# Patient Record
Sex: FEMALE | Race: BLACK OR AFRICAN AMERICAN | NOT HISPANIC OR LATINO | URBAN - METROPOLITAN AREA
[De-identification: names, ages, dates, MRNs, and addresses within clinical notes are randomized per-mention and may not be internally consistent; named-entity substitution may affect disease eponyms.]

---

## 2014-09-04 RX ORDER — MORPHINE SULFATE 50 MG/1
1 CAPSULE, EXTENDED RELEASE ORAL
Qty: 0 | Refills: 0 | COMMUNITY
Start: 2014-09-04

## 2014-09-04 RX ORDER — MORPHINE SULFATE 50 MG/1
1 CAPSULE, EXTENDED RELEASE ORAL
Qty: 0 | Refills: 0 | DISCHARGE
Start: 2014-09-04

## 2014-09-04 RX ORDER — HYDROMORPHONE HYDROCHLORIDE 2 MG/ML
1 INJECTION INTRAMUSCULAR; INTRAVENOUS; SUBCUTANEOUS
Qty: 0 | Refills: 0 | DISCHARGE
Start: 2014-09-04

## 2017-02-21 ENCOUNTER — INPATIENT (INPATIENT)
Facility: HOSPITAL | Age: 72
LOS: 1 days | Discharge: HOME HEALTH | End: 2017-02-23
Attending: HOSPITALIST | Admitting: HOSPITALIST
Payer: MEDICARE

## 2017-02-21 VITALS
DIASTOLIC BLOOD PRESSURE: 70 MMHG | SYSTOLIC BLOOD PRESSURE: 160 MMHG | OXYGEN SATURATION: 100 % | TEMPERATURE: 98 F | RESPIRATION RATE: 16 BRPM | HEART RATE: 71 BPM

## 2017-02-21 DIAGNOSIS — M54.5 LOW BACK PAIN: ICD-10-CM

## 2017-02-21 LAB
ALBUMIN SERPL ELPH-MCNC: 3.7 G/DL — SIGNIFICANT CHANGE UP (ref 3.3–5)
ALP SERPL-CCNC: 49 U/L — SIGNIFICANT CHANGE UP (ref 40–120)
ALT FLD-CCNC: 14 U/L — SIGNIFICANT CHANGE UP (ref 4–33)
ANISOCYTOSIS BLD QL: SLIGHT — SIGNIFICANT CHANGE UP
APPEARANCE UR: CLEAR — SIGNIFICANT CHANGE UP
AST SERPL-CCNC: 33 U/L — HIGH (ref 4–32)
BASOPHILS # BLD AUTO: 0.01 K/UL — SIGNIFICANT CHANGE UP (ref 0–0.2)
BASOPHILS NFR BLD AUTO: 0 % — SIGNIFICANT CHANGE UP (ref 0–2)
BILIRUB SERPL-MCNC: 0.5 MG/DL — SIGNIFICANT CHANGE UP (ref 0.2–1.2)
BILIRUB UR-MCNC: NEGATIVE — SIGNIFICANT CHANGE UP
BLOOD UR QL VISUAL: NEGATIVE — SIGNIFICANT CHANGE UP
BUN SERPL-MCNC: 36 MG/DL — HIGH (ref 7–23)
CALCIUM SERPL-MCNC: 9.3 MG/DL — SIGNIFICANT CHANGE UP (ref 8.4–10.5)
CHLORIDE SERPL-SCNC: 101 MMOL/L — SIGNIFICANT CHANGE UP (ref 98–107)
CK MB BLD-MCNC: 2.39 NG/ML — SIGNIFICANT CHANGE UP (ref 1–4.7)
CK MB BLD-MCNC: SIGNIFICANT CHANGE UP (ref 0–2.5)
CK SERPL-CCNC: 88 U/L — SIGNIFICANT CHANGE UP (ref 25–170)
CO2 SERPL-SCNC: 24 MMOL/L — SIGNIFICANT CHANGE UP (ref 22–31)
COLOR SPEC: SIGNIFICANT CHANGE UP
CREAT SERPL-MCNC: 1.26 MG/DL — SIGNIFICANT CHANGE UP (ref 0.5–1.3)
EOSINOPHIL # BLD AUTO: 0 K/UL — SIGNIFICANT CHANGE UP (ref 0–0.5)
EOSINOPHIL NFR BLD AUTO: 0 % — SIGNIFICANT CHANGE UP (ref 0–6)
GLUCOSE SERPL-MCNC: 148 MG/DL — HIGH (ref 70–99)
GLUCOSE UR-MCNC: SIGNIFICANT CHANGE UP
HCT VFR BLD CALC: 33.6 % — LOW (ref 34.5–45)
HGB BLD-MCNC: 11.6 G/DL — SIGNIFICANT CHANGE UP (ref 11.5–15.5)
HYALINE CASTS # UR AUTO: SIGNIFICANT CHANGE UP (ref 0–?)
HYPOCHROMIA BLD QL: SLIGHT — SIGNIFICANT CHANGE UP
IMM GRANULOCYTES NFR BLD AUTO: 0.6 % — SIGNIFICANT CHANGE UP (ref 0–1.5)
KETONES UR-MCNC: NEGATIVE — SIGNIFICANT CHANGE UP
LEUKOCYTE ESTERASE UR-ACNC: NEGATIVE — SIGNIFICANT CHANGE UP
LG PLATELETS BLD QL AUTO: SLIGHT — SIGNIFICANT CHANGE UP
LIDOCAIN IGE QN: 29.3 U/L — SIGNIFICANT CHANGE UP (ref 7–60)
LYMPHOCYTES # BLD AUTO: 1.23 K/UL — SIGNIFICANT CHANGE UP (ref 1–3.3)
LYMPHOCYTES # BLD AUTO: 5.7 % — LOW (ref 13–44)
MANUAL SMEAR VERIFICATION: SIGNIFICANT CHANGE UP
MCHC RBC-ENTMCNC: 24.2 PG — LOW (ref 27–34)
MCHC RBC-ENTMCNC: 34.5 % — SIGNIFICANT CHANGE UP (ref 32–36)
MCV RBC AUTO: 70.1 FL — LOW (ref 80–100)
MICROCYTES BLD QL: SLIGHT — SIGNIFICANT CHANGE UP
MONOCYTES # BLD AUTO: 1.56 K/UL — HIGH (ref 0–0.9)
MONOCYTES NFR BLD AUTO: 7.3 % — SIGNIFICANT CHANGE UP (ref 2–14)
NEUTROPHILS # BLD AUTO: 18.56 K/UL — HIGH (ref 1.8–7.4)
NEUTROPHILS NFR BLD AUTO: 86.4 % — HIGH (ref 43–77)
NITRITE UR-MCNC: NEGATIVE — SIGNIFICANT CHANGE UP
NON-SQ EPI CELLS # UR AUTO: <1 — SIGNIFICANT CHANGE UP
OVALOCYTES BLD QL SMEAR: SLIGHT — SIGNIFICANT CHANGE UP
PH UR: 6.5 — SIGNIFICANT CHANGE UP (ref 4.6–8)
PLATELET # BLD AUTO: 229 K/UL — SIGNIFICANT CHANGE UP (ref 150–400)
PLATELET COUNT - ESTIMATE: NORMAL — SIGNIFICANT CHANGE UP
PMV BLD: 9.5 FL — SIGNIFICANT CHANGE UP (ref 7–13)
POLYCHROMASIA BLD QL SMEAR: SLIGHT — SIGNIFICANT CHANGE UP
POTASSIUM SERPL-MCNC: 5.4 MMOL/L — HIGH (ref 3.5–5.3)
POTASSIUM SERPL-SCNC: 5.4 MMOL/L — HIGH (ref 3.5–5.3)
PROT SERPL-MCNC: 7.5 G/DL — SIGNIFICANT CHANGE UP (ref 6–8.3)
PROT UR-MCNC: 10 — SIGNIFICANT CHANGE UP
RBC # BLD: 4.79 M/UL — SIGNIFICANT CHANGE UP (ref 3.8–5.2)
RBC # FLD: 17.9 % — HIGH (ref 10.3–14.5)
SODIUM SERPL-SCNC: 141 MMOL/L — SIGNIFICANT CHANGE UP (ref 135–145)
SP GR SPEC: 1.01 — SIGNIFICANT CHANGE UP (ref 1–1.03)
SQUAMOUS # UR AUTO: SIGNIFICANT CHANGE UP
TARGETS BLD QL SMEAR: SLIGHT — SIGNIFICANT CHANGE UP
TROPONIN T SERPL-MCNC: < 0.06 NG/ML — SIGNIFICANT CHANGE UP (ref 0–0.06)
UROBILINOGEN FLD QL: NORMAL E.U. — SIGNIFICANT CHANGE UP (ref 0.1–0.2)
WBC # BLD: 21.49 K/UL — HIGH (ref 3.8–10.5)
WBC # FLD AUTO: 21.49 K/UL — HIGH (ref 3.8–10.5)
WBC UR QL: SIGNIFICANT CHANGE UP (ref 0–?)

## 2017-02-21 PROCEDURE — 72131 CT LUMBAR SPINE W/O DYE: CPT | Mod: 26

## 2017-02-21 PROCEDURE — 71010: CPT | Mod: 26

## 2017-02-21 RX ORDER — HYDROMORPHONE HYDROCHLORIDE 2 MG/ML
2 INJECTION INTRAMUSCULAR; INTRAVENOUS; SUBCUTANEOUS ONCE
Qty: 0 | Refills: 0 | Status: DISCONTINUED | OUTPATIENT
Start: 2017-02-21 | End: 2017-02-21

## 2017-02-21 RX ORDER — HYDRALAZINE HCL 50 MG
25 TABLET ORAL ONCE
Qty: 0 | Refills: 0 | Status: COMPLETED | OUTPATIENT
Start: 2017-02-21 | End: 2017-02-21

## 2017-02-21 RX ADMIN — HYDROMORPHONE HYDROCHLORIDE 2 MILLIGRAM(S): 2 INJECTION INTRAMUSCULAR; INTRAVENOUS; SUBCUTANEOUS at 22:30

## 2017-02-21 RX ADMIN — HYDROMORPHONE HYDROCHLORIDE 2 MILLIGRAM(S): 2 INJECTION INTRAMUSCULAR; INTRAVENOUS; SUBCUTANEOUS at 19:27

## 2017-02-21 RX ADMIN — HYDROMORPHONE HYDROCHLORIDE 2 MILLIGRAM(S): 2 INJECTION INTRAMUSCULAR; INTRAVENOUS; SUBCUTANEOUS at 22:12

## 2017-02-21 RX ADMIN — HYDROMORPHONE HYDROCHLORIDE 2 MILLIGRAM(S): 2 INJECTION INTRAMUSCULAR; INTRAVENOUS; SUBCUTANEOUS at 20:40

## 2017-02-21 NOTE — ED PROVIDER NOTE - PROGRESS NOTE DETAILS
ZULMA Mays- Spoke with Cards fellow Maikel at [phone] and informed them that pt was in the ER and being admitted to medicine.

## 2017-02-21 NOTE — ED PROVIDER NOTE - MEDICAL DECISION MAKING DETAILS
70 yo female w/ lower back pain, acute on chronic r/o fracture w/ ct scan  chest pain r/o acs: cbc , cmp, trop, ekg, ck, ckmb, cxr

## 2017-02-21 NOTE — ED PROVIDER NOTE - OBJECTIVE STATEMENT
72 yo F w/ PMH hypothyroidism, adrenal insufficiency, lumbar spinal stenosis s/p laminectomy x 2 (most recent 2011), CKD, HTN, p/w lower back pain.  Patient completed recent course of steroids for the lower back pain which has been present the past two weeks. Family decided to bring in the patient in because she was complaining of burning in her chest starting today at 4 o'clock.  Denies bowel or bladder incontinence, fevers ,chills, blurry vision or headaches.  Patient takes Dialudid and Morphine chronically.  Denies n/v.  States that the chest pain and back pain are not related.

## 2017-02-21 NOTE — ED ADULT TRIAGE NOTE - CHIEF COMPLAINT QUOTE
C/o chest pain and nausea/vomiting since today and back pain x 2 weeks. Denies sob/dizziness/lightheadedness/fevers/chills/diarrhea. PMH HTN

## 2017-02-21 NOTE — ED PROVIDER NOTE - PMH
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

## 2017-02-21 NOTE — ED PROVIDER NOTE - ATTENDING CONTRIBUTION TO CARE
ED Attending Dr. Sanchez: 72 yo female with hypothyroidism, adrenal insufficiency, spinal stenosis s/p surgical repair x 2, with history of need for steroid taper when back pain worsens, in ED with low back pain and today chest pain.  Pt notes that she has pain to left low back for a few days and today developed epigastric chest/abdominal pain described as a burning.  She finished a steroid taper today for pain and daughter states that pt has had epigastric pain from using steroids in the past.  This is associated with N/V but no diarrhea.  Pt able to ambulate at baseline, and still able to move lower extremities, but walking more slowly due to pain.  No recent falls as per daughter.  No bowel/bladder incontinence.  No fevers.  On exam pt uncomfortable appearing, in moderate distress, heart RRR, lungs CTAB, abd TTP LLQ, extremities without swelling, strength 5/5 in all extremities but significant pain, left low back with TTP, no obvious swelling or collection, and skin without rash.

## 2017-02-22 DIAGNOSIS — Z41.8 ENCOUNTER FOR OTHER PROCEDURES FOR PURPOSES OTHER THAN REMEDYING HEALTH STATE: ICD-10-CM

## 2017-02-22 DIAGNOSIS — I10 ESSENTIAL (PRIMARY) HYPERTENSION: ICD-10-CM

## 2017-02-22 DIAGNOSIS — D72.829 ELEVATED WHITE BLOOD CELL COUNT, UNSPECIFIED: ICD-10-CM

## 2017-02-22 DIAGNOSIS — M54.5 LOW BACK PAIN: ICD-10-CM

## 2017-02-22 DIAGNOSIS — E27.40 UNSPECIFIED ADRENOCORTICAL INSUFFICIENCY: ICD-10-CM

## 2017-02-22 DIAGNOSIS — E03.9 HYPOTHYROIDISM, UNSPECIFIED: ICD-10-CM

## 2017-02-22 LAB
BASOPHILS # BLD AUTO: 0.02 K/UL — SIGNIFICANT CHANGE UP (ref 0–0.2)
BASOPHILS NFR BLD AUTO: 0.1 % — SIGNIFICANT CHANGE UP (ref 0–2)
BUN SERPL-MCNC: 32 MG/DL — HIGH (ref 7–23)
CALCIUM SERPL-MCNC: 9.2 MG/DL — SIGNIFICANT CHANGE UP (ref 8.4–10.5)
CHLORIDE SERPL-SCNC: 99 MMOL/L — SIGNIFICANT CHANGE UP (ref 98–107)
CHOLEST SERPL-MCNC: 168 MG/DL — SIGNIFICANT CHANGE UP (ref 120–199)
CK MB BLD-MCNC: 1.71 NG/ML — SIGNIFICANT CHANGE UP (ref 1–4.7)
CK SERPL-CCNC: 67 U/L — SIGNIFICANT CHANGE UP (ref 25–170)
CO2 SERPL-SCNC: 27 MMOL/L — SIGNIFICANT CHANGE UP (ref 22–31)
CREAT SERPL-MCNC: 1.21 MG/DL — SIGNIFICANT CHANGE UP (ref 0.5–1.3)
EOSINOPHIL # BLD AUTO: 0.02 K/UL — SIGNIFICANT CHANGE UP (ref 0–0.5)
EOSINOPHIL NFR BLD AUTO: 0.1 % — SIGNIFICANT CHANGE UP (ref 0–6)
GLUCOSE SERPL-MCNC: 85 MG/DL — SIGNIFICANT CHANGE UP (ref 70–99)
HBA1C BLD-MCNC: 5.9 % — HIGH (ref 4–5.6)
HCT VFR BLD CALC: 32.9 % — LOW (ref 34.5–45)
HDLC SERPL-MCNC: 81 MG/DL — HIGH (ref 45–65)
HGB BLD-MCNC: 11.1 G/DL — LOW (ref 11.5–15.5)
IMM GRANULOCYTES NFR BLD AUTO: 0.6 % — SIGNIFICANT CHANGE UP (ref 0–1.5)
LIPID PNL WITH DIRECT LDL SERPL: 93 MG/DL — SIGNIFICANT CHANGE UP
LYMPHOCYTES # BLD AUTO: 15.8 % — SIGNIFICANT CHANGE UP (ref 13–44)
LYMPHOCYTES # BLD AUTO: 2.64 K/UL — SIGNIFICANT CHANGE UP (ref 1–3.3)
MCHC RBC-ENTMCNC: 23.7 PG — LOW (ref 27–34)
MCHC RBC-ENTMCNC: 33.7 % — SIGNIFICANT CHANGE UP (ref 32–36)
MCV RBC AUTO: 70.3 FL — LOW (ref 80–100)
MONOCYTES # BLD AUTO: 1.66 K/UL — HIGH (ref 0–0.9)
MONOCYTES NFR BLD AUTO: 10 % — SIGNIFICANT CHANGE UP (ref 2–14)
NEUTROPHILS # BLD AUTO: 12.22 K/UL — HIGH (ref 1.8–7.4)
NEUTROPHILS NFR BLD AUTO: 73.4 % — SIGNIFICANT CHANGE UP (ref 43–77)
PLATELET # BLD AUTO: 162 K/UL — SIGNIFICANT CHANGE UP (ref 150–400)
PMV BLD: 10 FL — SIGNIFICANT CHANGE UP (ref 7–13)
POTASSIUM SERPL-MCNC: 4.2 MMOL/L — SIGNIFICANT CHANGE UP (ref 3.5–5.3)
POTASSIUM SERPL-SCNC: 4.2 MMOL/L — SIGNIFICANT CHANGE UP (ref 3.5–5.3)
RBC # BLD: 4.68 M/UL — SIGNIFICANT CHANGE UP (ref 3.8–5.2)
RBC # FLD: 18 % — HIGH (ref 10.3–14.5)
SODIUM SERPL-SCNC: 141 MMOL/L — SIGNIFICANT CHANGE UP (ref 135–145)
TRIGL SERPL-MCNC: 93 MG/DL — SIGNIFICANT CHANGE UP (ref 10–149)
TROPONIN T SERPL-MCNC: < 0.06 NG/ML — SIGNIFICANT CHANGE UP (ref 0–0.06)
TSH SERPL-MCNC: 0.01 UIU/ML — LOW (ref 0.27–4.2)
WBC # BLD: 16.66 K/UL — HIGH (ref 3.8–10.5)
WBC # FLD AUTO: 16.66 K/UL — HIGH (ref 3.8–10.5)

## 2017-02-22 PROCEDURE — 99222 1ST HOSP IP/OBS MODERATE 55: CPT

## 2017-02-22 RX ORDER — PANTOPRAZOLE SODIUM 20 MG/1
40 TABLET, DELAYED RELEASE ORAL
Qty: 0 | Refills: 0 | Status: DISCONTINUED | OUTPATIENT
Start: 2017-02-22 | End: 2017-02-23

## 2017-02-22 RX ORDER — HEPARIN SODIUM 5000 [USP'U]/ML
5000 INJECTION INTRAVENOUS; SUBCUTANEOUS EVERY 8 HOURS
Qty: 0 | Refills: 0 | Status: DISCONTINUED | OUTPATIENT
Start: 2017-02-22 | End: 2017-02-23

## 2017-02-22 RX ORDER — LISINOPRIL 2.5 MG/1
40 TABLET ORAL DAILY
Qty: 0 | Refills: 0 | Status: DISCONTINUED | OUTPATIENT
Start: 2017-02-22 | End: 2017-02-23

## 2017-02-22 RX ORDER — ASPIRIN/CALCIUM CARB/MAGNESIUM 324 MG
81 TABLET ORAL DAILY
Qty: 0 | Refills: 0 | Status: DISCONTINUED | OUTPATIENT
Start: 2017-02-22 | End: 2017-02-23

## 2017-02-22 RX ORDER — HYDROCORTISONE 20 MG
20 TABLET ORAL
Qty: 0 | Refills: 0 | Status: DISCONTINUED | OUTPATIENT
Start: 2017-02-22 | End: 2017-02-22

## 2017-02-22 RX ORDER — MORPHINE SULFATE 50 MG/1
30 CAPSULE, EXTENDED RELEASE ORAL EVERY 12 HOURS
Qty: 0 | Refills: 0 | Status: DISCONTINUED | OUTPATIENT
Start: 2017-02-22 | End: 2017-02-23

## 2017-02-22 RX ORDER — ONDANSETRON 8 MG/1
4 TABLET, FILM COATED ORAL ONCE
Qty: 0 | Refills: 0 | Status: COMPLETED | OUTPATIENT
Start: 2017-02-22 | End: 2017-02-22

## 2017-02-22 RX ORDER — HYDROCORTISONE 20 MG
10 TABLET ORAL
Qty: 0 | Refills: 0 | Status: DISCONTINUED | OUTPATIENT
Start: 2017-02-22 | End: 2017-02-23

## 2017-02-22 RX ORDER — GABAPENTIN 400 MG/1
200 CAPSULE ORAL AT BEDTIME
Qty: 0 | Refills: 0 | Status: DISCONTINUED | OUTPATIENT
Start: 2017-02-22 | End: 2017-02-23

## 2017-02-22 RX ORDER — HYDROMORPHONE HYDROCHLORIDE 2 MG/ML
4 INJECTION INTRAMUSCULAR; INTRAVENOUS; SUBCUTANEOUS EVERY 6 HOURS
Qty: 0 | Refills: 0 | Status: DISCONTINUED | OUTPATIENT
Start: 2017-02-22 | End: 2017-02-23

## 2017-02-22 RX ORDER — LEVOTHYROXINE SODIUM 125 MCG
75 TABLET ORAL DAILY
Qty: 0 | Refills: 0 | Status: DISCONTINUED | OUTPATIENT
Start: 2017-02-22 | End: 2017-02-23

## 2017-02-22 RX ORDER — ATORVASTATIN CALCIUM 80 MG/1
10 TABLET, FILM COATED ORAL AT BEDTIME
Qty: 0 | Refills: 0 | Status: DISCONTINUED | OUTPATIENT
Start: 2017-02-22 | End: 2017-02-23

## 2017-02-22 RX ORDER — DOCUSATE SODIUM 100 MG
100 CAPSULE ORAL
Qty: 0 | Refills: 0 | Status: DISCONTINUED | OUTPATIENT
Start: 2017-02-22 | End: 2017-02-23

## 2017-02-22 RX ADMIN — PANTOPRAZOLE SODIUM 40 MILLIGRAM(S): 20 TABLET, DELAYED RELEASE ORAL at 06:44

## 2017-02-22 RX ADMIN — HEPARIN SODIUM 5000 UNIT(S): 5000 INJECTION INTRAVENOUS; SUBCUTANEOUS at 13:58

## 2017-02-22 RX ADMIN — Medication 75 MICROGRAM(S): at 05:10

## 2017-02-22 RX ADMIN — MORPHINE SULFATE 30 MILLIGRAM(S): 50 CAPSULE, EXTENDED RELEASE ORAL at 05:10

## 2017-02-22 RX ADMIN — HYDROMORPHONE HYDROCHLORIDE 4 MILLIGRAM(S): 2 INJECTION INTRAMUSCULAR; INTRAVENOUS; SUBCUTANEOUS at 11:16

## 2017-02-22 RX ADMIN — MORPHINE SULFATE 30 MILLIGRAM(S): 50 CAPSULE, EXTENDED RELEASE ORAL at 17:04

## 2017-02-22 RX ADMIN — ONDANSETRON 4 MILLIGRAM(S): 8 TABLET, FILM COATED ORAL at 06:44

## 2017-02-22 RX ADMIN — MORPHINE SULFATE 30 MILLIGRAM(S): 50 CAPSULE, EXTENDED RELEASE ORAL at 18:00

## 2017-02-22 RX ADMIN — HYDROMORPHONE HYDROCHLORIDE 4 MILLIGRAM(S): 2 INJECTION INTRAMUSCULAR; INTRAVENOUS; SUBCUTANEOUS at 05:10

## 2017-02-22 RX ADMIN — HYDROMORPHONE HYDROCHLORIDE 4 MILLIGRAM(S): 2 INJECTION INTRAMUSCULAR; INTRAVENOUS; SUBCUTANEOUS at 12:11

## 2017-02-22 RX ADMIN — Medication 20 MILLIGRAM(S): at 05:10

## 2017-02-22 RX ADMIN — Medication 1 TABLET(S): at 11:16

## 2017-02-22 RX ADMIN — Medication 100 MILLIGRAM(S): at 21:25

## 2017-02-22 RX ADMIN — HEPARIN SODIUM 5000 UNIT(S): 5000 INJECTION INTRAVENOUS; SUBCUTANEOUS at 21:24

## 2017-02-22 RX ADMIN — GABAPENTIN 200 MILLIGRAM(S): 400 CAPSULE ORAL at 21:24

## 2017-02-22 RX ADMIN — Medication 81 MILLIGRAM(S): at 11:15

## 2017-02-22 RX ADMIN — MORPHINE SULFATE 30 MILLIGRAM(S): 50 CAPSULE, EXTENDED RELEASE ORAL at 06:07

## 2017-02-22 RX ADMIN — ATORVASTATIN CALCIUM 10 MILLIGRAM(S): 80 TABLET, FILM COATED ORAL at 21:24

## 2017-02-22 RX ADMIN — HYDROMORPHONE HYDROCHLORIDE 4 MILLIGRAM(S): 2 INJECTION INTRAMUSCULAR; INTRAVENOUS; SUBCUTANEOUS at 06:07

## 2017-02-22 RX ADMIN — LISINOPRIL 40 MILLIGRAM(S): 2.5 TABLET ORAL at 05:10

## 2017-02-22 NOTE — H&P ADULT. - PROBLEM SELECTOR PLAN 1
admit to tele, serial ekg  trend CE, check A1C, pBNP, lipids, TSH, T3, freeT4  Check TTE to assess Lv/Rv/valve function  c/w home meds Admit to Tele  - Serial CE/EKGs  - seen by house cardiology - Check TTE, trend CE

## 2017-02-22 NOTE — PROVIDER CONTACT NOTE (OTHER) - ACTION/TREATMENT ORDERED:
Pt to receive zofran, BP will be reassessed
Pt to receive BP and pain meds at this time and reassess BP after 1 hour

## 2017-02-22 NOTE — H&P ADULT. - PROBLEM SELECTOR PLAN 4
- WBC 21 likely secondary to long-term steroids?  - no sign/symptoms of infection  - CXR prelim neg and UA neg

## 2017-02-22 NOTE — H&P ADULT. - NEGATIVE MUSCULOSKELETAL SYMPTOMS
no joint swelling/no muscle cramps/no myalgia/no arthralgia/no arm pain L/no arm pain R/no muscle weakness/no neck pain

## 2017-02-22 NOTE — PATIENT PROFILE ADULT. - BY WHOM
MD Obrien is not in the office, spoke with Tawana (UR), covering MD Radha Dempsey to be notified MD Obrien not in the office, Didier spoke w/Tawana (UR), covering MD Radha Dempsey to be notified

## 2017-02-22 NOTE — H&P ADULT. - NEGATIVE NEUROLOGICAL SYMPTOMS
no syncope/no paresthesias/no loss of sensation/no difficulty walking/no loss of consciousness/no headache/no weakness

## 2017-02-22 NOTE — H&P ADULT. - RS GEN PE MLT RESP DETAILS PC
good air movement/normal/airway patent/breath sounds equal/respirations non-labored/clear to auscultation bilaterally/no chest wall tenderness

## 2017-02-22 NOTE — H&P ADULT. - HISTORY OF PRESENT ILLNESS
72 yo female with pmhx of hypothyroidiusm, adrenal insufficiency, lumbar spinal stenosis s/p laminextomy x2, CKD, HTN p/w with lower back pain, LLE swelling and chest pressure x2weeks. Patient states the chest pressure worsened today to 8/10 constant pressure with no radiation, and one episode of vomiting. Patient denies fever, chills, diaphoresis, Ha, dizzieness, diarrhea, constipation, SOB, palpitations, pleuritic CP, dysuria, hematuria, melena, hematochezia, or any other complaints. Patient denies any recent travel. 72 y/o female with pmhx of hypothyroidism, adrenal insufficiency, lumbar spinal stenosis s/p laminextomy x2, CKD, HTN p/w with lower back pain, LLE swelling and chest pressure x2weeks. Patient states the chest pressure worsened today to 8/10 constant pressure with no radiation, and one episode of vomiting. Patient denies fever, chills, diaphoresis, Ha, dizzieness, diarrhea, constipation, SOB, palpitations, pleuritic CP, dysuria, hematuria, melena, hematochezia, or any other complaints. Patient denies any recent travel.

## 2017-02-22 NOTE — H&P ADULT. - ATTENDING COMMENTS
Chart reviewed. Vitals and Labs noted.   Pt seen and examined at bedside. Plan formulated with the resident/PA/NP. Detail H&P as above.   Admitted for acute on chronic worsening low back pain. No trauma. Hx of Laminectomy with spinal fusion x 2 by Dr. Pinto.  Pain getting worse. Takes morphine and dilaudid at home but not helping.  No signs and symptoms of cord compression. Will continue with pain meds, spine consult, physical therapy.   Chest pressure after vomitting several times two days ago. Likely gastroenteritis. Now better, able to keep food down.  EKG no new changes. Cardiac enzymes WNL. House card on the case. TTE pending.   Dr. Norris will cover me starting 2/23/17.

## 2017-02-22 NOTE — H&P ADULT. - MUSCULOSKELETAL
details… detailed exam no joint warmth/no calf tenderness/no joint swelling/normal/normal strength/ROM intact/no joint erythema

## 2017-02-22 NOTE — H&P ADULT. - ASSESSMENT
72 yo female with pmhx of hypothyroidiusm, adrenal insufficiency, lumbar spinal stenosis s/p laminextomy x2, CKD, HTN p/w with lower back pain, LLE swelling and chest pressure. r/o acs 72 y/o female with pmhx of hypothyroidism, adrenal insufficiency, lumbar spinal stenosis s/p laminextomy x 2, CKD, HTN p/w with lower back pain and chest pressure. R/o ACS

## 2017-02-22 NOTE — PROVIDER CONTACT NOTE (OTHER) - ASSESSMENT
Pt seen in bed c/o nausea
Pt seen in bed  c/o pain, pt BP seen elevated, above 160SBP parameter, tele pa made aware pt bp above parameter as ordered.

## 2017-02-23 VITALS
SYSTOLIC BLOOD PRESSURE: 91 MMHG | RESPIRATION RATE: 18 BRPM | HEART RATE: 82 BPM | OXYGEN SATURATION: 98 % | TEMPERATURE: 99 F | DIASTOLIC BLOOD PRESSURE: 59 MMHG

## 2017-02-23 LAB
BACTERIA UR CULT: SIGNIFICANT CHANGE UP
BUN SERPL-MCNC: 33 MG/DL — HIGH (ref 7–23)
CALCIUM SERPL-MCNC: 8.9 MG/DL — SIGNIFICANT CHANGE UP (ref 8.4–10.5)
CHLORIDE SERPL-SCNC: 103 MMOL/L — SIGNIFICANT CHANGE UP (ref 98–107)
CO2 SERPL-SCNC: 27 MMOL/L — SIGNIFICANT CHANGE UP (ref 22–31)
CREAT SERPL-MCNC: 1.38 MG/DL — HIGH (ref 0.5–1.3)
GLUCOSE SERPL-MCNC: 84 MG/DL — SIGNIFICANT CHANGE UP (ref 70–99)
HCT VFR BLD CALC: 31.7 % — LOW (ref 34.5–45)
HGB BLD-MCNC: 10.7 G/DL — LOW (ref 11.5–15.5)
MCHC RBC-ENTMCNC: 23.6 PG — LOW (ref 27–34)
MCHC RBC-ENTMCNC: 33.8 % — SIGNIFICANT CHANGE UP (ref 32–36)
MCV RBC AUTO: 70 FL — LOW (ref 80–100)
PLATELET # BLD AUTO: 194 K/UL — SIGNIFICANT CHANGE UP (ref 150–400)
PMV BLD: 9.4 FL — SIGNIFICANT CHANGE UP (ref 7–13)
POTASSIUM SERPL-MCNC: 3.6 MMOL/L — SIGNIFICANT CHANGE UP (ref 3.5–5.3)
POTASSIUM SERPL-SCNC: 3.6 MMOL/L — SIGNIFICANT CHANGE UP (ref 3.5–5.3)
RBC # BLD: 4.53 M/UL — SIGNIFICANT CHANGE UP (ref 3.8–5.2)
RBC # FLD: 17.6 % — HIGH (ref 10.3–14.5)
SODIUM SERPL-SCNC: 142 MMOL/L — SIGNIFICANT CHANGE UP (ref 135–145)
SPECIMEN SOURCE: SIGNIFICANT CHANGE UP
T4 AB SER-ACNC: 4.93 UG/DL — LOW (ref 5.1–13)
WBC # BLD: 14.36 K/UL — HIGH (ref 3.8–10.5)
WBC # FLD AUTO: 14.36 K/UL — HIGH (ref 3.8–10.5)

## 2017-02-23 RX ADMIN — PANTOPRAZOLE SODIUM 40 MILLIGRAM(S): 20 TABLET, DELAYED RELEASE ORAL at 06:17

## 2017-02-23 RX ADMIN — HYDROMORPHONE HYDROCHLORIDE 4 MILLIGRAM(S): 2 INJECTION INTRAMUSCULAR; INTRAVENOUS; SUBCUTANEOUS at 12:11

## 2017-02-23 RX ADMIN — HYDROMORPHONE HYDROCHLORIDE 4 MILLIGRAM(S): 2 INJECTION INTRAMUSCULAR; INTRAVENOUS; SUBCUTANEOUS at 13:00

## 2017-02-23 RX ADMIN — HEPARIN SODIUM 5000 UNIT(S): 5000 INJECTION INTRAVENOUS; SUBCUTANEOUS at 06:17

## 2017-02-23 RX ADMIN — MORPHINE SULFATE 30 MILLIGRAM(S): 50 CAPSULE, EXTENDED RELEASE ORAL at 07:00

## 2017-02-23 RX ADMIN — Medication 1 TABLET(S): at 11:14

## 2017-02-23 RX ADMIN — Medication 81 MILLIGRAM(S): at 11:14

## 2017-02-23 RX ADMIN — Medication 75 MICROGRAM(S): at 06:17

## 2017-02-23 RX ADMIN — Medication 10 MILLIGRAM(S): at 06:17

## 2017-02-23 RX ADMIN — MORPHINE SULFATE 30 MILLIGRAM(S): 50 CAPSULE, EXTENDED RELEASE ORAL at 06:17

## 2017-02-23 RX ADMIN — LISINOPRIL 40 MILLIGRAM(S): 2.5 TABLET ORAL at 06:17

## 2017-02-23 NOTE — DISCHARGE NOTE ADULT - HOME CARE AGENCY
[facility] .Nurse to visit the day after discharge. Nurse will call prior to first visit.

## 2017-02-23 NOTE — DISCHARGE NOTE ADULT - PATIENT PORTAL LINK FT
“You can access the [facility] Patient Portal, offered by [facility], by registering with the following website: [email or web address]”

## 2017-02-23 NOTE — DISCHARGE NOTE ADULT - COMMUNITY RESOURCES
Home aide reinstated with [facility]--Ms. Myrna Swanson was faxed the discharge summary for start of care 2/24, Call [phone].

## 2017-02-23 NOTE — DISCHARGE NOTE ADULT - CARE PLAN
Principal Discharge DX:	Chest pain  Goal:	Followup with PMD and take all medications prescribed.  Instructions for follow-up, activity and diet:	Followup with PMD and take all medications prescribed. Low salt, low fat, low cholesterol diet  Secondary Diagnosis:	Chronic back pain greater than 3 months duration  Goal:	Followup with PMD and take all medications prescribed.  Instructions for follow-up, activity and diet:	Followup with PMD and take all medications prescribed. Low salt, low fat, low cholesterol diet  Secondary Diagnosis:	HTN (hypertension)  Goal:	Followup with PMD and take all medications prescribed.  Instructions for follow-up, activity and diet:	Followup with PMD and take all medications prescribed. Low salt, low fat, low cholesterol diet

## 2017-02-23 NOTE — DISCHARGE NOTE ADULT - HOSPITAL COURSE
70 y/o female with pmhx of hypothyroidism, adrenal insufficiency, lumbar spinal stenosis s/p laminectomy  x 2, CKD, HTN p/w with lower back pain and chest pressure. R/o ACS  CE x 2 neg   UA - neg  CT L-spine - Status post posterior spinal fusion from L2 to L5.   No evidence for acute lumbar spine fracture.  House cards evaluation - serial CE/ EKG, TTE in AM  CXR clear lungs  2/22 Med: Admitted for acute on chronic worsening low back pain. No trauma. Hx of Laminectomy with spinal fusion x 2 by Dr. Burrell. Pain getting worse. Takes morphine and dilaudid at home but not helping. No signs and symptoms of cord compression. Will continue with pain meds, spine consult, physical therapy. Chest pressure after vomiting  several times two days ago. Likely gastroenteritis. Now better, able to keep food down. EKG no new changes. Cardiac enzymes WNL. House card on the case. TTE pending.   2/23- As per attending, patient stable for discharge. 72 y/o female with pmhx of hypothyroidism, adrenal insufficiency, lumbar spinal stenosis s/p laminectomy  x 2, CKD, HTN p/w with lower back pain and chest pressure. R/o ACS  CE x 2 neg   UA - neg  CT L-spine - Status post posterior spinal fusion from L2 to L5.   No evidence for acute lumbar spine fracture.  House cards evaluation - serial CE/ EKG, TTE in AM  CXR clear lungs  2/22 Med: Admitted for acute on chronic worsening low back pain. No trauma. Hx of Laminectomy with spinal fusion x 2 by Dr. Burrell. Pain getting worse. Takes morphine and dilaudid at home but not helping. No signs and symptoms of cord compression. Will continue with pain meds, spine consult, physical therapy. Chest pressure after vomiting  several times two days ago. Likely gastroenteritis. Now better, able to keep food down. EKG no new changes. Cardiac enzymes WNL. House card on the case. TTE as outpatient.   2/23- As per attending, patient stable for discharge. 72 y/o female with pmhx of hypothyroidism, adrenal insufficiency, lumbar spinal stenosis s/p laminectomy  x 2, CKD, HTN p/w with lower back pain and chest pressure. R/o ACS  CE x 2 neg   UA - neg  CT L-spine - Status post posterior spinal fusion from L2 to L5.   No evidence for acute lumbar spine fracture.  House cards evaluation - serial CE/ EKG, TTE in AM  CXR clear lungs  2/22 Med: Admitted for acute on chronic worsening low back pain. No trauma. Hx of Laminectomy with spinal fusion x 2 by Dr. Burrell. Pain getting worse. Takes morphine and dilaudid at home but not helping. No signs and symptoms of cord compression. Will continue with pain meds, spine consult, physical therapy. Chest pressure after vomiting  several times two days ago. Likely gastroenteritis. Now better, able to keep food down. EKG no new changes. Cardiac enzymes WNL. House card on the case. TTE as outpatient.   2/23- As per attending, patient stable for discharge.     Med attending brief hospital summary  72 y/o female with pmhx of hypothyroidism, adrenal insufficiency, lumbar spinal stenosis s/p laminextomy x 2, CKD, HTN p/w with   lower back pain and chest pressure. R/o ACS.     #acute on chronic LBP: Pain persisted despite taking home meds morphine and dilaudid.  CT lumbar spine:Status post posterior spinal fusion from L2 to L5. No evidence for acute lumbar spine fracture. pain improved gradually.    #Chest Pressure: House cards evaluation - serial CE -ve/ EKG no acute changes  cp resolved, likely related to possible gastroenteritis as it was preceded by n/v few days ago.  outpt pcp f/u  outpt orthospine f/u with Dr Burrell-pt has an appointment.   .

## 2017-02-23 NOTE — DISCHARGE NOTE ADULT - CARE PROVIDERS DIRECT ADDRESSES
[email or web address],DirectAddress_Unknown

## 2017-02-23 NOTE — DISCHARGE NOTE ADULT - ADDITIONAL INSTRUCTIONS
Follow up with Cardiologist and PMD within one week of discharge. Call for appointment. Return to ED for any concerning symptoms. Continue medications as prescribed. Low salt, low fat, low cholesterol diet. Followup with Dr Burrell on 2/27/17 at 12:15 pm.

## 2017-02-23 NOTE — DISCHARGE NOTE ADULT - CARE PROVIDER_API CALL
Chava George), Internal Medicine  [address] [place] [address]  Phone: [phone]  Fax: [phone]    Mark Anthony uBrrell (MD; DC), Orthopaedic Surgery  [address] [place] [address]  Phone: [phone]  Fax: [phone]

## 2017-02-23 NOTE — DISCHARGE NOTE ADULT - MEDICATION SUMMARY - MEDICATIONS TO TAKE
I will START or STAY ON the medications listed below when I get home from the hospital:    hydrocortisone 20 mg oral tablet  -- 1 tab(s) by mouth 2 times a day  -- Indication: For Adrenal Insufficiency    morphine 30 mg/12 hr oral tablet, extended release  -- 1 tab(s) by mouth 2 times a day  -- Indication: For Low back pain    HYDROmorphone 4 mg oral tablet  -- 1 tab(s) by mouth every 6 hours, As needed, Moderate Pain  -- Indication: For Low back pain    aspirin 81 mg oral delayed release tablet  -- 1 tab(s) by mouth once a day  -- Indication: For Heart health    ramipril 10 mg oral capsule  -- 1 cap(s) by mouth once a day  -- Indication: For HTN (hypertension)    diltiazem 240 mg/24 hours oral capsule, extended release  -- 1 cap(s) by mouth once a day  -- Indication: For HTN (hypertension)    gabapentin 100 mg oral capsule  -- 2 cap(s) by mouth once a day (at bedtime)  -- Indication: For Leukocytosis    atorvastatin 10 mg oral tablet  -- 1 tab(s) by mouth once a day  -- Indication: For HLD    docusate sodium 100 mg oral capsule  -- 1 cap(s) by mouth 2 times a day, As needed, Constipation  -- Indication: For stool softner    omeprazole 20 mg oral delayed release tablet  -- 1 tab(s) by mouth once a day  -- Indication: For gerd    levothyroxine 75 mcg (0.075 mg) oral tablet  -- 1 tab(s) by mouth once a day  -- Indication: For Hypothyroidism    Citracal + D 315 mg-250 intl units oral tablet  -- 1 tab(s) by mouth once a day  -- Indication: For vitamin

## 2017-02-27 ENCOUNTER — APPOINTMENT (OUTPATIENT)
Dept: ORTHOPEDIC SURGERY | Facility: CLINIC | Age: 72
End: 2017-02-27

## 2017-02-27 VITALS — HEIGHT: 60 IN | WEIGHT: 157 LBS | BODY MASS INDEX: 30.82 KG/M2

## 2017-04-26 ENCOUNTER — APPOINTMENT (OUTPATIENT)
Dept: ORTHOPEDIC SURGERY | Facility: CLINIC | Age: 72
End: 2017-04-26

## 2017-05-16 NOTE — ED ADULT NURSE NOTE - OBJECTIVE STATEMENT
received pt A&ox3 in no apparent distress at this time. #20g IVL to L FA, bloods drawn and sent to the lab. no s/s of infiltration noted at this time. medicated for pain as per MD order. vss. cardiac monitor in place. MD at bedside. pt sent for CT. dispo pending
Warm/Dry

## 2017-05-31 ENCOUNTER — APPOINTMENT (OUTPATIENT)
Dept: ORTHOPEDIC SURGERY | Facility: CLINIC | Age: 72
End: 2017-05-31

## 2017-05-31 VITALS
DIASTOLIC BLOOD PRESSURE: 80 MMHG | SYSTOLIC BLOOD PRESSURE: 166 MMHG | HEIGHT: 62 IN | WEIGHT: 158 LBS | HEART RATE: 112 BPM | BODY MASS INDEX: 29.08 KG/M2

## 2017-07-14 ENCOUNTER — APPOINTMENT (OUTPATIENT)
Dept: ORTHOPEDIC SURGERY | Facility: CLINIC | Age: 72
End: 2017-07-14

## 2017-07-14 VITALS — BODY MASS INDEX: 29.08 KG/M2 | HEIGHT: 62 IN | WEIGHT: 158 LBS

## 2017-07-24 ENCOUNTER — OUTPATIENT (OUTPATIENT)
Dept: OUTPATIENT SERVICES | Facility: HOSPITAL | Age: 72
LOS: 1 days | End: 2017-07-24
Payer: MEDICARE

## 2017-07-24 ENCOUNTER — APPOINTMENT (OUTPATIENT)
Dept: RADIOLOGY | Facility: IMAGING CENTER | Age: 72
End: 2017-07-24

## 2017-07-24 DIAGNOSIS — M81.0 AGE-RELATED OSTEOPOROSIS WITHOUT CURRENT PATHOLOGICAL FRACTURE: ICD-10-CM

## 2017-07-24 PROCEDURE — 77080 DXA BONE DENSITY AXIAL: CPT

## 2018-07-18 ENCOUNTER — APPOINTMENT (OUTPATIENT)
Dept: ORTHOPEDIC SURGERY | Facility: CLINIC | Age: 73
End: 2018-07-18
Payer: MEDICARE

## 2018-07-18 VITALS
HEIGHT: 62 IN | HEART RATE: 98 BPM | BODY MASS INDEX: 27.23 KG/M2 | WEIGHT: 148 LBS | SYSTOLIC BLOOD PRESSURE: 157 MMHG | DIASTOLIC BLOOD PRESSURE: 80 MMHG

## 2018-07-18 DIAGNOSIS — S32.009A UNSPECIFIED FRACTURE OF UNSPECIFIED LUMBAR VERTEBRA, INITIAL ENCOUNTER FOR CLOSED FRACTURE: ICD-10-CM

## 2018-07-18 DIAGNOSIS — M54.5 LOW BACK PAIN: ICD-10-CM

## 2018-07-18 PROCEDURE — 72100 X-RAY EXAM L-S SPINE 2/3 VWS: CPT

## 2018-07-18 PROCEDURE — 99214 OFFICE O/P EST MOD 30 MIN: CPT | Mod: 25

## 2018-07-18 PROCEDURE — 20552 NJX 1/MLT TRIGGER POINT 1/2: CPT

## 2018-09-14 ENCOUNTER — INPATIENT (INPATIENT)
Facility: HOSPITAL | Age: 73
LOS: 3 days | Discharge: NURSING, REHAB OR LONG-TERM CARE | End: 2018-09-18
Attending: HOSPITALIST | Admitting: HOSPITALIST
Payer: MEDICARE

## 2018-09-14 VITALS
HEART RATE: 87 BPM | TEMPERATURE: 99 F | DIASTOLIC BLOOD PRESSURE: 91 MMHG | SYSTOLIC BLOOD PRESSURE: 176 MMHG | OXYGEN SATURATION: 99 % | RESPIRATION RATE: 16 BRPM

## 2018-09-14 DIAGNOSIS — E03.9 HYPOTHYROIDISM, UNSPECIFIED: ICD-10-CM

## 2018-09-14 DIAGNOSIS — E27.40 UNSPECIFIED ADRENOCORTICAL INSUFFICIENCY: ICD-10-CM

## 2018-09-14 DIAGNOSIS — M25.559 PAIN IN UNSPECIFIED HIP: ICD-10-CM

## 2018-09-14 DIAGNOSIS — D64.9 ANEMIA, UNSPECIFIED: ICD-10-CM

## 2018-09-14 DIAGNOSIS — I10 ESSENTIAL (PRIMARY) HYPERTENSION: ICD-10-CM

## 2018-09-14 DIAGNOSIS — M48.061 SPINAL STENOSIS, LUMBAR REGION WITHOUT NEUROGENIC CLAUDICATION: ICD-10-CM

## 2018-09-14 DIAGNOSIS — Z29.9 ENCOUNTER FOR PROPHYLACTIC MEASURES, UNSPECIFIED: ICD-10-CM

## 2018-09-14 LAB
ALBUMIN SERPL ELPH-MCNC: 3.1 G/DL — LOW (ref 3.3–5)
ALP SERPL-CCNC: 42 U/L — SIGNIFICANT CHANGE UP (ref 40–120)
ALT FLD-CCNC: 10 U/L — SIGNIFICANT CHANGE UP (ref 4–33)
APPEARANCE UR: CLEAR — SIGNIFICANT CHANGE UP
AST SERPL-CCNC: 28 U/L — SIGNIFICANT CHANGE UP (ref 4–32)
BASOPHILS # BLD AUTO: 0.07 K/UL — SIGNIFICANT CHANGE UP (ref 0–0.2)
BASOPHILS NFR BLD AUTO: 0.8 % — SIGNIFICANT CHANGE UP (ref 0–2)
BILIRUB SERPL-MCNC: 0.4 MG/DL — SIGNIFICANT CHANGE UP (ref 0.2–1.2)
BILIRUB UR-MCNC: NEGATIVE — SIGNIFICANT CHANGE UP
BLOOD UR QL VISUAL: NEGATIVE — SIGNIFICANT CHANGE UP
BUN SERPL-MCNC: 24 MG/DL — HIGH (ref 7–23)
CALCIUM SERPL-MCNC: 8.4 MG/DL — SIGNIFICANT CHANGE UP (ref 8.4–10.5)
CHLORIDE SERPL-SCNC: 105 MMOL/L — SIGNIFICANT CHANGE UP (ref 98–107)
CO2 SERPL-SCNC: 23 MMOL/L — SIGNIFICANT CHANGE UP (ref 22–31)
COLOR SPEC: SIGNIFICANT CHANGE UP
CREAT SERPL-MCNC: 1.37 MG/DL — HIGH (ref 0.5–1.3)
EOSINOPHIL # BLD AUTO: 0.13 K/UL — SIGNIFICANT CHANGE UP (ref 0–0.5)
EOSINOPHIL NFR BLD AUTO: 1.4 % — SIGNIFICANT CHANGE UP (ref 0–6)
GLUCOSE SERPL-MCNC: 96 MG/DL — SIGNIFICANT CHANGE UP (ref 70–99)
GLUCOSE UR-MCNC: NEGATIVE — SIGNIFICANT CHANGE UP
HCT VFR BLD CALC: 27.3 % — LOW (ref 34.5–45)
HGB BLD-MCNC: 9.4 G/DL — LOW (ref 11.5–15.5)
IMM GRANULOCYTES # BLD AUTO: 0.02 # — SIGNIFICANT CHANGE UP
IMM GRANULOCYTES NFR BLD AUTO: 0.2 % — SIGNIFICANT CHANGE UP (ref 0–1.5)
KETONES UR-MCNC: NEGATIVE — SIGNIFICANT CHANGE UP
LEUKOCYTE ESTERASE UR-ACNC: NEGATIVE — SIGNIFICANT CHANGE UP
LYMPHOCYTES # BLD AUTO: 2.16 K/UL — SIGNIFICANT CHANGE UP (ref 1–3.3)
LYMPHOCYTES # BLD AUTO: 23.8 % — SIGNIFICANT CHANGE UP (ref 13–44)
MCHC RBC-ENTMCNC: 25.1 PG — LOW (ref 27–34)
MCHC RBC-ENTMCNC: 34.4 % — SIGNIFICANT CHANGE UP (ref 32–36)
MCV RBC AUTO: 73 FL — LOW (ref 80–100)
MONOCYTES # BLD AUTO: 0.77 K/UL — SIGNIFICANT CHANGE UP (ref 0–0.9)
MONOCYTES NFR BLD AUTO: 8.5 % — SIGNIFICANT CHANGE UP (ref 2–14)
NEUTROPHILS # BLD AUTO: 5.92 K/UL — SIGNIFICANT CHANGE UP (ref 1.8–7.4)
NEUTROPHILS NFR BLD AUTO: 65.3 % — SIGNIFICANT CHANGE UP (ref 43–77)
NITRITE UR-MCNC: NEGATIVE — SIGNIFICANT CHANGE UP
NRBC # FLD: 0 — SIGNIFICANT CHANGE UP
PH UR: 7 — SIGNIFICANT CHANGE UP (ref 5–8)
PLATELET # BLD AUTO: 224 K/UL — SIGNIFICANT CHANGE UP (ref 150–400)
PMV BLD: 9.2 FL — SIGNIFICANT CHANGE UP (ref 7–13)
POTASSIUM SERPL-MCNC: 4.3 MMOL/L — SIGNIFICANT CHANGE UP (ref 3.5–5.3)
POTASSIUM SERPL-SCNC: 4.3 MMOL/L — SIGNIFICANT CHANGE UP (ref 3.5–5.3)
PROT SERPL-MCNC: 6 G/DL — SIGNIFICANT CHANGE UP (ref 6–8.3)
PROT UR-MCNC: NEGATIVE — SIGNIFICANT CHANGE UP
RBC # BLD: 3.74 M/UL — LOW (ref 3.8–5.2)
RBC # FLD: 18.2 % — HIGH (ref 10.3–14.5)
SODIUM SERPL-SCNC: 139 MMOL/L — SIGNIFICANT CHANGE UP (ref 135–145)
SP GR SPEC: 1.01 — SIGNIFICANT CHANGE UP (ref 1–1.04)
UROBILINOGEN FLD QL: NORMAL — SIGNIFICANT CHANGE UP
WBC # BLD: 9.07 K/UL — SIGNIFICANT CHANGE UP (ref 3.8–10.5)
WBC # FLD AUTO: 9.07 K/UL — SIGNIFICANT CHANGE UP (ref 3.8–10.5)

## 2018-09-14 PROCEDURE — 73502 X-RAY EXAM HIP UNI 2-3 VIEWS: CPT | Mod: 26,RT

## 2018-09-14 RX ORDER — ATORVASTATIN CALCIUM 80 MG/1
1 TABLET, FILM COATED ORAL
Qty: 0 | Refills: 0 | COMMUNITY

## 2018-09-14 RX ORDER — MORPHINE SULFATE 50 MG/1
60 CAPSULE, EXTENDED RELEASE ORAL EVERY 12 HOURS
Qty: 0 | Refills: 0 | Status: DISCONTINUED | OUTPATIENT
Start: 2018-09-14 | End: 2018-09-14

## 2018-09-14 RX ORDER — HYDROMORPHONE HYDROCHLORIDE 2 MG/ML
1 INJECTION INTRAMUSCULAR; INTRAVENOUS; SUBCUTANEOUS ONCE
Qty: 0 | Refills: 0 | Status: DISCONTINUED | OUTPATIENT
Start: 2018-09-14 | End: 2018-09-14

## 2018-09-14 RX ORDER — INFLUENZA VIRUS VACCINE 15; 15; 15; 15 UG/.5ML; UG/.5ML; UG/.5ML; UG/.5ML
0.5 SUSPENSION INTRAMUSCULAR ONCE
Qty: 0 | Refills: 0 | Status: DISCONTINUED | OUTPATIENT
Start: 2018-09-14 | End: 2018-09-18

## 2018-09-14 RX ORDER — MORPHINE SULFATE 50 MG/1
30 CAPSULE, EXTENDED RELEASE ORAL ONCE
Qty: 0 | Refills: 0 | Status: DISCONTINUED | OUTPATIENT
Start: 2018-09-14 | End: 2018-09-14

## 2018-09-14 RX ORDER — ACETAMINOPHEN 500 MG
650 TABLET ORAL EVERY 6 HOURS
Qty: 0 | Refills: 0 | Status: DISCONTINUED | OUTPATIENT
Start: 2018-09-14 | End: 2018-09-18

## 2018-09-14 RX ORDER — HYDROCHLOROTHIAZIDE 25 MG
25 TABLET ORAL DAILY
Qty: 0 | Refills: 0 | Status: DISCONTINUED | OUTPATIENT
Start: 2018-09-14 | End: 2018-09-18

## 2018-09-14 RX ORDER — ASPIRIN/CALCIUM CARB/MAGNESIUM 324 MG
81 TABLET ORAL DAILY
Qty: 0 | Refills: 0 | Status: DISCONTINUED | OUTPATIENT
Start: 2018-09-14 | End: 2018-09-18

## 2018-09-14 RX ORDER — ATORVASTATIN CALCIUM 80 MG/1
40 TABLET, FILM COATED ORAL AT BEDTIME
Qty: 0 | Refills: 0 | Status: DISCONTINUED | OUTPATIENT
Start: 2018-09-14 | End: 2018-09-18

## 2018-09-14 RX ORDER — HEPARIN SODIUM 5000 [USP'U]/ML
5000 INJECTION INTRAVENOUS; SUBCUTANEOUS EVERY 8 HOURS
Qty: 0 | Refills: 0 | Status: DISCONTINUED | OUTPATIENT
Start: 2018-09-14 | End: 2018-09-18

## 2018-09-14 RX ORDER — LISINOPRIL 2.5 MG/1
40 TABLET ORAL DAILY
Qty: 0 | Refills: 0 | Status: DISCONTINUED | OUTPATIENT
Start: 2018-09-14 | End: 2018-09-18

## 2018-09-14 RX ORDER — HYDROMORPHONE HYDROCHLORIDE 2 MG/ML
0.5 INJECTION INTRAMUSCULAR; INTRAVENOUS; SUBCUTANEOUS ONCE
Qty: 0 | Refills: 0 | Status: DISCONTINUED | OUTPATIENT
Start: 2018-09-14 | End: 2018-09-14

## 2018-09-14 RX ORDER — LEVOTHYROXINE SODIUM 125 MCG
50 TABLET ORAL DAILY
Qty: 0 | Refills: 0 | Status: DISCONTINUED | OUTPATIENT
Start: 2018-09-14 | End: 2018-09-18

## 2018-09-14 RX ORDER — HYDROMORPHONE HYDROCHLORIDE 2 MG/ML
4 INJECTION INTRAMUSCULAR; INTRAVENOUS; SUBCUTANEOUS EVERY 4 HOURS
Qty: 0 | Refills: 0 | Status: DISCONTINUED | OUTPATIENT
Start: 2018-09-14 | End: 2018-09-18

## 2018-09-14 RX ORDER — MORPHINE SULFATE 50 MG/1
30 CAPSULE, EXTENDED RELEASE ORAL
Qty: 0 | Refills: 0 | Status: DISCONTINUED | OUTPATIENT
Start: 2018-09-14 | End: 2018-09-18

## 2018-09-14 RX ORDER — HYDROMORPHONE HYDROCHLORIDE 2 MG/ML
1 INJECTION INTRAMUSCULAR; INTRAVENOUS; SUBCUTANEOUS EVERY 4 HOURS
Qty: 0 | Refills: 0 | Status: DISCONTINUED | OUTPATIENT
Start: 2018-09-14 | End: 2018-09-18

## 2018-09-14 RX ORDER — HYDROCORTISONE 20 MG
10 TABLET ORAL EVERY 12 HOURS
Qty: 0 | Refills: 0 | Status: DISCONTINUED | OUTPATIENT
Start: 2018-09-14 | End: 2018-09-18

## 2018-09-14 RX ORDER — MORPHINE SULFATE 50 MG/1
30 CAPSULE, EXTENDED RELEASE ORAL
Qty: 0 | Refills: 0 | Status: DISCONTINUED | OUTPATIENT
Start: 2018-09-14 | End: 2018-09-14

## 2018-09-14 RX ORDER — ACETAMINOPHEN 500 MG
975 TABLET ORAL ONCE
Qty: 0 | Refills: 0 | Status: COMPLETED | OUTPATIENT
Start: 2018-09-14 | End: 2018-09-14

## 2018-09-14 RX ORDER — CALCIUM CARBONATE 500(1250)
1 TABLET ORAL DAILY
Qty: 0 | Refills: 0 | Status: DISCONTINUED | OUTPATIENT
Start: 2018-09-14 | End: 2018-09-18

## 2018-09-14 RX ORDER — MORPHINE SULFATE 50 MG/1
60 CAPSULE, EXTENDED RELEASE ORAL
Qty: 0 | Refills: 0 | Status: DISCONTINUED | OUTPATIENT
Start: 2018-09-14 | End: 2018-09-18

## 2018-09-14 RX ORDER — MULTIVIT-MIN/FERROUS GLUCONATE 9 MG/15 ML
1 LIQUID (ML) ORAL DAILY
Qty: 0 | Refills: 0 | Status: DISCONTINUED | OUTPATIENT
Start: 2018-09-14 | End: 2018-09-14

## 2018-09-14 RX ORDER — PANTOPRAZOLE SODIUM 20 MG/1
40 TABLET, DELAYED RELEASE ORAL
Qty: 0 | Refills: 0 | Status: DISCONTINUED | OUTPATIENT
Start: 2018-09-14 | End: 2018-09-18

## 2018-09-14 RX ADMIN — HYDROMORPHONE HYDROCHLORIDE 0.5 MILLIGRAM(S): 2 INJECTION INTRAMUSCULAR; INTRAVENOUS; SUBCUTANEOUS at 08:53

## 2018-09-14 RX ADMIN — HYDROMORPHONE HYDROCHLORIDE 1 MILLIGRAM(S): 2 INJECTION INTRAMUSCULAR; INTRAVENOUS; SUBCUTANEOUS at 17:45

## 2018-09-14 RX ADMIN — Medication 975 MILLIGRAM(S): at 04:48

## 2018-09-14 RX ADMIN — Medication 975 MILLIGRAM(S): at 06:00

## 2018-09-14 RX ADMIN — HEPARIN SODIUM 5000 UNIT(S): 5000 INJECTION INTRAVENOUS; SUBCUTANEOUS at 21:25

## 2018-09-14 RX ADMIN — HYDROMORPHONE HYDROCHLORIDE 1 MILLIGRAM(S): 2 INJECTION INTRAMUSCULAR; INTRAVENOUS; SUBCUTANEOUS at 17:32

## 2018-09-14 RX ADMIN — LISINOPRIL 40 MILLIGRAM(S): 2.5 TABLET ORAL at 18:22

## 2018-09-14 RX ADMIN — MORPHINE SULFATE 30 MILLIGRAM(S): 50 CAPSULE, EXTENDED RELEASE ORAL at 06:00

## 2018-09-14 RX ADMIN — HYDROMORPHONE HYDROCHLORIDE 0.5 MILLIGRAM(S): 2 INJECTION INTRAMUSCULAR; INTRAVENOUS; SUBCUTANEOUS at 09:18

## 2018-09-14 RX ADMIN — MORPHINE SULFATE 30 MILLIGRAM(S): 50 CAPSULE, EXTENDED RELEASE ORAL at 22:29

## 2018-09-14 RX ADMIN — HYDROMORPHONE HYDROCHLORIDE 0.5 MILLIGRAM(S): 2 INJECTION INTRAMUSCULAR; INTRAVENOUS; SUBCUTANEOUS at 07:49

## 2018-09-14 RX ADMIN — HYDROMORPHONE HYDROCHLORIDE 0.5 MILLIGRAM(S): 2 INJECTION INTRAMUSCULAR; INTRAVENOUS; SUBCUTANEOUS at 08:28

## 2018-09-14 RX ADMIN — Medication 81 MILLIGRAM(S): at 18:22

## 2018-09-14 RX ADMIN — Medication 10 MILLIGRAM(S): at 18:59

## 2018-09-14 RX ADMIN — MORPHINE SULFATE 30 MILLIGRAM(S): 50 CAPSULE, EXTENDED RELEASE ORAL at 04:48

## 2018-09-14 RX ADMIN — Medication 1 TABLET(S): at 18:59

## 2018-09-14 RX ADMIN — PANTOPRAZOLE SODIUM 40 MILLIGRAM(S): 20 TABLET, DELAYED RELEASE ORAL at 18:22

## 2018-09-14 RX ADMIN — Medication 25 MILLIGRAM(S): at 18:22

## 2018-09-14 RX ADMIN — Medication 50 MICROGRAM(S): at 18:22

## 2018-09-14 RX ADMIN — ATORVASTATIN CALCIUM 40 MILLIGRAM(S): 80 TABLET, FILM COATED ORAL at 21:25

## 2018-09-14 NOTE — ED PROVIDER NOTE - PROGRESS NOTE DETAILS
Kavon Cardoso PGY2: paged [facility] hospitalist Kavon Cardoso PGY2: d/w [facility] hospitalist agreed w/ plan and to admission; text paged MAR: Admit: Joleen Solomon [phone]  to Dr. Hope  pain management, unable to walk  callback: [phone]

## 2018-09-14 NOTE — H&P ADULT - ASSESSMENT
73F w/ PMH of HTN, CKD, lumbar spinal stenosis s/p laminectomy x 2 (2008), hypothyroidism, adrenal insufficiency p/w acute on chronic R hip pain.

## 2018-09-14 NOTE — ED PROVIDER NOTE - RESPIRATORY NEGATIVE STATEMENT, MLM
Catarino for Tanya     Known to me from prior hospital stay     Metastatic lung cancer     Admitted with back pain and found to have empyema oh chest xray for chest tube today     On MS hernán taking 2 tablets twice daily  msir prn   Percocet 10 mg prn     Poor pain control overall but improved overnight with addtion of valium and increasing dilaudid dosing     Staff note pt \"slept all night\" after receiving vaium     Problem list as noted below     A  Metastatic lung ca  Empyema   Opioid tolerant and dependent  Continuous     p  D/w SO and staff    Continue current pain med  Consider pca when more awake and can evaluate pain after chest tube placed     Sleeping   No pain behavior   Pain 10/10 earlier today   Med reviewed       Patient Active Problem List   Diagnosis   • Non-small cell carcinoma of lung (CMS/HCC)   • Malignant neoplasm (CMS/HCC)   • Blurred vision   • History of non-ST elevation myocardial infarction (NSTEMI)   • Brain metastasis (CMS/HCC)   • History of ischemic multifocal multiple vascular territories stroke   • PFO (patent foramen ovale) with bidirectional atrial shunt   • Nausea and vomiting   • Cancer related pain   • Drug-induced constipation   • Candidiasis of mouth   • Microcytic anemia   • Hyponatremia   • Hx of ischemic left MCA stroke   • Other chest pain   • Palliative care by specialist     Past Medical History:   Diagnosis Date   • Chronic pain     ; several medication trials   • History of ischemic multifocal multiple vascular territories stroke 12/08/2017    12-8-17 mainly R PCA R parietal MCA branch other scattered L cerebellum L MCA felt hypercoagulable also PFO   • Hx of ischemic left MCA stroke 02/20/2018    L M1 embolus from hypercoag state s/p embolectomy   • Kidney stone    • Non-small cell cancer of left lung (CMS/HCC) 11/01/2017   • PFO (patent foramen ovale) with bidirectional atrial shunt 12/19/2017     Past Surgical History:   Procedure Laterality Date   • Cyberknife 3d  contouring  11/29/2017   • Fracture surgery Left 2000    plate placed     ALLERGIES:  No Known Allergies  Social History     Social History   • Marital status:      Spouse name: N/A   • Number of children: N/A   • Years of education: N/A     Occupational History   • Not on file.     Social History Main Topics   • Smoking status: Former Smoker     Packs/day: 1.00     Years: 30.00     Types: Cigarettes     Quit date: 10/23/2017   • Smokeless tobacco: Never Used   • Alcohol use No   • Drug use: Yes     Frequency: 4.0 times per week     Types: Marijuana      Comment: after chemo treatments    • Sexual activity: Not on file     Other Topics Concern   • Not on file     Social History Narrative   • No narrative on file     Current Facility-Administered Medications   Medication   • dexamethasone (DECADRON) tablet 4 mg   • ondansetron (ZOFRAN ODT) disintegrating tablet 4 mg   • morphine (IMM REL) (MSIR) tablet 15 mg   • morphine SR (MS CONTIN) tablet 15 mg   • nystatin (MYCOSTATIN) 744976 UNIT/ML suspension 500,000 Units   • levETIRAcetam (KepPRA) tablet 500 mg   • pantoprazole (PROTONIX) EC tablet 40 mg   • albuterol inhaler 2 puff   • sodium chloride (PF) 0.9 % injection 2 mL   • sodium chloride (PF) 0.9 % injection 2 mL   • sodium chloride 0.9% infusion   • sodium chloride 0.9% infusion   • HYDROmorphone (DILAUDID) injection 0.5-1 mg   • lidocaine (LIDOCARE) 4 % patch 1 patch    And   • lidocaine patch removal   • piperacillin-tazobactam (ZOSYN) 3.375 g in sodium chloride 0.9 % 100 mL IVPB     Facility-Administered Medications Ordered in Other Encounters   Medication   • heparin flush 100 UNIT/ML lock flush 500 Units          no chest pain, no cough, and no shortness of breath.

## 2018-09-14 NOTE — ED ADULT NURSE REASSESSMENT NOTE - NS ED NURSE REASSESS COMMENT FT1
Received report from Jaiden VIDAL. Pt is a/o x 3. Pt states she has no relief from pain. MAR made aware. No complaints of chest pain, headache, nausea, dizziness, vomiting  SOB, fever, chills verbalized. Pt has 22G to the left AC with no redness or swelling noted. Awaiting for furthers orders. WIll continue to monitor.

## 2018-09-14 NOTE — H&P ADULT - ATTENDING COMMENTS
Chart reviewed. Vitals and Labs noted.   Pt seen and examined at bedside. Plan formulated with the resident/PA/NP. Detail H&P as above.     Admitted for acute on chronic worsening of right hip pain. Pain is localized in the right scral iliac and surround muscles.  Pt already on high dose opioid regimen, seeing pain management at home.   Hip xray with degenerative changes. pt seems overly sensitive to pain.   PMHx of HTN, CKD, lumbar spinal stenosis s/p laminectomy x 2 (2008), hypothyroidism, adrenal insufficiency p/w acute on chronic R hip pain.  At home she takes Morphine ER 60 mg in am and 30 mg in pm, with break though dilaudid 4 mg PO.  will increase morphine ER to 60 mg BID. c/w dilaudid PO 4 mg.   For severe pain, add dilaudid 1 mg IV.   if no improvement, pain management consult.  DVT ppx    - Dr. ELMER Hope ([facility])  - [phone]

## 2018-09-14 NOTE — ED PROVIDER NOTE - ATTENDING CONTRIBUTION TO CARE
MD Cooper:  I performed a face to face bedside interview with patient regarding history of present illness, review of symptoms and past medical history. I completed an independent physical exam(documented below).  I have discussed patient's plan of care with resident.   I agree with note as stated above, having amended the EMR as needed to reflect my findings. I have discussed the assessment and plan of care.  This includes during the time I functioned as the attending physician for this patient.  PE:  Gen: Alert, moderate distress  Head: NC, AT,  R eye disconjugate gaze (chronic). normal lids/conjunctiva  ENT:  normal hearing, patent oropharynx without erythema/exudate  Neck: +supple, no tenderness/meningismus/JVD, +Trachea midline  Chest: no chest wall tenderness, equal chest rise  Pulm: Bilateral BS, normal resp effort, no wheeze/stridor/retractions  CV: RRR, no M/R/G, +dist pulses  Abd: +BS, soft, NT/ND  Rectal: deferred  Mskel: +R prox hip ttp and ROM at this joint limited 2/2 to pain  Skin: no rash  Neuro: AAOx3, no sensory/motor deficits  MDM:  72yo F w/ pmh inclusive of htn, ckd, lumbar spinal stenosis s/p laminectomy, on chronic opioid pain mngmt, c/o acute on chronic R hip pain X 2 days; normally ambulates with walker, c/o severe pain with even minimal movement. Denies urine/stool incontinence or retention, perineal paresthesias, or focal neuro symptoms. Basic labs, ua, pain mngmt, possible admission for pain mngmt/PT eval as pt in severe pain here and unable to even sit up, let alone walk.

## 2018-09-14 NOTE — H&P ADULT - NSHPLABSRESULTS_GEN_ALL_CORE
9.4    9.07  )-----------( 224      ( 14 Sep 2018 04:50 )             27.3       -    139  |  105  |  24<H>  ----------------------------<  96  4.3   |  23  |  1.37<H>    Ca    8.4      14 Sep 2018 04:50    TPro  6.0  /  Alb  3.1<L>  /  TBili  0.4  /  DBili  x   /  AST  28  /  ALT  10  /  AlkPhos  42                Urinalysis Basic - ( 14 Sep 2018 05:00 )    Color: LIGHT YELLOW / Appearance: CLEAR / S.013 / pH: 7.0  Gluc: NEGATIVE / Ketone: NEGATIVE  / Bili: NEGATIVE / Urobili: NORMAL   Blood: NEGATIVE / Protein: NEGATIVE / Nitrite: NEGATIVE   Leuk Esterase: NEGATIVE / RBC: x / WBC x   Sq Epi: x / Non Sq Epi: x / Bacteria: x            Lactate Trend            CAPILLARY BLOOD GLUCOSE

## 2018-09-14 NOTE — H&P ADULT - PROBLEM SELECTOR PLAN 1
-with acute on chronic hip pain   - start with morphine 60mg ER BID, continue with dilaudid 4mg PO for moderate pain, and 1mg dialudid IVP for severe pain Q 4hrs.  -per conversation with ortho, no acute reason for surgical intervention at this time  -consider pain management consult if pain not well controlled  -PT ordered

## 2018-09-14 NOTE — ED ADULT NURSE REASSESSMENT NOTE - NS ED NURSE REASSESS COMMENT FT1
Received report from Wm VIDAL. Pt states her pain returned. ADS Dr. Guerrero made aware and coming to evaluate the pt. No complaints of chest pain, headache, nausea, dizziness, vomiting  SOB, fever, chills verbalized. Awaiting further orders. Will continue to monitor.

## 2018-09-14 NOTE — H&P ADULT - FAMILY HISTORY
Mother  Still living? Unknown  Family history of hypertension, Age at diagnosis: Age Unknown     Sibling  Still living? Unknown  Family history of diabetes mellitus, Age at diagnosis: Age Unknown

## 2018-09-14 NOTE — ED PROVIDER NOTE - MEDICAL DECISION MAKING DETAILS
73F w/ R hip pain atraumatic likely 2/2 osteoarthritis, less likely gout, dislocation, fracture; will check xr, give pain meds, reassess, if still unable to ambulate will need admission

## 2018-09-14 NOTE — ED ADULT NURSE NOTE - NSIMPLEMENTINTERV_GEN_ALL_ED
Implemented All Universal Safety Interventions:  [place] to call system. Call bell, personal items and telephone within reach. Instruct patient to call for assistance. Room bathroom lighting operational. Non-slip footwear when patient is off stretcher. Physically safe environment: no spills, clutter or unnecessary equipment. Stretcher in lowest position, wheels locked, appropriate side rails in place.

## 2018-09-14 NOTE — ED PROVIDER NOTE - PHYSICAL EXAMINATION
*GEN:   uncomfortable, AOx3    ///    *EYES:   R eye lateral gaze deviation and uneven pupil; L eye normal    ///    *HEENT:   airway patent, moist mucosal membranes    ///    *CV:   regular rate and rhythm    ///    *RESP:   clear to auscultation bilaterally, non-labored    ///    *ABD:   soft, non-tender    ///    *:   no cva/flank tenderness    ///    *MSK:   R hip tenderness to palpation and limited ROM    ///    *SKIN:   dry, intact    ///    *NEURO:   AOx3, no focal loss of sensation, unable to sit up or stand w/out significant pain

## 2018-09-14 NOTE — H&P ADULT - HISTORY OF PRESENT ILLNESS
73F w/ pmh HTN, CKD, lumbar spinal stenosis s/p laminectomy x 2 (2008), hypothyroidism, adrenal insufficiency p/w acute on chronic R hip pain since yesterday. Has had R hip pain x 1 mo intermittently, denies any trauma, increased exertion or strain. Lives at home w/ daughter, usually ambulates with walker. +chronic unchanged back pain radiating down bilateral legs. Denies urinary incontinence, constipation/diarrhea, other sx. Normally takes morphine 60/30 and dilaudid 4 at home, took today w/out relief. Received second hip injection 1 wk ago. 73F w/ PMH of HTN, CKD, lumbar spinal stenosis s/p laminectomy x 2 (2008), hypothyroidism, adrenal insufficiency p/w acute on chronic R hip pain. Patient states that she was doing well on Wednesday morning but progressively started to have worsening R hip pain becoming unbearable on Thursday. Denies any trauma, increased exertion or strain. Lives at home w/ daughter, usually ambulates with walker. +chronic unchanged back pain radiating down bilateral legs. Denies urinary incontinence, constipation/diarrhea, other sx. Normally takes morphine 60/30 and dilaudid 4 at home, took today w/out relief. Received second hip injection 1 wk ago. 73F w/ PMH of HTN, CKD, lumbar spinal stenosis s/p laminectomy x 2 (2008), hypothyroidism, adrenal insufficiency p/w acute on chronic R hip pain. Patient states that she was doing well on Wednesday morning but progressively started to have worsening R hip pain becoming unbearable on Thursday, patient called her pain management provider who told her to present to the ED. Pain is 10/10 deep in the bone, worsened with acute movement. Patient states that she cannot ambulate 2/2 severe pain. Denies any trauma, increased exertion or strain. Lives at home w/ daughter and , usually ambulates with walker. Denies urinary incontinence, constipation/diarrhea, other sx, reports not feeling acutely ill. Normally takes morphine ER 60 in AM/30 in PM and dilaudid 4mg PRN at at home. Received second hip injection 1 wk ago. Patient repots that she is aware that she is anemic but has never been worked up. Reports no hematuria, hematochezia or hematemesis.       REVIEW OF SYSTEMS:  CONSTITUTIONAL: No weakness, fevers or chills  EYES/ENT: No visual changes;  No vertigo or throat pain   NECK: No pain or stiffness  RESPIRATORY: No cough, wheezing, hemoptysis; No shortness of breath  CARDIOVASCULAR: No chest pain or palpitations  GASTROINTESTINAL: No abdominal or epigastric pain. No nausea, vomiting, or hematemesis; No diarrhea or constipation. No melena or hematochezia.  GENITOURINARY: No dysuria, frequency or hematuria  NEUROLOGICAL: No numbness or weakness  SKIN: No itching, burning, rashes, or lesions   All other review of systems is negative unless indicated above.

## 2018-09-14 NOTE — H&P ADULT - NSHPPHYSICALEXAM_GEN_ALL_CORE
PHYSICAL EXAM:  GENERAL: appear uncomfortable   HEAD:  Atraumatic, Normocephalic  EYES: EOMI, clear sclera   ENMT: Moist mucous membranes  NECK: Supple, No JVD,   HEART: Regular rate and rhythm; No murmurs, rubs, or gallops  RESPIRATORY: CTA B/L, No W/R/R  ABDOMEN: Soft, Nontender, Nondistended; Bowel sounds present  NEUROLOGY: A&Ox3, nonfocal, moves lower extremities but in severe pain  EXTREMITIES:   No clubbing, cyanosis, or 2+ pitting edema, varicosities present, legs equal in girth   SKIN: warm, dry, normal color, no rash or abnormal lesions

## 2018-09-14 NOTE — ED PROVIDER NOTE - OBJECTIVE STATEMENT
73F w/ pmh HTN, CKD, lumbar spinal stenosis s/p laminectomy x 2 (2008), hypothyroidism, adrenal insufficiency p/w acute on chronic R hip pain since yesterday. Has had R hip pain x 1 mo intermittently, denies any trauma, increased exertion or strain. Lives at home w/ daughter, usually ambulates with walker. +chronic unchanged back pain radiating down bilateral legs. Denies urinary incontinence, constipation/diarrhea, other sx. Normally takes morphine 60/30 and dilaudid 4 at home, took today w/out relief. Received second hip injection 1 wk ago.    PCP: Chava Obrien  Pain: Ana Capellan

## 2018-09-14 NOTE — ED ADULT TRIAGE NOTE - CHIEF COMPLAINT QUOTE
p/w with chronic lower back pain worse in R hip & RLE worse today.  Sent to ED by pain management.  Pt rpts unable to ambulate x 1 day 2/2 to pain.  Morphine and Dilaudid at home for pain with no relief.

## 2018-09-14 NOTE — H&P ADULT - PROBLEM SELECTOR PLAN 2
-with hg 9.4, low MCV, high RDW, may be in the setting a slow bleed vs chronic disease  -iron studies with B12 and Folate   -trend daily CBC

## 2018-09-14 NOTE — ED ADULT NURSE NOTE - NSFALLRSKOUTCOME_ED_ALL_ED
ED Skin Rash/Insect Bite/Abscs





- General


Chief Complaint: Skin Problem


Stated Complaint: BACK ISSUE


Time Seen by Provider: 07/17/18 15:45


Mode of Arrival: Ambulatory


Information source: Patient


Notes: 





55-year-old male presents to ED for poison ivy to the back.  He states he was 

out working last week and his back been itching but it is getting progressively 

worse to the point that he could not take it anymore so he came to emergency 

room.  Patient is alert and oriented respirations regular and unlabored 

speaking in full sentences ambulate with a even steady gait.


TRAVEL OUTSIDE OF THE [place] IN LAST 30 DAYS: No





- HPI


Patient complains to provider of: Skin rash/lesion


Onset: Last week


Onset/Duration: Gradual, Worse


Quality of pain: Other - Itching


Severity: Mild


Pain Level: 1


Skin Character: Erythema, Rash


Quality of rash: Itchy


Identify cause: Yes


Other exposure: Poison ivy


Exacerbated by: Denies


Relieved by: Denies


Similar symptoms previously: Yes


Recently seen / treated by doctor: No





- Related Data


Allergies/Adverse Reactions: 


 





No Known Allergies Allergy (Unverified 07/17/18 15:35)


 











Past Medical History





- General


Information source: Patient





- Social History


Smoking Status: Current Every Day Smoker


Cigarette use (# per day): Yes - One half pack per day


Chew tobacco use (# tins/day): No


Smoking Education Provided: Yes - 4 minutes


Frequency of alcohol use: None


Drug Abuse: None


Occupation: Disabled


Lives with: Family


Family History: Reviewed & Not Pertinent


Patient has suicidal ideation: No


Patient has homicidal ideation: No





- Past Medical History


Cardiac Medical History: Reports: Hx Hypercholesterolemia, Hx Hypertension


Pulmonary Medical History: Reports: None


EENT Medical History: Reports: None


Neurological Medical History: Reports: Hx Cerebrovascular Accident


Endocrine Medical History: Reports: None


Renal/ Medical History: Reports: None


Malignancy Medical History: Reports None


GI Medical History: Reports: None


Musculoskeletal Medical History: Reports Hx Arthritis, Reports Hx 

Musculoskeletal Deformity


Skin Medical History: Reports None


Psychiatric Medical History: Reports: None


Traumatic Medical History: Reports: None


Infectious Medical History: Reports: None


Past Surgical History: Reports: Hx Orthopedic Surgery - Back surgery 2





- Immunizations


Immunizations up to date: Yes





Review of Systems





- Review of Systems


Constitutional: No symptoms reported


EENT: No symptoms reported


Cardiovascular: No symptoms reported


Respiratory: No symptoms reported


Gastrointestinal: No symptoms reported


Genitourinary: No symptoms reported


Male Genitourinary: No symptoms reported


Musculoskeletal: No symptoms reported


Skin: Rash - Poison ivy rash to upper back bilateral


Hematologic/Lymphatic: No symptoms reported


Neurological/Psychological: No symptoms reported


-: Yes All other systems reviewed and negative





Physical Exam





- Vital signs


Vitals: 


 











Temp Pulse Resp BP Pulse Ox


 


 98.5 F   60   20   121/72   97 


 


 07/17/18 15:37  07/17/18 15:37  07/17/18 15:37  07/17/18 15:37  07/17/18 15:37











Interpretation: Normal





- General


General appearance: Appears well, Alert





- HEENT


Head: Normocephalic, Atraumatic


Eyes: Normal


Pupils: PERRL





- Respiratory


Respiratory status: No respiratory distress


Chest status: Nontender


Breath sounds: Normal


Chest palpation: Normal





- Cardiovascular


Rhythm: Regular


Heart sounds: Normal auscultation


Murmur: No





- Abdominal


Inspection: Normal


Distension: No distension


Bowel sounds: Normal


Tenderness: Nontender


Organomegaly: No organomegaly





- Back


Back: Normal, Nontender





- Extremities


General upper extremity: Normal inspection, Nontender, Normal color, Normal ROM

, Normal temperature


General lower extremity: Normal inspection, Nontender, Normal color, Normal ROM

, Normal temperature, Normal weight bearing.  No: Nasrin's sign





- Neurological


Neuro grossly intact: Yes


Cognition: Normal


Orientation: AAOx4


[place] Coma Scale Eye Opening: Spontaneous


[place] Coma Scale Verbal: Oriented


[place] Coma Scale Motor: Obeys Commands


[place] Coma Scale Total: 15


Speech: Normal


Motor strength normal: LUE, RUE, LLE, RLE


Sensory: Normal





- Psychological


Associated symptoms: Normal affect, Normal mood





- Skin


Skin Temperature: Warm


Skin Moisture: Dry


Skin Color: Normal


Skin irregularity: Rash


Location of irregularity: Back - Upper back


Character of irregularity: Vesicular - Bilateral upper back poison ivy, 

Urticarial





Course





- Re-evaluation


Re-evalutation: 





07/17/18 16:06


Patient was treated with Decadron and Pepcid for his poison ivy to the upper 

back.  He was discharged home with prescription for Pepcid and prednisone.  He 

was instructed to follow-up with his primary doctor.  He was also instructed to 

use Benadryl and Caladryl for his itching.  She verbalized understanding of 

instructions and agreement with treatment plan.





- Vital Signs


Vital signs: 


 











Temp Pulse Resp BP Pulse Ox


 


 98.5 F   60   20   121/72   97 


 


 07/17/18 15:37  07/17/18 15:37  07/17/18 15:37  07/17/18 15:37  07/17/18 15:37














Discharge





- Discharge


Clinical Impression: 


 Poison ivy dermatitis





Condition: Stable


Disposition: HOME, SELF-CARE


Additional Instructions: 


Poison Ivy





     Poison ivy and poison oak can cause an itchy rash. This is called contact 

dermatitis. It's an allergy to an oil in the plant's leaves. The oil can be 

spread from clothing to skin, from pets to humans, or from one spot on the body 

to another. Washing thoroughly with soap immediately after exposure can prevent 

the rash. (Clothing should be washed as well.)


     If the oil is not removed, an itchy rash develops a few days after the 

exposure. Blisters may develop. Two to three weeks may be required for healing.


     Generally, treatment consists of: 


     (1) an immediate thorough washing with soap to remove the oil, 


     (2) application of a cortisone cream, and 


     (3) antihistamines for itching.


     If the reaction is particularly severe, oral cortisone medicine may be 

required. If there are oozing areas, these can be soaked in epsom salts or 

Burrow's solution.


     Call the doctor if the rash worsens despite treatment, or if signs of 

infection occur such as spreading redness, red streaks, swollen glands, swelling

, or fever.





STEROID MEDICATION INJECTION:


     You have been given an injection of medicine of the cortisone/steroid 

class.  This medication is used to control inflammation or allergy.  It is 

often continued as a pill for a short period of time, until the acute process 

subsides.


     There are usually no side effects from short-term use of cortisone-like 

medications.  Some persons feel an increased sense of well-being and are not 

sleepy at bedtime.  Long-term use of cortisone medications is best avoided, 

unless required for a severe condition.  If your condition does not remit, or 

relapses after the course of corticosteroid medication, you should consult your 

physician.








STEROID MEDICATION:


     You have been given a medicine of the cortisone/steroid class.  This 

medication is used to control inflammation or allergy.  It is usually only 

given for a short period of time, until the acute process subsides.


     There are usually no side effects from short-term use of cortisone-like 

medications.  Some persons feel an increased sense of well-being and are not 

sleepy at bedtime.  Long-term use of cortisone medications is best avoided, 

unless required for a severe condition.  If your condition does not remit, or 

relapses after the course of corticosteroid medication, you should consult your 

physician.








ACID-SUPPRESSING MEDICATION:


     You have a prescription for medicine which reduces the stomach's secretion 

of acid.  Examples include Zantac, Tagament, and Pepcid.  These drugs are often 

used to allow healing of ulcers or esophagitis.  They may be needed to prevent 

recurrence of ulcers in some patients, or to prevent damage from acid reflux in 

the esophagus.


     Take all medication as prescribed, even after the pain is gone. Regular 

antacids may be added as needed if you have symptoms while taking this medicine.


     These medications sometimes are prescribed for allergic reactions because 

they have anti-histaminic effects and relieve the rash and itching of the 

reaction.


     There are usually no side effects from this medication.  But, in rare 

cases and particularly in the elderly, serious problems can occur. Contact your 

doctor if there is fever, rash, hallucinations, confusion, or unusual bruising.


     Contact your doctor at once if you develop lightheadedness, black or 

bloody stool, or bloody vomitus.








ANTIHISTAMINES:


     An antihistamine has been given and/or prescribed to control your 

symptoms. Antihistamines are used for many reasons, including itching, watering 

eyes, runny nose, allergic swelling, hives, and insect stings.


     Antihistamines may cause drowsiness, especially with the first dose.  Do 

not operate machinery or drive while under the effects of the medication.  

Other common side effects include dry mouth and eyes.  In older persons, 

antihistamines can occasionally cause urinary retention, constipation, and 

trouble focusing the eyes.


     Do not combine the medication with alcohol, or with any other medication 

without talking to your doctor.








USE OF DIPHENHYDRAMINE:


     The use of diphenhydramine (Benadryl) has been recommended to control 

allergic symptoms.  The 25 mg strength is available over- the-counter, as well 

as the elixir.  This antihistamine is used for many symptoms.  It's useful for 

itching, watering eyes and nose, allergic swelling, hives, and insect stings.  

The medication can be repeated four times daily.


     Age         Elixir (12.5 mg/tsp)         25 mg pill


     2-3 yr      1/2 tsp


     4-8 yr      1 tsp


     9-14 yr     2 tsp                        one tab


     adult                                    1-2 tabs


     Antihistamines may cause drowsiness, especially with the first dose.  Do 

not operate machinery or drive while under the effects of the medication.  Do 

not combine the medication with alcohol, or with any other medication without 

talking to your doctor.





Use Dial soap to wash her back and use Caladryl lotion to help with the itching





FOLLOW-UP CARE:


If you have been referred to a physician for follow-up care, call the physician

s office for an appointment as you were instructed or within the next two days.

  If you experience worsening or a significant change in your symptoms, notify 

the physician immediately or return to the Emergency Department at any time for 

re-evaluation.





Prescriptions: 


Famotidine [Pepcid 20 mg Tablet] 20 mg PO BID #12 tablet


Prednisone [Deltasone 20 mg Tablet] 3 tab PO DAILY 5 Days  tablet
Universal Safety Interventions

## 2018-09-14 NOTE — ED ADULT NURSE REASSESSMENT NOTE - NS ED NURSE REASSESS COMMENT FT1
pain reassessment as noted in flow sheet. Pt states she has partial relief from pain. No complaints of chest pain, headache, nausea, dizziness, vomiting  SOB, fever, chills verbalized. Pt resting comfortably in bed. Will continue to monitor.

## 2018-09-14 NOTE — ED ADULT NURSE NOTE - OBJECTIVE STATEMENT
alert no distress c/o severe low back right hip and right leg pain  states she is unable to walk   in pain when turning in bed

## 2018-09-15 LAB
ALBUMIN SERPL ELPH-MCNC: 3.2 G/DL — LOW (ref 3.3–5)
ALP SERPL-CCNC: 48 U/L — SIGNIFICANT CHANGE UP (ref 40–120)
ALT FLD-CCNC: 9 U/L — SIGNIFICANT CHANGE UP (ref 4–33)
APTT BLD: 28.3 SEC — SIGNIFICANT CHANGE UP (ref 27.5–37.4)
AST SERPL-CCNC: 21 U/L — SIGNIFICANT CHANGE UP (ref 4–32)
BACTERIA UR CULT: SIGNIFICANT CHANGE UP
BASOPHILS # BLD AUTO: 0.1 K/UL — SIGNIFICANT CHANGE UP (ref 0–0.2)
BASOPHILS NFR BLD AUTO: 1 % — SIGNIFICANT CHANGE UP (ref 0–2)
BILIRUB SERPL-MCNC: 0.7 MG/DL — SIGNIFICANT CHANGE UP (ref 0.2–1.2)
BUN SERPL-MCNC: 21 MG/DL — SIGNIFICANT CHANGE UP (ref 7–23)
CALCIUM SERPL-MCNC: 8.9 MG/DL — SIGNIFICANT CHANGE UP (ref 8.4–10.5)
CHLORIDE SERPL-SCNC: 104 MMOL/L — SIGNIFICANT CHANGE UP (ref 98–107)
CO2 SERPL-SCNC: 24 MMOL/L — SIGNIFICANT CHANGE UP (ref 22–31)
CREAT SERPL-MCNC: 1.34 MG/DL — HIGH (ref 0.5–1.3)
EOSINOPHIL # BLD AUTO: 0.25 K/UL — SIGNIFICANT CHANGE UP (ref 0–0.5)
EOSINOPHIL NFR BLD AUTO: 2.4 % — SIGNIFICANT CHANGE UP (ref 0–6)
FERRITIN SERPL-MCNC: 57.69 NG/ML — SIGNIFICANT CHANGE UP (ref 15–150)
FOLATE SERPL-MCNC: > 20 NG/ML — HIGH (ref 4.7–20)
GLUCOSE SERPL-MCNC: 72 MG/DL — SIGNIFICANT CHANGE UP (ref 70–99)
HAPTOGLOB SERPL-MCNC: 235 MG/DL — HIGH (ref 34–200)
HCT VFR BLD CALC: 32.3 % — LOW (ref 34.5–45)
HGB BLD-MCNC: 11.1 G/DL — LOW (ref 11.5–15.5)
IMM GRANULOCYTES # BLD AUTO: 0.03 # — SIGNIFICANT CHANGE UP
IMM GRANULOCYTES NFR BLD AUTO: 0.3 % — SIGNIFICANT CHANGE UP (ref 0–1.5)
INR BLD: 0.99 — SIGNIFICANT CHANGE UP (ref 0.88–1.17)
IRON SATN MFR SERPL: 218 UG/DL — SIGNIFICANT CHANGE UP (ref 140–530)
IRON SATN MFR SERPL: 32 UG/DL — SIGNIFICANT CHANGE UP (ref 30–160)
LDH SERPL L TO P-CCNC: 236 U/L — HIGH (ref 135–225)
LYMPHOCYTES # BLD AUTO: 29.9 % — SIGNIFICANT CHANGE UP (ref 13–44)
LYMPHOCYTES # BLD AUTO: 3.12 K/UL — SIGNIFICANT CHANGE UP (ref 1–3.3)
MAGNESIUM SERPL-MCNC: 2.2 MG/DL — SIGNIFICANT CHANGE UP (ref 1.6–2.6)
MCHC RBC-ENTMCNC: 25.3 PG — LOW (ref 27–34)
MCHC RBC-ENTMCNC: 34.4 % — SIGNIFICANT CHANGE UP (ref 32–36)
MCV RBC AUTO: 73.7 FL — LOW (ref 80–100)
MONOCYTES # BLD AUTO: 0.84 K/UL — SIGNIFICANT CHANGE UP (ref 0–0.9)
MONOCYTES NFR BLD AUTO: 8.1 % — SIGNIFICANT CHANGE UP (ref 2–14)
NEUTROPHILS # BLD AUTO: 6.09 K/UL — SIGNIFICANT CHANGE UP (ref 1.8–7.4)
NEUTROPHILS NFR BLD AUTO: 58.3 % — SIGNIFICANT CHANGE UP (ref 43–77)
NRBC # FLD: 0 — SIGNIFICANT CHANGE UP
PHOSPHATE SERPL-MCNC: 2.6 MG/DL — SIGNIFICANT CHANGE UP (ref 2.5–4.5)
PLATELET # BLD AUTO: 230 K/UL — SIGNIFICANT CHANGE UP (ref 150–400)
PMV BLD: 10.5 FL — SIGNIFICANT CHANGE UP (ref 7–13)
POTASSIUM SERPL-MCNC: 3.9 MMOL/L — SIGNIFICANT CHANGE UP (ref 3.5–5.3)
POTASSIUM SERPL-SCNC: 3.9 MMOL/L — SIGNIFICANT CHANGE UP (ref 3.5–5.3)
PROT SERPL-MCNC: 6.3 G/DL — SIGNIFICANT CHANGE UP (ref 6–8.3)
PROTHROM AB SERPL-ACNC: 11.4 SEC — SIGNIFICANT CHANGE UP (ref 9.8–13.1)
RBC # BLD: 4.38 M/UL — SIGNIFICANT CHANGE UP (ref 3.8–5.2)
RBC # FLD: 18.4 % — HIGH (ref 10.3–14.5)
RETICS #: 83 K/UL — SIGNIFICANT CHANGE UP (ref 25–125)
RETICS/RBC NFR: 1.9 % — SIGNIFICANT CHANGE UP (ref 0.5–2.5)
SODIUM SERPL-SCNC: 142 MMOL/L — SIGNIFICANT CHANGE UP (ref 135–145)
SPECIMEN SOURCE: SIGNIFICANT CHANGE UP
TRANSFERRIN SERPL-MCNC: 157 MG/DL — LOW (ref 200–360)
UIBC SERPL-MCNC: 185.6 UG/DL — SIGNIFICANT CHANGE UP (ref 110–370)
VIT B12 SERPL-MCNC: 2000 PG/ML — HIGH (ref 200–900)
WBC # BLD: 10.43 K/UL — SIGNIFICANT CHANGE UP (ref 3.8–10.5)
WBC # FLD AUTO: 10.43 K/UL — SIGNIFICANT CHANGE UP (ref 3.8–10.5)

## 2018-09-15 PROCEDURE — 73700 CT LOWER EXTREMITY W/O DYE: CPT | Mod: 26,RT

## 2018-09-15 RX ORDER — LIDOCAINE 4 G/100G
1 CREAM TOPICAL DAILY
Qty: 0 | Refills: 0 | Status: DISCONTINUED | OUTPATIENT
Start: 2018-09-15 | End: 2018-09-18

## 2018-09-15 RX ADMIN — MORPHINE SULFATE 30 MILLIGRAM(S): 50 CAPSULE, EXTENDED RELEASE ORAL at 17:01

## 2018-09-15 RX ADMIN — HYDROMORPHONE HYDROCHLORIDE 4 MILLIGRAM(S): 2 INJECTION INTRAMUSCULAR; INTRAVENOUS; SUBCUTANEOUS at 20:42

## 2018-09-15 RX ADMIN — HYDROMORPHONE HYDROCHLORIDE 4 MILLIGRAM(S): 2 INJECTION INTRAMUSCULAR; INTRAVENOUS; SUBCUTANEOUS at 12:44

## 2018-09-15 RX ADMIN — LISINOPRIL 40 MILLIGRAM(S): 2.5 TABLET ORAL at 05:05

## 2018-09-15 RX ADMIN — Medication 10 MILLIGRAM(S): at 05:05

## 2018-09-15 RX ADMIN — HEPARIN SODIUM 5000 UNIT(S): 5000 INJECTION INTRAVENOUS; SUBCUTANEOUS at 05:04

## 2018-09-15 RX ADMIN — HYDROMORPHONE HYDROCHLORIDE 4 MILLIGRAM(S): 2 INJECTION INTRAMUSCULAR; INTRAVENOUS; SUBCUTANEOUS at 21:18

## 2018-09-15 RX ADMIN — Medication 10 MILLIGRAM(S): at 17:03

## 2018-09-15 RX ADMIN — ATORVASTATIN CALCIUM 40 MILLIGRAM(S): 80 TABLET, FILM COATED ORAL at 21:23

## 2018-09-15 RX ADMIN — Medication 1 TABLET(S): at 12:44

## 2018-09-15 RX ADMIN — Medication 1 TABLET(S): at 13:30

## 2018-09-15 RX ADMIN — Medication 25 MILLIGRAM(S): at 05:05

## 2018-09-15 RX ADMIN — HYDROMORPHONE HYDROCHLORIDE 4 MILLIGRAM(S): 2 INJECTION INTRAMUSCULAR; INTRAVENOUS; SUBCUTANEOUS at 13:30

## 2018-09-15 RX ADMIN — HYDROMORPHONE HYDROCHLORIDE 4 MILLIGRAM(S): 2 INJECTION INTRAMUSCULAR; INTRAVENOUS; SUBCUTANEOUS at 09:00

## 2018-09-15 RX ADMIN — Medication 81 MILLIGRAM(S): at 12:44

## 2018-09-15 RX ADMIN — PANTOPRAZOLE SODIUM 40 MILLIGRAM(S): 20 TABLET, DELAYED RELEASE ORAL at 05:04

## 2018-09-15 RX ADMIN — HEPARIN SODIUM 5000 UNIT(S): 5000 INJECTION INTRAVENOUS; SUBCUTANEOUS at 21:23

## 2018-09-15 RX ADMIN — HYDROMORPHONE HYDROCHLORIDE 4 MILLIGRAM(S): 2 INJECTION INTRAMUSCULAR; INTRAVENOUS; SUBCUTANEOUS at 08:23

## 2018-09-15 RX ADMIN — HYDROMORPHONE HYDROCHLORIDE 4 MILLIGRAM(S): 2 INJECTION INTRAMUSCULAR; INTRAVENOUS; SUBCUTANEOUS at 04:21

## 2018-09-15 RX ADMIN — HEPARIN SODIUM 5000 UNIT(S): 5000 INJECTION INTRAVENOUS; SUBCUTANEOUS at 13:30

## 2018-09-15 RX ADMIN — HYDROMORPHONE HYDROCHLORIDE 4 MILLIGRAM(S): 2 INJECTION INTRAMUSCULAR; INTRAVENOUS; SUBCUTANEOUS at 03:34

## 2018-09-15 RX ADMIN — MORPHINE SULFATE 60 MILLIGRAM(S): 50 CAPSULE, EXTENDED RELEASE ORAL at 05:07

## 2018-09-15 RX ADMIN — LIDOCAINE 1 PATCH: 4 CREAM TOPICAL at 13:30

## 2018-09-15 RX ADMIN — MORPHINE SULFATE 30 MILLIGRAM(S): 50 CAPSULE, EXTENDED RELEASE ORAL at 17:40

## 2018-09-15 RX ADMIN — Medication 50 MICROGRAM(S): at 05:05

## 2018-09-15 NOTE — PROGRESS NOTE ADULT - SUBJECTIVE AND OBJECTIVE BOX
Patient is a 73y old  Female who presents with a chief complaint of     SUBJECTIVE / OVERNIGHT EVENTS:  the right hip pain is still bothering.  not much improved.   the son emily at bedside.  no cp, no sob, no n/v/d. no abdominal pain.  no headache, no dizziness.       Vital Signs Last 24 Hrs  T(C): 36.7 (15 Sep 2018 04:06), Max: 37.2 (14 Sep 2018 17:22)  T(F): 98 (15 Sep 2018 04:06), Max: 98.9 (14 Sep 2018 17:22)  HR: 64 (15 Sep 2018 04:06) (64 - 79)  BP: 137/87 (15 Sep 2018 04:06) (135/76 - 169/90)  BP(mean): 106 (14 Sep 2018 16:37) (106 - 106)  RR: 17 (15 Sep 2018 04:06) (17 - 18)  SpO2: 99% (15 Sep 2018 04:06) (96% - 99%)  I&O's Summary    14 Sep 2018 07:01  -  15 Sep 2018 07:00  --------------------------------------------------------  IN: 170 mL / OUT: 350 mL / NET: -180 mL        PHYSICAL EXAM:  GENERAL: NAD, Comfortable, lying in bed  HEAD:  Atraumatic, Normocephalic  EYES: EOMI, PERRLA, conjunctiva and sclera clear  NECK: Supple, No JVD  CHEST/LUNG: Clear to auscultation bilaterally; No wheeze  HEART: Regular rate and rhythm; No murmurs, rubs, or gallops  ABDOMEN: Soft, Nontender, Nondistended; Bowel sounds present  Neuro: AAO x 3, no focal deficit, 5/5 b/l extremities  EXTREMITIES:  2+ Peripheral Pulses, No clubbing, cyanosis, or edema, right hip bony tenderness right iliac crest, right leg limited range of motion due to pain  SKIN: No rashes or lesions    LABS:                        11.1   10.43 )-----------( 230      ( 15 Sep 2018 05:21 )             32.3     09-15    142  |  104  |  21  ----------------------------<  72  3.9   |  24  |  1.34<H>    Ca    8.9      15 Sep 2018 05:21  Phos  2.6     09-15  Mg     2.2     09-15    TPro  6.3  /  Alb  3.2<L>  /  TBili  0.7  /  DBili  x   /  AST  21  /  ALT  9   /  AlkPhos  48  09-15    PT/INR - ( 15 Sep 2018 05:21 )   PT: 11.4 SEC;   INR: 0.99          PTT - ( 15 Sep 2018 05:21 )  PTT:28.3 SEC  CAPILLARY BLOOD GLUCOSE            Urinalysis Basic - ( 14 Sep 2018 05:00 )    Color: LIGHT YELLOW / Appearance: CLEAR / S.013 / pH: 7.0  Gluc: NEGATIVE / Ketone: NEGATIVE  / Bili: NEGATIVE / Urobili: NORMAL   Blood: NEGATIVE / Protein: NEGATIVE / Nitrite: NEGATIVE   Leuk Esterase: NEGATIVE / RBC: x / WBC x   Sq Epi: x / Non Sq Epi: x / Bacteria: x        RADIOLOGY & ADDITIONAL TESTS:    Imaging Personally Reviewed:  [x] YES  [ ] NO    Consultant(s) Notes Reviewed:  [x] YES  [ ] NO      MEDICATIONS  (STANDING):  aspirin enteric coated 81 milliGRAM(s) Oral daily  atorvastatin 40 milliGRAM(s) Oral at bedtime  calcium carbonate    500 mG (Tums) Chewable 1 Tablet(s) Chew daily  heparin  Injectable 5000 Unit(s) SubCutaneous every 8 hours  hydrochlorothiazide 25 milliGRAM(s) Oral daily  hydrocortisone 10 milliGRAM(s) Oral every 12 hours  influenza   Vaccine 0.5 milliLiter(s) IntraMuscular once  levothyroxine 50 MICROGram(s) Oral daily  lidocaine   Patch 1 Patch Transdermal daily  lisinopril 40 milliGRAM(s) Oral daily  morphine ER Tablet 30 milliGRAM(s) Oral <User Schedule>  morphine ER Tablet 60 milliGRAM(s) Oral <User Schedule>  multivitamin 1 Tablet(s) Oral daily  pantoprazole    Tablet 40 milliGRAM(s) Oral before breakfast    MEDICATIONS  (PRN):  acetaminophen   Tablet .. 650 milliGRAM(s) Oral every 6 hours PRN Mild Pain (1 - 3)  HYDROmorphone   Tablet 4 milliGRAM(s) Oral every 4 hours PRN for moderate pain  HYDROmorphone  Injectable 1 milliGRAM(s) IV Push every 4 hours PRN Severe Pain (7 - 10)      Care Discussed with Consultants/Other Providers [x] YES  [ ] NO    HEALTH ISSUES - PROBLEM Dx:  Encounter for preventive measure: Encounter for preventive measure  Adult Hypothyroidism: Adult Hypothyroidism  Lumbar Spinal Stenosis: Lumbar Spinal Stenosis  Adrenal Insufficiency: Adrenal Insufficiency  Benign essential HTN: Benign essential HTN  Anemia: Anemia  Hip pain: Hip pain

## 2018-09-15 NOTE — PROGRESS NOTE ADULT - PROBLEM SELECTOR PLAN 1
-with acute on chronic hip pain   - c/w morphine 60mg ER BID, continue with dilaudid 4mg PO for moderate pain, and 1mg dialudid IVP for severe pain Q 4hrs.  -per conversation with ortho, no acute reason for surgical intervention at this time  -consider pain management consult if pain not well controlled  -PT ordered  - CT right hip ordered given the severity of the pain to r/o secondary pathologies.

## 2018-09-15 NOTE — PHYSICAL THERAPY INITIAL EVALUATION ADULT - PERTINENT HX OF CURRENT PROBLEM, REHAB EVAL
admitted from home with R hip pain, history of severe OA R hip, receiving injections, as per ortho MD, no Sx. intervention at this time

## 2018-09-16 ENCOUNTER — TRANSCRIPTION ENCOUNTER (OUTPATIENT)
Age: 73
End: 2018-09-16

## 2018-09-16 RX ORDER — SENNA PLUS 8.6 MG/1
2 TABLET ORAL AT BEDTIME
Qty: 0 | Refills: 0 | Status: DISCONTINUED | OUTPATIENT
Start: 2018-09-16 | End: 2018-09-18

## 2018-09-16 RX ORDER — LIDOCAINE HCL 20 MG/ML
10 VIAL (ML) INJECTION ONCE
Qty: 0 | Refills: 0 | Status: DISCONTINUED | OUTPATIENT
Start: 2018-09-17 | End: 2018-09-18

## 2018-09-16 RX ORDER — DOCUSATE SODIUM 100 MG
100 CAPSULE ORAL THREE TIMES A DAY
Qty: 0 | Refills: 0 | Status: DISCONTINUED | OUTPATIENT
Start: 2018-09-16 | End: 2018-09-18

## 2018-09-16 RX ADMIN — PANTOPRAZOLE SODIUM 40 MILLIGRAM(S): 20 TABLET, DELAYED RELEASE ORAL at 05:18

## 2018-09-16 RX ADMIN — HYDROMORPHONE HYDROCHLORIDE 1 MILLIGRAM(S): 2 INJECTION INTRAMUSCULAR; INTRAVENOUS; SUBCUTANEOUS at 04:53

## 2018-09-16 RX ADMIN — HYDROMORPHONE HYDROCHLORIDE 4 MILLIGRAM(S): 2 INJECTION INTRAMUSCULAR; INTRAVENOUS; SUBCUTANEOUS at 11:40

## 2018-09-16 RX ADMIN — HEPARIN SODIUM 5000 UNIT(S): 5000 INJECTION INTRAVENOUS; SUBCUTANEOUS at 21:52

## 2018-09-16 RX ADMIN — LIDOCAINE 1 PATCH: 4 CREAM TOPICAL at 11:40

## 2018-09-16 RX ADMIN — Medication 1 TABLET(S): at 11:42

## 2018-09-16 RX ADMIN — LISINOPRIL 40 MILLIGRAM(S): 2.5 TABLET ORAL at 05:19

## 2018-09-16 RX ADMIN — MORPHINE SULFATE 60 MILLIGRAM(S): 50 CAPSULE, EXTENDED RELEASE ORAL at 05:24

## 2018-09-16 RX ADMIN — Medication 25 MILLIGRAM(S): at 05:19

## 2018-09-16 RX ADMIN — Medication 50 MICROGRAM(S): at 05:18

## 2018-09-16 RX ADMIN — Medication 10 MILLIGRAM(S): at 05:18

## 2018-09-16 RX ADMIN — LIDOCAINE 1 PATCH: 4 CREAM TOPICAL at 21:53

## 2018-09-16 RX ADMIN — ATORVASTATIN CALCIUM 40 MILLIGRAM(S): 80 TABLET, FILM COATED ORAL at 21:52

## 2018-09-16 RX ADMIN — HEPARIN SODIUM 5000 UNIT(S): 5000 INJECTION INTRAVENOUS; SUBCUTANEOUS at 13:35

## 2018-09-16 RX ADMIN — LIDOCAINE 1 PATCH: 4 CREAM TOPICAL at 01:14

## 2018-09-16 RX ADMIN — Medication 1 TABLET(S): at 11:40

## 2018-09-16 RX ADMIN — Medication 100 MILLIGRAM(S): at 13:35

## 2018-09-16 RX ADMIN — MORPHINE SULFATE 30 MILLIGRAM(S): 50 CAPSULE, EXTENDED RELEASE ORAL at 17:06

## 2018-09-16 RX ADMIN — Medication 10 MILLIGRAM(S): at 17:06

## 2018-09-16 RX ADMIN — Medication 81 MILLIGRAM(S): at 11:40

## 2018-09-16 RX ADMIN — HYDROMORPHONE HYDROCHLORIDE 1 MILLIGRAM(S): 2 INJECTION INTRAMUSCULAR; INTRAVENOUS; SUBCUTANEOUS at 05:08

## 2018-09-16 RX ADMIN — HEPARIN SODIUM 5000 UNIT(S): 5000 INJECTION INTRAVENOUS; SUBCUTANEOUS at 05:19

## 2018-09-16 RX ADMIN — HYDROMORPHONE HYDROCHLORIDE 4 MILLIGRAM(S): 2 INJECTION INTRAMUSCULAR; INTRAVENOUS; SUBCUTANEOUS at 12:15

## 2018-09-16 NOTE — PROGRESS NOTE ADULT - PROBLEM SELECTOR PLAN 1
- with acute on chronic hip pain   - c/w morphine 60mg ER BID, continue with dilaudid 4mg PO for moderate pain, and 1mg dialudid IVP for severe pain Q 4hrs.  -per conversation with ortho, no acute reason for surgical intervention at this time  -consider pain management consult if pain not well controlled  -PT ordered  - CT right hip neg.   - PM&R eval appreciated.   Plan for joint /bursa injection.

## 2018-09-16 NOTE — DISCHARGE NOTE ADULT - PATIENT PORTAL LINK FT
You can access the [facility] Patient Portal, offered by [facility], by registering with the following website: [email or web address]

## 2018-09-16 NOTE — CONSULT NOTE ADULT - SUBJECTIVE AND OBJECTIVE BOX
HPI:  73F w/ PMH of HTN, CKD, lumbar spinal stenosis s/p laminectomy x 2 (2008), hypothyroidism, adrenal insufficiency p/w acute on chronic R hip pain. Patient states that she was doing well on Wednesday morning but progressively started to have worsening R hip pain becoming unbearable on Thursday, patient called her pain management provider who told her to present to the ED. Pain is 10/10 deep in the bone, worsened with acute movement. Patient states that she cannot ambulate 2/2 severe pain. Denies any trauma, increased exertion or strain. Lives at home w/ daughter and , usually ambulates with walker. Denies urinary incontinence, constipation/diarrhea, other sx, reports not feeling acutely ill. Normally takes morphine ER 60 in AM/30 in PM and dilaudid 4mg PRN at at home. Received second hip injection 1 wk ago. Patient repots that she is aware that she is anemic but has never been worked up. Reports no hematuria, hematochezia or hematemesis.       REVIEW OF SYSTEMS:  CONSTITUTIONAL: No weakness, fevers or chills  EYES/ENT: No visual changes;  No vertigo or throat pain   NECK: No pain or stiffness  RESPIRATORY: No cough, wheezing, hemoptysis; No shortness of breath  CARDIOVASCULAR: No chest pain or palpitations  GASTROINTESTINAL: No abdominal or epigastric pain. No nausea, vomiting, or hematemesis; No diarrhea or constipation. No melena or hematochezia.  GENITOURINARY: No dysuria, frequency or hematuria  NEUROLOGICAL: No numbness or weakness  SKIN: No itching, burning, rashes, or lesions   All other review of systems is negative unless indicated above. (14 Sep 2018 16:37)      REVIEW OF SYSTEMS: No chest pain, shortness of breath, nausea, vomiting or diarhea.      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  Adrenal Insufficiency  Adult Hypothyroidism  Lumbar Spinal Stenosis  History of Laminectomy/ Fusion 1/06; L1-L2      SOCIAL HISTORY  Smoking - Denied, EtOH - Denied, Drugs - Denied    FUNCTIONAL HISTORY:   Lives   Independent    CURRENT FUNCTIONAL STATUS:      FAMILY HISTORY   Family history of diabetes mellitus (Sibling)  Family history of hypertension (Mother)  Family history of hypertension      RECENT LABS/IMAGING  CBC Full  -  ( 15 Sep 2018 05:21 )  WBC Count : 10.43 K/uL  Hemoglobin : 11.1 g/dL  Hematocrit : 32.3 %  Platelet Count - Automated : 230 K/uL  Mean Cell Volume : 73.7 fL  Mean Cell Hemoglobin : 25.3 pg  Mean Cell Hemoglobin Concentration : 34.4 %  Auto Neutrophil # : 6.09 K/uL  Auto Lymphocyte # : 3.12 K/uL  Auto Monocyte # : 0.84 K/uL  Auto Eosinophil # : 0.25 K/uL  Auto Basophil # : 0.10 K/uL  Auto Neutrophil % : 58.3 %  Auto Lymphocyte % : 29.9 %  Auto Monocyte % : 8.1 %  Auto Eosinophil % : 2.4 %  Auto Basophil % : 1.0 %    09-15    142  |  104  |  21  ----------------------------<  72  3.9   |  24  |  1.34<H>    Ca    8.9      15 Sep 2018 05:21  Phos  2.6     09-15  Mg     2.2     09-15    TPro  6.3  /  Alb  3.2<L>  /  TBili  0.7  /  DBili  x   /  AST  21  /  ALT  9   /  AlkPhos  48  09-15        VITALS  T(C): 36.8 (09-16-18 @ 04:04), Max: 36.9 (09-15-18 @ 20:04)  HR: 60 (09-16-18 @ 05:08) (60 - 81)  BP: 127/74 (09-16-18 @ 05:08) (114/63 - 138/71)  RR: 17 (09-16-18 @ 05:08) (17 - 17)  SpO2: 100% (09-16-18 @ 05:08) (97% - 100%)  Wt(kg): --    ALLERGIES  codedine (Rash)  codeine (Nausea; Other)  penicillin (Rash)      MEDICATIONS   acetaminophen   Tablet .. 650 milliGRAM(s) Oral every 6 hours PRN  aspirin enteric coated 81 milliGRAM(s) Oral daily  atorvastatin 40 milliGRAM(s) Oral at bedtime  calcium carbonate    500 mG (Tums) Chewable 1 Tablet(s) Chew daily  docusate sodium 100 milliGRAM(s) Oral three times a day  heparin  Injectable 5000 Unit(s) SubCutaneous every 8 hours  hydrochlorothiazide 25 milliGRAM(s) Oral daily  hydrocortisone 10 milliGRAM(s) Oral every 12 hours  HYDROmorphone   Tablet 4 milliGRAM(s) Oral every 4 hours PRN  HYDROmorphone  Injectable 1 milliGRAM(s) IV Push every 4 hours PRN  influenza   Vaccine 0.5 milliLiter(s) IntraMuscular once  levothyroxine 50 MICROGram(s) Oral daily  lidocaine   Patch 1 Patch Transdermal daily  lisinopril 40 milliGRAM(s) Oral daily  morphine ER Tablet 30 milliGRAM(s) Oral <User Schedule>  morphine ER Tablet 60 milliGRAM(s) Oral <User Schedule>  multivitamin 1 Tablet(s) Oral daily  pantoprazole    Tablet 40 milliGRAM(s) Oral before breakfast  senna 2 Tablet(s) Oral at bedtime      ----------------------------------------------------------------------------------------  PHYSICAL EXAM  Constitutional - NAD, Comfortable  HEENT - NCAT, EOMI  Neck - Supple, No limited ROM  Chest - CTA bilaterally, No wheeze, No rhonchi, No crackles  Cardiovascular - RRR, S1S2, No murmurs  Abdomen - BS+, Soft, NTND  Extremities - No C/C/E, No calf tenderness   Neurologic Exam -                    Cognitive - Awake, Alert, AAO to self, place, date, year, situation     Communication - Fluent, No dysarthria, no aphasia     Cranial Nerves - CN 2-12 intact     Motor - No focal deficits                       Sensory - Intact to LT     Reflexes - DTR Intact, No primitive reflexive     Balance - WNL Static  Psychiatric - Mood stable, Affect WNL HPI:  73F w/ PMH of HTN, CKD, lumbar spinal stenosis s/p laminectomy x 2 (2008), hypothyroidism, adrenal insufficiency p/w acute on chronic R hip pain. Patient states that she was doing well on Wednesday morning but progressively started to have worsening R hip pain becoming unbearable on Thursday, patient called her pain management provider who told her to present to the ED. Pain is 10/10 deep in the bone, worsened with acute movement. Patient states that she cannot ambulate 2/2 severe pain. Denies any trauma, increased exertion or strain. Lives at home w/ daughter and , usually ambulates with walker. Denies urinary incontinence, constipation/diarrhea, other sx, reports not feeling acutely ill. Normally takes morphine ER 60 in AM/30 in PM and dilaudid 4mg PRN at at home. Received second hip injection 1 wk ago. Patient repots that she is aware that she is anemic but has never been worked up.     CT with mild hip arthosis         REVIEW OF SYSTEMS:  CONSTITUTIONAL: No weakness, fevers or chills  EYES/ENT: No visual changes;  No vertigo or throat pain   NECK: No pain or stiffness  RESPIRATORY: No cough, wheezing, hemoptysis; No shortness of breath  CARDIOVASCULAR: No chest pain or palpitations  GASTROINTESTINAL: No abdominal or epigastric pain. No nausea, vomiting, or hematemesis; No diarrhea or constipation. No melena or hematochezia.  GENITOURINARY: No dysuria, frequency or hematuria  NEUROLOGICAL: No numbness or weakness  SKIN: No itching, burning, rashes, or lesions   All other review of systems is negative unless indicated above. (14 Sep 2018 16:37)      REVIEW OF SYSTEMS: No chest pain, shortness of breath, nausea, vomiting or diarhea.      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  Adrenal Insufficiency  Adult Hypothyroidism  Lumbar Spinal Stenosis  History of Laminectomy/ Fusion 1/06; L1-L2      SOCIAL HISTORY  Smoking - Denied, EtOH - Denied, Drugs - Denied    FUNCTIONAL HISTORY:   Lives   Independent    CURRENT FUNCTIONAL STATUS:      FAMILY HISTORY   Family history of diabetes mellitus (Sibling)  Family history of hypertension (Mother)  Family history of hypertension      RECENT LABS/IMAGING  CBC Full  -  ( 15 Sep 2018 05:21 )  WBC Count : 10.43 K/uL  Hemoglobin : 11.1 g/dL  Hematocrit : 32.3 %  Platelet Count - Automated : 230 K/uL  Mean Cell Volume : 73.7 fL  Mean Cell Hemoglobin : 25.3 pg  Mean Cell Hemoglobin Concentration : 34.4 %  Auto Neutrophil # : 6.09 K/uL  Auto Lymphocyte # : 3.12 K/uL  Auto Monocyte # : 0.84 K/uL  Auto Eosinophil # : 0.25 K/uL  Auto Basophil # : 0.10 K/uL  Auto Neutrophil % : 58.3 %  Auto Lymphocyte % : 29.9 %  Auto Monocyte % : 8.1 %  Auto Eosinophil % : 2.4 %  Auto Basophil % : 1.0 %    09-15    142  |  104  |  21  ----------------------------<  72  3.9   |  24  |  1.34<H>    Ca    8.9      15 Sep 2018 05:21  Phos  2.6     09-15  Mg     2.2     09-15    TPro  6.3  /  Alb  3.2<L>  /  TBili  0.7  /  DBili  x   /  AST  21  /  ALT  9   /  AlkPhos  48  09-15        VITALS  T(C): 36.8 (09-16-18 @ 04:04), Max: 36.9 (09-15-18 @ 20:04)  HR: 60 (09-16-18 @ 05:08) (60 - 81)  BP: 127/74 (09-16-18 @ 05:08) (114/63 - 138/71)  RR: 17 (09-16-18 @ 05:08) (17 - 17)  SpO2: 100% (09-16-18 @ 05:08) (97% - 100%)  Wt(kg): --    ALLERGIES  codedine (Rash)  codeine (Nausea; Other)  penicillin (Rash)      MEDICATIONS   acetaminophen   Tablet .. 650 milliGRAM(s) Oral every 6 hours PRN  aspirin enteric coated 81 milliGRAM(s) Oral daily  atorvastatin 40 milliGRAM(s) Oral at bedtime  calcium carbonate    500 mG (Tums) Chewable 1 Tablet(s) Chew daily  docusate sodium 100 milliGRAM(s) Oral three times a day  heparin  Injectable 5000 Unit(s) SubCutaneous every 8 hours  hydrochlorothiazide 25 milliGRAM(s) Oral daily  hydrocortisone 10 milliGRAM(s) Oral every 12 hours  HYDROmorphone   Tablet 4 milliGRAM(s) Oral every 4 hours PRN  HYDROmorphone  Injectable 1 milliGRAM(s) IV Push every 4 hours PRN  influenza   Vaccine 0.5 milliLiter(s) IntraMuscular once  levothyroxine 50 MICROGram(s) Oral daily  lidocaine   Patch 1 Patch Transdermal daily  lisinopril 40 milliGRAM(s) Oral daily  morphine ER Tablet 30 milliGRAM(s) Oral <User Schedule>  morphine ER Tablet 60 milliGRAM(s) Oral <User Schedule>  multivitamin 1 Tablet(s) Oral daily  pantoprazole    Tablet 40 milliGRAM(s) Oral before breakfast  senna 2 Tablet(s) Oral at bedtime      ----------------------------------------------------------------------------------------  PHYSICAL EXAM  Constitutional - NAD, Comfortable  HEENT - NCAT, EOMI  Neck - Supple, No limited ROM  Chest - CTA bilaterally, No wheeze, No rhonchi, No crackles  Cardiovascular - RRR, S1S2, No murmurs  Abdomen - BS+, Soft, NTND  Extremities - No C/C/E, No calf tenderness   MSK: + pain right buttock, right hip trochanteric bursa, decreased ROM   Neurologic Exam -                    Cognitive - Awake, Alert, AAO to self, place, date, year, situation     Communication - Fluent, No dysarthria, no aphasia     Cranial Nerves - CN 2-12 intact     Motor - limited by pain                       Sensory - Intact to LT     Reflexes - DTR Intact, No primitive reflexive     Balance - WNL Static  Psychiatric - Mood stable, Affect WNL

## 2018-09-16 NOTE — DISCHARGE NOTE ADULT - CARE PROVIDER_API CALL
Chetna Marroquin (DO), PhysicalRehab Medicine  [facility] [facility]  [address]th [place] [address]  Phone: [phone]  Fax: [phone]    Ana Capellan), Anesthesiology; Pain Medicine  [address] [place] [address]  Phone: [phone]  Fax: [phone]    Chava George), Internal Medicine  [address] [place] [address]  Phone: [phone]  Fax: [phone]

## 2018-09-16 NOTE — DISCHARGE NOTE ADULT - HOSPITAL COURSE
73F w/ PMH of HTN, CKD, lumbar spinal stenosis s/p laminectomy x 2 (2008), hypothyroidism, adrenal insufficiency p/w acute on chronic R hip pain.     Hospital Course    Hip pain- with acute on chronic hip pain. XR Rt Hip- No evidence of acute fracture or dislocation of the right hip. Osteoarthrosis. CT Rt Hip- There is no acute fracture or dislocation. Morphine 60mg ER BID, Dilaudid 4mg PO for mod pain, Dialudid IVP for severe pain. ORTHO consulted no acute reason for surgical intervention at this time, pt follows with Pain management as outpt. PT- Rehab    Anemia- with hg 9.4, low MCV, high RDW, may be in the setting a slow bleed vs chronic disease. Iron studies- WNL, B12 -2000, Folate >20.0     Benign essential HTN- continue on Lisinopril    Adrenal Insufficiency- continue on Hydrocortisone 10mg BID.     Lumbar Spinal Stenosis- continue adequate pain control, no acute complaints of back pain at this time. Lidoderm patch    Adult Hypothyroidism- continue on Levothyroxine 50mcg. Encounter for preventive measure- [facility] 73F w/ PMH of HTN, CKD, lumbar spinal stenosis s/p laminectomy x 2 (2008), hypothyroidism, adrenal insufficiency p/w acute on chronic R hip pain.     Hospital Course    Hip pain- with acute on chronic hip pain. XR Rt Hip- No evidence of acute fracture or dislocation of the right hip. Osteoarthrosis. CT Rt Hip- There is no acute fracture or dislocation. Morphine 60mg ER BID, Dilaudid 4mg PO for mod pain, Dialudid IVP for severe pain. ORTHO consulted no acute reason for surgical intervention at this time, pt follows with Pain management as outpt. PT- Rehab    Anemia- with hg 9.4, low MCV, high RDW, may be in the setting a slow bleed vs chronic disease. Iron studies- WNL, B12 -2000, Folate >20.0     Benign essential HTN- continue on Lisinopril    Adrenal Insufficiency- continue on Hydrocortisone 10mg BID.     Lumbar Spinal Stenosis- continue adequate pain control, no acute complaints of back pain at this time. Lidoderm patch    Adult Hypothyroidism- continue on Levothyroxine 50mcg. Encounter for preventive measure- [facility]    Dispo: PT : Rehab 73F w/ PMH of HTN, CKD, lumbar spinal stenosis s/p laminectomy x 2 (2008), hypothyroidism, adrenal insufficiency p/w acute on chronic R hip pain.     Hospital Course    Problem: Hip pain.  Plan: - with acute on chronic hip pain   - c/w morphine 60 mg ER am, 30 mg ER pm, continue with dilaudid 4mg PO for moderate pain, and 1mg dialudid IVP for severe pain Q4hrs.  -per ortho, no acute reason for surgical intervention at this time  - CT right hip neg neg for fracture.   - PM&R eval appreciated. s/p joint /bursa injection with great results.  - pt is stable for d/c planning to rehab.  	   Anemia- with hg 9.4, low MCV, high RDW, may be in the setting a slow bleed vs chronic disease. Iron studies- WNL, B12 -2000, Folate >20.0     Benign essential HTN- continue on Lisinopril    Adrenal Insufficiency- continue on Hydrocortisone 10mg BID.     Lumbar Spinal Stenosis- continue adequate pain control, no acute complaints of back pain at this time. Lidoderm patch    Adult Hypothyroidism- continue on Levothyroxine 50mcg. Encounter for preventive measure- [facility]    Dispo: PT : Rehab

## 2018-09-16 NOTE — PROGRESS NOTE ADULT - ASSESSMENT
73 F w/ PMH of HTN, CKD, lumbar spinal stenosis s/p laminectomy x 2 (2008), hypothyroidism, adrenal insufficiency p/w acute on chronic R hip pain.

## 2018-09-16 NOTE — PROGRESS NOTE ADULT - SUBJECTIVE AND OBJECTIVE BOX
Patient is a 73y old  Female who presents with a chief complaint of hip pain (16 Sep 2018 15:39)      SUBJECTIVE / OVERNIGHT EVENTS:  pain is stable.  the son at bedside.  comfortable.  plan for joint injection tomorrow.  no cp, no sob, no n/v/d. no abdominal pain.  no headache, no dizziness.       Vital Signs Last 24 Hrs  T(C): 37.2 (16 Sep 2018 13:04), Max: 37.2 (16 Sep 2018 13:04)  T(F): 98.9 (16 Sep 2018 13:04), Max: 98.9 (16 Sep 2018 13:04)  HR: 73 (16 Sep 2018 13:04) (60 - 81)  BP: 122/73 (16 Sep 2018 13:04) (122/73 - 138/71)  BP(mean): --  RR: 17 (16 Sep 2018 13:04) (17 - 17)  SpO2: 96% (16 Sep 2018 13:04) (96% - 100%)  I&O's Summary    15 Sep 2018 07:01  -  16 Sep 2018 07:00  --------------------------------------------------------  IN: 645 mL / OUT: 1020 mL / NET: -375 mL    16 Sep 2018 07:01  -  16 Sep 2018 22:09  --------------------------------------------------------  IN: 550 mL / OUT: 750 mL / NET: -200 mL        PHYSICAL EXAM:  GENERAL: NAD, Comfortable, lying in bed  HEAD:  Atraumatic, Normocephalic  EYES: EOMI, PERRLA, conjunctiva and sclera clear  NECK: Supple, No JVD  CHEST/LUNG: Clear to auscultation bilaterally; No wheeze  HEART: Regular rate and rhythm; No murmurs, rubs, or gallops  ABDOMEN: Soft, Nontender, Nondistended; Bowel sounds present  Neuro: AAO x 3, no focal deficit, 5/5 b/l extremities  EXTREMITIES:  2+ Peripheral Pulses, No clubbing, cyanosis, or edema, right hip ?bursa tenderness, right leg limited range of motion due to pain  SKIN: No rashes or lesions      LABS:                        11.1   10.43 )-----------( 230      ( 15 Sep 2018 05:21 )             32.3     09-15    142  |  104  |  21  ----------------------------<  72  3.9   |  24  |  1.34<H>    Ca    8.9      15 Sep 2018 05:21  Phos  2.6     09-15  Mg     2.2     09-15    TPro  6.3  /  Alb  3.2<L>  /  TBili  0.7  /  DBili  x   /  AST  21  /  ALT  9   /  AlkPhos  48  09-15    PT/INR - ( 15 Sep 2018 05:21 )   PT: 11.4 SEC;   INR: 0.99          PTT - ( 15 Sep 2018 05:21 )  PTT:28.3 SEC  CAPILLARY BLOOD GLUCOSE                RADIOLOGY & ADDITIONAL TESTS:    Imaging Personally Reviewed:  [x] YES  [ ] NO    Consultant(s) Notes Reviewed:  [x] YES  [ ] NO      MEDICATIONS  (STANDING):  aspirin enteric coated 81 milliGRAM(s) Oral daily  atorvastatin 40 milliGRAM(s) Oral at bedtime  calcium carbonate    500 mG (Tums) Chewable 1 Tablet(s) Chew daily  docusate sodium 100 milliGRAM(s) Oral three times a day  heparin  Injectable 5000 Unit(s) SubCutaneous every 8 hours  hydrochlorothiazide 25 milliGRAM(s) Oral daily  hydrocortisone 10 milliGRAM(s) Oral every 12 hours  influenza   Vaccine 0.5 milliLiter(s) IntraMuscular once  levothyroxine 50 MICROGram(s) Oral daily  lidocaine   Patch 1 Patch Transdermal daily  lisinopril 40 milliGRAM(s) Oral daily  morphine ER Tablet 30 milliGRAM(s) Oral <User Schedule>  morphine ER Tablet 60 milliGRAM(s) Oral <User Schedule>  multivitamin 1 Tablet(s) Oral daily  pantoprazole    Tablet 40 milliGRAM(s) Oral before breakfast  senna 2 Tablet(s) Oral at bedtime    MEDICATIONS  (PRN):  acetaminophen   Tablet .. 650 milliGRAM(s) Oral every 6 hours PRN Mild Pain (1 - 3)  HYDROmorphone   Tablet 4 milliGRAM(s) Oral every 4 hours PRN for moderate pain  HYDROmorphone  Injectable 1 milliGRAM(s) IV Push every 4 hours PRN Severe Pain (7 - 10)      Care Discussed with Consultants/Other Providers [x] YES  [ ] NO    HEALTH ISSUES - PROBLEM Dx:  Encounter for preventive measure: Encounter for preventive measure  Adult Hypothyroidism: Adult Hypothyroidism  Lumbar Spinal Stenosis: Lumbar Spinal Stenosis  Adrenal Insufficiency: Adrenal Insufficiency  Benign essential HTN: Benign essential HTN  Anemia: Anemia  Hip pain: Hip pain

## 2018-09-16 NOTE — DISCHARGE NOTE ADULT - CARE PLAN
Principal Discharge DX:	Pain of right hip joint  Goal:	Remain free from pain  Assessment and plan of treatment:	- with acute on chronic hip pain   - XR Rt Hip- No evidence of acute fracture or dislocation of the right hip. Osteoarthrosis.  - CT Rt Hip- There is no acute fracture or dislocation.  - Morphine 60mg ER BID, Dilaudid 4mg PO for mod pain, Dilaudid IVP for severe pain  - ORTHO consulted no acute reason for surgical intervention at this time  - pt follows with Pain management doctor as outpatient  - PM&R- Will try to inject right hip bursa tomorrow 9/17 with Depomedrol 80mg; needs aggressive PT. Would not increase narcotics  - PT- Rehab  Secondary Diagnosis:	Spinal stenosis of lumbar region, unspecified whether neurogenic claudication present  Assessment and plan of treatment:	- follow safety / fall precautions  - Lidoderm patch, continue taking pain medications as prescribed  - PT- Rehab  Secondary Diagnosis:	Anemia  Assessment and plan of treatment:	- with hg 9.4, low MCV, high RDW, may be in the setting a slow bleed vs chronic disease  - Iron studies- WNL, B12 -2000, Folate >20.0  Secondary Diagnosis:	Adult Hypothyroidism  Assessment and plan of treatment:	- continue on Levothyroxine 50mcg as prescribed  - follow up with Provider as outpatient for TFTs  Secondary Diagnosis:	Adrenal Insufficiency  Assessment and plan of treatment:	- continue on Hydrocortisone 10mg BID as prescribed Principal Discharge DX:	Pain of right hip joint  Goal:	Remain free from pain  Assessment and plan of treatment:	- with acute on chronic hip pain   - XR Rt Hip- No evidence of acute fracture or dislocation of the right hip. Osteoarthrosis.  - CT Rt Hip- There is no acute fracture or dislocation.  - Morphine 60mg ER BID, Dilaudid 4mg PO for mod pain, Dilaudid IVP for severe pain  - ORTHO consulted no acute reason for surgical intervention at this time  - pt follows with Pain management doctor as outpatient  - PM&R- Will try to inject right hip bursa tomorrow 9/17 with Depomedrol 80mg; needs aggressive PT. Would not increase narcotics  - PT- Rehab  Please follow up with your medical doctors upon arrival to rehab  Secondary Diagnosis:	Spinal stenosis of lumbar region, unspecified whether neurogenic claudication present  Goal:	Remain free from pain  Assessment and plan of treatment:	- follow safety / fall precautions  - Lidoderm patch, continue taking pain medications as prescribed  - PT- Rehab  Secondary Diagnosis:	Anemia  Goal:	stable  Assessment and plan of treatment:	- with hg 9.4, low MCV, high RDW, may be in the setting a slow bleed vs chronic disease  - Iron studies- WNL, B12 -2000, Folate >20.0  Secondary Diagnosis:	Adult Hypothyroidism  Goal:	continue synthroid  Assessment and plan of treatment:	- continue on Levothyroxine 50mcg as prescribed  - follow up with Provider as outpatient for TFTs  Secondary Diagnosis:	Adrenal Insufficiency  Goal:	continue current regimen  Assessment and plan of treatment:	- continue on Hydrocortisone 10mg BID as prescribed  Please follow up with your medical doctor upon arrival to rehab

## 2018-09-16 NOTE — DISCHARGE NOTE ADULT - MEDICATION SUMMARY - MEDICATIONS TO TAKE
I will START or STAY ON the medications listed below when I get home from the hospital:    hydrocortisone 10 mg oral tablet  -- 1 tab(s) by mouth 2 times a day  -- Indication: For Adrenal Insufficiency    morphine 60 mg/12 hours oral tablet, extended release  -- 1 tab(s) by mouth in the AM   -- Indication: For Pain of right hip joint    morphine 30 mg/12 hr oral tablet, extended release  -- 1 tab(s) by mouth once a day in the PM   -- Indication: For Pain of right hip joint    HYDROmorphone 4 mg oral tablet  -- 1 tab(s) by mouth every 4 hours, As Needed  -- Indication: For Pain of right hip joint    aspirin 81 mg oral delayed release tablet  -- 1 tab(s) by mouth once a day  -- Indication: For Prophylaxis     acetaminophen 325 mg oral tablet  -- 2 tab(s) by mouth every 6 hours, As needed, Mild Pain (1 - 3)  -- Indication: For Pain of right hip joint    ramipril 10 mg oral capsule  -- 1 cap(s) by mouth once a day  -- Indication: For Htn     calcium (as carbonate) 600 mg oral tablet  -- 1 cap(s) by mouth once a day  -- Indication: For Supplement     rosuvastatin 10 mg oral tablet  -- 1 tab(s) by mouth once a day (at bedtime)  -- Indication: For Hyperlipidemia     lidocaine 5% topical film  -- Apply on skin to affected area once a day  -- Indication: For Pain of right hip joint    hydroCHLOROthiazide 25 mg oral tablet  -- 1 tab(s) by mouth once a day  -- Indication: For Htn     docusate sodium 100 mg oral capsule  -- 1 cap(s) by mouth 3 times a day  -- Indication: For Bowel regimen     senna oral tablet  -- 2 tab(s) by mouth once a day (at bedtime)  -- Indication: For Bowel regimen     pantoprazole 20 mg oral delayed release tablet  -- 1 tab(s) by mouth once a day  -- Indication: For gerd     levothyroxine 50 mcg (0.05 mg) oral tablet  -- 1 tab(s) by mouth once a day  -- Indication: For Hypothyroidism     Macular Vitamin Benefit oral tablet  -- 1 tab(s) by mouth once a day  -- Indication: For Supplement     Vitamin A, D oral capsule  -- 1 cap(s) by mouth once a day  -- Indication: For Supplement

## 2018-09-16 NOTE — DISCHARGE NOTE ADULT - PLAN OF CARE
Remain free from pain - with acute on chronic hip pain   - XR Rt Hip- No evidence of acute fracture or dislocation of the right hip. Osteoarthrosis.  - CT Rt Hip- There is no acute fracture or dislocation.  - Morphine 60mg ER BID, Dilaudid 4mg PO for mod pain, Dilaudid IVP for severe pain  - ORTHO consulted no acute reason for surgical intervention at this time  - pt follows with Pain management doctor as outpatient  - PM&R- Will try to inject right hip bursa tomorrow 9/17 with Depomedrol 80mg; needs aggressive PT. Would not increase narcotics  - PT- Rehab - follow safety / fall precautions  - Lidoderm patch, continue taking pain medications as prescribed  - PT- Rehab - with hg 9.4, low MCV, high RDW, may be in the setting a slow bleed vs chronic disease  - Iron studies- WNL, B12 -2000, Folate >20.0 - continue on Levothyroxine 50mcg as prescribed  - follow up with Provider as outpatient for TFTs - continue on Hydrocortisone 10mg BID as prescribed - with acute on chronic hip pain   - XR Rt Hip- No evidence of acute fracture or dislocation of the right hip. Osteoarthrosis.  - CT Rt Hip- There is no acute fracture or dislocation.  - Morphine 60mg ER BID, Dilaudid 4mg PO for mod pain, Dilaudid IVP for severe pain  - ORTHO consulted no acute reason for surgical intervention at this time  - pt follows with Pain management doctor as outpatient  - PM&R- Will try to inject right hip bursa tomorrow 9/17 with Depomedrol 80mg; needs aggressive PT. Would not increase narcotics  - PT- Rehab  Please follow up with your medical doctors upon arrival to rehab stable continue synthroid continue current regimen - continue on Hydrocortisone 10mg BID as prescribed  Please follow up with your medical doctor upon arrival to rehab

## 2018-09-16 NOTE — DISCHARGE NOTE ADULT - CARE PROVIDERS DIRECT ADDRESSES
[email or web address]

## 2018-09-16 NOTE — DISCHARGE NOTE ADULT - SECONDARY DIAGNOSIS.
Spinal stenosis of lumbar region, unspecified whether neurogenic claudication present Anemia Adult Hypothyroidism Adrenal Insufficiency

## 2018-09-17 LAB
BUN SERPL-MCNC: 25 MG/DL — HIGH (ref 7–23)
CALCIUM SERPL-MCNC: 8.4 MG/DL — SIGNIFICANT CHANGE UP (ref 8.4–10.5)
CHLORIDE SERPL-SCNC: 102 MMOL/L — SIGNIFICANT CHANGE UP (ref 98–107)
CO2 SERPL-SCNC: 17 MMOL/L — LOW (ref 22–31)
CREAT SERPL-MCNC: 1.32 MG/DL — HIGH (ref 0.5–1.3)
GLUCOSE SERPL-MCNC: 85 MG/DL — SIGNIFICANT CHANGE UP (ref 70–99)
HCT VFR BLD CALC: 31.7 % — LOW (ref 34.5–45)
HGB BLD-MCNC: 10.7 G/DL — LOW (ref 11.5–15.5)
MCHC RBC-ENTMCNC: 24.9 PG — LOW (ref 27–34)
MCHC RBC-ENTMCNC: 33.8 % — SIGNIFICANT CHANGE UP (ref 32–36)
MCV RBC AUTO: 73.9 FL — LOW (ref 80–100)
NRBC # FLD: 0.04 — SIGNIFICANT CHANGE UP
PLATELET # BLD AUTO: 259 K/UL — SIGNIFICANT CHANGE UP (ref 150–400)
PMV BLD: 9.4 FL — SIGNIFICANT CHANGE UP (ref 7–13)
POTASSIUM SERPL-MCNC: 4.5 MMOL/L — SIGNIFICANT CHANGE UP (ref 3.5–5.3)
POTASSIUM SERPL-SCNC: 4.5 MMOL/L — SIGNIFICANT CHANGE UP (ref 3.5–5.3)
RBC # BLD: 4.29 M/UL — SIGNIFICANT CHANGE UP (ref 3.8–5.2)
RBC # FLD: 18.4 % — HIGH (ref 10.3–14.5)
SODIUM SERPL-SCNC: 137 MMOL/L — SIGNIFICANT CHANGE UP (ref 135–145)
WBC # BLD: 11.01 K/UL — HIGH (ref 3.8–10.5)
WBC # FLD AUTO: 11.01 K/UL — HIGH (ref 3.8–10.5)

## 2018-09-17 PROCEDURE — 99222 1ST HOSP IP/OBS MODERATE 55: CPT | Mod: GC

## 2018-09-17 RX ADMIN — Medication 10 MILLIGRAM(S): at 17:17

## 2018-09-17 RX ADMIN — Medication 25 MILLIGRAM(S): at 05:57

## 2018-09-17 RX ADMIN — MORPHINE SULFATE 60 MILLIGRAM(S): 50 CAPSULE, EXTENDED RELEASE ORAL at 06:38

## 2018-09-17 RX ADMIN — HEPARIN SODIUM 5000 UNIT(S): 5000 INJECTION INTRAVENOUS; SUBCUTANEOUS at 13:28

## 2018-09-17 RX ADMIN — HYDROMORPHONE HYDROCHLORIDE 4 MILLIGRAM(S): 2 INJECTION INTRAMUSCULAR; INTRAVENOUS; SUBCUTANEOUS at 01:57

## 2018-09-17 RX ADMIN — HYDROMORPHONE HYDROCHLORIDE 4 MILLIGRAM(S): 2 INJECTION INTRAMUSCULAR; INTRAVENOUS; SUBCUTANEOUS at 21:56

## 2018-09-17 RX ADMIN — HYDROMORPHONE HYDROCHLORIDE 4 MILLIGRAM(S): 2 INJECTION INTRAMUSCULAR; INTRAVENOUS; SUBCUTANEOUS at 08:18

## 2018-09-17 RX ADMIN — Medication 1 TABLET(S): at 13:28

## 2018-09-17 RX ADMIN — MORPHINE SULFATE 30 MILLIGRAM(S): 50 CAPSULE, EXTENDED RELEASE ORAL at 17:17

## 2018-09-17 RX ADMIN — HYDROMORPHONE HYDROCHLORIDE 4 MILLIGRAM(S): 2 INJECTION INTRAMUSCULAR; INTRAVENOUS; SUBCUTANEOUS at 16:38

## 2018-09-17 RX ADMIN — HYDROMORPHONE HYDROCHLORIDE 4 MILLIGRAM(S): 2 INJECTION INTRAMUSCULAR; INTRAVENOUS; SUBCUTANEOUS at 17:24

## 2018-09-17 RX ADMIN — MORPHINE SULFATE 60 MILLIGRAM(S): 50 CAPSULE, EXTENDED RELEASE ORAL at 06:05

## 2018-09-17 RX ADMIN — HYDROMORPHONE HYDROCHLORIDE 4 MILLIGRAM(S): 2 INJECTION INTRAMUSCULAR; INTRAVENOUS; SUBCUTANEOUS at 22:40

## 2018-09-17 RX ADMIN — Medication 81 MILLIGRAM(S): at 13:29

## 2018-09-17 RX ADMIN — ATORVASTATIN CALCIUM 40 MILLIGRAM(S): 80 TABLET, FILM COATED ORAL at 21:54

## 2018-09-17 RX ADMIN — HYDROMORPHONE HYDROCHLORIDE 4 MILLIGRAM(S): 2 INJECTION INTRAMUSCULAR; INTRAVENOUS; SUBCUTANEOUS at 09:00

## 2018-09-17 RX ADMIN — LISINOPRIL 40 MILLIGRAM(S): 2.5 TABLET ORAL at 05:57

## 2018-09-17 RX ADMIN — LIDOCAINE 1 PATCH: 4 CREAM TOPICAL at 16:38

## 2018-09-17 RX ADMIN — Medication 10 MILLIGRAM(S): at 06:06

## 2018-09-17 RX ADMIN — Medication 100 MILLIGRAM(S): at 13:28

## 2018-09-17 RX ADMIN — PANTOPRAZOLE SODIUM 40 MILLIGRAM(S): 20 TABLET, DELAYED RELEASE ORAL at 05:57

## 2018-09-17 RX ADMIN — HEPARIN SODIUM 5000 UNIT(S): 5000 INJECTION INTRAVENOUS; SUBCUTANEOUS at 21:54

## 2018-09-17 RX ADMIN — HEPARIN SODIUM 5000 UNIT(S): 5000 INJECTION INTRAVENOUS; SUBCUTANEOUS at 06:06

## 2018-09-17 RX ADMIN — HYDROMORPHONE HYDROCHLORIDE 4 MILLIGRAM(S): 2 INJECTION INTRAMUSCULAR; INTRAVENOUS; SUBCUTANEOUS at 02:30

## 2018-09-17 RX ADMIN — Medication 50 MICROGRAM(S): at 06:06

## 2018-09-17 NOTE — PROGRESS NOTE ADULT - PROBLEM SELECTOR PLAN 1
- with acute on chronic hip pain   - c/w morphine 60 mg ER am, 30 mg ER pm, continue with dilaudid 4mg PO for moderate pain, and 1mg dialudid IVP for severe pain Q4hrs.  -per conversation with ortho, no acute reason for surgical intervention at this time  -consider pain management consult if pain not well controlled  -PT ordered  - CT right hip neg.   - PM&R eval appreciated. Plan for joint /bursa injection.

## 2018-09-17 NOTE — PROGRESS NOTE ADULT - SUBJECTIVE AND OBJECTIVE BOX
Pt seen for follow up for injection.   continues to have right hip pain, going to rehab today                 REVIEW OF SYSTEMS  Constitutional - No fever, No weight loss, No fatigue  HEENT - No eye pain, No visual disturbances, No difficulty hearing, No tinnitus, No vertigo, No neck pain  Neurological - No headaches, No memory loss, No loss of strength, No numbness, No tremors  Musculoskeletal - + hip pain   Psychiatric - No depression, No anxiety  All other ROS negative,     RECENT LABS/IMAGING  CBC Full  -  ( 17 Sep 2018 09:56 )  WBC Count : 11.01 K/uL  Hemoglobin : 10.7 g/dL  Hematocrit : 31.7 %  Platelet Count - Automated : 259 K/uL  Mean Cell Volume : 73.9 fL  Mean Cell Hemoglobin : 24.9 pg  Mean Cell Hemoglobin Concentration : 33.8 %  Auto Neutrophil # : x  Auto Lymphocyte # : x  Auto Monocyte # : x  Auto Eosinophil # : x  Auto Basophil # : x  Auto Neutrophil % : x  Auto Lymphocyte % : x  Auto Monocyte % : x  Auto Eosinophil % : x  Auto Basophil % : x    09-17    137  |  102  |  25<H>  ----------------------------<  85  4.5   |  17<L>  |  1.32<H>    Ca    8.4      17 Sep 2018 06:30          VITALS  T(C): 36.9 (09-17-18 @ 13:48), Max: 37.2 (09-16-18 @ 22:12)  HR: 83 (09-17-18 @ 13:48) (68 - 83)  BP: 104/62 (09-17-18 @ 13:48) (103/66 - 111/72)  RR: 17 (09-17-18 @ 13:48) (17 - 18)  SpO2: 97% (09-17-18 @ 13:48) (97% - 98%)  Wt(kg): --    MEDICATIONS   acetaminophen   Tablet .. 650 milliGRAM(s) every 6 hours PRN  aspirin enteric coated 81 milliGRAM(s) daily  atorvastatin 40 milliGRAM(s) at bedtime  calcium carbonate    500 mG (Tums) Chewable 1 Tablet(s) daily  docusate sodium 100 milliGRAM(s) three times a day  heparin  Injectable 5000 Unit(s) every 8 hours  hydrochlorothiazide 25 milliGRAM(s) daily  hydrocortisone 10 milliGRAM(s) every 12 hours  HYDROmorphone   Tablet 4 milliGRAM(s) every 4 hours PRN  HYDROmorphone  Injectable 1 milliGRAM(s) every 4 hours PRN  influenza   Vaccine 0.5 milliLiter(s) once  levothyroxine 50 MICROGram(s) daily  lidocaine   Patch 1 Patch daily  lidocaine 2% Injectable 10 milliLiter(s) once  lisinopril 40 milliGRAM(s) daily  methylPREDNISolone acetate Injectable 80 milliGRAM(s) once  morphine ER Tablet 30 milliGRAM(s) <User Schedule>  morphine ER Tablet 60 milliGRAM(s) <User Schedule>  multivitamin 1 Tablet(s) daily  pantoprazole    Tablet 40 milliGRAM(s) before breakfast  senna 2 Tablet(s) at bedtime      PHYSICAL EXAM  Constitutional - NAD, Comfortable  HEENT - NCAT, EOMI  Neck - Supple, No limited ROM  Chest - CTA bilaterally, No wheeze, No rhonchi, No crackles  Cardiovascular - RRR, S1S2, No murmurs  Abdomen - BS+, Soft, NTND  Extremities - decereased ROm right hip, + TTP right hip trochanteric bursa  Neurologic Exam -                   stable   Psychiatric - Mood stable, Affect WNL    --------------------------------------------------------------------

## 2018-09-17 NOTE — PROGRESS NOTE ADULT - ASSESSMENT
trochanteric bursitis  After obtaining informed consent and under sterile conditions, she was injected with 2 cc lidocaine and 80 mg Depomedrol in the right hip trochanteric bursa.   She tolerated the injection well.

## 2018-09-17 NOTE — PROGRESS NOTE ADULT - SUBJECTIVE AND OBJECTIVE BOX
Patient is a 73y old  Female who presents with a chief complaint of hip pain (16 Sep 2018 16:09)      SUBJECTIVE / OVERNIGHT EVENTS:  pain is stable.  no cp, no sob, no n/v/d. no abdominal pain.  no headache, no dizziness.   plan for joint injection.       Vital Signs Last 24 Hrs  T(C): 36.9 (17 Sep 2018 05:54), Max: 37.2 (16 Sep 2018 13:04)  T(F): 98.5 (17 Sep 2018 05:54), Max: 98.9 (16 Sep 2018 13:04)  HR: 68 (17 Sep 2018 05:54) (68 - 73)  BP: 111/72 (17 Sep 2018 05:54) (103/66 - 122/73)  BP(mean): --  RR: 18 (17 Sep 2018 05:54) (17 - 18)  SpO2: 98% (17 Sep 2018 05:54) (96% - 98%)  I&O's Summary    16 Sep 2018 07:01  -  17 Sep 2018 07:00  --------------------------------------------------------  IN: 550 mL / OUT: 1750 mL / NET: -1200 mL      PHYSICAL EXAM:  GENERAL: NAD, Comfortable, lying in bed  HEAD:  Atraumatic, Normocephalic  EYES: EOMI, PERRLA, conjunctiva and sclera clear  NECK: Supple, No JVD  CHEST/LUNG: Clear to auscultation bilaterally; No wheeze  HEART: Regular rate and rhythm; No murmurs, rubs, or gallops  ABDOMEN: Soft, Nontender, Nondistended; Bowel sounds present  Neuro: AAO x 3, no focal deficit, 5/5 b/l extremities  EXTREMITIES:  2+ Peripheral Pulses, No clubbing, cyanosis, or edema, right hip ?bursa tenderness, right leg limited range of motion due to pain  SKIN: No rashes or lesions      LABS:    09-17    137  |  102  |  25<H>  ----------------------------<  85  4.5   |  17<L>  |  1.32<H>    Ca    8.4      17 Sep 2018 06:30        CAPILLARY BLOOD GLUCOSE                RADIOLOGY & ADDITIONAL TESTS:    Imaging Personally Reviewed:  [x] YES  [ ] NO    Consultant(s) Notes Reviewed:  [x] YES  [ ] NO      MEDICATIONS  (STANDING):  aspirin enteric coated 81 milliGRAM(s) Oral daily  atorvastatin 40 milliGRAM(s) Oral at bedtime  calcium carbonate    500 mG (Tums) Chewable 1 Tablet(s) Chew daily  docusate sodium 100 milliGRAM(s) Oral three times a day  heparin  Injectable 5000 Unit(s) SubCutaneous every 8 hours  hydrochlorothiazide 25 milliGRAM(s) Oral daily  hydrocortisone 10 milliGRAM(s) Oral every 12 hours  influenza   Vaccine 0.5 milliLiter(s) IntraMuscular once  levothyroxine 50 MICROGram(s) Oral daily  lidocaine   Patch 1 Patch Transdermal daily  lidocaine 2% Injectable 10 milliLiter(s) Local Injection once  lisinopril 40 milliGRAM(s) Oral daily  methylPREDNISolone acetate Injectable 80 milliGRAM(s) IntraArticular once  morphine ER Tablet 30 milliGRAM(s) Oral <User Schedule>  morphine ER Tablet 60 milliGRAM(s) Oral <User Schedule>  multivitamin 1 Tablet(s) Oral daily  pantoprazole    Tablet 40 milliGRAM(s) Oral before breakfast  senna 2 Tablet(s) Oral at bedtime    MEDICATIONS  (PRN):  acetaminophen   Tablet .. 650 milliGRAM(s) Oral every 6 hours PRN Mild Pain (1 - 3)  HYDROmorphone   Tablet 4 milliGRAM(s) Oral every 4 hours PRN for moderate pain  HYDROmorphone  Injectable 1 milliGRAM(s) IV Push every 4 hours PRN Severe Pain (7 - 10)      Care Discussed with Consultants/Other Providers [x] YES  [ ] NO    HEALTH ISSUES - PROBLEM Dx:  Encounter for preventive measure: Encounter for preventive measure  Adult Hypothyroidism: Adult Hypothyroidism  Lumbar Spinal Stenosis: Lumbar Spinal Stenosis  Adrenal Insufficiency: Adrenal Insufficiency  Benign essential HTN: Benign essential HTN  Anemia: Anemia  Hip pain: Hip pain

## 2018-09-18 VITALS
TEMPERATURE: 98 F | RESPIRATION RATE: 18 BRPM | HEART RATE: 84 BPM | DIASTOLIC BLOOD PRESSURE: 69 MMHG | OXYGEN SATURATION: 100 % | SYSTOLIC BLOOD PRESSURE: 116 MMHG

## 2018-09-18 LAB
BUN SERPL-MCNC: 26 MG/DL — HIGH (ref 7–23)
CALCIUM SERPL-MCNC: 8.3 MG/DL — LOW (ref 8.4–10.5)
CHLORIDE SERPL-SCNC: 105 MMOL/L — SIGNIFICANT CHANGE UP (ref 98–107)
CO2 SERPL-SCNC: 25 MMOL/L — SIGNIFICANT CHANGE UP (ref 22–31)
CREAT SERPL-MCNC: 1.48 MG/DL — HIGH (ref 0.5–1.3)
GLUCOSE SERPL-MCNC: 100 MG/DL — HIGH (ref 70–99)
HCT VFR BLD CALC: 28.8 % — LOW (ref 34.5–45)
HGB BLD-MCNC: 9.9 G/DL — LOW (ref 11.5–15.5)
MCHC RBC-ENTMCNC: 25.7 PG — LOW (ref 27–34)
MCHC RBC-ENTMCNC: 34.4 % — SIGNIFICANT CHANGE UP (ref 32–36)
MCV RBC AUTO: 74.8 FL — LOW (ref 80–100)
NRBC # FLD: 0 — SIGNIFICANT CHANGE UP
PLATELET # BLD AUTO: 239 K/UL — SIGNIFICANT CHANGE UP (ref 150–400)
PMV BLD: 9.7 FL — SIGNIFICANT CHANGE UP (ref 7–13)
POTASSIUM SERPL-MCNC: 4.2 MMOL/L — SIGNIFICANT CHANGE UP (ref 3.5–5.3)
POTASSIUM SERPL-SCNC: 4.2 MMOL/L — SIGNIFICANT CHANGE UP (ref 3.5–5.3)
RBC # BLD: 3.85 M/UL — SIGNIFICANT CHANGE UP (ref 3.8–5.2)
RBC # FLD: 18 % — HIGH (ref 10.3–14.5)
SODIUM SERPL-SCNC: 140 MMOL/L — SIGNIFICANT CHANGE UP (ref 135–145)
WBC # BLD: 10.25 K/UL — SIGNIFICANT CHANGE UP (ref 3.8–10.5)
WBC # FLD AUTO: 10.25 K/UL — SIGNIFICANT CHANGE UP (ref 3.8–10.5)

## 2018-09-18 PROCEDURE — 20600 DRAIN/INJ JOINT/BURSA W/O US: CPT | Mod: GC

## 2018-09-18 RX ORDER — ACETAMINOPHEN 500 MG
2 TABLET ORAL
Qty: 0 | Refills: 0 | DISCHARGE
Start: 2018-09-18

## 2018-09-18 RX ORDER — LIDOCAINE 4 G/100G
1 CREAM TOPICAL
Qty: 0 | Refills: 0 | DISCHARGE
Start: 2018-09-18

## 2018-09-18 RX ORDER — DOCUSATE SODIUM 100 MG
1 CAPSULE ORAL
Qty: 0 | Refills: 0 | DISCHARGE
Start: 2018-09-18

## 2018-09-18 RX ORDER — SENNA PLUS 8.6 MG/1
2 TABLET ORAL
Qty: 0 | Refills: 0 | DISCHARGE
Start: 2018-09-18

## 2018-09-18 RX ADMIN — Medication 1 TABLET(S): at 12:57

## 2018-09-18 RX ADMIN — Medication 81 MILLIGRAM(S): at 12:59

## 2018-09-18 RX ADMIN — Medication 100 MILLIGRAM(S): at 05:35

## 2018-09-18 RX ADMIN — Medication 25 MILLIGRAM(S): at 05:35

## 2018-09-18 RX ADMIN — Medication 100 MILLIGRAM(S): at 12:58

## 2018-09-18 RX ADMIN — HEPARIN SODIUM 5000 UNIT(S): 5000 INJECTION INTRAVENOUS; SUBCUTANEOUS at 12:58

## 2018-09-18 RX ADMIN — MORPHINE SULFATE 60 MILLIGRAM(S): 50 CAPSULE, EXTENDED RELEASE ORAL at 06:00

## 2018-09-18 RX ADMIN — LIDOCAINE 1 PATCH: 4 CREAM TOPICAL at 03:58

## 2018-09-18 RX ADMIN — LISINOPRIL 40 MILLIGRAM(S): 2.5 TABLET ORAL at 05:35

## 2018-09-18 RX ADMIN — Medication 1 TABLET(S): at 12:58

## 2018-09-18 RX ADMIN — MORPHINE SULFATE 60 MILLIGRAM(S): 50 CAPSULE, EXTENDED RELEASE ORAL at 05:35

## 2018-09-18 RX ADMIN — LIDOCAINE 1 PATCH: 4 CREAM TOPICAL at 12:57

## 2018-09-18 RX ADMIN — HYDROMORPHONE HYDROCHLORIDE 4 MILLIGRAM(S): 2 INJECTION INTRAMUSCULAR; INTRAVENOUS; SUBCUTANEOUS at 10:44

## 2018-09-18 RX ADMIN — HYDROMORPHONE HYDROCHLORIDE 4 MILLIGRAM(S): 2 INJECTION INTRAMUSCULAR; INTRAVENOUS; SUBCUTANEOUS at 11:30

## 2018-09-18 RX ADMIN — Medication 50 MICROGRAM(S): at 05:35

## 2018-09-18 RX ADMIN — Medication 10 MILLIGRAM(S): at 05:35

## 2018-09-18 RX ADMIN — PANTOPRAZOLE SODIUM 40 MILLIGRAM(S): 20 TABLET, DELAYED RELEASE ORAL at 05:35

## 2018-09-18 NOTE — PROGRESS NOTE ADULT - SUBJECTIVE AND OBJECTIVE BOX
Patient is a 73y old  Female who presents with a chief complaint of hip pain (17 Sep 2018 09:36)      SUBJECTIVE / OVERNIGHT EVENTS:  pain is so much better today per pt.  no cp, no sob, no n/v/d. no abdominal pain.  no headache, no dizziness.   plan for rehab.       Vital Signs Last 24 Hrs  T(C): 36.9 (18 Sep 2018 05:31), Max: 36.9 (17 Sep 2018 13:48)  T(F): 98.5 (18 Sep 2018 05:31), Max: 98.5 (17 Sep 2018 13:48)  HR: 70 (18 Sep 2018 05:31) (68 - 83)  BP: 132/73 (18 Sep 2018 05:31) (104/62 - 132/73)  BP(mean): --  RR: 18 (18 Sep 2018 05:31) (17 - 18)  SpO2: 98% (18 Sep 2018 05:31) (97% - 98%)  I&O's Summary    17 Sep 2018 07:01  -  18 Sep 2018 07:00  --------------------------------------------------------  IN: 0 mL / OUT: 800 mL / NET: -800 mL        PHYSICAL EXAM:  GENERAL: NAD, Comfortable  HEAD:  Atraumatic, Normocephalic  EYES: EOMI, PERRLA, conjunctiva and sclera clear  NECK: Supple, No JVD  CHEST/LUNG: Clear to auscultation bilaterally; No wheeze  HEART: Regular rate and rhythm; No murmurs, rubs, or gallops  ABDOMEN: Soft, Nontender, Nondistended; Bowel sounds present  Neuro: AAO x 3, no focal deficit, 5/5 b/l extremities  EXTREMITIES:  2+ Peripheral Pulses, No clubbing, cyanosis, or edema, right hip pain much improved. injection site clean, no edema/erythema.   SKIN: No rashes or lesions      LABS:                        9.9    10.25 )-----------( 239      ( 18 Sep 2018 07:42 )             28.8     09-18    140  |  105  |  26<H>  ----------------------------<  100<H>  4.2   |  25  |  1.48<H>    Ca    8.3<L>      18 Sep 2018 07:42        CAPILLARY BLOOD GLUCOSE                RADIOLOGY & ADDITIONAL TESTS:    Imaging Personally Reviewed:  [x] YES  [ ] NO    Consultant(s) Notes Reviewed:  [x] YES  [ ] NO      MEDICATIONS  (STANDING):  aspirin enteric coated 81 milliGRAM(s) Oral daily  atorvastatin 40 milliGRAM(s) Oral at bedtime  calcium carbonate    500 mG (Tums) Chewable 1 Tablet(s) Chew daily  docusate sodium 100 milliGRAM(s) Oral three times a day  heparin  Injectable 5000 Unit(s) SubCutaneous every 8 hours  hydrochlorothiazide 25 milliGRAM(s) Oral daily  hydrocortisone 10 milliGRAM(s) Oral every 12 hours  influenza   Vaccine 0.5 milliLiter(s) IntraMuscular once  levothyroxine 50 MICROGram(s) Oral daily  lidocaine   Patch 1 Patch Transdermal daily  lidocaine 2% Injectable 10 milliLiter(s) Local Injection once  lisinopril 40 milliGRAM(s) Oral daily  methylPREDNISolone acetate Injectable 80 milliGRAM(s) IntraArticular once  morphine ER Tablet 30 milliGRAM(s) Oral <User Schedule>  morphine ER Tablet 60 milliGRAM(s) Oral <User Schedule>  multivitamin 1 Tablet(s) Oral daily  pantoprazole    Tablet 40 milliGRAM(s) Oral before breakfast  senna 2 Tablet(s) Oral at bedtime    MEDICATIONS  (PRN):  acetaminophen   Tablet .. 650 milliGRAM(s) Oral every 6 hours PRN Mild Pain (1 - 3)  HYDROmorphone   Tablet 4 milliGRAM(s) Oral every 4 hours PRN for moderate pain  HYDROmorphone  Injectable 1 milliGRAM(s) IV Push every 4 hours PRN Severe Pain (7 - 10)      Care Discussed with Consultants/Other Providers [x] YES  [ ] NO    HEALTH ISSUES - PROBLEM Dx:  Encounter for preventive measure: Encounter for preventive measure  Adult Hypothyroidism: Adult Hypothyroidism  Lumbar Spinal Stenosis: Lumbar Spinal Stenosis  Adrenal Insufficiency: Adrenal Insufficiency  Benign essential HTN: Benign essential HTN  Anemia: Anemia  Hip pain: Hip pain

## 2018-09-18 NOTE — PROGRESS NOTE ADULT - ATTENDING COMMENTS
- Dr. ELMER Hope ([facility])  - [phone] Jerardo Steward will be covering for me starting 9/19/18. He can be reached at  if needed.     - Dr. ELMER Hope ([facility])  - [phone]

## 2018-09-18 NOTE — PROGRESS NOTE ADULT - PROBLEM SELECTOR PLAN 1
- with acute on chronic hip pain   - c/w morphine 60 mg ER am, 30 mg ER pm, continue with dilaudid 4mg PO for moderate pain, and 1mg dialudid IVP for severe pain Q4hrs.  -per ortho, no acute reason for surgical intervention at this time  - CT right hip neg neg for fracture.   - PM&R eval appreciated. s/p joint /bursa injection with great results.  - pt is stable for d/c planning.

## 2019-05-14 NOTE — PATIENT PROFILE ADULT. - BLOOD AVOIDANCE/RESTRICTIONS, PROFILE
OK 05/15    Telephone call to pt. and given Dr. Glez message. Pt. verbalized understanding and states he is willing to do what is recommended and start on the Atorvastatin and ASA 81 mg. He states he is also committed to changing his diet and exercise.     RN discussed how medications help to lower risk of cardiac event in the future as well as diet and exercise but that some risk is r/t heredity.     He is asking if he will need to take the Atorvastatin and ASA lifelong? Can the CT cardiac scoring be reversed so that he will be able to stop these medications in the future?      none

## 2019-09-17 NOTE — ED ADULT NURSE NOTE - CHIEF COMPLAINT
MD Elkins paged about patients BP being 163/88 and requesting other BP medication as Hydralazine was not effective this AM. Per MD SBP in the 160s is okay for this patient, patient can still discharge at 1500 and no need to give another dose of Hydralazine for BP.    The patient is a 71y Female complaining of

## 2019-09-19 NOTE — DISCHARGE NOTE ADULT - NSTOBACCOREFERRAL_GEN_A_NCS
Note Text (......Xxx Chief Complaint.): This diagnosis correlates with the Detail Level: Zone Other (Free Text): Patient states lesion has been present without change.  Discussed biopsy of lesion versus continuing to monitor. Patient wishes to continue to monitor. Picture and measurement obtained today and will recheck at next visit.  Counseled patient to return to office upon any change or worsening of condition. Yes

## 2020-02-03 ENCOUNTER — OUTPATIENT (OUTPATIENT)
Dept: OUTPATIENT SERVICES | Facility: HOSPITAL | Age: 75
LOS: 1 days | End: 2020-02-03
Payer: MEDICARE

## 2020-02-03 ENCOUNTER — APPOINTMENT (OUTPATIENT)
Dept: RADIOLOGY | Facility: IMAGING CENTER | Age: 75
End: 2020-02-03
Payer: MEDICARE

## 2020-02-03 DIAGNOSIS — Z00.8 ENCOUNTER FOR OTHER GENERAL EXAMINATION: ICD-10-CM

## 2020-02-03 PROCEDURE — 77080 DXA BONE DENSITY AXIAL: CPT

## 2020-02-03 PROCEDURE — 77080 DXA BONE DENSITY AXIAL: CPT | Mod: 26

## 2020-06-03 NOTE — ED ADULT TRIAGE NOTE - MODE OF ARRIVAL
EMS Consent (Temporal Branch)/Introductory Paragraph: The rationale for Mohs was explained to the patient and consent was obtained. The risks, benefits and alternatives to therapy were discussed in detail. Specifically, the risks of damage to the temporal branch of the facial nerve, infection, scarring, bleeding, prolonged wound healing, incomplete removal, allergy to anesthesia, and recurrence were addressed. Prior to the procedure, the treatment site was clearly identified and confirmed by the patient. All components of Universal Protocol/PAUSE Rule completed.

## 2020-09-20 DIAGNOSIS — Z01.818 ENCOUNTER FOR OTHER PREPROCEDURAL EXAMINATION: ICD-10-CM

## 2020-09-21 ENCOUNTER — APPOINTMENT (OUTPATIENT)
Dept: DISASTER EMERGENCY | Facility: CLINIC | Age: 75
End: 2020-09-21

## 2020-09-22 LAB — SARS-COV-2 N GENE NPH QL NAA+PROBE: NOT DETECTED

## 2020-09-24 ENCOUNTER — OUTPATIENT (OUTPATIENT)
Dept: OUTPATIENT SERVICES | Facility: HOSPITAL | Age: 75
LOS: 1 days | End: 2020-09-24
Payer: MEDICARE

## 2020-09-24 VITALS
HEIGHT: 60 IN | OXYGEN SATURATION: 96 % | WEIGHT: 177.03 LBS | RESPIRATION RATE: 18 BRPM | SYSTOLIC BLOOD PRESSURE: 154 MMHG | TEMPERATURE: 98 F | HEART RATE: 85 BPM | DIASTOLIC BLOOD PRESSURE: 72 MMHG

## 2020-09-24 VITALS
RESPIRATION RATE: 17 BRPM | SYSTOLIC BLOOD PRESSURE: 154 MMHG | OXYGEN SATURATION: 95 % | HEART RATE: 78 BPM | DIASTOLIC BLOOD PRESSURE: 86 MMHG

## 2020-09-24 DIAGNOSIS — Z90.49 ACQUIRED ABSENCE OF OTHER SPECIFIED PARTS OF DIGESTIVE TRACT: Chronic | ICD-10-CM

## 2020-09-24 PROCEDURE — G0105: CPT

## 2020-09-24 RX ORDER — HYDROCORTISONE 20 MG
1 TABLET ORAL
Qty: 0 | Refills: 0 | DISCHARGE

## 2020-09-24 RX ORDER — PANTOPRAZOLE SODIUM 20 MG/1
1 TABLET, DELAYED RELEASE ORAL
Qty: 0 | Refills: 0 | DISCHARGE

## 2020-09-24 RX ORDER — MORPHINE SULFATE 50 MG/1
1 CAPSULE, EXTENDED RELEASE ORAL
Qty: 0 | Refills: 0 | DISCHARGE

## 2020-09-24 RX ORDER — ROSUVASTATIN CALCIUM 5 MG/1
1 TABLET ORAL
Qty: 0 | Refills: 0 | DISCHARGE

## 2020-09-24 RX ORDER — SODIUM CHLORIDE 9 MG/ML
1000 INJECTION INTRAMUSCULAR; INTRAVENOUS; SUBCUTANEOUS
Refills: 0 | Status: DISCONTINUED | OUTPATIENT
Start: 2020-09-24 | End: 2020-10-08

## 2020-09-24 RX ADMIN — SODIUM CHLORIDE 75 MILLILITER(S): 9 INJECTION INTRAMUSCULAR; INTRAVENOUS; SUBCUTANEOUS at 12:06

## 2020-09-24 NOTE — PRE PROCEDURE NOTE - PRE PROCEDURE EVALUATION
Attending Physician:              Soto Ryan M.D.              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    Adrenal Insufficiency    Adult Hypothyroidism    Lumbar Spinal Stenosis    H/O right hemicolectomy    History of Laminectomy/ Fusion 1/06; L1-L2      ALLERGIES:  codedine (Rash)  codeine (Nausea; Other)  penicillin (Rash)    HOME MEDICATIONS:  acetaminophen 325 mg oral tablet: 2 tab(s) orally every 6 hours, As needed, Mild Pain (1 - 3)  calcium (as carbonate) 600 mg oral tablet: 1 cap(s) orally once a day  hydrocortisone 10 mg oral tablet: 1 tab(s) orally 2 times a day  levothyroxine 50 mcg (0.05 mg) oral tablet: 1 tab(s) orally once a day  lidocaine 5% topical film: Apply topically to affected area once a day  Macular Vitamin Benefit oral tablet: 1 tab(s) orally once a day  morphine 30 mg/12 hr oral tablet, extended release: 1 tab(s) orally once a day in the PM     AICD/PPM: [ ] yes   [ ] no    PERTINENT LAB DATA:                      PHYSICAL EXAMINATION:    Height (cm): 152.4  Weight (kg): 80.3  BMI (kg/m2): 34.6  BSA (m2): 1.77T(C): 36.9  HR: 85  BP: 154/72  RR: 18  SpO2: 96%    Constitutional: NAD  HEENT: PERRLA, EOMI,    Neck:  No JVD  Respiratory: CTAB/L  Cardiovascular: S1 and S2  Gastrointestinal: BS+, soft, NT/ND  Extremities: No peripheral edema  Neurological: A/O x 3, no focal deficits  Psychiatric: Normal mood, normal affect  Skin: No rashes    ASA Class: I [ ]  II [ ]  III [x ]  IV [ ]    COMMENTS:    The patient is a suitable candidate for the planned procedure unless box checked [ ]  No, explain:

## 2020-09-24 NOTE — ASU PATIENT PROFILE, ADULT - VISION (WITH CORRECTIVE LENSES IF THE PATIENT USUALLY WEARS THEM):
Normal vision: sees adequately in most situations; can see medication labels, newsprint/malcular regeneration

## 2021-07-06 NOTE — PHYSICAL THERAPY INITIAL EVALUATION ADULT - SITTING BALANCE: DYNAMIC
----- Message from Afsaneh Boland sent at 7/6/2021  9:44 AM EDT -----  Regarding: Dr. Partha Pinedo: [phone]  Appointment not available    Caller's first and last name and relationship to patient (if not the patient): N/A      Best contact number: [phone]      Preferred date and time: ASAP      Scheduled appointment date and time: N/A      Reason for appointment: EZE YI & Lab Appt Request      Details to clarify the request: PT was discharged from [facility] on 7/3/21. Diagnosis: Hypervolemia. PT went in for pain in legs and frequent urination.       Message from [facility] good balance

## 2021-07-15 NOTE — ED ADULT NURSE NOTE - SPO2 (%)
100
You can access the FollowMyHealth Patient Portal offered by [facility] by registering at the following website: [email or web address]. By joining [employer]’s FollowMyHealth portal, you will also be able to view your health information using other applications (apps) compatible with our system.

## 2021-08-22 ENCOUNTER — TRANSCRIPTION ENCOUNTER (OUTPATIENT)
Age: 76
End: 2021-08-22

## 2021-08-22 ENCOUNTER — INPATIENT (INPATIENT)
Facility: HOSPITAL | Age: 76
LOS: 4 days | Discharge: HOME | End: 2021-08-27
Attending: HOSPITALIST | Admitting: HOSPITALIST
Payer: MEDICARE

## 2021-08-22 VITALS
OXYGEN SATURATION: 100 % | RESPIRATION RATE: 24 BRPM | HEIGHT: 60 IN | DIASTOLIC BLOOD PRESSURE: 79 MMHG | SYSTOLIC BLOOD PRESSURE: 116 MMHG | HEART RATE: 140 BPM | TEMPERATURE: 98 F

## 2021-08-22 DIAGNOSIS — I47.1 SUPRAVENTRICULAR TACHYCARDIA: ICD-10-CM

## 2021-08-22 DIAGNOSIS — Z90.49 ACQUIRED ABSENCE OF OTHER SPECIFIED PARTS OF DIGESTIVE TRACT: Chronic | ICD-10-CM

## 2021-08-22 LAB
ALBUMIN SERPL ELPH-MCNC: 4 G/DL — SIGNIFICANT CHANGE UP (ref 3.3–5)
ALP SERPL-CCNC: 61 U/L — SIGNIFICANT CHANGE UP (ref 40–120)
ALT FLD-CCNC: 18 U/L — SIGNIFICANT CHANGE UP (ref 4–33)
ANION GAP SERPL CALC-SCNC: 11 MMOL/L — SIGNIFICANT CHANGE UP (ref 7–14)
AST SERPL-CCNC: 30 U/L — SIGNIFICANT CHANGE UP (ref 4–32)
B PERT DNA SPEC QL NAA+PROBE: SIGNIFICANT CHANGE UP
B PERT+PARAPERT DNA PNL SPEC NAA+PROBE: SIGNIFICANT CHANGE UP
BASE EXCESS BLDV CALC-SCNC: 0.6 MMOL/L — SIGNIFICANT CHANGE UP (ref -2–3)
BASOPHILS # BLD AUTO: 0.08 K/UL — SIGNIFICANT CHANGE UP (ref 0–0.2)
BASOPHILS NFR BLD AUTO: 0.8 % — SIGNIFICANT CHANGE UP (ref 0–2)
BILIRUB SERPL-MCNC: 0.4 MG/DL — SIGNIFICANT CHANGE UP (ref 0.2–1.2)
BLOOD GAS VENOUS COMPREHENSIVE RESULT: SIGNIFICANT CHANGE UP
BORDETELLA PARAPERTUSSIS (RAPRVP): SIGNIFICANT CHANGE UP
BUN SERPL-MCNC: 24 MG/DL — HIGH (ref 7–23)
C PNEUM DNA SPEC QL NAA+PROBE: SIGNIFICANT CHANGE UP
CALCIUM SERPL-MCNC: 9.8 MG/DL — SIGNIFICANT CHANGE UP (ref 8.4–10.5)
CHLORIDE BLDV-SCNC: 104 MMOL/L — SIGNIFICANT CHANGE UP (ref 96–108)
CHLORIDE SERPL-SCNC: 103 MMOL/L — SIGNIFICANT CHANGE UP (ref 98–107)
CO2 BLDV-SCNC: 29 MMOL/L — HIGH (ref 22–26)
CO2 SERPL-SCNC: 24 MMOL/L — SIGNIFICANT CHANGE UP (ref 22–31)
CREAT SERPL-MCNC: 1.74 MG/DL — HIGH (ref 0.5–1.3)
D DIMER BLD IA.RAPID-MCNC: 283 NG/ML DDU — HIGH
EOSINOPHIL # BLD AUTO: 0.56 K/UL — HIGH (ref 0–0.5)
EOSINOPHIL NFR BLD AUTO: 5.4 % — SIGNIFICANT CHANGE UP (ref 0–6)
FLUAV SUBTYP SPEC NAA+PROBE: SIGNIFICANT CHANGE UP
FLUBV RNA SPEC QL NAA+PROBE: SIGNIFICANT CHANGE UP
GAS PNL BLDV: 137 MMOL/L — SIGNIFICANT CHANGE UP (ref 136–145)
GLUCOSE BLDV-MCNC: 114 MG/DL — HIGH (ref 70–99)
GLUCOSE SERPL-MCNC: 114 MG/DL — HIGH (ref 70–99)
HADV DNA SPEC QL NAA+PROBE: SIGNIFICANT CHANGE UP
HCO3 BLDV-SCNC: 27 MMOL/L — SIGNIFICANT CHANGE UP (ref 22–29)
HCOV 229E RNA SPEC QL NAA+PROBE: SIGNIFICANT CHANGE UP
HCOV HKU1 RNA SPEC QL NAA+PROBE: SIGNIFICANT CHANGE UP
HCOV NL63 RNA SPEC QL NAA+PROBE: SIGNIFICANT CHANGE UP
HCOV OC43 RNA SPEC QL NAA+PROBE: SIGNIFICANT CHANGE UP
HCT VFR BLD CALC: 36.1 % — SIGNIFICANT CHANGE UP (ref 34.5–45)
HCT VFR BLDA CALC: 39 % — SIGNIFICANT CHANGE UP (ref 34.5–46.5)
HGB BLD CALC-MCNC: 12.9 G/DL — SIGNIFICANT CHANGE UP (ref 11.5–15.5)
HGB BLD-MCNC: 12.5 G/DL — SIGNIFICANT CHANGE UP (ref 11.5–15.5)
HMPV RNA SPEC QL NAA+PROBE: SIGNIFICANT CHANGE UP
HPIV1 RNA SPEC QL NAA+PROBE: SIGNIFICANT CHANGE UP
HPIV2 RNA SPEC QL NAA+PROBE: SIGNIFICANT CHANGE UP
HPIV3 RNA SPEC QL NAA+PROBE: SIGNIFICANT CHANGE UP
HPIV4 RNA SPEC QL NAA+PROBE: SIGNIFICANT CHANGE UP
IANC: 5.16 K/UL — SIGNIFICANT CHANGE UP (ref 1.5–8.5)
IMM GRANULOCYTES NFR BLD AUTO: 0.4 % — SIGNIFICANT CHANGE UP (ref 0–1.5)
LACTATE BLDV-MCNC: 1.5 MMOL/L — SIGNIFICANT CHANGE UP (ref 0.5–2)
LYMPHOCYTES # BLD AUTO: 3.44 K/UL — HIGH (ref 1–3.3)
LYMPHOCYTES # BLD AUTO: 33.4 % — SIGNIFICANT CHANGE UP (ref 13–44)
M PNEUMO DNA SPEC QL NAA+PROBE: SIGNIFICANT CHANGE UP
MCHC RBC-ENTMCNC: 26.4 PG — LOW (ref 27–34)
MCHC RBC-ENTMCNC: 34.6 GM/DL — SIGNIFICANT CHANGE UP (ref 32–36)
MCV RBC AUTO: 76.3 FL — LOW (ref 80–100)
MONOCYTES # BLD AUTO: 1.01 K/UL — HIGH (ref 0–0.9)
MONOCYTES NFR BLD AUTO: 9.8 % — SIGNIFICANT CHANGE UP (ref 2–14)
NEUTROPHILS # BLD AUTO: 5.16 K/UL — SIGNIFICANT CHANGE UP (ref 1.8–7.4)
NEUTROPHILS NFR BLD AUTO: 50.2 % — SIGNIFICANT CHANGE UP (ref 43–77)
NRBC # BLD: 0 /100 WBCS — SIGNIFICANT CHANGE UP
NRBC # FLD: 0 K/UL — SIGNIFICANT CHANGE UP
PCO2 BLDV: 52 MMHG — HIGH (ref 39–42)
PH BLDV: 7.33 — SIGNIFICANT CHANGE UP (ref 7.32–7.43)
PLATELET # BLD AUTO: 250 K/UL — SIGNIFICANT CHANGE UP (ref 150–400)
PO2 BLDV: 29 MMHG — SIGNIFICANT CHANGE UP
POTASSIUM BLDV-SCNC: 4.5 MMOL/L — SIGNIFICANT CHANGE UP (ref 3.5–5.1)
POTASSIUM SERPL-MCNC: 4.5 MMOL/L — SIGNIFICANT CHANGE UP (ref 3.5–5.3)
POTASSIUM SERPL-SCNC: 4.5 MMOL/L — SIGNIFICANT CHANGE UP (ref 3.5–5.3)
PROT SERPL-MCNC: 8 G/DL — SIGNIFICANT CHANGE UP (ref 6–8.3)
RAPID RVP RESULT: SIGNIFICANT CHANGE UP
RBC # BLD: 4.73 M/UL — SIGNIFICANT CHANGE UP (ref 3.8–5.2)
RBC # FLD: 16.8 % — HIGH (ref 10.3–14.5)
RSV RNA SPEC QL NAA+PROBE: SIGNIFICANT CHANGE UP
RV+EV RNA SPEC QL NAA+PROBE: SIGNIFICANT CHANGE UP
SAO2 % BLDV: 38.2 % — SIGNIFICANT CHANGE UP
SARS-COV-2 RNA SPEC QL NAA+PROBE: SIGNIFICANT CHANGE UP
SODIUM SERPL-SCNC: 138 MMOL/L — SIGNIFICANT CHANGE UP (ref 135–145)
TROPONIN T, HIGH SENSITIVITY RESULT: 56 NG/L — CRITICAL HIGH
WBC # BLD: 10.29 K/UL — SIGNIFICANT CHANGE UP (ref 3.8–10.5)
WBC # FLD AUTO: 10.29 K/UL — SIGNIFICANT CHANGE UP (ref 3.8–10.5)

## 2021-08-22 PROCEDURE — 99291 CRITICAL CARE FIRST HOUR: CPT | Mod: 25,GC

## 2021-08-22 PROCEDURE — 93010 ELECTROCARDIOGRAM REPORT: CPT

## 2021-08-22 PROCEDURE — 71045 X-RAY EXAM CHEST 1 VIEW: CPT | Mod: 26

## 2021-08-22 RX ORDER — APIXABAN 2.5 MG/1
2.5 TABLET, FILM COATED ORAL EVERY 12 HOURS
Refills: 0 | Status: DISCONTINUED | OUTPATIENT
Start: 2021-08-23 | End: 2021-08-23

## 2021-08-22 RX ORDER — HYDROCORTISONE 20 MG
10 TABLET ORAL
Refills: 0 | Status: DISCONTINUED | OUTPATIENT
Start: 2021-08-22 | End: 2021-08-27

## 2021-08-22 RX ORDER — ACETAMINOPHEN 500 MG
1000 TABLET ORAL ONCE
Refills: 0 | Status: DISCONTINUED | OUTPATIENT
Start: 2021-08-22 | End: 2021-08-22

## 2021-08-22 RX ORDER — ACETAMINOPHEN 500 MG
975 TABLET ORAL ONCE
Refills: 0 | Status: COMPLETED | OUTPATIENT
Start: 2021-08-22 | End: 2021-08-22

## 2021-08-22 RX ORDER — LEVOTHYROXINE SODIUM 125 MCG
50 TABLET ORAL DAILY
Refills: 0 | Status: DISCONTINUED | OUTPATIENT
Start: 2021-08-22 | End: 2021-08-27

## 2021-08-22 RX ORDER — PANTOPRAZOLE SODIUM 20 MG/1
40 TABLET, DELAYED RELEASE ORAL
Refills: 0 | Status: DISCONTINUED | OUTPATIENT
Start: 2021-08-22 | End: 2021-08-27

## 2021-08-22 RX ORDER — ATORVASTATIN CALCIUM 80 MG/1
10 TABLET, FILM COATED ORAL AT BEDTIME
Refills: 0 | Status: DISCONTINUED | OUTPATIENT
Start: 2021-08-22 | End: 2021-08-27

## 2021-08-22 RX ORDER — OXYCODONE HYDROCHLORIDE 5 MG/1
1 TABLET ORAL
Qty: 0 | Refills: 0 | DISCHARGE

## 2021-08-22 RX ORDER — OXYCODONE HYDROCHLORIDE 5 MG/1
5 TABLET ORAL EVERY 8 HOURS
Refills: 0 | Status: DISCONTINUED | OUTPATIENT
Start: 2021-08-22 | End: 2021-08-27

## 2021-08-22 RX ORDER — CALCIUM CARBONATE 500(1250)
1 TABLET ORAL DAILY
Refills: 0 | Status: DISCONTINUED | OUTPATIENT
Start: 2021-08-22 | End: 2021-08-27

## 2021-08-22 RX ORDER — LATANOPROST 0.05 MG/ML
1 SOLUTION/ DROPS OPHTHALMIC; TOPICAL AT BEDTIME
Refills: 0 | Status: DISCONTINUED | OUTPATIENT
Start: 2021-08-22 | End: 2021-08-27

## 2021-08-22 RX ORDER — OXYCODONE HYDROCHLORIDE 5 MG/1
20 TABLET ORAL
Refills: 0 | Status: DISCONTINUED | OUTPATIENT
Start: 2021-08-22 | End: 2021-08-26

## 2021-08-22 RX ORDER — METOPROLOL TARTRATE 50 MG
50 TABLET ORAL DAILY
Refills: 0 | Status: DISCONTINUED | OUTPATIENT
Start: 2021-08-22 | End: 2021-08-27

## 2021-08-22 RX ORDER — AMLODIPINE BESYLATE 2.5 MG/1
5 TABLET ORAL DAILY
Refills: 0 | Status: DISCONTINUED | OUTPATIENT
Start: 2021-08-22 | End: 2021-08-27

## 2021-08-22 RX ADMIN — Medication 975 MILLIGRAM(S): at 23:11

## 2021-08-22 NOTE — H&P ADULT - NSHPPHYSICALEXAM_GEN_ALL_CORE
PHYSICAL EXAM:      Constitutional: NAD, well-groomed, well-developed  HEENT:  EOMI, Normal Hearing  Neck: No LAD, No JVD  Back: Normal spine flexure, No CVA tenderness  Respiratory: CTAB  Cardiovascular: S1 and S2, RRR  Gastrointestinal: BS+, soft, NT/ND  Extremities: No peripheral edema  Vascular: 2+ peripheral pulses  Neurological: A/O x 3, mild resting tremors  Psychiatric: Normal mood, normal affect  Musculoskeletal: 5/5 strength b/l upper and lower extremities  Skin: No rashes

## 2021-08-22 NOTE — H&P ADULT - PROBLEM SELECTOR PLAN 2
-would obtain baseline from pcp Janelle Segura- pcp [phone]  -hold arb, risedronate, prn lasix for now (given for leg swelling, no known h/o chf)

## 2021-08-22 NOTE — ED PROVIDER NOTE - OBJECTIVE STATEMENT
75 yo F pmhx htn ckd presents with 2 hours of SOB. Pt said she started to feel SOB this afternoon but it had worsened in the last 2 hours before presentation. She was tachycardic to the 150s on presentation with other stable   She denied any chest pain denied orthopnea 75 yo F pmhx htn ckd hypothyroid adrenal insuffiencey presents with 2 hours of SOB. Pt said she started to feel SOB this afternoon but it had worsened in the last 2 hours before presentation. It was gradual onset. She was tachycardic to the 150s on presentation with other stable vitals. She denied any chest pain denied orthopnea f/c cough. 75 yo F pmhx htn ckd hypothyroid adrenal insuffiencey presents with 2 hours of SOB. Pt said she started to feel SOB this afternoon but it had worsened in the last 2 hours before presentation. It was gradual onset. She was tachycardic to the 150s on presentation with other stable vitals. She denied any chest pain denied orthopnea f/c cough.  Attending - Agree with above.  I evaluated patient myself. 75 y/o F with PMH as noted.  c/o shortness of breath since yesterday, worsened today so to ED. 77 yo F pmhx htn ckd hypothyroid adrenal insuffiencey presents with 2 hours of SOB. Pt said she started to feel SOB this afternoon but it had worsened in the last 2 hours before presentation. It was gradual onset. She was tachycardic to the 150s on presentation with other stable vitals. She denied any chest pain denied orthopnea f/c cough.  Attending - Agree with above.  I evaluated patient myself. 75 y/o F with PMH as noted.  c/o shortness of breath since yesterday, worsened today so to ED.  Denies chest pain or palpitations.  denies fever, cough, covid diagnosis.  No recent trauma.

## 2021-08-22 NOTE — H&P ADULT - HISTORY OF PRESENT ILLNESS
77 yo f h/o spinal stenosis on Oxycontin, colon ca s/p hemicolectomy 2 years ago, perioperative MI,  dvt on eliquis, htn, adrenal insufficiency on hydrocortisone, hypothyroidism, CKD, essential tremors   Pt reports sudden onset of dyspnea at rest while watching tv on couch earlier in afternoon. In ed, found  to be in SVT in 140s on ekg, broke spontaneously. Pt currently hemodynamically stable. Daughter with whom I spoke reports compliance with Eliquis. TSH , duplex pending. Denies fever, cough, chest pain.  77 yo f h/o spinal stenosis on Oxycontin, colon ca s/p hemicolectomy 2 years ago, perioperative MI,  dvt on eliquis, htn, adrenal insufficiency on hydrocortisone, hypothyroidism, CKD, essential tremors.   Pt reports sudden onset of dyspnea at rest while watching tv on couch earlier in afternoon. In ed, found  to be in SVT in 140s on ekg, broke spontaneously. Pt currently hemodynamically stable. Daughter with whom I spoke reports compliance with Eliquis. TSH , duplex pending. Denies fever, cough, chest pain.

## 2021-08-22 NOTE — H&P ADULT - NSHPOUTPATIENTPROVIDERS_GEN_ALL_CORE
[facility] -[facility]  Janelle Segura- [facility] [phone]

## 2021-08-22 NOTE — ED ADULT NURSE NOTE - CHIEF COMPLAINT QUOTE
pt c/o increased diff breathing since yesterday.  occ cough. denies fever  . leg swelling noted. hr    denies ch pains.  daughter feliberto [phone]

## 2021-08-22 NOTE — ED PROVIDER NOTE - PROGRESS NOTE DETAILS
called [facility] hospitalist line for admission called [facility] hospitalist. admit to Dr. Hope

## 2021-08-22 NOTE — H&P ADULT - NSHPLABSRESULTS_GEN_ALL_CORE
12.5   10.29 )-----------( 250      ( 22 Aug 2021 20:37 )             36.1     08-22    138  |  103  |  24<H>  ----------------------------<  114<H>  4.5   |  24  |  1.74<H>    Ca    9.8      22 Aug 2021 20:27    TPro  8.0  /  Alb  4.0  /  TBili  0.4  /  DBili  x   /  AST  30  /  ALT  18  /  AlkPhos  61  08-22    CAPILLARY BLOOD GLUCOSE            Vital Signs Last 24 Hrs  T(C): 36.8 (22 Aug 2021 19:24), Max: 36.8 (22 Aug 2021 19:24)  T(F): 98.3 (22 Aug 2021 19:24), Max: 98.3 (22 Aug 2021 19:24)  HR: 60 (22 Aug 2021 22:50) (60 - 152)  BP: 138/84 (22 Aug 2021 22:50) (108/73 - 138/84)  BP(mean): 83 (22 Aug 2021 21:20) (72 - 83)  RR: 20 (22 Aug 2021 22:50) (18 - 24)  SpO2: 100% (22 Aug 2021 22:50) (98% - 100%)

## 2021-08-22 NOTE — ED PROVIDER NOTE - PHYSICAL EXAMINATION
Gen: NAD, non-toxic appearing  Head: normal appearing  HEENT: normal conjunctiva, oral mucosa moist  Lung: respiratory distress, speaking in full sentences, rhales b/l B lines throughout on US     CV: SVT rate 150s, no murmurs  Abd: soft, non distended, non tender   MSK: no visible deformities  Neuro: No focal deficits, AAOx3  Skin: Warm  Psych: normal affect Gen: NAD, non-toxic appearing  Head: normal appearing  HEENT: normal conjunctiva, oral mucosa moist  Lung: respiratory distress, speaking in full sentences, rhales b/l B lines throughout on US     CV: SVT rate 150s, no murmurs  Abd: soft, non distended, non tender   MSK: no visible deformities  Neuro: No focal deficits, AAOx3  Skin: Warm  Psych: normal affect  ATTENDING PHYSICAL EXAM  GEN - NAD; A+O x3, mild tachypnea appreciated with RR 18-20, O2 99%RA, able to speak full sentences  HEAD - NC/AT; EYES/NOSE - PERRL, EOMI, mucous membranes moist, no discharge; THROAT: Oral cavity and pharynx normal. No inflammation, swelling, exudate, or lesions  NECK: Neck supple, non-tender without lymphadenopathy, no masses, no JVD  PULMONARY - CTA b/l, symmetric breath sounds, no w/r/r  CARDIAC -tachy with rate 145-155, reg rhythm - spontaneously converted to NSR after IV placed, prior to adenosine or vagal maneuvers.  ABDOMEN - +NABS, ND, NT, soft, no guarding, no rebound, no masses   BACK - no CVA tenderness, No vertebral or paravertebral tenderness  EXTREMITIES - symmetric pulses, 2+ dp, capillary refill < 2 seconds, no clubbing, no cyanosis, no edema  NEUROLOGIC - alert, CN 2-12 intact, no focal deficits

## 2021-08-22 NOTE — H&P ADULT - NSHPREVIEWOFSYSTEMS_GEN_ALL_CORE
Review of Systems:   CONSTITUTIONAL: No fever, weight loss  EYES: No eye pain, visual disturbances, or discharge  ENMT:  No difficulty hearing, tinnitus, vertigo; No sinus or throat pain  NECK: No pain or stiffness  RESPIRATORY: No cough, wheezing, chills or hemoptysis; No current  shortness of breath  CARDIOVASCULAR: No chest pain, palpitations, dizziness, or leg swelling  GASTROINTESTINAL: No abdominal or epigastric pain. No nausea, vomiting, or hematemesis; No diarrhea or constipation. No melena or hematochezia.  GENITOURINARY: No dysuria, frequency, hematuria, or incontinence  NEUROLOGICAL: No headaches, memory loss, loss of strength, numbness, or tremors  SKIN: No itching, burning, rashes, or lesions   LYMPH NODES: No enlarged glands  ENDOCRINE: No heat or cold intolerance; No hair loss

## 2021-08-22 NOTE — ED ADULT TRIAGE NOTE - CHIEF COMPLAINT QUOTE
pt c/o increased diff breathing since yesterday.  occ cough. denies fever  . leg swelling noted. hr    denies ch pains.  daughter feliberto [phone]

## 2021-08-22 NOTE — H&P ADULT - NSICDXPASTMEDICALHX_GEN_ALL_CORE_FT
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

## 2021-08-22 NOTE — ED PROVIDER NOTE - NSICDXFAMILYHX_GEN_ALL_CORE_FT
FAMILY HISTORY:  Mother  Still living? Unknown  Family history of hypertension, Age at diagnosis: Age Unknown    Sibling  Still living? Unknown  Family history of diabetes mellitus, Age at diagnosis: Age Unknown

## 2021-08-22 NOTE — ED PROVIDER NOTE - NS ED ROS FT
GENERAL: No fever, no chills  EYES: no change in vision  HEENT: no trouble swallowing, no trouble speaking  CARDIAC: no chest pain, no palpitations  PULMONARY: no cough, (+)SOB  GI: no abdominal pain, no nausea, no vomiting, no diarrhea, no constipation  : no dysuria, no frequency, no change in appearance, no odor of urine  SKIN: no rashes  NEURO: no headache, no weakness  MSK: no joint pain

## 2021-08-22 NOTE — ED ADULT NURSE NOTE - OBJECTIVE STATEMENT
Brian RN. PT arrives to room 12 for SOB. PT states SOB started today at 12 pm increasing throughout the day. PT respirations labored, tachypneic arrives on NRBM, placed on NC 4 L spo2 100 %. Pt placed on CM, SVT on monitor. Denies chest pain. Zoll placed on pt, EKG in progress. PT broke SVT on her own, NSR on cardiac monitor, continues to deny cp. spo2 100 % on room air. Pt is AOX4. Pt denies chest pain Pt denies fever, chills, n/v/d. Pt denies abdominal pain, dysuria, hematuria. PMH of HTN, CKD, adrenal insufficieny. 20 g iv placed in L ac by US, 18 g iv placed in R forearm labs drawn, pending review, will continue to monitor.

## 2021-08-22 NOTE — ED PROVIDER NOTE - CLINICAL SUMMARY MEDICAL DECISION MAKING FREE TEXT BOX
76 F p/w SOB. SVT on EKG and in the room to 150s, B lines throughout, good cardiac squeeze no large effusions US. Patient converted back to NSR with rate in 70s with no intervention and improvements of SOB symptoms. Concern for ACS vs CHF. Less likley PE given normal O2 with no chest pain. Will get labs repeat EKG CXR trope.

## 2021-08-22 NOTE — ED ADULT NURSE REASSESSMENT NOTE - NS ED NURSE REASSESS COMMENT FT1
Patient A&Ox4, resting in stretcher, respirations even and unlabored, patient states improvement in condition. Denies chest pain, dyspnea, dizziness and nausea at this time. Patient made aware of admission to hospital. Continues on cardiac monitor- NSR. 100% O2 on 2L nasal canula for comfort. Stretcher in lowest position, wheels locked, appropriate side rails in place, call bell in reach.

## 2021-08-22 NOTE — ED PROVIDER NOTE - NSICDXPASTMEDICALHX_GEN_ALL_CORE_FT
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

## 2021-08-22 NOTE — H&P ADULT - PROBLEM SELECTOR PLAN 1
-broke spontaneously  -monitor on tel  -tte, tsh, ordered  -gfr in low 30s prompted ed to cancel CTA chest; will order BL duplex /continue Eliquis for now. If suspicion for PE persists will need vq or CTA

## 2021-08-23 DIAGNOSIS — I82.409 ACUTE EMBOLISM AND THROMBOSIS OF UNSPECIFIED DEEP VEINS OF UNSPECIFIED LOWER EXTREMITY: ICD-10-CM

## 2021-08-23 DIAGNOSIS — I47.1 SUPRAVENTRICULAR TACHYCARDIA: ICD-10-CM

## 2021-08-23 DIAGNOSIS — I25.10 ATHEROSCLEROTIC HEART DISEASE OF NATIVE CORONARY ARTERY WITHOUT ANGINA PECTORIS: ICD-10-CM

## 2021-08-23 DIAGNOSIS — N18.9 CHRONIC KIDNEY DISEASE, UNSPECIFIED: ICD-10-CM

## 2021-08-23 LAB
ALBUMIN SERPL ELPH-MCNC: 3.1 G/DL — LOW (ref 3.3–5)
ALP SERPL-CCNC: 56 U/L — SIGNIFICANT CHANGE UP (ref 40–120)
ALT FLD-CCNC: 13 U/L — SIGNIFICANT CHANGE UP (ref 4–33)
ANION GAP SERPL CALC-SCNC: 20 MMOL/L — HIGH (ref 7–14)
APTT BLD: 37.1 SEC — HIGH (ref 27–36.3)
AST SERPL-CCNC: 27 U/L — SIGNIFICANT CHANGE UP (ref 4–32)
BASOPHILS # BLD AUTO: 0.09 K/UL — SIGNIFICANT CHANGE UP (ref 0–0.2)
BASOPHILS NFR BLD AUTO: 0.9 % — SIGNIFICANT CHANGE UP (ref 0–2)
BILIRUB SERPL-MCNC: 0.5 MG/DL — SIGNIFICANT CHANGE UP (ref 0.2–1.2)
BUN SERPL-MCNC: 23 MG/DL — SIGNIFICANT CHANGE UP (ref 7–23)
CALCIUM SERPL-MCNC: 9.3 MG/DL — SIGNIFICANT CHANGE UP (ref 8.4–10.5)
CHLORIDE SERPL-SCNC: 100 MMOL/L — SIGNIFICANT CHANGE UP (ref 98–107)
CO2 SERPL-SCNC: 17 MMOL/L — LOW (ref 22–31)
CREAT SERPL-MCNC: 1.53 MG/DL — HIGH (ref 0.5–1.3)
EOSINOPHIL # BLD AUTO: 0.81 K/UL — HIGH (ref 0–0.5)
EOSINOPHIL NFR BLD AUTO: 8.5 % — HIGH (ref 0–6)
GLUCOSE SERPL-MCNC: 83 MG/DL — SIGNIFICANT CHANGE UP (ref 70–99)
HCT VFR BLD CALC: 35.2 % — SIGNIFICANT CHANGE UP (ref 34.5–45)
HGB BLD-MCNC: 12.1 G/DL — SIGNIFICANT CHANGE UP (ref 11.5–15.5)
IANC: 4.84 K/UL — SIGNIFICANT CHANGE UP (ref 1.5–8.5)
IMM GRANULOCYTES NFR BLD AUTO: 0.5 % — SIGNIFICANT CHANGE UP (ref 0–1.5)
INR BLD: 1.42 RATIO — HIGH (ref 0.88–1.16)
LYMPHOCYTES # BLD AUTO: 2.77 K/UL — SIGNIFICANT CHANGE UP (ref 1–3.3)
LYMPHOCYTES # BLD AUTO: 29.1 % — SIGNIFICANT CHANGE UP (ref 13–44)
MCHC RBC-ENTMCNC: 26.7 PG — LOW (ref 27–34)
MCHC RBC-ENTMCNC: 34.4 GM/DL — SIGNIFICANT CHANGE UP (ref 32–36)
MCV RBC AUTO: 77.5 FL — LOW (ref 80–100)
MONOCYTES # BLD AUTO: 0.95 K/UL — HIGH (ref 0–0.9)
MONOCYTES NFR BLD AUTO: 10 % — SIGNIFICANT CHANGE UP (ref 2–14)
NEUTROPHILS # BLD AUTO: 4.84 K/UL — SIGNIFICANT CHANGE UP (ref 1.8–7.4)
NEUTROPHILS NFR BLD AUTO: 51 % — SIGNIFICANT CHANGE UP (ref 43–77)
NRBC # BLD: 0 /100 WBCS — SIGNIFICANT CHANGE UP
NRBC # FLD: 0 K/UL — SIGNIFICANT CHANGE UP
PLATELET # BLD AUTO: 214 K/UL — SIGNIFICANT CHANGE UP (ref 150–400)
POTASSIUM SERPL-MCNC: 4.3 MMOL/L — SIGNIFICANT CHANGE UP (ref 3.5–5.3)
POTASSIUM SERPL-SCNC: 4.3 MMOL/L — SIGNIFICANT CHANGE UP (ref 3.5–5.3)
PROT SERPL-MCNC: 7.1 G/DL — SIGNIFICANT CHANGE UP (ref 6–8.3)
PROTHROM AB SERPL-ACNC: 16.1 SEC — HIGH (ref 10.6–13.6)
RBC # BLD: 4.54 M/UL — SIGNIFICANT CHANGE UP (ref 3.8–5.2)
RBC # FLD: 16.9 % — HIGH (ref 10.3–14.5)
SODIUM SERPL-SCNC: 137 MMOL/L — SIGNIFICANT CHANGE UP (ref 135–145)
TROPONIN T, HIGH SENSITIVITY RESULT: 86 NG/L — CRITICAL HIGH
TSH SERPL-MCNC: 0.5 UIU/ML — SIGNIFICANT CHANGE UP (ref 0.27–4.2)
WBC # BLD: 9.51 K/UL — SIGNIFICANT CHANGE UP (ref 3.8–10.5)
WBC # FLD AUTO: 9.51 K/UL — SIGNIFICANT CHANGE UP (ref 3.8–10.5)

## 2021-08-23 PROCEDURE — 99223 1ST HOSP IP/OBS HIGH 75: CPT

## 2021-08-23 PROCEDURE — 93970 EXTREMITY STUDY: CPT | Mod: 26

## 2021-08-23 RX ORDER — HEPARIN SODIUM 5000 [USP'U]/ML
8000 INJECTION INTRAVENOUS; SUBCUTANEOUS EVERY 6 HOURS
Refills: 0 | Status: DISCONTINUED | OUTPATIENT
Start: 2021-08-23 | End: 2021-08-25

## 2021-08-23 RX ORDER — HEPARIN SODIUM 5000 [USP'U]/ML
INJECTION INTRAVENOUS; SUBCUTANEOUS
Qty: 25000 | Refills: 0 | Status: DISCONTINUED | OUTPATIENT
Start: 2021-08-23 | End: 2021-08-25

## 2021-08-23 RX ORDER — HEPARIN SODIUM 5000 [USP'U]/ML
4000 INJECTION INTRAVENOUS; SUBCUTANEOUS EVERY 6 HOURS
Refills: 0 | Status: DISCONTINUED | OUTPATIENT
Start: 2021-08-23 | End: 2021-08-25

## 2021-08-23 RX ADMIN — APIXABAN 2.5 MILLIGRAM(S): 2.5 TABLET, FILM COATED ORAL at 06:19

## 2021-08-23 RX ADMIN — OXYCODONE HYDROCHLORIDE 20 MILLIGRAM(S): 5 TABLET ORAL at 18:59

## 2021-08-23 RX ADMIN — OXYCODONE HYDROCHLORIDE 20 MILLIGRAM(S): 5 TABLET ORAL at 06:19

## 2021-08-23 RX ADMIN — OXYCODONE HYDROCHLORIDE 20 MILLIGRAM(S): 5 TABLET ORAL at 07:08

## 2021-08-23 RX ADMIN — ATORVASTATIN CALCIUM 10 MILLIGRAM(S): 80 TABLET, FILM COATED ORAL at 21:29

## 2021-08-23 RX ADMIN — Medication 10 MILLIGRAM(S): at 13:18

## 2021-08-23 RX ADMIN — OXYCODONE HYDROCHLORIDE 20 MILLIGRAM(S): 5 TABLET ORAL at 20:00

## 2021-08-23 RX ADMIN — HEPARIN SODIUM 1800 UNIT(S)/HR: 5000 INJECTION INTRAVENOUS; SUBCUTANEOUS at 19:17

## 2021-08-23 RX ADMIN — Medication 50 MICROGRAM(S): at 06:20

## 2021-08-23 RX ADMIN — OXYCODONE HYDROCHLORIDE 5 MILLIGRAM(S): 5 TABLET ORAL at 13:10

## 2021-08-23 RX ADMIN — Medication 50 MILLIGRAM(S): at 06:19

## 2021-08-23 RX ADMIN — AMLODIPINE BESYLATE 5 MILLIGRAM(S): 2.5 TABLET ORAL at 06:19

## 2021-08-23 RX ADMIN — Medication 10 MILLIGRAM(S): at 22:21

## 2021-08-23 RX ADMIN — Medication 1 TABLET(S): at 13:12

## 2021-08-23 RX ADMIN — Medication 1 TABLET(S): at 13:18

## 2021-08-23 RX ADMIN — PANTOPRAZOLE SODIUM 40 MILLIGRAM(S): 20 TABLET, DELAYED RELEASE ORAL at 06:19

## 2021-08-23 NOTE — PATIENT PROFILE ADULT - TOBACCO USE
Never smoker Muscle Hinge Flap Text: The defect edges were debeveled with a #15 scalpel blade.  Given the size, depth and location of the defect and the proximity to free margins a muscle hinge flap was deemed most appropriate.  Using a sterile surgical marker, an appropriate hinge flap was drawn incorporating the defect. The area thus outlined was incised with a #15 scalpel blade.  The skin margins were undermined to an appropriate distance in all directions utilizing iris scissors.

## 2021-08-23 NOTE — CONSULT NOTE ADULT - TIME BILLING
Agree with above NP note.  pt getting le duplex  a/p  75 yo f h/o spinal stenosis on Oxycontin, colon ca s/p hemicolectomy 2 years ago, perioperative MI,  dvt on eliquis, htn, adrenal insufficiency on hydrocortisone, hypothyroidism, CKD, essential tremors presenting with sudden onset of SOB, found to have SVT     #PSVT   -broke spontaneously, cont tele   -c/w toprol 50 mg daily, inc to 75 qd if hr can tolerate  -elevated trop likely demand ischemia given wm, PSVT  -add ASA 81 mg daily   -await ECHO, cont a/c, possible ischemic eval pending echo, change to hep gtt if stress ordered    # SOB  -likely  symptomatic SVT episode  -echo pending    # DVT (hx)  pending repeat dopplers, on ac     # wm   -med f/u

## 2021-08-23 NOTE — CONSULT NOTE ADULT - ASSESSMENT
ECHO 10/25/16:  Normal left ventricular systolic function. No segmental wall motion abnormalities. Hypermobile interatrial septum.     a/p    77 yo f h/o spinal stenosis on Oxycontin, colon ca s/p hemicolectomy 2 years ago, perioperative MI,  dvt on eliquis, htn, adrenal insufficiency on hydrocortisone, hypothyroidism, CKD, essential tremors presenting with sudden onset of SOB, found to have SVT     #SVT   -broke spontaneously  -monitor on tele  -wnl tsh, rvp/covid negative  -LE dopplers pending r/o DVT   -med f/u noted L gfr in low 30s prompted ed to cancel CTA chest to r.o pe   -Pt already on Eliquis for her prior DVTs.  -c/w toprol 50 mg daily   -uptrending HS trop noted ( 56-86-92)- no acs noted on ecg, likely demand ischemia given wm, episode svt  -CK and CKMB added, trend trops  -add ASA 81 mg daily   -pending ECHO ;; change Eliquis to hep gtt- possible ischemic eval pending echo results     # SOB  -likely  symptomatic SVT episode  -echo pending  -chest xray noted ; Bilateral neck soft tissue calcifications may be vascular. Question minimal left apical pneumothorax versus skinfold; Hazy left basilar opacity, possibly a small layering left pleural effusion with associated passive atelectasis.  -awaiting repeat chest xray   -consider ct chest non cont    # DVT (hx)  pending repeat dopplers, on ac     # wm   -med f/u     dvt ppx

## 2021-08-23 NOTE — CONSULT NOTE ADULT - SUBJECTIVE AND OBJECTIVE BOX
CARDIOLOGY CONSULT - Dr. Orozco         HPI:  75 yo f h/o spinal stenosis on Oxycontin, colon ca s/p hemicolectomy 2 years ago, perioperative MI,  dvt on eliquis, htn, adrenal insufficiency on hydrocortisone, hypothyroidism, CKD, essential tremors presenting with sudden onset of SOB. pt reports episode started  at rest while watching tv on couch. She reports her hr was elevated at that time.  In ed, found  to be in SVT in 140s on ekg, broke spontaneously. reports compliance with Eliquis. She denies any prior cardiac hx,. 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    Osteoporosis    Chronic Kidney Disease; Dx 2009    HTN - Hypertension    History of Obesity    Myocardial infarction    History of Laminectomy/ Fusion 1/06; L1-L2    H/O right hemicolectomy            PREVIOUS DIAGNOSTIC TESTING:    [ ] Echocardiogram:< from: Transthoracic Echocardiogram (10.25.16 @ 09:42) >  CONCLUSIONS:  1. Increased relative wall thickness with normal left  ventricular mass index, consistent with concentric left  ventricular remodeling.  2. Normal left ventricular systolic function. No segmental  wall motion abnormalities.  3. Normal right ventricular size and function.  4. Hypermobile interatrial septum. Cannot excllude patent  foramen ovale.  ------------------------------------------------------------------------    < end of copied text >    [ ]  Catheterization:    [ ] Stress Test:  	    MEDICATIONS:  Home Medications:  acetaminophen 325 mg oral tablet: 2 tab(s) orally every 6 hours, As needed, Mild Pain (1 - 3) (24 Sep 2020 11:16)  Artificial Tears ophthalmic solution: 1 drop(s) to each affected eye 4 times a day, As Needed (22 Aug 2021 23:28)  calcium (as carbonate) 600 mg oral tablet: 1 cap(s) orally once a day (24 Sep 2020 11:16)  Eliquis 2.5 mg oral tablet: 1 tab(s) orally 2 times a day (24 Sep 2020 11:16)  felodipine 5 mg oral tablet, extended release: 1 tab(s) orally once a day (22 Aug 2021 23:23)  hydrocortisone 10 mg oral tablet: 1 tab(s) orally 2 times a day (24 Sep 2020 11:16)  Lasix:  (22 Aug 2021 23:24)  latanoprost 0.005% ophthalmic solution: 1 drop(s) to each affected eye once a day (in the evening) (22 Aug 2021 23:27)  levothyroxine 50 mcg (0.05 mg) oral tablet: 1 tab(s) orally once a day (24 Sep 2020 11:16)  Lipitor 10 mg oral tablet: 1 tab(s) orally once a day (22 Aug 2021 23:41)  Macular Vitamin Benefit oral tablet: 1 tab(s) orally once a day (24 Sep 2020 11:16)  metoprolol succinate 50 mg oral tablet, extended release: 1 tab(s) orally once a day (22 Aug 2021 23:24)  Multiple Vitamins oral tablet: 1 tab(s) orally once a day (22 Aug 2021 23:22)  olmesartan 20 mg oral tablet: 1 tab(s) orally once a day (22 Aug 2021 23:22)  omeprazole 20 mg oral delayed release capsule: 1 cap(s) orally once a day (22 Aug 2021 23:21)  oxyCODONE 5 mg oral tablet: 1 tab(s) orally every 8 hours (22 Aug 2021 23:18)  OxyCONTIN 20 mg oral tablet, extended release: 1 tab(s) orally every 12 hours (24 Sep 2020 11:16)  risedronate 35 mg oral tablet: 1 tab(s) orally once a week (24 Sep 2020 11:16)      MEDICATIONS  (STANDING):  amLODIPine   Tablet 5 milliGRAM(s) Oral daily  apixaban 2.5 milliGRAM(s) Oral every 12 hours  atorvastatin 10 milliGRAM(s) Oral at bedtime  calcium carbonate    500 mG (Tums) Chewable 1 Tablet(s) Chew daily  hydrocortisone 10 milliGRAM(s) Oral <User Schedule>  latanoprost 0.005% Ophthalmic Solution 1 Drop(s) Both EYES at bedtime  levothyroxine 50 MICROGram(s) Oral daily  metoprolol succinate ER 50 milliGRAM(s) Oral daily  multivitamin 1 Tablet(s) Oral daily  oxyCODONE  ER Tablet 20 milliGRAM(s) Oral <User Schedule>  pantoprazole    Tablet 40 milliGRAM(s) Oral before breakfast      FAMILY HISTORY:  Family history of hypertension (Mother)    Family history of diabetes mellitus (Sibling)        SOCIAL HISTORY:    [ x] Non-smoker  [ ] Smoker  [ ] Alcohol    Allergies    codedine (Rash)  codeine (Nausea; Other)  penicillin (Rash)    Intolerances    	    REVIEW OF SYSTEMS:  CONSTITUTIONAL: No fever, weight loss, or fatigue  EYES: No eye pain, visual disturbances, or discharge  ENMT:  No difficulty hearing, tinnitus, vertigo; No sinus or throat pain  NECK: No pain or stiffness  RESPIRATORY: see hpi   CARDIOVASCULAR: No chest pain, palpitations, passing out, dizziness, or leg swelling  GASTROINTESTINAL: No abdominal or epigastric pain. No nausea, vomiting, or hematemesis; No diarrhea or constipation. No melena or hematochezia.  GENITOURINARY: No dysuria, frequency, hematuria, or incontinence  NEUROLOGICAL: No headaches, memory loss, loss of strength, numbness, or tremors  SKIN: No itching, burning, rashes, or lesions   	    [x ] All others negative	  [ ] Unable to obtain    PHYSICAL EXAM:  T(C): 36.6 (08-23-21 @ 10:00), Max: 36.8 (08-22-21 @ 19:24)  HR: 63 (08-23-21 @ 10:00) (60 - 152)  BP: 117/55 (08-23-21 @ 10:00) (108/73 - 164/72)  RR: 19 (08-23-21 @ 10:00) (18 - 24)  SpO2: 98% (08-23-21 @ 10:00) (98% - 100%)  Wt(kg): --  I&O's Summary      Appearance: Normal	  Psychiatry: A & O x 3, Mood & affect appropriate  HEENT:   Normal oral mucosa, PERRL, EOMI	  Lymphatic: No lymphadenopathy  Cardiovascular: Normal S1 S2,RR + murmur  Respiratory:  diminished    Gastrointestinal:  Soft, Non-tender, + BS	  Skin: No rashes, No ecchymoses, No cyanosis	  Neurologic: Non-focal  Extremities: Normal range of motion, No clubbing, cyanosis or edema  Vascular: Peripheral pulses palpable 2+ bilaterally    TELEMETRY: nsr 	    ECG:  	  RADIOLOGY:  < from: Xray Chest 1 View AP/PA (08.22.21 @ 22:36) >    EXAM:  XR CHEST AP OR PA 1V        *** ADDENDUM 08/23/2021  ***    Bilateral neck soft tissue calcifications may be vascular.    --- End of Report ---    *** END OF ADDENDUM 08/23/2021  ***      PROCEDURE DATE:  Aug 22 2021         INTERPRETATION:TIME OF EXAM: August 22, 2021 at 10:36 PM.    CLINICAL INFORMATION: Chest pain. Shortness of breath.    COMPARISON:  February 21, 2017.    TECHNIQUE:   AP Portable chest x-ray. External cardiac pacer pad projects over and obscures part of left chest.    INTERPRETATION:    Heart size and the mediastinum cannot be accurately evaluated on this projection. Calcified thoracic aorta.  Mildly elevated right hemidiaphragm.  Right basilar subsegmental atelectasis.  Linear atelectasis versus scar left lower lung.  No right pleural effusion.  Hazy left basilar opacity may be a small layering left pleural effusion with associated passive atelectasis.  Question minimal left apical pneumothorax versus skinfold.  Question pneumoperitoneum below right hemidiaphragm versus interposed lung between right basilar linear atelectasis and the right hemidiaphragm.  There is osteoarthritic degenerative change of the spine and shoulders. There is incompletely imaged orthopedic hardware projecting over the lumbar spine.      IMPRESSION:  Right basilar subsegmental atelectasis. Linear atelectasis versus scar left lower lung.    Hazy left basilar opacity, possibly a small layering left pleural effusion with associated passive atelectasis.    Question minimal leftapical pneumothorax versus skinfold.    Question pneumoperitoneum below right hemidiaphragm versus interposed lung between right basilar linear atelectasis in the right hemidiaphragm.    Suggest repeat, preferably PA and lateral, chest x-ray to see if findings persist. Discussed with ZULMA Solorzano with read back at 8:57 AM on August 23, 2021 by Dr. Pruitt.    --- End of Report ---      ***Please see the addendum at the top of this report. It may contain additional important information or changes.****        < end of copied text >    OTHER: 	  	  LABS:	 	    CARDIAC MARKERS:  Troponin T, High Sensitivity Result: 92 ng/L (08-23 @ 07:35)  Troponin T, High Sensitivity Result: 86 ng/L (08-22 @ 23:30)  Troponin T, High Sensitivity Result: 56 ng/L (08-22 @ 20:27)                                  12.1   9.51  )-----------( 214      ( 23 Aug 2021 08:01 )             35.2     08-23    137  |  100  |  23  ----------------------------<  83  4.3   |  17<L>  |  1.53<H>    Ca    9.3      23 Aug 2021 07:33  Mg     2.10     08-23    TPro  7.1  /  Alb  3.1<L>  /  TBili  0.5  /  DBili  x   /  AST  27  /  ALT  13  /  AlkPhos  56  08-23    PT/INR - ( 23 Aug 2021 08:01 )   PT: 16.1 sec;   INR: 1.42 ratio         PTT - ( 23 Aug 2021 08:01 )  PTT:37.1 sec  proBNP:   Lipid Profile:   HgA1c:   TSH: Thyroid Stimulating Hormone, Serum: 0.50 uIU/mL (08-23 @ 07:33)

## 2021-08-24 LAB
ANION GAP SERPL CALC-SCNC: 14 MMOL/L — SIGNIFICANT CHANGE UP (ref 7–14)
APTT BLD: >200 SEC — CRITICAL HIGH (ref 27–36.3)
APTT BLD: >200 SEC — SIGNIFICANT CHANGE UP (ref 27–36.3)
APTT BLD: >200 SEC — SIGNIFICANT CHANGE UP (ref 27–36.3)
BUN SERPL-MCNC: 24 MG/DL — HIGH (ref 7–23)
CALCIUM SERPL-MCNC: 9.5 MG/DL — SIGNIFICANT CHANGE UP (ref 8.4–10.5)
CHLORIDE SERPL-SCNC: 101 MMOL/L — SIGNIFICANT CHANGE UP (ref 98–107)
CO2 SERPL-SCNC: 22 MMOL/L — SIGNIFICANT CHANGE UP (ref 22–31)
COVID-19 SPIKE DOMAIN AB INTERP: POSITIVE
COVID-19 SPIKE DOMAIN ANTIBODY RESULT: 60.2 U/ML — HIGH
CREAT SERPL-MCNC: 1.51 MG/DL — HIGH (ref 0.5–1.3)
GLUCOSE SERPL-MCNC: 104 MG/DL — HIGH (ref 70–99)
HCT VFR BLD CALC: 34.4 % — LOW (ref 34.5–45)
HCT VFR BLD CALC: 35.8 % — SIGNIFICANT CHANGE UP (ref 34.5–45)
HGB BLD-MCNC: 11.8 G/DL — SIGNIFICANT CHANGE UP (ref 11.5–15.5)
HGB BLD-MCNC: 12.6 G/DL — SIGNIFICANT CHANGE UP (ref 11.5–15.5)
INR BLD: 1.45 RATIO — HIGH (ref 0.88–1.16)
MAGNESIUM SERPL-MCNC: 2 MG/DL — SIGNIFICANT CHANGE UP (ref 1.6–2.6)
MCHC RBC-ENTMCNC: 26.5 PG — LOW (ref 27–34)
MCHC RBC-ENTMCNC: 26.7 PG — LOW (ref 27–34)
MCHC RBC-ENTMCNC: 34.3 GM/DL — SIGNIFICANT CHANGE UP (ref 32–36)
MCHC RBC-ENTMCNC: 35.2 GM/DL — SIGNIFICANT CHANGE UP (ref 32–36)
MCV RBC AUTO: 75.8 FL — LOW (ref 80–100)
MCV RBC AUTO: 77.3 FL — LOW (ref 80–100)
NRBC # BLD: 0 /100 WBCS — SIGNIFICANT CHANGE UP
NRBC # BLD: 0 /100 WBCS — SIGNIFICANT CHANGE UP
NRBC # FLD: 0 K/UL — SIGNIFICANT CHANGE UP
NRBC # FLD: 0 K/UL — SIGNIFICANT CHANGE UP
PHOSPHATE SERPL-MCNC: 3.5 MG/DL — SIGNIFICANT CHANGE UP (ref 2.5–4.5)
PLATELET # BLD AUTO: 216 K/UL — SIGNIFICANT CHANGE UP (ref 150–400)
PLATELET # BLD AUTO: 225 K/UL — SIGNIFICANT CHANGE UP (ref 150–400)
POTASSIUM SERPL-MCNC: 4.5 MMOL/L — SIGNIFICANT CHANGE UP (ref 3.5–5.3)
POTASSIUM SERPL-SCNC: 4.5 MMOL/L — SIGNIFICANT CHANGE UP (ref 3.5–5.3)
PROTHROM AB SERPL-ACNC: 16.2 SEC — HIGH (ref 10.6–13.6)
RBC # BLD: 4.45 M/UL — SIGNIFICANT CHANGE UP (ref 3.8–5.2)
RBC # BLD: 4.72 M/UL — SIGNIFICANT CHANGE UP (ref 3.8–5.2)
RBC # FLD: 16.5 % — HIGH (ref 10.3–14.5)
RBC # FLD: 16.8 % — HIGH (ref 10.3–14.5)
SARS-COV-2 IGG+IGM SERPL QL IA: 60.2 U/ML — HIGH
SARS-COV-2 IGG+IGM SERPL QL IA: POSITIVE
SODIUM SERPL-SCNC: 137 MMOL/L — SIGNIFICANT CHANGE UP (ref 135–145)
TROPONIN T, HIGH SENSITIVITY RESULT: 62 NG/L — CRITICAL HIGH
WBC # BLD: 10.06 K/UL — SIGNIFICANT CHANGE UP (ref 3.8–10.5)
WBC # BLD: 10.09 K/UL — SIGNIFICANT CHANGE UP (ref 3.8–10.5)
WBC # FLD AUTO: 10.06 K/UL — SIGNIFICANT CHANGE UP (ref 3.8–10.5)
WBC # FLD AUTO: 10.09 K/UL — SIGNIFICANT CHANGE UP (ref 3.8–10.5)

## 2021-08-24 PROCEDURE — 93306 TTE W/DOPPLER COMPLETE: CPT | Mod: 26

## 2021-08-24 RX ORDER — LIDOCAINE 4 G/100G
1 CREAM TOPICAL DAILY
Refills: 0 | Status: DISCONTINUED | OUTPATIENT
Start: 2021-08-24 | End: 2021-08-24

## 2021-08-24 RX ORDER — LIDOCAINE 4 G/100G
1 CREAM TOPICAL DAILY
Refills: 0 | Status: DISCONTINUED | OUTPATIENT
Start: 2021-08-24 | End: 2021-08-27

## 2021-08-24 RX ORDER — NYSTATIN CREAM 100000 [USP'U]/G
1 CREAM TOPICAL ONCE
Refills: 0 | Status: COMPLETED | OUTPATIENT
Start: 2021-08-24 | End: 2021-08-24

## 2021-08-24 RX ORDER — ASPIRIN/CALCIUM CARB/MAGNESIUM 324 MG
81 TABLET ORAL DAILY
Refills: 0 | Status: DISCONTINUED | OUTPATIENT
Start: 2021-08-24 | End: 2021-08-27

## 2021-08-24 RX ORDER — LIDOCAINE 4 G/100G
1 CREAM TOPICAL ONCE
Refills: 0 | Status: DISCONTINUED | OUTPATIENT
Start: 2021-08-24 | End: 2021-08-27

## 2021-08-24 RX ORDER — LOSARTAN POTASSIUM 100 MG/1
25 TABLET, FILM COATED ORAL DAILY
Refills: 0 | Status: DISCONTINUED | OUTPATIENT
Start: 2021-08-24 | End: 2021-08-27

## 2021-08-24 RX ORDER — LIDOCAINE 4 G/100G
1 CREAM TOPICAL ONCE
Refills: 0 | Status: DISCONTINUED | OUTPATIENT
Start: 2021-08-24 | End: 2021-08-24

## 2021-08-24 RX ADMIN — AMLODIPINE BESYLATE 5 MILLIGRAM(S): 2.5 TABLET ORAL at 05:55

## 2021-08-24 RX ADMIN — HEPARIN SODIUM 0 UNIT(S)/HR: 5000 INJECTION INTRAVENOUS; SUBCUTANEOUS at 13:21

## 2021-08-24 RX ADMIN — LATANOPROST 1 DROP(S): 0.05 SOLUTION/ DROPS OPHTHALMIC; TOPICAL at 22:13

## 2021-08-24 RX ADMIN — Medication 50 MICROGRAM(S): at 05:55

## 2021-08-24 RX ADMIN — Medication 81 MILLIGRAM(S): at 17:55

## 2021-08-24 RX ADMIN — HEPARIN SODIUM 0 UNIT(S)/HR: 5000 INJECTION INTRAVENOUS; SUBCUTANEOUS at 21:33

## 2021-08-24 RX ADMIN — LATANOPROST 1 DROP(S): 0.05 SOLUTION/ DROPS OPHTHALMIC; TOPICAL at 00:26

## 2021-08-24 RX ADMIN — HEPARIN SODIUM 1200 UNIT(S)/HR: 5000 INJECTION INTRAVENOUS; SUBCUTANEOUS at 14:29

## 2021-08-24 RX ADMIN — LIDOCAINE 1 PATCH: 4 CREAM TOPICAL at 22:13

## 2021-08-24 RX ADMIN — HEPARIN SODIUM 1500 UNIT(S)/HR: 5000 INJECTION INTRAVENOUS; SUBCUTANEOUS at 05:54

## 2021-08-24 RX ADMIN — Medication 1 TABLET(S): at 11:28

## 2021-08-24 RX ADMIN — OXYCODONE HYDROCHLORIDE 20 MILLIGRAM(S): 5 TABLET ORAL at 05:55

## 2021-08-24 RX ADMIN — PANTOPRAZOLE SODIUM 40 MILLIGRAM(S): 20 TABLET, DELAYED RELEASE ORAL at 05:55

## 2021-08-24 RX ADMIN — Medication 50 MILLIGRAM(S): at 05:55

## 2021-08-24 RX ADMIN — Medication 10 MILLIGRAM(S): at 10:40

## 2021-08-24 RX ADMIN — ATORVASTATIN CALCIUM 10 MILLIGRAM(S): 80 TABLET, FILM COATED ORAL at 22:14

## 2021-08-24 RX ADMIN — Medication 10 MILLIGRAM(S): at 22:14

## 2021-08-24 RX ADMIN — OXYCODONE HYDROCHLORIDE 20 MILLIGRAM(S): 5 TABLET ORAL at 18:53

## 2021-08-24 RX ADMIN — HEPARIN SODIUM 900 UNIT(S)/HR: 5000 INJECTION INTRAVENOUS; SUBCUTANEOUS at 22:44

## 2021-08-24 RX ADMIN — OXYCODONE HYDROCHLORIDE 20 MILLIGRAM(S): 5 TABLET ORAL at 17:55

## 2021-08-24 RX ADMIN — HEPARIN SODIUM 0 UNIT(S)/HR: 5000 INJECTION INTRAVENOUS; SUBCUTANEOUS at 04:51

## 2021-08-24 RX ADMIN — NYSTATIN CREAM 1 APPLICATION(S): 100000 CREAM TOPICAL at 10:41

## 2021-08-24 RX ADMIN — OXYCODONE HYDROCHLORIDE 20 MILLIGRAM(S): 5 TABLET ORAL at 06:55

## 2021-08-24 NOTE — PHYSICAL THERAPY INITIAL EVALUATION ADULT - ADDITIONAL COMMENTS
Patient report lives with daughter, gets HHA for 8 hours/ day, ambulated with a walker and wheel chair.

## 2021-08-24 NOTE — PROVIDER CONTACT NOTE (CRITICAL VALUE NOTIFICATION) - RECOMMENDATIONS
Follow Heparin nomogram and continue to monitor Pt. New Heparin rate of 13gtt/hr. Follow Heparin nomogram and continue to monitor Pt. New Heparin rate of 12gtt/hr.

## 2021-08-24 NOTE — PROGRESS NOTE ADULT - NSPROGADDITIONALINFOA_GEN_ALL_CORE
Jerardo Steward will be covering for me starting 8/25/21. He can be reached at  if needed.     d/w pt and the son Sebastián (Lives in [place]).   All questions answered.   Physical therapy. Out of bed to chair with assistance.     - Dr. ELMER Hope ([facility])  - [phone]
- Dr. ELMER Hope ([facility])  - [phone]

## 2021-08-24 NOTE — PHYSICAL THERAPY INITIAL EVALUATION ADULT - PERTINENT HX OF CURRENT PROBLEM, REHAB EVAL
Patient is 76 year old female admitted with history of spinal stenosis on Oxycontin, colon ca s/p hemicolectomy 2 years ago, perioperative MI,  dvt on eliquis, htn, adrenal insufficiency on hydrocortisone, hypothyroidism, CKD, essential tremors, presents with SVT.

## 2021-08-25 LAB
ANION GAP SERPL CALC-SCNC: 12 MMOL/L — SIGNIFICANT CHANGE UP (ref 7–14)
APTT BLD: 181.9 SEC — SIGNIFICANT CHANGE UP (ref 27–36.3)
APTT BLD: >200 SEC — CRITICAL HIGH (ref 27–36.3)
BUN SERPL-MCNC: 24 MG/DL — HIGH (ref 7–23)
CALCIUM SERPL-MCNC: 9.4 MG/DL — SIGNIFICANT CHANGE UP (ref 8.4–10.5)
CHLORIDE SERPL-SCNC: 104 MMOL/L — SIGNIFICANT CHANGE UP (ref 98–107)
CO2 SERPL-SCNC: 22 MMOL/L — SIGNIFICANT CHANGE UP (ref 22–31)
CREAT SERPL-MCNC: 1.58 MG/DL — HIGH (ref 0.5–1.3)
GLUCOSE SERPL-MCNC: 117 MG/DL — HIGH (ref 70–99)
HCT VFR BLD CALC: 30.6 % — LOW (ref 34.5–45)
HGB BLD-MCNC: 10.8 G/DL — LOW (ref 11.5–15.5)
MAGNESIUM SERPL-MCNC: 2 MG/DL — SIGNIFICANT CHANGE UP (ref 1.6–2.6)
MCHC RBC-ENTMCNC: 26.7 PG — LOW (ref 27–34)
MCHC RBC-ENTMCNC: 35.3 GM/DL — SIGNIFICANT CHANGE UP (ref 32–36)
MCV RBC AUTO: 75.6 FL — LOW (ref 80–100)
NRBC # BLD: 0 /100 WBCS — SIGNIFICANT CHANGE UP
NRBC # FLD: 0 K/UL — SIGNIFICANT CHANGE UP
PHOSPHATE SERPL-MCNC: 3.1 MG/DL — SIGNIFICANT CHANGE UP (ref 2.5–4.5)
PLATELET # BLD AUTO: 204 K/UL — SIGNIFICANT CHANGE UP (ref 150–400)
POTASSIUM SERPL-MCNC: 4.3 MMOL/L — SIGNIFICANT CHANGE UP (ref 3.5–5.3)
POTASSIUM SERPL-SCNC: 4.3 MMOL/L — SIGNIFICANT CHANGE UP (ref 3.5–5.3)
RBC # BLD: 4.05 M/UL — SIGNIFICANT CHANGE UP (ref 3.8–5.2)
RBC # FLD: 16.4 % — HIGH (ref 10.3–14.5)
SODIUM SERPL-SCNC: 138 MMOL/L — SIGNIFICANT CHANGE UP (ref 135–145)
WBC # BLD: 9.37 K/UL — SIGNIFICANT CHANGE UP (ref 3.8–10.5)
WBC # FLD AUTO: 9.37 K/UL — SIGNIFICANT CHANGE UP (ref 3.8–10.5)

## 2021-08-25 RX ORDER — APIXABAN 2.5 MG/1
5 TABLET, FILM COATED ORAL EVERY 12 HOURS
Refills: 0 | Status: DISCONTINUED | OUTPATIENT
Start: 2021-08-25 | End: 2021-08-27

## 2021-08-25 RX ADMIN — LIDOCAINE 1 PATCH: 4 CREAM TOPICAL at 09:30

## 2021-08-25 RX ADMIN — Medication 10 MILLIGRAM(S): at 22:20

## 2021-08-25 RX ADMIN — OXYCODONE HYDROCHLORIDE 20 MILLIGRAM(S): 5 TABLET ORAL at 05:50

## 2021-08-25 RX ADMIN — LIDOCAINE 1 PATCH: 4 CREAM TOPICAL at 07:30

## 2021-08-25 RX ADMIN — OXYCODONE HYDROCHLORIDE 20 MILLIGRAM(S): 5 TABLET ORAL at 17:50

## 2021-08-25 RX ADMIN — PANTOPRAZOLE SODIUM 40 MILLIGRAM(S): 20 TABLET, DELAYED RELEASE ORAL at 05:50

## 2021-08-25 RX ADMIN — Medication 1 TABLET(S): at 11:32

## 2021-08-25 RX ADMIN — LATANOPROST 1 DROP(S): 0.05 SOLUTION/ DROPS OPHTHALMIC; TOPICAL at 22:20

## 2021-08-25 RX ADMIN — OXYCODONE HYDROCHLORIDE 5 MILLIGRAM(S): 5 TABLET ORAL at 22:55

## 2021-08-25 RX ADMIN — HEPARIN SODIUM 0 UNIT(S)/HR: 5000 INJECTION INTRAVENOUS; SUBCUTANEOUS at 06:05

## 2021-08-25 RX ADMIN — LOSARTAN POTASSIUM 25 MILLIGRAM(S): 100 TABLET, FILM COATED ORAL at 05:50

## 2021-08-25 RX ADMIN — Medication 81 MILLIGRAM(S): at 17:49

## 2021-08-25 RX ADMIN — Medication 50 MILLIGRAM(S): at 05:51

## 2021-08-25 RX ADMIN — HEPARIN SODIUM 0 UNIT(S)/HR: 5000 INJECTION INTRAVENOUS; SUBCUTANEOUS at 15:14

## 2021-08-25 RX ADMIN — APIXABAN 5 MILLIGRAM(S): 2.5 TABLET, FILM COATED ORAL at 22:16

## 2021-08-25 RX ADMIN — LIDOCAINE 1 PATCH: 4 CREAM TOPICAL at 22:18

## 2021-08-25 RX ADMIN — OXYCODONE HYDROCHLORIDE 5 MILLIGRAM(S): 5 TABLET ORAL at 14:29

## 2021-08-25 RX ADMIN — Medication 50 MICROGRAM(S): at 05:50

## 2021-08-25 RX ADMIN — ATORVASTATIN CALCIUM 10 MILLIGRAM(S): 80 TABLET, FILM COATED ORAL at 22:19

## 2021-08-25 RX ADMIN — HEPARIN SODIUM 300 UNIT(S)/HR: 5000 INJECTION INTRAVENOUS; SUBCUTANEOUS at 16:24

## 2021-08-25 RX ADMIN — OXYCODONE HYDROCHLORIDE 5 MILLIGRAM(S): 5 TABLET ORAL at 15:22

## 2021-08-25 RX ADMIN — OXYCODONE HYDROCHLORIDE 5 MILLIGRAM(S): 5 TABLET ORAL at 23:50

## 2021-08-25 RX ADMIN — OXYCODONE HYDROCHLORIDE 20 MILLIGRAM(S): 5 TABLET ORAL at 06:50

## 2021-08-25 RX ADMIN — Medication 10 MILLIGRAM(S): at 09:02

## 2021-08-25 RX ADMIN — Medication 600 MILLIGRAM(S): at 18:16

## 2021-08-25 RX ADMIN — OXYCODONE HYDROCHLORIDE 20 MILLIGRAM(S): 5 TABLET ORAL at 18:48

## 2021-08-25 RX ADMIN — AMLODIPINE BESYLATE 5 MILLIGRAM(S): 2.5 TABLET ORAL at 05:50

## 2021-08-25 RX ADMIN — HEPARIN SODIUM 600 UNIT(S)/HR: 5000 INJECTION INTRAVENOUS; SUBCUTANEOUS at 07:08

## 2021-08-25 NOTE — PROVIDER CONTACT NOTE (CRITICAL VALUE NOTIFICATION) - BACKGROUND
Patient was admitted for SOB and SVT. Pt has pmh of obesity, HTN, and spinal stenosis.
Patient admitted for SOB. Pt has pmh of HTN, spinal stenosis, and MI.
Patient admitted for SVT, DVT and SOB.
Patient admitted for SOB and SVT. Pt has pmh of obesity, HTN and spinal stenosis.
Pt has critical aPTT results on 8/24/21 and 8/25/21

## 2021-08-25 NOTE — PROVIDER CONTACT NOTE (CRITICAL VALUE NOTIFICATION) - ASSESSMENT
Patient is stable and shows no signs of bleeding
Patient is stable and shows no signs of bleeding.
Pt is AOx4 without signs or symptoms of bleeding
Patient is asymptomatic and shows no signs of bleeding
Patient is stable and shows no signs of bleeding

## 2021-08-25 NOTE — PROVIDER CONTACT NOTE (CRITICAL VALUE NOTIFICATION) - TEST AND RESULT REPORTED:
aPTT greater than 200
aPTT result of 181.9
aPTT greater than 200

## 2021-08-25 NOTE — PROVIDER CONTACT NOTE (CRITICAL VALUE NOTIFICATION) - RECOMMENDATIONS
Continue to follow Heparin nomogram and restart Heparin in 60 minutes from holding, as per nomogram. Restart at new rate at 3gtt/hr as per nomogram.

## 2021-08-25 NOTE — PROVIDER CONTACT NOTE (CRITICAL VALUE NOTIFICATION) - SITUATION
Pt has aPTT result of 181.9. Heparin paused for 60 minutes as per nomogram with new rate of 3gtt/hr.
Patients aPTT is greater than 200
Patient's aPTT is greater than 200
Patients aPTT greater than 200
Patients aPTT was greater than 200

## 2021-08-25 NOTE — PROVIDER CONTACT NOTE (CRITICAL VALUE NOTIFICATION) - ACTION/TREATMENT ORDERED:
RN to follow Heparin nomogram.
As per ZULMA Resendiz, continue to monitor patient and follow full anticoagulation nonogram
Pending
As per PA Shaylee will continue to monitor the patient and follow full anticoagulation nonogram
As per ZULMA June, will monitor the patient and follow the full anticoagulation nonogram as ordered

## 2021-08-26 LAB
ANION GAP SERPL CALC-SCNC: 12 MMOL/L — SIGNIFICANT CHANGE UP (ref 7–14)
BUN SERPL-MCNC: 25 MG/DL — HIGH (ref 7–23)
CALCIUM SERPL-MCNC: 9.7 MG/DL — SIGNIFICANT CHANGE UP (ref 8.4–10.5)
CHLORIDE SERPL-SCNC: 102 MMOL/L — SIGNIFICANT CHANGE UP (ref 98–107)
CO2 SERPL-SCNC: 23 MMOL/L — SIGNIFICANT CHANGE UP (ref 22–31)
CREAT SERPL-MCNC: 1.7 MG/DL — HIGH (ref 0.5–1.3)
GLUCOSE SERPL-MCNC: 103 MG/DL — HIGH (ref 70–99)
HCT VFR BLD CALC: 34.1 % — LOW (ref 34.5–45)
HGB BLD-MCNC: 11.5 G/DL — SIGNIFICANT CHANGE UP (ref 11.5–15.5)
MCHC RBC-ENTMCNC: 25.7 PG — LOW (ref 27–34)
MCHC RBC-ENTMCNC: 33.7 GM/DL — SIGNIFICANT CHANGE UP (ref 32–36)
MCV RBC AUTO: 76.1 FL — LOW (ref 80–100)
NRBC # BLD: 0 /100 WBCS — SIGNIFICANT CHANGE UP
NRBC # FLD: 0 K/UL — SIGNIFICANT CHANGE UP
PLATELET # BLD AUTO: 197 K/UL — SIGNIFICANT CHANGE UP (ref 150–400)
POTASSIUM SERPL-MCNC: 4.3 MMOL/L — SIGNIFICANT CHANGE UP (ref 3.5–5.3)
POTASSIUM SERPL-SCNC: 4.3 MMOL/L — SIGNIFICANT CHANGE UP (ref 3.5–5.3)
RBC # BLD: 4.48 M/UL — SIGNIFICANT CHANGE UP (ref 3.8–5.2)
RBC # FLD: 16.6 % — HIGH (ref 10.3–14.5)
SODIUM SERPL-SCNC: 137 MMOL/L — SIGNIFICANT CHANGE UP (ref 135–145)
WBC # BLD: 9.62 K/UL — SIGNIFICANT CHANGE UP (ref 3.8–10.5)
WBC # FLD AUTO: 9.62 K/UL — SIGNIFICANT CHANGE UP (ref 3.8–10.5)

## 2021-08-26 RX ORDER — SENNA PLUS 8.6 MG/1
2 TABLET ORAL AT BEDTIME
Refills: 0 | Status: DISCONTINUED | OUTPATIENT
Start: 2021-08-26 | End: 2021-08-27

## 2021-08-26 RX ORDER — OXYCODONE HYDROCHLORIDE 5 MG/1
20 TABLET ORAL
Refills: 0 | Status: DISCONTINUED | OUTPATIENT
Start: 2021-08-26 | End: 2021-08-27

## 2021-08-26 RX ADMIN — Medication 600 MILLIGRAM(S): at 05:40

## 2021-08-26 RX ADMIN — Medication 81 MILLIGRAM(S): at 19:02

## 2021-08-26 RX ADMIN — Medication 1 TABLET(S): at 12:49

## 2021-08-26 RX ADMIN — Medication 50 MICROGRAM(S): at 05:40

## 2021-08-26 RX ADMIN — APIXABAN 5 MILLIGRAM(S): 2.5 TABLET, FILM COATED ORAL at 19:02

## 2021-08-26 RX ADMIN — OXYCODONE HYDROCHLORIDE 20 MILLIGRAM(S): 5 TABLET ORAL at 19:30

## 2021-08-26 RX ADMIN — LIDOCAINE 1 PATCH: 4 CREAM TOPICAL at 12:30

## 2021-08-26 RX ADMIN — LIDOCAINE 1 PATCH: 4 CREAM TOPICAL at 08:34

## 2021-08-26 RX ADMIN — PANTOPRAZOLE SODIUM 40 MILLIGRAM(S): 20 TABLET, DELAYED RELEASE ORAL at 08:34

## 2021-08-26 RX ADMIN — OXYCODONE HYDROCHLORIDE 20 MILLIGRAM(S): 5 TABLET ORAL at 19:02

## 2021-08-26 RX ADMIN — SENNA PLUS 2 TABLET(S): 8.6 TABLET ORAL at 23:20

## 2021-08-26 RX ADMIN — ATORVASTATIN CALCIUM 10 MILLIGRAM(S): 80 TABLET, FILM COATED ORAL at 23:18

## 2021-08-26 RX ADMIN — Medication 10 MILLIGRAM(S): at 13:51

## 2021-08-26 RX ADMIN — OXYCODONE HYDROCHLORIDE 5 MILLIGRAM(S): 5 TABLET ORAL at 13:51

## 2021-08-26 RX ADMIN — APIXABAN 5 MILLIGRAM(S): 2.5 TABLET, FILM COATED ORAL at 05:40

## 2021-08-26 RX ADMIN — Medication 1 TABLET(S): at 12:48

## 2021-08-26 RX ADMIN — OXYCODONE HYDROCHLORIDE 20 MILLIGRAM(S): 5 TABLET ORAL at 05:40

## 2021-08-26 RX ADMIN — Medication 10 MILLIGRAM(S): at 23:18

## 2021-08-26 RX ADMIN — LATANOPROST 1 DROP(S): 0.05 SOLUTION/ DROPS OPHTHALMIC; TOPICAL at 23:18

## 2021-08-26 RX ADMIN — OXYCODONE HYDROCHLORIDE 20 MILLIGRAM(S): 5 TABLET ORAL at 06:45

## 2021-08-26 RX ADMIN — Medication 600 MILLIGRAM(S): at 19:03

## 2021-08-26 RX ADMIN — AMLODIPINE BESYLATE 5 MILLIGRAM(S): 2.5 TABLET ORAL at 05:40

## 2021-08-26 RX ADMIN — LOSARTAN POTASSIUM 25 MILLIGRAM(S): 100 TABLET, FILM COATED ORAL at 05:40

## 2021-08-26 RX ADMIN — OXYCODONE HYDROCHLORIDE 5 MILLIGRAM(S): 5 TABLET ORAL at 14:25

## 2021-08-26 RX ADMIN — Medication 50 MILLIGRAM(S): at 05:40

## 2021-08-26 NOTE — PROGRESS NOTE ADULT - PROBLEM SELECTOR PLAN 1
-broke spontaneously  -monitor on tele  -TTE, tsh, ordered  -gfr in low 30s prompted ed to cancel CTA chest; will order BL duplex /continue Eliquis for now.  - Pt already on Eliquis for her prior DVTs.  - pt of Dr. Walter (cardiology) [facility]. no recent TTE.   - will consult Dr. Orozco.
- broke spontaneously  - monitor on tele  - TSH wnl   - gfr in low 30s prompted ed to cancel CTA chest; ordered BL duplex: neg.  - Pt already on Eliquis for her prior DVTs.  - pt of Dr. Walter (cardiology) [facility]. no recent TTE.   - card consulted Dr. Orozco.  - TTE showing nl LV wall motion --> no need for ischemic eval as trops reflect demand ischemia  - started Asa 81 mg 8/24/21
- broke spontaneously  - monitor on tele  - TSH wnl   - gfr in low 30s prompted ed to cancel CTA chest; ordered BL duplex: neg.  - Pt already on Eliquis for her prior DVTs.  - pt of Dr. Walter (cardiology) [facility]. no recent TTE.   - card consulted Dr. Orozco.  - TTE showing nl LV wall motion --> no need for ischemic eval as trops reflect demand ischemia  - started Asa 81 mg 8/24/21
-broke spontaneously  -monitor on tele  - TSH wnl   - gfr in low 30s prompted ed to cancel CTA chest; ordered BL duplex: neg.  - Pt already on Eliquis for her prior DVTs.  - pt of Dr. Walter (cardiology) [facility]. no recent TTE.   - card consulted Dr. Orozco.  - await TTE. depending on TTE results will consider ischemic work up per card  - started Asa 81 8/24/21

## 2021-08-26 NOTE — PROGRESS NOTE ADULT - PROBLEM SELECTOR PLAN 2
- pcp Janelle Segura- pcp [phone], renal Dr. Treviño ([facility])  - outpt EMR reviewed. Cr range from 1.4 to 1.6, presume HTN nephrosclerosis.   - stable Cr so restarted arb (Omelsatan at home)  - prn Lasix for now (given for leg swelling, no known h/o chf)
- pcp Janelle Segura- pcp [phone], renal Dr. Treviño ([facility])  - outpt EMR reviewed. Cr range from 1.4 to 1.6, presume HTN nephrosclerosis.   - stable Cr so restarted cozaar in place of Olmesartan at home  - prn Lasix for now (given for leg swelling, no known h/o chf)
- pcp Janelle Segura- pcp [phone], renal Dr. Treviño ([facility])  - outpt EMR reviewed. Cr range from 1.4 to 1.6, presume HTN nephrosclerosis.   - stable Cr so restarted arb (Omelsatan at home)  - prn Lasix for now (given for leg swelling, no known h/o chf)
- pcp Janelle Segura- pcp [phone], renal Dr. Treviño ([facility])  - outpt EMR reviewed. Cr range from 1.4 to 1.6, presume HTN nephrosclerosis.   - can resume arb, risedronate, prn Lasix for now (given for leg swelling, no known h/o chf)

## 2021-08-26 NOTE — PROGRESS NOTE ADULT - PROBLEM SELECTOR PLAN 3
- Cont bb, statin, restart ARB Olmesartan at home. (Cr at baseline)
c/w bb, statin, restart ARB Omelsatan at home. (Cr at baseline)
c/w bb, statin, restart ARB Omelsatan at home. (Cr at baseline)
c/w bb, statin, restart ARB (Cr at baseline)

## 2021-08-26 NOTE — PROGRESS NOTE ADULT - PROBLEM SELECTOR PLAN 4
May resume eliquis 8/25/21  Physical therapy. Out of bed to chair with assistance.
- Resumed eliquis 8/25/21  - Physical therapy. Out of bed to chair with assistance.
c/w Eliquis  Physical therapy. Out of bed to chair with assistance.
c/w Eliquis (hep gtt in anticipation for ischemic work up)  Physical therapy. Out of bed to chair with assistance.

## 2021-08-26 NOTE — PROGRESS NOTE ADULT - PROBLEM SELECTOR PROBLEM 1
SVT (supraventricular tachycardia)

## 2021-08-27 ENCOUNTER — TRANSCRIPTION ENCOUNTER (OUTPATIENT)
Age: 76
End: 2021-08-27

## 2021-08-27 VITALS
SYSTOLIC BLOOD PRESSURE: 151 MMHG | RESPIRATION RATE: 18 BRPM | DIASTOLIC BLOOD PRESSURE: 71 MMHG | TEMPERATURE: 99 F | HEART RATE: 64 BPM | OXYGEN SATURATION: 100 %

## 2021-08-27 LAB
ANION GAP SERPL CALC-SCNC: 10 MMOL/L — SIGNIFICANT CHANGE UP (ref 7–14)
BUN SERPL-MCNC: 23 MG/DL — SIGNIFICANT CHANGE UP (ref 7–23)
CALCIUM SERPL-MCNC: 9.3 MG/DL — SIGNIFICANT CHANGE UP (ref 8.4–10.5)
CHLORIDE SERPL-SCNC: 105 MMOL/L — SIGNIFICANT CHANGE UP (ref 98–107)
CO2 SERPL-SCNC: 23 MMOL/L — SIGNIFICANT CHANGE UP (ref 22–31)
CREAT SERPL-MCNC: 1.57 MG/DL — HIGH (ref 0.5–1.3)
GLUCOSE SERPL-MCNC: 109 MG/DL — HIGH (ref 70–99)
HCT VFR BLD CALC: 32.7 % — LOW (ref 34.5–45)
HGB BLD-MCNC: 11.5 G/DL — SIGNIFICANT CHANGE UP (ref 11.5–15.5)
MAGNESIUM SERPL-MCNC: 2 MG/DL — SIGNIFICANT CHANGE UP (ref 1.6–2.6)
MCHC RBC-ENTMCNC: 26.7 PG — LOW (ref 27–34)
MCHC RBC-ENTMCNC: 35.2 GM/DL — SIGNIFICANT CHANGE UP (ref 32–36)
MCV RBC AUTO: 75.9 FL — LOW (ref 80–100)
NRBC # BLD: 0 /100 WBCS — SIGNIFICANT CHANGE UP
NRBC # FLD: 0 K/UL — SIGNIFICANT CHANGE UP
PLATELET # BLD AUTO: 205 K/UL — SIGNIFICANT CHANGE UP (ref 150–400)
POTASSIUM SERPL-MCNC: 4.4 MMOL/L — SIGNIFICANT CHANGE UP (ref 3.5–5.3)
POTASSIUM SERPL-SCNC: 4.4 MMOL/L — SIGNIFICANT CHANGE UP (ref 3.5–5.3)
RBC # BLD: 4.31 M/UL — SIGNIFICANT CHANGE UP (ref 3.8–5.2)
RBC # FLD: 16.5 % — HIGH (ref 10.3–14.5)
SODIUM SERPL-SCNC: 138 MMOL/L — SIGNIFICANT CHANGE UP (ref 135–145)
T3FREE SERPL-MCNC: 1.29 PG/ML — LOW (ref 1.8–4.6)
T4 FREE SERPL-MCNC: 1.1 NG/DL — SIGNIFICANT CHANGE UP (ref 0.9–1.8)
WBC # BLD: 9.01 K/UL — SIGNIFICANT CHANGE UP (ref 3.8–10.5)
WBC # FLD AUTO: 9.01 K/UL — SIGNIFICANT CHANGE UP (ref 3.8–10.5)

## 2021-08-27 RX ORDER — ASPIRIN/CALCIUM CARB/MAGNESIUM 324 MG
1 TABLET ORAL
Qty: 0 | Refills: 0 | DISCHARGE
Start: 2021-08-27

## 2021-08-27 RX ORDER — SENNA PLUS 8.6 MG/1
2 TABLET ORAL
Qty: 0 | Refills: 0 | DISCHARGE
Start: 2021-08-27

## 2021-08-27 RX ORDER — OXYCODONE HYDROCHLORIDE 5 MG/1
1 TABLET ORAL
Qty: 0 | Refills: 0 | DISCHARGE

## 2021-08-27 RX ORDER — FUROSEMIDE 40 MG
0 TABLET ORAL
Qty: 0 | Refills: 0 | DISCHARGE

## 2021-08-27 RX ADMIN — AMLODIPINE BESYLATE 5 MILLIGRAM(S): 2.5 TABLET ORAL at 06:12

## 2021-08-27 RX ADMIN — Medication 50 MILLIGRAM(S): at 06:12

## 2021-08-27 RX ADMIN — LOSARTAN POTASSIUM 25 MILLIGRAM(S): 100 TABLET, FILM COATED ORAL at 06:11

## 2021-08-27 RX ADMIN — Medication 10 MILLIGRAM(S): at 11:38

## 2021-08-27 RX ADMIN — Medication 50 MICROGRAM(S): at 06:11

## 2021-08-27 RX ADMIN — PANTOPRAZOLE SODIUM 40 MILLIGRAM(S): 20 TABLET, DELAYED RELEASE ORAL at 06:12

## 2021-08-27 RX ADMIN — APIXABAN 5 MILLIGRAM(S): 2.5 TABLET, FILM COATED ORAL at 06:11

## 2021-08-27 RX ADMIN — Medication 1 TABLET(S): at 11:38

## 2021-08-27 RX ADMIN — Medication 600 MILLIGRAM(S): at 06:11

## 2021-08-27 RX ADMIN — OXYCODONE HYDROCHLORIDE 20 MILLIGRAM(S): 5 TABLET ORAL at 07:01

## 2021-08-27 RX ADMIN — OXYCODONE HYDROCHLORIDE 5 MILLIGRAM(S): 5 TABLET ORAL at 06:12

## 2021-08-27 NOTE — CHART NOTE - NSCHARTNOTEFT_GEN_A_CORE
Patient has a history of spinal stenosis, osteoporosis, obesity, and previous laminectomy. As a result of these comorbidities, patient demonstrates impaired balance, decreased strength, and decreased mobility. Patient will require standard wheelchair with elevated leg rests, brake extensions, anti-tippers, and seat belt. A wheelchair is necessary to achieve daily tasks and therapies that cannot be achieved with a walker, cane, or crutches. Patient will be utilizing wheelchair within the home.

## 2021-08-27 NOTE — DISCHARGE NOTE PROVIDER - NSDCCPCAREPLAN_GEN_ALL_CORE_FT
PRINCIPAL DISCHARGE DIAGNOSIS  Diagnosis: Supraventricular tachycardia  Assessment and Plan of Treatment: You were found to have supra-ventricular tachycardia which spontaneously resolved. You were seen by cardiology team.  - A Transthoracic echocardiogram was performed which showed normal Left Ventricular wall motion   - started Asa 81 mg 8/24/21  - Outpatient cardiology follow up within 1 week of discharge from the hospital for further management.  Please continue your medications as directed and follow-up with your primary provider/cardiologist to further manage your care. Monitor for signs/symptoms such as, increased heart rate, palpitations, chest pain, dizziness, or shortness of breath - Return to emergency room if these signs/symptoms are present.      SECONDARY DISCHARGE DIAGNOSES  Diagnosis: CAD (coronary artery disease)  Assessment and Plan of Treatment: Continue medications. Continue DASH diet. Follow up with your PCP within 1 week of discharge for further management.    Diagnosis: Chronic kidney disease, unspecified CKD stage  Assessment and Plan of Treatment: - Creatinine, Serum: 1.57 mg/dL (08.27.21 @ 07:27)  - CONTINUE TO HOLD LASIX FOR NOW. FOLLOW UP WITH P[[facility] CARE PROVIDER WITHIN 1 WEEK OF DISCHARGE FROM THE HOSPITAL FOR FURTHER EVALUATION AND MANAGEMENT.  In order to prevent further disease progression, continue to follow recommendations made by your primary provider/nephrologist. Continue a diet that is low in sodium and avoid foods that are concentrated in potassium and phosphorus. Continue your medications/supplementations as directed and avoid over-the-counter drugs that are harmful to kidneys, such as, Non-Steroidal Anti-Inflammatory Drugs (NSAIDs). Follow-up as outpatient to monitor your kidney function, as well as, vitamin D, Calcium, potassium, and phosphorus levels.       Diagnosis: DVT, lower extremity  Assessment and Plan of Treatment: Continue with eliquis as prescribed. Continue your medications as directed and please follow-up as an outpatient with your primary care provider for further care and recommendations.

## 2021-08-27 NOTE — DISCHARGE NOTE NURSING/CASE MANAGEMENT/SOCIAL WORK - NSSCTYPOFSERV_GEN_ALL_CORE
RN & PT visits; RN to call to schedule the visit. PLEASE CALL [facility] directly if do not hear from the agency.

## 2021-08-27 NOTE — DISCHARGE NOTE PROVIDER - CARE PROVIDER_API CALL
Pablo Orozco (MD)  Cardiovascular Disease; Interventional Cardiology; Nuclear Cardiology  [address]  [place] [address]  Phone: [phone]  Fax: [phone]  Follow Up Time:     Primary care provider,   Phone: (   )    -  Fax: (   )    -  Follow Up Time:

## 2021-08-27 NOTE — DISCHARGE NOTE PROVIDER - PROVIDER TOKENS
PROVIDER:[TOKEN:[[id number]]],FREE:[LAST:[Primary care provider],PHONE:[(   )    -],FAX:[(   )    -]]

## 2021-08-27 NOTE — DISCHARGE NOTE PROVIDER - HOSPITAL COURSE
75 yo F h/o spinal stenosis on Oxycontin, colon ca s/p hemicolectomy 2 years ago, perioperative MI,  dvt on Eliquis, htn, adrenal insufficiency on hydrocortisone, hypothyroidism, CKD, essential tremors. Pt reports sudden onset of dyspnea at rest while watching tv on couch earlier in afternoon. In ed, found to be in SVT in 140s on ekg, broke spontaneously.      Problem/Plan - 1:  ·  Problem: SVT (supraventricular tachycardia).   ·  Plan: - broke spontaneously  - monitor on tele  - TSH wnl   - gfr in low 30s prompted ed to cancel CTA chest; ordered BL duplex: neg.  - Pt already on Eliquis for her prior DVTs.  - pt of Dr. Walter (cardiology) [facility]. no recent TTE.   - card consulted Dr. Orozco.  - TTE showing nl LV wall motion --> no need for ischemic eval as trops reflect demand ischemia  - started Asa 81 mg 8/24/21.     Problem/Plan - 2:  ·  Problem: Chronic kidney disease, unspecified CKD stage.   ·  Plan: - pcp Janelle Segura- pcp [phone], renal Dr. Treviño ([facility])  - outpt EMR reviewed. Cr range from 1.4 to 1.6, presume HTN nephrosclerosis.   - stable Cr so restarted cozaar in place of Olmesartan at home  - prn Lasix for now (given for leg swelling, no known h/o chf).     Problem/Plan - 3:  ·  Problem: CAD (coronary artery disease).   ·  Plan: - Cont bb, statin, restart ARB Olmesartan at home. (Cr at baseline).     Problem/Plan - 4:  ·  Problem: DVT, lower extremity.   ·  Plan: - Resumed eliquis 8/25/21  - Physical therapy. Out of bed to chair with assistance.   77 yo F h/o spinal stenosis on Oxycontin, colon ca s/p hemicolectomy 2 years ago, perioperative MI,  dvt on Eliquis, htn, adrenal insufficiency on hydrocortisone, hypothyroidism, CKD, essential tremors. Pt reports sudden onset of dyspnea at rest while watching tv on couch earlier in afternoon. In ed, found to be in SVT in 140s on ekg, broke spontaneously.     SVT  - broke spontaneously  - TSH wnl   - gfr in low 30s prompted ed to cancel CTA chest; ordered BL duplex: neg.  - Pt already on Eliquis for her prior DVTs.  - pt of Dr. Walter (cardiology) [facility]. no recent TTE.   - card consulted Dr. Orozco.  - TTE showing nl LV wall motion --> no need for ischemic eval as trops reflect demand ischemia  - started Asa 81 mg 8/24/21.  - Outpatient cardiology follow up    Chronic kidney disease  -  pcp Janelle Segura- pcp [phone], renal Dr. Treviño ([facility])  - outpt EMR reviewed. Cr range from 1.4 to 1.6, presume HTN nephrosclerosis.   - stable Cr so restarted cozaar in place of Olmesartan at home  - prn Lasix for now (given for leg swelling, no known h/o chf).  - Creatinine, Serum: 1.57 mg/dL (08.27.21 @ 07:27)    CAD  - Cont bb, statin, restart ARB Olmesartan at home. (Cr at baseline).    DVT, lower extremity.   - Resumed eliquis 8/25/21    Discussed case with Dr. Bernard on 8/27/2021, patient medically stable for discharge to home today. All medications reviewed with medical attending prior to discharge.

## 2021-08-27 NOTE — PROGRESS NOTE ADULT - SUBJECTIVE AND OBJECTIVE BOX
SUBJECTIVE / OVERNIGHT EVENTS:  Feeling better today.  the son Sebastián at bedside.  No overnight event. remain stable on tele. Sinus. occasional PVCs.   No complaints. await TTE.   Chest pain free. no SOB, no N/V/D.  Denied HA/dizziness, abdominal pain.         --------------------------------------------------------------------------------------------  LABS:                        11.8   10.06 )-----------( 216      ( 24 Aug 2021 03:56 )             34.4     08-24    137  |  101  |  24<H>  ----------------------------<  104<H>  4.5   |  22  |  1.51<H>    Ca    9.5      24 Aug 2021 03:56  Phos  3.5     08-24  Mg     2.00     08-24    TPro  7.1  /  Alb  3.1<L>  /  TBili  0.5  /  DBili  x   /  AST  27  /  ALT  13  /  AlkPhos  56  08-23    PT/INR - ( 24 Aug 2021 03:56 )   PT: 16.2 sec;   INR: 1.45 ratio         PTT - ( 24 Aug 2021 12:20 )  PTT:>200 sec  CAPILLARY BLOOD GLUCOSE        CARDIAC MARKERS ( 23 Aug 2021 07:35 )  x     / x     / 59 U/L / x     / 2.4 ng/mL          RADIOLOGY & ADDITIONAL TESTS:    Imaging Personally Reviewed:  [x] YES  [ ] NO    Consultant(s) Notes Reviewed:  [x] YES  [ ] NO    MEDICATIONS  (STANDING):  amLODIPine   Tablet 5 milliGRAM(s) Oral daily  aspirin enteric coated 81 milliGRAM(s) Oral daily  atorvastatin 10 milliGRAM(s) Oral at bedtime  calcium carbonate    500 mG (Tums) Chewable 1 Tablet(s) Chew daily  heparin  Infusion.  Unit(s)/Hr (18 mL/Hr) IV Continuous <Continuous>  hydrocortisone 10 milliGRAM(s) Oral <User Schedule>  latanoprost 0.005% Ophthalmic Solution 1 Drop(s) Both EYES at bedtime  levothyroxine 50 MICROGram(s) Oral daily  losartan 25 milliGRAM(s) Oral daily  metoprolol succinate ER 50 milliGRAM(s) Oral daily  multivitamin 1 Tablet(s) Oral daily  oxyCODONE  ER Tablet 20 milliGRAM(s) Oral <User Schedule>  pantoprazole    Tablet 40 milliGRAM(s) Oral before breakfast    MEDICATIONS  (PRN):  artificial tears (preservative free) Ophthalmic Solution 1 Drop(s) Both EYES three times a day PRN Dry Eyes  heparin   Injectable 8000 Unit(s) IV Push every 6 hours PRN For aPTT less than 40  heparin   Injectable 4000 Unit(s) IV Push every 6 hours PRN For aPTT between 40 - 57  oxyCODONE    IR 5 milliGRAM(s) Oral every 8 hours PRN moderate and severe pain      Care Discussed with Consultants/Other Providers [x] YES  [ ] NO    Vital Signs Last 24 Hrs  T(C): 36.7 (24 Aug 2021 05:52), Max: 37.1 (23 Aug 2021 21:34)  T(F): 98 (24 Aug 2021 05:52), Max: 98.8 (23 Aug 2021 21:34)  HR: 70 (24 Aug 2021 05:52) (62 - 84)  BP: 136/65 (24 Aug 2021 05:52) (132/74 - 167/83)  BP(mean): --  RR: 18 (24 Aug 2021 05:52) (18 - 21)  SpO2: 99% (24 Aug 2021 05:52) (96% - 100%)  I&O's Summary    24 Aug 2021 07:01  -  24 Aug 2021 13:24  --------------------------------------------------------  IN: 363 mL / OUT: 0 mL / NET: 363 mL    PHYSICAL EXAM:  GENERAL: NAD, well-developed, comfortable, on room air  HEAD:  Atraumatic, Normocephalic  EYES: EOMI, PERRLA, conjunctiva and sclera clear, legally blind  NECK: Supple, No JVD  CHEST/LUNG: mild decrease breath sounds bilaterally; No wheeze   HEART: Regular rate and rhythm; No murmurs, rubs, or gallops  ABDOMEN: Soft, Nontender, Nondistended; Bowel sounds present  Neuro: AAOx3, no focal weakness, 5/5 b/l extremity strength  EXTREMITIES:  2+ Peripheral Pulses, No clubbing, cyanosis, trace b/l edema  SKIN: No rashes or lesions   
CARDIOLOGY FOLLOW UP - Dr. Orozco  DATE OF SERVICE: 8/25/21    CC no cp or sob       REVIEW OF SYSTEMS:  CONSTITUTIONAL: No fever, weight loss, or fatigue  RESPIRATORY: No cough, wheezing, chills or hemoptysis; No Shortness of Breath  CARDIOVASCULAR: No chest pain, palpitations, passing out, dizziness, or leg swelling  GASTROINTESTINAL: No abdominal or epigastric pain. No nausea, vomiting, or hematemesis; No diarrhea or constipation. No melena or hematochezia.  VASCULAR: No edema     PHYSICAL EXAM:  T(C): 36.9 (08-25-21 @ 05:47), Max: 37.7 (08-24-21 @ 12:30)  HR: 65 (08-25-21 @ 05:47) (65 - 74)  BP: 126/65 (08-25-21 @ 05:47) (114/49 - 126/65)  RR: 16 (08-25-21 @ 05:47) (16 - 18)  SpO2: 100% (08-25-21 @ 05:47) (99% - 100%)  Wt(kg): --  I&O's Summary    24 Aug 2021 07:01  -  25 Aug 2021 07:00  --------------------------------------------------------  IN: 1065 mL / OUT: 0 mL / NET: 1065 mL        Appearance: Normal	  Cardiovascular: Normal S1 S2,RRR, No JVD, No murmurs  Respiratory: Lungs clear to auscultation	  Gastrointestinal:  Soft, Non-tender, + BS	  Extremities: Normal range of motion, No clubbing, cyanosis or edema      Home Medications:  acetaminophen 325 mg oral tablet: 2 tab(s) orally every 6 hours, As needed, Mild Pain (1 - 3) (24 Sep 2020 11:16)  Artificial Tears ophthalmic solution: 1 drop(s) to each affected eye 4 times a day, As Needed (22 Aug 2021 23:28)  calcium (as carbonate) 600 mg oral tablet: 1 cap(s) orally once a day (24 Sep 2020 11:16)  Eliquis 2.5 mg oral tablet: 1 tab(s) orally 2 times a day (24 Sep 2020 11:16)  felodipine 5 mg oral tablet, extended release: 1 tab(s) orally once a day (22 Aug 2021 23:23)  hydrocortisone 10 mg oral tablet: 1 tab(s) orally 2 times a day (24 Sep 2020 11:16)  Lasix:  (22 Aug 2021 23:24)  latanoprost 0.005% ophthalmic solution: 1 drop(s) to each affected eye once a day (in the evening) (22 Aug 2021 23:27)  levothyroxine 50 mcg (0.05 mg) oral tablet: 1 tab(s) orally once a day (24 Sep 2020 11:16)  Lipitor 10 mg oral tablet: 1 tab(s) orally once a day (22 Aug 2021 23:41)  Macular Vitamin Benefit oral tablet: 1 tab(s) orally once a day (24 Sep 2020 11:16)  metoprolol succinate 50 mg oral tablet, extended release: 1 tab(s) orally once a day (22 Aug 2021 23:24)  Multiple Vitamins oral tablet: 1 tab(s) orally once a day (22 Aug 2021 23:22)  olmesartan 20 mg oral tablet: 1 tab(s) orally once a day (22 Aug 2021 23:22)  omeprazole 20 mg oral delayed release capsule: 1 cap(s) orally once a day (22 Aug 2021 23:21)  oxyCODONE 5 mg oral tablet: 1 tab(s) orally every 8 hours (22 Aug 2021 23:18)  OxyCONTIN 20 mg oral tablet, extended release: 1 tab(s) orally every 12 hours (24 Sep 2020 11:16)  risedronate 35 mg oral tablet: 1 tab(s) orally once a week (24 Sep 2020 11:16)      MEDICATIONS  (STANDING):  amLODIPine   Tablet 5 milliGRAM(s) Oral daily  aspirin enteric coated 81 milliGRAM(s) Oral daily  atorvastatin 10 milliGRAM(s) Oral at bedtime  calcium carbonate    500 mG (Tums) Chewable 1 Tablet(s) Chew daily  heparin  Infusion.  Unit(s)/Hr (18 mL/Hr) IV Continuous <Continuous>  hydrocortisone 10 milliGRAM(s) Oral <User Schedule>  latanoprost 0.005% Ophthalmic Solution 1 Drop(s) Both EYES at bedtime  levothyroxine 50 MICROGram(s) Oral daily  lidocaine   5% Patch 1 Patch Transdermal once  lidocaine   5% Patch 1 Patch Transdermal daily  losartan 25 milliGRAM(s) Oral daily  metoprolol succinate ER 50 milliGRAM(s) Oral daily  multivitamin 1 Tablet(s) Oral daily  oxyCODONE  ER Tablet 20 milliGRAM(s) Oral <User Schedule>  pantoprazole    Tablet 40 milliGRAM(s) Oral before breakfast      TELEMETRY: nsr	    ECG:  	  RADIOLOGY:   DIAGNOSTIC TESTING:  < from: US Duplex Venous Lower Ext Complete, Bilateral (08.23.21 @ 15:25) >    IMPRESSION:    No evidence of deep venous thrombosis in either lower extremity.          [ ] Echocardiogram:   from: Transthoracic Echocardiogram (08.24.21 @ 16:53) >  Ejection Fraction (Teicholtz): 66 %  ------------------------------------------------------------------------  OBSERVATIONS:  Mitral Valve: Mitral annular calcification, otherwise  normal mitral valve. Minimal mitral regurgitation.  Aortic Root: Normal aortic root.  Aortic Valve: Aortic valve leaflet morphology not well  visualized.  Left Atrium: Normal left atrium.  Left Ventricle: Endocardium not well visualized; grossly  normal left ventricular systolic function. Normal left  ventricular internal dimensions and wall thicknesses. Mild  diastolic dysfunction (Stage I).  Right Heart: Normal right atrium. The right ventricle is  not well visualized; grossly normal right ventricular  systolic function. Normal tricuspid valve. Minimal  tricuspid regurgitation. Normal pulmonic valve.  Pericardium/PleuraNormal pericardium with no pericardial  effusion.  ------------------------------------------------------------------------  CONCLUSIONS:  1. Mitral annular calcification, otherwise normal mitral  valve. Minimal mitral regurgitation.  2. Normal left ventricular internal dimensions and wall  thicknesses.  3. Endocardium not well visualized; grossly normal left  ventricular systolic function.  4. Mild diastolic dysfunction (Stage I).  5. The right ventricle is not well visualized; grossly  normal right ventricular systolic function.  ------------------------------------------------------------------------  Confirmed on  8/24/2021 - 18:04:43 by Pablo Valenzuela M.D.,  [facility], [facility]  ------------------------------------------------------------------------    < end of copied text >    [ ]  Catheterization:  [ ] Stress Test:    OTHER: 	    LABS:	 	    Troponin T, High Sensitivity Result: 62 ng/L (08-24 @ 03:56)  Troponin T, High Sensitivity Result: 92 ng/L (08-23 @ 07:35)  Creatine Kinase, Serum: 59 U/L [25 - 170] (08-23 @ 07:35)  CKMB Units: 2.4 ng/mL (08-23 @ 07:35)  Troponin T, High Sensitivity Result: 86 ng/L (08-22 @ 23:30)  Troponin T, High Sensitivity Result: 56 ng/L (08-22 @ 20:27)                          10.8   9.37  )-----------( 204      ( 25 Aug 2021 05:14 )             30.6     08-25    138  |  104  |  24<H>  ----------------------------<  117<H>  4.3   |  22  |  1.58<H>    Ca    9.4      25 Aug 2021 05:14  Phos  3.1     08-25  Mg     2.00     08-25      PT/INR - ( 24 Aug 2021 03:56 )   PT: 16.2 sec;   INR: 1.45 ratio         PTT - ( 25 Aug 2021 05:14 )  PTT:>200.0 sec        
CARDIOLOGY FOLLOW UP - Dr. Orozco  DATE OF SERVICE: 08-24-21     CC no cp/sob     REVIEW OF SYSTEMS:   CONSTITUTIONAL: No fever, weight loss, or fatigue   RESPIRATORY:  No cough, wheezing, chills or hemoptysis; No SOB  CARDIOVASCULAR: No chest pain, palpitations, passing out, dizziness, or leg swelling   GASTROINTESTINAL: No abdominal or epigastric pain. No nausea, vomiting, or hematemesis, no diarreha, or constipation, No melena or hematochezia   VASCULAR: no edema.       PHYSICAL EXAM:  T(C): 36.7 (08-24-21 @ 05:52), Max: 37.1 (08-23-21 @ 21:34)  HR: 70 (08-24-21 @ 05:52) (62 - 84)  BP: 136/65 (08-24-21 @ 05:52) (132/74 - 167/83)  RR: 18 (08-24-21 @ 05:52) (18 - 21)  SpO2: 99% (08-24-21 @ 05:52) (96% - 100%)  Wt(kg): --  I&O's Summary      Appearance: Normal	  Cardiovascular: Normal S1 S2,RRR, No JVD, + murmurs  Respiratory: Lungs clear to auscultation	  Gastrointestinal:  Soft, Non-tender, + BS	  Extremities: Normal range of motion, No clubbing, cyanosis or edema      HOME MEDICATIONS:  acetaminophen 325 mg oral tablet: 2 tab(s) orally every 6 hours, As needed, Mild Pain (1 - 3) (24 Sep 2020 11:16)  Artificial Tears ophthalmic solution: 1 drop(s) to each affected eye 4 times a day, As Needed (22 Aug 2021 23:28)  calcium (as carbonate) 600 mg oral tablet: 1 cap(s) orally once a day (24 Sep 2020 11:16)  Eliquis 2.5 mg oral tablet: 1 tab(s) orally 2 times a day (24 Sep 2020 11:16)  felodipine 5 mg oral tablet, extended release: 1 tab(s) orally once a day (22 Aug 2021 23:23)  hydrocortisone 10 mg oral tablet: 1 tab(s) orally 2 times a day (24 Sep 2020 11:16)  Lasix:  (22 Aug 2021 23:24)  latanoprost 0.005% ophthalmic solution: 1 drop(s) to each affected eye once a day (in the evening) (22 Aug 2021 23:27)  levothyroxine 50 mcg (0.05 mg) oral tablet: 1 tab(s) orally once a day (24 Sep 2020 11:16)  Lipitor 10 mg oral tablet: 1 tab(s) orally once a day (22 Aug 2021 23:41)  Macular Vitamin Benefit oral tablet: 1 tab(s) orally once a day (24 Sep 2020 11:16)  metoprolol succinate 50 mg oral tablet, extended release: 1 tab(s) orally once a day (22 Aug 2021 23:24)  Multiple Vitamins oral tablet: 1 tab(s) orally once a day (22 Aug 2021 23:22)  olmesartan 20 mg oral tablet: 1 tab(s) orally once a day (22 Aug 2021 23:22)  omeprazole 20 mg oral delayed release capsule: 1 cap(s) orally once a day (22 Aug 2021 23:21)  oxyCODONE 5 mg oral tablet: 1 tab(s) orally every 8 hours (22 Aug 2021 23:18)  OxyCONTIN 20 mg oral tablet, extended release: 1 tab(s) orally every 12 hours (24 Sep 2020 11:16)  risedronate 35 mg oral tablet: 1 tab(s) orally once a week (24 Sep 2020 11:16)      MEDICATIONS  (STANDING):  amLODIPine   Tablet 5 milliGRAM(s) Oral daily  atorvastatin 10 milliGRAM(s) Oral at bedtime  calcium carbonate    500 mG (Tums) Chewable 1 Tablet(s) Chew daily  heparin  Infusion.  Unit(s)/Hr (18 mL/Hr) IV Continuous <Continuous>  hydrocortisone 10 milliGRAM(s) Oral <User Schedule>  latanoprost 0.005% Ophthalmic Solution 1 Drop(s) Both EYES at bedtime  levothyroxine 50 MICROGram(s) Oral daily  metoprolol succinate ER 50 milliGRAM(s) Oral daily  multivitamin 1 Tablet(s) Oral daily  nystatin Powder 1 Application(s) Topical once  oxyCODONE  ER Tablet 20 milliGRAM(s) Oral <User Schedule>  pantoprazole    Tablet 40 milliGRAM(s) Oral before breakfast      TELEMETRY: nsr 	    ECG:  	  RADIOLOGY:   DIAGNOSTIC TESTING:  [ ] Echocardiogram:  [ ]  Catheterization:  [ ] Stress Test:    OTHER: 	    LABS:	 	                                11.8   10.06 )-----------( 216      ( 24 Aug 2021 03:56 )             34.4     08-24    137  |  101  |  24<H>  ----------------------------<  104<H>  4.5   |  22  |  1.51<H>    Ca    9.5      24 Aug 2021 03:56  Phos  3.5     08-24  Mg     2.00     08-24    TPro  7.1  /  Alb  3.1<L>  /  TBili  0.5  /  DBili  x   /  AST  27  /  ALT  13  /  AlkPhos  56  08-23    PT/INR - ( 24 Aug 2021 03:56 )   PT: 16.2 sec;   INR: 1.45 ratio         PTT - ( 24 Aug 2021 03:56 )  PTT:>200.0 sec    
CARDIOLOGY FOLLOW UP - Dr. Orozco  DATE OF SERVICE: 08-26-21 @ 14:54    CC no cp/sob   c/o right sided back pain s/p pain medications. reported improvement.     REVIEW OF SYSTEMS:   CONSTITUTIONAL: No fever, weight loss, or fatigue   RESPIRATORY:  No cough, wheezing, chills or hemoptysis; No SOB  CARDIOVASCULAR: No chest pain, palpitations, passing out, dizziness, or leg swelling   GASTROINTESTINAL: No abdominal or epigastric pain. No nausea, vomiting, or hematemesis, no diarreha, or constipation, No melena or hematochezia   VASCULAR: no edema.       PHYSICAL EXAM:  T(C): 37.2 (08-26-21 @ 10:00), Max: 37.2 (08-26-21 @ 10:00)  HR: 69 (08-26-21 @ 10:00) (62 - 69)  BP: 105/53 (08-26-21 @ 10:00) (105/53 - 131/63)  RR: 16 (08-26-21 @ 10:00) (16 - 16)  SpO2: 100% (08-26-21 @ 10:00) (98% - 100%)  Wt(kg): --  I&O's Summary    25 Aug 2021 07:01  -  26 Aug 2021 07:00  --------------------------------------------------------  IN: 251 mL / OUT: 0 mL / NET: 251 mL        Appearance: Normal	  Cardiovascular: Normal S1 S2,RRR, No JVD, No murmurs  Respiratory: Lungs clear to auscultation	  Gastrointestinal:  Soft, Non-tender, + BS	  Extremities: Normal range of motion, No clubbing, cyanosis or edema      HOME MEDICATIONS:  acetaminophen 325 mg oral tablet: 2 tab(s) orally every 6 hours, As needed, Mild Pain (1 - 3) (24 Sep 2020 11:16)  Artificial Tears ophthalmic solution: 1 drop(s) to each affected eye 4 times a day, As Needed (22 Aug 2021 23:28)  calcium (as carbonate) 600 mg oral tablet: 1 cap(s) orally once a day (24 Sep 2020 11:16)  Eliquis 2.5 mg oral tablet: 1 tab(s) orally 2 times a day (24 Sep 2020 11:16)  felodipine 5 mg oral tablet, extended release: 1 tab(s) orally once a day (22 Aug 2021 23:23)  hydrocortisone 10 mg oral tablet: 1 tab(s) orally 2 times a day (24 Sep 2020 11:16)  Lasix:  (22 Aug 2021 23:24)  latanoprost 0.005% ophthalmic solution: 1 drop(s) to each affected eye once a day (in the evening) (22 Aug 2021 23:27)  levothyroxine 50 mcg (0.05 mg) oral tablet: 1 tab(s) orally once a day (24 Sep 2020 11:16)  Lipitor 10 mg oral tablet: 1 tab(s) orally once a day (22 Aug 2021 23:41)  Macular Vitamin Benefit oral tablet: 1 tab(s) orally once a day (24 Sep 2020 11:16)  metoprolol succinate 50 mg oral tablet, extended release: 1 tab(s) orally once a day (22 Aug 2021 23:24)  Multiple Vitamins oral tablet: 1 tab(s) orally once a day (22 Aug 2021 23:22)  olmesartan 20 mg oral tablet: 1 tab(s) orally once a day (22 Aug 2021 23:22)  omeprazole 20 mg oral delayed release capsule: 1 cap(s) orally once a day (22 Aug 2021 23:21)  oxyCODONE 5 mg oral tablet: 1 tab(s) orally every 8 hours (22 Aug 2021 23:18)  OxyCONTIN 20 mg oral tablet, extended release: 1 tab(s) orally every 12 hours (24 Sep 2020 11:16)  risedronate 35 mg oral tablet: 1 tab(s) orally once a week (24 Sep 2020 11:16)      MEDICATIONS  (STANDING):  amLODIPine   Tablet 5 milliGRAM(s) Oral daily  apixaban 5 milliGRAM(s) Oral every 12 hours  aspirin enteric coated 81 milliGRAM(s) Oral daily  atorvastatin 10 milliGRAM(s) Oral at bedtime  calcium carbonate    500 mG (Tums) Chewable 1 Tablet(s) Chew daily  guaiFENesin  milliGRAM(s) Oral every 12 hours  hydrocortisone 10 milliGRAM(s) Oral <User Schedule>  latanoprost 0.005% Ophthalmic Solution 1 Drop(s) Both EYES at bedtime  levothyroxine 50 MICROGram(s) Oral daily  lidocaine   5% Patch 1 Patch Transdermal once  lidocaine   5% Patch 1 Patch Transdermal daily  losartan 25 milliGRAM(s) Oral daily  metoprolol succinate ER 50 milliGRAM(s) Oral daily  multivitamin 1 Tablet(s) Oral daily  oxyCODONE  ER Tablet 20 milliGRAM(s) Oral <User Schedule>  pantoprazole    Tablet 40 milliGRAM(s) Oral before breakfast  senna 2 Tablet(s) Oral at bedtime      TELEMETRY: nsr 	    ECG:  	  RADIOLOGY:   DIAGNOSTIC TESTING:  [ ] Echocardiogram:  [ ]  Catheterization:  [ ] Stress Test:    OTHER: 	    LABS:	 	                                11.5   9.62  )-----------( 197      ( 26 Aug 2021 08:19 )             34.1     08-26    137  |  102  |  25<H>  ----------------------------<  103<H>  4.3   |  23  |  1.70<H>    Ca    9.7      26 Aug 2021 08:19  Phos  3.1     08-25  Mg     2.00     08-25      PTT - ( 25 Aug 2021 14:33 )  PTT:181.9 sec    
CARDIOLOGY FOLLOW UP - Dr. Orozco  DATE OF SERVICE: 08-27-21    CC no cp/sob     REVIEW OF SYSTEMS:   CONSTITUTIONAL: No fever, weight loss, or fatigue   RESPIRATORY:  No cough, wheezing, chills or hemoptysis; No SOB  CARDIOVASCULAR: No chest pain, palpitations, passing out, dizziness, or leg swelling   GASTROINTESTINAL: No abdominal or epigastric pain. No nausea, vomiting, or hematemesis, no diarreha, or constipation, No melena or hematochezia   VASCULAR: no edema.       PHYSICAL EXAM:  T(C): 37 (08-27-21 @ 10:09), Max: 37 (08-27-21 @ 10:09)  HR: 64 (08-27-21 @ 10:09) (63 - 71)  BP: 151/71 (08-27-21 @ 10:09) (115/61 - 151/71)  RR: 18 (08-27-21 @ 10:09) (16 - 18)  SpO2: 100% (08-27-21 @ 10:09) (98% - 100%)  Wt(kg): --  I&O's Summary      Appearance: Normal	  Cardiovascular: Normal S1 S2,RRR, No JVD, No murmurs  Respiratory: Lungs clear to auscultation	  Gastrointestinal:  Soft, Non-tender, + BS	  Extremities: Normal range of motion, No clubbing, cyanosis or edema      HOME MEDICATIONS:  acetaminophen 325 mg oral tablet: 2 tab(s) orally every 6 hours, As needed, Mild Pain (1 - 3) (24 Sep 2020 11:16)  Artificial Tears ophthalmic solution: 1 drop(s) to each affected eye 4 times a day, As Needed (22 Aug 2021 23:28)  aspirin 81 mg oral delayed release tablet: 1 tab(s) orally once a day (27 Aug 2021 13:38)  calcium (as carbonate) 600 mg oral tablet: 1 cap(s) orally once a day (24 Sep 2020 11:16)  Eliquis 2.5 mg oral tablet: 1 tab(s) orally 2 times a day (24 Sep 2020 11:16)  felodipine 5 mg oral tablet, extended release: 1 tab(s) orally once a day (22 Aug 2021 23:23)  guaiFENesin 600 mg oral tablet, extended release: 1 tab(s) orally every 12 hours as needed for cough  (27 Aug 2021 13:38)  hydrocortisone 10 mg oral tablet: 1 tab(s) orally 2 times a day (24 Sep 2020 11:16)  latanoprost 0.005% ophthalmic solution: 1 drop(s) to each affected eye once a day (in the evening) (22 Aug 2021 23:27)  levothyroxine 50 mcg (0.05 mg) oral tablet: 1 tab(s) orally once a day (24 Sep 2020 11:16)  Lipitor 10 mg oral tablet: 1 tab(s) orally once a day (22 Aug 2021 23:41)  Macular Vitamin Benefit oral tablet: 1 tab(s) orally once a day (24 Sep 2020 11:16)  metoprolol succinate 50 mg oral tablet, extended release: 1 tab(s) orally once a day (22 Aug 2021 23:24)  Multiple Vitamins oral tablet: 1 tab(s) orally once a day (22 Aug 2021 23:22)  olmesartan 20 mg oral tablet: 1 tab(s) orally once a day (22 Aug 2021 23:22)  omeprazole 20 mg oral delayed release capsule: 1 cap(s) orally once a day (22 Aug 2021 23:21)  oxyCODONE 5 mg oral tablet: 1 tab(s) orally every 8 hours as needed for severe pain (27 Aug 2021 13:35)  OxyCONTIN 20 mg oral tablet, extended release: 1 tab(s) orally every 12 hours (24 Sep 2020 11:16)  risedronate 35 mg oral tablet: 1 tab(s) orally once a week (24 Sep 2020 11:16)  senna oral tablet: 2 tab(s) orally once a day (at bedtime) (27 Aug 2021 13:38)      MEDICATIONS  (STANDING):  amLODIPine   Tablet 5 milliGRAM(s) Oral daily  apixaban 5 milliGRAM(s) Oral every 12 hours  aspirin enteric coated 81 milliGRAM(s) Oral daily  atorvastatin 10 milliGRAM(s) Oral at bedtime  calcium carbonate    500 mG (Tums) Chewable 1 Tablet(s) Chew daily  guaiFENesin  milliGRAM(s) Oral every 12 hours  hydrocortisone 10 milliGRAM(s) Oral <User Schedule>  latanoprost 0.005% Ophthalmic Solution 1 Drop(s) Both EYES at bedtime  levothyroxine 50 MICROGram(s) Oral daily  lidocaine   5% Patch 1 Patch Transdermal once  lidocaine   5% Patch 1 Patch Transdermal daily  losartan 25 milliGRAM(s) Oral daily  metoprolol succinate ER 50 milliGRAM(s) Oral daily  multivitamin 1 Tablet(s) Oral daily  oxyCODONE  ER Tablet 20 milliGRAM(s) Oral <User Schedule>  pantoprazole    Tablet 40 milliGRAM(s) Oral before breakfast  senna 2 Tablet(s) Oral at bedtime      TELEMETRY: 	    ECG:  	  RADIOLOGY:   DIAGNOSTIC TESTING:  [ ] Echocardiogram:  [ ]  Catheterization:  [ ] Stress Test:    OTHER: 	    LABS:	 	                                11.5   9.01  )-----------( 205      ( 27 Aug 2021 07:27 )             32.7     08-27    138  |  105  |  23  ----------------------------<  109<H>  4.4   |  23  |  1.57<H>    Ca    9.3      27 Aug 2021 07:27  Mg     2.00     08-27      PTT - ( 25 Aug 2021 14:33 )  PTT:181.9 sec    
SUBJECTIVE / OVERNIGHT EVENTS:  Feeling better today.  the son Sebastián at bedside.  spoke with daughter over phone; according to her, she expressed concerns over whether pt's SVT was 2' to her chronic hypopituitarism; I reassured her this was not the case  No overnight event. remain stable on tele.       Vital Signs Last 24 Hrs  T(C): 37.2 (26 Aug 2021 10:00), Max: 37.2 (26 Aug 2021 10:00)  T(F): 98.9 (26 Aug 2021 10:00), Max: 98.9 (26 Aug 2021 10:00)  HR: 69 (26 Aug 2021 10:00) (62 - 69)  BP: 105/53 (26 Aug 2021 10:00) (105/53 - 131/63)  BP(mean): --  RR: 16 (26 Aug 2021 10:00) (16 - 16)  SpO2: 100% (26 Aug 2021 10:00) (98% - 100%)    I&O's Summary    08-25-21 @ 07:01  -  08-26-21 @ 07:00  --------------------------------------------------------  IN: 251 mL / OUT: 0 mL / NET: 251 mL        PHYSICAL EXAM:  GENERAL: NAD, well-developed, comfortable, on room air  HEAD:  Atraumatic, Normocephalic  EYES: EOMI, PERRLA, conjunctiva and sclera clear, legally blind  NECK: Supple, No JVD  CHEST/LUNG: mild decrease breath sounds bilaterally; No wheeze   HEART: Regular rate and rhythm; No murmurs, rubs, or gallops  ABDOMEN: Soft, Nontender, Nondistended; Bowel sounds present  Neuro: AAOx3, no focal weakness, 5/5 b/l extremity strength  EXTREMITIES:  2+ Peripheral Pulses, No clubbing, cyanosis, trace b/l edema  SKIN: No rashes or lesions     LABS:                        11.5   9.62  )-----------( 197      ( 26 Aug 2021 08:19 )             34.1     08-26    137  |  102  |  25<H>  ----------------------------<  103<H>  4.3   |  23  |  1.70<H>    Ca    9.7      26 Aug 2021 08:19  Phos  3.1     08-25  Mg     2.00     08-25      PTT - ( 25 Aug 2021 14:33 )  PTT:181.9 sec  CAPILLARY BLOOD GLUCOSE                RADIOLOGY & ADDITIONAL TESTS:    Imaging Personally Reviewed:  [x] YES  [ ] NO    Will obtain old records:  [ ] YES  [x] NO       
SUBJECTIVE / OVERNIGHT EVENTS:  Feeling better today.  the son Sebastián at bedside.  spoke with daughter over phone; according to her, pt having trouble with clearance of mucus associated with cough past 3 weeks; she is quite sure that pt does not have any dysphagia or signs of aspiration while eating  No overnight event. remain stable on tele.     Vital Signs Last 24 Hrs  T(C): 36.6 (25 Aug 2021 11:33), Max: 37.2 (24 Aug 2021 17:52)  T(F): 97.8 (25 Aug 2021 11:33), Max: 99 (24 Aug 2021 17:52)  HR: 62 (25 Aug 2021 11:33) (62 - 72)  BP: 108/51 (25 Aug 2021 11:33) (108/51 - 126/65)  BP(mean): --  RR: 17 (25 Aug 2021 11:33) (16 - 18)  SpO2: 100% (25 Aug 2021 11:33) (99% - 100%)    I&O's Summary    08-24-21 @ 07:01  -  08-25-21 @ 07:00  --------------------------------------------------------  IN: 1065 mL / OUT: 0 mL / NET: 1065 mL    08-25-21 @ 07:01  -  08-25-21 @ 14:00  --------------------------------------------------------  IN: 224 mL / OUT: 0 mL / NET: 224 mL        PHYSICAL EXAM:  GENERAL: NAD, well-developed, comfortable, on room air  HEAD:  Atraumatic, Normocephalic  EYES: EOMI, PERRLA, conjunctiva and sclera clear, legally blind  NECK: Supple, No JVD  CHEST/LUNG: mild decrease breath sounds bilaterally; No wheeze   HEART: Regular rate and rhythm; No murmurs, rubs, or gallops  ABDOMEN: Soft, Nontender, Nondistended; Bowel sounds present  Neuro: AAOx3, no focal weakness, 5/5 b/l extremity strength  EXTREMITIES:  2+ Peripheral Pulses, No clubbing, cyanosis, trace b/l edema  SKIN: No rashes or lesions     LABS:                        10.8   9.37  )-----------( 204      ( 25 Aug 2021 05:14 )             30.6     08-25    138  |  104  |  24<H>  ----------------------------<  117<H>  4.3   |  22  |  1.58<H>    Ca    9.4      25 Aug 2021 05:14  Phos  3.1     08-25  Mg     2.00     08-25      PT/INR - ( 24 Aug 2021 03:56 )   PT: 16.2 sec;   INR: 1.45 ratio         PTT - ( 25 Aug 2021 05:14 )  PTT:>200.0 sec  CAPILLARY BLOOD GLUCOSE                RADIOLOGY & ADDITIONAL TESTS:    Imaging Personally Reviewed:  [x] YES  [ ] NO    Will obtain old records:  [ ] YES  [x] NO  
SUBJECTIVE / OVERNIGHT EVENTS:\  No overnight events.  Pt feels well.   no further SVT on tele.  Denied cp, sob, n/v/d.  no abdominal pain. No HA/dizziness.   have chronic right hip pain  legally blind.   wheel chair bound, lives with the daughter and home aide.          --------------------------------------------------------------------------------------------  LABS:                        12.1   9.51  )-----------( 214      ( 23 Aug 2021 08:01 )             35.2     08-23    137  |  100  |  23  ----------------------------<  83  4.3   |  17<L>  |  1.53<H>    Ca    9.3      23 Aug 2021 07:33  Mg     2.10     08-23    TPro  7.1  /  Alb  3.1<L>  /  TBili  0.5  /  DBili  x   /  AST  27  /  ALT  13  /  AlkPhos  56  08-23    PT/INR - ( 23 Aug 2021 08:01 )   PT: 16.1 sec;   INR: 1.42 ratio         PTT - ( 23 Aug 2021 08:01 )  PTT:37.1 sec  CAPILLARY BLOOD GLUCOSE                RADIOLOGY & ADDITIONAL TESTS:    Imaging Personally Reviewed:  [x] YES  [ ] NO    Consultant(s) Notes Reviewed:  [x] YES  [ ] NO    MEDICATIONS  (STANDING):  amLODIPine   Tablet 5 milliGRAM(s) Oral daily  apixaban 2.5 milliGRAM(s) Oral every 12 hours  atorvastatin 10 milliGRAM(s) Oral at bedtime  calcium carbonate    500 mG (Tums) Chewable 1 Tablet(s) Chew daily  hydrocortisone 10 milliGRAM(s) Oral <User Schedule>  latanoprost 0.005% Ophthalmic Solution 1 Drop(s) Both EYES at bedtime  levothyroxine 50 MICROGram(s) Oral daily  metoprolol succinate ER 50 milliGRAM(s) Oral daily  multivitamin 1 Tablet(s) Oral daily  oxyCODONE  ER Tablet 20 milliGRAM(s) Oral <User Schedule>  pantoprazole    Tablet 40 milliGRAM(s) Oral before breakfast    MEDICATIONS  (PRN):  artificial tears (preservative free) Ophthalmic Solution 1 Drop(s) Both EYES three times a day PRN Dry Eyes  oxyCODONE    IR 5 milliGRAM(s) Oral every 8 hours PRN moderate and severe pain      Care Discussed with Consultants/Other Providers [x] YES  [ ] NO    Vital Signs Last 24 Hrs  T(C): 36.5 (23 Aug 2021 04:05), Max: 36.8 (22 Aug 2021 19:24)  T(F): 97.7 (23 Aug 2021 04:05), Max: 98.3 (22 Aug 2021 19:24)  HR: 61 (23 Aug 2021 06:15) (60 - 152)  BP: 158/72 (23 Aug 2021 06:15) (108/73 - 164/72)  BP(mean): 83 (22 Aug 2021 21:20) (72 - 83)  RR: 18 (23 Aug 2021 06:15) (18 - 24)  SpO2: 100% (23 Aug 2021 06:15) (98% - 100%)  I&O's Summary    PHYSICAL EXAM:  GENERAL: NAD, well-developed, comfortable, on nasal canula  HEAD:  Atraumatic, Normocephalic  EYES: EOMI, PERRLA, conjunctiva and sclera clear, legally blind  NECK: Supple, No JVD  CHEST/LUNG: mild decrease breath sounds bilaterally; No wheeze   HEART: Regular rate and rhythm; No murmurs, rubs, or gallops  ABDOMEN: Soft, Nontender, Nondistended; Bowel sounds present  Neuro: AAOx3, no focal weakness, 5/5 b/l extremity strength  EXTREMITIES:  2+ Peripheral Pulses, No clubbing, cyanosis, 1+ b/l edema  SKIN: No rashes or lesions

## 2021-08-27 NOTE — DISCHARGE NOTE NURSING/CASE MANAGEMENT/SOCIAL WORK - NSDCFUADDAPPT_GEN_ALL_CORE_FT
Please follow up with your primary care provider within 1 week of discharge from the hospital. If you do not have a primary care provider please follow up with the [facility], call [phone]

## 2021-08-27 NOTE — PROGRESS NOTE ADULT - TIME BILLING
Agree with above NP note.  77 yo f h/o spinal stenosis on Oxycontin, colon ca s/p hemicolectomy 2 years ago, perioperative MI,  dvt on eliquis, htn, adrenal insufficiency on hydrocortisone, hypothyroidism, CKD, essential tremors presenting with sudden onset of SOB, found to have SVT     #PSVT   -broke spontaneously, cont tele   -c/w toprol 50 mg daily  -elevated trop likely demand ischemia given wm, PSVT  -echo with normal lv fxn  -will defer ischemic eval for now as sx were likely secondary to svt   -cont a/c      # SOB  -likely symptomatic SVT episode  -echo as above    # DVT (hx)  pending repeat dopplers, on ac     # wm   -med f/u
Agree with above NP note.  77 yo f h/o spinal stenosis on Oxycontin, colon ca s/p hemicolectomy 2 years ago, perioperative MI,  dvt on eliquis, htn, adrenal insufficiency on hydrocortisone, hypothyroidism, CKD, essential tremors presenting with sudden onset of SOB, found to have SVT     #PSVT   -broke spontaneously, cont tele   -c/w toprol 50 mg daily, inc to 75 qd if hr can tolerate  -elevated trop likely demand ischemia given wm, PSVT  -add ASA 81 mg daily   -await ECHO, a/c on hold, possible ischemic eval pending echo, change to hep gtt if stress ordered    # SOB  -likely symptomatic SVT episode  -echo pending    # DVT (hx)  pending repeat dopplers, on ac     # wm   -med f/u
Agree with above NP note.  77 yo f h/o spinal stenosis on Oxycontin, colon ca s/p hemicolectomy 2 years ago, perioperative MI,  dvt on eliquis, htn, adrenal insufficiency on hydrocortisone, hypothyroidism, CKD, essential tremors presenting with sudden onset of SOB, found to have SVT     #PSVT   -broke spontaneously, cont tele   -c/w toprol 50 mg daily  -elevated trop likely demand ischemia given wm, PSVT  -echo with normal lv fxn  -will defer ischemic eval for now as sx were likely secondary to svt   -cont a/c      # SOB  -likely symptomatic SVT episode  -echo as above    # DVT (hx)  pending repeat dopplers, on ac     # wm   -med f/u
Agree with above NP note.  75 yo f h/o spinal stenosis on Oxycontin, colon ca s/p hemicolectomy 2 years ago, perioperative MI,  dvt on eliquis, htn, adrenal insufficiency on hydrocortisone, hypothyroidism, CKD, essential tremors presenting with sudden onset of SOB, found to have SVT     #PSVT   -broke spontaneously, cont tele   -c/w toprol 50 mg daily  -elevated trop likely demand ischemia given wm, PSVT  -echo with normal lv fxn  -will defer ischemic eval for now as sx were likely secondary to svt   -change to oral a/c     # SOB  -likely symptomatic SVT episode  -echo as above    # DVT (hx)  pending repeat dopplers, on ac     # wm   -med f/u

## 2021-08-27 NOTE — DISCHARGE NOTE PROVIDER - NSDCFUADDAPPT_GEN_ALL_CORE_FT
Please follow up with your primary care provider within 1 week of discharge from the hospital. If you do not have a primary care provider please follow up with the [facility], call [phone]

## 2021-08-27 NOTE — PROGRESS NOTE ADULT - ASSESSMENT
ECHO 10/25/16:  Normal left ventricular systolic function. No segmental wall motion abnormalities. Hypermobile interatrial septum.     a/p    75 yo f h/o spinal stenosis on Oxycontin, colon ca s/p hemicolectomy 2 years ago, perioperative MI,  dvt on eliquis, htn, adrenal insufficiency on hydrocortisone, hypothyroidism, CKD, essential tremors presenting with sudden onset of SOB, found to have SVT     #SVT   -broke spontaneously  -monitor on tele  -wnl tsh, rvp/covid negative  -LE dopplers negative for DVT   -med f/u noted L gfr in low 30s prompted ed to cancel CTA chest to r.o pe   -Pt already on Eliquis for her prior DVTs.  -c/w toprol 50 mg daily   -HST peaked, now downtrending, normal CK/CKMB   -add ASA 81 mg daily   -c/w hep gtt, pending echo, possible ischemic eval pending results.     # SOB  -likely symptomatic SVT episode  -echo pending  -chest xray noted ; Bilateral neck soft tissue calcifications may be vascular. Question minimal left apical pneumothorax versus skinfold; Hazy left basilar opacity, possibly a small layering left pleural effusion with associated passive atelectasis.  -awaiting repeat chest xray   -consider ct chest non cont    # DVT (hx)  -negative for acute dvt  - on ac     # wm   -med f/u     dvt ppx 
ECHO 10/25/16:  Normal left ventricular systolic function. No segmental wall motion abnormalities. Hypermobile interatrial septum.   ECHO 8/24/21: EF 66% grossly nl LV sys fx, mild diastolic dsyfx - stage 1   a/p    75 yo f h/o spinal stenosis on Oxycontin, colon ca s/p hemicolectomy 2 years ago, perioperative MI,  dvt on eliquis, htn, adrenal insufficiency on hydrocortisone, hypothyroidism, CKD, essential tremors presenting with sudden onset of SOB, found to have SVT     #SVT   -broke spontaneously in the ER   -no events on tele   -wnl tsh, rvp/covid negative  -LE dopplers negative for DVT   -med f/u noted  gfr in low 30s prompted ed to cancel CTA chest to r.o pe   -Pt already on Eliquis for her prior DVTs.  -c/w toprol 50 mg daily   -HST peaked, now downtrending, normal CK/CKMB   -c.w ASA 81 mg daily   -echo with nl LV sys fx  -can stop hep gtt and resume oral PO A/C     # SOB  -likely symptomatic SVT episode  -echo with nl LV sys fx    -chest xray noted ; Bilateral neck soft tissue calcifications may be vascular. Question minimal left apical pneumothorax versus skinfold; Hazy left basilar opacity, possibly a small layering left pleural effusion with associated passive atelectasis.  -med f/u    # DVT (hx)  -negative for acute dvt  -on ac     # wm   -med f/u     dvt ppx 
ECHO 10/25/16:  Normal left ventricular systolic function. No segmental wall motion abnormalities. Hypermobile interatrial septum.   ECHO 8/24/21: EF 66% grossly nl LV sys fx, mild diastolic dsyfx - stage 1   a/p    77 yo f h/o spinal stenosis on Oxycontin, colon ca s/p hemicolectomy 2 years ago, perioperative MI,  dvt on eliquis, htn, adrenal insufficiency on hydrocortisone, hypothyroidism, CKD, essential tremors presenting with sudden onset of SOB, found to have SVT     #SVT   -broke spontaneously in the ER   -no events on tele   -wnl tsh, rvp/covid negative  -LE dopplers negative for DVT   -med f/u noted  gfr in low 30s prompted ed to cancel CTA chest to r.o pe   -Pt already on Eliquis for her prior DVTs.  -c/w toprol 50 mg daily   -HST peaked, now downtrending, normal CK/CKMB   -c.w ASA 81 mg daily   -echo with nl LV sys fx  c/w eliquis.     # SOB  -likely symptomatic SVT episode  -echo with nl LV sys fx    -chest xray noted ; Bilateral neck soft tissue calcifications may be vascular. Question minimal left apical pneumothorax versus skinfold; Hazy left basilar opacity, possibly a small layering left pleural effusion with associated passive atelectasis.  -med f/u    # DVT (hx)  -negative for acute dvt  -on ac     # wm   -med f/u     dvt ppx 
ECHO 10/25/16:  Normal left ventricular systolic function. No segmental wall motion abnormalities. Hypermobile interatrial septum.   ECHO 8/24/21: EF 66% grossly nl LV sys fx, mild diastolic dsyfx - stage 1   a/p    77 yo f h/o spinal stenosis on Oxycontin, colon ca s/p hemicolectomy 2 years ago, perioperative MI,  dvt on eliquis, htn, adrenal insufficiency on hydrocortisone, hypothyroidism, CKD, essential tremors presenting with sudden onset of SOB, found to have SVT.     #SVT   -broke spontaneously in the ER   -no events on tele   -wnl tsh, rvp/covid negative  -LE dopplers negative for DVT   -med f/u noted  gfr in low 30s prompted ed to cancel CTA chest to r.o pe   -Pt already on Eliquis for her prior DVTs.  -c/w toprol 50 mg daily   -HST peaked, now downtrending, normal CK/CKMB   -c.w ASA 81 mg daily   -echo with nl LV sys fx  c/w eliquis.     # SOB  -likely symptomatic SVT episode  -echo with nl LV sys fx    -chest xray noted ; Bilateral neck soft tissue calcifications may be vascular. Question minimal left apical pneumothorax versus skinfold; Hazy left basilar opacity, possibly a small layering left pleural effusion with associated passive atelectasis.  -med f/u    # DVT (hx)  -negative for acute dvt  -on ac     # wm   -med f/u     dvt ppx   no cv objection to dc planning. 
75 yo F h/o spinal stenosis on Oxycontin, colon ca s/p hemicolectomy 2 years ago, perioperative MI,  dvt on Eliquis, htn, adrenal insufficiency on hydrocortisone, hypothyroidism, CKD, essential tremors. Pt reports sudden onset of dyspnea at rest while watching tv on couch earlier in afternoon. In ed, found to be in SVT in 140s on ekg, broke spontaneously. 
77 yo F h/o spinal stenosis on Oxycontin, colon ca s/p hemicolectomy 2 years ago, perioperative MI,  dvt on Eliquis, htn, adrenal insufficiency on hydrocortisone, hypothyroidism, CKD, essential tremors. Pt reports sudden onset of dyspnea at rest while watching tv on couch earlier in afternoon. In ed, found to be in SVT in 140s on ekg, broke spontaneously. 
75 yo F h/o spinal stenosis on Oxycontin, colon ca s/p hemicolectomy 2 years ago, perioperative MI,  dvt on Eliquis, htn, adrenal insufficiency on hydrocortisone, hypothyroidism, CKD, essential tremors.   Pt reports sudden onset of dyspnea at rest while watching tv on couch earlier in afternoon. In ed, found to be in SVT in 140s on ekg, broke spontaneously. 
75 yo F h/o spinal stenosis on Oxycontin, colon ca s/p hemicolectomy 2 years ago, perioperative MI,  dvt on Eliquis, htn, adrenal insufficiency on hydrocortisone, hypothyroidism, CKD, essential tremors. Pt reports sudden onset of dyspnea at rest while watching tv on couch earlier in afternoon. In ed, found to be in SVT in 140s on ekg, broke spontaneously.

## 2021-08-27 NOTE — DISCHARGE NOTE NURSING/CASE MANAGEMENT/SOCIAL WORK - NSDCDMETYPESERV_GEN_ALL_CORE_FT
Wheelchair ordered; please follow-up with [facility] re: wheelchair delivery/insurance approval

## 2021-08-27 NOTE — DISCHARGE NOTE PROVIDER - NSDCMRMEDTOKEN_GEN_ALL_CORE_FT
acetaminophen 325 mg oral tablet: 2 tab(s) orally every 6 hours, As needed, Mild Pain (1 - 3)  Artificial Tears ophthalmic solution: 1 drop(s) to each affected eye 4 times a day, As Needed  calcium (as carbonate) 600 mg oral tablet: 1 cap(s) orally once a day  Eliquis 2.5 mg oral tablet: 1 tab(s) orally 2 times a day  felodipine 5 mg oral tablet, extended release: 1 tab(s) orally once a day  hydrocortisone 10 mg oral tablet: 1 tab(s) orally 2 times a day  Lasix:   latanoprost 0.005% ophthalmic solution: 1 drop(s) to each affected eye once a day (in the evening)  levothyroxine 50 mcg (0.05 mg) oral tablet: 1 tab(s) orally once a day  Lipitor 10 mg oral tablet: 1 tab(s) orally once a day  Macular Vitamin Benefit oral tablet: 1 tab(s) orally once a day  metoprolol succinate 50 mg oral tablet, extended release: 1 tab(s) orally once a day  Multiple Vitamins oral tablet: 1 tab(s) orally once a day  olmesartan 20 mg oral tablet: 1 tab(s) orally once a day  omeprazole 20 mg oral delayed release capsule: 1 cap(s) orally once a day  oxyCODONE 5 mg oral tablet: 1 tab(s) orally every 8 hours  OxyCONTIN 20 mg oral tablet, extended release: 1 tab(s) orally every 12 hours  risedronate 35 mg oral tablet: 1 tab(s) orally once a week   acetaminophen 325 mg oral tablet: 2 tab(s) orally every 6 hours, As needed, Mild Pain (1 - 3)  Artificial Tears ophthalmic solution: 1 drop(s) to each affected eye 4 times a day, As Needed  aspirin 81 mg oral delayed release tablet: 1 tab(s) orally once a day  calcium (as carbonate) 600 mg oral tablet: 1 cap(s) orally once a day  Eliquis 2.5 mg oral tablet: 1 tab(s) orally 2 times a day  felodipine 5 mg oral tablet, extended release: 1 tab(s) orally once a day  guaiFENesin 600 mg oral tablet, extended release: 1 tab(s) orally every 12 hours as needed for cough   hydrocortisone 10 mg oral tablet: 1 tab(s) orally 2 times a day  latanoprost 0.005% ophthalmic solution: 1 drop(s) to each affected eye once a day (in the evening)  levothyroxine 50 mcg (0.05 mg) oral tablet: 1 tab(s) orally once a day  Lipitor 10 mg oral tablet: 1 tab(s) orally once a day  Macular Vitamin Benefit oral tablet: 1 tab(s) orally once a day  metoprolol succinate 50 mg oral tablet, extended release: 1 tab(s) orally once a day  Multiple Vitamins oral tablet: 1 tab(s) orally once a day  olmesartan 20 mg oral tablet: 1 tab(s) orally once a day  omeprazole 20 mg oral delayed release capsule: 1 cap(s) orally once a day  oxyCODONE 5 mg oral tablet: 1 tab(s) orally every 8 hours as needed for severe pain  OxyCONTIN 20 mg oral tablet, extended release: 1 tab(s) orally every 12 hours  risedronate 35 mg oral tablet: 1 tab(s) orally once a week  senna oral tablet: 2 tab(s) orally once a day (at bedtime)

## 2021-08-27 NOTE — DISCHARGE NOTE NURSING/CASE MANAGEMENT/SOCIAL WORK - PATIENT PORTAL LINK FT
You can access the FollowMyHealth Patient Portal offered by [facility] by registering at the following website: [email or web address]. By joining [facility]’s FollowMyHealth portal, you will also be able to view your health information using other applications (apps) compatible with our system.

## 2021-09-01 ENCOUNTER — OUTPATIENT (OUTPATIENT)
Dept: OUTPATIENT SERVICES | Facility: HOSPITAL | Age: 76
LOS: 1 days | End: 2021-09-01
Payer: MEDICARE

## 2021-09-01 DIAGNOSIS — Z90.49 ACQUIRED ABSENCE OF OTHER SPECIFIED PARTS OF DIGESTIVE TRACT: Chronic | ICD-10-CM

## 2021-09-05 ENCOUNTER — EMERGENCY (EMERGENCY)
Facility: HOSPITAL | Age: 76
LOS: 1 days | Discharge: ANOTHER HOSPITAL | End: 2021-09-05
Attending: EMERGENCY MEDICINE | Admitting: EMERGENCY MEDICINE
Payer: MEDICARE

## 2021-09-05 ENCOUNTER — INPATIENT (INPATIENT)
Facility: HOSPITAL | Age: 76
LOS: 21 days | Discharge: NURSING, REHAB OR LONG-TERM CARE | DRG: 28 | End: 2021-09-27
Attending: NEUROLOGICAL SURGERY | Admitting: SURGERY
Payer: MEDICARE

## 2021-09-05 VITALS
DIASTOLIC BLOOD PRESSURE: 75 MMHG | TEMPERATURE: 98 F | WEIGHT: 179.9 LBS | OXYGEN SATURATION: 97 % | HEART RATE: 77 BPM | RESPIRATION RATE: 18 BRPM | HEIGHT: 61 IN | SYSTOLIC BLOOD PRESSURE: 145 MMHG

## 2021-09-05 VITALS
TEMPERATURE: 98 F | OXYGEN SATURATION: 100 % | HEART RATE: 74 BPM | RESPIRATION RATE: 19 BRPM | DIASTOLIC BLOOD PRESSURE: 75 MMHG | SYSTOLIC BLOOD PRESSURE: 149 MMHG

## 2021-09-05 VITALS
SYSTOLIC BLOOD PRESSURE: 174 MMHG | RESPIRATION RATE: 16 BRPM | HEART RATE: 66 BPM | TEMPERATURE: 98 F | DIASTOLIC BLOOD PRESSURE: 75 MMHG | OXYGEN SATURATION: 95 % | HEIGHT: 61 IN

## 2021-09-05 DIAGNOSIS — Z90.49 ACQUIRED ABSENCE OF OTHER SPECIFIED PARTS OF DIGESTIVE TRACT: Chronic | ICD-10-CM
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
## 2021-09-05 PROBLEM — I21.9 ACUTE MYOCARDIAL INFARCTION, UNSPECIFIED: Chronic | Status: ACTIVE | Noted: 2021-08-23

## 2021-09-05 LAB
ALBUMIN SERPL ELPH-MCNC: 3.6 G/DL — SIGNIFICANT CHANGE UP (ref 3.3–5)
ALP SERPL-CCNC: 55 U/L — SIGNIFICANT CHANGE UP (ref 40–120)
ALT FLD-CCNC: 16 U/L — SIGNIFICANT CHANGE UP (ref 4–33)
ANION GAP SERPL CALC-SCNC: 12 MMOL/L — SIGNIFICANT CHANGE UP (ref 7–14)
APTT BLD: 34 SEC — SIGNIFICANT CHANGE UP (ref 27.5–35.5)
APTT BLD: 35.1 SEC — SIGNIFICANT CHANGE UP (ref 27–36.3)
AST SERPL-CCNC: 43 U/L — HIGH (ref 4–32)
B PERT DNA SPEC QL NAA+PROBE: SIGNIFICANT CHANGE UP
B PERT+PARAPERT DNA PNL SPEC NAA+PROBE: SIGNIFICANT CHANGE UP
BASE EXCESS BLDV CALC-SCNC: -0.5 MMOL/L — SIGNIFICANT CHANGE UP (ref -2–3)
BASOPHILS # BLD AUTO: 0.09 K/UL — SIGNIFICANT CHANGE UP (ref 0–0.2)
BASOPHILS NFR BLD AUTO: 0.5 % — SIGNIFICANT CHANGE UP (ref 0–2)
BILIRUB SERPL-MCNC: 0.5 MG/DL — SIGNIFICANT CHANGE UP (ref 0.2–1.2)
BLD GP AB SCN SERPL QL: NEGATIVE — SIGNIFICANT CHANGE UP
BLOOD GAS VENOUS COMPREHENSIVE RESULT: SIGNIFICANT CHANGE UP
BORDETELLA PARAPERTUSSIS (RAPRVP): SIGNIFICANT CHANGE UP
BUN SERPL-MCNC: 26 MG/DL — HIGH (ref 7–23)
C PNEUM DNA SPEC QL NAA+PROBE: SIGNIFICANT CHANGE UP
CALCIUM SERPL-MCNC: 9.4 MG/DL — SIGNIFICANT CHANGE UP (ref 8.4–10.5)
CHLORIDE BLDV-SCNC: 108 MMOL/L — SIGNIFICANT CHANGE UP (ref 96–108)
CHLORIDE SERPL-SCNC: 101 MMOL/L — SIGNIFICANT CHANGE UP (ref 98–107)
CO2 BLDV-SCNC: 26.8 MMOL/L — HIGH (ref 22–26)
CO2 SERPL-SCNC: 22 MMOL/L — SIGNIFICANT CHANGE UP (ref 22–31)
CREAT SERPL-MCNC: 1.42 MG/DL — HIGH (ref 0.5–1.3)
EOSINOPHIL # BLD AUTO: 0.5 K/UL — SIGNIFICANT CHANGE UP (ref 0–0.5)
EOSINOPHIL NFR BLD AUTO: 2.9 % — SIGNIFICANT CHANGE UP (ref 0–6)
FLUAV SUBTYP SPEC NAA+PROBE: SIGNIFICANT CHANGE UP
FLUBV RNA SPEC QL NAA+PROBE: SIGNIFICANT CHANGE UP
GAS PNL BLDV: 136 MMOL/L — SIGNIFICANT CHANGE UP (ref 136–145)
GLUCOSE BLDV-MCNC: 107 MG/DL — HIGH (ref 70–99)
GLUCOSE SERPL-MCNC: 102 MG/DL — HIGH (ref 70–99)
HADV DNA SPEC QL NAA+PROBE: SIGNIFICANT CHANGE UP
HCO3 BLDV-SCNC: 25 MMOL/L — SIGNIFICANT CHANGE UP (ref 22–29)
HCOV 229E RNA SPEC QL NAA+PROBE: SIGNIFICANT CHANGE UP
HCOV HKU1 RNA SPEC QL NAA+PROBE: SIGNIFICANT CHANGE UP
HCOV NL63 RNA SPEC QL NAA+PROBE: SIGNIFICANT CHANGE UP
HCOV OC43 RNA SPEC QL NAA+PROBE: SIGNIFICANT CHANGE UP
HCT VFR BLD CALC: 32.8 % — LOW (ref 34.5–45)
HCT VFR BLDA CALC: 36 % — SIGNIFICANT CHANGE UP (ref 34.5–46.5)
HGB BLD CALC-MCNC: 11.9 G/DL — SIGNIFICANT CHANGE UP (ref 11.5–15.5)
HGB BLD-MCNC: 11.7 G/DL — SIGNIFICANT CHANGE UP (ref 11.5–15.5)
HMPV RNA SPEC QL NAA+PROBE: SIGNIFICANT CHANGE UP
HPIV1 RNA SPEC QL NAA+PROBE: SIGNIFICANT CHANGE UP
HPIV2 RNA SPEC QL NAA+PROBE: SIGNIFICANT CHANGE UP
HPIV3 RNA SPEC QL NAA+PROBE: SIGNIFICANT CHANGE UP
HPIV4 RNA SPEC QL NAA+PROBE: SIGNIFICANT CHANGE UP
IANC: 11.53 K/UL — HIGH (ref 1.5–8.5)
IMM GRANULOCYTES NFR BLD AUTO: 0.6 % — SIGNIFICANT CHANGE UP (ref 0–1.5)
INR BLD: 1.29 RATIO — HIGH (ref 0.88–1.16)
INR BLD: 1.36 RATIO — HIGH (ref 0.88–1.16)
LACTATE BLDV-MCNC: 1.6 MMOL/L — SIGNIFICANT CHANGE UP (ref 0.5–2)
LYMPHOCYTES # BLD AUTO: 22.3 % — SIGNIFICANT CHANGE UP (ref 13–44)
LYMPHOCYTES # BLD AUTO: 3.88 K/UL — HIGH (ref 1–3.3)
M PNEUMO DNA SPEC QL NAA+PROBE: SIGNIFICANT CHANGE UP
MCHC RBC-ENTMCNC: 27 PG — SIGNIFICANT CHANGE UP (ref 27–34)
MCHC RBC-ENTMCNC: 35.7 GM/DL — SIGNIFICANT CHANGE UP (ref 32–36)
MCV RBC AUTO: 75.6 FL — LOW (ref 80–100)
MONOCYTES # BLD AUTO: 1.3 K/UL — HIGH (ref 0–0.9)
MONOCYTES NFR BLD AUTO: 7.5 % — SIGNIFICANT CHANGE UP (ref 2–14)
NEUTROPHILS # BLD AUTO: 11.53 K/UL — HIGH (ref 1.8–7.4)
NEUTROPHILS NFR BLD AUTO: 66.2 % — SIGNIFICANT CHANGE UP (ref 43–77)
NRBC # BLD: 0 /100 WBCS — SIGNIFICANT CHANGE UP
NRBC # FLD: 0 K/UL — SIGNIFICANT CHANGE UP
PCO2 BLDV: 46 MMHG — HIGH (ref 39–42)
PH BLDV: 7.35 — SIGNIFICANT CHANGE UP (ref 7.32–7.43)
PLATELET # BLD AUTO: 251 K/UL — SIGNIFICANT CHANGE UP (ref 150–400)
PO2 BLDV: 41 MMHG — SIGNIFICANT CHANGE UP
POTASSIUM BLDV-SCNC: 5.9 MMOL/L — HIGH (ref 3.5–5.1)
POTASSIUM SERPL-MCNC: 4.2 MMOL/L — SIGNIFICANT CHANGE UP (ref 3.5–5.3)
POTASSIUM SERPL-SCNC: 4.2 MMOL/L — SIGNIFICANT CHANGE UP (ref 3.5–5.3)
PROT SERPL-MCNC: 7.8 G/DL — SIGNIFICANT CHANGE UP (ref 6–8.3)
PROTHROM AB SERPL-ACNC: 15.3 SEC — HIGH (ref 10.6–13.6)
PROTHROM AB SERPL-ACNC: 15.4 SEC — HIGH (ref 10.6–13.6)
RAPID RVP RESULT: SIGNIFICANT CHANGE UP
RBC # BLD: 4.34 M/UL — SIGNIFICANT CHANGE UP (ref 3.8–5.2)
RBC # FLD: 17.7 % — HIGH (ref 10.3–14.5)
RH IG SCN BLD-IMP: POSITIVE — SIGNIFICANT CHANGE UP
RSV RNA SPEC QL NAA+PROBE: SIGNIFICANT CHANGE UP
RV+EV RNA SPEC QL NAA+PROBE: SIGNIFICANT CHANGE UP
SAO2 % BLDV: 63.4 % — SIGNIFICANT CHANGE UP
SARS-COV-2 RNA SPEC QL NAA+PROBE: SIGNIFICANT CHANGE UP
SARS-COV-2 RNA SPEC QL NAA+PROBE: SIGNIFICANT CHANGE UP
SODIUM SERPL-SCNC: 135 MMOL/L — SIGNIFICANT CHANGE UP (ref 135–145)
TROPONIN T, HIGH SENSITIVITY RESULT: 35 NG/L — SIGNIFICANT CHANGE UP (ref 0–51)
WBC # BLD: 17.4 K/UL — HIGH (ref 3.8–10.5)
WBC # FLD AUTO: 17.4 K/UL — HIGH (ref 3.8–10.5)

## 2021-09-05 PROCEDURE — 99285 EMERGENCY DEPT VISIT HI MDM: CPT

## 2021-09-05 PROCEDURE — 14302 TIS TRNFR ADDL 30 SQ CM: CPT | Mod: 59

## 2021-09-05 PROCEDURE — 73590 X-RAY EXAM OF LOWER LEG: CPT | Mod: 26,50

## 2021-09-05 PROCEDURE — 70450 CT HEAD/BRAIN W/O DYE: CPT | Mod: 26

## 2021-09-05 PROCEDURE — 71250 CT THORAX DX C-: CPT | Mod: 26

## 2021-09-05 PROCEDURE — 73030 X-RAY EXAM OF SHOULDER: CPT | Mod: 26,RT

## 2021-09-05 PROCEDURE — 72170 X-RAY EXAM OF PELVIS: CPT | Mod: 26

## 2021-09-05 PROCEDURE — 14301 TIS TRNFR ANY 30.1-60 SQ CM: CPT

## 2021-09-05 PROCEDURE — 73562 X-RAY EXAM OF KNEE 3: CPT | Mod: 26,RT

## 2021-09-05 PROCEDURE — 99291 CRITICAL CARE FIRST HOUR: CPT

## 2021-09-05 PROCEDURE — 72125 CT NECK SPINE W/O DYE: CPT | Mod: 26

## 2021-09-05 PROCEDURE — 15734 MUSCLE-SKIN GRAFT TRUNK: CPT | Mod: 59

## 2021-09-05 PROCEDURE — 93010 ELECTROCARDIOGRAM REPORT: CPT

## 2021-09-05 PROCEDURE — 72131 CT LUMBAR SPINE W/O DYE: CPT | Mod: 26

## 2021-09-05 PROCEDURE — 71046 X-RAY EXAM CHEST 2 VIEWS: CPT | Mod: 26

## 2021-09-05 PROCEDURE — 99283 EMERGENCY DEPT VISIT LOW MDM: CPT

## 2021-09-05 RX ORDER — SENNA PLUS 8.6 MG/1
2 TABLET ORAL AT BEDTIME
Refills: 0 | Status: DISCONTINUED | OUTPATIENT
Start: 2021-09-05 | End: 2021-09-14

## 2021-09-05 RX ORDER — ATORVASTATIN CALCIUM 80 MG/1
10 TABLET, FILM COATED ORAL AT BEDTIME
Refills: 0 | Status: DISCONTINUED | OUTPATIENT
Start: 2021-09-05 | End: 2021-09-14

## 2021-09-05 RX ORDER — CALCIUM CARBONATE 500(1250)
1 TABLET ORAL DAILY
Refills: 0 | Status: DISCONTINUED | OUTPATIENT
Start: 2021-09-06 | End: 2021-09-14

## 2021-09-05 RX ORDER — RISEDRONATE SODIUM 25.8; 4.2 MG/1; MG/1
1 TABLET, FILM COATED ORAL
Qty: 0 | Refills: 0 | DISCHARGE

## 2021-09-05 RX ORDER — LEVOTHYROXINE SODIUM 125 MCG
50 TABLET ORAL DAILY
Refills: 0 | Status: DISCONTINUED | OUTPATIENT
Start: 2021-09-05 | End: 2021-09-14

## 2021-09-05 RX ORDER — AMLODIPINE BESYLATE 2.5 MG/1
5 TABLET ORAL DAILY
Refills: 0 | Status: DISCONTINUED | OUTPATIENT
Start: 2021-09-05 | End: 2021-09-08

## 2021-09-05 RX ORDER — MORPHINE SULFATE 50 MG/1
4 CAPSULE, EXTENDED RELEASE ORAL ONCE
Refills: 0 | Status: DISCONTINUED | OUTPATIENT
Start: 2021-09-05 | End: 2021-09-05

## 2021-09-05 RX ORDER — TRAMADOL HYDROCHLORIDE 50 MG/1
25 TABLET ORAL EVERY 6 HOURS
Refills: 0 | Status: DISCONTINUED | OUTPATIENT
Start: 2021-09-05 | End: 2021-09-12

## 2021-09-05 RX ORDER — HEPARIN SODIUM 5000 [USP'U]/ML
5000 INJECTION INTRAVENOUS; SUBCUTANEOUS EVERY 8 HOURS
Refills: 0 | Status: DISCONTINUED | OUTPATIENT
Start: 2021-09-05 | End: 2021-09-08

## 2021-09-05 RX ORDER — METOPROLOL TARTRATE 50 MG
50 TABLET ORAL EVERY 24 HOURS
Refills: 0 | Status: DISCONTINUED | OUTPATIENT
Start: 2021-09-05 | End: 2021-09-14

## 2021-09-05 RX ORDER — LATANOPROST 0.05 MG/ML
1 SOLUTION/ DROPS OPHTHALMIC; TOPICAL AT BEDTIME
Refills: 0 | Status: DISCONTINUED | OUTPATIENT
Start: 2021-09-05 | End: 2021-09-14

## 2021-09-05 RX ORDER — ASPIRIN/CALCIUM CARB/MAGNESIUM 324 MG
1 TABLET ORAL
Qty: 0 | Refills: 0 | DISCHARGE

## 2021-09-05 RX ORDER — ONDANSETRON 8 MG/1
4 TABLET, FILM COATED ORAL ONCE
Refills: 0 | Status: COMPLETED | OUTPATIENT
Start: 2021-09-05 | End: 2021-09-05

## 2021-09-05 RX ORDER — LOSARTAN POTASSIUM 100 MG/1
50 TABLET, FILM COATED ORAL DAILY
Refills: 0 | Status: DISCONTINUED | OUTPATIENT
Start: 2021-09-06 | End: 2021-09-07

## 2021-09-05 RX ORDER — LIDOCAINE 4 G/100G
1 CREAM TOPICAL EVERY 24 HOURS
Refills: 0 | Status: DISCONTINUED | OUTPATIENT
Start: 2021-09-05 | End: 2021-09-14

## 2021-09-05 RX ORDER — ASPIRIN/CALCIUM CARB/MAGNESIUM 324 MG
81 TABLET ORAL DAILY
Refills: 0 | Status: DISCONTINUED | OUTPATIENT
Start: 2021-09-06 | End: 2021-09-08

## 2021-09-05 RX ORDER — HYDROMORPHONE HYDROCHLORIDE 2 MG/ML
0.5 INJECTION INTRAMUSCULAR; INTRAVENOUS; SUBCUTANEOUS ONCE
Refills: 0 | Status: DISCONTINUED | OUTPATIENT
Start: 2021-09-05 | End: 2021-09-05

## 2021-09-05 RX ORDER — OXYCODONE HYDROCHLORIDE 5 MG/1
20 TABLET ORAL EVERY 12 HOURS
Refills: 0 | Status: DISCONTINUED | OUTPATIENT
Start: 2021-09-05 | End: 2021-09-12

## 2021-09-05 RX ORDER — TRAMADOL HYDROCHLORIDE 50 MG/1
50 TABLET ORAL EVERY 8 HOURS
Refills: 0 | Status: DISCONTINUED | OUTPATIENT
Start: 2021-09-05 | End: 2021-09-12

## 2021-09-05 RX ORDER — ACETAMINOPHEN 500 MG
975 TABLET ORAL EVERY 8 HOURS
Refills: 0 | Status: COMPLETED | OUTPATIENT
Start: 2021-09-05 | End: 2021-09-07

## 2021-09-05 RX ORDER — PANTOPRAZOLE SODIUM 20 MG/1
40 TABLET, DELAYED RELEASE ORAL
Refills: 0 | Status: DISCONTINUED | OUTPATIENT
Start: 2021-09-05 | End: 2021-09-14

## 2021-09-05 RX ORDER — HYDROCORTISONE 20 MG
10 TABLET ORAL EVERY 12 HOURS
Refills: 0 | Status: DISCONTINUED | OUTPATIENT
Start: 2021-09-05 | End: 2021-09-09

## 2021-09-05 RX ADMIN — MORPHINE SULFATE 4 MILLIGRAM(S): 50 CAPSULE, EXTENDED RELEASE ORAL at 10:33

## 2021-09-05 RX ADMIN — TRAMADOL HYDROCHLORIDE 50 MILLIGRAM(S): 50 TABLET ORAL at 21:54

## 2021-09-05 RX ADMIN — HYDROMORPHONE HYDROCHLORIDE 0.5 MILLIGRAM(S): 2 INJECTION INTRAMUSCULAR; INTRAVENOUS; SUBCUTANEOUS at 14:51

## 2021-09-05 RX ADMIN — HYDROMORPHONE HYDROCHLORIDE 0.5 MILLIGRAM(S): 2 INJECTION INTRAMUSCULAR; INTRAVENOUS; SUBCUTANEOUS at 14:04

## 2021-09-05 RX ADMIN — ATORVASTATIN CALCIUM 10 MILLIGRAM(S): 80 TABLET, FILM COATED ORAL at 21:54

## 2021-09-05 RX ADMIN — Medication 975 MILLIGRAM(S): at 21:53

## 2021-09-05 RX ADMIN — HYDROMORPHONE HYDROCHLORIDE 0.5 MILLIGRAM(S): 2 INJECTION INTRAMUSCULAR; INTRAVENOUS; SUBCUTANEOUS at 16:51

## 2021-09-05 RX ADMIN — HYDROMORPHONE HYDROCHLORIDE 0.5 MILLIGRAM(S): 2 INJECTION INTRAMUSCULAR; INTRAVENOUS; SUBCUTANEOUS at 16:21

## 2021-09-05 RX ADMIN — HEPARIN SODIUM 5000 UNIT(S): 5000 INJECTION INTRAVENOUS; SUBCUTANEOUS at 21:54

## 2021-09-05 RX ADMIN — Medication 975 MILLIGRAM(S): at 22:25

## 2021-09-05 RX ADMIN — MORPHINE SULFATE 4 MILLIGRAM(S): 50 CAPSULE, EXTENDED RELEASE ORAL at 11:19

## 2021-09-05 RX ADMIN — LIDOCAINE 1 PATCH: 4 CREAM TOPICAL at 17:00

## 2021-09-05 RX ADMIN — LIDOCAINE 1 PATCH: 4 CREAM TOPICAL at 17:52

## 2021-09-05 RX ADMIN — LATANOPROST 1 DROP(S): 0.05 SOLUTION/ DROPS OPHTHALMIC; TOPICAL at 22:22

## 2021-09-05 RX ADMIN — ONDANSETRON 4 MILLIGRAM(S): 8 TABLET, FILM COATED ORAL at 19:00

## 2021-09-05 RX ADMIN — MORPHINE SULFATE 4 MILLIGRAM(S): 50 CAPSULE, EXTENDED RELEASE ORAL at 12:56

## 2021-09-05 RX ADMIN — Medication 10 MILLIGRAM(S): at 21:54

## 2021-09-05 RX ADMIN — TRAMADOL HYDROCHLORIDE 50 MILLIGRAM(S): 50 TABLET ORAL at 22:25

## 2021-09-05 NOTE — ED ADULT NURSE NOTE - OBJECTIVE STATEMENT
77 yo F pt PMHx of HTN presents to ED transferred from [facility] s/p fall off toilet with sternum fx and right sided scapulla fx. c spine collar was cleared via xray at [facility] and CT was not done. pt endorses hitting her head. pt was given 4 of morphine around 1 pm per EMS report. pt currently c/o 9/10 pain.  pt is A&Ox4, MALHOTRA, PERRL, 77 yo F pt PMHx of HTN presents to ED transferred from [facility] s/p fall off toilet with sternum fx and right sided scapulla fx. c spine collar was cleared via xray at [facility] and CT was not done. pt endorses hitting her head. pt was given 4 of morphine around 1 pm per EMS report. pt currently c/o 9/10 pain in right back and central chest.   pt is A&Ox4, MALHOTRA, PERRL, minimal ecchymosis noted to right for head, tenderness noted over right scapula and central sternum, lung sounds cta, sensation grossly intact, no unilateral weakness, no arm or leg drift, strength 5/5 b/l.  Pt denies: headache, dizziness, chest pain, palpitations, cough, SOB, abdominal pain, n/v/d, urinary symptoms, fevers, chills, generalized or focal weakness at this time. 75 yo F pt PMHx of HTN presents to ED transferred from [facility] s/p fall off toilet with sternum fx and right sided scapulla fx. c spine collar was cleared at [facility]. pt endorses hitting her head and was scanned at [facility] with no ICH. pt was given 4 of morphine around 1 pm per EMS report. pt currently c/o 9/10 pain in right back and central chest.   pt is A&Ox4, MALHOTRA, PERRL, minimal ecchymosis noted to right for head, tenderness noted over right scapula and central sternum, lung sounds cta, sensation grossly intact, no unilateral weakness, no arm or leg drift, strength 5/5 b/l.  Pt denies: headache, dizziness, chest pain, palpitations, cough, SOB, abdominal pain, n/v/d, urinary symptoms, fevers, chills, generalized or focal weakness at this time.

## 2021-09-05 NOTE — ED PROVIDER NOTE - OBJECTIVE STATEMENT
75yo F with a history of colon cancer s/p right hemicolectomy, DVT (on Eliquis), CKD, adrenal insufficiency s/p transfer from [facility] due to fall forward from commode found to have sternal fx, thoracic/lumbar vertebral compression fx, and R scap fx. Patient only has complaints of chest pain. Patient does not report sob, loc, aura, or any sx prior to falling. Patient does report head trauma hitting her head on walker while falling forward. Patient has no other complaints, ROS otherwise negative.

## 2021-09-05 NOTE — ED ADULT NURSE NOTE - OBJECTIVE STATEMENT
Pt received to room 20 came from home complaining of R sided chest pain after a fall this AM. Pt states she was getting up to go to the commode when she slipped and fell forward onto her walker. Pt noted to have bruising to her R chest area. Pt also states she hit her head, noted to have hematoma to R side of head. Pt states she takes Eliquis daily. As per EMS pts O2 was 88% so pt was placed on 2L NC. Pt placed on cardiac monitor. PA at bedside for US IV line. pt denies SOB, N/V/D, fever or chills.

## 2021-09-05 NOTE — ED PROVIDER NOTE - NS ED ROS FT
General: no fever, chills  HENT: no nasal congestion, no sore throat  Eyes: no visual changes, no blurred vision  Neck: no neck pain  CV: +chest pain, no palpitations  Resp: no difficulty breathing, no cough  Abdominal: no nausea, no vomiting, no diarrhea, no abdominal pain  MSK: no muscle aches, no leg pain, no leg swelling  Neuro: no headaches  Skin: no rashes

## 2021-09-05 NOTE — ED PROVIDER NOTE - PROGRESS NOTE DETAILS
BRENDA:  CT showing right scapular fx, possible sternal fx, age indeterminant compression fractures of thoracic spine.  HD stable currently.  No respiratory distress.  I spoke with trauma surgery attending Dr. Mancia through transfer center line and plan is to transfer patient to [facility] for trauma care.

## 2021-09-05 NOTE — ED PROVIDER NOTE - CARE PLAN
1 Principal Discharge DX:	Fall   Principal Discharge DX:	Closed fracture of coracoid process of right scapula  Secondary Diagnosis:	Hematoma of scalp

## 2021-09-05 NOTE — CONSULT NOTE ADULT - SUBJECTIVE AND OBJECTIVE BOX
Orthopaedic Surgery Consult Note    Pt is a R-hand dominant 76y female presenting with R shoulder pain s/p mechanical fall. Pt was walking at home when she tripped over her commode/ Pt denies numbness, tingling, paresthesias in affected limb. Pt denies headstrike or LOC and denies any other orthopaedic injuries at this time. Denies fevers, dizziness, CP, SOB, N/V, calf pain.    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    Osteoporosis    Chronic Kidney Disease; Dx 2009    HTN - Hypertension    History of Obesity    Myocardial infarction    History of Laminectomy/ Fusion 1/06; L1-L2    H/O right hemicolectomy    TRAUMA TRANSFER    0    [email or web address]      PSHx:    Allergies:  codedine (Rash)  codeine (Nausea; Other)  penicillin (Rash)    Medications:    Social: Denies tobacco, EtOH, or illicit drug use.    Labs:                        11.7   17.40 )-----------( 251      ( 05 Sep 2021 09:50 )             32.8     09-05    135  |  101  |  26<H>  ----------------------------<  102<H>  4.2   |  22  |  1.42<H>    Ca    9.4      05 Sep 2021 09:50    TPro  7.8  /  Alb  3.6  /  TBili  0.5  /  DBili  x   /  AST  43<H>  /  ALT  16  /  AlkPhos  55  09-05    PT/INR - ( 05 Sep 2021 14:48 )   PT: 15.3 sec;   INR: 1.29 ratio         PTT - ( 05 Sep 2021 14:48 )  PTT:34.0 sec    CT Chest - nondisplaced right coracoid process fracture    Vitals:  T(C): 36.3 (09-05-21 @ 14:52), Max: 36.8 (09-05-21 @ 07:46)  HR: 68 (09-05-21 @ 14:52) (66 - 77)  BP: 155/62 (09-05-21 @ 14:52) (145/75 - 174/75)  RR: 20 (09-05-21 @ 14:52) (16 - 20)  SpO2: 96% (09-05-21 @ 14:52) (95% - 100%)    Physical Exam:  Gen: NAD, AAOx3    RUE:   Skin intact  + TTP over coracoid process  No bony TTP of Elbow/Wrist/Hand/Fingers  NT with AROM/PROM of Elbow/Wrist/Hand/Fingers  +AIN/PIN/Med/Rad/Uln/Msc/Ax  SILT C5-T1  2+ Rad/Uln Pulse   Brisk cap refill  Compartments soft and compressible    Secondary Assessment:  NC/AT, NTTP of clavicles, NTTP of C-,T-,L-Spine, NTTP of Pelvis  LUE: NTTP of Shoulder, Elbow, Wrist, Hand; NT with AROM/PROM of Shoulder, Elbow, Wrist, Hand; AIN/PIN/Med/Uln/Msc/Rad/Ax intact  LEs: Able to SLR, NT with Log Roll, NT with Heel Strike, NTTP of Hips, Knees, Ankles, Feet; NT with AROM/PROM of Hips, Knees, Ankles, Feet; Q/H/Gsc/TA/EHL/FHL intact    A/P: Pt is a 76y female with a R scapula fracture    -Stable injury no surgical intervention warranted at this time  -Closed Fracture, NV Intact  -Pain control  -Pt placed in sling.  -Rest, ice, elevate affected extremity.  -NWB affected extremity in sling.  -Pt informed to remove all jewelry from affected limb.  -Please follow up in office within 2 weeks of discharge from hospital or rehab with Dr. Tripp. Call office for appointment.  -Ortho stable for discharge.    Lakhwinder Mcdermott D.O.  PGY-3 Orthopaedic Surgery

## 2021-09-05 NOTE — ED ADULT NURSE NOTE - NS ED NURSE LEVEL OF CONSCIOUSNESS MENTAL STATUS
Order placed for Decadron 10 mg per Verbal Order from Dr. Momo Garduno on 6/18/2019 due to poly Myalgia. Decadron 10 mg given IM in right arm, no reaction noted. Awake/Alert

## 2021-09-05 NOTE — ED ADULT NURSE NOTE - CHIEF COMPLAINT QUOTE
patient was attempting to get to commode this AM slipped on right foot fell forward on her walker and hit her chest on the walker. having pain to her chest at this time. per EMS patient was found to have low 02 sat 88% on RA place on 2LNC with 02 sat 95%. no LOC, on blood thinner eliquis have hematoma to head

## 2021-09-05 NOTE — ED PROVIDER NOTE - OBJECTIVE STATEMENT
75 yo F h/o spinal stenosis on Oxycontin, colon ca s/p hemicolectomy 2 years ago, perioperative MI,  dvt on Eliquis, htn, adrenal insufficiency on hydrocortisone, hypothyroidism, CKD, essential tremors presents to ER sp fall. As per patient and EMS - staets she was getting off of comode when she lost her footing falling forward and hit the right side of her chest wall on her walker - c/o pain to right chest wall./breast pain and bruising. Denies loc or head trauma.

## 2021-09-05 NOTE — ED PROVIDER NOTE - PHYSICAL EXAMINATION
General: well appearing   HEENT: neck supple, anicteric sclera  Cardiovascular: Normal s1, s2, RRR  Respiratory: CTA b/l   Abdominal: Soft, ntnd  Extremities: No swelling in LEs  MSK: Midstenal ttp, no step off on sternum or clavicle   Neurologic: Non focal  Psych: Awake, alert answering questions appropriately General: well appearing   HEENT: neck supple, anicteric sclera  Cardiovascular: Normal s1, s2, RRR  Respiratory: CTA b/l   Abdominal: Soft, ntnd  Extremities: No swelling in LEs  MSK: Midstenal ttp, no step off on sternum or clavicle   Neurologic: Non focal  Psych: Awake, alert answering questions appropriately    NIC Spencer, Attending: distal neurovascular exam on RUE intact, sensation/motor intact lower legs b/l, no hypoxia or resp distress

## 2021-09-05 NOTE — CONSULT NOTE ADULT - SUBJECTIVE AND OBJECTIVE BOX
p ([id number])     HPI:  Pt is a R-hand dominant 76y female presenting with R shoulder pain s/p mechanical fall. Pt was walking at home when she tripped over her commode.    Imaging: CTL w/ prior L2-5 fxn, hardware intact, significant comp fx T10-L1 supra-adjacent to prior fusion, chronic in nature, areas of mild retropulsion.     Exam: AOx2-3 with choices, RUE pain pritchett 3/5 prox, 5/5 distally, LUE 5/5, pain pritchett HF LLE 4+/5, otherwise 5/5, SILT, no clonus.     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    Osteoporosis    Chronic Kidney Disease; Dx 2009    HTN - Hypertension    History of Obesity    Myocardial infarction      PAST SURGICAL HISTORY   History of Laminectomy/ Fusion 1/06; L1-L2    H/O right hemicolectomy      codedine (Rash)  codeine (Nausea; Other)  penicillin (Rash)      MEDICATIONS:  Antibiotics:    Neuro:    Other:      SOCIAL HISTORY:   Occupation:   Marital Status:     FAMILY HISTORY:  Family history of hypertension    Family history of hypertension (Mother)    Family history of diabetes mellitus (Sibling)        ROS: Negative except per HPI    LABS:  PT/INR - ( 05 Sep 2021 14:48 )   PT: 15.3 sec;   INR: 1.29 ratio         PTT - ( 05 Sep 2021 14:48 )  PTT:34.0 sec                        11.7   17.40 )-----------( 251      ( 05 Sep 2021 09:50 )             32.8     09-05    135  |  101  |  26<H>  ----------------------------<  102<H>  4.2   |  22  |  1.42<H>    Ca    9.4      05 Sep 2021 09:50    TPro  7.8  /  Alb  3.6  /  TBili  0.5  /  DBili  x   /  AST  43<H>  /  ALT  16  /  AlkPhos  55  09-05

## 2021-09-05 NOTE — H&P ADULT - NSHPSOCIALHISTORY_GEN_ALL_CORE
Lives at home with daughter and with assistance from an aide for ADLs. Denies smoking, drinking or using drugs.

## 2021-09-05 NOTE — ED PROVIDER NOTE - ATTENDING CONTRIBUTION TO CARE
I performed a face to face evaluation of this patient and obtained a history and performed a full exam.  I agree with the history, physical exam and plan of the PA.  Critical care time 30 mins.      Brief HPI:  77 yo F h/o spinal stenosis on Oxycontin, colon ca s/p hemicolectomy 2 years ago, perioperative MI,  dvt on Eliquis, htn, adrenal insufficiency on hydrocortisone, hypothyroidism, CKD, essential tremors presents to ER sp fall.   Patient reports right sided chest pain.  Denies numbness, tingling, weakness.  Reported hypoxia by ems but sats reassuring in ED.     Vitals:   Reviewed    Primary survey:   A:  airway intact, normal voice  B:  breath sounds clear bilaterally, symmetric chest rise, no flail segment  C:  peripheral pulses 2+ and symmetric, brisk capillary refill     Secondary Survey:    Gen:  AAOx3, non-toxic appearing, non-diaphoretic, speaking full sentences   Scalp:  no skull depression, right sided forehead hematoma   Face:  no hematoma or laceration, non-tender sinuses and forehead, no mobile segments   Eyes:  no orbital swelling or tenderness bilaterally, no conjunctival injection, no proptosis, EOMI without pain, PERRL, no visual field abnormalities, no raccoon eyes   Nose:  non-swollen, non-tender, nares clear, no septal hematoma, no rhinorrhea   Ears:  no deformity or ecchymoses, no hemotympanum b/l, external auditory canals clear b/l, no disla's sign, no otorrhea   Mouth:  lips and tongue normal appearing, teeth normal appearing, uvula midline, mucosa normal appearing without any laceration or visible bleeding   NECK:  no stridor, normal voice, no jvd, neck supple, no skin laceration   CV:  RRR, s1/s2, no murmur, rubs or gallops, peripheral pulses 2+ and symmetric, no JVD  PULM:  symmetric chest rise, lungs ctab, no wheeze, crackles, or rales, right anterior chest wall ecchymoses and tenderness, no flail segment   ABD:  non-distended, non-tender, no rebound or guarding, no ecchymoses, no flank ecchymoses, no fluid shift   Spine:  no c/t/l spine tenderness or stepoff   EXT:  no deformity, non-tender joints or extremities, rom grossly intact, warm to touch  PELVIS:  stable to bony compression   Axilla:  no lacerations or wounds  NEURO:  AAOx3, GCS 15, motor 5/5 in ue and le b/l, sensation grossly intact in extremities and trunk, no   SKIN:  warm, dry, no lacerations or ecchymoses     A/P:  77 yo F h/o spinal stenosis on Oxycontin, colon ca s/p hemicolectomy 2 years ago, perioperative MI,  dvt on Eliquis, htn, adrenal insufficiency on hydrocortisone, hypothyroidism, CKD, essential tremors presents to ER sp fall.   VSS.  GCS 15.  Will obtain ct imaging, labs, x-rays, ekg.  Dispo pending.

## 2021-09-05 NOTE — H&P ADULT - NSHPPHYSICALEXAM_GEN_ALL_CORE
VITALS:  Vital Signs Last 24 Hrs  T(C): 36.3 (05 Sep 2021 14:52), Max: 36.8 (05 Sep 2021 07:46)  T(F): 97.4 (05 Sep 2021 14:52), Max: 98.3 (05 Sep 2021 07:46)  HR: 68 (05 Sep 2021 14:52) (66 - 77)  BP: 155/62 (05 Sep 2021 14:52) (145/75 - 174/75)  BP(mean): 88 (05 Sep 2021 14:52) (88 - 93)  RR: 20 (05 Sep 2021 14:52) (16 - 20)  SpO2: 96% (05 Sep 2021 14:52) (95% - 100%)    SECONDARY SURVEY:  Gen: No acute distress.  HEENT: Normocephalic, right forehead hematoma, slightly tender to palpation. No midline cervical tenderness. Trachea midline.  Pulm: No respiratory distress.   Chest: inferior sternal tenderness, no crepitus over bilateral chest  Abd: Soft, nontender, nondistended, no rebound, no guarding.  Hips: Stable.   Ext: No gross deformities. Sensation and strength intact. Palpable radial pulses bilaterally, palpable dorsalis pedis pulses bilaterally. +right knee tenderness, +right shoulder tenderness  Back: No tenderness to palpation of spine, paraspinal tenderness, no palpable runoff, stepoff, or deformity.

## 2021-09-05 NOTE — H&P ADULT - HISTORY OF PRESENT ILLNESS
Patient is a 77yo F with a history of colon cancer s/p right hemicolectomy (approx 2 years ago, outside hospital), DVT (on Eliquis), CKD, adrenal insufficiency presenting as transfer from [facility] for trauma evaluation secondary to a mechanical fall from sitting. Patient was going to her commode last night when she slipped and fell on her right side, hitting her head. Denies LOC. Patient's daughter came to help her up and brought her to the ED. Since the fall, patient has been endorses right-sided chest pain but no SOB, headache, weakness or dizziness. Patient does not know what medications she takes.     Patient transferred to [facility] for trauma evaluation. In the ED, patient was hemodynamically stable but requiring 2L NC, satting 98%. Labs showed WBC 17, Cr 1.42, otherwise normal. CT head/cervical spine/chest were performed which showed a right forehead hematoma, acute minimally displaced right coracoid process scapular fracture, T11/L1 compression fractures and an inferior sternal fracture.    VITALS:  Vital Signs Last 24 Hrs  T(C): 36.3 (05 Sep 2021 14:52), Max: 36.8 (05 Sep 2021 07:46)  T(F): 97.4 (05 Sep 2021 14:52), Max: 98.3 (05 Sep 2021 07:46)  HR: 68 (05 Sep 2021 14:52) (66 - 77)  BP: 155/62 (05 Sep 2021 14:52) (145/75 - 174/75)  BP(mean): 88 (05 Sep 2021 14:52) (88 - 93)  RR: 20 (05 Sep 2021 14:52) (16 - 20)  SpO2: 96% (05 Sep 2021 14:52) (95% - 100%)    PRIMARY SURVEY:  A - Airway intact.  B - Bilateral breath sounds and equal chest rise. SpO2 98% 2L NC  C - Initially BP: 155/62 (09-05-21 @ 14:52), palpable pulses in all extremities.  D - GCS 15.  E - Exposure obtained.    SECONDARY SURVEY:  Gen: No acute distress.  HEENT: Normocephalic, right forehead hematoma, slightly tender to palpation. No midline cervical tenderness. Trachea midline.  Pulm: No respiratory distress.   Chest: inferior sternal tenderness, no crepitus over bilateral chest  Abd: Soft, nontender, nondistended, no rebound, no guarding.  Hips: Stable.   Ext: No gross deformities. Sensation and strength intact. Palpable radial pulses bilaterally, palpable dorsalis pedis pulses bilaterally. +right knee tenderness, +right shoulder tenderness  Back: No tenderness to palpation of spine, paraspinal tenderness, no palpable runoff, stepoff, or deformity.

## 2021-09-05 NOTE — ED ADULT NURSE REASSESSMENT NOTE - NS ED NURSE REASSESS COMMENT FT1
Pt to be transferred to [facility] for Trauma. report given to ER YOUNG Chavis. Pt waiting for EMS for transport. Pt in no acute distress at this time. Will continue to monitor.

## 2021-09-05 NOTE — H&P ADULT - NSHPLABSRESULTS_GEN_ALL_CORE
LABS:                          11.7   17.40 )-----------( 251      ( 05 Sep 2021 09:50 )             32.8       09-05    135  |  101  |  26<H>  ----------------------------<  102<H>  4.2   |  22  |  1.42<H>    Ca    9.4      05 Sep 2021 09:50    TPro  7.8  /  Alb  3.6  /  TBili  0.5  /  DBili  x   /  AST  43<H>  /  ALT  16  /  AlkPhos  55  09-05        PT/INR - ( 05 Sep 2021 14:48 )   PT: 15.3 sec;   INR: 1.29 ratio      PTT - ( 05 Sep 2021 14:48 )  PTT:34.0 sec    _______________________________________  RADIOLOGY & ADDITIONAL STUDIES:    < from: CT Head No Cont (09.05.21 @ 09:02) >    IMPRESSION:  BRAIN:  Small right frontal scalp hematoma without associated calvarial fracture.  No acute intracranial hemorrhage, mass effect, or cerebral edema.    CERVICAL SPINE:  No acute vertebral fracture or traumatic malalignment.  Severe multilevel spondylosis as above.    < end of copied text >    ===    < from: CT Chest No Cont (09.05.21 @ 09:02) >    IMPRESSION:  Acute minimally displaced fracture of the coracoid process of the right scapula.    Mildly displaced and foreshortened fracture of the mid to lower sternum new since the prior study appears acute although no surrounding soft tissue infiltrative changes are noted. Correlation with point tenderness in this region is recommended for evaluation of an acute fracture.    Compression fracture deformities of the T11 and L1 vertebral bodies are new since February 21, 2017 are of indeterminate age.    No acute rib fractures or pneumothorax.    Scattered few groundglass opacities in the right upper lobe, indeterminate.    < end of copied text >    ===    < from: Xray Chest 2 Views PA/Lat (09.05.21 @ 09:25) >    FINDINGS/  IMPRESSION:  Mild increased interstitial lung markings. No significant pleural effusion.    Scoliosis and hardware overlies thoracolumbar region    Heart size within normal limits.    < end of copied text >

## 2021-09-05 NOTE — CONSULT NOTE ADULT - ASSESSMENT
BEBETO, GREYSON  76F hx prior L2-5 Fxn w/. Dr Jane (ortho) at [facility] in early 2000s, revision x1, chronic LBP, s/p mechanical fall w/ RUE pain found to have R scapular fx and sternal fx, adm to trauma, CT T/L w/ sig adj segment degen above prior fusion w/ chronic comp fx, mild retropulsion. Exam: AOx2-3 with choices, RUE pain pritcehtt 3/5 prox, 5/5 distally, LUE 5/5, pain pritchett HF LLE 4+/5, otherwise 5/5, SILT, no clonus.   - No acute nsgy intervention, d/w daughter, would not want further surgical intervention for spine  - should wear TLSO brace when OOB, ok for PT w/ brace  - can fu outpt w/ Dr. Gomez in 6-8 weeks as needed, continue outpt fu w/ [facility] for pain control  - no further imaging indicated at this time, not a candidate for kyphoplasty given significant degen of >50% vb and multilevel in nature     - Please obtain a TLSO brace. Please contact lavinia goodrich for a fitted brace: [phone]   BEBETO, GREYSON  76F hx prior L2-5 Fxn w/. Dr Jane (ortho) at [facility] in early 2000s, revision x1, chronic LBP, s/p mechanical fall w/ RUE pain found to have R scapular fx and sternal fx, adm to trauma, CT T/L w/ sig adj segment degen above prior fusion w/ chronic comp fx, mild retropulsion, c/f unstable fx. Exam: AOx2-3 with choices, RUE pain pritchett 3/5 prox, 5/5 distally, LUE 5/5, pain pritchett HF LLE 4+/5, otherwise 5/5, SILT, no clonus.   - No acute nsgy intervention, d/w daughter, would not want further surgical intervention for spine  - needs MRI non con T/L spine for r/o ligamentous injury  - should wear TLSO brace at all times, keep on spine precautions pending MRI  - continue outpt fu w/ [facility] for pain control  - not a candidate for kyphoplasty given significant degen of >50% vb and multilevel in nature     - Please obtain a TLSO brace. Please contact lavinia goodrich for a fitted brace: [phone]

## 2021-09-05 NOTE — ED ADULT NURSE NOTE - HIV OFFER
Patient understands condition, prognosis and need for follow up care. F/U with Dr. Michael Dorsey. Previously Declined (within the last year)

## 2021-09-05 NOTE — H&P ADULT - ASSESSMENT
Patient is a 77yo F with a history of colon cancer s/p right hemicolectomy (approx 2 years ago, outside hospital), DVT (on Eliquis), CKD, adrenal insufficiency presenting as transfer from [facility] for trauma evaluation secondary to a mechanical fall from sitting with headstrike. Injuries identified include right forehead hematoma, acute minimally displaced right coracoid process scapular fracture, T11/L1 compression fractures and an inferior sternal fracture. Patient hemodynamically stable.    Plan:  - Admit to surgery under Dr. Mancia, floor bed  - Reg diet  - F/u ortho recs for scapular fracture  - F/u spine recs for vertebral fractures  - EKG and troponin to assess for blunt cardiac injury  - Hold Eliquis until tomorrow and if cleared by ortho/spine  - PT eval  - Nasal cannula as needed    Seen and discussed with Dr. Gavino You, PGY2  Acute Care Surgery [id number]    Patient is a 77yo F with a history of colon cancer s/p right hemicolectomy (approx 2 years ago, outside hospital), DVT (on Eliquis), CKD, adrenal insufficiency presenting as transfer from [facility] for trauma evaluation secondary to a mechanical fall from sitting with headstrike. Injuries identified include right forehead hematoma, acute minimally displaced right coracoid process scapular fracture, T11/L1 compression fractures and an inferior sternal fracture. Patient hemodynamically stable.    Plan:  - Admit to surgery under Dr. Mancia, floor bed  - Reg diet  - F/u ortho recs for scapular fracture  - F/u spine recs for vertebral fractures  - F/u right knee XRAY  - EKG and troponin to assess for blunt cardiac injury  - Hold Eliquis until tomorrow and if cleared by ortho/spine  - PT eval  - Nasal cannula as needed    Seen and discussed with Dr. Gavino You, PGY2  Acute Care Surgery [id number]

## 2021-09-05 NOTE — ED ADULT TRIAGE NOTE - CHIEF COMPLAINT QUOTE
patient was attempting to get to commode this AM slipped on right foot fell forward on her walker and hit her chest on the walker. having pain to her chest at this time. per EMS patient was found to have low 02 sat 88% on RA place on 2LNC with 02 sat 95%. no LOC, on blood thinner eliquis no head trauma patient was attempting to get to commode this AM slipped on right foot fell forward on her walker and hit her chest on the walker. having pain to her chest at this time. per EMS patient was found to have low 02 sat 88% on RA place on 2LNC with 02 sat 95%. no LOC, on blood thinner eliquis have hematoma to head

## 2021-09-05 NOTE — ED PROVIDER NOTE - CLINICAL SUMMARY MEDICAL DECISION MAKING FREE TEXT BOX
77 y/o female w/ fall at home c/o right chest wall pain  -r/o fracture r/o ptx  -labs cxr ct  -pain control

## 2021-09-05 NOTE — ED PROVIDER NOTE - PROGRESS NOTE DETAILS
ap- pt signed out to me, discussion w/ orthopedics, will be down to assess ap- pt seen by me has pain in the L hip when I was palpating the L hip, she reports worsening chest pain, will give additional dose of diluadid, obtain ekg and send trop seen by nsg, recommending ct scan of the t spine

## 2021-09-05 NOTE — ED PROVIDER NOTE - CLINICAL SUMMARY MEDICAL DECISION MAKING FREE TEXT BOX
77yo F with a history of colon cancer s/p right hemicolectomy, DVT (on Eliquis), CKD, adrenal insufficiency s/p transfer from [facility] for trauma eval. EKG and trops for cp, other labs drawn at [facility]. Trauma, ortho and spine c/s. Will admit. 75yo F with a history of colon cancer s/p right hemicolectomy, DVT (on Eliquis), CKD, adrenal insufficiency s/p transfer from [facility] for trauma eval. EKG and trops for cp, other labs drawn at [facility]. Trauma, ortho and spine c/s. Will admit.    NIC Spencer, Attending: saw with resident, reviewed note.    Transfer from [facility] as trauma consult for [facility] fall on commode with sternal fx, scalp hematoma, R coracoid process fx. No intracranial injuries. A/P was not imaged. No reported extremity injuries. Arrives appearing well with complaint of only chest pain. No hypoxia or resp issues. No supplemental O2 needed. Reviewed diagnostics from [facility]. Spoke to surgery about evaluation and likely admission. Doubt ICU level of care needed. Pt looks well with reassuring vitals. Defer to specialists if additional diagnostics/treatment required.

## 2021-09-06 LAB
ANION GAP SERPL CALC-SCNC: 16 MMOL/L — SIGNIFICANT CHANGE UP (ref 5–17)
BUN SERPL-MCNC: 18 MG/DL — SIGNIFICANT CHANGE UP (ref 7–23)
CALCIUM SERPL-MCNC: 10.1 MG/DL — SIGNIFICANT CHANGE UP (ref 8.4–10.5)
CHLORIDE SERPL-SCNC: 101 MMOL/L — SIGNIFICANT CHANGE UP (ref 96–108)
CO2 SERPL-SCNC: 19 MMOL/L — LOW (ref 22–31)
COVID-19 SPIKE DOMAIN AB INTERP: POSITIVE
COVID-19 SPIKE DOMAIN ANTIBODY RESULT: 72.8 U/ML — HIGH
CREAT SERPL-MCNC: 1.25 MG/DL — SIGNIFICANT CHANGE UP (ref 0.5–1.3)
GLUCOSE SERPL-MCNC: 110 MG/DL — HIGH (ref 70–99)
HCT VFR BLD CALC: 35.7 % — SIGNIFICANT CHANGE UP (ref 34.5–45)
HGB BLD-MCNC: 11.9 G/DL — SIGNIFICANT CHANGE UP (ref 11.5–15.5)
MAGNESIUM SERPL-MCNC: 2 MG/DL — SIGNIFICANT CHANGE UP (ref 1.6–2.6)
MCHC RBC-ENTMCNC: 27.1 PG — SIGNIFICANT CHANGE UP (ref 27–34)
MCHC RBC-ENTMCNC: 33.3 GM/DL — SIGNIFICANT CHANGE UP (ref 32–36)
MCV RBC AUTO: 81.3 FL — SIGNIFICANT CHANGE UP (ref 80–100)
NRBC # BLD: 0 /100 WBCS — SIGNIFICANT CHANGE UP (ref 0–0)
PHOSPHATE SERPL-MCNC: 3.3 MG/DL — SIGNIFICANT CHANGE UP (ref 2.5–4.5)
PLATELET # BLD AUTO: 155 K/UL — SIGNIFICANT CHANGE UP (ref 150–400)
POTASSIUM SERPL-MCNC: 3.7 MMOL/L — SIGNIFICANT CHANGE UP (ref 3.5–5.3)
POTASSIUM SERPL-SCNC: 3.7 MMOL/L — SIGNIFICANT CHANGE UP (ref 3.5–5.3)
RBC # BLD: 4.39 M/UL — SIGNIFICANT CHANGE UP (ref 3.8–5.2)
RBC # FLD: 18.5 % — HIGH (ref 10.3–14.5)
SARS-COV-2 IGG+IGM SERPL QL IA: 72.8 U/ML — HIGH
SARS-COV-2 IGG+IGM SERPL QL IA: POSITIVE
SODIUM SERPL-SCNC: 136 MMOL/L — SIGNIFICANT CHANGE UP (ref 135–145)
WBC # BLD: 10.21 K/UL — SIGNIFICANT CHANGE UP (ref 3.8–10.5)
WBC # FLD AUTO: 10.21 K/UL — SIGNIFICANT CHANGE UP (ref 3.8–10.5)

## 2021-09-06 PROCEDURE — 93010 ELECTROCARDIOGRAM REPORT: CPT

## 2021-09-06 RX ORDER — ONDANSETRON 8 MG/1
4 TABLET, FILM COATED ORAL EVERY 8 HOURS
Refills: 0 | Status: DISCONTINUED | OUTPATIENT
Start: 2021-09-06 | End: 2021-09-06

## 2021-09-06 RX ORDER — ONDANSETRON 8 MG/1
4 TABLET, FILM COATED ORAL ONCE
Refills: 0 | Status: COMPLETED | OUTPATIENT
Start: 2021-09-06 | End: 2021-09-06

## 2021-09-06 RX ORDER — ONDANSETRON 8 MG/1
4 TABLET, FILM COATED ORAL EVERY 8 HOURS
Refills: 0 | Status: COMPLETED | OUTPATIENT
Start: 2021-09-06 | End: 2021-09-07

## 2021-09-06 RX ORDER — NYSTATIN CREAM 100000 [USP'U]/G
1 CREAM TOPICAL
Refills: 0 | Status: DISCONTINUED | OUTPATIENT
Start: 2021-09-06 | End: 2021-09-14

## 2021-09-06 RX ORDER — INFLUENZA VIRUS VACCINE 15; 15; 15; 15 UG/.5ML; UG/.5ML; UG/.5ML; UG/.5ML
0.5 SUSPENSION INTRAMUSCULAR ONCE
Refills: 0 | Status: DISCONTINUED | OUTPATIENT
Start: 2021-09-06 | End: 2021-09-27

## 2021-09-06 RX ADMIN — LIDOCAINE 1 PATCH: 4 CREAM TOPICAL at 06:26

## 2021-09-06 RX ADMIN — Medication 50 MICROGRAM(S): at 05:53

## 2021-09-06 RX ADMIN — OXYCODONE HYDROCHLORIDE 20 MILLIGRAM(S): 5 TABLET ORAL at 05:53

## 2021-09-06 RX ADMIN — Medication 10 MILLIGRAM(S): at 21:41

## 2021-09-06 RX ADMIN — AMLODIPINE BESYLATE 5 MILLIGRAM(S): 2.5 TABLET ORAL at 05:53

## 2021-09-06 RX ADMIN — LOSARTAN POTASSIUM 50 MILLIGRAM(S): 100 TABLET, FILM COATED ORAL at 05:53

## 2021-09-06 RX ADMIN — Medication 1 TABLET(S): at 13:47

## 2021-09-06 RX ADMIN — Medication 975 MILLIGRAM(S): at 06:26

## 2021-09-06 RX ADMIN — HEPARIN SODIUM 5000 UNIT(S): 5000 INJECTION INTRAVENOUS; SUBCUTANEOUS at 21:41

## 2021-09-06 RX ADMIN — PANTOPRAZOLE SODIUM 40 MILLIGRAM(S): 20 TABLET, DELAYED RELEASE ORAL at 05:54

## 2021-09-06 RX ADMIN — Medication 975 MILLIGRAM(S): at 05:53

## 2021-09-06 RX ADMIN — Medication 975 MILLIGRAM(S): at 13:47

## 2021-09-06 RX ADMIN — Medication 975 MILLIGRAM(S): at 21:41

## 2021-09-06 RX ADMIN — OXYCODONE HYDROCHLORIDE 20 MILLIGRAM(S): 5 TABLET ORAL at 19:22

## 2021-09-06 RX ADMIN — LIDOCAINE 1 PATCH: 4 CREAM TOPICAL at 18:43

## 2021-09-06 RX ADMIN — TRAMADOL HYDROCHLORIDE 50 MILLIGRAM(S): 50 TABLET ORAL at 14:15

## 2021-09-06 RX ADMIN — OXYCODONE HYDROCHLORIDE 20 MILLIGRAM(S): 5 TABLET ORAL at 06:26

## 2021-09-06 RX ADMIN — ONDANSETRON 4 MILLIGRAM(S): 8 TABLET, FILM COATED ORAL at 13:28

## 2021-09-06 RX ADMIN — Medication 975 MILLIGRAM(S): at 22:11

## 2021-09-06 RX ADMIN — HEPARIN SODIUM 5000 UNIT(S): 5000 INJECTION INTRAVENOUS; SUBCUTANEOUS at 05:53

## 2021-09-06 RX ADMIN — ATORVASTATIN CALCIUM 10 MILLIGRAM(S): 80 TABLET, FILM COATED ORAL at 21:41

## 2021-09-06 RX ADMIN — HEPARIN SODIUM 5000 UNIT(S): 5000 INJECTION INTRAVENOUS; SUBCUTANEOUS at 13:47

## 2021-09-06 RX ADMIN — LATANOPROST 1 DROP(S): 0.05 SOLUTION/ DROPS OPHTHALMIC; TOPICAL at 21:41

## 2021-09-06 RX ADMIN — ONDANSETRON 4 MILLIGRAM(S): 8 TABLET, FILM COATED ORAL at 05:51

## 2021-09-06 RX ADMIN — Medication 10 MILLIGRAM(S): at 09:11

## 2021-09-06 RX ADMIN — Medication 50 MILLIGRAM(S): at 18:39

## 2021-09-06 RX ADMIN — Medication 81 MILLIGRAM(S): at 13:46

## 2021-09-06 RX ADMIN — OXYCODONE HYDROCHLORIDE 20 MILLIGRAM(S): 5 TABLET ORAL at 18:41

## 2021-09-06 RX ADMIN — LIDOCAINE 1 PATCH: 4 CREAM TOPICAL at 19:28

## 2021-09-06 RX ADMIN — TRAMADOL HYDROCHLORIDE 50 MILLIGRAM(S): 50 TABLET ORAL at 13:49

## 2021-09-06 RX ADMIN — Medication 975 MILLIGRAM(S): at 14:15

## 2021-09-06 RX ADMIN — NYSTATIN CREAM 1 APPLICATION(S): 100000 CREAM TOPICAL at 18:40

## 2021-09-06 NOTE — PROGRESS NOTE ADULT - SUBJECTIVE AND OBJECTIVE BOX
SUBJECTIVE:   Seen and examined at bedside this am. no acute events overnight. pain is controlled.     OBJECTIVE: T(C): 37 (09-06-21 @ 08:57), Max: 37 (09-06-21 @ 08:57)  HR: 113 (09-06-21 @ 08:57) (68 - 113)  BP: 141/80 (09-06-21 @ 08:57) (141/80 - 164/82)  RR: 18 (09-06-21 @ 08:57) (18 - 20)  SpO2: 97% (09-06-21 @ 08:57) (95% - 100%)  Wt(kg): --  I&O's Summary    05 Sep 2021 07:01  -  06 Sep 2021 07:00  --------------------------------------------------------  IN: 0 mL / OUT: 1000 mL / NET: -1000 mL    06 Sep 2021 07:01  -  06 Sep 2021 12:17  --------------------------------------------------------  IN: 60 mL / OUT: 0 mL / NET: 60 mL      I&O's Detail    05 Sep 2021 07:01  -  06 Sep 2021 07:00  --------------------------------------------------------  IN:  Total IN: 0 mL    OUT:    Voided (mL): 1000 mL  Total OUT: 1000 mL    Total NET: -1000 mL      06 Sep 2021 07:01  -  06 Sep 2021 12:17  --------------------------------------------------------  IN:    Oral Fluid: 60 mL  Total IN: 60 mL    OUT:  Total OUT: 0 mL    Total NET: 60 mL      Physical Exam:   Gen: No acute distress.  HEENT: Normocephalic, right forehead hematoma, slightly tender to palpation. No midline cervical tenderness. Trachea midline.  Pulm: No respiratory distress.   Chest: inferior sternal tenderness, no crepitus over bilateral chest  Abd: Soft, nontender, nondistended, no rebound, no guarding.  Hips: Stable.   Ext: No gross deformities. Palpable radial pulses bilaterally, palpable dorsalis pedis pulses bilaterally. +right knee tenderness, +right shoulder tenderness      MEDICATIONS  (STANDING):  acetaminophen   Tablet .. 975 milliGRAM(s) Oral every 8 hours  amLODIPine   Tablet 5 milliGRAM(s) Oral daily  aspirin enteric coated 81 milliGRAM(s) Oral daily  atorvastatin 10 milliGRAM(s) Oral at bedtime  calcium carbonate    500 mG (Tums) Chewable 1 Tablet(s) Chew daily  heparin   Injectable 5000 Unit(s) SubCutaneous every 8 hours  hydrocortisone 10 milliGRAM(s) Oral every 12 hours  influenza   Vaccine 0.5 milliLiter(s) IntraMuscular once  latanoprost 0.005% Ophthalmic Solution 1 Drop(s) Both EYES at bedtime  levothyroxine 50 MICROGram(s) Oral daily  lidocaine   4% Patch 1 Patch Transdermal every 24 hours  losartan 50 milliGRAM(s) Oral daily  metoprolol succinate ER 50 milliGRAM(s) Oral every 24 hours  nystatin Powder 1 Application(s) Topical two times a day  oxyCODONE  ER Tablet 20 milliGRAM(s) Oral every 12 hours  pantoprazole    Tablet 40 milliGRAM(s) Oral before breakfast    MEDICATIONS  (PRN):  artificial  tears Solution 1 Drop(s) Both EYES every 6 hours PRN Dry Eyes  guaiFENesin  milliGRAM(s) Oral every 12 hours PRN Cough  senna 2 Tablet(s) Oral at bedtime PRN Constipation  traMADol 25 milliGRAM(s) Oral every 6 hours PRN Moderate Pain (4 - 6)  traMADol 50 milliGRAM(s) Oral every 8 hours PRN Severe Pain (7 - 10)      LABS:                        11.9   10.21 )-----------( 155      ( 06 Sep 2021 06:52 )             35.7     09-06    136  |  101  |  18  ----------------------------<  110<H>  3.7   |  19<L>  |  1.25    Ca    10.1      06 Sep 2021 06:52  Phos  3.3     09-06  Mg     2.0     09-06    TPro  7.8  /  Alb  3.6  /  TBili  0.5  /  DBili  x   /  AST  43<H>  /  ALT  16  /  AlkPhos  55  09-05    PT/INR - ( 05 Sep 2021 14:48 )   PT: 15.3 sec;   INR: 1.29 ratio         PTT - ( 05 Sep 2021 14:48 )  PTT:34.0 sec

## 2021-09-06 NOTE — PROGRESS NOTE ADULT - ASSESSMENT
BEBETO, GREYSON  76F hx prior L2-5 Fxn w/. Dr Jane (ortho) at [facility] in early 2000s, revision x1, chronic LBP, s/p mechanical fall w/ RUE pain found to have R scapular fx and sternal fx, adm to trauma, CT T/L w/ sig adj segment degen above prior fusion w/ chronic comp fx, mild retropulsion, c/f unstable fx. Exam: AOx2-3 with choices, RUE pain pritchett 3/5 prox, 5/5 distally, LUE 5/5, pain pritchett HF LLE 4+/5, otherwise 5/5, SILT, no clonus.   - No acute nsgy intervention, d/w daughter, would not want further surgical intervention for spine  - needs MRI non con T/L spine for r/o ligamentous injury  - should wear TLSO brace at all times, keep on spine precautions pending MRI  - continue outpt fu w/ [facility] for pain control  - not a candidate for kyphoplasty given significant degen of >50% vb and multilevel in nature     - Please obtain a TLSO brace. Please contact lavinia goodrich for a fitted brace: [phone]

## 2021-09-06 NOTE — PHYSICAL THERAPY INITIAL EVALUATION ADULT - PRECAUTIONS/LIMITATIONS, REHAB EVAL
CT head/cervical spine/chest were performed which showed a right forehead hematoma, acute minimally displaced right coracoid process scapular fracture, T11/L1 compression fractures and an inferior sternal fracture.. XRayPelvis: (-) XRayRKnee: (-) TLSO brace at all times. CT head/cervical spine/chest were performed which showed a right forehead hematoma, acute minimally displaced right coracoid process scapular fracture, T11/L1 compression fractures and an inferior sternal fracture.. XRayPelvis: (-) XRayRKnee: (-) TLSO brace at all times./fall precautions/spinal precautions

## 2021-09-06 NOTE — PROGRESS NOTE ADULT - ASSESSMENT
Patient is a 77yo F with a history of colon cancer s/p right hemicolectomy (approx 2 years ago, outside hospital), DVT (on Eliquis), CKD, adrenal insufficiency presenting as transfer from [facility] for trauma evaluation secondary to a mechanical fall from sitting with headstrike. Injuries identified include right forehead hematoma, acute minimally displaced right coracoid process scapular fracture, T11/L1 compression fractures and an inferior sternal fracture. Patient hemodynamically stable.    Plan:  - pain control   - Reg diet  - F/u ortho recs for scapular fracture: sling immobilization and outpt fu  - F/u spine recs for vertebral fractures: TLSO, C spine precautions  - F/u MRI   - F/u right knee XRAY  - EKG and troponin negative  - PT eval  - tertiary survey  - DVT ppx     ATP  [id number]

## 2021-09-07 LAB
ANION GAP SERPL CALC-SCNC: 16 MMOL/L — SIGNIFICANT CHANGE UP (ref 5–17)
APPEARANCE UR: CLEAR — SIGNIFICANT CHANGE UP
BILIRUB UR-MCNC: NEGATIVE — SIGNIFICANT CHANGE UP
BUN SERPL-MCNC: 23 MG/DL — SIGNIFICANT CHANGE UP (ref 7–23)
CALCIUM SERPL-MCNC: 9.7 MG/DL — SIGNIFICANT CHANGE UP (ref 8.4–10.5)
CHLORIDE SERPL-SCNC: 100 MMOL/L — SIGNIFICANT CHANGE UP (ref 96–108)
CO2 SERPL-SCNC: 20 MMOL/L — LOW (ref 22–31)
COLOR SPEC: YELLOW — SIGNIFICANT CHANGE UP
CREAT SERPL-MCNC: 2.28 MG/DL — HIGH (ref 0.5–1.3)
DIFF PNL FLD: ABNORMAL
GLUCOSE SERPL-MCNC: 76 MG/DL — SIGNIFICANT CHANGE UP (ref 70–99)
GLUCOSE UR QL: NEGATIVE — SIGNIFICANT CHANGE UP
HCT VFR BLD CALC: 34.9 % — SIGNIFICANT CHANGE UP (ref 34.5–45)
HGB BLD-MCNC: 11.9 G/DL — SIGNIFICANT CHANGE UP (ref 11.5–15.5)
KETONES UR-MCNC: ABNORMAL
LEUKOCYTE ESTERASE UR-ACNC: ABNORMAL
MAGNESIUM SERPL-MCNC: 2 MG/DL — SIGNIFICANT CHANGE UP (ref 1.6–2.6)
MCHC RBC-ENTMCNC: 26.2 PG — LOW (ref 27–34)
MCHC RBC-ENTMCNC: 34.1 GM/DL — SIGNIFICANT CHANGE UP (ref 32–36)
MCV RBC AUTO: 76.7 FL — LOW (ref 80–100)
NITRITE UR-MCNC: NEGATIVE — SIGNIFICANT CHANGE UP
NRBC # BLD: 0 /100 WBCS — SIGNIFICANT CHANGE UP (ref 0–0)
PH UR: 6 — SIGNIFICANT CHANGE UP (ref 5–8)
PHOSPHATE SERPL-MCNC: 4 MG/DL — SIGNIFICANT CHANGE UP (ref 2.5–4.5)
PLATELET # BLD AUTO: 205 K/UL — SIGNIFICANT CHANGE UP (ref 150–400)
POTASSIUM SERPL-MCNC: 4.5 MMOL/L — SIGNIFICANT CHANGE UP (ref 3.5–5.3)
POTASSIUM SERPL-SCNC: 4.5 MMOL/L — SIGNIFICANT CHANGE UP (ref 3.5–5.3)
PROT UR-MCNC: ABNORMAL
RBC # BLD: 4.55 M/UL — SIGNIFICANT CHANGE UP (ref 3.8–5.2)
RBC # FLD: 17.7 % — HIGH (ref 10.3–14.5)
SODIUM SERPL-SCNC: 136 MMOL/L — SIGNIFICANT CHANGE UP (ref 135–145)
SP GR SPEC: 1.02 — SIGNIFICANT CHANGE UP (ref 1.01–1.02)
UROBILINOGEN FLD QL: NEGATIVE — SIGNIFICANT CHANGE UP
WBC # BLD: 13.56 K/UL — HIGH (ref 3.8–10.5)
WBC # FLD AUTO: 13.56 K/UL — HIGH (ref 3.8–10.5)

## 2021-09-07 PROCEDURE — 72146 MRI CHEST SPINE W/O DYE: CPT | Mod: 26

## 2021-09-07 PROCEDURE — 99221 1ST HOSP IP/OBS SF/LOW 40: CPT | Mod: 57

## 2021-09-07 PROCEDURE — 72128 CT CHEST SPINE W/O DYE: CPT | Mod: 26

## 2021-09-07 PROCEDURE — 72148 MRI LUMBAR SPINE W/O DYE: CPT | Mod: 26

## 2021-09-07 RX ORDER — SODIUM CHLORIDE 9 MG/ML
1000 INJECTION INTRAMUSCULAR; INTRAVENOUS; SUBCUTANEOUS
Refills: 0 | Status: DISCONTINUED | OUTPATIENT
Start: 2021-09-07 | End: 2021-09-08

## 2021-09-07 RX ORDER — ONDANSETRON 8 MG/1
4 TABLET, FILM COATED ORAL ONCE
Refills: 0 | Status: COMPLETED | OUTPATIENT
Start: 2021-09-07 | End: 2021-09-07

## 2021-09-07 RX ORDER — OXYCODONE HYDROCHLORIDE 5 MG/1
5 TABLET ORAL EVERY 8 HOURS
Refills: 0 | Status: DISCONTINUED | OUTPATIENT
Start: 2021-09-07 | End: 2021-09-14

## 2021-09-07 RX ADMIN — HEPARIN SODIUM 5000 UNIT(S): 5000 INJECTION INTRAVENOUS; SUBCUTANEOUS at 14:54

## 2021-09-07 RX ADMIN — Medication 81 MILLIGRAM(S): at 14:54

## 2021-09-07 RX ADMIN — Medication 1 TABLET(S): at 14:54

## 2021-09-07 RX ADMIN — OXYCODONE HYDROCHLORIDE 20 MILLIGRAM(S): 5 TABLET ORAL at 05:48

## 2021-09-07 RX ADMIN — PANTOPRAZOLE SODIUM 40 MILLIGRAM(S): 20 TABLET, DELAYED RELEASE ORAL at 05:48

## 2021-09-07 RX ADMIN — NYSTATIN CREAM 1 APPLICATION(S): 100000 CREAM TOPICAL at 17:39

## 2021-09-07 RX ADMIN — ONDANSETRON 4 MILLIGRAM(S): 8 TABLET, FILM COATED ORAL at 17:00

## 2021-09-07 RX ADMIN — LIDOCAINE 1 PATCH: 4 CREAM TOPICAL at 06:43

## 2021-09-07 RX ADMIN — HEPARIN SODIUM 5000 UNIT(S): 5000 INJECTION INTRAVENOUS; SUBCUTANEOUS at 21:57

## 2021-09-07 RX ADMIN — Medication 975 MILLIGRAM(S): at 06:43

## 2021-09-07 RX ADMIN — TRAMADOL HYDROCHLORIDE 50 MILLIGRAM(S): 50 TABLET ORAL at 11:33

## 2021-09-07 RX ADMIN — TRAMADOL HYDROCHLORIDE 50 MILLIGRAM(S): 50 TABLET ORAL at 22:24

## 2021-09-07 RX ADMIN — LIDOCAINE 1 PATCH: 4 CREAM TOPICAL at 17:37

## 2021-09-07 RX ADMIN — TRAMADOL HYDROCHLORIDE 50 MILLIGRAM(S): 50 TABLET ORAL at 12:03

## 2021-09-07 RX ADMIN — Medication 10 MILLIGRAM(S): at 08:51

## 2021-09-07 RX ADMIN — Medication 50 MILLIGRAM(S): at 18:07

## 2021-09-07 RX ADMIN — Medication 975 MILLIGRAM(S): at 15:25

## 2021-09-07 RX ADMIN — LATANOPROST 1 DROP(S): 0.05 SOLUTION/ DROPS OPHTHALMIC; TOPICAL at 21:57

## 2021-09-07 RX ADMIN — OXYCODONE HYDROCHLORIDE 20 MILLIGRAM(S): 5 TABLET ORAL at 18:08

## 2021-09-07 RX ADMIN — OXYCODONE HYDROCHLORIDE 20 MILLIGRAM(S): 5 TABLET ORAL at 06:43

## 2021-09-07 RX ADMIN — NYSTATIN CREAM 1 APPLICATION(S): 100000 CREAM TOPICAL at 05:49

## 2021-09-07 RX ADMIN — LOSARTAN POTASSIUM 50 MILLIGRAM(S): 100 TABLET, FILM COATED ORAL at 05:48

## 2021-09-07 RX ADMIN — TRAMADOL HYDROCHLORIDE 50 MILLIGRAM(S): 50 TABLET ORAL at 21:56

## 2021-09-07 RX ADMIN — HEPARIN SODIUM 5000 UNIT(S): 5000 INJECTION INTRAVENOUS; SUBCUTANEOUS at 05:49

## 2021-09-07 RX ADMIN — Medication 975 MILLIGRAM(S): at 14:55

## 2021-09-07 RX ADMIN — LIDOCAINE 1 PATCH: 4 CREAM TOPICAL at 21:58

## 2021-09-07 RX ADMIN — ONDANSETRON 4 MILLIGRAM(S): 8 TABLET, FILM COATED ORAL at 10:06

## 2021-09-07 RX ADMIN — Medication 50 MICROGRAM(S): at 05:49

## 2021-09-07 RX ADMIN — ATORVASTATIN CALCIUM 10 MILLIGRAM(S): 80 TABLET, FILM COATED ORAL at 21:56

## 2021-09-07 RX ADMIN — AMLODIPINE BESYLATE 5 MILLIGRAM(S): 2.5 TABLET ORAL at 05:48

## 2021-09-07 RX ADMIN — Medication 10 MILLIGRAM(S): at 21:57

## 2021-09-07 RX ADMIN — SODIUM CHLORIDE 75 MILLILITER(S): 9 INJECTION INTRAMUSCULAR; INTRAVENOUS; SUBCUTANEOUS at 15:37

## 2021-09-07 RX ADMIN — OXYCODONE HYDROCHLORIDE 20 MILLIGRAM(S): 5 TABLET ORAL at 17:38

## 2021-09-07 RX ADMIN — Medication 975 MILLIGRAM(S): at 05:48

## 2021-09-07 NOTE — PROGRESS NOTE ADULT - SUBJECTIVE AND OBJECTIVE BOX
SURGERY DAILY PROGRESS NOTE:       SUBJECTIVE/ROS: No acute events overnight. pain is well controlled. tolerating regular diet. denies dizziness, SOB, CP or palpitations.          MEDICATIONS  (STANDING):  acetaminophen   Tablet .. 975 milliGRAM(s) Oral every 8 hours  amLODIPine   Tablet 5 milliGRAM(s) Oral daily  aspirin enteric coated 81 milliGRAM(s) Oral daily  atorvastatin 10 milliGRAM(s) Oral at bedtime  calcium carbonate    500 mG (Tums) Chewable 1 Tablet(s) Chew daily  heparin   Injectable 5000 Unit(s) SubCutaneous every 8 hours  hydrocortisone 10 milliGRAM(s) Oral every 12 hours  influenza   Vaccine 0.5 milliLiter(s) IntraMuscular once  latanoprost 0.005% Ophthalmic Solution 1 Drop(s) Both EYES at bedtime  levothyroxine 50 MICROGram(s) Oral daily  lidocaine   4% Patch 1 Patch Transdermal every 24 hours  losartan 50 milliGRAM(s) Oral daily  metoprolol succinate ER 50 milliGRAM(s) Oral every 24 hours  nystatin Powder 1 Application(s) Topical two times a day  oxyCODONE  ER Tablet 20 milliGRAM(s) Oral every 12 hours  pantoprazole    Tablet 40 milliGRAM(s) Oral before breakfast    MEDICATIONS  (PRN):  artificial  tears Solution 1 Drop(s) Both EYES every 6 hours PRN Dry Eyes  guaiFENesin  milliGRAM(s) Oral every 12 hours PRN Cough  ondansetron Injectable 4 milliGRAM(s) IV Push every 8 hours PRN Nausea and/or Vomiting  senna 2 Tablet(s) Oral at bedtime PRN Constipation  traMADol 25 milliGRAM(s) Oral every 6 hours PRN Moderate Pain (4 - 6)  traMADol 50 milliGRAM(s) Oral every 8 hours PRN Severe Pain (7 - 10)      OBJECTIVE:    Vital Signs Last 24 Hrs  T(C): 36.9 (07 Sep 2021 00:32), Max: 37.4 (06 Sep 2021 13:59)  T(F): 98.5 (07 Sep 2021 00:32), Max: 99.3 (06 Sep 2021 13:59)  HR: 95 (07 Sep 2021 00:32) (63 - 116)  BP: 100/61 (07 Sep 2021 00:32) (100/61 - 164/82)  BP(mean): --  RR: 18 (07 Sep 2021 00:32) (18 - 18)  SpO2: 97% (07 Sep 2021 00:32) (95% - 97%)    Physical Exam:   Gen: No acute distress.  HEENT: Normocephalic, right forehead hematoma, slightly tender to palpation. No midline cervical tenderness. Trachea midline.  Pulm: No respiratory distress.   Chest: inferior sternal tenderness, no crepitus over bilateral chest  Abd: Soft, nontender, nondistended, no rebound, no guarding.  Hips: Stable.   Ext: No gross deformities. Palpable radial pulses bilaterally, palpable dorsalis pedis pulses bilaterally. +right knee tenderness, +right shoulder tenderness        I&O's Detail    05 Sep 2021 07:01  -  06 Sep 2021 07:00  --------------------------------------------------------  IN:  Total IN: 0 mL    OUT:    Voided (mL): 1000 mL  Total OUT: 1000 mL    Total NET: -1000 mL      06 Sep 2021 07:01  -  07 Sep 2021 02:13  --------------------------------------------------------  IN:    Oral Fluid: 300 mL  Total IN: 300 mL    OUT:    Voided (mL): 950 mL  Total OUT: 950 mL    Total NET: -650 mL          Daily     Daily     LABS:                        11.9   10.21 )-----------( 155      ( 06 Sep 2021 06:52 )             35.7     09-06    136  |  101  |  18  ----------------------------<  110<H>  3.7   |  19<L>  |  1.25    Ca    10.1      06 Sep 2021 06:52  Phos  3.3     09-06  Mg     2.0     09-06    TPro  7.8  /  Alb  3.6  /  TBili  0.5  /  DBili  x   /  AST  43<H>  /  ALT  16  /  AlkPhos  55  09-05    PT/INR - ( 05 Sep 2021 14:48 )   PT: 15.3 sec;   INR: 1.29 ratio         PTT - ( 05 Sep 2021 14:48 )  PTT:34.0 sec                     SURGERY DAILY PROGRESS NOTE:       SUBJECTIVE/ROS: No acute events overnight. pain is well controlled. tolerating regular diet. denies dizziness, SOB, CP or palpitations. MRI T/L spine with anterior splaying of the intervertebral disc space at the T12-L1 with edema in the anterior prevertebral soft tissues.          MEDICATIONS  (STANDING):  acetaminophen   Tablet .. 975 milliGRAM(s) Oral every 8 hours  amLODIPine   Tablet 5 milliGRAM(s) Oral daily  aspirin enteric coated 81 milliGRAM(s) Oral daily  atorvastatin 10 milliGRAM(s) Oral at bedtime  calcium carbonate    500 mG (Tums) Chewable 1 Tablet(s) Chew daily  heparin   Injectable 5000 Unit(s) SubCutaneous every 8 hours  hydrocortisone 10 milliGRAM(s) Oral every 12 hours  influenza   Vaccine 0.5 milliLiter(s) IntraMuscular once  latanoprost 0.005% Ophthalmic Solution 1 Drop(s) Both EYES at bedtime  levothyroxine 50 MICROGram(s) Oral daily  lidocaine   4% Patch 1 Patch Transdermal every 24 hours  losartan 50 milliGRAM(s) Oral daily  metoprolol succinate ER 50 milliGRAM(s) Oral every 24 hours  nystatin Powder 1 Application(s) Topical two times a day  oxyCODONE  ER Tablet 20 milliGRAM(s) Oral every 12 hours  pantoprazole    Tablet 40 milliGRAM(s) Oral before breakfast    MEDICATIONS  (PRN):  artificial  tears Solution 1 Drop(s) Both EYES every 6 hours PRN Dry Eyes  guaiFENesin  milliGRAM(s) Oral every 12 hours PRN Cough  ondansetron Injectable 4 milliGRAM(s) IV Push every 8 hours PRN Nausea and/or Vomiting  senna 2 Tablet(s) Oral at bedtime PRN Constipation  traMADol 25 milliGRAM(s) Oral every 6 hours PRN Moderate Pain (4 - 6)  traMADol 50 milliGRAM(s) Oral every 8 hours PRN Severe Pain (7 - 10)      OBJECTIVE:    Vital Signs Last 24 Hrs  T(C): 36.9 (07 Sep 2021 00:32), Max: 37.4 (06 Sep 2021 13:59)  T(F): 98.5 (07 Sep 2021 00:32), Max: 99.3 (06 Sep 2021 13:59)  HR: 95 (07 Sep 2021 00:32) (63 - 116)  BP: 100/61 (07 Sep 2021 00:32) (100/61 - 164/82)  BP(mean): --  RR: 18 (07 Sep 2021 00:32) (18 - 18)  SpO2: 97% (07 Sep 2021 00:32) (95% - 97%)    Physical Exam:   Gen: No acute distress.  HEENT: Normocephalic, right forehead hematoma, slightly tender to palpation. No midline cervical tenderness. Trachea midline.  Pulm: No respiratory distress.   Chest: inferior sternal tenderness, no crepitus over bilateral chest  Abd: Soft, nontender, nondistended, no rebound, no guarding.  Hips: Stable.   Ext: No gross deformities. Palpable radial pulses bilaterally, palpable dorsalis pedis pulses bilaterally. +right knee tenderness, +right shoulder tenderness        I&O's Detail    05 Sep 2021 07:01  -  06 Sep 2021 07:00  --------------------------------------------------------  IN:  Total IN: 0 mL    OUT:    Voided (mL): 1000 mL  Total OUT: 1000 mL    Total NET: -1000 mL      06 Sep 2021 07:01  -  07 Sep 2021 02:13  --------------------------------------------------------  IN:    Oral Fluid: 300 mL  Total IN: 300 mL    OUT:    Voided (mL): 950 mL  Total OUT: 950 mL    Total NET: -650 mL          Daily     Daily     LABS:                        11.9   10.21 )-----------( 155      ( 06 Sep 2021 06:52 )             35.7     09-06    136  |  101  |  18  ----------------------------<  110<H>  3.7   |  19<L>  |  1.25    Ca    10.1      06 Sep 2021 06:52  Phos  3.3     09-06  Mg     2.0     09-06    TPro  7.8  /  Alb  3.6  /  TBili  0.5  /  DBili  x   /  AST  43<H>  /  ALT  16  /  AlkPhos  55  09-05    PT/INR - ( 05 Sep 2021 14:48 )   PT: 15.3 sec;   INR: 1.29 ratio         PTT - ( 05 Sep 2021 14:48 )  PTT:34.0 sec

## 2021-09-07 NOTE — PROGRESS NOTE ADULT - ASSESSMENT
BEBETO, GREYSON  76F hx prior L2-5 Fxn w/. Dr Jane (ortho) at [facility] in early 2000s, revision x1, chronic LBP, s/p mechanical fall w/ RUE pain found to have R scapular fx and sternal fx, adm to trauma, CT T/L w/ sig adj segment degen above prior fusion w/ chronic comp fx, mild retropulsion, c/f unstable fx. Exam: AOx2-3 with choices, RUE pain pritchett 3/5 prox, 5/5 distally, LUE 5/5, pain pritchett HF LLE 4+/5, otherwise 5/5, SILT, no clonus.   -ADM trauma, q4h neuro checks  - No acute nsgy intervention, d/w daughter, would not want further surgical intervention for spine  - needs MRI non con T/L spine for r/o ligamentous injury.   - Attempted MRI yesterday but patient in too much pain to withstand. Needs to be premedicated with pain meds prior to going to MRI today.   - should wear TLSO brace at all times, keep on spine precautions pending MRI  - continue outpt fu w/ [facility] for pain control  - not a candidate for kyphoplasty given significant degen of >50% vb and multilevel in nature     - Please obtain a TLSO brace. Please contact lavinia goodrich for a fitted brace: [phone]   BEBETO, GREYSON  76F hx prior L2-5 Fxn w/. Dr Jane (ortho) at [facility] in early 2000s, revision x1, chronic LBP, s/p mechanical fall w/ RUE pain found to have R scapular fx and sternal fx, adm to trauma, CT T/L w/ sig adj segment degen above prior fusion w/ chronic comp fx, mild retropulsion, c/f unstable fx. Exam: AOx2-3 with choices, RUE pain pritchett 3/5 prox, 5/5 distally, LUE 5/5, pain pritchett HF LLE 4+/5, otherwise 5/5, SILT, no clonus.   -ADM trauma, q4h neuro checks  - No acute nsgy intervention, d/w daughter, would not want further surgical intervention for spine  - needs MRI non con T/L spine for r/o ligamentous injury.   - Attempted MRI yesterday but patient in too much pain to withstand. Needs to have MRI done with anesthesia today.  - should wear TLSO brace at all times, keep on spine precautions pending MRI  - continue outpt fu w/ [facility] for pain control  - not a candidate for kyphoplasty given significant degen of >50% vb and multilevel in nature     - Please obtain a TLSO brace. Please contact lavinia goodrich for a fitted brace: [phone]   BEBETO, GREYSON  76F hx prior L2-5 Fxn w/. Dr Jane (ortho) at [facility] in early 2000s, revision x1, chronic LBP, s/p mechanical fall w/ RUE pain found to have R scapular fx and sternal fx, adm to trauma, CT T/L w/ sig adj segment degen above prior fusion w/ chronic comp fx, mild retropulsion, c/f unstable fx. Exam: AOx2-3 with choices, RUE pain pritchett 3/5 prox, 5/5 distally, LUE 5/5, pain pritchett HF LLE 4+/5, otherwise 5/5, SILT, no clonus.   -ADM trauma, q4h neuro checks  - No acute nsgy intervention, d/w daughter, would not want further surgical intervention for spine  - needs MRI non con T/L spine for r/o ligamentous injury.   - Attempted MRI yesterday but patient in too much pain to withstand. Needs to have MRI done with anesthesia today.  - please obtain a dedicated CT T-spine urgently. Ordered  - should wear TLSO brace at all times, keep on spine precautions pending MRI  - continue outpt fu w/ [facility] for pain control  - not a candidate for kyphoplasty given significant degen of >50% vb and multilevel in nature     - Please obtain a TLSO brace. Please contact lavinia goodrich for a fitted brace: [phone]

## 2021-09-07 NOTE — PROGRESS NOTE ADULT - ASSESSMENT
Patient is a 75yo F with a history of colon cancer s/p right hemicolectomy (approx 2 years ago, outside hospital), DVT (on Eliquis), CKD, adrenal insufficiency presenting as transfer from [facility] for trauma evaluation secondary to a mechanical fall from sitting with headstrike. Injuries identified include right forehead hematoma, acute minimally displaced right coracoid process scapular fracture, T11/L1 compression fractures and an inferior sternal fracture. Patient hemodynamically stable.    Plan:  - pain control   - Reg diet  - F/u ortho recs for scapular fracture: sling immobilization and outpt fu  - F/u spine recs for vertebral fractures: TLSO, C spine precautions  - F/u MRI   - F/u right knee XRAY  - EKG and troponin negative  - Appreciate PT eval  - DVT ppx     ATP  [id number]   Patient is a 75yo F with a history of colon cancer s/p right hemicolectomy (approx 2 years ago, outside hospital), DVT (on Eliquis), CKD, adrenal insufficiency presenting as transfer from [facility] for trauma evaluation secondary to a mechanical fall from sitting with headstrike. Injuries identified include right forehead hematoma, acute minimally displaced right coracoid process scapular fracture, T11/L1 compression fractures and an inferior sternal fracture. Patient hemodynamically stable.    Plan:  - pain control   - Reg diet  - F/u ortho recs for scapular fracture: sling immobilization and outpt fu  - F/u spine recs for vertebral fractures: TLSO, C spine precautions  - F/u MRI, FU CT Thoracic Spine   - Appreciate PT eval  - DVT ppx     ATP  [id number]

## 2021-09-07 NOTE — CONSULT NOTE ADULT - SUBJECTIVE AND OBJECTIVE BOX
Patient is a 76y old  Female who presents with a chief complaint of S/p fall (07 Sep 2021 05:13)      HPI:  Patient is a 75yo F with a history of colon cancer s/p right hemicolectomy (approx 2 years ago, outside hospital), DVT (on Eliquis), CKD, adrenal insufficiency presenting as transfer from [facility] for trauma evaluation secondary to a mechanical fall from sitting. Patient was going to her commode last night when she slipped and fell on her right side, hitting her head. Denies LOC. Patient's daughter came to help her up and brought her to the ED. Since the fall, patient has been endorses right-sided chest pain but no SOB, headache, weakness or dizziness. Patient does not know what medications she takes.     Patient transferred to [facility] for trauma evaluation. In the ED, patient was hemodynamically stable but requiring 2L NC, satting 98%. Labs showed WBC 17, Cr 1.42, otherwise normal. CT head/cervical spine/chest were performed which showed a right forehead hematoma, acute minimally displaced right coracoid process scapular fracture, T11/L1 compression fractures and an inferior sternal fracture.    VITALS:  Vital Signs Last 24 Hrs  T(C): 36.3 (05 Sep 2021 14:52), Max: 36.8 (05 Sep 2021 07:46)  T(F): 97.4 (05 Sep 2021 14:52), Max: 98.3 (05 Sep 2021 07:46)  HR: 68 (05 Sep 2021 14:52) (66 - 77)  BP: 155/62 (05 Sep 2021 14:52) (145/75 - 174/75)  BP(mean): 88 (05 Sep 2021 14:52) (88 - 93)  RR: 20 (05 Sep 2021 14:52) (16 - 20)  SpO2: 96% (05 Sep 2021 14:52) (95% - 100%)    PRIMARY SURVEY:  A - Airway intact.  B - Bilateral breath sounds and equal chest rise. SpO2 98% 2L NC  C - Initially BP: 155/62 (09-05-21 @ 14:52), palpable pulses in all extremities.  D - GCS 15.  E - Exposure obtained.    SECONDARY SURVEY:  Gen: No acute distress.  HEENT: Normocephalic, right forehead hematoma, slightly tender to palpation. No midline cervical tenderness. Trachea midline.  Pulm: No respiratory distress.   Chest: inferior sternal tenderness, no crepitus over bilateral chest  Abd: Soft, nontender, nondistended, no rebound, no guarding.  Hips: Stable.   Ext: No gross deformities. Sensation and strength intact. Palpable radial pulses bilaterally, palpable dorsalis pedis pulses bilaterally. +right knee tenderness, +right shoulder tenderness  Back: No tenderness to palpation of spine, paraspinal tenderness, no palpable runoff, stepoff, or deformity. 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    Osteoporosis    Chronic Kidney Disease; Dx 2009    HTN - Hypertension    History of Obesity    Myocardial infarction    History of Laminectomy/ Fusion 1/06; L1-L2    H/O right hemicolectomy        FAMILY HISTORY:  Family history of hypertension (Mother)    Family history of diabetes mellitus (Sibling)        SOCIAL HISTORY:    Allergies    penicillin (Rash)    Intolerances          REVIEW OF SYSTEMS:  General: no weakness, no fever/chills, no weight loss/gain  Skin/Breast: no rash, no jaundice  Ophthalmologic: no vision changes, no dry eyes   Respiratory and Thorax: no cough, no wheezing, no hemoptysis, no dyspnea  Cardiovascular: no chest pain, no shortness of breath, no orthopnea  Gastrointestinal: no n/v/d, no abdominal pain, no dysphagia   Genitourinary: no dysuria, no frequency, no nocturia, no hematuria  Musculoskeletal: no trauma, no sprain/strain, no myalgias, no arthralgias, no fracture, + back pain  Neurological: no HA, no dizziness, no weakness, no numbness  Psychiatric: no depression, no SI/HI  Hematology/Lymphatics: no easy bruising  Endocrine: no heat or cold intolerance. no weight gain or loss  Allergic/Immunologic: no allergy or recent reaction     SUBJECTIVE / OVERNIGHT EVENTS:    + vomiting overnight and this AM as per daughter at bedside  patient seen and examined  denies cp/sob  poor PO intake      Vital Signs Last 24 Hrs  T(C): 36.6 (07 Sep 2021 09:00), Max: 37.4 (06 Sep 2021 13:59)  T(F): 97.8 (07 Sep 2021 09:00), Max: 99.3 (06 Sep 2021 13:59)  HR: 75 (07 Sep 2021 09:00) (63 - 116)  BP: 104/62 (07 Sep 2021 09:00) (100/61 - 137/68)  BP(mean): --  RR: 18 (07 Sep 2021 09:00) (18 - 18)  SpO2: 97% (07 Sep 2021 09:00) (96% - 97%)  I&O's Summary    06 Sep 2021 07:01  -  07 Sep 2021 07:00  --------------------------------------------------------  IN: 550 mL / OUT: 950 mL / NET: -400 mL    07 Sep 2021 07:01  -  07 Sep 2021 12:50  --------------------------------------------------------  IN: 0 mL / OUT: 300 mL / NET: -300 mL        PHYSICAL EXAM:  GENERAL: NAD, Comfortable  HEAD:  Atraumatic, Normocephalic, right forehead hematoma  EYES: EOMI, PERRLA, conjunctiva and sclera clear, legally blind  NECK: Supple, No JVD  CHEST/LUNG: Clear to auscultation bilaterally; No wheeze  HEART: Regular rate and rhythm; No murmurs, rubs, or gallops  ABDOMEN: Soft, Nontender, Nondistended; Bowel sounds present  NEURO: AAOx3, no focal deficit, 5/5 b/l extremities  EXTREMITIES:  2+ Peripheral Pulses, No clubbing, cyanosis, or edema  SKIN: No rashes or lesions    LABS:                        11.9   13.56 )-----------( 205      ( 07 Sep 2021 07:19 )             34.9     09-07    136  |  100  |  23  ----------------------------<  76  4.5   |  20<L>  |  2.28<H>    Ca    9.7      07 Sep 2021 07:19  Phos  4.0     09-07  Mg     2.0     09-07      PT/INR - ( 05 Sep 2021 14:48 )   PT: 15.3 sec;   INR: 1.29 ratio         PTT - ( 05 Sep 2021 14:48 )  PTT:34.0 sec  CAPILLARY BLOOD GLUCOSE                RADIOLOGY & ADDITIONAL TESTS:        Imaging Personally Reviewed:  [x] YES  [ ] NO    Consultant(s) Notes Reviewed:  [x] YES  [ ] NO      MEDICATIONS  (STANDING):  acetaminophen   Tablet .. 975 milliGRAM(s) Oral every 8 hours  amLODIPine   Tablet 5 milliGRAM(s) Oral daily  aspirin enteric coated 81 milliGRAM(s) Oral daily  atorvastatin 10 milliGRAM(s) Oral at bedtime  calcium carbonate    500 mG (Tums) Chewable 1 Tablet(s) Chew daily  heparin   Injectable 5000 Unit(s) SubCutaneous every 8 hours  hydrocortisone 10 milliGRAM(s) Oral every 12 hours  influenza   Vaccine 0.5 milliLiter(s) IntraMuscular once  latanoprost 0.005% Ophthalmic Solution 1 Drop(s) Both EYES at bedtime  levothyroxine 50 MICROGram(s) Oral daily  lidocaine   4% Patch 1 Patch Transdermal every 24 hours  losartan 50 milliGRAM(s) Oral daily  metoprolol succinate ER 50 milliGRAM(s) Oral every 24 hours  nystatin Powder 1 Application(s) Topical two times a day  oxyCODONE  ER Tablet 20 milliGRAM(s) Oral every 12 hours  pantoprazole    Tablet 40 milliGRAM(s) Oral before breakfast    MEDICATIONS  (PRN):  artificial  tears Solution 1 Drop(s) Both EYES every 6 hours PRN Dry Eyes  guaiFENesin  milliGRAM(s) Oral every 12 hours PRN Cough  oxyCODONE    IR 5 milliGRAM(s) Oral every 8 hours PRN Severe Pain (7 - 10)  senna 2 Tablet(s) Oral at bedtime PRN Constipation  traMADol 25 milliGRAM(s) Oral every 6 hours PRN Moderate Pain (4 - 6)  traMADol 50 milliGRAM(s) Oral every 8 hours PRN Severe Pain (7 - 10)      Care Discussed with Consultants/Other Providers [x] YES  [ ] NO

## 2021-09-07 NOTE — PROGRESS NOTE ADULT - SUBJECTIVE AND OBJECTIVE BOX
Ms. Solomon is a very pleasant 68-year-old female with a past medical history significant for 2 prior lumbar operations in the form of an L2-L5 fusion in 2004 as well as an T12-L1 laminectomy in 2008 who presents with a 3 column injury at T12-L1 in the setting of recent trauma.  She has had longstanding back issues ever since 2008 with worsening kyphosis as well as chronic low back pain requiring the aid of a walker to walk around.  Despite having significant proximal junctional kyphosis she was still able to get around with some assistance and she lives with her daughter.  She was recently hospitalized to [facility] and was discharged home.  However she had a mechanical fall and landed on her right side and hit her head.  She was brought to [facility] where work-up revealed that she had a hyperextension injury at T12-L1 with MRI confirming 3 column injury especially in the setting of a prior laminectomy at this level.  She is currently on the trauma service on bedrest with significant mechanical back pain upon any type of movement.  She denies any weakness of the legs or any numbness or tingling or bowel bladder incontinence.    On examination today she appears uncomfortable and mumbles to herself in bed.  With prompting by her daughter she seems to reorient herself.  Her bilateral lower extremities are at least 4 out of 5 throughout.  Her reflexes are somewhat diminished at 1+.  She has no clonus.    A CT and MRI both revealed that there is anterior splaying of T12-L1 with essentially a 3 column injury with STIR signal change at this level as well as paraspinous soft tissue edema.    I explained to her daughter that this is a difficult situation given that this was clearly an unstable injury based off of CT as well as MRI criteria.  However she has many medical comorbidities including her advanced age as well as severe osteoporosis that make surgery risky that is complicated by her prior L2-L5 fusion as well, with high likelihood of complications.  I told her that while I would recommend surgical management in the form of a thoracolumbar fusion that it was not entirely unreasonable for her is to try to keep her bedrest with a TLSO brace to see if she can heal the fracture on her own to avoid the risks of the surgery although I told her that there was significant morbidity from the immobilization from the conservative management as well and even after all that she might not heal anyways potentially required an operation.  I told her that it was unsafe to ambulate her at this time even with the brace.  I encouraged her daughter to discuss these options with her mother as well as her family and get back to us with course of their decision.  They are in agreement with the plan.    I spent over 30 minutes with the patient at the bedside evaluating the patient and coordinating her care.

## 2021-09-07 NOTE — CHART NOTE - NSCHARTNOTEFT_GEN_A_CORE
Tertiary Trauma Survey (TTS)    Date of TTS: 09/07/21               Time: 1430  Admit Date:  09/05/21                              Admit GCS: E- 4    V-5     M- 6    HPI:  Patient is a 77yo F with a history of colon cancer s/p right hemicolectomy (approx 2 years ago, outside hospital), DVT (on Eliquis), CKD, adrenal insufficiency presenting as transfer from [facility] for trauma evaluation secondary to a mechanical fall from sitting. Patient was going to her commode last night when she slipped and fell on her right side, hitting her head. Denies LOC. Patient's daughter came to help her up and brought her to the ED. Since the fall, patient has been endorses right-sided chest pain but no SOB, headache, weakness or dizziness. Patient does not know what medications she takes.     Patient transferred to [facility] for trauma evaluation. In the ED, patient was hemodynamically stable but requiring 2L NC, satting 98%. Labs showed WBC 17, Cr 1.42, otherwise normal. CT head/cervical spine/chest were performed which showed a right forehead hematoma, acute minimally displaced right coracoid process scapular fracture, T11/L1 compression fractures and an inferior sternal fracture.    VITALS:  Vital Signs Last 24 Hrs  T(C): 36.3 (05 Sep 2021 14:52), Max: 36.8 (05 Sep 2021 07:46)  T(F): 97.4 (05 Sep 2021 14:52), Max: 98.3 (05 Sep 2021 07:46)  HR: 68 (05 Sep 2021 14:52) (66 - 77)  BP: 155/62 (05 Sep 2021 14:52) (145/75 - 174/75)  BP(mean): 88 (05 Sep 2021 14:52) (88 - 93)  RR: 20 (05 Sep 2021 14:52) (16 - 20)  SpO2: 96% (05 Sep 2021 14:52) (95% - 100%)    PRIMARY SURVEY:  A - Airway intact.  B - Bilateral breath sounds and equal chest rise. SpO2 98% 2L NC  C - Initially BP: 155/62 (09-05-21 @ 14:52), palpable pulses in all extremities.  D - GCS 15.  E - Exposure obtained.    SECONDARY SURVEY:  Gen: No acute distress.  HEENT: Normocephalic, right forehead hematoma, slightly tender to palpation. No midline cervical tenderness. Trachea midline.  Pulm: No respiratory distress.   Chest: inferior sternal tenderness, no crepitus over bilateral chest  Abd: Soft, nontender, nondistended, no rebound, no guarding.  Hips: Stable.   Ext: No gross deformities. Sensation and strength intact. Palpable radial pulses bilaterally, palpable dorsalis pedis pulses bilaterally. +right knee tenderness, +right shoulder tenderness  Back: No tenderness to palpation of spine, paraspinal tenderness, no palpable runoff, stepoff, or deformity. 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    Osteoporosis    Chronic Kidney Disease; Dx 2009    HTN - Hypertension    History of Obesity    Myocardial infarction    History of Laminectomy/ Fusion 1/06; L1-L2    H/O right hemicolectomy      [  ] No significant past history as reviewed with the patient and family    FAMILY HISTORY:  Family history of hypertension (Mother)    Family history of diabetes mellitus (Sibling)      [  ] Family history not pertinent as reviewed with the patient and family    SOCIAL HISTORY:    Medications (inpatient): acetaminophen   Tablet .. 975 milliGRAM(s) Oral every 8 hours  amLODIPine   Tablet 5 milliGRAM(s) Oral daily  aspirin enteric coated 81 milliGRAM(s) Oral daily  atorvastatin 10 milliGRAM(s) Oral at bedtime  calcium carbonate    500 mG (Tums) Chewable 1 Tablet(s) Chew daily  heparin   Injectable 5000 Unit(s) SubCutaneous every 8 hours  hydrocortisone 10 milliGRAM(s) Oral every 12 hours  influenza   Vaccine 0.5 milliLiter(s) IntraMuscular once  latanoprost 0.005% Ophthalmic Solution 1 Drop(s) Both EYES at bedtime  levothyroxine 50 MICROGram(s) Oral daily  lidocaine   4% Patch 1 Patch Transdermal every 24 hours  metoprolol succinate ER 50 milliGRAM(s) Oral every 24 hours  nystatin Powder 1 Application(s) Topical two times a day  oxyCODONE  ER Tablet 20 milliGRAM(s) Oral every 12 hours  pantoprazole    Tablet 40 milliGRAM(s) Oral before breakfast    Medications (PRN):artificial  tears Solution 1 Drop(s) Both EYES every 6 hours PRN  guaiFENesin  milliGRAM(s) Oral every 12 hours PRN  oxyCODONE    IR 5 milliGRAM(s) Oral every 8 hours PRN  senna 2 Tablet(s) Oral at bedtime PRN  traMADol 25 milliGRAM(s) Oral every 6 hours PRN  traMADol 50 milliGRAM(s) Oral every 8 hours PRN    Allergies: penicillin (Rash)  (Intolerances: )    Vital Signs Last 24 Hrs  T(C): 36.7 (07 Sep 2021 13:46), Max: 37.2 (06 Sep 2021 21:41)  T(F): 98.1 (07 Sep 2021 13:46), Max: 99 (06 Sep 2021 21:41)  HR: 87 (07 Sep 2021 13:46) (63 - 99)  BP: 125/75 (07 Sep 2021 13:46) (100/61 - 137/68)  BP(mean): --  RR: 18 (07 Sep 2021 13:46) (18 - 18)  SpO2: 97% (07 Sep 2021 13:46) (97% - 97%)  Drug Dosing Weight  Height (cm): 154.9 (05 Sep 2021 13:41)  Weight (kg): 81.6 (05 Sep 2021 13:41)  BMI (kg/m2): 34 (05 Sep 2021 13:41)  BSA (m2): 1.81 (05 Sep 2021 13:41)                          11.9   13.56 )-----------( 205      ( 07 Sep 2021 07:19 )             34.9     09-07    136  |  100  |  23  ----------------------------<  76  4.5   |  20<L>  |  2.28<H>    Ca    9.7      07 Sep 2021 07:19  Phos  4.0     09-07  Mg     2.0     09-07      PT/INR - ( 05 Sep 2021 14:48 )   PT: 15.3 sec;   INR: 1.29 ratio         PTT - ( 05 Sep 2021 14:48 )  PTT:34.0 sec      List Injuries Identified to Date:    1) Small right frontal scalp hematoma without associated calvarial fracture.  2) Acute minimally displaced fracture of the coracoid process of the right scapula.  3) Mildly displaced and foreshortened fracture of the mid to lower sternum  4) Compression fracture deformities of the T11 and L1 vertebral bodies    List Operative and Interventional Radiological Procedures:   None    Consults (Date):  [09/05 ] Neurosurgery   [09/05 ] Orthopedics    RADIOLOGICAL FINDINGS REVIEW:  CXR:   Mild increased interstitial lung markings. No significant pleural effusion.    Scoliosis and hardware overlies thoracolumbar region    Heart size within normal limits.    Pelvis Films:   No acute fracture or dislocation. If clinically warranted cross-sectional imaging should be obtained.    Degenerative joint disease of the bilateral hips.    C-Spine Films:    T/L/S Spine Films:    Extremity Films:  Tib/Fib    No acute fracture, dislocation, or joint effusion.    Mild osteoarthritic changes of the right knee.    R shoulder    No acute displaced fracture or dislocation. Calcified tendinitis. Degenerative changes of the right shoulder.    R Knee  No acute fracture, dislocation, or joint effusion.    Mild osteoarthritic changes of the right knee.      Head CT:    BRAIN:  Small right frontal scalp hematoma without associated calvarial fracture.  No acute intracranial hemorrhage, mass effect, or cerebral edema.    C-Spine CT:   CERVICAL SPINE:  No acute vertebral fracture or traumatic malalignment.  Severe multilevel spondylosis as above.    Neck CT:    Chest CT:  Acute minimally displaced fracture of the coracoid process of the right scapula.    Mildly displaced and foreshortened fracture of the mid to lower sternum new since the prior study appears acute although no surrounding soft tissue infiltrative changes are noted. Correlation with point tenderness in this region is recommended for evaluation of an acute fracture.    Compression fracture deformities of the T11 and L1 vertebral bodies are new since February 21, 2017 are of indeterminate age.    No acute rib fractures or pneumothorax.    Scattered few groundglass opacities in the right upper lobe, indeterminate.    ABD/Pelvis CT:    Other: CT lumbar spine  Fusion L2-5. Fracture through the T12-L1 disc space with widening of the disc space and a L1-2 laminectomy which appears unstable. Diffuse osteopenia. Old T11 and L1 fractures.    MR Thoracic Spine  There is anterior splaying of the intervertebral disc space at T12-L1 with edema and blood products in the anterior prevertebral soft tissues, suspicious for anterior longitudinal ligament injury. No edema is seen in the neural arch to suggest three column injury.    Chronic fractures of the T2, T11, T12 and L1. No acute fractures are seen.    MR Lumbar spine  -Widening of the T12-L1 disc space, without associated marrow edema. Chronic L1 compression fracture. Please see concurrent dedicated MRI thoracic spine for further details.    -Multilevel degenerative changes and postsurgical changes throughout the lumbar spine, as described above.    CT Thoracic Spine  The level above the lumbar fusion there is widening of the T12-L1 disc space anteriorly with abnormal signal replacing the disc and compression of the L1 vertebral body. There has been a L1 laminectomy. In spite of the preservation of the posterior longitudinal ligament and ligamentum flavum this level may be unstable without lack of posterior osseous support.    Physical Exam:  Gen: No acute distress.  HEENT: Normocephalic, right forehead hematoma, slightly tender to palpation. No midline cervical tenderness. Trachea midline.  Pulm: No respiratory distress.   Chest: inferior sternal tenderness, no crepitus over bilateral chest   Abd: Soft, nontender, nondistended, no rebound, no guarding.  Hips: Stable.   Ext: No gross deformities. Sensation and strength intact. Palpable radial pulses bilaterally, palpable dorsalis pedis pulses bilaterally. +right knee tenderness, +right shoulder tenderness  Back: Mild tenderness to palpation of spine in thoracolumbar spine, mild paraspinal tenderness at this level, no palpable runoff, stepoff, or deformity.      Interpretation of Findings:    1) Small right frontal scalp hematoma without associated calvarial fracture.  2) Acute minimally displaced fracture of the coracoid process of the right scapula.  3) Mildly displaced and foreshortened fracture of the mid to lower sternum  4) Compression fracture deformities of the T11 and L1 vertebral bodies  5) Scattered few groundglass opacities in the right upper lobe  6) Anterior splaying of the intervertebral disc space at T12-L1 with edema and blood products in the anterior prevertebral soft tissues, suspicious for anterior longitudinal ligament injury. Chronic fractures of the T2, T11, T12 and L1  7) Fusion L2-5

## 2021-09-07 NOTE — CHART NOTE - NSCHARTNOTEFT_GEN_A_CORE
Evaluate and measure for custom bivalve TLSO. Reviewed with daughter. Orthosis to be fit and applied 9/8/21.    Kenny CUNNINGHAM  [facility]  [phone]

## 2021-09-07 NOTE — CONSULT NOTE ADULT - ATTENDING COMMENTS
Pt seen and examined with the NP. Agree with the assessment and plan.  Vitals, labs and radiology results personally reviewed.    Admitted for mechanical fall with compression fractures. Per the daughter, pt is legally blind from macular degeneration, but otherwise able to go short distances with walker. Interactive at home. Feeling well until she fell while trying to go from bed to commode.     Neuro surgery offering surgery vs conservative management.  CT and MRI reviewed.   TLSO brace in the meantime. The daughter Susie will discuss further with the rest of the family.    Pt has been nauseous (no abd pain). ?pain med induced.   Poor PO intake since being in the hospital. Normally with good appetite at home, with mechanical soft.  Cr is up from baseline. Hold Losartan. Will start Gentle IVF for acute kidney failure: pre-renal. Her recent TTE reviewed, with normal LV.   She is on Hydrocortisone 10 mg BID at home, so will monitor for adrenal insufficiency. will order am cortisol.   can consider abd xray if remain nauseous. c/w antiemetics.     Discussed with the pt and the daughter at bedside answered all questions. discussed with neurosurgery team.    DVT ppx    Dr. Hope ([employer])  [phone] Pt seen and examined with the NP. Agree with the assessment and plan.  Vitals, labs and radiology results personally reviewed.    Admitted for mechanical fall with compression fractures. Per the daughter, pt is legally blind from macular degeneration, but otherwise able to go short distances with walker. Interactive at home. Feeling well until she fell while trying to go from bed to commode.     Neuro surgery offering surgery vs conservative management.  CT and MRI reviewed.   TLSO brace in the meantime. The daughter Susie will discuss further with the rest of the family.    Pt has been nauseous (no abd pain). ?pain med induced.   Poor PO intake since being in the hospital. Normally with good appetite at home, with mechanical soft.  Cr is up from baseline. Hold Losartan. Will start Gentle IVF for acute kidney failure: pre-renal. Her recent TTE reviewed, with normal LV.   She is on Hydrocortisone 10 mg BID at home, so will monitor for adrenal insufficiency. will order am cortisol.   can consider abd xray if remain nauseous. c/w antiemetics.   check UA.  will check vit D, vit B12, TSH.     Discussed with the pt and the daughter at bedside answered all questions. discussed with neurosurgery team.    DVT ppx    Dr. Hope ([employer])  [phone]

## 2021-09-08 LAB
24R-OH-CALCIDIOL SERPL-MCNC: 67.9 NG/ML — SIGNIFICANT CHANGE UP (ref 30–80)
ANION GAP SERPL CALC-SCNC: 17 MMOL/L — SIGNIFICANT CHANGE UP (ref 5–17)
ANION GAP SERPL CALC-SCNC: 19 MMOL/L — HIGH (ref 5–17)
APTT BLD: 69.4 SEC — HIGH (ref 27.5–35.5)
BLD GP AB SCN SERPL QL: NEGATIVE — SIGNIFICANT CHANGE UP
BUN SERPL-MCNC: 27 MG/DL — HIGH (ref 7–23)
BUN SERPL-MCNC: 29 MG/DL — HIGH (ref 7–23)
CALCIUM SERPL-MCNC: 9.3 MG/DL — SIGNIFICANT CHANGE UP (ref 8.4–10.5)
CALCIUM SERPL-MCNC: 9.3 MG/DL — SIGNIFICANT CHANGE UP (ref 8.4–10.5)
CHLORIDE SERPL-SCNC: 101 MMOL/L — SIGNIFICANT CHANGE UP (ref 96–108)
CHLORIDE SERPL-SCNC: 102 MMOL/L — SIGNIFICANT CHANGE UP (ref 96–108)
CK SERPL-CCNC: 161 U/L — SIGNIFICANT CHANGE UP (ref 25–170)
CO2 SERPL-SCNC: 17 MMOL/L — LOW (ref 22–31)
CO2 SERPL-SCNC: 18 MMOL/L — LOW (ref 22–31)
CORTIS AM PEAK SERPL-MCNC: 18 UG/DL — SIGNIFICANT CHANGE UP (ref 6–18.4)
CREAT ?TM UR-MCNC: 84 MG/DL — SIGNIFICANT CHANGE UP
CREAT SERPL-MCNC: 2.05 MG/DL — HIGH (ref 0.5–1.3)
CREAT SERPL-MCNC: 2.2 MG/DL — HIGH (ref 0.5–1.3)
GLUCOSE SERPL-MCNC: 69 MG/DL — LOW (ref 70–99)
GLUCOSE SERPL-MCNC: 74 MG/DL — SIGNIFICANT CHANGE UP (ref 70–99)
HCT VFR BLD CALC: 31.5 % — LOW (ref 34.5–45)
HCT VFR BLD CALC: 32.1 % — LOW (ref 34.5–45)
HGB BLD-MCNC: 10.8 G/DL — LOW (ref 11.5–15.5)
HGB BLD-MCNC: 11.2 G/DL — LOW (ref 11.5–15.5)
INR BLD: 1.28 RATIO — HIGH (ref 0.88–1.16)
MAGNESIUM SERPL-MCNC: 2 MG/DL — SIGNIFICANT CHANGE UP (ref 1.6–2.6)
MCHC RBC-ENTMCNC: 26.5 PG — LOW (ref 27–34)
MCHC RBC-ENTMCNC: 26.6 PG — LOW (ref 27–34)
MCHC RBC-ENTMCNC: 34.3 GM/DL — SIGNIFICANT CHANGE UP (ref 32–36)
MCHC RBC-ENTMCNC: 34.9 GM/DL — SIGNIFICANT CHANGE UP (ref 32–36)
MCV RBC AUTO: 75.9 FL — LOW (ref 80–100)
MCV RBC AUTO: 77.6 FL — LOW (ref 80–100)
MRSA PCR RESULT.: SIGNIFICANT CHANGE UP
NRBC # BLD: 0 /100 WBCS — SIGNIFICANT CHANGE UP (ref 0–0)
NRBC # BLD: 0 /100 WBCS — SIGNIFICANT CHANGE UP (ref 0–0)
OSMOLALITY UR: 443 MOS/KG — SIGNIFICANT CHANGE UP (ref 300–900)
PHOSPHATE SERPL-MCNC: 4.9 MG/DL — HIGH (ref 2.5–4.5)
PLATELET # BLD AUTO: 203 K/UL — SIGNIFICANT CHANGE UP (ref 150–400)
PLATELET # BLD AUTO: 221 K/UL — SIGNIFICANT CHANGE UP (ref 150–400)
POTASSIUM SERPL-MCNC: 4 MMOL/L — SIGNIFICANT CHANGE UP (ref 3.5–5.3)
POTASSIUM SERPL-MCNC: 4.1 MMOL/L — SIGNIFICANT CHANGE UP (ref 3.5–5.3)
POTASSIUM SERPL-SCNC: 4 MMOL/L — SIGNIFICANT CHANGE UP (ref 3.5–5.3)
POTASSIUM SERPL-SCNC: 4.1 MMOL/L — SIGNIFICANT CHANGE UP (ref 3.5–5.3)
POTASSIUM UR-SCNC: 26 MMOL/L — SIGNIFICANT CHANGE UP
PROTHROM AB SERPL-ACNC: 15.2 SEC — HIGH (ref 10.6–13.6)
RBC # BLD: 4.06 M/UL — SIGNIFICANT CHANGE UP (ref 3.8–5.2)
RBC # BLD: 4.23 M/UL — SIGNIFICANT CHANGE UP (ref 3.8–5.2)
RBC # FLD: 16.9 % — HIGH (ref 10.3–14.5)
RBC # FLD: 17.6 % — HIGH (ref 10.3–14.5)
RH IG SCN BLD-IMP: POSITIVE — SIGNIFICANT CHANGE UP
S AUREUS DNA NOSE QL NAA+PROBE: SIGNIFICANT CHANGE UP
SARS-COV-2 RNA SPEC QL NAA+PROBE: SIGNIFICANT CHANGE UP
SODIUM SERPL-SCNC: 137 MMOL/L — SIGNIFICANT CHANGE UP (ref 135–145)
SODIUM SERPL-SCNC: 137 MMOL/L — SIGNIFICANT CHANGE UP (ref 135–145)
SODIUM UR-SCNC: 70 MMOL/L — SIGNIFICANT CHANGE UP
TSH SERPL-MCNC: 0.18 UIU/ML — LOW (ref 0.27–4.2)
VIT B12 SERPL-MCNC: >2000 PG/ML — HIGH (ref 232–1245)
WBC # BLD: 11.96 K/UL — HIGH (ref 3.8–10.5)
WBC # BLD: 12.89 K/UL — HIGH (ref 3.8–10.5)
WBC # FLD AUTO: 11.96 K/UL — HIGH (ref 3.8–10.5)
WBC # FLD AUTO: 12.89 K/UL — HIGH (ref 3.8–10.5)

## 2021-09-08 PROCEDURE — 74018 RADEX ABDOMEN 1 VIEW: CPT | Mod: 26

## 2021-09-08 RX ORDER — HEPARIN SODIUM 5000 [USP'U]/ML
5000 INJECTION INTRAVENOUS; SUBCUTANEOUS EVERY 8 HOURS
Refills: 0 | Status: DISCONTINUED | OUTPATIENT
Start: 2021-09-08 | End: 2021-09-13

## 2021-09-08 RX ORDER — SODIUM BICARBONATE 1 MEQ/ML
650 SYRINGE (ML) INTRAVENOUS THREE TIMES A DAY
Refills: 0 | Status: DISCONTINUED | OUTPATIENT
Start: 2021-09-08 | End: 2021-09-14

## 2021-09-08 RX ORDER — DEXTROSE MONOHYDRATE, SODIUM CHLORIDE, AND POTASSIUM CHLORIDE 50; .745; 4.5 G/1000ML; G/1000ML; G/1000ML
1000 INJECTION, SOLUTION INTRAVENOUS
Refills: 0 | Status: DISCONTINUED | OUTPATIENT
Start: 2021-09-08 | End: 2021-09-12

## 2021-09-08 RX ORDER — DEXTROSE MONOHYDRATE, SODIUM CHLORIDE, AND POTASSIUM CHLORIDE 50; .745; 4.5 G/1000ML; G/1000ML; G/1000ML
1000 INJECTION, SOLUTION INTRAVENOUS
Refills: 0 | Status: DISCONTINUED | OUTPATIENT
Start: 2021-09-08 | End: 2021-09-08

## 2021-09-08 RX ORDER — ACETAMINOPHEN 500 MG
975 TABLET ORAL EVERY 8 HOURS
Refills: 0 | Status: COMPLETED | OUTPATIENT
Start: 2021-09-08 | End: 2021-09-09

## 2021-09-08 RX ADMIN — HEPARIN SODIUM 5000 UNIT(S): 5000 INJECTION INTRAVENOUS; SUBCUTANEOUS at 21:45

## 2021-09-08 RX ADMIN — NYSTATIN CREAM 1 APPLICATION(S): 100000 CREAM TOPICAL at 06:29

## 2021-09-08 RX ADMIN — Medication 1 TABLET(S): at 11:40

## 2021-09-08 RX ADMIN — OXYCODONE HYDROCHLORIDE 20 MILLIGRAM(S): 5 TABLET ORAL at 06:59

## 2021-09-08 RX ADMIN — DEXTROSE MONOHYDRATE, SODIUM CHLORIDE, AND POTASSIUM CHLORIDE 50 MILLILITER(S): 50; .745; 4.5 INJECTION, SOLUTION INTRAVENOUS at 21:43

## 2021-09-08 RX ADMIN — LIDOCAINE 1 PATCH: 4 CREAM TOPICAL at 17:23

## 2021-09-08 RX ADMIN — SODIUM CHLORIDE 75 MILLILITER(S): 9 INJECTION INTRAMUSCULAR; INTRAVENOUS; SUBCUTANEOUS at 00:54

## 2021-09-08 RX ADMIN — OXYCODONE HYDROCHLORIDE 5 MILLIGRAM(S): 5 TABLET ORAL at 21:42

## 2021-09-08 RX ADMIN — PANTOPRAZOLE SODIUM 40 MILLIGRAM(S): 20 TABLET, DELAYED RELEASE ORAL at 06:29

## 2021-09-08 RX ADMIN — OXYCODONE HYDROCHLORIDE 20 MILLIGRAM(S): 5 TABLET ORAL at 17:25

## 2021-09-08 RX ADMIN — Medication 975 MILLIGRAM(S): at 11:41

## 2021-09-08 RX ADMIN — OXYCODONE HYDROCHLORIDE 5 MILLIGRAM(S): 5 TABLET ORAL at 14:10

## 2021-09-08 RX ADMIN — Medication 975 MILLIGRAM(S): at 21:45

## 2021-09-08 RX ADMIN — LIDOCAINE 1 PATCH: 4 CREAM TOPICAL at 20:27

## 2021-09-08 RX ADMIN — OXYCODONE HYDROCHLORIDE 20 MILLIGRAM(S): 5 TABLET ORAL at 06:29

## 2021-09-08 RX ADMIN — Medication 975 MILLIGRAM(S): at 22:15

## 2021-09-08 RX ADMIN — AMLODIPINE BESYLATE 5 MILLIGRAM(S): 2.5 TABLET ORAL at 09:40

## 2021-09-08 RX ADMIN — LATANOPROST 1 DROP(S): 0.05 SOLUTION/ DROPS OPHTHALMIC; TOPICAL at 21:44

## 2021-09-08 RX ADMIN — OXYCODONE HYDROCHLORIDE 20 MILLIGRAM(S): 5 TABLET ORAL at 17:22

## 2021-09-08 RX ADMIN — Medication 975 MILLIGRAM(S): at 00:54

## 2021-09-08 RX ADMIN — Medication 50 MILLIGRAM(S): at 17:24

## 2021-09-08 RX ADMIN — LIDOCAINE 1 PATCH: 4 CREAM TOPICAL at 06:29

## 2021-09-08 RX ADMIN — HEPARIN SODIUM 5000 UNIT(S): 5000 INJECTION INTRAVENOUS; SUBCUTANEOUS at 11:39

## 2021-09-08 RX ADMIN — Medication 650 MILLIGRAM(S): at 22:46

## 2021-09-08 RX ADMIN — Medication 50 MICROGRAM(S): at 06:29

## 2021-09-08 RX ADMIN — OXYCODONE HYDROCHLORIDE 5 MILLIGRAM(S): 5 TABLET ORAL at 13:38

## 2021-09-08 RX ADMIN — ATORVASTATIN CALCIUM 10 MILLIGRAM(S): 80 TABLET, FILM COATED ORAL at 21:44

## 2021-09-08 RX ADMIN — HEPARIN SODIUM 5000 UNIT(S): 5000 INJECTION INTRAVENOUS; SUBCUTANEOUS at 06:29

## 2021-09-08 RX ADMIN — Medication 975 MILLIGRAM(S): at 11:40

## 2021-09-08 RX ADMIN — Medication 10 MILLIGRAM(S): at 09:40

## 2021-09-08 RX ADMIN — Medication 975 MILLIGRAM(S): at 01:24

## 2021-09-08 RX ADMIN — Medication 10 MILLIGRAM(S): at 22:46

## 2021-09-08 RX ADMIN — OXYCODONE HYDROCHLORIDE 5 MILLIGRAM(S): 5 TABLET ORAL at 22:12

## 2021-09-08 RX ADMIN — NYSTATIN CREAM 1 APPLICATION(S): 100000 CREAM TOPICAL at 17:23

## 2021-09-08 NOTE — CONSULT NOTE ADULT - TIME BILLING
Pt seen and examined, agree with the above assessment and plan by NAHOMI Pineda.  Patient is a 75yo F , known to practice from prior admission,  with a history of colon cancer s/p right hemicolectomy (approx 2 years ago, outside hospital), DVT (on Eliquis), CKD, adrenal insufficiency presenting as transfer from [facility] for trauma evaluation secondary to a mechanical fall from sitting    #Mechanical fall  -MRI noted with anterior splaying of T12/L1 space w/ edema   -CT T done noted with widening of T12/L1 disc space w/ L1 laminectomy  -plans for posterior extension of prior L2-5 fusion and stabilization tomorrow  -cv stable, no cp, sob. ekg without acute ischemic changes. most recent echo noted with EF 66% grossly nl LV sys fx, mild diastolic dsyfx   -pt cardiac optimized - can proceed to OR with acceptable CV risk  -neuro sx/ trauma f/u   -c/w toprol 50 mg daily for h/o SVT  -AC on hold for OR      d/w patient and daughter at bedside

## 2021-09-08 NOTE — PROGRESS NOTE ADULT - ASSESSMENT
BEBETO, GREYSON  76F hx prior L2-5 Fxn w/. Dr Jane (ortho) at [facility] in early 2000s, revision x1, chronic LBP, s/p mechanical fall w/ RUE pain found to have R scapular fx and sternal fx, adm to trauma, CT T/L w/ sig adj segment degen above prior fusion w/ chronic comp fx, mild retropulsion, c/f unstable fx. Exam: AOx2-3 with choices, RUE pain pritchett 3/5 prox, 5/5 distally, LUE 5/5, pain pritchett HF LLE 4+/5, otherwise 5/5, SILT, no clonus.     - ADM trauma, q4h neuro checks  - Possible OR tomorrow, need to confirm with family and patient whether they'd like to proceed with surgery, please document clearance   - MRI T/L done - anterior splaying of T12/L1 space w/ edema and hem w/ ALL injury, concerning for unstable injury  - CT T done - widening of T12/L1 disc space w/ L1 laminectomy  - should wear TLSO brace at all times, keep on spine precautions

## 2021-09-08 NOTE — PROGRESS NOTE ADULT - SUBJECTIVE AND OBJECTIVE BOX
SUBJECTIVE / OVERNIGHT EVENTS:  feels okay  mild nausea  could be from oxycodone  Cr still ~ 2. on iVF  no cp, no sob, no n/v/d. no abdominal pain.  no headache, no dizziness.   the daughter at bedside.      --------------------------------------------------------------------------------------------  LABS:                        11.2   12.89 )-----------( 221      ( 08 Sep 2021 10:31 )             32.1     -    137  |  101  |  27<H>  ----------------------------<  69<L>  4.0   |  17<L>  |  2.05<H>    Ca    9.3      08 Sep 2021 10:30  Phos  4.9     -  Mg     2.0     09-08      PT/INR - ( 08 Sep 2021 10:31 )   PT: 15.2 sec;   INR: 1.28 ratio         PTT - ( 08 Sep 2021 10:31 )  PTT:69.4 sec  CAPILLARY BLOOD GLUCOSE            Urinalysis Basic - ( 07 Sep 2021 17:15 )    Color: Yellow / Appearance: Clear / S.017 / pH: x  Gluc: x / Ketone: Small  / Bili: Negative / Urobili: Negative   Blood: x / Protein: 30 mg/dL / Nitrite: Negative   Leuk Esterase: Moderate / RBC: 2 /hpf / WBC 6 /HPF   Sq Epi: x / Non Sq Epi: 1 /hpf / Bacteria: Negative        RADIOLOGY & ADDITIONAL TESTS:    Imaging Personally Reviewed:  [x] YES  [ ] NO    Consultant(s) Notes Reviewed:  [x] YES  [ ] NO    MEDICATIONS  (STANDING):  acetaminophen   Tablet .. 975 milliGRAM(s) Oral every 8 hours  atorvastatin 10 milliGRAM(s) Oral at bedtime  calcium carbonate    500 mG (Tums) Chewable 1 Tablet(s) Chew daily  dextrose 5% + sodium chloride 0.45% with potassium chloride 20 mEq/L 1000 milliLiter(s) (100 mL/Hr) IV Continuous <Continuous>  heparin   Injectable 5000 Unit(s) SubCutaneous every 8 hours  hydrocortisone 10 milliGRAM(s) Oral every 12 hours  influenza   Vaccine 0.5 milliLiter(s) IntraMuscular once  latanoprost 0.005% Ophthalmic Solution 1 Drop(s) Both EYES at bedtime  levothyroxine 50 MICROGram(s) Oral daily  lidocaine   4% Patch 1 Patch Transdermal every 24 hours  metoprolol succinate ER 50 milliGRAM(s) Oral every 24 hours  nystatin Powder 1 Application(s) Topical two times a day  oxyCODONE  ER Tablet 20 milliGRAM(s) Oral every 12 hours  pantoprazole    Tablet 40 milliGRAM(s) Oral before breakfast    MEDICATIONS  (PRN):  artificial  tears Solution 1 Drop(s) Both EYES every 6 hours PRN Dry Eyes  guaiFENesin  milliGRAM(s) Oral every 12 hours PRN Cough  oxyCODONE    IR 5 milliGRAM(s) Oral every 8 hours PRN Severe Pain (7 - 10)  senna 2 Tablet(s) Oral at bedtime PRN Constipation  traMADol 25 milliGRAM(s) Oral every 6 hours PRN Moderate Pain (4 - 6)  traMADol 50 milliGRAM(s) Oral every 8 hours PRN Severe Pain (7 - 10)      Care Discussed with Consultants/Other Providers [x] YES  [ ] NO    Vital Signs Last 24 Hrs  T(C): 37.3 (08 Sep 2021 15:57), Max: 37.3 (08 Sep 2021 15:57)  T(F): 99.1 (08 Sep 2021 15:57), Max: 99.1 (08 Sep 2021 15:57)  HR: 86 (08 Sep 2021 15:57) (66 - 91)  BP: 142/68 (08 Sep 2021 15:57) (78/42 - 142/68)  BP(mean): --  RR: 18 (08 Sep 2021 15:57) (18 - 18)  SpO2: 94% (08 Sep 2021 15:57) (94% - 100%)  I&O's Summary    07 Sep 2021 07:01  -  08 Sep 2021 07:00  --------------------------------------------------------  IN: 1420 mL / OUT: 1050 mL / NET: 370 mL    08 Sep 2021 07:01  -  08 Sep 2021 16:39  --------------------------------------------------------  IN: 0 mL / OUT: 300 mL / NET: -300 mL    PHYSICAL EXAM:  GENERAL: NAD, well-developed, comfortable, on room air  HEAD:  Atraumatic, Normocephalic  EYES: EOMI, PERRLA, conjunctiva and sclera clear, legally blind  NECK: Supple, No JVD  CHEST/LUNG: mild decrease breath sounds bilaterally; No wheeze   HEART: Regular rate and rhythm; No murmurs, rubs, or gallops  ABDOMEN: Soft, Nontender, Nondistended; Bowel sounds present  Neuro: AAOx3, no focal weakness, 5/5 b/l extremity strength  EXTREMITIES:  2+ Peripheral Pulses, No clubbing, cyanosis, trace b/l edema  SKIN: No rashes or lesions

## 2021-09-08 NOTE — PROGRESS NOTE ADULT - ASSESSMENT
Patient is a 77yo F with a history of colon cancer s/p right hemicolectomy (approx 2 years ago, outside hospital), DVT (on Eliquis), CKD, adrenal insufficiency presenting as transfer from [facility] for trauma evaluation secondary to a mechanical fall from sitting with headstrike. Injuries identified include right forehead hematoma, acute minimally displaced right coracoid process scapular fracture, T11/L1 compression fractures and an inferior sternal fracture. Patient hemodynamically stable.    Plan:  - pain control   - Reg diet  - F/u ortho recs for scapular fracture: sling immobilization and outpt fu  - F/u spine recs for vertebral fractures: TLSO, C spine precautions  - Appreciate hospitalist recs  - DVT ppx    Patient is a 77yo F with a history of colon cancer s/p right hemicolectomy (approx 2 years ago, outside hospital), DVT (on Eliquis), CKD, adrenal insufficiency presenting as transfer from [facility] for trauma evaluation secondary to a mechanical fall from sitting with headstrike. Injuries identified include right forehead hematoma, acute minimally displaced right coracoid process scapular fracture, T11/L1 compression fractures and an inferior sternal fracture. Patient hemodynamically stable.    Plan:  - pain control   - mechanical soft   - F/u ortho recs for scapular fracture: sling immobilization and outpt fu  - F/u spine recs for vertebral fractures: TLSO, C spine precautions, pending OR planning   - Appreciate hospitalist recs  - DVT ppx     ACS  [id number]

## 2021-09-08 NOTE — CONSULT NOTE ADULT - ASSESSMENT
ECHO 10/25/16:  Normal left ventricular systolic function. No segmental wall motion abnormalities. Hypermobile interatrial septum.   ECHO 8/24/21: EF 66% grossly nl LV sys fx, mild diastolic dsyfx - stage 1   a/p    Patient is a 75yo F , known to practice from prior admission,  with a history of colon cancer s/p right hemicolectomy (approx 2 years ago, outside hospital), DVT (on Eliquis), CKD, adrenal insufficiency presenting as transfer from [facility] for trauma evaluation secondary to a mechanical fall from sitting    #Mechanical fall  -MRI noted with anterior splaying of T12/L1 space w/ edema   -CT T done noted with widening of T12/L1 disc space w/ L1 laminectomy  -plans for posterior extension of prior L2-5 fusion and stabilization tomorrow  -cv stable, no cp, sob. ekg without acute ischemic changes. most recent echo noted with EF 66% grossly nl LV sys fx, mild diastolic dsyfx   -pt cardiac optimized - can proceed to OR with acceptable CV risk  -neuro sx/ trauma f/u     #SVT (hx)  -c/w toprol 50 mg daily     # DVT (hx)  -recent le doppler negative for acute dvt  -AC on hold for OR      # wm on CKD   -renal following     dvt ppx   d/w family at bedside

## 2021-09-08 NOTE — CONSULT NOTE ADULT - SUBJECTIVE AND OBJECTIVE BOX
CARDIOLOGY CONSULT - Dr. Orozco         HPI:  Patient is a 77yo F , known to practice from prior admission,  with a history of colon cancer s/p right hemicolectomy (approx 2 years ago, outside hospital), DVT (on Eliquis), CKD, adrenal insufficiency presenting as transfer from [facility] for trauma evaluation secondary to a mechanical fall from sitting. Patient was going to her commode last night when she slipped and fell on her right side, hitting her head. Denies LOC. Patient's daughter came to help her up and brought her to the ED. Since the fall, patient has been endorses right-sided chest pain but no SOB, headache, weakness or dizziness. Patient does not know what medications she takes.     Patient transferred to [facility] for trauma evaluation. In the ED, patient was hemodynamically stable but requiring 2L NC, satting 98%. Labs showed WBC 17, Cr 1.42, otherwise normal. CT head/cervical spine/chest were performed which showed a right forehead hematoma, acute minimally displaced right coracoid process scapular fracture, T11/L1 compression fractures and an inferior sternal fracture.    Cardiac eval for clearance.  Patient denies any chest pain, palpitations, cough, syncope, edema, exertional symptoms, nausea, abdominal pain, fever, chills,  or rash.  Recent echo noted with EF 66% grossly nl LV sys fx, mild diastolic dsyfx - stage 1     VITALS:  Vital Signs Last 24 Hrs  T(C): 36.3 (05 Sep 2021 14:52), Max: 36.8 (05 Sep 2021 07:46)  T(F): 97.4 (05 Sep 2021 14:52), Max: 98.3 (05 Sep 2021 07:46)  HR: 68 (05 Sep 2021 14:52) (66 - 77)  BP: 155/62 (05 Sep 2021 14:52) (145/75 - 174/75)  BP(mean): 88 (05 Sep 2021 14:52) (88 - 93)  RR: 20 (05 Sep 2021 14:52) (16 - 20)  SpO2: 96% (05 Sep 2021 14:52) (95% - 100%)        PAST MEDICAL & SURGICAL HISTORY:  Lumbar Spinal Stenosis    Adult Hypothyroidism    Adrenal Insufficiency    Pituitary Insufficiency;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    Osteoporosis    Chronic Kidney Disease; Dx 2009    HTN - Hypertension    History of Obesity    Myocardial infarction    History of Laminectomy/ Fusion 1/06; L1-L2    H/O right hemicolectomy            PREVIOUS DIAGNOSTIC TESTING:    [ x] Echocardiogram:  < from: Transthoracic Echocardiogram (08.24.21 @ 16:53) >  ------------------------------------------------------------------------  DIMENSIONS:  Dimensions:     Normal Values:  LA:     3.5 cm    2.0 - 4.0 cm  Ao:     3.2 cm    2.0 -3.8 cm  SEPTUM: 0.8 cm    0.6 - 1.2 cm  PWT:    0.8 cm    0.6 - 1.1 cm  LVIDd:  4.2 cm    3.0 - 5.6 cm  LVIDs:  2.7 cm    1.8 - 4.0 cm  Derived Variables:  LVMI: 52 g/m2  RWT: 0.38  Fractional short: 36 %  Ejection Fraction (Teicholtz): 66 %  ------------------------------------------------------------------------  OBSERVATIONS:  Mitral Valve: Mitral annular calcification, otherwise  normal mitral valve. Minimal mitral regurgitation.  Aortic Root: Normal aortic root.  Aortic Valve: Aortic valve leaflet morphology not well  visualized.  Left Atrium: Normal left atrium.  Left Ventricle: Endocardium not well visualized; grossly  normal left ventricular systolic function. Normal left  ventricular internal dimensions and wall thicknesses. Mild  diastolic dysfunction (Stage I).  Right Heart: Normal right atrium. The right ventricle is  not well visualized; grossly normal right ventricular  systolic function. Normal tricuspid valve. Minimal  tricuspid regurgitation. Normal pulmonic valve.  Pericardium/PleuraNormal pericardium with no pericardial  effusion.  ------------------------------------------------------------------------  CONCLUSIONS:  1. Mitral annular calcification, otherwise normal mitral  valve. Minimal mitral regurgitation.  2. Normal left ventricular internal dimensions and wall  thicknesses.  3. Endocardium not well visualized; grossly normal left  ventricular systolic function.  4. Mild diastolic dysfunction (Stage I).  5. The right ventricle is not well visualized; grossly  normal right ventricular systolic function.  ------------------------------------------------------------------------    < end of copied text >    [ ]  Catheterization:  [ ] Stress Test:  	    MEDICATIONS:  Home Medications:  acetaminophen 325 mg oral tablet: 2 tab(s) orally every 6 hours, As needed, Mild Pain (1 - 3) (24 Sep 2020 11:16)  Artificial Tears ophthalmic solution: 1 drop(s) to each affected eye 4 times a day, As Needed (22 Aug 2021 23:28)  aspirin 81 mg oral delayed release tablet: 1 tab(s) orally once a day (27 Aug 2021 13:38)  calcium (as carbonate) 600 mg oral tablet: 1 cap(s) orally once a day (24 Sep 2020 11:16)  Eliquis 2.5 mg oral tablet: 1 tab(s) orally 2 times a day (24 Sep 2020 11:16)  felodipine 5 mg oral tablet, extended release: 1 tab(s) orally once a day (22 Aug 2021 23:23)  guaiFENesin 600 mg oral tablet, extended release: 1 tab(s) orally every 12 hours as needed for cough  (27 Aug 2021 13:38)  hydrocortisone 10 mg oral tablet: 1 tab(s) orally 2 times a day (24 Sep 2020 11:16)  latanoprost 0.005% ophthalmic solution: 1 drop(s) to each affected eye once a day (in the evening) (22 Aug 2021 23:27)  levothyroxine 50 mcg (0.05 mg) oral tablet: 1 tab(s) orally once a day (24 Sep 2020 11:16)  Lipitor 10 mg oral tablet: 1 tab(s) orally once a day (22 Aug 2021 23:41)  Macular Vitamin Benefit oral tablet: 1 tab(s) orally once a day (24 Sep 2020 11:16)  metoprolol succinate 50 mg oral tablet, extended release: 1 tab(s) orally once a day (22 Aug 2021 23:24)  Multiple Vitamins oral tablet: 1 tab(s) orally once a day (22 Aug 2021 23:22)  olmesartan 20 mg oral tablet: 1 tab(s) orally once a day (22 Aug 2021 23:22)  omeprazole 20 mg oral delayed release capsule: 1 cap(s) orally once a day (22 Aug 2021 23:21)  oxyCODONE 5 mg oral tablet: 1 tab(s) orally every 8 hours as needed for severe pain (27 Aug 2021 13:35)  OxyCONTIN 20 mg oral tablet, extended release: 1 tab(s) orally every 12 hours (24 Sep 2020 11:16)  risedronate 35 mg oral tablet: 1 tab(s) orally once a week (Mondays) (05 Sep 2021 18:30)  senna oral tablet: 2 tab(s) orally once a day (at bedtime), As Needed (05 Sep 2021 18:31)      MEDICATIONS  (STANDING):  acetaminophen   Tablet .. 975 milliGRAM(s) Oral every 8 hours  atorvastatin 10 milliGRAM(s) Oral at bedtime  calcium carbonate    500 mG (Tums) Chewable 1 Tablet(s) Chew daily  dextrose 5% + sodium chloride 0.45% with potassium chloride 20 mEq/L 1000 milliLiter(s) (100 mL/Hr) IV Continuous <Continuous>  heparin   Injectable 5000 Unit(s) SubCutaneous every 8 hours  hydrocortisone 10 milliGRAM(s) Oral every 12 hours  influenza   Vaccine 0.5 milliLiter(s) IntraMuscular once  latanoprost 0.005% Ophthalmic Solution 1 Drop(s) Both EYES at bedtime  levothyroxine 50 MICROGram(s) Oral daily  lidocaine   4% Patch 1 Patch Transdermal every 24 hours  metoprolol succinate ER 50 milliGRAM(s) Oral every 24 hours  nystatin Powder 1 Application(s) Topical two times a day  oxyCODONE  ER Tablet 20 milliGRAM(s) Oral every 12 hours  pantoprazole    Tablet 40 milliGRAM(s) Oral before breakfast      FAMILY HISTORY:  Family history of hypertension (Mother)    Family history of diabetes mellitus (Sibling)        SOCIAL HISTORY:    [ ] Non-smoker  [ ] Smoker  [ ] Alcohol    Allergies    penicillin (Rash)    Intolerances    	    REVIEW OF SYSTEMS:  CONSTITUTIONAL: No fever, weight loss, or fatigue  EYES: No eye pain, visual disturbances, or discharge  ENMT:  No difficulty hearing, tinnitus, vertigo; No sinus or throat pain  NECK: No pain or stiffness  RESPIRATORY: No cough, wheezing, chills or hemoptysis; No Shortness of Breath  CARDIOVASCULAR: No chest pain, palpitations, passing out, dizziness, or leg swelling  GASTROINTESTINAL: No abdominal or epigastric pain. No nausea, vomiting, or hematemesis; No diarrhea or constipation. No melena or hematochezia.  GENITOURINARY: No dysuria, frequency, hematuria, or incontinence  NEUROLOGICAL: No headaches, memory loss, loss of strength, numbness, or tremors  SKIN: No itching, burning, rashes, or lesions   	    [ x] All others negative	 see hpi   [ ] Unable to obtain    PHYSICAL EXAM:  T(C): 37.3 (09-08-21 @ 15:57), Max: 37.3 (09-08-21 @ 15:57)  HR: 86 (09-08-21 @ 15:57) (66 - 91)  BP: 142/68 (09-08-21 @ 15:57) (78/42 - 142/68)  RR: 18 (09-08-21 @ 15:57) (18 - 18)  SpO2: 94% (09-08-21 @ 15:57) (94% - 100%)  Wt(kg): --  I&O's Summary    07 Sep 2021 07:01  -  08 Sep 2021 07:00  --------------------------------------------------------  IN: 1420 mL / OUT: 1050 mL / NET: 370 mL    08 Sep 2021 07:01  -  08 Sep 2021 16:49  --------------------------------------------------------  IN: 0 mL / OUT: 300 mL / NET: -300 mL        Appearance: Normal	  Psychiatry: A & O x 3, Mood & affect appropriate  HEENT:   Normal oral mucosa, PERRL, EOMI	  Lymphatic: No lymphadenopathy  Cardiovascular: Normal S1 S2,RRR, No JVD, No murmurs  Respiratory: Lungs clear to auscultation	  Gastrointestinal:  Soft, Non-tender, + BS	  Skin: No rashes, No ecchymoses, No cyanosis	  Neurologic: Non-focal  Extremities: Normal range of motion, No clubbing, cyanosis or edema  Vascular: Peripheral pulses palpable 2+ bilaterally    TELEMETRY: 	    ECG:  Sinus tach , pvc, no acute ischemic changes 	  RADIOLOGY:  < from: CT Thoracic Spine No Cont (09.07.21 @ 12:16) >    Serial thin sections on a multi slice scanner were obtained through thethoracic spine from the T1 to the L2-3 level in a stacked axial fashion reformatted at 1.25 mm sections with sagittal and coronal computer generated reconstructed views.    Comparison is made with the prior MRI of 9/7/2021 and CT of 9/5/2021.    The thoracic vertebral bodies are normal in height with the exception of T2 and T11. There appear to be old mild compression deformities. There is widening of the T12-L1 disc space anteriorly with apparent disruption of the anterior longitudinal ligament.There is also volume loss to the L1 vertebral body anteriorly. Disc space is bright on T2-weighted series consistent with inflammatory change. There is fusion of L2-L5. There has been a previous laminectomy of L1. The widening of the disc space extends to the posterior disc margin. The ligamentum flavum and posterior longitudinal ligament may be intact. However, no bony spinal canal is present at this level raising the possibility of instability. Disc space narrowing is identified at L1-2 with vacuum disc phenomenon. Transpedicular screws are identified at L2 and L3.    IMPRESSION: The level above the lumbar fusion there is widening of the T12-L1 disc space anteriorly with abnormal signal replacing the disc and compression of the L1 vertebralbody. There has been a L1 laminectomy. In spite of the preservation of the posterior longitudinal ligament and ligamentum flavum this level may be unstable without lack of posterior osseous support.      < from: MR Lumbar Spine No Cont (09.07.21 @ 09:54) >  FINDINGS:    ALIGNMENT: There is reversal of the normal lumbar lordosis.    VERTEBRAE: Posterior instrumentation and fusion noted spanning L2-L5. There is widening of the T12-L1 disc space as seen on 9/5/2021 CT and further discussed on dedicated MRI thoracic spine 9/7/2021. No significant associated marrow edema of T12 or L1 vertebral bodies.    Chronic T11 and T12 compression deformities, discussed in further detail on concurrent MRI thoracic spine.    CONUS MEDULLARIS AND CAUDA EQUINA: The conus medullaris terminates at T12-L1. There is normal appearance of the conus medullaris and cauda equina.    EVALUATION OF INDIVIDUAL LEVELS:  L1-2: Disc bulge and superimposed central disc protrusion. There is mild spinal canal stenosis. There is mild bilateral neuroforaminal stenosis.    L2-3: Disc bulge. No spinal canal stenosis. Mild bilateral neuroforaminal stenosis.    L3-4: Postsurgical changes. Disc osteophyte complex. No thecal sac narrowing. Mild bilateral neuroforaminal stenosis.    L4-5: Postsurgical changes. Disc osteophyte complex. No thecal sac narrowing. Mild bilateral neuroforaminal stenosis.    L5-S1: Postsurgical changes. Disc osteophyte complex. No thecal sac narrowing. Severe bilateral neuroforaminal stenosis.    LIMITED EVALUATION OF UPPER SACRUM AND SACROILIAC JOINTS: Mild degenerative changes of the sacroiliac joints.    PARAVERTEBRAL SOFT TISSUES AND VISUALIZED RETROPERITONEUM: Diffuse fatty atrophy of the paraspinal musculature. Included intra-abdominal contents are grossly unremarkable.    IMPRESSION:    -Widening of the T12-L1 disc space, without associated marrow edema. Chronic L1 compression fracture. Please see concurrent dedicated MRI thoracic spine for further details.    -Multilevel degenerative changes and postsurgical changes throughout the lumbar spine, as described above.    < from: Xray Abdomen 1 View PORTABLE -Urgent (Xray Abdomen 1 View PORTABLE -Urgent .) (09.08.21 @ 07:21) >  FINDINGS:    Partially imaged abdomen demonstrates a nonobstructive bowel gas pattern with air and stool filled large bowel.  There is no evidence of intraperitoneal free air.  Lumbar spine orthopedic hardware.    IMPRESSION:  Nonobstructive bowel gas pattern.    --- End of Report ---    < from: Xray Chest 2 Views PA/Lat (09.05.21 @ 09:25) >  FINDINGS/  IMPRESSION:  Mild increased interstitial lung markings. No significant pleural effusion.    Scoliosis and hardware overlies thoracolumbar region    Heart size within normal limits.    --- End of Report ---    < end of copied text >        OTHER: 	  	  LABS:	 	    CARDIAC MARKERS:  Troponin T, High Sensitivity Result: 35 ng/L (09-05 @ 18:53)                                  11.2   12.89 )-----------( 221      ( 08 Sep 2021 10:31 )             32.1     09-08    137  |  101  |  27<H>  ----------------------------<  69<L>  4.0   |  17<L>  |  2.05<H>    Ca    9.3      08 Sep 2021 10:30  Phos  4.9     09-08  Mg     2.0     09-08      PT/INR - ( 08 Sep 2021 10:31 )   PT: 15.2 sec;   INR: 1.28 ratio         PTT - ( 08 Sep 2021 10:31 )  PTT:69.4 sec  proBNP:   Lipid Profile:   HgA1c:   TSH: Thyroid Stimulating Hormone, Serum: 0.18 uIU/mL (09-08 @ 09:59)

## 2021-09-08 NOTE — PROGRESS NOTE ADULT - ASSESSMENT
77yo F with a history of colon cancer s/p right hemicolectomy (approx 2 years ago, outside hospital), DVT (on Eliquis), CKD, adrenal insufficiency presenting as transfer from [facility] for trauma evaluation secondary to a mechanical fall from sitting with headstrike. Injuries identified include right forehead hematoma, acute minimally displaced right coracoid process scapular fracture, T11/L1 compression fractures and an inferior sternal fracture. Patient hemodynamically stable.    # s/p Mechanical Fall:  Right forehead hematoma, acute minimally displaced right coracoid process scapular fracture, T11/L1 compression fractures and an inferior sternal fracture  no surgery for scapular fx. Placed in sling  CT spine with fusion L2-5. Fracture through the T12-L1 disc space with widening of the disc space and a L1-2 laminectomy which appears unstable. Diffuse osteopenia. Old T11 and L1 fractures.   MRI T/L spine with anterior splaying of the intervertebral disc space at the T12-L1 with edema in the anterior prevertebral soft tissues  Neuro surgery offering surgery vs conservative management, TLSO brace in the meantime.   The daughter Susie decided to proceed with surgery.     # Nausea:   Pt has been nauseous (no abd pain). ?pain med induced/ Opioids   Poor PO intake since being in the hospital. Normally with good appetite at home, with mechanical soft per the daughter.  anti-emetics PRN, encourage PO intake     # Acute on chronic kidney disease stage II-III  Renal Dr. Treviño ([facility]). Outpt EMR reviewed; Cr range from 1.4 to 1.6   Monitor I/O's, Serial Cr, Avoid nephrotoxins  Cr is up from baseline. Hold Losartan. c/w gentle IVF for acute kidney failure: likely pre-renal.   Her recent TTE reviewed, with normal LV.     # Pre-Op Clearance  - monitoring Cr on IVF. if Cr downtrending, then will clear her for surgery. will re-evaluate in am.  - Cardiology (Dr. Orozco's group) consulted for card clearance.     # Hypopituitary   She is on Hydrocortisone 10 mg BID at home, so will monitor for adrenal insufficiency. am cortisol appropriate  UA unimpressive. vit D, vit B12 normal. TSH low.   Given she is chronically on Hydrocortisone, will consider stress dose steroids 24hrs pre-surgery  (50 mg hydrocortisone intravenously just before the procedure and 25 mg of hydrocortisone every eight hours for 24 hours, then resume home PO dose after)    # CAD: Chronic, stable  Cont home CV meds  holding Asa for neurosurgery     # Hypothyroidism  Chronic, stable  TSH low. will check free T4  Cont Synthroid 50mcg    # Hx of DVT, lower extremity  Home med Eliquis, on hold  Heparin subq Q8H    GI ppx:  Protonix  77yo F with a history of colon cancer s/p right hemicolectomy (approx 2 years ago, outside hospital), DVT (on Eliquis), CKD, adrenal insufficiency presenting as transfer from [facility] for trauma evaluation secondary to a mechanical fall from sitting with headstrike. Injuries identified include right forehead hematoma, acute minimally displaced right coracoid process scapular fracture, T11/L1 compression fractures and an inferior sternal fracture. Patient hemodynamically stable.    # s/p Mechanical Fall:  Right forehead hematoma, acute minimally displaced right coracoid process scapular fracture, T11/L1 compression fractures and an inferior sternal fracture  no surgery for scapular fx. Placed in sling  CT spine with fusion L2-5. Fracture through the T12-L1 disc space with widening of the disc space and a L1-2 laminectomy which appears unstable. Diffuse osteopenia. Old T11 and L1 fractures.   MRI T/L spine with anterior splaying of the intervertebral disc space at the T12-L1 with edema in the anterior prevertebral soft tissues  Neuro surgery offering surgery vs conservative management, TLSO brace in the meantime.   The daughter Susie decided to proceed with surgery.     # Nausea:   Pt has been nauseous (no abd pain). ?pain med induced/ Opioids   Poor PO intake since being in the hospital. Normally with good appetite at home, with mechanical soft per the daughter.  anti-emetics PRN, encourage PO intake     # Acute on chronic kidney disease stage II-III  Renal Dr. Trevñio ([facility]). Outpt EMR reviewed; Cr range from 1.4 to 1.6   Monitor I/O's, Serial Cr, Avoid nephrotoxins  Cr is up from baseline. Hold Losartan. c/w gentle IVF for acute kidney failure: likely pre-renal.   Her recent TTE reviewed, with normal LV.     # Pre-Op Clearance  - monitoring Cr on IVF. if Cr downtrending, then will clear her for surgery. will re-evaluate in am.  - Cardiology (Dr. Orozco's group) consulted for card clearance.   - Dr. Mohan's group for renal, consulted.     # Hypopituitary   She is on Hydrocortisone 10 mg BID at home, so will monitor for adrenal insufficiency. am cortisol appropriate  UA unimpressive. vit D, vit B12 normal. TSH low.   Given she is chronically on Hydrocortisone, will consider stress dose steroids 24hrs pre-surgery  (50 mg hydrocortisone intravenously just before the procedure and 25 mg of hydrocortisone every eight hours for 24 hours, then resume home PO dose after)    # CAD: Chronic, stable  Cont home CV meds  holding Asa for neurosurgery     # Hypothyroidism  Chronic, stable  TSH low. will check free T4  Cont Synthroid 50mcg    # Hx of DVT, lower extremity  Home med Eliquis, on hold  Heparin subq Q8H    GI ppx:  Protonix

## 2021-09-08 NOTE — CONSULT NOTE ADULT - SUBJECTIVE AND OBJECTIVE BOX
Dr. Mohan  Office [phone]  Cell [phone]  Irma MAYA  Cell [phone]    RENAL INITIAL CONSULT NOTE: DATE OF SERVICE 21 @ 16:41    HPI:  Patient is a 77yo F with a history of colon cancer s/p right hemicolectomy (approx 2 years ago, outside hospital), DVT (on Eliquis), CKD, adrenal insufficiency presenting after mechanical fall from sitting. CT head/cervical spine/chest were performed which showed a right forehead hematoma, acute minimally displaced right coracoid process scapular fracture, T11/L1 compression fractures and an inferior sternal fracture. 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    Osteoporosis    Chronic Kidney Disease; Dx     HTN - Hypertension    History of Obesity    Myocardial infarction    History of Laminectomy/ Fusion ; L1-L2    H/O right hemicolectomy        Home Medications Reviewed    Hospital Medications:   MEDICATIONS  (STANDING):  acetaminophen   Tablet .. 975 milliGRAM(s) Oral every 8 hours  atorvastatin 10 milliGRAM(s) Oral at bedtime  calcium carbonate    500 mG (Tums) Chewable 1 Tablet(s) Chew daily  dextrose 5% + sodium chloride 0.45% with potassium chloride 20 mEq/L 1000 milliLiter(s) (100 mL/Hr) IV Continuous <Continuous>  heparin   Injectable 5000 Unit(s) SubCutaneous every 8 hours  hydrocortisone 10 milliGRAM(s) Oral every 12 hours  influenza   Vaccine 0.5 milliLiter(s) IntraMuscular once  latanoprost 0.005% Ophthalmic Solution 1 Drop(s) Both EYES at bedtime  levothyroxine 50 MICROGram(s) Oral daily  lidocaine   4% Patch 1 Patch Transdermal every 24 hours  metoprolol succinate ER 50 milliGRAM(s) Oral every 24 hours  nystatin Powder 1 Application(s) Topical two times a day  oxyCODONE  ER Tablet 20 milliGRAM(s) Oral every 12 hours  pantoprazole    Tablet 40 milliGRAM(s) Oral before breakfast      SOCIAL HISTORY:  Denies ETOh, Smoking,     FAMILY HISTORY:  Family history of hypertension (Mother)    Family history of diabetes mellitus (Sibling)        REVIEW OF SYSTEMS:  CONSTITUTIONAL: No weakness, fevers or chills  EYES/ENT: No visual changes;  No vertigo or throat pain   NECK: No pain or stiffness  RESPIRATORY: No cough, wheezing, hemoptysis; No shortness of breath  CARDIOVASCULAR: No chest pain or palpitations.  GASTROINTESTINAL: No abdominal or epigastric pain. No nausea, vomiting, or hematemesis; No diarrhea or constipation. No melena or hematochezia.  GENITOURINARY: No dysuria, frequency, foamy urine, urinary urgency, incontinence or hematuria  NEUROLOGICAL: No numbness or weakness  SKIN: No itching, burning, rashes, or lesions   VASCULAR: No bilateral lower extremity edema.   All other review of systems is negative unless indicated above.    VITALS:  T(F): 99.1 (21 @ 15:57), Max: 99.1 (21 @ 15:57)  HR: 86 (21 @ 15:57)  BP: 142/68 (21 @ 15:57)  RR: 18 (21 @ 15:57)  SpO2: 94% (21 @ 15:57)  Wt(kg): --     @ 07:01  -   @ 07:00  --------------------------------------------------------  IN: 1420 mL / OUT: 1050 mL / NET: 370 mL     @ 07:01  -   @ 16:41  --------------------------------------------------------  IN: 0 mL / OUT: 300 mL / NET: -300 mL          PHYSICAL EXAM:  Constitutional: NAD  HEENT: anicteric sclera, oropharynx clear, MMM  Neck: No JVD  Respiratory: CTAB, no wheezes, rales or rhonchi  Cardiovascular: S1, S2, RRR  Gastrointestinal: BS+, soft, NT/ND  Extremities: No cyanosis or clubbing. No peripheral edema  Neurological: A/O x 3, no focal deficits  Psychiatric: Normal mood, normal affect  : No CVA tenderness. No ervin.   Skin: No rashes      LABS:      137  |  101  |  27<H>  ----------------------------<  69<L>  4.0   |  17<L>  |  2.05<H>    Ca    9.3      08 Sep 2021 10:30  Phos  4.9       Mg     2.0           Creatinine Trend: 2.05 <--, 2.20 <--, 2.28 <--, 1.25 <--, 1.42 <--                        11.2   12.89 )-----------( 221      ( 08 Sep 2021 10:31 )             32.1     Urine Studies:  Urinalysis Basic - ( 07 Sep 2021 17:15 )    Color: Yellow / Appearance: Clear / S.017 / pH:   Gluc:  / Ketone: Small  / Bili: Negative / Urobili: Negative   Blood:  / Protein: 30 mg/dL / Nitrite: Negative   Leuk Esterase: Moderate / RBC: 2 /hpf / WBC 6 /HPF   Sq Epi:  / Non Sq Epi: 1 /hpf / Bacteria: Negative          RADIOLOGY & ADDITIONAL STUDIES:

## 2021-09-08 NOTE — PROGRESS NOTE ADULT - SUBJECTIVE AND OBJECTIVE BOX
SURGERY DAILY PROGRESS NOTE:       SUBJECTIVE/ROS: No acute events overnight. pain is well controlled. tolerating regular diet. denies dizziness, SOB, CP or palpitations. MRI T/L spine with anterior splaying of the intervertebral disc space at the T12-L1 with edema in the anterior prevertebral soft tissues.          MEDICATIONS  (STANDING):  acetaminophen   Tablet .. 975 milliGRAM(s) Oral every 8 hours  amLODIPine   Tablet 5 milliGRAM(s) Oral daily  aspirin enteric coated 81 milliGRAM(s) Oral daily  atorvastatin 10 milliGRAM(s) Oral at bedtime  calcium carbonate    500 mG (Tums) Chewable 1 Tablet(s) Chew daily  heparin   Injectable 5000 Unit(s) SubCutaneous every 8 hours  hydrocortisone 10 milliGRAM(s) Oral every 12 hours  influenza   Vaccine 0.5 milliLiter(s) IntraMuscular once  latanoprost 0.005% Ophthalmic Solution 1 Drop(s) Both EYES at bedtime  levothyroxine 50 MICROGram(s) Oral daily  lidocaine   4% Patch 1 Patch Transdermal every 24 hours  losartan 50 milliGRAM(s) Oral daily  metoprolol succinate ER 50 milliGRAM(s) Oral every 24 hours  nystatin Powder 1 Application(s) Topical two times a day  oxyCODONE  ER Tablet 20 milliGRAM(s) Oral every 12 hours  pantoprazole    Tablet 40 milliGRAM(s) Oral before breakfast    MEDICATIONS  (PRN):  artificial  tears Solution 1 Drop(s) Both EYES every 6 hours PRN Dry Eyes  guaiFENesin  milliGRAM(s) Oral every 12 hours PRN Cough  ondansetron Injectable 4 milliGRAM(s) IV Push every 8 hours PRN Nausea and/or Vomiting  senna 2 Tablet(s) Oral at bedtime PRN Constipation  traMADol 25 milliGRAM(s) Oral every 6 hours PRN Moderate Pain (4 - 6)  traMADol 50 milliGRAM(s) Oral every 8 hours PRN Severe Pain (7 - 10)      OBJECTIVE:    Vital Signs Last 24 Hrs  T(C): 36.9 (07 Sep 2021 00:32), Max: 37.4 (06 Sep 2021 13:59)  T(F): 98.5 (07 Sep 2021 00:32), Max: 99.3 (06 Sep 2021 13:59)  HR: 95 (07 Sep 2021 00:32) (63 - 116)  BP: 100/61 (07 Sep 2021 00:32) (100/61 - 164/82)  BP(mean): --  RR: 18 (07 Sep 2021 00:32) (18 - 18)  SpO2: 97% (07 Sep 2021 00:32) (95% - 97%)    Physical Exam:   Gen: No acute distress.  HEENT: Normocephalic, right forehead hematoma, slightly tender to palpation. No midline cervical tenderness. Trachea midline.  Pulm: No respiratory distress.   Chest: inferior sternal tenderness, no crepitus over bilateral chest  Abd: Soft, nontender, nondistended, no rebound, no guarding.  Hips: Stable.   Ext: No gross deformities. Palpable radial pulses bilaterally, palpable dorsalis pedis pulses bilaterally. +right knee tenderness, +right shoulder tenderness        I&O's Detail    05 Sep 2021 07:01  -  06 Sep 2021 07:00  --------------------------------------------------------  IN:  Total IN: 0 mL    OUT:    Voided (mL): 1000 mL  Total OUT: 1000 mL    Total NET: -1000 mL      06 Sep 2021 07:01  -  07 Sep 2021 02:13  --------------------------------------------------------  IN:    Oral Fluid: 300 mL  Total IN: 300 mL    OUT:    Voided (mL): 950 mL  Total OUT: 950 mL    Total NET: -650 mL          Daily     Daily     LABS:                        11.9   10.21 )-----------( 155      ( 06 Sep 2021 06:52 )             35.7     09-06    136  |  101  |  18  ----------------------------<  110<H>  3.7   |  19<L>  |  1.25    Ca    10.1      06 Sep 2021 06:52  Phos  3.3     09-06  Mg     2.0     09-06    TPro  7.8  /  Alb  3.6  /  TBili  0.5  /  DBili  x   /  AST  43<H>  /  ALT  16  /  AlkPhos  55  09-05    PT/INR - ( 05 Sep 2021 14:48 )   PT: 15.3 sec;   INR: 1.29 ratio         PTT - ( 05 Sep 2021 14:48 )  PTT:34.0 sec

## 2021-09-08 NOTE — PROGRESS NOTE ADULT - SUBJECTIVE AND OBJECTIVE BOX
p ([id number])     HPI:  Pt is a R-hand dominant 76y female presenting with R shoulder pain s/p mechanical fall. Pt was walking at home when she tripped over her commode.    Imaging: . MRI shows anterior splaying of T12/L1 space w/ edema and hem w/ ALL injury, no PLL injury or involvement of post elementsCTL w/ prior L2-5 fxn, hardware intact, significant comp fx T10-L1 supra-adjacent to prior fusion, chronic in nature, areas of mild retropulsion.     Exam: AOx2-3 with choices, RUE pain pritchett 3/5 prox, 5/5 distally, LUE 5/5, pain prtichett HF LLE 4+/5, otherwise 5/5, SILT, no clonus.     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    Osteoporosis    Chronic Kidney Disease; Dx 2009    HTN - Hypertension    History of Obesity    Myocardial infarction      PAST SURGICAL HISTORY   History of Laminectomy/ Fusion 1/06; L1-L2    H/O right hemicolectomy      codedine (Rash)  codeine (Nausea; Other)  penicillin (Rash)      MEDICATIONS:  Antibiotics:    Neuro:    Other:      SOCIAL HISTORY:   Occupation:   Marital Status:     FAMILY HISTORY:  Family history of hypertension    Family history of hypertension (Mother)    Family history of diabetes mellitus (Sibling)        ROS: Negative except per HPI    LABS:  PT/INR - ( 05 Sep 2021 14:48 )   PT: 15.3 sec;   INR: 1.29 ratio         PTT - ( 05 Sep 2021 14:48 )  PTT:34.0 sec                        11.7   17.40 )-----------( 251      ( 05 Sep 2021 09:50 )             32.8     09-05    135  |  101  |  26<H>  ----------------------------<  102<H>  4.2   |  22  |  1.42<H>    Ca    9.4      05 Sep 2021 09:50    TPro  7.8  /  Alb  3.6  /  TBili  0.5  /  DBili  x   /  AST  43<H>  /  ALT  16  /  AlkPhos  55  09-05

## 2021-09-08 NOTE — PROVIDER CONTACT NOTE (OTHER) - ACTION/TREATMENT ORDERED:
As per MD, IV Tylenol to be ordered, recheck b/p at 0115, if b/p remains low, contact team and bolus will be ordered. As per MD, IV Tylenol to be ordered, recheck b/p at 0100, if b/p remains low, contact team and bolus will be ordered.

## 2021-09-09 LAB
ANION GAP SERPL CALC-SCNC: 11 MMOL/L — SIGNIFICANT CHANGE UP (ref 5–17)
BUN SERPL-MCNC: 18 MG/DL — SIGNIFICANT CHANGE UP (ref 7–23)
CALCIUM SERPL-MCNC: 9.5 MG/DL — SIGNIFICANT CHANGE UP (ref 8.4–10.5)
CHLORIDE SERPL-SCNC: 104 MMOL/L — SIGNIFICANT CHANGE UP (ref 96–108)
CO2 SERPL-SCNC: 22 MMOL/L — SIGNIFICANT CHANGE UP (ref 22–31)
CREAT SERPL-MCNC: 1.36 MG/DL — HIGH (ref 0.5–1.3)
GLUCOSE SERPL-MCNC: 129 MG/DL — HIGH (ref 70–99)
HCT VFR BLD CALC: 31.8 % — LOW (ref 34.5–45)
HGB BLD-MCNC: 11.1 G/DL — LOW (ref 11.5–15.5)
MAGNESIUM SERPL-MCNC: 1.8 MG/DL — SIGNIFICANT CHANGE UP (ref 1.6–2.6)
MCHC RBC-ENTMCNC: 25.8 PG — LOW (ref 27–34)
MCHC RBC-ENTMCNC: 34.9 GM/DL — SIGNIFICANT CHANGE UP (ref 32–36)
MCV RBC AUTO: 74 FL — LOW (ref 80–100)
NRBC # BLD: 0 /100 WBCS — SIGNIFICANT CHANGE UP (ref 0–0)
PHOSPHATE SERPL-MCNC: 1.6 MG/DL — LOW (ref 2.5–4.5)
PLATELET # BLD AUTO: 236 K/UL — SIGNIFICANT CHANGE UP (ref 150–400)
POTASSIUM SERPL-MCNC: 4.4 MMOL/L — SIGNIFICANT CHANGE UP (ref 3.5–5.3)
POTASSIUM SERPL-SCNC: 4.4 MMOL/L — SIGNIFICANT CHANGE UP (ref 3.5–5.3)
RBC # BLD: 4.3 M/UL — SIGNIFICANT CHANGE UP (ref 3.8–5.2)
RBC # FLD: 17.2 % — HIGH (ref 10.3–14.5)
SODIUM SERPL-SCNC: 137 MMOL/L — SIGNIFICANT CHANGE UP (ref 135–145)
WBC # BLD: 11.53 K/UL — HIGH (ref 3.8–10.5)
WBC # FLD AUTO: 11.53 K/UL — HIGH (ref 3.8–10.5)

## 2021-09-09 PROCEDURE — 93970 EXTREMITY STUDY: CPT | Mod: 26

## 2021-09-09 PROCEDURE — 99231 SBSQ HOSP IP/OBS SF/LOW 25: CPT

## 2021-09-09 RX ORDER — MAGNESIUM SULFATE 500 MG/ML
1 VIAL (ML) INJECTION ONCE
Refills: 0 | Status: COMPLETED | OUTPATIENT
Start: 2021-09-09 | End: 2021-09-09

## 2021-09-09 RX ORDER — ONDANSETRON 8 MG/1
4 TABLET, FILM COATED ORAL ONCE
Refills: 0 | Status: COMPLETED | OUTPATIENT
Start: 2021-09-09 | End: 2021-09-09

## 2021-09-09 RX ORDER — POTASSIUM PHOSPHATE, MONOBASIC POTASSIUM PHOSPHATE, DIBASIC 236; 224 MG/ML; MG/ML
15 INJECTION, SOLUTION INTRAVENOUS ONCE
Refills: 0 | Status: COMPLETED | OUTPATIENT
Start: 2021-09-09 | End: 2021-09-09

## 2021-09-09 RX ORDER — ACETAMINOPHEN 500 MG
1000 TABLET ORAL ONCE
Refills: 0 | Status: COMPLETED | OUTPATIENT
Start: 2021-09-09 | End: 2021-09-09

## 2021-09-09 RX ORDER — POLYETHYLENE GLYCOL 3350 17 G/17G
17 POWDER, FOR SOLUTION ORAL DAILY
Refills: 0 | Status: DISCONTINUED | OUTPATIENT
Start: 2021-09-09 | End: 2021-09-14

## 2021-09-09 RX ORDER — HYDROCORTISONE 20 MG
25 TABLET ORAL
Refills: 0 | Status: DISCONTINUED | OUTPATIENT
Start: 2021-09-09 | End: 2021-09-14

## 2021-09-09 RX ADMIN — PANTOPRAZOLE SODIUM 40 MILLIGRAM(S): 20 TABLET, DELAYED RELEASE ORAL at 06:28

## 2021-09-09 RX ADMIN — Medication 975 MILLIGRAM(S): at 05:34

## 2021-09-09 RX ADMIN — OXYCODONE HYDROCHLORIDE 5 MILLIGRAM(S): 5 TABLET ORAL at 09:53

## 2021-09-09 RX ADMIN — OXYCODONE HYDROCHLORIDE 5 MILLIGRAM(S): 5 TABLET ORAL at 09:23

## 2021-09-09 RX ADMIN — Medication 400 MILLIGRAM(S): at 23:01

## 2021-09-09 RX ADMIN — HEPARIN SODIUM 5000 UNIT(S): 5000 INJECTION INTRAVENOUS; SUBCUTANEOUS at 15:15

## 2021-09-09 RX ADMIN — HEPARIN SODIUM 5000 UNIT(S): 5000 INJECTION INTRAVENOUS; SUBCUTANEOUS at 05:05

## 2021-09-09 RX ADMIN — POLYETHYLENE GLYCOL 3350 17 GRAM(S): 17 POWDER, FOR SOLUTION ORAL at 22:30

## 2021-09-09 RX ADMIN — Medication 100 GRAM(S): at 17:00

## 2021-09-09 RX ADMIN — Medication 50 MICROGRAM(S): at 05:07

## 2021-09-09 RX ADMIN — Medication 600 MILLIGRAM(S): at 05:03

## 2021-09-09 RX ADMIN — Medication 50 MILLIGRAM(S): at 17:49

## 2021-09-09 RX ADMIN — LIDOCAINE 1 PATCH: 4 CREAM TOPICAL at 04:30

## 2021-09-09 RX ADMIN — Medication 10 MILLIGRAM(S): at 12:34

## 2021-09-09 RX ADMIN — LIDOCAINE 1 PATCH: 4 CREAM TOPICAL at 19:14

## 2021-09-09 RX ADMIN — OXYCODONE HYDROCHLORIDE 20 MILLIGRAM(S): 5 TABLET ORAL at 17:30

## 2021-09-09 RX ADMIN — Medication 650 MILLIGRAM(S): at 22:24

## 2021-09-09 RX ADMIN — Medication 650 MILLIGRAM(S): at 16:38

## 2021-09-09 RX ADMIN — Medication 1000 MILLIGRAM(S): at 23:02

## 2021-09-09 RX ADMIN — Medication 650 MILLIGRAM(S): at 05:04

## 2021-09-09 RX ADMIN — OXYCODONE HYDROCHLORIDE 20 MILLIGRAM(S): 5 TABLET ORAL at 18:00

## 2021-09-09 RX ADMIN — NYSTATIN CREAM 1 APPLICATION(S): 100000 CREAM TOPICAL at 17:31

## 2021-09-09 RX ADMIN — POTASSIUM PHOSPHATE, MONOBASIC POTASSIUM PHOSPHATE, DIBASIC 62.5 MILLIMOLE(S): 236; 224 INJECTION, SOLUTION INTRAVENOUS at 18:47

## 2021-09-09 RX ADMIN — NYSTATIN CREAM 1 APPLICATION(S): 100000 CREAM TOPICAL at 05:04

## 2021-09-09 RX ADMIN — Medication 1 TABLET(S): at 12:34

## 2021-09-09 RX ADMIN — SENNA PLUS 2 TABLET(S): 8.6 TABLET ORAL at 22:24

## 2021-09-09 RX ADMIN — OXYCODONE HYDROCHLORIDE 20 MILLIGRAM(S): 5 TABLET ORAL at 05:44

## 2021-09-09 RX ADMIN — ATORVASTATIN CALCIUM 10 MILLIGRAM(S): 80 TABLET, FILM COATED ORAL at 22:24

## 2021-09-09 RX ADMIN — Medication 975 MILLIGRAM(S): at 15:15

## 2021-09-09 RX ADMIN — ONDANSETRON 4 MILLIGRAM(S): 8 TABLET, FILM COATED ORAL at 16:38

## 2021-09-09 RX ADMIN — LATANOPROST 1 DROP(S): 0.05 SOLUTION/ DROPS OPHTHALMIC; TOPICAL at 22:30

## 2021-09-09 RX ADMIN — Medication 975 MILLIGRAM(S): at 15:45

## 2021-09-09 RX ADMIN — OXYCODONE HYDROCHLORIDE 20 MILLIGRAM(S): 5 TABLET ORAL at 05:03

## 2021-09-09 RX ADMIN — LIDOCAINE 1 PATCH: 4 CREAM TOPICAL at 17:31

## 2021-09-09 RX ADMIN — Medication 975 MILLIGRAM(S): at 05:04

## 2021-09-09 RX ADMIN — HEPARIN SODIUM 5000 UNIT(S): 5000 INJECTION INTRAVENOUS; SUBCUTANEOUS at 22:24

## 2021-09-09 NOTE — PROGRESS NOTE ADULT - ASSESSMENT
ECHO 10/25/16:  Normal left ventricular systolic function. No segmental wall motion abnormalities. Hypermobile interatrial septum.   ECHO 8/24/21: EF 66% grossly nl LV sys fx, mild diastolic dsyfx - stage 1   a/p    Patient is a 75yo F , known to practice from prior admission,  with a history of colon cancer s/p right hemicolectomy (approx 2 years ago, outside hospital), DVT (on Eliquis), CKD, adrenal insufficiency presenting as transfer from [facility] for trauma evaluation secondary to a mechanical fall from sitting    #Mechanical fall  -MRI noted with anterior splaying of T12/L1 space w/ edema   -CT T done noted with widening of T12/L1 disc space w/ L1 laminectomy  -plans for posterior extension of prior L2-5 fusion and stabilization 9/14  -cv stable, no cp, sob. ekg without acute ischemic changes. most recent echo noted with EF 66% grossly nl LV sys fx, mild diastolic dsyfx   -pt cardiac optimized - can proceed to OR with acceptable CV risk  -neuro sx/ trauma f/u     #SVT (hx)  -c/w toprol 50 mg daily     # DVT (hx)  -recent le doppler negative for acute dvt  -AC on hold for OR      # wm on CKD   -ivf per renal   -renal following     dvt ppx

## 2021-09-09 NOTE — PROVIDER CONTACT NOTE (OTHER) - ACTION/TREATMENT ORDERED:
Pt. head elevated per request/comfort level during coughing fit, chest PT performed, 3L 02 applied with 02 >95%, pt. currently laying @ 19 degrees, states she is comfortable, /84, 02 95%, HR 81

## 2021-09-09 NOTE — PROGRESS NOTE ADULT - ASSESSMENT
Patient is a 77yo F with a history of colon cancer s/p right hemicolectomy (approx 2 years ago, outside hospital), DVT (on Eliquis), CKD, adrenal insufficiency presenting as transfer from [facility] for trauma evaluation secondary to a mechanical fall from sitting with headstrike. Injuries identified include right forehead hematoma, acute minimally displaced right coracoid process scapular fracture, T11/L1 compression fractures and an inferior sternal fracture. Patient hemodynamically stable.    Plan:  - pain control   - mechanical soft   - F/u ortho recs for scapular fracture: sling immobilization and outpt fu  - F/u spine recs for vertebral fractures: TLSO, C spine precautions, pending OR planning   - Appreciate hospitalist recs  - DVT ppx     ACS  [id number] Patient is a 77yo F with a history of colon cancer s/p right hemicolectomy (approx 2 years ago, outside hospital), DVT (on Eliquis), CKD, adrenal insufficiency presenting as transfer from [facility] for trauma evaluation secondary to a mechanical fall from sitting with headstrike. Injuries identified include right forehead hematoma, acute minimally displaced right coracoid process scapular fracture, T11/L1 compression fractures and an inferior sternal fracture. Patient hemodynamically stable.    Plan:  - pain control   - mechanical soft   - cleared by multiple teams for surgery with NSGY team  - Appreciate hospitalist recs  - DVT ppx       ACS  [id number]

## 2021-09-09 NOTE — PROGRESS NOTE ADULT - SUBJECTIVE AND OBJECTIVE BOX
SUBJECTIVE / OVERNIGHT EVENTS:  comfortable  still nauseous  vomited  the daughter at bedside.  requests IV steroids early, states her PO may not be absorbed with vomiting.  switched to IV hydrocortisone.   can give stress dose near timing of surgery.  the daughter upset that her call-bell is out of reach. d/w RN.       --------------------------------------------------------------------------------------------  LABS:                        11.1   11.53 )-----------( 236      ( 09 Sep 2021 14:37 )             31.8         137  |  104  |  18  ----------------------------<  129<H>  4.4   |  22  |  1.36<H>    Ca    9.5      09 Sep 2021 14:37  Phos  1.6       Mg     1.8     -      PT/INR - ( 08 Sep 2021 10:31 )   PT: 15.2 sec;   INR: 1.28 ratio         PTT - ( 08 Sep 2021 10:31 )  PTT:69.4 sec  CAPILLARY BLOOD GLUCOSE        CARDIAC MARKERS ( 08 Sep 2021 18:29 )  x     / x     / 161 U/L / x     / x          Urinalysis Basic - ( 07 Sep 2021 17:15 )    Color: Yellow / Appearance: Clear / S.017 / pH: x  Gluc: x / Ketone: Small  / Bili: Negative / Urobili: Negative   Blood: x / Protein: 30 mg/dL / Nitrite: Negative   Leuk Esterase: Moderate / RBC: 2 /hpf / WBC 6 /HPF   Sq Epi: x / Non Sq Epi: 1 /hpf / Bacteria: Negative        RADIOLOGY & ADDITIONAL TESTS:    Imaging Personally Reviewed:  [x] YES  [ ] NO    Consultant(s) Notes Reviewed:  [x] YES  [ ] NO    MEDICATIONS  (STANDING):  acetaminophen   Tablet .. 975 milliGRAM(s) Oral every 8 hours  atorvastatin 10 milliGRAM(s) Oral at bedtime  calcium carbonate    500 mG (Tums) Chewable 1 Tablet(s) Chew daily  dextrose 5% + sodium chloride 0.45% with potassium chloride 20 mEq/L 1000 milliLiter(s) (50 mL/Hr) IV Continuous <Continuous>  heparin   Injectable 5000 Unit(s) SubCutaneous every 8 hours  hydrocortisone sodium succinate Injectable 25 milliGRAM(s) IV Push two times a day  influenza   Vaccine 0.5 milliLiter(s) IntraMuscular once  latanoprost 0.005% Ophthalmic Solution 1 Drop(s) Both EYES at bedtime  levothyroxine 50 MICROGram(s) Oral daily  lidocaine   4% Patch 1 Patch Transdermal every 24 hours  magnesium sulfate  IVPB 1 Gram(s) IV Intermittent once  metoprolol succinate ER 50 milliGRAM(s) Oral every 24 hours  nystatin Powder 1 Application(s) Topical two times a day  oxyCODONE  ER Tablet 20 milliGRAM(s) Oral every 12 hours  pantoprazole    Tablet 40 milliGRAM(s) Oral before breakfast  polyethylene glycol 3350 17 Gram(s) Oral daily  potassium phosphate IVPB 15 milliMole(s) IV Intermittent once  sodium bicarbonate 650 milliGRAM(s) Oral three times a day    MEDICATIONS  (PRN):  artificial  tears Solution 1 Drop(s) Both EYES every 6 hours PRN Dry Eyes  guaiFENesin  milliGRAM(s) Oral every 12 hours PRN Cough  oxyCODONE    IR 5 milliGRAM(s) Oral every 8 hours PRN Severe Pain (7 - 10)  senna 2 Tablet(s) Oral at bedtime PRN Constipation  traMADol 25 milliGRAM(s) Oral every 6 hours PRN Moderate Pain (4 - 6)  traMADol 50 milliGRAM(s) Oral every 8 hours PRN Severe Pain (7 - 10)      Care Discussed with Consultants/Other Providers [x] YES  [ ] NO    Vital Signs Last 24 Hrs  T(C): 36.6 (09 Sep 2021 12:36), Max: 37.3 (08 Sep 2021 15:57)  T(F): 97.9 (09 Sep 2021 12:36), Max: 99.1 (08 Sep 2021 15:57)  HR: 60 (09 Sep 2021 12:36) (60 - 86)  BP: 147/83 (09 Sep 2021 12:36) (103/63 - 157/88)  BP(mean): --  RR: 18 (09 Sep 2021 12:36) (18 - 18)  SpO2: 100% (09 Sep 2021 12:36) (93% - 100%)  I&O's Summary    08 Sep 2021 07:01  -  09 Sep 2021 07:00  --------------------------------------------------------  IN: 0 mL / OUT: 600 mL / NET: -600 mL    09 Sep 2021 07:01  -  09 Sep 2021 15:48  --------------------------------------------------------  IN: 400 mL / OUT: 650 mL / NET: -250 mL      PHYSICAL EXAM:  GENERAL: NAD, well-developed, comfortable, on room air  HEAD:  Atraumatic, Normocephalic  EYES: EOMI, PERRLA, conjunctiva and sclera clear, legally blind  NECK: Supple, No JVD  CHEST/LUNG: mild decrease breath sounds bilaterally; No wheeze   HEART: Regular rate and rhythm; No murmurs, rubs, or gallops  ABDOMEN: Soft, Nontender, Nondistended; Bowel sounds present  Neuro: AAOx3, no focal weakness, 5/5 b/l extremity strength  EXTREMITIES:  2+ Peripheral Pulses, No clubbing, cyanosis, trace b/l edema  SKIN: No rashes or lesions

## 2021-09-09 NOTE — PROGRESS NOTE ADULT - ASSESSMENT
75 yo F with a history of colon cancer s/p right hemicolectomy (approx 2 years ago, outside hospital), DVT (on Eliquis), CKD, adrenal insufficiency presenting as transfer from [facility] for trauma evaluation secondary to a mechanical fall from sitting with headstrike. Injuries identified include right forehead hematoma, acute minimally displaced right coracoid process scapular fracture, T11/L1 compression fractures and an inferior sternal fracture. Patient hemodynamically stable.    # s/p Mechanical Fall:  Right forehead hematoma, acute minimally displaced right coracoid process scapular fracture, T11/L1 compression fractures and an inferior sternal fracture  no surgery for scapular fx. Placed in sling  CT spine with fusion L2-5. Fracture through the T12-L1 disc space with widening of the disc space and a L1-2 laminectomy which appears unstable. Diffuse osteopenia. Old T11 and L1 fractures.   MRI T/L spine with anterior splaying of the intervertebral disc space at the T12-L1 with edema in the anterior prevertebral soft tissues  Neuro surgery offering surgery vs conservative management, TLSO brace in the meantime.   The daughter Susie decided to proceed with surgery.   Cr improving. Medically optimized for the OR. Card clearance appreciated.     # Nausea:   Pt has been nauseous (no abd pain). ?pain med induced/ Opioids   Poor PO intake since being in the hospital. Normally with good appetite at home, with mechanical soft per the daughter.  anti-emetics PRN, encourage PO intake   still nauseous today, vomited x 1  the daughter at bedside. requests IV steroids early, states her PO may not be absorbed with vomiting.  Reasonable to start IV hydrocortisone while PO intake is poor.   Switch hydrocortisone 10 mg BID to  IV 25 mg BID.   can give additional stress dose near the timing of surgery.    # Hypopituitary   She is on Hydrocortisone 10 mg BID at home, so will monitor for adrenal insufficiency. am cortisol appropriate  UA unimpressive. vit D, vit B12 normal. TSH low as expected with hypopit. free T4 sent, pending.   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 Treviño ([facility]). Outpt EMR reviewed; Cr range from 1.4 to 1.6   Monitor I/O's, Serial Cr, Avoid nephrotoxins  Cr is up from baseline. Hold Losartan. s/p gentle IVF for acute kidney failure: likely pre-renal.   Her recent TTE reviewed, with normal LV.   Her Cr now back to baseline.     # CAD: Chronic, stable  Cont home CV meds  holding Asa for neurosurgery     # Hypothyroidism  Chronic, stable  TSH low. free T4 sent  Cont Synthroid 50mcg    # Hx of DVT, lower extremity  Home med Eliquis, on hold  Heparin subq Q8H    GI ppx:  Protonix

## 2021-09-09 NOTE — PROGRESS NOTE ADULT - ASSESSMENT
Patient is a 75yo F with a history of colon cancer s/p right hemicolectomy (approx 2 years ago, outside hospital), DVT (on Eliquis), CKD, adrenal insufficiency presenting after mechanical fall from sitting. CT head/cervical spine/chest were performed which showed a right forehead hematoma, acute minimally displaced right coracoid process scapular fracture, T11/L1 compression fractures and an inferior sternal fracture. Found to have worsened renal function    A/P:  Acute on CKD:  Outpatient Nephrologist Dr. Treviño  Baseline SCr 1.4-1.6  FELICIANO etiology? possible sec to hemodynamic change, rule out Rhabdo  Renal function slightly better since admission--pending BMP today  Pt optimized from renal stand point for surgery , as long as renal function stable   CK level ok, Urine lytes suggestive of ATN  Monitor I/O  Monitor renal function at present    Acidosis:  non-AG+AG  Continue oral  sodium bicarb 650mg TID    HTN:  BP improving   Currently off antihypertensive meds  Monitor at present

## 2021-09-09 NOTE — PROGRESS NOTE ADULT - SUBJECTIVE AND OBJECTIVE BOX
[facility]   MD KILLIAN JAIME MD RUORU WONG, PA    TEL:  OFFICE: [phone]  DR CHARLES CELL: [phone]  BEENA PISANO CELL: [phone]  DR. LOZANO CELL: [phone]      FROM 5 PM - 7 AM PLEASE CALL ANSWERING SERVICE: [phone]    RENAL FOLLOW UP NOTE--Date of Service 09-09-21 @ 10:23  --------------------------------------------------------------------------------  HPI:      Pt seen and examined at bedside.     PAST HISTORY  --------------------------------------------------------------------------------  No significant changes to PMH, PSH, FHx, SHx, unless otherwise noted    ALLERGIES & MEDICATIONS  --------------------------------------------------------------------------------  Allergies    penicillin (Rash)    Intolerances      Standing Inpatient Medications  acetaminophen   Tablet .. 975 milliGRAM(s) Oral every 8 hours  atorvastatin 10 milliGRAM(s) Oral at bedtime  calcium carbonate    500 mG (Tums) Chewable 1 Tablet(s) Chew daily  dextrose 5% + sodium chloride 0.45% with potassium chloride 20 mEq/L 1000 milliLiter(s) IV Continuous <Continuous>  heparin   Injectable 5000 Unit(s) SubCutaneous every 8 hours  hydrocortisone 10 milliGRAM(s) Oral every 12 hours  influenza   Vaccine 0.5 milliLiter(s) IntraMuscular once  latanoprost 0.005% Ophthalmic Solution 1 Drop(s) Both EYES at bedtime  levothyroxine 50 MICROGram(s) Oral daily  lidocaine   4% Patch 1 Patch Transdermal every 24 hours  metoprolol succinate ER 50 milliGRAM(s) Oral every 24 hours  nystatin Powder 1 Application(s) Topical two times a day  oxyCODONE  ER Tablet 20 milliGRAM(s) Oral every 12 hours  pantoprazole    Tablet 40 milliGRAM(s) Oral before breakfast  sodium bicarbonate 650 milliGRAM(s) Oral three times a day    PRN Inpatient Medications  artificial  tears Solution 1 Drop(s) Both EYES every 6 hours PRN  guaiFENesin  milliGRAM(s) Oral every 12 hours PRN  oxyCODONE    IR 5 milliGRAM(s) Oral every 8 hours PRN  senna 2 Tablet(s) Oral at bedtime PRN  traMADol 25 milliGRAM(s) Oral every 6 hours PRN  traMADol 50 milliGRAM(s) Oral every 8 hours PRN      REVIEW OF SYSTEMS  --------------------------------------------------------------------------------  General: no fever  ,  MSK: no edema     VITALS/PHYSICAL EXAM  --------------------------------------------------------------------------------  T(C): 37.3 (09-09-21 @ 08:20), Max: 37.3 (09-08-21 @ 15:57)  HR: 81 (09-09-21 @ 08:20) (76 - 86)  BP: 157/88 (09-09-21 @ 08:20) (103/63 - 157/88)  RR: 18 (09-09-21 @ 08:20) (18 - 18)  SpO2: 95% (09-09-21 @ 08:20) (93% - 96%)  Wt(kg): --        09-08-21 @ 07:01  -  09-09-21 @ 07:00  --------------------------------------------------------  IN: 0 mL / OUT: 600 mL / NET: -600 mL      Physical Exam:  Constitutional: NAD  HEENT: anicteric sclera, oropharynx clear, MMM  Neck: No JVD  Respiratory: CTAB, no wheezes, rales or rhonchi  Cardiovascular: S1, S2, RRR  Gastrointestinal: BS+, soft, NT/ND  Extremities: No cyanosis or clubbing. No peripheral edema  Neurological: A/O x 3, no focal deficits  Psychiatric: Normal mood, normal affect  : No CVA tenderness. No ervin.   Skin: No rashes  LABS/STUDIES  --------------------------------------------------------------------------------              11.2   12.89 >-----------<  221      [09-08-21 @ 10:31]              32.1     137  |  101  |  27  ----------------------------<  69      [09-08-21 @ 10:30]  4.0   |  17  |  2.05        Ca     9.3     [09-08-21 @ 10:30]      Mg     2.0     [09-08-21 @ 06:01]      Phos  4.9     [09-08-21 @ 06:01]      PT/INR: PT 15.2 , INR 1.28       [09-08-21 @ 10:31]  PTT: 69.4       [09-08-21 @ 10:31]          [09-08-21 @ 18:29]    Creatinine Trend:  SCr 2.05 [09-08 @ 10:30]  SCr 2.20 [09-08 @ 06:01]  SCr 2.28 [09-07 @ 07:19]  SCr 1.25 [09-06 @ 06:52]  SCr 1.42 [09-05 @ 09:50]    Urinalysis - [09-07-21 @ 17:15]      Color Yellow / Appearance Clear / SG 1.017 / pH 6.0      Gluc Negative / Ketone Small  / Bili Negative / Urobili Negative       Blood Trace / Protein 30 mg/dL / Leuk Est Moderate / Nitrite Negative      RBC 2 / WBC 6 / Hyaline 0 / Gran  / Sq Epi  / Non Sq Epi 1 / Bacteria Negative    Urine Creatinine 84      [09-08-21 @ 18:29]  Urine Sodium 70      [09-08-21 @ 18:29]  Urine Potassium 26      [09-08-21 @ 18:29]  Urine Osmolality 443      [09-08-21 @ 18:29]    Vitamin D (25OH) 67.9      [09-08-21 @ 09:59]  HbA1c 5.9      [02-22-17 @ 03:10]  TSH 0.18      [09-08-21 @ 09:59]

## 2021-09-09 NOTE — PROGRESS NOTE ADULT - SUBJECTIVE AND OBJECTIVE BOX
Patient seen and examined. Stable exam, c/o nausea. Bedrest with TLSO and spine precautions. Family decided to undergo operative stabilization.    - Awaiting for OR next Tuesday due to ASA use  - Medicine pre-op clearance and FELICIANO management  - Cont spine precautions, TLSO    I spent 15 min at bedside evaluating the patient.

## 2021-09-09 NOTE — CHART NOTE - NSCHARTNOTEFT_GEN_A_CORE
Fit and apply custom bivalve TLSO with CASH extension. Physical therapy present and assisting. Orthosis fit well. Reviewed application skin precautions and care with nursing.  Contact information left bedside. To notfiy office with any issues questions or concerns.    Orthosis to be worn at all times to protect and stabilize fracture and prevent additional injury.    Kenny CUNNINGHAM  [facility]  [phone]

## 2021-09-09 NOTE — PROGRESS NOTE ADULT - SUBJECTIVE AND OBJECTIVE BOX
p ([id number])     HPI:  Pt is a R-hand dominant 76y female presenting with R shoulder pain s/p mechanical fall. Pt was walking at home when she tripped over her commode.    Imaging: . MRI shows anterior splaying of T12/L1 space w/ edema and hem w/ ALL injury, no PLL injury or involvement of post elementsCTL w/ prior L2-5 fxn, hardware intact, significant comp fx T10-L1 supra-adjacent to prior fusion, chronic in nature, areas of mild retropulsion.     Exam: AOx2-3 with choices, RUE pain pritchett 3/5 prox, 5/5 distally, LUE 5/5, pain pritchett HF LLE 4+/5, otherwise 5/5, SILT, no clonus.     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    Osteoporosis    Chronic Kidney Disease; Dx 2009    HTN - Hypertension    History of Obesity    Myocardial infarction      PAST SURGICAL HISTORY   History of Laminectomy/ Fusion 1/06; L1-L2    H/O right hemicolectomy      codedine (Rash)  codeine (Nausea; Other)  penicillin (Rash)      MEDICATIONS:  Antibiotics:    Neuro:    Other:      SOCIAL HISTORY:   Occupation:   Marital Status:     FAMILY HISTORY:  Family history of hypertension    Family history of hypertension (Mother)    Family history of diabetes mellitus (Sibling)        ROS: Negative except per HPI    LABS:  Vital Signs Last 24 Hrs  T(C): 37 (09 Sep 2021 04:41), Max: 37.3 (08 Sep 2021 15:57)  T(F): 98.6 (09 Sep 2021 04:41), Max: 99.1 (08 Sep 2021 15:57)  HR: 83 (09 Sep 2021 04:41) (72 - 86)  BP: 149/86 (09 Sep 2021 04:41) (103/63 - 149/86)  BP(mean): --  RR: 18 (09 Sep 2021 04:41) (18 - 18)  SpO2: 94% (09 Sep 2021 04:41) (93% - 96%)                          11.2   12.89 )-----------( 221      ( 08 Sep 2021 10:31 )             32.1     09-08    137  |  101  |  27<H>  ----------------------------<  69<L>  4.0   |  17<L>  |  2.05<H>    Ca    9.3      08 Sep 2021 10:30  Phos  4.9     09-08  Mg     2.0     09-08

## 2021-09-09 NOTE — PROGRESS NOTE ADULT - SUBJECTIVE AND OBJECTIVE BOX
CARDIOLOGY FOLLOW UP - Dr. Orozco  Date of Service: 9/9/21  CC: denies cp, sob, and palpitations     Review of Systems:  Constitutional: No fever, weight loss, or fatigue  Respiratory: No cough, wheezing, or hemoptysis, no shortness of breath  Cardiovascular: No chest pain, palpitations, passing out, dizziness, or leg swelling  Gastrointestinal: No abd or epigastric pain.  No nausea, vomiting, or hematemesis; no diarrhea or constipation, no melena or hematochezia  Vascular: no edema       PHYSICAL EXAM:  T(C): 36.6 (09-09-21 @ 12:36), Max: 37.3 (09-08-21 @ 15:57)  HR: 60 (09-09-21 @ 12:36) (60 - 86)  BP: 147/83 (09-09-21 @ 12:36) (103/63 - 157/88)  RR: 18 (09-09-21 @ 12:36) (18 - 18)  SpO2: 100% (09-09-21 @ 12:36) (93% - 100%)  Wt(kg): --  I&O's Summary    08 Sep 2021 07:01  -  09 Sep 2021 07:00  --------------------------------------------------------  IN: 0 mL / OUT: 600 mL / NET: -600 mL    09 Sep 2021 07:01  -  09 Sep 2021 12:51  --------------------------------------------------------  IN: 400 mL / OUT: 650 mL / NET: -250 mL        Appearance: Normal	  Cardiovascular: Normal S1 S2,RRR, No JVD, No murmurs  Respiratory: Lungs clear to auscultation	  Gastrointestinal:  Soft, Non-tender, + BS	  Extremities: Normal range of motion, No clubbing, cyanosis or edema      Home Medications:  acetaminophen 325 mg oral tablet: 2 tab(s) orally every 6 hours, As needed, Mild Pain (1 - 3) (24 Sep 2020 11:16)  Artificial Tears ophthalmic solution: 1 drop(s) to each affected eye 4 times a day, As Needed (22 Aug 2021 23:28)  aspirin 81 mg oral delayed release tablet: 1 tab(s) orally once a day (27 Aug 2021 13:38)  calcium (as carbonate) 600 mg oral tablet: 1 cap(s) orally once a day (24 Sep 2020 11:16)  Eliquis 2.5 mg oral tablet: 1 tab(s) orally 2 times a day (24 Sep 2020 11:16)  felodipine 5 mg oral tablet, extended release: 1 tab(s) orally once a day (22 Aug 2021 23:23)  guaiFENesin 600 mg oral tablet, extended release: 1 tab(s) orally every 12 hours as needed for cough  (27 Aug 2021 13:38)  hydrocortisone 10 mg oral tablet: 1 tab(s) orally 2 times a day (24 Sep 2020 11:16)  latanoprost 0.005% ophthalmic solution: 1 drop(s) to each affected eye once a day (in the evening) (22 Aug 2021 23:27)  levothyroxine 50 mcg (0.05 mg) oral tablet: 1 tab(s) orally once a day (24 Sep 2020 11:16)  Lipitor 10 mg oral tablet: 1 tab(s) orally once a day (22 Aug 2021 23:41)  Macular Vitamin Benefit oral tablet: 1 tab(s) orally once a day (24 Sep 2020 11:16)  metoprolol succinate 50 mg oral tablet, extended release: 1 tab(s) orally once a day (22 Aug 2021 23:24)  Multiple Vitamins oral tablet: 1 tab(s) orally once a day (22 Aug 2021 23:22)  olmesartan 20 mg oral tablet: 1 tab(s) orally once a day (22 Aug 2021 23:22)  omeprazole 20 mg oral delayed release capsule: 1 cap(s) orally once a day (22 Aug 2021 23:21)  oxyCODONE 5 mg oral tablet: 1 tab(s) orally every 8 hours as needed for severe pain (27 Aug 2021 13:35)  OxyCONTIN 20 mg oral tablet, extended release: 1 tab(s) orally every 12 hours (24 Sep 2020 11:16)  risedronate 35 mg oral tablet: 1 tab(s) orally once a week (Mondays) (05 Sep 2021 18:30)  senna oral tablet: 2 tab(s) orally once a day (at bedtime), As Needed (05 Sep 2021 18:31)      MEDICATIONS  (STANDING):  acetaminophen   Tablet .. 975 milliGRAM(s) Oral every 8 hours  atorvastatin 10 milliGRAM(s) Oral at bedtime  calcium carbonate    500 mG (Tums) Chewable 1 Tablet(s) Chew daily  dextrose 5% + sodium chloride 0.45% with potassium chloride 20 mEq/L 1000 milliLiter(s) (50 mL/Hr) IV Continuous <Continuous>  heparin   Injectable 5000 Unit(s) SubCutaneous every 8 hours  hydrocortisone 10 milliGRAM(s) Oral every 12 hours  influenza   Vaccine 0.5 milliLiter(s) IntraMuscular once  latanoprost 0.005% Ophthalmic Solution 1 Drop(s) Both EYES at bedtime  levothyroxine 50 MICROGram(s) Oral daily  lidocaine   4% Patch 1 Patch Transdermal every 24 hours  metoprolol succinate ER 50 milliGRAM(s) Oral every 24 hours  nystatin Powder 1 Application(s) Topical two times a day  oxyCODONE  ER Tablet 20 milliGRAM(s) Oral every 12 hours  pantoprazole    Tablet 40 milliGRAM(s) Oral before breakfast  sodium bicarbonate 650 milliGRAM(s) Oral three times a day      TELEMETRY: 	    ECG:  	  RADIOLOGY:   DIAGNOSTIC TESTING:  [ ] Echocardiogram:  [ ]  Catheterization:  [ ] Stress Test:    OTHER: 	    LABS:	 	    Creatine Kinase, Serum: 161 U/L [25 - 170] (09-08 @ 18:29)  Troponin T, High Sensitivity Result: 35 ng/L [0 - 51] (09-05 @ 18:53)                          11.2   12.89 )-----------( 221      ( 08 Sep 2021 10:31 )             32.1     09-08    137  |  101  |  27<H>  ----------------------------<  69<L>  4.0   |  17<L>  |  2.05<H>    Ca    9.3      08 Sep 2021 10:30  Phos  4.9     09-08  Mg     2.0     09-08      PT/INR - ( 08 Sep 2021 10:31 )   PT: 15.2 sec;   INR: 1.28 ratio         PTT - ( 08 Sep 2021 10:31 )  PTT:69.4 sec

## 2021-09-09 NOTE — PROGRESS NOTE ADULT - ASSESSMENT
BEBETO, GREYSON  76F hx prior L2-5 Fxn w/. Dr Jane (ortho) at [facility] in early 2000s, revision x1, chronic LBP, s/p mechanical fall w/ RUE pain found to have R scapular fx and sternal fx, adm to trauma, CT T/L w/ sig adj segment degen above prior fusion w/ chronic comp fx, mild retropulsion, c/f unstable fx.     - ADM trauma, q4h neuro checks  - Preop for OR Tuesday for posterior extension of prior L2-5 fusion and stabilitzation, dw family  - ASA held, last dose 9/7  - Needs preop labs: please send new coags, BMP, CBC, Type and screen x1, COVID swab, MRSA swab, BLE Duplex urgently (ordered)  - Cr in light of new FELICIANO downtrending 2.05 < 2.20 < 2.28, please document if patient is optimized from a medical, renal, and cardiological standpoint for surgery   - MRI T/L done - anterior splaying of T12/L1 space w/ edema and hem w/ ALL injury, concerning for unstable injury  - CT T done - widening of T12/L1 disc space w/ L1 laminectomy

## 2021-09-10 DIAGNOSIS — Z86.39 PERSONAL HISTORY OF OTHER ENDOCRINE, NUTRITIONAL AND METABOLIC DISEASE: ICD-10-CM

## 2021-09-10 DIAGNOSIS — Z71.89 OTHER SPECIFIED COUNSELING: ICD-10-CM

## 2021-09-10 DIAGNOSIS — I10 ESSENTIAL (PRIMARY) HYPERTENSION: ICD-10-CM

## 2021-09-10 DIAGNOSIS — S22.000A WEDGE COMPRESSION FRACTURE OF UNSPECIFIED THORACIC VERTEBRA, INITIAL ENCOUNTER FOR CLOSED FRACTURE: ICD-10-CM

## 2021-09-10 LAB
ANION GAP SERPL CALC-SCNC: 12 MMOL/L — SIGNIFICANT CHANGE UP (ref 5–17)
BUN SERPL-MCNC: 18 MG/DL — SIGNIFICANT CHANGE UP (ref 7–23)
CALCIUM SERPL-MCNC: 8.9 MG/DL — SIGNIFICANT CHANGE UP (ref 8.4–10.5)
CHLORIDE SERPL-SCNC: 103 MMOL/L — SIGNIFICANT CHANGE UP (ref 96–108)
CO2 SERPL-SCNC: 22 MMOL/L — SIGNIFICANT CHANGE UP (ref 22–31)
CREAT SERPL-MCNC: 1.25 MG/DL — SIGNIFICANT CHANGE UP (ref 0.5–1.3)
GLUCOSE SERPL-MCNC: 124 MG/DL — HIGH (ref 70–99)
HCT VFR BLD CALC: 29.9 % — LOW (ref 34.5–45)
HGB BLD-MCNC: 10.6 G/DL — LOW (ref 11.5–15.5)
MAGNESIUM SERPL-MCNC: 2.1 MG/DL — SIGNIFICANT CHANGE UP (ref 1.6–2.6)
MCHC RBC-ENTMCNC: 26.6 PG — LOW (ref 27–34)
MCHC RBC-ENTMCNC: 35.5 GM/DL — SIGNIFICANT CHANGE UP (ref 32–36)
MCV RBC AUTO: 75.1 FL — LOW (ref 80–100)
NRBC # BLD: 0 /100 WBCS — SIGNIFICANT CHANGE UP (ref 0–0)
PHOSPHATE SERPL-MCNC: 2.2 MG/DL — LOW (ref 2.5–4.5)
PLATELET # BLD AUTO: 194 K/UL — SIGNIFICANT CHANGE UP (ref 150–400)
POTASSIUM SERPL-MCNC: 4.7 MMOL/L — SIGNIFICANT CHANGE UP (ref 3.5–5.3)
POTASSIUM SERPL-SCNC: 4.7 MMOL/L — SIGNIFICANT CHANGE UP (ref 3.5–5.3)
RBC # BLD: 3.98 M/UL — SIGNIFICANT CHANGE UP (ref 3.8–5.2)
RBC # FLD: 16.8 % — HIGH (ref 10.3–14.5)
SODIUM SERPL-SCNC: 137 MMOL/L — SIGNIFICANT CHANGE UP (ref 135–145)
T4 AB SER-ACNC: 6 UG/DL — SIGNIFICANT CHANGE UP (ref 4.6–12)
WBC # BLD: 11.18 K/UL — HIGH (ref 3.8–10.5)
WBC # FLD AUTO: 11.18 K/UL — HIGH (ref 3.8–10.5)

## 2021-09-10 PROCEDURE — 99232 SBSQ HOSP IP/OBS MODERATE 35: CPT | Mod: 57

## 2021-09-10 RX ORDER — ACETAMINOPHEN 500 MG
1000 TABLET ORAL ONCE
Refills: 0 | Status: COMPLETED | OUTPATIENT
Start: 2021-09-10 | End: 2021-09-11

## 2021-09-10 RX ORDER — ONDANSETRON 8 MG/1
4 TABLET, FILM COATED ORAL ONCE
Refills: 0 | Status: COMPLETED | OUTPATIENT
Start: 2021-09-10 | End: 2021-09-10

## 2021-09-10 RX ADMIN — ATORVASTATIN CALCIUM 10 MILLIGRAM(S): 80 TABLET, FILM COATED ORAL at 22:59

## 2021-09-10 RX ADMIN — Medication 650 MILLIGRAM(S): at 05:41

## 2021-09-10 RX ADMIN — OXYCODONE HYDROCHLORIDE 20 MILLIGRAM(S): 5 TABLET ORAL at 18:19

## 2021-09-10 RX ADMIN — Medication 25 MILLIGRAM(S): at 18:01

## 2021-09-10 RX ADMIN — Medication 1 TABLET(S): at 11:29

## 2021-09-10 RX ADMIN — POLYETHYLENE GLYCOL 3350 17 GRAM(S): 17 POWDER, FOR SOLUTION ORAL at 11:30

## 2021-09-10 RX ADMIN — OXYCODONE HYDROCHLORIDE 20 MILLIGRAM(S): 5 TABLET ORAL at 07:06

## 2021-09-10 RX ADMIN — Medication 50 MICROGRAM(S): at 05:41

## 2021-09-10 RX ADMIN — Medication 650 MILLIGRAM(S): at 22:58

## 2021-09-10 RX ADMIN — LIDOCAINE 1 PATCH: 4 CREAM TOPICAL at 18:02

## 2021-09-10 RX ADMIN — HEPARIN SODIUM 5000 UNIT(S): 5000 INJECTION INTRAVENOUS; SUBCUTANEOUS at 22:59

## 2021-09-10 RX ADMIN — PANTOPRAZOLE SODIUM 40 MILLIGRAM(S): 20 TABLET, DELAYED RELEASE ORAL at 05:42

## 2021-09-10 RX ADMIN — LIDOCAINE 1 PATCH: 4 CREAM TOPICAL at 18:18

## 2021-09-10 RX ADMIN — ONDANSETRON 4 MILLIGRAM(S): 8 TABLET, FILM COATED ORAL at 20:11

## 2021-09-10 RX ADMIN — OXYCODONE HYDROCHLORIDE 20 MILLIGRAM(S): 5 TABLET ORAL at 05:46

## 2021-09-10 RX ADMIN — NYSTATIN CREAM 1 APPLICATION(S): 100000 CREAM TOPICAL at 05:41

## 2021-09-10 RX ADMIN — NYSTATIN CREAM 1 APPLICATION(S): 100000 CREAM TOPICAL at 18:00

## 2021-09-10 RX ADMIN — HEPARIN SODIUM 5000 UNIT(S): 5000 INJECTION INTRAVENOUS; SUBCUTANEOUS at 05:40

## 2021-09-10 RX ADMIN — LIDOCAINE 1 PATCH: 4 CREAM TOPICAL at 07:06

## 2021-09-10 RX ADMIN — Medication 50 MILLIGRAM(S): at 18:00

## 2021-09-10 RX ADMIN — HEPARIN SODIUM 5000 UNIT(S): 5000 INJECTION INTRAVENOUS; SUBCUTANEOUS at 14:45

## 2021-09-10 RX ADMIN — Medication 650 MILLIGRAM(S): at 14:45

## 2021-09-10 RX ADMIN — Medication 25 MILLIGRAM(S): at 05:46

## 2021-09-10 RX ADMIN — LATANOPROST 1 DROP(S): 0.05 SOLUTION/ DROPS OPHTHALMIC; TOPICAL at 22:58

## 2021-09-10 RX ADMIN — OXYCODONE HYDROCHLORIDE 20 MILLIGRAM(S): 5 TABLET ORAL at 18:01

## 2021-09-10 NOTE — PROGRESS NOTE ADULT - SUBJECTIVE AND OBJECTIVE BOX
SUBJECTIVE / OVERNIGHT EVENTS:  Nausea better today  not eating much  the son at bedside  no cp, no sob, no n/v/d. no abdominal pain.  no headache, no dizziness.     --------------------------------------------------------------------------------------------  LABS:                        10.6   11.18 )-----------( 194      ( 10 Sep 2021 09:50 )             29.9     09-10    137  |  103  |  18  ----------------------------<  124<H>  4.7   |  22  |  1.25    Ca    8.9      10 Sep 2021 09:50  Phos  2.2     09-10  Mg     2.1     09-10        CAPILLARY BLOOD GLUCOSE        CARDIAC MARKERS ( 08 Sep 2021 18:29 )  x     / x     / 161 U/L / x     / x              RADIOLOGY & ADDITIONAL TESTS:    Imaging Personally Reviewed:  [x] YES  [ ] NO    Consultant(s) Notes Reviewed:  [x] YES  [ ] NO    MEDICATIONS  (STANDING):  atorvastatin 10 milliGRAM(s) Oral at bedtime  calcium carbonate    500 mG (Tums) Chewable 1 Tablet(s) Chew daily  dextrose 5% + sodium chloride 0.45% with potassium chloride 20 mEq/L 1000 milliLiter(s) (50 mL/Hr) IV Continuous <Continuous>  heparin   Injectable 5000 Unit(s) SubCutaneous every 8 hours  hydrocortisone sodium succinate Injectable 25 milliGRAM(s) IV Push two times a day  influenza   Vaccine 0.5 milliLiter(s) IntraMuscular once  latanoprost 0.005% Ophthalmic Solution 1 Drop(s) Both EYES at bedtime  levothyroxine 50 MICROGram(s) Oral daily  lidocaine   4% Patch 1 Patch Transdermal every 24 hours  metoprolol succinate ER 50 milliGRAM(s) Oral every 24 hours  nystatin Powder 1 Application(s) Topical two times a day  oxyCODONE  ER Tablet 20 milliGRAM(s) Oral every 12 hours  pantoprazole    Tablet 40 milliGRAM(s) Oral before breakfast  polyethylene glycol 3350 17 Gram(s) Oral daily  sodium bicarbonate 650 milliGRAM(s) Oral three times a day    MEDICATIONS  (PRN):  artificial  tears Solution 1 Drop(s) Both EYES every 6 hours PRN Dry Eyes  guaiFENesin  milliGRAM(s) Oral every 12 hours PRN Cough  oxyCODONE    IR 5 milliGRAM(s) Oral every 8 hours PRN Severe Pain (7 - 10)  senna 2 Tablet(s) Oral at bedtime PRN Constipation  traMADol 25 milliGRAM(s) Oral every 6 hours PRN Moderate Pain (4 - 6)  traMADol 50 milliGRAM(s) Oral every 8 hours PRN Severe Pain (7 - 10)      Care Discussed with Consultants/Other Providers [x] YES  [ ] NO    Vital Signs Last 24 Hrs  T(C): 37.1 (10 Sep 2021 13:01), Max: 37.3 (09 Sep 2021 16:03)  T(F): 98.8 (10 Sep 2021 13:01), Max: 99.2 (09 Sep 2021 16:03)  HR: 73 (10 Sep 2021 13:01) (68 - 85)  BP: 123/82 (10 Sep 2021 13:01) (108/56 - 156/80)  BP(mean): --  RR: 18 (10 Sep 2021 13:01) (18 - 18)  SpO2: 100% (10 Sep 2021 13:01) (95% - 100%)  I&O's Summary    09 Sep 2021 07:01  -  10 Sep 2021 07:00  --------------------------------------------------------  IN: 1850 mL / OUT: 2550 mL / NET: -700 mL    10 Sep 2021 07:01  -  10 Sep 2021 15:00  --------------------------------------------------------  IN: 360 mL / OUT: 400 mL / NET: -40 mL      PHYSICAL EXAM:  GENERAL: NAD, well-developed, comfortable, on room air  HEAD:  Atraumatic, Normocephalic  EYES: EOMI, PERRLA, conjunctiva and sclera clear, legally blind  NECK: Supple, No JVD  CHEST/LUNG: mild decrease breath sounds bilaterally; No wheeze   HEART: Regular rate and rhythm; No murmurs, rubs, or gallops  ABDOMEN: Soft, Nontender, Nondistended; Bowel sounds present  Neuro: AAOx3, no focal weakness, 5/5 b/l extremity strength  EXTREMITIES:  2+ Peripheral Pulses, No clubbing, cyanosis, trace b/l edema  SKIN: No rashes or lesions

## 2021-09-10 NOTE — PROGRESS NOTE ADULT - SUBJECTIVE AND OBJECTIVE BOX
CARDIOLOGY FOLLOW UP - Dr. Orozco  DATE OF SERVICE: 09-10-21     CC no cp/sob     REVIEW OF SYSTEMS:   CONSTITUTIONAL: No fever, weight loss, or fatigue   RESPIRATORY:  No cough, wheezing, chills or hemoptysis; No SOB  CARDIOVASCULAR: No chest pain, palpitations, passing out, dizziness, or leg swelling   GASTROINTESTINAL: No abdominal or epigastric pain. No nausea, vomiting, or hematemesis, no diarreha, or constipation, No melena or hematochezia   VASCULAR: no edema.       PHYSICAL EXAM:  T(C): 37.2 (09-10-21 @ 08:45), Max: 37.3 (09-09-21 @ 16:03)  HR: 68 (09-10-21 @ 08:45) (60 - 85)  BP: 130/81 (09-10-21 @ 08:45) (108/56 - 156/80)  RR: 18 (09-10-21 @ 08:45) (18 - 18)  SpO2: 98% (09-10-21 @ 08:45) (95% - 100%)  Wt(kg): --  I&O's Summary    09 Sep 2021 07:01  -  10 Sep 2021 07:00  --------------------------------------------------------  IN: 1850 mL / OUT: 2550 mL / NET: -700 mL        Appearance: Normal	  Cardiovascular: Normal S1 S2,RRR, No JVD, No murmurs  Respiratory: Lungs clear to auscultation	  Gastrointestinal:  Soft, Non-tender, + BS	  Extremities: Normal range of motion, No clubbing, cyanosis or edema      HOME MEDICATIONS:  acetaminophen 325 mg oral tablet: 2 tab(s) orally every 6 hours, As needed, Mild Pain (1 - 3) (24 Sep 2020 11:16)  Artificial Tears ophthalmic solution: 1 drop(s) to each affected eye 4 times a day, As Needed (22 Aug 2021 23:28)  aspirin 81 mg oral delayed release tablet: 1 tab(s) orally once a day (27 Aug 2021 13:38)  calcium (as carbonate) 600 mg oral tablet: 1 cap(s) orally once a day (24 Sep 2020 11:16)  Eliquis 2.5 mg oral tablet: 1 tab(s) orally 2 times a day (24 Sep 2020 11:16)  felodipine 5 mg oral tablet, extended release: 1 tab(s) orally once a day (22 Aug 2021 23:23)  guaiFENesin 600 mg oral tablet, extended release: 1 tab(s) orally every 12 hours as needed for cough  (27 Aug 2021 13:38)  hydrocortisone 10 mg oral tablet: 1 tab(s) orally 2 times a day (24 Sep 2020 11:16)  latanoprost 0.005% ophthalmic solution: 1 drop(s) to each affected eye once a day (in the evening) (22 Aug 2021 23:27)  levothyroxine 50 mcg (0.05 mg) oral tablet: 1 tab(s) orally once a day (24 Sep 2020 11:16)  Lipitor 10 mg oral tablet: 1 tab(s) orally once a day (22 Aug 2021 23:41)  Macular Vitamin Benefit oral tablet: 1 tab(s) orally once a day (24 Sep 2020 11:16)  metoprolol succinate 50 mg oral tablet, extended release: 1 tab(s) orally once a day (22 Aug 2021 23:24)  Multiple Vitamins oral tablet: 1 tab(s) orally once a day (22 Aug 2021 23:22)  olmesartan 20 mg oral tablet: 1 tab(s) orally once a day (22 Aug 2021 23:22)  omeprazole 20 mg oral delayed release capsule: 1 cap(s) orally once a day (22 Aug 2021 23:21)  oxyCODONE 5 mg oral tablet: 1 tab(s) orally every 8 hours as needed for severe pain (27 Aug 2021 13:35)  OxyCONTIN 20 mg oral tablet, extended release: 1 tab(s) orally every 12 hours (24 Sep 2020 11:16)  risedronate 35 mg oral tablet: 1 tab(s) orally once a week (Mondays) (05 Sep 2021 18:30)  senna oral tablet: 2 tab(s) orally once a day (at bedtime), As Needed (05 Sep 2021 18:31)      MEDICATIONS  (STANDING):  atorvastatin 10 milliGRAM(s) Oral at bedtime  calcium carbonate    500 mG (Tums) Chewable 1 Tablet(s) Chew daily  dextrose 5% + sodium chloride 0.45% with potassium chloride 20 mEq/L 1000 milliLiter(s) (50 mL/Hr) IV Continuous <Continuous>  heparin   Injectable 5000 Unit(s) SubCutaneous every 8 hours  hydrocortisone sodium succinate Injectable 25 milliGRAM(s) IV Push two times a day  influenza   Vaccine 0.5 milliLiter(s) IntraMuscular once  latanoprost 0.005% Ophthalmic Solution 1 Drop(s) Both EYES at bedtime  levothyroxine 50 MICROGram(s) Oral daily  lidocaine   4% Patch 1 Patch Transdermal every 24 hours  metoprolol succinate ER 50 milliGRAM(s) Oral every 24 hours  nystatin Powder 1 Application(s) Topical two times a day  oxyCODONE  ER Tablet 20 milliGRAM(s) Oral every 12 hours  pantoprazole    Tablet 40 milliGRAM(s) Oral before breakfast  polyethylene glycol 3350 17 Gram(s) Oral daily  sodium bicarbonate 650 milliGRAM(s) Oral three times a day      TELEMETRY: 	    ECG:  	  RADIOLOGY:   DIAGNOSTIC TESTING:  [ ] Echocardiogram:  [ ]  Catheterization:  [ ] Stress Test:    OTHER: 	    LABS:	 	                                10.6   11.18 )-----------( 194      ( 10 Sep 2021 09:50 )             29.9     09-10    137  |  103  |  18  ----------------------------<  124<H>  4.7   |  22  |  1.25    Ca    8.9      10 Sep 2021 09:50  Phos  2.2     09-10  Mg     2.1     09-10

## 2021-09-10 NOTE — PROGRESS NOTE ADULT - ASSESSMENT
Patient is a 77yo F with a history of colon cancer s/p right hemicolectomy (approx 2 years ago, outside hospital), DVT (on Eliquis), CKD, adrenal insufficiency presenting after mechanical fall from sitting. CT head/cervical spine/chest were performed which showed a right forehead hematoma, acute minimally displaced right coracoid process scapular fracture, T11/L1 compression fractures and an inferior sternal fracture. Found to have worsened renal function    A/P:  Acute on CKD:  Outpatient Nephrologist Dr. Treviño  Baseline SCr 1.4-1.6  FELICIANO etiology? possible sec to hemodynamic change  Renal function improving   Pt optimized from renal stand point for surgery , as long as renal function stable   CK level ok, Urine lytes suggestive of ATN  Monitor I/O  Monitor renal function at present    Acidosis:  non-AG+AG  Continue oral  sodium bicarb 650mg TID    HTN:  BP improving   Currently off antihypertensive meds  Monitor at present

## 2021-09-10 NOTE — PROGRESS NOTE ADULT - SUBJECTIVE AND OBJECTIVE BOX
[facility]   MD KILLIAN JAIME MD RUORU WONG, PA    TEL:  OFFICE: [phone]  DR CHARLES CELL: [phone]  BEENA PISANO CELL: [phone]  DR. LOZANO CELL: [phone]      FROM 5 PM - 7 AM PLEASE CALL ANSWERING SERVICE: [phone]    RENAL FOLLOW UP NOTE--Date of Service 09-10-21 @ 10:10  --------------------------------------------------------------------------------  HPI:      Pt seen and examined at bedside.   Denies SOB, chest pain     PAST HISTORY  --------------------------------------------------------------------------------  No significant changes to PMH, PSH, FHx, SHx, unless otherwise noted    ALLERGIES & MEDICATIONS  --------------------------------------------------------------------------------  Allergies    penicillin (Rash)    Intolerances      Standing Inpatient Medications  atorvastatin 10 milliGRAM(s) Oral at bedtime  calcium carbonate    500 mG (Tums) Chewable 1 Tablet(s) Chew daily  dextrose 5% + sodium chloride 0.45% with potassium chloride 20 mEq/L 1000 milliLiter(s) IV Continuous <Continuous>  heparin   Injectable 5000 Unit(s) SubCutaneous every 8 hours  hydrocortisone sodium succinate Injectable 25 milliGRAM(s) IV Push two times a day  influenza   Vaccine 0.5 milliLiter(s) IntraMuscular once  latanoprost 0.005% Ophthalmic Solution 1 Drop(s) Both EYES at bedtime  levothyroxine 50 MICROGram(s) Oral daily  lidocaine   4% Patch 1 Patch Transdermal every 24 hours  metoprolol succinate ER 50 milliGRAM(s) Oral every 24 hours  nystatin Powder 1 Application(s) Topical two times a day  oxyCODONE  ER Tablet 20 milliGRAM(s) Oral every 12 hours  pantoprazole    Tablet 40 milliGRAM(s) Oral before breakfast  polyethylene glycol 3350 17 Gram(s) Oral daily  sodium bicarbonate 650 milliGRAM(s) Oral three times a day    PRN Inpatient Medications  artificial  tears Solution 1 Drop(s) Both EYES every 6 hours PRN  guaiFENesin  milliGRAM(s) Oral every 12 hours PRN  oxyCODONE    IR 5 milliGRAM(s) Oral every 8 hours PRN  senna 2 Tablet(s) Oral at bedtime PRN  traMADol 25 milliGRAM(s) Oral every 6 hours PRN  traMADol 50 milliGRAM(s) Oral every 8 hours PRN      REVIEW OF SYSTEMS  --------------------------------------------------------------------------------  General: no fever    MSK: no edema     VITALS/PHYSICAL EXAM  --------------------------------------------------------------------------------  T(C): 37.2 (09-10-21 @ 08:45), Max: 37.3 (09-09-21 @ 16:03)  HR: 68 (09-10-21 @ 08:45) (60 - 85)  BP: 130/81 (09-10-21 @ 08:45) (108/56 - 156/80)  RR: 18 (09-10-21 @ 08:45) (18 - 18)  SpO2: 98% (09-10-21 @ 08:45) (95% - 100%)  Wt(kg): --        09-09-21 @ 07:01  -  09-10-21 @ 07:00  --------------------------------------------------------  IN: 1850 mL / OUT: 2550 mL / NET: -700 mL      Physical Exam:  	Gen: NAD  	HEENT: MMM  	Pulm: CTA B/L  	CV: S1S2  	Abd: Soft, +BS  	Ext: No LE edema B/L                      Neuro: Awake   	Skin: Warm and Dry   	Vascular access: NO HD catheter            no  aziza  LABS/STUDIES  --------------------------------------------------------------------------------              10.6   11.18 >-----------<  194      [09-10-21 @ 09:50]              29.9     137  |  104  |  18  ----------------------------<  129      [09-09-21 @ 14:37]  4.4   |  22  |  1.36        Ca     9.5     [09-09-21 @ 14:37]      Mg     1.8     [09-09-21 @ 14:37]      Phos  1.6     [09-09-21 @ 14:37]      PT/INR: PT 15.2 , INR 1.28       [09-08-21 @ 10:31]  PTT: 69.4       [09-08-21 @ 10:31]          [09-08-21 @ 18:29]    Creatinine Trend:  SCr 1.36 [09-09 @ 14:37]  SCr 2.05 [09-08 @ 10:30]  SCr 2.20 [09-08 @ 06:01]  SCr 2.28 [09-07 @ 07:19]  SCr 1.25 [09-06 @ 06:52]    Urinalysis - [09-07-21 @ 17:15]      Color Yellow / Appearance Clear / SG 1.017 / pH 6.0      Gluc Negative / Ketone Small  / Bili Negative / Urobili Negative       Blood Trace / Protein 30 mg/dL / Leuk Est Moderate / Nitrite Negative      RBC 2 / WBC 6 / Hyaline 0 / Gran  / Sq Epi  / Non Sq Epi 1 / Bacteria Negative    Urine Creatinine 84      [09-08-21 @ 18:29]  Urine Sodium 70      [09-08-21 @ 18:29]  Urine Potassium 26      [09-08-21 @ 18:29]  Urine Osmolality 443      [09-08-21 @ 18:29]    Vitamin D (25OH) 67.9      [09-08-21 @ 09:59]  HbA1c 5.9      [02-22-17 @ 03:10]  TSH 0.18      [09-08-21 @ 09:59]

## 2021-09-10 NOTE — PROGRESS NOTE ADULT - ASSESSMENT
ECHO 10/25/16:  Normal left ventricular systolic function. No segmental wall motion abnormalities. Hypermobile interatrial septum.   ECHO 8/24/21: EF 66% grossly nl LV sys fx, mild diastolic dsyfx - stage 1     a/p    Patient is a 77yo F , known to practice from prior admission,  with a history of colon cancer s/p right hemicolectomy (approx 2 years ago, outside hospital), DVT (on Eliquis), CKD, adrenal insufficiency presenting as transfer from [facility] for trauma evaluation secondary to a mechanical fall from sitting    #Mechanical fall  -MRI noted with anterior splaying of T12/L1 space w/ edema   -CT T done noted with widening of T12/L1 disc space w/ L1 laminectomy  -plans for posterior extension of prior L2-5 fusion and stabilization 9/14  -cv stable, no cp, sob. ekg without acute ischemic changes. most recent echo noted with EF 66% grossly nl LV sys fx, mild diastolic dsyfx   -pt cardiac optimized - can proceed to OR with acceptable CV risk  -neuro sx/ trauma f/u     #SVT (hx)  -c/w toprol 50 mg daily     # DVT (hx)  -recent le doppler negative for acute dvt  -AC on hold for OR      # wm on CKD   -ivf per renal   -renal following     dvt ppx

## 2021-09-10 NOTE — PROGRESS NOTE ADULT - SUBJECTIVE AND OBJECTIVE BOX
SUBJECTIVE:   Patient was seen and evaluated at bedside. Patient is resting in bed and is in no new acute distress.   OVERNIGHT EVENTS:   none   Vital Signs Last 24 Hrs  T(C): 37.2 (10 Sep 2021 08:45), Max: 37.3 (09 Sep 2021 16:03)  T(F): 99 (10 Sep 2021 08:45), Max: 99.2 (09 Sep 2021 16:03)  HR: 68 (10 Sep 2021 08:45) (60 - 85)  BP: 130/81 (10 Sep 2021 08:45) (108/56 - 156/80)  BP(mean): --  RR: 18 (10 Sep 2021 08:45) (18 - 18)  SpO2: 98% (10 Sep 2021 08:45) (95% - 100%)    PHYSICAL EXAM:    General: No Acute Distress     Neurological: Awake, alert oriented to person, place and time, Following Commands, PERRL, EOMI, Face Symmetrical, Speech Fluent, Moving all extremities, Muscle Strength uppers wnl, lowers rle 4/5 proximal , df/pf 4+, lle proximal 4- and df/pf 4/5 , sensation wnl.   Pulmonary: Clear to Auscultation, No Rales, No Rhonchi, No Wheezes     Cardiovascular: S1, S2, Regular Rate and Rhythm     Gastrointestinal: Soft, Nontender, Nondistended     Incision: none     LABS:                        10.6   11.18 )-----------( 194      ( 10 Sep 2021 09:50 )             29.9    10    137  |  103  |  18  ----------------------------<  124<H>  4.7   |  22  |  1.25    Ca    8.9      10 Sep 2021 09:50  Phos  2.2     10  Mg     2.1     09-10          09-09 @ 07:01  -  09-10 @ 07:00  --------------------------------------------------------  IN: 1850 mL / OUT: 2550 mL / NET: -700 mL      DRAINS:     MEDICATIONS:  Antibiotics:    Neuro:  oxyCODONE    IR 5 milliGRAM(s) Oral every 8 hours PRN Severe Pain (7 - 10)  oxyCODONE  ER Tablet 20 milliGRAM(s) Oral every 12 hours  traMADol 25 milliGRAM(s) Oral every 6 hours PRN Moderate Pain (4 - 6)  traMADol 50 milliGRAM(s) Oral every 8 hours PRN Severe Pain (7 - 10)    Cardiac:  metoprolol succinate ER 50 milliGRAM(s) Oral every 24 hours    Pulm:  guaiFENesin  milliGRAM(s) Oral every 12 hours PRN Cough    GI/:  calcium carbonate    500 mG (Tums) Chewable 1 Tablet(s) Chew daily  pantoprazole    Tablet 40 milliGRAM(s) Oral before breakfast  polyethylene glycol 3350 17 Gram(s) Oral daily  senna 2 Tablet(s) Oral at bedtime PRN Constipation    Other:   artificial  tears Solution 1 Drop(s) Both EYES every 6 hours PRN Dry Eyes  atorvastatin 10 milliGRAM(s) Oral at bedtime  dextrose 5% + sodium chloride 0.45% with potassium chloride 20 mEq/L 1000 milliLiter(s) IV Continuous <Continuous>  heparin   Injectable 5000 Unit(s) SubCutaneous every 8 hours  hydrocortisone sodium succinate Injectable 25 milliGRAM(s) IV Push two times a day  influenza   Vaccine 0.5 milliLiter(s) IntraMuscular once  latanoprost 0.005% Ophthalmic Solution 1 Drop(s) Both EYES at bedtime  levothyroxine 50 MICROGram(s) Oral daily  lidocaine   4% Patch 1 Patch Transdermal every 24 hours  nystatin Powder 1 Application(s) Topical two times a day  sodium bicarbonate 650 milliGRAM(s) Oral three times a day    DIET: [] Regular [] CCD [] Renal [] Puree [] Dysphagia [] Tube Feeds:   mechanical soft   [facility]       INTERPRETATION:  MR OF THE THORACIC SPINE WITHOUT CONTRAST.    CLINICAL INDICATION: Suspected 3 column injury at T12.    TECHNIQUE: Multiplanar, multisequence MR imaging of the thoracic spine was performed without contrast.    COMPARISON: Relevant portions of CT lumbar spine, 2021    FINDINGS:    There is straightening of the usual thoracic kyphosis with multiple chronic compression deformities involving the T2 vertebral body, T11, T12 and L1 vertebral bodies. There is anterior widening of the interspace between T11 and T12 with edema, more suspicious of anterior longitudinal ligament injury. There is no edema in the posterior elements of T11-T12 although there is some edema in the right facet of T12-L1 without evidence of facet dislocation which may be degenerative in nature.    Patient is status post fusion in the lumbar spine which is partially seen from L2 to L5 by means of bi-pedicular screws and vertical members.    The pre and paravertebralsoft tissues demonstrate a thin 6 mm T1 and T2 hyperintensity anterior to the T11-L2 which may represent blood products from anterior ligamentous injury. This finding can be best seen on series 6 image 10.    No acute fracture is seen in the thoracicspine.    The thoracic spinal cord signal and contour is unremarkable.      IMPRESSION:    There is anterior splaying of the intervertebral disc space at T12-L1 with edema and blood products in the anterior prevertebral soft tissues, suspicious for anterior longitudinal ligament injury. No edema is seen in the neural arch to suggest three column injury.    Chronic fractures of the T2, T11, T12 and L1. No acute fractures are seen.

## 2021-09-10 NOTE — PROGRESS NOTE ADULT - ASSESSMENT
HPI:  Patient is a 75yo F with a history of colon cancer s/p right hemicolectomy (approx 2 years ago, outside hospital), DVT (on Eliquis), CKD, adrenal insufficiency presenting as transfer from [facility] for trauma evaluation secondary to a mechanical fall from sitting. Patient was going to her commode last night when she slipped and fell on her right side, hitting her head. Denies LOC. Patient's daughter came to help her up and brought her to the ED. Since the fall, patient has been endorses right-sided chest pain but no SOB, headache, weakness or dizziness. Patient does not know what medications she takes.     Patient transferred to [facility] for trauma evaluation. In the ED, patient was hemodynamically stable but requiring 2L NC, satting 98%. Labs showed WBC 17, Cr 1.42, otherwise normal. CT head/cervical spine/chest were performed which showed a right forehead hematoma, acute minimally displaced right coracoid process scapular fracture, T11/L1 compression fractures and an inferior sternal fracture.    75 yo female with T12 L1 fracture with ALL injury after a fall   PROCEDURE:    POD#    PLAN:  1 OR planning   2 strict bedrest and spine precaution -brace in place   3 prn pain meds   4 medically cleared   5 on nasal cannula   6 bp stable -continue metoprolol   7      HPI:  Patient is a 77yo F with a history of colon cancer s/p right hemicolectomy (approx 2 years ago, outside hospital), DVT (on Eliquis), CKD, adrenal insufficiency presenting as transfer from [facility] for trauma evaluation secondary to a mechanical fall from sitting. Patient was going to her commode last night when she slipped and fell on her right side, hitting her head. Denies LOC. Patient's daughter came to help her up and brought her to the ED. Since the fall, patient has been endorses right-sided chest pain but no SOB, headache, weakness or dizziness. Patient does not know what medications she takes.     Patient transferred to [facility] for trauma evaluation. In the ED, patient was hemodynamically stable but requiring 2L NC, satting 98%. Labs showed WBC 17, Cr 1.42, otherwise normal. CT head/cervical spine/chest were performed which showed a right forehead hematoma, acute minimally displaced right coracoid process scapular fracture, T11/L1 compression fractures and an inferior sternal fracture.    75 yo female with T12 L1 fracture with ALL injury after a fall   PROCEDURE:    POD#    PLAN:  1 OR planning -9/14   2 strict bedrest and spine precaution -brace in place   3 prn pain meds   4 medically cleared   5 on nasal cannula   6 bp stable -continue metoprolol   7 mechanical soft diet   8 continue hydrocortisone for hypopituitarism per medicine , continue synthroid   9 voiding   10 afebrile   11 dvt ppx sqh and scds   12 dispo :p     -will discuss with Dr. Heidy mcginnis [phone]

## 2021-09-10 NOTE — PROGRESS NOTE ADULT - ASSESSMENT
75 yo F with a history of colon cancer s/p right hemicolectomy (approx 2 years ago, outside hospital), DVT (on Eliquis), CKD, adrenal insufficiency presenting as transfer from [facility] for trauma evaluation secondary to a mechanical fall from sitting with headstrike. Injuries identified include right forehead hematoma, acute minimally displaced right coracoid process scapular fracture, T11/L1 compression fractures and an inferior sternal fracture. Patient hemodynamically stable.    # s/p Mechanical Fall:  Right forehead hematoma, acute minimally displaced right coracoid process scapular fracture, T11/L1 compression fractures and an inferior sternal fracture  no surgery for scapular fx. Placed in sling  CT spine with fusion L2-5. Fracture through the T12-L1 disc space with widening of the disc space and a L1-2 laminectomy which appears unstable. Diffuse osteopenia. Old T11 and L1 fractures.   MRI T/L spine with anterior splaying of the intervertebral disc space at the T12-L1 with edema in the anterior prevertebral soft tissues  Neuro surgery offering surgery vs conservative management, TLSO brace in the meantime.   The daughter Susie decided to proceed with surgery.   Cr improving. Medically optimized for the OR. Card clearance appreciated.     # Nausea:   Pt has been nauseous (no abd pain). ?pain med induced/ Opioids   Poor PO intake since being in the hospital. Normally with good appetite at home, with mechanical soft per the daughter.  anti-emetics PRN, encourage PO intake   the daughter at bedside. requests IV steroids early, states her PO may not be absorbed with vomiting.  Reasonable to start IV hydrocortisone while PO intake is poor.   Switch hydrocortisone 10 mg BID to  IV 25 mg BID.   can give additional stress dose near the timing of surgery.   (can consider 50 mg hydrocortisone intravenously just before the procedure and 25 mg of hydrocortisone every eight hours for 24 hours, then resume home PO dose after)  or steroid regimen per anesthesia's discretion     # Hypopituitary   She is on Hydrocortisone 10 mg BID at home, so will monitor for adrenal insufficiency. am cortisol appropriate  UA unimpressive. vit D, vit B12 normal. TSH low as expected with hypopit. free T4 sent, pending.   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 Outpt EMR reviewed; Cr range from 1.4 to 1.6   Monitor I/O's, Serial Cr, Avoid nephrotoxins  Cr is up from baseline. Hold Losartan. s/p gentle IVF for acute kidney failure: likely pre-renal.   Her recent TTE reviewed, with normal LV.   Her Cr now back to baseline.     # CAD: Chronic, stable  Cont home CV meds  holding Asa for neurosurgery     # Hypothyroidism  Chronic, stable  TSH low. free T4 sent  Cont Synthroid 50mcg    # Hx of DVT, lower extremity  Home med Eliquis, on hold  Heparin subq Q8H    GI ppx:  Protonix

## 2021-09-10 NOTE — PROGRESS NOTE ADULT - SUBJECTIVE AND OBJECTIVE BOX
C/C of Joleen was straps cutting in her axilla area. Examination proved brace had moved proximally and required repositioning, which was accomplished with assist from YOUNG Siddiqui. Pt felt much better, straps no longer cutting .  [place] [phone]

## 2021-09-11 LAB
ANION GAP SERPL CALC-SCNC: 14 MMOL/L — SIGNIFICANT CHANGE UP (ref 5–17)
BUN SERPL-MCNC: 17 MG/DL — SIGNIFICANT CHANGE UP (ref 7–23)
CALCIUM SERPL-MCNC: 9 MG/DL — SIGNIFICANT CHANGE UP (ref 8.4–10.5)
CHLORIDE SERPL-SCNC: 101 MMOL/L — SIGNIFICANT CHANGE UP (ref 96–108)
CO2 SERPL-SCNC: 22 MMOL/L — SIGNIFICANT CHANGE UP (ref 22–31)
CREAT SERPL-MCNC: 1.24 MG/DL — SIGNIFICANT CHANGE UP (ref 0.5–1.3)
GLUCOSE SERPL-MCNC: 118 MG/DL — HIGH (ref 70–99)
POTASSIUM SERPL-MCNC: 4.5 MMOL/L — SIGNIFICANT CHANGE UP (ref 3.5–5.3)
POTASSIUM SERPL-SCNC: 4.5 MMOL/L — SIGNIFICANT CHANGE UP (ref 3.5–5.3)
SODIUM SERPL-SCNC: 137 MMOL/L — SIGNIFICANT CHANGE UP (ref 135–145)

## 2021-09-11 PROCEDURE — 99231 SBSQ HOSP IP/OBS SF/LOW 25: CPT

## 2021-09-11 PROCEDURE — 99232 SBSQ HOSP IP/OBS MODERATE 35: CPT | Mod: 57

## 2021-09-11 RX ORDER — ACETAMINOPHEN 500 MG
1000 TABLET ORAL ONCE
Refills: 0 | Status: COMPLETED | OUTPATIENT
Start: 2021-09-11 | End: 2021-09-11

## 2021-09-11 RX ADMIN — NYSTATIN CREAM 1 APPLICATION(S): 100000 CREAM TOPICAL at 06:14

## 2021-09-11 RX ADMIN — Medication 650 MILLIGRAM(S): at 14:08

## 2021-09-11 RX ADMIN — OXYCODONE HYDROCHLORIDE 20 MILLIGRAM(S): 5 TABLET ORAL at 17:22

## 2021-09-11 RX ADMIN — Medication 650 MILLIGRAM(S): at 21:51

## 2021-09-11 RX ADMIN — Medication 1000 MILLIGRAM(S): at 09:43

## 2021-09-11 RX ADMIN — OXYCODONE HYDROCHLORIDE 5 MILLIGRAM(S): 5 TABLET ORAL at 21:51

## 2021-09-11 RX ADMIN — DEXTROSE MONOHYDRATE, SODIUM CHLORIDE, AND POTASSIUM CHLORIDE 50 MILLILITER(S): 50; .745; 4.5 INJECTION, SOLUTION INTRAVENOUS at 06:34

## 2021-09-11 RX ADMIN — OXYCODONE HYDROCHLORIDE 5 MILLIGRAM(S): 5 TABLET ORAL at 22:21

## 2021-09-11 RX ADMIN — OXYCODONE HYDROCHLORIDE 20 MILLIGRAM(S): 5 TABLET ORAL at 06:34

## 2021-09-11 RX ADMIN — HEPARIN SODIUM 5000 UNIT(S): 5000 INJECTION INTRAVENOUS; SUBCUTANEOUS at 06:13

## 2021-09-11 RX ADMIN — DEXTROSE MONOHYDRATE, SODIUM CHLORIDE, AND POTASSIUM CHLORIDE 50 MILLILITER(S): 50; .745; 4.5 INJECTION, SOLUTION INTRAVENOUS at 21:51

## 2021-09-11 RX ADMIN — Medication 1000 MILLIGRAM(S): at 00:32

## 2021-09-11 RX ADMIN — LIDOCAINE 1 PATCH: 4 CREAM TOPICAL at 21:43

## 2021-09-11 RX ADMIN — Medication 50 MICROGRAM(S): at 06:13

## 2021-09-11 RX ADMIN — HEPARIN SODIUM 5000 UNIT(S): 5000 INJECTION INTRAVENOUS; SUBCUTANEOUS at 14:08

## 2021-09-11 RX ADMIN — PANTOPRAZOLE SODIUM 40 MILLIGRAM(S): 20 TABLET, DELAYED RELEASE ORAL at 06:13

## 2021-09-11 RX ADMIN — Medication 25 MILLIGRAM(S): at 06:14

## 2021-09-11 RX ADMIN — NYSTATIN CREAM 1 APPLICATION(S): 100000 CREAM TOPICAL at 17:23

## 2021-09-11 RX ADMIN — LATANOPROST 1 DROP(S): 0.05 SOLUTION/ DROPS OPHTHALMIC; TOPICAL at 21:51

## 2021-09-11 RX ADMIN — HEPARIN SODIUM 5000 UNIT(S): 5000 INJECTION INTRAVENOUS; SUBCUTANEOUS at 21:51

## 2021-09-11 RX ADMIN — Medication 650 MILLIGRAM(S): at 06:13

## 2021-09-11 RX ADMIN — Medication 50 MILLIGRAM(S): at 17:44

## 2021-09-11 RX ADMIN — Medication 1 TABLET(S): at 12:02

## 2021-09-11 RX ADMIN — ATORVASTATIN CALCIUM 10 MILLIGRAM(S): 80 TABLET, FILM COATED ORAL at 21:51

## 2021-09-11 RX ADMIN — Medication 400 MILLIGRAM(S): at 09:12

## 2021-09-11 RX ADMIN — Medication 25 MILLIGRAM(S): at 17:22

## 2021-09-11 RX ADMIN — LIDOCAINE 1 PATCH: 4 CREAM TOPICAL at 17:22

## 2021-09-11 RX ADMIN — Medication 400 MILLIGRAM(S): at 00:00

## 2021-09-11 RX ADMIN — OXYCODONE HYDROCHLORIDE 20 MILLIGRAM(S): 5 TABLET ORAL at 17:50

## 2021-09-11 RX ADMIN — OXYCODONE HYDROCHLORIDE 20 MILLIGRAM(S): 5 TABLET ORAL at 06:16

## 2021-09-11 RX ADMIN — LIDOCAINE 1 PATCH: 4 CREAM TOPICAL at 06:33

## 2021-09-11 NOTE — PROGRESS NOTE ADULT - ASSESSMENT
Patient is a 75yo F with a history of colon cancer s/p right hemicolectomy (approx 2 years ago, outside hospital), DVT (on Eliquis), CKD, adrenal insufficiency presenting after mechanical fall from sitting. CT head/cervical spine/chest were performed which showed a right forehead hematoma, acute minimally displaced right coracoid process scapular fracture, T11/L1 compression fractures and an inferior sternal fracture. Found to have worsened renal function    A/P:  Acute on CKD:  Outpatient Nephrologist Dr. Treviño  Baseline SCr 1.4-1.6  FELICIANO etiology? possible sec to hemodynamic change  Renal function improving   Pt optimized from renal stand point for surgery , as long as renal function stable   CK level ok, Urine lytes suggestive of ATN  Monitor I/O  Monitor renal function at present    Acidosis:  non-AG+AG  Continue oral  sodium bicarb 650mg TID    HTN:  BP improving   Currently off antihypertensive meds  Monitor at present

## 2021-09-11 NOTE — PROGRESS NOTE ADULT - ASSESSMENT
HPI:  Patient is a 75yo F with a history of colon cancer s/p right hemicolectomy (approx 2 years ago, outside hospital), DVT (on Eliquis), CKD, adrenal insufficiency presenting as transfer from [facility] for trauma evaluation secondary to a mechanical fall from sitting. Patient was going to her commode last night when she slipped and fell on her right side, hitting her head. Denies LOC. Patient's daughter came to help her up and brought her to the ED. Since the fall, patient has been endorses right-sided chest pain but no SOB, headache, weakness or dizziness. Patient does not know what medications she takes.     Patient transferred to [facility] for trauma evaluation. In the ED, patient was hemodynamically stable but requiring 2L NC, satting 98%. Labs showed WBC 17, Cr 1.42, otherwise normal. CT head/cervical spine/chest were performed which showed a right forehead hematoma, acute minimally displaced right coracoid process scapular fracture, T11/L1 compression fractures and an inferior sternal fracture.    77 yo female with T12 L1 fracture with ALL injury after a fall   PROCEDURE:    POD#    PLAN:  1 OR planning -9/14   2 strict bedrest and spine precaution -brace in place   3 prn pain meds   4 medically cleared   5 on nasal cannula   6 bp stable -continue metoprolol   7 mechanical soft diet   8 continue hydrocortisone for hypopituitarism per medicine , continue synthroid   9 voiding   10 afebrile   11 dvt ppx sqh and scds   12 dispo :p     -will discuss with Dr. Heidy mcginnis [phone]

## 2021-09-11 NOTE — PROGRESS NOTE ADULT - SUBJECTIVE AND OBJECTIVE BOX
[facility]   MD KILLIAN JAIME MD RUORU WONG, PA    TEL:  OFFICE: [phone]  DR CHARLES CELL: [phone]  BEENA PISANO CELL: [phone]  DR. LOZANO CELL: [phone]      FROM 5 PM - 7 AM PLEASE CALL ANSWERING SERVICE: [phone]    RENAL FOLLOW UP NOTE--Date of Service 09-11-21 @ 08:17  --------------------------------------------------------------------------------  HPI:      Pt seen and examined at bedside.     PAST HISTORY  --------------------------------------------------------------------------------  No significant changes to PMH, PSH, FHx, SHx, unless otherwise noted    ALLERGIES & MEDICATIONS  --------------------------------------------------------------------------------  Allergies    penicillin (Rash)    Intolerances      Standing Inpatient Medications  atorvastatin 10 milliGRAM(s) Oral at bedtime  calcium carbonate    500 mG (Tums) Chewable 1 Tablet(s) Chew daily  dextrose 5% + sodium chloride 0.45% with potassium chloride 20 mEq/L 1000 milliLiter(s) IV Continuous <Continuous>  heparin   Injectable 5000 Unit(s) SubCutaneous every 8 hours  hydrocortisone sodium succinate Injectable 25 milliGRAM(s) IV Push two times a day  influenza   Vaccine 0.5 milliLiter(s) IntraMuscular once  latanoprost 0.005% Ophthalmic Solution 1 Drop(s) Both EYES at bedtime  levothyroxine 50 MICROGram(s) Oral daily  lidocaine   4% Patch 1 Patch Transdermal every 24 hours  metoprolol succinate ER 50 milliGRAM(s) Oral every 24 hours  nystatin Powder 1 Application(s) Topical two times a day  oxyCODONE  ER Tablet 20 milliGRAM(s) Oral every 12 hours  pantoprazole    Tablet 40 milliGRAM(s) Oral before breakfast  polyethylene glycol 3350 17 Gram(s) Oral daily  sodium bicarbonate 650 milliGRAM(s) Oral three times a day    PRN Inpatient Medications  artificial  tears Solution 1 Drop(s) Both EYES every 6 hours PRN  guaiFENesin  milliGRAM(s) Oral every 12 hours PRN  oxyCODONE    IR 5 milliGRAM(s) Oral every 8 hours PRN  senna 2 Tablet(s) Oral at bedtime PRN  traMADol 25 milliGRAM(s) Oral every 6 hours PRN  traMADol 50 milliGRAM(s) Oral every 8 hours PRN      REVIEW OF SYSTEMS  --------------------------------------------------------------------------------  General: no fever    MSK: no edema     VITALS/PHYSICAL EXAM  --------------------------------------------------------------------------------  T(C): 37 (09-11-21 @ 04:32), Max: 37.3 (09-10-21 @ 17:06)  HR: 74 (09-11-21 @ 04:32) (62 - 79)  BP: 121/75 (09-11-21 @ 04:32) (120/74 - 139/82)  RR: 18 (09-11-21 @ 04:32) (18 - 18)  SpO2: 97% (09-11-21 @ 04:32) (95% - 100%)  Wt(kg): --        09-10-21 @ 07:01  -  09-11-21 @ 07:00  --------------------------------------------------------  IN: 1620 mL / OUT: 1600 mL / NET: 20 mL      Physical Exam:  	Gen: NAD  	HEENT: MMM  	Pulm: CTA B/L  	CV: S1S2  	Abd: Soft, +BS  	Ext: No LE edema B/L                      Neuro: Awake   	Skin: Warm and Dry   	Vascular access: NO HD catheter            no  aziza  LABS/STUDIES  --------------------------------------------------------------------------------              10.6   11.18 >-----------<  194      [09-10-21 @ 09:50]              29.9     137  |  101  |  17  ----------------------------<  118      [09-11-21 @ 06:32]  4.5   |  22  |  1.24        Ca     9.0     [09-11-21 @ 06:32]      Mg     2.1     [09-10-21 @ 09:50]      Phos  2.2     [09-10-21 @ 09:50]            Creatinine Trend:  SCr 1.24 [09-11 @ 06:32]  SCr 1.25 [09-10 @ 09:50]  SCr 1.36 [09-09 @ 14:37]  SCr 2.05 [09-08 @ 10:30]  SCr 2.20 [09-08 @ 06:01]    Urinalysis - [09-07-21 @ 17:15]      Color Yellow / Appearance Clear / SG 1.017 / pH 6.0      Gluc Negative / Ketone Small  / Bili Negative / Urobili Negative       Blood Trace / Protein 30 mg/dL / Leuk Est Moderate / Nitrite Negative      RBC 2 / WBC 6 / Hyaline 0 / Gran  / Sq Epi  / Non Sq Epi 1 / Bacteria Negative    Urine Creatinine 84      [09-08-21 @ 18:29]  Urine Sodium 70      [09-08-21 @ 18:29]  Urine Potassium 26      [09-08-21 @ 18:29]  Urine Osmolality 443      [09-08-21 @ 18:29]    Vitamin D (25OH) 67.9      [09-08-21 @ 09:59]  HbA1c 5.9      [02-22-17 @ 03:10]  TSH 0.18      [09-08-21 @ 09:59]

## 2021-09-11 NOTE — PROGRESS NOTE ADULT - ASSESSMENT
ECHO 10/25/16:  Normal left ventricular systolic function. No segmental wall motion abnormalities. Hypermobile interatrial septum.   ECHO 8/24/21: EF 66% grossly nl LV sys fx, mild diastolic dsyfx - stage 1     a/p    Patient is a 75yo F , known to practice from prior admission,  with a history of colon cancer s/p right hemicolectomy (approx 2 years ago, outside hospital), DVT (on Eliquis), CKD, adrenal insufficiency presenting as transfer from [facility] for trauma evaluation secondary to a mechanical fall from sitting    #Mechanical fall  -MRI noted with anterior splaying of T12/L1 space w/ edema   -CT T done noted with widening of T12/L1 disc space w/ L1 laminectomy  -plans for posterior extension of prior L2-5 fusion and stabilization 9/14  -cv stable, no cp, sob. ekg without acute ischemic changes. most recent echo noted with EF 66% grossly nl LV sys fx, mild diastolic dsyfx   -pt cardiac optimized - can proceed to OR with acceptable CV risk  -neuro sx/ trauma f/u     #SVT (hx)  -c/w toprol 50 mg daily     # DVT (hx)  -recent le doppler negative for acute dvt  -AC on hold for OR      # wm on CKD   -ivf per renal   -renal following     dvt ppx

## 2021-09-11 NOTE — PROGRESS NOTE ADULT - ASSESSMENT
77 yo F with a history of colon cancer s/p right hemicolectomy (approx 2 years ago, outside hospital), DVT (on Eliquis), CKD, adrenal insufficiency presenting as transfer from [facility] for trauma evaluation secondary to a mechanical fall from sitting with headstrike. Injuries identified include right forehead hematoma, acute minimally displaced right coracoid process scapular fracture, T11/L1 compression fractures and an inferior sternal fracture. Patient hemodynamically stable.    # s/p Mechanical Fall:  Right forehead hematoma, acute minimally displaced right coracoid process scapular fracture, T11/L1 compression fractures and an inferior sternal fracture  no surgery for scapular fx. Placed in sling  CT spine with fusion L2-5. Fracture through the T12-L1 disc space with widening of the disc space and a L1-2 laminectomy which appears unstable. Diffuse osteopenia. Old T11 and L1 fractures.   MRI T/L spine with anterior splaying of the intervertebral disc space at the T12-L1 with edema in the anterior prevertebral soft tissues  Neuro surgery offering surgery vs conservative management, TLSO brace in the meantime.   The daughter Susie decided to proceed with surgery.   Cr improving. Medically optimized for the OR. Card clearance appreciated.     # Nausea:   Pt has been nauseous (no abd pain). ?pain med induced/ Opioids   Poor PO intake since being in the hospital. Normally with good appetite at home, with mechanical soft per the daughter.  anti-emetics PRN, encourage PO intake   the daughter requested IV steroids early, states her PO may not be absorbed with vomiting.  Reasonable to start IV hydrocortisone while PO intake is poor.   Switched hydrocortisone 10 mg BID to  IV 25 mg BID.   can give additional stress dose near the timing of surgery.   (can consider 50 mg hydrocortisone intravenously just before the procedure and 25 mg of hydrocortisone every eight hours for 24 hours, then resume home PO dose after)  or steroid regimen per anesthesia's discretion     # Hypopituitary   She is on Hydrocortisone 10 mg BID at home, so will monitor for adrenal insufficiency. am cortisol appropriate  UA unimpressive. vit D, vit B12 normal. TSH low as expected with hypopit. free T4 sent, pending.   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 Outpt EMR reviewed; Cr range from 1.4 to 1.6   Monitor I/O's, Serial Cr, Avoid nephrotoxins  Cr is up from baseline. Hold Losartan. s/p gentle IVF for acute kidney failure: likely pre-renal.   Her recent TTE reviewed, with normal LV.   Her Cr now back to baseline.     # CAD: Chronic, stable  Cont home CV meds  holding Asa for neurosurgery     # Hypothyroidism  Chronic, stable  TSH low. free T4 sent  Cont Synthroid 50mcg    # Hx of DVT, lower extremity  Home med Eliquis, on hold  Heparin subq Q8H    # GI ppx:  Protonix

## 2021-09-11 NOTE — PROGRESS NOTE ADULT - SUBJECTIVE AND OBJECTIVE BOX
CARDIOLOGY FOLLOW UP - Dr. Orozco  DATE OF SERVICE: 09-11-21    CC resting comfortably,  no acute events     REVIEW OF SYSTEMS:   CONSTITUTIONAL: No fever, weight loss, or fatigue   RESPIRATORY:  No cough, wheezing, chills or hemoptysis; No SOB  CARDIOVASCULAR: No chest pain, palpitations, passing out, dizziness, or leg swelling   GASTROINTESTINAL: No abdominal or epigastric pain. No nausea, vomiting, or hematemesis, no diarreha, or constipation, No melena or hematochezia   VASCULAR: no edema.       PHYSICAL EXAM:  T(C): 36.9 (09-11-21 @ 08:07), Max: 37.3 (09-10-21 @ 17:06)  HR: 77 (09-11-21 @ 08:07) (62 - 79)  BP: 137/76 (09-11-21 @ 08:07) (120/74 - 139/82)  RR: 18 (09-11-21 @ 08:07) (18 - 18)  SpO2: 97% (09-11-21 @ 08:07) (95% - 100%)  Wt(kg): --  I&O's Summary    10 Sep 2021 07:01  -  11 Sep 2021 07:00  --------------------------------------------------------  IN: 1620 mL / OUT: 1600 mL / NET: 20 mL        Appearance: Normal	  Cardiovascular: Normal S1 S2,RRR, No JVD, No murmurs  Respiratory: Lungs clear to auscultation	  Gastrointestinal:  Soft, Non-tender, + BS	  Extremities:  No clubbing, cyanosis or edema      HOME MEDICATIONS:  acetaminophen 325 mg oral tablet: 2 tab(s) orally every 6 hours, As needed, Mild Pain (1 - 3) (24 Sep 2020 11:16)  Artificial Tears ophthalmic solution: 1 drop(s) to each affected eye 4 times a day, As Needed (22 Aug 2021 23:28)  aspirin 81 mg oral delayed release tablet: 1 tab(s) orally once a day (27 Aug 2021 13:38)  calcium (as carbonate) 600 mg oral tablet: 1 cap(s) orally once a day (24 Sep 2020 11:16)  Eliquis 2.5 mg oral tablet: 1 tab(s) orally 2 times a day (24 Sep 2020 11:16)  felodipine 5 mg oral tablet, extended release: 1 tab(s) orally once a day (22 Aug 2021 23:23)  guaiFENesin 600 mg oral tablet, extended release: 1 tab(s) orally every 12 hours as needed for cough  (27 Aug 2021 13:38)  hydrocortisone 10 mg oral tablet: 1 tab(s) orally 2 times a day (24 Sep 2020 11:16)  latanoprost 0.005% ophthalmic solution: 1 drop(s) to each affected eye once a day (in the evening) (22 Aug 2021 23:27)  levothyroxine 50 mcg (0.05 mg) oral tablet: 1 tab(s) orally once a day (24 Sep 2020 11:16)  Lipitor 10 mg oral tablet: 1 tab(s) orally once a day (22 Aug 2021 23:41)  Macular Vitamin Benefit oral tablet: 1 tab(s) orally once a day (24 Sep 2020 11:16)  metoprolol succinate 50 mg oral tablet, extended release: 1 tab(s) orally once a day (22 Aug 2021 23:24)  Multiple Vitamins oral tablet: 1 tab(s) orally once a day (22 Aug 2021 23:22)  olmesartan 20 mg oral tablet: 1 tab(s) orally once a day (22 Aug 2021 23:22)  omeprazole 20 mg oral delayed release capsule: 1 cap(s) orally once a day (22 Aug 2021 23:21)  oxyCODONE 5 mg oral tablet: 1 tab(s) orally every 8 hours as needed for severe pain (27 Aug 2021 13:35)  OxyCONTIN 20 mg oral tablet, extended release: 1 tab(s) orally every 12 hours (24 Sep 2020 11:16)  risedronate 35 mg oral tablet: 1 tab(s) orally once a week (Mondays) (05 Sep 2021 18:30)  senna oral tablet: 2 tab(s) orally once a day (at bedtime), As Needed (05 Sep 2021 18:31)      MEDICATIONS  (STANDING):  atorvastatin 10 milliGRAM(s) Oral at bedtime  calcium carbonate    500 mG (Tums) Chewable 1 Tablet(s) Chew daily  dextrose 5% + sodium chloride 0.45% with potassium chloride 20 mEq/L 1000 milliLiter(s) (50 mL/Hr) IV Continuous <Continuous>  heparin   Injectable 5000 Unit(s) SubCutaneous every 8 hours  hydrocortisone sodium succinate Injectable 25 milliGRAM(s) IV Push two times a day  influenza   Vaccine 0.5 milliLiter(s) IntraMuscular once  latanoprost 0.005% Ophthalmic Solution 1 Drop(s) Both EYES at bedtime  levothyroxine 50 MICROGram(s) Oral daily  lidocaine   4% Patch 1 Patch Transdermal every 24 hours  metoprolol succinate ER 50 milliGRAM(s) Oral every 24 hours  nystatin Powder 1 Application(s) Topical two times a day  oxyCODONE  ER Tablet 20 milliGRAM(s) Oral every 12 hours  pantoprazole    Tablet 40 milliGRAM(s) Oral before breakfast  polyethylene glycol 3350 17 Gram(s) Oral daily  sodium bicarbonate 650 milliGRAM(s) Oral three times a day      TELEMETRY: 	    ECG:  	  RADIOLOGY:   DIAGNOSTIC TESTING:  [ ] Echocardiogram:  [ ]  Catheterization:  [ ] Stress Test:    OTHER: 	    LABS:	 	                                10.6   11.18 )-----------( 194      ( 10 Sep 2021 09:50 )             29.9     09-11    137  |  101  |  17  ----------------------------<  118<H>  4.5   |  22  |  1.24    Ca    9.0      11 Sep 2021 06:32  Phos  2.2     09-10  Mg     2.1     09-10

## 2021-09-11 NOTE — PROGRESS NOTE ADULT - SUBJECTIVE AND OBJECTIVE BOX
Patient seen and examined, stable exam. OK for bedrest without TLSO brace in bed given pressure ulcer issues plus patient remains quite still in bed despite fracture. OK for reverse trendelenberg in bed. Awaiting surgery Tues, cont DVT chemoppx. Appreciate medicine recs.    I spent 15 min at bedside evaluating the patient.

## 2021-09-11 NOTE — PROGRESS NOTE ADULT - SUBJECTIVE AND OBJECTIVE BOX
SUBJECTIVE / OVERNIGHT EVENTS:  Nausea better today  not eating much  the son at bedside  no cp, no sob, no n/v/d. no abdominal pain.  no headache, no dizziness.       Vital Signs Last 24 Hrs  T(C): 36.9 (11 Sep 2021 08:07), Max: 37.3 (10 Sep 2021 17:06)  T(F): 98.4 (11 Sep 2021 08:07), Max: 99.2 (10 Sep 2021 17:06)  HR: 77 (11 Sep 2021 08:07) (62 - 79)  BP: 137/76 (11 Sep 2021 08:07) (120/74 - 139/82)  BP(mean): --  RR: 18 (11 Sep 2021 08:07) (18 - 18)  SpO2: 97% (11 Sep 2021 08:07) (95% - 100%)    I&O's Summary    09-10-21 @ 07:01  -  09-11-21 @ 07:00  --------------------------------------------------------  IN: 1620 mL / OUT: 1600 mL / NET: 20 mL          PHYSICAL EXAM:  GENERAL: NAD, well-developed, comfortable, on room air  HEAD:  Atraumatic, Normocephalic  EYES: EOMI, PERRLA, conjunctiva and sclera clear, legally blind  NECK: Supple, No JVD  CHEST/LUNG: mild decrease breath sounds bilaterally; No wheeze   HEART: Regular rate and rhythm; No murmurs, rubs, or gallops  ABDOMEN: Soft, Nontender, Nondistended; Bowel sounds present  Neuro: AAOx3, no focal weakness, 5/5 b/l upper extremity strength, 4/5 lower extremity strength  EXTREMITIES:  2+ Peripheral Pulses, No clubbing, cyanosis, trace b/l edema  SKIN: No rashes or lesions       LABS:                        10.6   11.18 )-----------( 194      ( 10 Sep 2021 09:50 )             29.9     09-11    137  |  101  |  17  ----------------------------<  118<H>  4.5   |  22  |  1.24    Ca    9.0      11 Sep 2021 06:32  Phos  2.2     09-10  Mg     2.1     09-10        CAPILLARY BLOOD GLUCOSE                RADIOLOGY & ADDITIONAL TESTS:    Imaging Personally Reviewed:  [x] YES  [ ] NO    Will obtain old records:  [ ] YES  [x] NO

## 2021-09-11 NOTE — PROGRESS NOTE ADULT - SUBJECTIVE AND OBJECTIVE BOX
SUBJECTIVE:   Patient was seen and evaluated at bedside. Patient is resting in bed and is in no new acute distress.   OVERNIGHT EVENTS:   none   Vital Signs Last 24 Hrs  T(C): 36.9 (11 Sep 2021 08:07), Max: 37.3 (10 Sep 2021 17:06)  T(F): 98.4 (11 Sep 2021 08:07), Max: 99.2 (10 Sep 2021 17:06)  HR: 77 (11 Sep 2021 08:07) (62 - 79)  BP: 137/76 (11 Sep 2021 08:07) (120/74 - 139/82)  BP(mean): --  RR: 18 (11 Sep 2021 08:07) (18 - 18)  SpO2: 97% (11 Sep 2021 08:07) (95% - 100%)    PHYSICAL EXAM:    General: No Acute Distress     Neurological:   Awake, alert oriented to person, place and time, Following Commands, PERRL, EOMI, Face Symmetrical, Speech Fluent, Moving all extremities, Muscle Strength uppers wnl, lowers rle 4/5 proximal , df/pf 4+, lle proximal 4- and df/pf 4/5 , sensation wnl.   Pulmonary: Clear to Auscultation, No Rales, No Rhonchi, No Wheezes     Cardiovascular: S1, S2, Regular Rate and Rhythm     Gastrointestinal: Soft, Nontender, Nondistended     Incision:   none   LABS:                        10.6   11.18 )-----------( 194      ( 10 Sep 2021 09:50 )             29.9        137  |  101  |  17  ----------------------------<  118<H>  4.5   |  22  |  1.24    Ca    9.0      11 Sep 2021 06:32  Phos  2.2     10  Mg     2.1     09-10          09-10 @ 07:01  -   @ 07:00  --------------------------------------------------------  IN: 1620 mL / OUT: 1600 mL / NET: 20 mL      DRAINS:     MEDICATIONS:  Antibiotics:    Neuro:  oxyCODONE    IR 5 milliGRAM(s) Oral every 8 hours PRN Severe Pain (7 - 10)  oxyCODONE  ER Tablet 20 milliGRAM(s) Oral every 12 hours  traMADol 25 milliGRAM(s) Oral every 6 hours PRN Moderate Pain (4 - 6)  traMADol 50 milliGRAM(s) Oral every 8 hours PRN Severe Pain (7 - 10)    Cardiac:  metoprolol succinate ER 50 milliGRAM(s) Oral every 24 hours    Pulm:  guaiFENesin  milliGRAM(s) Oral every 12 hours PRN Cough    GI/:  calcium carbonate    500 mG (Tums) Chewable 1 Tablet(s) Chew daily  pantoprazole    Tablet 40 milliGRAM(s) Oral before breakfast  polyethylene glycol 3350 17 Gram(s) Oral daily  senna 2 Tablet(s) Oral at bedtime PRN Constipation    Other:   artificial  tears Solution 1 Drop(s) Both EYES every 6 hours PRN Dry Eyes  atorvastatin 10 milliGRAM(s) Oral at bedtime  dextrose 5% + sodium chloride 0.45% with potassium chloride 20 mEq/L 1000 milliLiter(s) IV Continuous <Continuous>  heparin   Injectable 5000 Unit(s) SubCutaneous every 8 hours  hydrocortisone sodium succinate Injectable 25 milliGRAM(s) IV Push two times a day  influenza   Vaccine 0.5 milliLiter(s) IntraMuscular once  latanoprost 0.005% Ophthalmic Solution 1 Drop(s) Both EYES at bedtime  levothyroxine 50 MICROGram(s) Oral daily  lidocaine   4% Patch 1 Patch Transdermal every 24 hours  nystatin Powder 1 Application(s) Topical two times a day  sodium bicarbonate 650 milliGRAM(s) Oral three times a day    DIET: [] Regular [] CCD [] Renal [] Puree [] Dysphagia [] Tube Feeds:   mechanical soft   IMAGIN[facility]       INTERPRETATION:  MR OF THE THORACIC SPINE WITHOUT CONTRAST.    CLINICAL INDICATION: Suspected 3 column injury at T12.    TECHNIQUE: Multiplanar, multisequence MR imaging of the thoracic spine was performed without contrast.    COMPARISON: Relevant portions of CT lumbar spine, 2021    FINDINGS:    There is straightening of the usual thoracic kyphosis with multiple chronic compression deformities involving the T2 vertebral body, T11, T12 and L1 vertebral bodies. There is anterior widening of the interspace between T11 and T12 with edema, more suspicious of anterior longitudinal ligament injury. There is no edema in the posterior elements of T11-T12 although there is some edema in the right facet of T12-L1 without evidence of facet dislocation which may be degenerative in nature.    Patient is status post fusion in the lumbar spine which is partially seen from L2 to L5 by means of bi-pedicular screws and vertical members.    The pre and paravertebralsoft tissues demonstrate a thin 6 mm T1 and T2 hyperintensity anterior to the T11-L2 which may represent blood products from anterior ligamentous injury. This finding can be best seen on series 6 image 10.    No acute fracture is seen in the thoracicspine.    The thoracic spinal cord signal and contour is unremarkable.      IMPRESSION:    There is anterior splaying of the intervertebral disc space at T12-L1 with edema and blood products in the anterior prevertebral soft tissues, suspicious for anterior longitudinal ligament injury. No edema is seen in the neural arch to suggest three column injury.    Chronic fractures of the T2, T11, T12 and L1. No acute fractures are seen.

## 2021-09-12 DIAGNOSIS — S22.000A WEDGE COMPRESSION FRACTURE OF UNSPECIFIED THORACIC VERTEBRA, INITIAL ENCOUNTER FOR CLOSED FRACTURE: ICD-10-CM

## 2021-09-12 LAB
APPEARANCE UR: CLEAR — SIGNIFICANT CHANGE UP
BACTERIA # UR AUTO: NEGATIVE — SIGNIFICANT CHANGE UP
BILIRUB UR-MCNC: NEGATIVE — SIGNIFICANT CHANGE UP
COLOR SPEC: SIGNIFICANT CHANGE UP
DIFF PNL FLD: NEGATIVE — SIGNIFICANT CHANGE UP
EPI CELLS # UR: 0 /HPF — SIGNIFICANT CHANGE UP
GLUCOSE UR QL: ABNORMAL
KETONES UR-MCNC: NEGATIVE — SIGNIFICANT CHANGE UP
LEUKOCYTE ESTERASE UR-ACNC: NEGATIVE — SIGNIFICANT CHANGE UP
NITRITE UR-MCNC: NEGATIVE — SIGNIFICANT CHANGE UP
PH UR: 7 — SIGNIFICANT CHANGE UP (ref 5–8)
PROT UR-MCNC: ABNORMAL
RBC CASTS # UR COMP ASSIST: 1 /HPF — SIGNIFICANT CHANGE UP (ref 0–4)
SARS-COV-2 RNA SPEC QL NAA+PROBE: SIGNIFICANT CHANGE UP
SP GR SPEC: 1.01 — SIGNIFICANT CHANGE UP (ref 1.01–1.02)
UROBILINOGEN FLD QL: NEGATIVE — SIGNIFICANT CHANGE UP
WBC UR QL: 0 /HPF — SIGNIFICANT CHANGE UP (ref 0–5)

## 2021-09-12 PROCEDURE — 99232 SBSQ HOSP IP/OBS MODERATE 35: CPT | Mod: 57

## 2021-09-12 PROCEDURE — 93010 ELECTROCARDIOGRAM REPORT: CPT

## 2021-09-12 RX ORDER — ONDANSETRON 8 MG/1
4 TABLET, FILM COATED ORAL ONCE
Refills: 0 | Status: COMPLETED | OUTPATIENT
Start: 2021-09-12 | End: 2021-09-12

## 2021-09-12 RX ORDER — TRAMADOL HYDROCHLORIDE 50 MG/1
25 TABLET ORAL EVERY 6 HOURS
Refills: 0 | Status: DISCONTINUED | OUTPATIENT
Start: 2021-09-12 | End: 2021-09-14

## 2021-09-12 RX ORDER — METOCLOPRAMIDE HCL 10 MG
10 TABLET ORAL ONCE
Refills: 0 | Status: COMPLETED | OUTPATIENT
Start: 2021-09-12 | End: 2021-09-12

## 2021-09-12 RX ORDER — OXYCODONE HYDROCHLORIDE 5 MG/1
20 TABLET ORAL EVERY 12 HOURS
Refills: 0 | Status: DISCONTINUED | OUTPATIENT
Start: 2021-09-12 | End: 2021-09-14

## 2021-09-12 RX ADMIN — NYSTATIN CREAM 1 APPLICATION(S): 100000 CREAM TOPICAL at 18:21

## 2021-09-12 RX ADMIN — TRAMADOL HYDROCHLORIDE 50 MILLIGRAM(S): 50 TABLET ORAL at 02:08

## 2021-09-12 RX ADMIN — OXYCODONE HYDROCHLORIDE 5 MILLIGRAM(S): 5 TABLET ORAL at 06:05

## 2021-09-12 RX ADMIN — ATORVASTATIN CALCIUM 10 MILLIGRAM(S): 80 TABLET, FILM COATED ORAL at 22:05

## 2021-09-12 RX ADMIN — ONDANSETRON 4 MILLIGRAM(S): 8 TABLET, FILM COATED ORAL at 02:03

## 2021-09-12 RX ADMIN — TRAMADOL HYDROCHLORIDE 50 MILLIGRAM(S): 50 TABLET ORAL at 22:12

## 2021-09-12 RX ADMIN — Medication 50 MILLIGRAM(S): at 18:24

## 2021-09-12 RX ADMIN — OXYCODONE HYDROCHLORIDE 20 MILLIGRAM(S): 5 TABLET ORAL at 18:54

## 2021-09-12 RX ADMIN — LIDOCAINE 1 PATCH: 4 CREAM TOPICAL at 06:00

## 2021-09-12 RX ADMIN — OXYCODONE HYDROCHLORIDE 5 MILLIGRAM(S): 5 TABLET ORAL at 05:35

## 2021-09-12 RX ADMIN — HEPARIN SODIUM 5000 UNIT(S): 5000 INJECTION INTRAVENOUS; SUBCUTANEOUS at 05:37

## 2021-09-12 RX ADMIN — TRAMADOL HYDROCHLORIDE 50 MILLIGRAM(S): 50 TABLET ORAL at 22:23

## 2021-09-12 RX ADMIN — TRAMADOL HYDROCHLORIDE 50 MILLIGRAM(S): 50 TABLET ORAL at 10:33

## 2021-09-12 RX ADMIN — Medication 650 MILLIGRAM(S): at 13:07

## 2021-09-12 RX ADMIN — Medication 650 MILLIGRAM(S): at 22:05

## 2021-09-12 RX ADMIN — ONDANSETRON 4 MILLIGRAM(S): 8 TABLET, FILM COATED ORAL at 18:32

## 2021-09-12 RX ADMIN — OXYCODONE HYDROCHLORIDE 20 MILLIGRAM(S): 5 TABLET ORAL at 06:05

## 2021-09-12 RX ADMIN — LIDOCAINE 1 PATCH: 4 CREAM TOPICAL at 18:19

## 2021-09-12 RX ADMIN — LATANOPROST 1 DROP(S): 0.05 SOLUTION/ DROPS OPHTHALMIC; TOPICAL at 22:05

## 2021-09-12 RX ADMIN — Medication 25 MILLIGRAM(S): at 18:18

## 2021-09-12 RX ADMIN — HEPARIN SODIUM 5000 UNIT(S): 5000 INJECTION INTRAVENOUS; SUBCUTANEOUS at 13:07

## 2021-09-12 RX ADMIN — Medication 50 MICROGRAM(S): at 05:35

## 2021-09-12 RX ADMIN — Medication 10 MILLIGRAM(S): at 05:39

## 2021-09-12 RX ADMIN — TRAMADOL HYDROCHLORIDE 50 MILLIGRAM(S): 50 TABLET ORAL at 11:03

## 2021-09-12 RX ADMIN — LIDOCAINE 1 PATCH: 4 CREAM TOPICAL at 19:48

## 2021-09-12 RX ADMIN — OXYCODONE HYDROCHLORIDE 20 MILLIGRAM(S): 5 TABLET ORAL at 18:24

## 2021-09-12 RX ADMIN — PANTOPRAZOLE SODIUM 40 MILLIGRAM(S): 20 TABLET, DELAYED RELEASE ORAL at 05:37

## 2021-09-12 RX ADMIN — Medication 25 MILLIGRAM(S): at 05:36

## 2021-09-12 RX ADMIN — NYSTATIN CREAM 1 APPLICATION(S): 100000 CREAM TOPICAL at 05:36

## 2021-09-12 RX ADMIN — OXYCODONE HYDROCHLORIDE 20 MILLIGRAM(S): 5 TABLET ORAL at 05:36

## 2021-09-12 RX ADMIN — HEPARIN SODIUM 5000 UNIT(S): 5000 INJECTION INTRAVENOUS; SUBCUTANEOUS at 22:04

## 2021-09-12 RX ADMIN — TRAMADOL HYDROCHLORIDE 50 MILLIGRAM(S): 50 TABLET ORAL at 02:38

## 2021-09-12 RX ADMIN — Medication 650 MILLIGRAM(S): at 05:35

## 2021-09-12 RX ADMIN — Medication 1 TABLET(S): at 11:24

## 2021-09-12 NOTE — PROGRESS NOTE ADULT - SUBJECTIVE AND OBJECTIVE BOX
SUBJECTIVE: supine in bed. States some lt hip pain, otherwise NAD, comfortable.     OVERNIGHT EVENTS: None    Vital Signs Last 24 Hrs  T(C): 36.8 (12 Sep 2021 09:19), Max: 37.3 (11 Sep 2021 20:30)  T(F): 98.2 (12 Sep 2021 09:19), Max: 99.1 (11 Sep 2021 20:30)  HR: 88 (12 Sep 2021 09:19) (73 - 88)  BP: 158/89 (12 Sep 2021 09:19) (115/74 - 160/89)  BP(mean): --  RR: 18 (12 Sep 2021 09:19) (18 - 18)  SpO2: 96% (12 Sep 2021 09:19) (94% - 98%)  IVF: [ ] IVL [X ] D5+0.45 NS @50/hr  DIET: [X ] Regular-soft Mech [ ] CCD [ ] Renal [ ] Puree [ ] Dysphagia [ ] Tube Feeds:   PCA: [ ] YES [X ] NO   ESCOBEDO: [ ] YES [X ] NO [X ] VOID   BM: [ ] YES [ X] NO     DRAINS: NA    PHYSICAL EXAM:    Constitutional: No Acute Distress     Neurological: AAOx3.  Following Commands, PERRL, EOMI, Face Symmetrical, Speech Fluent, Moving all extremities, Muscle Strength uppers wnl, lowers rle 4/5 proximal , df/pf 4+, lle proximal 4- and df/pf 4/5 , sensation wnl.                                                  Sensation: [X] intact to light touch  [] decreased:     Pulmonary: Clear to Auscultation, No rales, No rhonchi, No wheezes     Cardiovascular: S1, S2, Regular rate and rhythm     Gastrointestinal: Soft, Non-tender, Non-distended     Extremities: No calf tenderness     Incision: NA    LABS:   09-11    137  |  101  |  17  ----------------------------<  118<H>  4.5   |  22  |  1.24    Ca    9.0      11 Sep 2021 06:32    COVID-19 PCR: NotDetec (08 Sep 2021 09:39)  COVID-19 PCR: NotDetec (05 Sep 2021 14:48)  SARS-CoV-2: NotDetec (05 Sep 2021 08:53)  SARS-CoV-2: [facility] (22 Aug 2021 20:34)    IMAGING:  < from: VA Duplex Lower Ext Vein Scan, Bilat (09.09.21 @ 11:23) >  No evidence of deep venous thrombosis in either lower extremity.    < from: Xray Abdomen 1 View PORTABLE -Urgent (Xray Abdomen 1 View PORTABLE -Urgent .) (09.08.21 @ 07:21) >  Nonobstructive bowel gas pattern.    < from: CT Thoracic Spine No Cont (09.07.21 @ 12:16) >  The thoracic vertebral bodies are normal in height with the exception of T2 and T11. There appear to be old mild compression deformities. There is widening of the T12-L1 disc space anteriorly with apparent disruption of the anterior longitudinal ligament.There is also volume loss to the L1 vertebral body anteriorly. Disc space is bright on T2-weighted series consistent with inflammatory change. There is fusion of L2-L5. There has been a previous laminectomy of L1. The widening of the disc space extends to the posterior disc margin. The ligamentum flavum and posterior longitudinal ligament may be intact. However, no bony spinal canal is present at this level raising the possibility of instability. Disc space narrowing is identified at L1-2 with vacuum disc phenomenon. Transpedicular screws are identified at L2 and L3.    IMPRESSION: The level above the lumbar fusion there is widening of the T12-L1 disc space anteriorly with abnormal signal replacing the disc and compression of the L1 vertebralbody. There has been a L1 laminectomy. In spite of the preservation of the posterior longitudinal ligament and ligamentum flavum this level may be unstable without lack of posterior osseous support.      MEDICATIONS  (STANDING):  atorvastatin 10 milliGRAM(s) Oral at bedtime  calcium carbonate    500 mG (Tums) Chewable 1 Tablet(s) Chew daily  dextrose 5% + sodium chloride 0.45% with potassium chloride 20 mEq/L 1000 milliLiter(s) (50 mL/Hr) IV Continuous <Continuous>  heparin   Injectable 5000 Unit(s) SubCutaneous every 8 hours  hydrocortisone sodium succinate Injectable 25 milliGRAM(s) IV Push two times a day  influenza   Vaccine 0.5 milliLiter(s) IntraMuscular once  latanoprost 0.005% Ophthalmic Solution 1 Drop(s) Both EYES at bedtime  levothyroxine 50 MICROGram(s) Oral daily  lidocaine   4% Patch 1 Patch Transdermal every 24 hours  metoprolol succinate ER 50 milliGRAM(s) Oral every 24 hours  nystatin Powder 1 Application(s) Topical two times a day  oxyCODONE  ER Tablet 20 milliGRAM(s) Oral every 12 hours  pantoprazole    Tablet 40 milliGRAM(s) Oral before breakfast  polyethylene glycol 3350 17 Gram(s) Oral daily  sodium bicarbonate 650 milliGRAM(s) Oral three times a day    MEDICATIONS  (PRN):  artificial  tears Solution 1 Drop(s) Both EYES every 6 hours PRN Dry Eyes  guaiFENesin  milliGRAM(s) Oral every 12 hours PRN Cough  oxyCODONE    IR 5 milliGRAM(s) Oral every 8 hours PRN Severe Pain (7 - 10)  senna 2 Tablet(s) Oral at bedtime PRN Constipation  traMADol 25 milliGRAM(s) Oral every 6 hours PRN Moderate Pain (4 - 6)  traMADol 50 milliGRAM(s) Oral every 8 hours PRN Severe Pain (7 - 10)

## 2021-09-12 NOTE — PROVIDER CONTACT NOTE (OTHER) - ACTION/TREATMENT ORDERED:
MD Itz Daniel aware. MD to put in UA. will collect UA as per orders. Will continue to monitor. Statement Selected

## 2021-09-12 NOTE — PROGRESS NOTE ADULT - ASSESSMENT
Patient is a 77yo F with a history of colon cancer s/p right hemicolectomy (approx 2 years ago, outside hospital), DVT (on Eliquis), CKD, adrenal insufficiency presenting after mechanical fall from sitting. CT head/cervical spine/chest were performed which showed a right forehead hematoma, acute minimally displaced right coracoid process scapular fracture, T11/L1 compression fractures and an inferior sternal fracture. Found to have worsened renal function    A/P:  Acute on CKD:  Outpatient Nephrologist Dr. Treviño  Baseline SCr 1.4-1.6  FELICIANO etiology? possible sec to hemodynamic change  Renal function improving   Pt optimized from renal stand point for surgery , as long as renal function stable   CK level ok, Urine lytes suggestive of ATN  Monitor I/O  Monitor renal function at present    Acidosis:  non-AG+AG  Continue oral  sodium bicarb 650mg TID    HTN:  BP fluctuating   On Metoprolol   Monitor at present

## 2021-09-12 NOTE — PROGRESS NOTE ADULT - ASSESSMENT
77 yo F with a history of colon cancer s/p right hemicolectomy (approx 2 years ago, outside hospital), DVT (on Eliquis), CKD, adrenal insufficiency presenting as transfer from [facility] for trauma evaluation secondary to a mechanical fall from sitting with headstrike. Injuries identified include right forehead hematoma, acute minimally displaced right coracoid process scapular fracture, T11/L1 compression fractures and an inferior sternal fracture. Patient hemodynamically stable.    # s/p Mechanical Fall:  Right forehead hematoma, acute minimally displaced right coracoid process scapular fracture, T11/L1 compression fractures and an inferior sternal fracture  no surgery for scapular fx. Placed in sling  CT spine with fusion L2-5. Fracture through the T12-L1 disc space with widening of the disc space and a L1-2 laminectomy which appears unstable. Diffuse osteopenia. Old T11 and L1 fractures.   MRI T/L spine with anterior splaying of the intervertebral disc space at the T12-L1 with edema in the anterior prevertebral soft tissues  Neuro surgery offering surgery vs conservative management, TLSO brace in the meantime.   The daughter Susie decided to proceed with surgery.   Cr has improved.   Medically optimized for the OR. Cardiology clearance appreciated.     # Nausea:   Pt has been nauseous (no abd pain). ?pain med induced/ Opioids   Poor PO intake since being in the hospital. Normally with good appetite at home, with mechanical soft per the daughter.  anti-emetics PRN, encourage PO intake   the daughter requested IV steroids early, states her PO may not be absorbed with vomiting.  Reasonable to start IV hydrocortisone while PO intake is poor.   Switched hydrocortisone 10 mg BID to  IV 25 mg BID.   can give additional stress dose near the timing of surgery.   (can consider 50 mg hydrocortisone intravenously just before the procedure and 25 mg of hydrocortisone every eight hours for 24 hours, then resume home PO dose after)  or steroid regimen per anesthesia's discretion     # Hypopituitary   She is on Hydrocortisone 10 mg BID at home, so will monitor for adrenal insufficiency. am cortisol appropriate  UA unimpressive. vit D, vit B12 normal. TSH low as expected with hypopit. free T4 sent, pending.   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 Outpt EMR reviewed; Cr range from 1.4 to 1.6   Monitor I/O's, Serial Cr, Avoid nephrotoxins  Cr is up from baseline. Hold Losartan. s/p gentle IVF for acute kidney failure: likely pre-renal.   Her recent TTE reviewed, with normal LV.   Her Cr now back to baseline.     # CAD: Chronic, stable  Cont home CV meds  holding Asa for neurosurgery     # Hypothyroidism  Chronic, stable  TSH low. Total T4 is nl  Cont Synthroid 50mcg    # Hx of DVT, lower extremity  Home med Eliquis, on hold  Heparin subq Q8H    # GI ppx:  Protonix

## 2021-09-12 NOTE — PROGRESS NOTE ADULT - SUBJECTIVE AND OBJECTIVE BOX
No acute shortness of breath  No palpitations  No dizziness    Vital Signs Last 24 Hrs  T(C): 36.8 (12 Sep 2021 09:19), Max: 37.3 (11 Sep 2021 20:30)  T(F): 98.2 (12 Sep 2021 09:19), Max: 99.1 (11 Sep 2021 20:30)  HR: 88 (12 Sep 2021 09:19) (73 - 88)  BP: 158/89 (12 Sep 2021 09:19) (115/74 - 160/89)  BP(mean): --  RR: 18 (12 Sep 2021 09:19) (18 - 18)  SpO2: 96% (12 Sep 2021 09:19) (94% - 98%)    I&O's Summary    21 @ 07:01  -  21 @ 07:00  --------------------------------------------------------  IN: 1100 mL / OUT: 2201 mL / NET: -1101 mL          PHYSICAL EXAM:  GENERAL: NAD, well-developed, comfortable, on room air  HEAD:  Atraumatic, Normocephalic  EYES: EOMI, PERRLA, conjunctiva and sclera clear, legally blind  NECK: Supple, No JVD  CHEST/LUNG: mild decrease breath sounds bilaterally; No wheeze   HEART: Regular rate and rhythm; No murmurs, rubs, or gallops  ABDOMEN: Soft, Nontender, Nondistended; Bowel sounds present  Neuro: AAOx3, no focal weakness, 5/5 b/l upper extremity strength, 4/5 lower extremity strength  EXTREMITIES:  2+ Peripheral Pulses, No clubbing, cyanosis, trace b/l edema  SKIN: No rashes or lesions     LABS:                        10.6   11.18 )-----------( 194      ( 10 Sep 2021 09:50 )             29.9     -    137  |  101  |  17  ----------------------------<  118<H>  4.5   |  22  |  1.24    Ca    9.0      11 Sep 2021 06:32  Phos  2.2     09-10  Mg     2.1     09-10        CAPILLARY BLOOD GLUCOSE            Urinalysis Basic - ( 12 Sep 2021 01:58 )    Color: Light Yellow / Appearance: Clear / S.013 / pH: x  Gluc: x / Ketone: Negative  / Bili: Negative / Urobili: Negative   Blood: x / Protein: Trace / Nitrite: Negative   Leuk Esterase: Negative / RBC: 1 /hpf / WBC 0 /HPF   Sq Epi: x / Non Sq Epi: 0 /hpf / Bacteria: Negative        RADIOLOGY & ADDITIONAL TESTS:    Imaging Personally Reviewed:  [x] YES  [ ] NO    Will obtain old records:  [ ] YES  [x] NO

## 2021-09-12 NOTE — PROGRESS NOTE ADULT - ASSESSMENT
ECHO 10/25/16:  Normal left ventricular systolic function. No segmental wall motion abnormalities. Hypermobile interatrial septum.   ECHO 8/24/21: EF 66% grossly nl LV sys fx, mild diastolic dsyfx - stage 1     a/p    Patient is a 75yo F , known to practice from prior admission,  with a history of colon cancer s/p right hemicolectomy (approx 2 years ago, outside hospital), DVT (on Eliquis), CKD, adrenal insufficiency presenting as transfer from [facility] for trauma evaluation secondary to a mechanical fall from sitting    #Mechanical fall  -MRI noted with anterior splaying of T12/L1 space w/ edema   -CT T done noted with widening of T12/L1 disc space w/ L1 laminectomy  -plans for posterior extension of prior L2-5 fusion and stabilization 9/14  -cv stable, no cp, sob. ekg without acute ischemic changes. most recent echo noted with EF 66% grossly nl LV sys fx, mild diastolic dsyfx   -pt cardiac optimized - can proceed to OR with acceptable CV risk  -neuro sx/ trauma f/u     #SVT (hx)  -c/w toprol 50 mg daily     # DVT (hx)  -recent le doppler negative for acute dvt  -AC on hold for OR      # wm on CKD   -ivf per renal   -renal following     dvt ppx

## 2021-09-12 NOTE — PROGRESS NOTE ADULT - ASSESSMENT
Patient is a 75yo F with a history of colon cancer s/p right hemicolectomy (approx 2 years ago, outside hospital), DVT (on Eliquis), CKD, adrenal insufficiency presenting as transfer from [facility] for trauma evaluation secondary to a mechanical fall from sitting. Patient was going to her commode last night when she slipped and fell on her right side, hitting her head. Denies LOC. Patient's daughter came to help her up and brought her to the ED. Since the fall, patient has been endorses right-sided chest pain but no SOB, headache, weakness or dizziness. Patient does not know what medications she takes.     Patient transferred to [facility] for trauma evaluation. In the ED, patient was hemodynamically stable but requiring 2L NC, satting 98%. Labs showed WBC 17, Cr 1.42, otherwise normal. CT head/cervical spine/chest were performed which showed a right forehead hematoma, acute minimally displaced right coracoid process scapular fracture, T11/L1 compression fractures and an inferior sternal fracture.    PROCEDURE:  Adm 9/5 s/p fall Unstable Fx T12-L1, seen by trauma.     POD#NA    PLAN:  Neuro: Cont Bedrest with spinal precautions. No need for brace while in bed, but will need brace when OOB after surgery. TLSO brace taken for repair-will return 9/13.  IVL now. Pain control. Preop for OR on Tuesday-Ck Labs 9/13.  Per medicine-can give additional stress dose near the timing of surgery. (can consider 50 mg hydrocortisone intravenously just before the procedure and 25 mg of hydrocortisone every eight hours for 24 hours, then resume home PO dose after)FU AM Labs.     Cardio note of 7/11-ECHO 10/25/16:  Normal left ventricular systolic function. No segmental wall motion abnormalities. Hypermobile interatrial septum. ECHO 8/24/21: EF 66% grossly nl LV sys fx, mild diastolic dsyfx - stage 1. A/p Patient is a 75yo F , known to practice from prior admission,  with a history of colon cancer s/p right hemicolectomy (approx 2 years ago, outside hospital), DVT (on Eliquis), CKD, adrenal insufficiency presenting as transfer from [facility] for trauma evaluation secondary to a mechanical fall from sitting.-cv stable, no cp, sob. ekg without acute ischemic changes. most recent echo noted with EF 66% grossly nl LV sys fx, mild diastolic dsyfx. -pt cardiac optimized - can proceed to OR with acceptable CV risk. -neuro sx/ trauma f/u. #SVT (hx). -c/w toprol 50 mg daily. # DVT (hx). -recent le doppler negative for acute Attestation Statements: Electronic Signatures: Pablo Orozco)  (Signed 11-Sep-2021 12:26) 	Authored: Attestation Statements Kaitlynn Morgan (NP    Medicine note of 9/12-# s/p Mechanical Fall: Right forehead hematoma, acute minimally displaced right coracoid process scapular fracture, T11/L1 compression fractures and an inferior sternal fracture. no surgery for scapular fx. Placed in sling CT spine with fusion L2-5. Fracture through the T12-L1 disc space with widening of the disc space and a L1-2 laminectomy which appears unstable. Diffuse osteopenia. Old T11 and L1 fractures. MRI T/L spine with anterior splaying of the intervertebral disc space at the T12-L1 with edema in the anterior prevertebral soft tissues. Neuro surgery offering surgery vs conservative management, TLSO brace in the meantime. The daughter Susie decided to proceed with surgery. Cr has improved. Medically optimized for the OR. Cardiology clearance appreciated. # Nausea:   Pt has been nauseous (no abd pain). ?pain med induced/ Opioids Poor PO intake since being in the hospital. Normally with good appetite at home, with mechanical soft per the daughter. anti-emetics PRN, encourage PO intake the daughter requested IV steroids early, states her PO may not be absorbed with vomiting. Reasonable to start IV hydrocortisone while PO intake is poor. Switched hydrocortisone 10 mg BID to  IV 25 mg BID. can give additional stress dose near the timing of surgery. (can consider 50 mg hydrocortisone intravenously just before the procedure and 25 mg of hydrocortisone every eight hours for 24 hours, then resume home PO dose after). or steroid regimen per anesthesia's discretion. # Hypopituitary She is on Hydrocortisone 10 mg BID at home, so will monitor for adrenal insufficiency. am cortisol appropriate. UA unimpressive. vit D, vit B12 normal. TSH low as expected with hypopit. free T4 sent, pending. Given she is chronically on Hydrocortisone, stress dose steroids 24hrs pre-surgery. # Acute on chronic kidney disease stage II-III. Renal Dr. Treviño ([facility]). Outpt EMR reviewed; Cr range from 1.4 to 1.6. Monitor I/O's, Serial Cr, Avoid nephrotoxins. Cr is up from baseline. Hold Losartan. s/p gentle IVF for acute kidney failure: likely pre-renal. Her recent TTE reviewed, with normal LV. Her Cr now back to baseline. # CAD: Chronic, stable  Cont home CV meds. holding Asa for neurosurgery. # Hypothyroidism. Chronic, stable. TSH low. Total T4 is nl. Cont Synthroid 50mcg. # Hx of DVT, lower extremity. Home med Eliquis, on hold. Heparin subq Q8H. # GI ppx: Protonix Electronic Signatures: Jerardo Bernard)    Nephro nopte of 9/12-Acute on CKD: Outpatient Nephrologist Dr. Treviño. Baseline SCr 1.4-1.6. FELICIANO etiology? possible sec to hemodynamic change Renal function improving. Pt optimized from renal stand point for surgery , as long as renal function stable. CK level ok, Urine lytes suggestive of ATN. Monitor I/O. Monitor renal function at present. Acidosis: non-AG+AG. Continue oral  sodium bicarb 650mg TID. HTN: BP fluctuating. On Metoprolol. Monitor at present. Electronic Signatures: James Salazar (MD)      Respiratory: Patient instructed to use incentive spirometer [ X] YES [ ] NO              DVT ppx: [ ] SQL [X ] SQH and Venodynes [ ] Left [ ] Right [ X] Bilateral    Discharge Planning:  The patient was evaluated by PT and recommended Acute Rehab.  need PT/OT postop.    More than 30 minutes spent on total encounter: more than 50% of the visit was spent on educating the patient and family regarding condition, medications, follow up plans, signs and symptoms to be concerned with, preparing paperwork, and questions answered regarding discharge.

## 2021-09-12 NOTE — PROGRESS NOTE ADULT - SUBJECTIVE AND OBJECTIVE BOX
CARDIOLOGY FOLLOW UP - Dr. Orozco  DATE OF SERVICE: 09-12-21     CC no cp/sob  resting comfortably     REVIEW OF SYSTEMS:   CONSTITUTIONAL: No fever, weight loss, or fatigue   RESPIRATORY:  No cough, wheezing, chills or hemoptysis; No SOB  CARDIOVASCULAR: No chest pain, palpitations, passing out, dizziness, or leg swelling   GASTROINTESTINAL: No abdominal or epigastric pain. No nausea, vomiting, or hematemesis, no diarreha, or constipation, No melena or hematochezia   VASCULAR: no edema.       PHYSICAL EXAM:  T(C): 36.8 (09-12-21 @ 09:19), Max: 37.3 (09-11-21 @ 20:30)  HR: 88 (09-12-21 @ 09:19) (73 - 88)  BP: 158/89 (09-12-21 @ 09:19) (115/74 - 160/89)  RR: 18 (09-12-21 @ 09:19) (18 - 18)  SpO2: 96% (09-12-21 @ 09:19) (94% - 98%)  Wt(kg): --  I&O's Summary    11 Sep 2021 07:01  -  12 Sep 2021 07:00  --------------------------------------------------------  IN: 1100 mL / OUT: 2201 mL / NET: -1101 mL        Appearance: Normal	  Cardiovascular: Normal S1 S2,RRR, No JVD, No murmurs  Respiratory: Lungs clear to auscultation	  Gastrointestinal:  Soft, Non-tender, + BS	  Extremities: Normal range of motion, No clubbing, cyanosis or edema      HOME MEDICATIONS:  acetaminophen 325 mg oral tablet: 2 tab(s) orally every 6 hours, As needed, Mild Pain (1 - 3) (24 Sep 2020 11:16)  Artificial Tears ophthalmic solution: 1 drop(s) to each affected eye 4 times a day, As Needed (22 Aug 2021 23:28)  aspirin 81 mg oral delayed release tablet: 1 tab(s) orally once a day (27 Aug 2021 13:38)  calcium (as carbonate) 600 mg oral tablet: 1 cap(s) orally once a day (24 Sep 2020 11:16)  Eliquis 2.5 mg oral tablet: 1 tab(s) orally 2 times a day (24 Sep 2020 11:16)  felodipine 5 mg oral tablet, extended release: 1 tab(s) orally once a day (22 Aug 2021 23:23)  guaiFENesin 600 mg oral tablet, extended release: 1 tab(s) orally every 12 hours as needed for cough  (27 Aug 2021 13:38)  hydrocortisone 10 mg oral tablet: 1 tab(s) orally 2 times a day (24 Sep 2020 11:16)  latanoprost 0.005% ophthalmic solution: 1 drop(s) to each affected eye once a day (in the evening) (22 Aug 2021 23:27)  levothyroxine 50 mcg (0.05 mg) oral tablet: 1 tab(s) orally once a day (24 Sep 2020 11:16)  Lipitor 10 mg oral tablet: 1 tab(s) orally once a day (22 Aug 2021 23:41)  Macular Vitamin Benefit oral tablet: 1 tab(s) orally once a day (24 Sep 2020 11:16)  metoprolol succinate 50 mg oral tablet, extended release: 1 tab(s) orally once a day (22 Aug 2021 23:24)  Multiple Vitamins oral tablet: 1 tab(s) orally once a day (22 Aug 2021 23:22)  olmesartan 20 mg oral tablet: 1 tab(s) orally once a day (22 Aug 2021 23:22)  omeprazole 20 mg oral delayed release capsule: 1 cap(s) orally once a day (22 Aug 2021 23:21)  oxyCODONE 5 mg oral tablet: 1 tab(s) orally every 8 hours as needed for severe pain (27 Aug 2021 13:35)  OxyCONTIN 20 mg oral tablet, extended release: 1 tab(s) orally every 12 hours (24 Sep 2020 11:16)  risedronate 35 mg oral tablet: 1 tab(s) orally once a week (Mondays) (05 Sep 2021 18:30)  senna oral tablet: 2 tab(s) orally once a day (at bedtime), As Needed (05 Sep 2021 18:31)      MEDICATIONS  (STANDING):  atorvastatin 10 milliGRAM(s) Oral at bedtime  calcium carbonate    500 mG (Tums) Chewable 1 Tablet(s) Chew daily  heparin   Injectable 5000 Unit(s) SubCutaneous every 8 hours  hydrocortisone sodium succinate Injectable 25 milliGRAM(s) IV Push two times a day  influenza   Vaccine 0.5 milliLiter(s) IntraMuscular once  latanoprost 0.005% Ophthalmic Solution 1 Drop(s) Both EYES at bedtime  levothyroxine 50 MICROGram(s) Oral daily  lidocaine   4% Patch 1 Patch Transdermal every 24 hours  metoprolol succinate ER 50 milliGRAM(s) Oral every 24 hours  nystatin Powder 1 Application(s) Topical two times a day  oxyCODONE  ER Tablet 20 milliGRAM(s) Oral every 12 hours  pantoprazole    Tablet 40 milliGRAM(s) Oral before breakfast  polyethylene glycol 3350 17 Gram(s) Oral daily  sodium bicarbonate 650 milliGRAM(s) Oral three times a day      TELEMETRY: 	    ECG:  	  RADIOLOGY:   DIAGNOSTIC TESTING:  [ ] Echocardiogram:  [ ]  Catheterization:  [ ] Stress Test:    OTHER: 	    LABS:	 	            09-11    137  |  101  |  17  ----------------------------<  118<H>  4.5   |  22  |  1.24    Ca    9.0      11 Sep 2021 06:32

## 2021-09-12 NOTE — PROGRESS NOTE ADULT - SUBJECTIVE AND OBJECTIVE BOX
[facility] NE[facility]   MD KILLIAN JAIME MD RUORU WONG, PA    TEL:  OFFICE: [phone]  DR CHARLES CELL: [phone]  BEENA PISANO CELL: [phone]  DR. LOZANO CELL: [phone]      FROM 5 PM - 7 AM PLEASE CALL ANSWERING SERVICE: [phone]    RENAL FOLLOW UP NOTE--Date of Service 09-12-21 @ 09:05  --------------------------------------------------------------------------------  HPI:      Pt seen and examined at bedside.       PAST HISTORY  --------------------------------------------------------------------------------  No significant changes to PMH, PSH, FHx, SHx, unless otherwise noted    ALLERGIES & MEDICATIONS  --------------------------------------------------------------------------------  Allergies    penicillin (Rash)    Intolerances      Standing Inpatient Medications  atorvastatin 10 milliGRAM(s) Oral at bedtime  calcium carbonate    500 mG (Tums) Chewable 1 Tablet(s) Chew daily  dextrose 5% + sodium chloride 0.45% with potassium chloride 20 mEq/L 1000 milliLiter(s) IV Continuous <Continuous>  heparin   Injectable 5000 Unit(s) SubCutaneous every 8 hours  hydrocortisone sodium succinate Injectable 25 milliGRAM(s) IV Push two times a day  influenza   Vaccine 0.5 milliLiter(s) IntraMuscular once  latanoprost 0.005% Ophthalmic Solution 1 Drop(s) Both EYES at bedtime  levothyroxine 50 MICROGram(s) Oral daily  lidocaine   4% Patch 1 Patch Transdermal every 24 hours  metoprolol succinate ER 50 milliGRAM(s) Oral every 24 hours  nystatin Powder 1 Application(s) Topical two times a day  oxyCODONE  ER Tablet 20 milliGRAM(s) Oral every 12 hours  pantoprazole    Tablet 40 milliGRAM(s) Oral before breakfast  polyethylene glycol 3350 17 Gram(s) Oral daily  sodium bicarbonate 650 milliGRAM(s) Oral three times a day    PRN Inpatient Medications  artificial  tears Solution 1 Drop(s) Both EYES every 6 hours PRN  guaiFENesin  milliGRAM(s) Oral every 12 hours PRN  oxyCODONE    IR 5 milliGRAM(s) Oral every 8 hours PRN  senna 2 Tablet(s) Oral at bedtime PRN  traMADol 25 milliGRAM(s) Oral every 6 hours PRN  traMADol 50 milliGRAM(s) Oral every 8 hours PRN      REVIEW OF SYSTEMS  --------------------------------------------------------------------------------  General: no fever  CVS: no chest pain  MSK: no edema     VITALS/PHYSICAL EXAM  --------------------------------------------------------------------------------  T(C): 36.8 (09-12-21 @ 05:14), Max: 37.3 (09-11-21 @ 20:30)  HR: 84 (09-12-21 @ 05:14) (73 - 84)  BP: 159/85 (09-12-21 @ 05:14) (115/74 - 160/89)  RR: 18 (09-12-21 @ 05:14) (18 - 18)  SpO2: 95% (09-12-21 @ 05:14) (94% - 98%)  Wt(kg): --        09-11-21 @ 07:01  -  09-12-21 @ 07:00  --------------------------------------------------------  IN: 1100 mL / OUT: 2201 mL / NET: -1101 mL      Physical Exam:  	Gen: NAD  	HEENT: MMM  	Pulm: CTA B/L  	CV: S1S2  	Abd: Soft, +BS  	Ext: No LE edema B/L                      Neuro: Awake   	Skin: Warm and Dry   	Vascular access: NO HD catheter            no  aizza  LABS/STUDIES  --------------------------------------------------------------------------------              10.6   11.18 >-----------<  194      [09-10-21 @ 09:50]              29.9     137  |  101  |  17  ----------------------------<  118      [09-11-21 @ 06:32]  4.5   |  22  |  1.24        Ca     9.0     [09-11-21 @ 06:32]      Mg     2.1     [09-10-21 @ 09:50]      Phos  2.2     [09-10-21 @ 09:50]            Creatinine Trend:  SCr 1.24 [09-11 @ 06:32]  SCr 1.25 [09-10 @ 09:50]  SCr 1.36 [09-09 @ 14:37]  SCr 2.05 [09-08 @ 10:30]  SCr 2.20 [09-08 @ 06:01]    Urinalysis - [09-12-21 @ 01:58]      Color Light Yellow / Appearance Clear / SG 1.013 / pH 7.0      Gluc Trace / Ketone Negative  / Bili Negative / Urobili Negative       Blood Negative / Protein Trace / Leuk Est Negative / Nitrite Negative      RBC 1 / WBC 0 / Hyaline  / Gran  / Sq Epi  / Non Sq Epi 0 / Bacteria Negative    Urine Creatinine 84      [09-08-21 @ 18:29]  Urine Sodium 70      [09-08-21 @ 18:29]  Urine Potassium 26      [09-08-21 @ 18:29]  Urine Osmolality 443      [09-08-21 @ 18:29]    Vitamin D (25OH) 67.9      [09-08-21 @ 09:59]  HbA1c 5.9      [02-22-17 @ 03:10]  TSH 0.18      [09-08-21 @ 09:59]

## 2021-09-13 ENCOUNTER — TRANSCRIPTION ENCOUNTER (OUTPATIENT)
Age: 76
End: 2021-09-13

## 2021-09-13 LAB
ANION GAP SERPL CALC-SCNC: 14 MMOL/L — SIGNIFICANT CHANGE UP (ref 5–17)
ANION GAP SERPL CALC-SCNC: 14 MMOL/L — SIGNIFICANT CHANGE UP (ref 5–17)
APTT 50/50 2HOUR INCUB: 58.7 SEC — HIGH (ref 27.5–36.5)
APTT 50/50 MIX COMMENT: SIGNIFICANT CHANGE UP
APTT BLD: 102.1 SEC — HIGH (ref 27.5–35.5)
APTT BLD: 114.8 SEC — HIGH (ref 27.5–35.5)
APTT BLD: 125.1 SEC — CRITICAL HIGH (ref 27.5–35.5)
APTT BLD: 129.2 SEC — CRITICAL HIGH (ref 27.5–35.5)
APTT BLD: 179.5 SEC — CRITICAL HIGH (ref 27.5–35.5)
APTT BLD: 40.1 SEC — HIGH (ref 27.5–35.5)
APTT BLD: 57.5 SEC — HIGH (ref 27.5–36.5)
BLD GP AB SCN SERPL QL: NEGATIVE — SIGNIFICANT CHANGE UP
BUN SERPL-MCNC: 13 MG/DL — SIGNIFICANT CHANGE UP (ref 7–23)
BUN SERPL-MCNC: 14 MG/DL — SIGNIFICANT CHANGE UP (ref 7–23)
CALCIUM SERPL-MCNC: 9.2 MG/DL — SIGNIFICANT CHANGE UP (ref 8.4–10.5)
CALCIUM SERPL-MCNC: 9.8 MG/DL — SIGNIFICANT CHANGE UP (ref 8.4–10.5)
CHLORIDE SERPL-SCNC: 99 MMOL/L — SIGNIFICANT CHANGE UP (ref 96–108)
CHLORIDE SERPL-SCNC: 99 MMOL/L — SIGNIFICANT CHANGE UP (ref 96–108)
CO2 SERPL-SCNC: 21 MMOL/L — LOW (ref 22–31)
CO2 SERPL-SCNC: 24 MMOL/L — SIGNIFICANT CHANGE UP (ref 22–31)
CREAT SERPL-MCNC: 1.11 MG/DL — SIGNIFICANT CHANGE UP (ref 0.5–1.3)
CREAT SERPL-MCNC: 1.16 MG/DL — SIGNIFICANT CHANGE UP (ref 0.5–1.3)
FIBRINOGEN PPP-MCNC: 504 MG/DL — SIGNIFICANT CHANGE UP (ref 290–520)
GLUCOSE SERPL-MCNC: 112 MG/DL — HIGH (ref 70–99)
GLUCOSE SERPL-MCNC: 115 MG/DL — HIGH (ref 70–99)
HCT VFR BLD CALC: 30.4 % — LOW (ref 34.5–45)
HGB BLD-MCNC: 10.8 G/DL — LOW (ref 11.5–15.5)
INR BLD: 1.05 RATIO — SIGNIFICANT CHANGE UP (ref 0.88–1.16)
INR BLD: 1.08 RATIO — SIGNIFICANT CHANGE UP (ref 0.88–1.16)
INR BLD: 1.13 RATIO — SIGNIFICANT CHANGE UP (ref 0.88–1.16)
INR BLD: 1.13 RATIO — SIGNIFICANT CHANGE UP (ref 0.88–1.16)
MCHC RBC-ENTMCNC: 26.3 PG — LOW (ref 27–34)
MCHC RBC-ENTMCNC: 35.5 GM/DL — SIGNIFICANT CHANGE UP (ref 32–36)
MCV RBC AUTO: 74.1 FL — LOW (ref 80–100)
NRBC # BLD: 0 /100 WBCS — SIGNIFICANT CHANGE UP (ref 0–0)
OSMOLALITY UR: 480 MOS/KG — SIGNIFICANT CHANGE UP (ref 300–900)
PAT CTL 2H: 61.4 SEC — HIGH (ref 27.5–36.5)
PLATELET # BLD AUTO: 241 K/UL — SIGNIFICANT CHANGE UP (ref 150–400)
POTASSIUM SERPL-MCNC: 3.5 MMOL/L — SIGNIFICANT CHANGE UP (ref 3.5–5.3)
POTASSIUM SERPL-MCNC: 3.8 MMOL/L — SIGNIFICANT CHANGE UP (ref 3.5–5.3)
POTASSIUM SERPL-SCNC: 3.5 MMOL/L — SIGNIFICANT CHANGE UP (ref 3.5–5.3)
POTASSIUM SERPL-SCNC: 3.8 MMOL/L — SIGNIFICANT CHANGE UP (ref 3.5–5.3)
PROTHROM AB SERPL-ACNC: 12.6 SEC — SIGNIFICANT CHANGE UP (ref 10.6–13.6)
PROTHROM AB SERPL-ACNC: 12.9 SEC — SIGNIFICANT CHANGE UP (ref 10.6–13.6)
PROTHROM AB SERPL-ACNC: 13.5 SEC — SIGNIFICANT CHANGE UP (ref 10.6–13.6)
PROTHROM AB SERPL-ACNC: 13.5 SEC — SIGNIFICANT CHANGE UP (ref 10.6–13.6)
RBC # BLD: 4.1 M/UL — SIGNIFICANT CHANGE UP (ref 3.8–5.2)
RBC # FLD: 17.5 % — HIGH (ref 10.3–14.5)
REPTILASE TIME: 21.7 SEC — HIGH (ref 15–20.5)
RH IG SCN BLD-IMP: POSITIVE — SIGNIFICANT CHANGE UP
SARS-COV-2 RNA SPEC QL NAA+PROBE: SIGNIFICANT CHANGE UP
SODIUM SERPL-SCNC: 134 MMOL/L — LOW (ref 135–145)
SODIUM SERPL-SCNC: 137 MMOL/L — SIGNIFICANT CHANGE UP (ref 135–145)
SODIUM UR-SCNC: 6 MMOL/L — SIGNIFICANT CHANGE UP
THROMBIN TIME: >60 SEC — HIGH (ref 16–25)
WBC # BLD: 11.33 K/UL — HIGH (ref 3.8–10.5)
WBC # FLD AUTO: 11.33 K/UL — HIGH (ref 3.8–10.5)

## 2021-09-13 PROCEDURE — 99232 SBSQ HOSP IP/OBS MODERATE 35: CPT | Mod: 57

## 2021-09-13 RX ORDER — HYDROCORTISONE 20 MG
50 TABLET ORAL ONCE
Refills: 0 | Status: COMPLETED | OUTPATIENT
Start: 2021-09-14 | End: 2021-09-14

## 2021-09-13 RX ORDER — SODIUM CHLORIDE 9 MG/ML
1000 INJECTION INTRAMUSCULAR; INTRAVENOUS; SUBCUTANEOUS
Refills: 0 | Status: DISCONTINUED | OUTPATIENT
Start: 2021-09-13 | End: 2021-09-14

## 2021-09-13 RX ORDER — SODIUM CHLORIDE 9 MG/ML
1000 INJECTION INTRAMUSCULAR; INTRAVENOUS; SUBCUTANEOUS
Refills: 0 | Status: DISCONTINUED | OUTPATIENT
Start: 2021-09-13 | End: 2021-09-13

## 2021-09-13 RX ADMIN — NYSTATIN CREAM 1 APPLICATION(S): 100000 CREAM TOPICAL at 05:54

## 2021-09-13 RX ADMIN — SODIUM CHLORIDE 50 MILLILITER(S): 9 INJECTION INTRAMUSCULAR; INTRAVENOUS; SUBCUTANEOUS at 14:41

## 2021-09-13 RX ADMIN — TRAMADOL HYDROCHLORIDE 25 MILLIGRAM(S): 50 TABLET ORAL at 08:15

## 2021-09-13 RX ADMIN — OXYCODONE HYDROCHLORIDE 5 MILLIGRAM(S): 5 TABLET ORAL at 16:49

## 2021-09-13 RX ADMIN — OXYCODONE HYDROCHLORIDE 20 MILLIGRAM(S): 5 TABLET ORAL at 06:25

## 2021-09-13 RX ADMIN — Medication 50 MICROGRAM(S): at 05:54

## 2021-09-13 RX ADMIN — OXYCODONE HYDROCHLORIDE 5 MILLIGRAM(S): 5 TABLET ORAL at 03:47

## 2021-09-13 RX ADMIN — NYSTATIN CREAM 1 APPLICATION(S): 100000 CREAM TOPICAL at 18:21

## 2021-09-13 RX ADMIN — Medication 25 MILLIGRAM(S): at 05:55

## 2021-09-13 RX ADMIN — LATANOPROST 1 DROP(S): 0.05 SOLUTION/ DROPS OPHTHALMIC; TOPICAL at 21:40

## 2021-09-13 RX ADMIN — LIDOCAINE 1 PATCH: 4 CREAM TOPICAL at 16:57

## 2021-09-13 RX ADMIN — OXYCODONE HYDROCHLORIDE 20 MILLIGRAM(S): 5 TABLET ORAL at 18:47

## 2021-09-13 RX ADMIN — ATORVASTATIN CALCIUM 10 MILLIGRAM(S): 80 TABLET, FILM COATED ORAL at 21:42

## 2021-09-13 RX ADMIN — TRAMADOL HYDROCHLORIDE 25 MILLIGRAM(S): 50 TABLET ORAL at 07:46

## 2021-09-13 RX ADMIN — OXYCODONE HYDROCHLORIDE 5 MILLIGRAM(S): 5 TABLET ORAL at 17:15

## 2021-09-13 RX ADMIN — Medication 650 MILLIGRAM(S): at 21:40

## 2021-09-13 RX ADMIN — Medication 650 MILLIGRAM(S): at 05:54

## 2021-09-13 RX ADMIN — Medication 25 MILLIGRAM(S): at 18:13

## 2021-09-13 RX ADMIN — OXYCODONE HYDROCHLORIDE 20 MILLIGRAM(S): 5 TABLET ORAL at 18:21

## 2021-09-13 RX ADMIN — HEPARIN SODIUM 5000 UNIT(S): 5000 INJECTION INTRAVENOUS; SUBCUTANEOUS at 05:51

## 2021-09-13 RX ADMIN — PANTOPRAZOLE SODIUM 40 MILLIGRAM(S): 20 TABLET, DELAYED RELEASE ORAL at 06:39

## 2021-09-13 RX ADMIN — OXYCODONE HYDROCHLORIDE 20 MILLIGRAM(S): 5 TABLET ORAL at 05:55

## 2021-09-13 RX ADMIN — LIDOCAINE 1 PATCH: 4 CREAM TOPICAL at 06:11

## 2021-09-13 RX ADMIN — OXYCODONE HYDROCHLORIDE 5 MILLIGRAM(S): 5 TABLET ORAL at 04:17

## 2021-09-13 RX ADMIN — Medication 650 MILLIGRAM(S): at 14:40

## 2021-09-13 RX ADMIN — Medication 50 MILLIGRAM(S): at 18:14

## 2021-09-13 RX ADMIN — LIDOCAINE 1 PATCH: 4 CREAM TOPICAL at 18:14

## 2021-09-13 NOTE — PROGRESS NOTE ADULT - SUBJECTIVE AND OBJECTIVE BOX
LABS:                        10.8   11.33 )-----------( 241      ( 13 Sep 2021 04:27 )             30.4     -13    134<L>  |  99  |  13  ----------------------------<  115<H>  3.8   |  21<L>  |  1.11    Ca    9.2      13 Sep 2021 04:27      PT/INR - ( 13 Sep 2021 04:27 )   PT: 12.9 sec;   INR: 1.08 ratio         PTT - ( 13 Sep 2021 06:34 )  PTT:125.1 sec  CAPILLARY BLOOD GLUCOSE            Urinalysis Basic - ( 12 Sep 2021 01:58 )    Color: Light Yellow / Appearance: Clear / S.013 / pH: x  Gluc: x / Ketone: Negative  / Bili: Negative / Urobili: Negative   Blood: x / Protein: Trace / Nitrite: Negative   Leuk Esterase: Negative / RBC: 1 /hpf / WBC 0 /HPF   Sq Epi: x / Non Sq Epi: 0 /hpf / Bacteria: Negative        RADIOLOGY & ADDITIONAL TESTS:    Imaging Personally Reviewed:  [x] YES  [ ] NO    Will obtain old records:  [ ] YES  [x] NOShe denies any past history of unprovoked petechiae, gum bleeding, epistaxis    Vital Signs Last 24 Hrs  T(C): 37.1 (13 Sep 2021 09:10), Max: 37.3 (12 Sep 2021 16:20)  T(F): 98.7 (13 Sep 2021 09:10), Max: 99.2 (12 Sep 2021 16:20)  HR: 82 (13 Sep 2021 09:10) (74 - 98)  BP: 131/80 (13 Sep 2021 09:10) (128/77 - 154/73)  BP(mean): --  RR: 18 (13 Sep 2021 09:10) (18 - 18)  SpO2: 98% (13 Sep 2021 09:10) (93% - 98%)    I&O's Summary    21 @ 07:01  -  21 @ 07:00  --------------------------------------------------------  IN: 120 mL / OUT: 1000 mL / NET: -880 mL        PHYSICAL EXAM:  GENERAL: NAD, well-developed, comfortable, on room air  HEAD:  Atraumatic, Normocephalic  EYES: EOMI, PERRLA, conjunctiva and sclera clear, legally blind  NECK: Supple, No JVD  CHEST/LUNG: mild decrease breath sounds bilaterally; No wheeze   HEART: Regular rate and rhythm; No murmurs, rubs, or gallops  ABDOMEN: Soft, Nontender, Nondistended; Bowel sounds present  Neuro: AAOx3, no focal weakness, 5/5 b/l upper extremity strength, 4/5 lower extremity strength  EXTREMITIES:  2+ Peripheral Pulses, No clubbing, cyanosis, trace b/l edema  SKIN: No rashes or lesions          LABS:                        10.8   11.33 )-----------( 241      ( 13 Sep 2021 04:27 )             30.4     09-13    134<L>  |  99  |  13  ----------------------------<  115<H>  3.8   |  21<L>  |  1.11  She denies any past history of unprovoked petechiae, gum bleeding, epistaxis    Vital Signs Last 24 Hrs  T(C): 37.1 (13 Sep 2021 09:10), Max: 37.3 (12 Sep 2021 16:20)  T(F): 98.7 (13 Sep 2021 09:10), Max: 99.2 (12 Sep 2021 16:20)  HR: 82 (13 Sep 2021 09:10) (74 - 98)  BP: 131/80 (13 Sep 2021 09:10) (128/77 - 154/73)  BP(mean): --  RR: 18 (13 Sep 2021 09:10) (18 - 18)  SpO2: 98% (13 Sep 2021 09:10) (93% - 98%)    I&O's Summary    21 @ 07:01  -  21 @ 07:00  --------------------------------------------------------  IN: 120 mL / OUT: 1000 mL / NET: -880 mL        PHYSICAL EXAM:  GENERAL: NAD, well-developed, comfortable, on room air  HEAD:  Atraumatic, Normocephalic  EYES: EOMI, PERRLA, conjunctiva and sclera clear, legally blind  NECK: Supple, No JVD  CHEST/LUNG: mild decrease breath sounds bilaterally; No wheeze   HEART: Regular rate and rhythm; No murmurs, rubs, or gallops  ABDOMEN: Soft, Nontender, Nondistended; Bowel sounds present  Neuro: AAOx3, no focal weakness, 5/5 b/l upper extremity strength, 4/5 lower extremity strength  EXTREMITIES:  2+ Peripheral Pulses, No clubbing, cyanosis, trace b/l edema  SKIN: No rashes or lesions     Ca    9.2      13 Sep 2021 04:27      PT/INR - ( 13 Sep 2021 04:27 )   PT: 12.9 sec;   INR: 1.08 ratio         PTT - ( 13 Sep 2021 06:34 )  PTT:125.1 sec  CAPILLARY BLOOD GLUCOSE            Urinalysis Basic - ( 12 Sep 2021 01:58 )    Color: Light Yellow / Appearance: Clear / S.013 / pH: x  Gluc: x / Ketone: Negative  / Bili: Negative / Urobili: Negative   Blood: x / Protein: Trace / Nitrite: Negative   Leuk Esterase: Negative / RBC: 1 /hpf / WBC 0 /HPF   Sq Epi: x / Non Sq Epi: 0 /hpf / Bacteria: Negative        RADIOLOGY & ADDITIONAL TESTS:    Imaging Personally Reviewed:  [x] YES  [ ] NO    Will obtain old records:  [ ] YES  [x] NO     She denies any past history of unprovoked petechiae, gum bleeding, epistaxis    Vital Signs Last 24 Hrs  T(C): 37.1 (13 Sep 2021 09:10), Max: 37.3 (12 Sep 2021 16:20)  T(F): 98.7 (13 Sep 2021 09:10), Max: 99.2 (12 Sep 2021 16:20)  HR: 82 (13 Sep 2021 09:10) (74 - 98)  BP: 131/80 (13 Sep 2021 09:10) (128/77 - 154/73)  BP(mean): --  RR: 18 (13 Sep 2021 09:10) (18 - 18)  SpO2: 98% (13 Sep 2021 09:10) (93% - 98%)    I&O's Summary    21 @ 07:01  -  21 @ 07:00  --------------------------------------------------------  IN: 120 mL / OUT: 1000 mL / NET: -880 mL        PHYSICAL EXAM:  GENERAL: NAD, well-developed, comfortable, on room air  HEAD:  Atraumatic, Normocephalic  EYES: EOMI, PERRLA, conjunctiva and sclera clear, legally blind  NECK: Supple, No JVD  CHEST/LUNG: mild decrease breath sounds bilaterally; No wheeze   HEART: Regular rate and rhythm; No murmurs, rubs, or gallops  ABDOMEN: Soft, Nontender, Nondistended; Bowel sounds present  Neuro: AAOx3, no focal weakness, 5/5 b/l upper extremity strength, 4/5 lower extremity strength  EXTREMITIES:  2+ Peripheral Pulses, No clubbing, cyanosis, trace b/l edema  SKIN: No rashes or lesions     LABS:                        10.8   11.33 )-----------( 241      ( 13 Sep 2021 04:27 )             30.4     09-13    134<L>  |  99  |  13  ----------------------------<  115<H>  3.8   |  21<L>  |  1.11    Ca    9.2      13 Sep 2021 04:27      PT/INR - ( 13 Sep 2021 04:27 )   PT: 12.9 sec;   INR: 1.08 ratio         PTT - ( 13 Sep 2021 06:34 )  PTT:125.1 sec  CAPILLARY BLOOD GLUCOSE            Urinalysis Basic - ( 12 Sep 2021 01:58 )    Color: Light Yellow / Appearance: Clear / S.013 / pH: x  Gluc: x / Ketone: Negative  / Bili: Negative / Urobili: Negative   Blood: x / Protein: Trace / Nitrite: Negative   Leuk Esterase: Negative / RBC: 1 /hpf / WBC 0 /HPF   Sq Epi: x / Non Sq Epi: 0 /hpf / Bacteria: Negative        RADIOLOGY & ADDITIONAL TESTS:    Imaging Personally Reviewed:  [x] YES  [ ] NO    Will obtain old records:  [ ] YES  [x] NO

## 2021-09-13 NOTE — CONSULT NOTE ADULT - ASSESSMENT
75yo F with a history of colon cancer s/p right hemicolectomy (approx 2 years ago, outside hospital), DVT (on Eliquis), CKD, adrenal insufficiency presents s/p fall with right coracoid process scapular fracture, T11/L1 compression fractures and an inferior sternal fracture, noted to have prolonged PTT    #prolonged PTT  - no hx of bleeding disorder  - nl PTT on admission 9/5; prolonged to 69 on 9/8, further prolonged 9/13  - blood draws have been peripheral sticks- no central catheter/Port  - prolonged PTT can be seen with subcutaneous heparin administration, usually milder elevations, but this is most likely cause in this patient  - low suspicion for factor deficiency with normal PTT on admission  - Heparin subcutaneous has been discontinued- last dose was this morning  - expect that PTT will decline off Heparin sc  - no hx of liver disease and PT is normal  - appetite has been decreased with nausea, but again PT is normal so vitamin K deficiency less likely  - DIC also not likely with normal PT, will check fibrinogen  - will recheck PTT with mixing study, thrombin time    #anemia, microcytic  - mildly dec, no overt bleeding  - will check iron studies, b12, folate  - monitor Hg    #hx of DVT, 2019- bilateral  - discovered with colon cancer diagnosis  - has been on eliquis  - also with decreased mobility  - duplex LE 9/9- no DVT  - eliquis on hold pre op    # s/p fall, with T12- L1 fracture  - plan for NSGY OR 9/14    clearance for OR from heme perspective will depend on fu PTT testing    d/w daughter bedside  d/w Dr. Bernard

## 2021-09-13 NOTE — PROGRESS NOTE ADULT - SUBJECTIVE AND OBJECTIVE BOX
CARDIOLOGY FOLLOW UP - Dr. Orozco  DATE OF SERVICE: 9/13/21    CC no cp or sob   family at bedside       REVIEW OF SYSTEMS:  CONSTITUTIONAL: No fever, weight loss, or fatigue  RESPIRATORY: No cough, wheezing, chills or hemoptysis; No Shortness of Breath  CARDIOVASCULAR: No chest pain, palpitations, passing out, dizziness, or leg swelling  GASTROINTESTINAL: No abdominal or epigastric pain. No nausea, vomiting, or hematemesis; No diarrhea or constipation. No melena or hematochezia.  VASCULAR: No edema     PHYSICAL EXAM:  T(C): 36.8 (09-13-21 @ 15:55), Max: 37.3 (09-12-21 @ 20:08)  HR: 84 (09-13-21 @ 15:55) (74 - 84)  BP: 131/91 (09-13-21 @ 15:55) (128/77 - 154/73)  RR: 18 (09-13-21 @ 15:55) (18 - 18)  SpO2: 94% (09-13-21 @ 15:55) (94% - 98%)  Wt(kg): --  I&O's Summary    12 Sep 2021 07:01  -  13 Sep 2021 07:00  --------------------------------------------------------  IN: 120 mL / OUT: 1000 mL / NET: -880 mL    13 Sep 2021 07:01  -  13 Sep 2021 16:41  --------------------------------------------------------  IN: 240 mL / OUT: 200 mL / NET: 40 mL        Appearance: Normal	  Cardiovascular: Normal S1 S2,RRR, No JVD, No murmurs  Respiratory: Lungs clear to auscultation	  Gastrointestinal:  Soft, Non-tender, + BS	  Extremities: Normal range of motion, No clubbing, cyanosis or edema      Home Medications:  acetaminophen 325 mg oral tablet: 2 tab(s) orally every 6 hours, As needed, Mild Pain (1 - 3) (24 Sep 2020 11:16)  Artificial Tears ophthalmic solution: 1 drop(s) to each affected eye 4 times a day, As Needed (22 Aug 2021 23:28)  aspirin 81 mg oral delayed release tablet: 1 tab(s) orally once a day (27 Aug 2021 13:38)  calcium (as carbonate) 600 mg oral tablet: 1 cap(s) orally once a day (24 Sep 2020 11:16)  Eliquis 2.5 mg oral tablet: 1 tab(s) orally 2 times a day (24 Sep 2020 11:16)  felodipine 5 mg oral tablet, extended release: 1 tab(s) orally once a day (22 Aug 2021 23:23)  guaiFENesin 600 mg oral tablet, extended release: 1 tab(s) orally every 12 hours as needed for cough  (27 Aug 2021 13:38)  hydrocortisone 10 mg oral tablet: 1 tab(s) orally 2 times a day (24 Sep 2020 11:16)  latanoprost 0.005% ophthalmic solution: 1 drop(s) to each affected eye once a day (in the evening) (22 Aug 2021 23:27)  levothyroxine 50 mcg (0.05 mg) oral tablet: 1 tab(s) orally once a day (24 Sep 2020 11:16)  Lipitor 10 mg oral tablet: 1 tab(s) orally once a day (22 Aug 2021 23:41)  Macular Vitamin Benefit oral tablet: 1 tab(s) orally once a day (24 Sep 2020 11:16)  metoprolol succinate 50 mg oral tablet, extended release: 1 tab(s) orally once a day (22 Aug 2021 23:24)  Multiple Vitamins oral tablet: 1 tab(s) orally once a day (22 Aug 2021 23:22)  olmesartan 20 mg oral tablet: 1 tab(s) orally once a day (22 Aug 2021 23:22)  omeprazole 20 mg oral delayed release capsule: 1 cap(s) orally once a day (22 Aug 2021 23:21)  oxyCODONE 5 mg oral tablet: 1 tab(s) orally every 8 hours as needed for severe pain (27 Aug 2021 13:35)  OxyCONTIN 20 mg oral tablet, extended release: 1 tab(s) orally every 12 hours (24 Sep 2020 11:16)  risedronate 35 mg oral tablet: 1 tab(s) orally once a week (Mondays) (05 Sep 2021 18:30)  senna oral tablet: 2 tab(s) orally once a day (at bedtime), As Needed (05 Sep 2021 18:31)      MEDICATIONS  (STANDING):  atorvastatin 10 milliGRAM(s) Oral at bedtime  calcium carbonate    500 mG (Tums) Chewable 1 Tablet(s) Chew daily  hydrocortisone sodium succinate Injectable 25 milliGRAM(s) IV Push two times a day  influenza   Vaccine 0.5 milliLiter(s) IntraMuscular once  latanoprost 0.005% Ophthalmic Solution 1 Drop(s) Both EYES at bedtime  levothyroxine 50 MICROGram(s) Oral daily  lidocaine   4% Patch 1 Patch Transdermal every 24 hours  metoprolol succinate ER 50 milliGRAM(s) Oral every 24 hours  nystatin Powder 1 Application(s) Topical two times a day  oxyCODONE  ER Tablet 20 milliGRAM(s) Oral every 12 hours  pantoprazole    Tablet 40 milliGRAM(s) Oral before breakfast  polyethylene glycol 3350 17 Gram(s) Oral daily  sodium bicarbonate 650 milliGRAM(s) Oral three times a day  sodium chloride 0.9%. 1000 milliLiter(s) (50 mL/Hr) IV Continuous <Continuous>      TELEMETRY: 	    ECG:  	  RADIOLOGY:   DIAGNOSTIC TESTING:  [ ] Echocardiogram:  [ ]  Catheterization:  [ ] Stress Test:    OTHER: 	    LABS:	 	    Creatine Kinase, Serum: 161 U/L [25 - 170] (09-08 @ 18:29)                          10.8   11.33 )-----------( 241      ( 13 Sep 2021 04:27 )             30.4     09-13    134<L>  |  99  |  13  ----------------------------<  115<H>  3.8   |  21<L>  |  1.11    Ca    9.2      13 Sep 2021 04:27      PT/INR - ( 13 Sep 2021 11:08 )   PT: 13.5 sec;   INR: 1.13 ratio         PTT - ( 13 Sep 2021 11:08 )  PTT:179.5 sec

## 2021-09-13 NOTE — PROGRESS NOTE ADULT - ASSESSMENT
Patient is a 77yo F with a history of colon cancer s/p right hemicolectomy (approx 2 years ago, outside hospital), DVT (on Eliquis), CKD, adrenal insufficiency presenting as transfer from [facility] for trauma evaluation secondary to a mechanical fall from sitting. Patient was going to her commode last night when she slipped and fell on her right side, hitting her head. Denies LOC. Patient's daughter came to help her up and brought her to the ED. Since the fall, patient has been endorses right-sided chest pain but no SOB, headache, weakness or dizziness. Patient does not know what medications she takes.     Patient transferred to [facility] for trauma evaluation. In the ED, patient was hemodynamically stable but requiring 2L NC, satting 98%. Labs showed WBC 17, Cr 1.42, otherwise normal. CT head/cervical spine/chest were performed which showed a right forehead hematoma, acute minimally displaced right coracoid process scapular fracture, T11/L1 compression fractures and an inferior sternal fracture.    PROCEDURE:  Adm 9/5 s/p fall Unstable Fx T12-L1, seen by trauma.     POD#NA    PLAN:  Neuro: Preop OR 9/14.  Held SQH this AM, IVF, NPO. However PTT trending up to 179.5 now,  Got SQH about 5am which has since been DC'd. Elevated PTT D/W Med Dr Jerardo Bernard this AM.  I called him again [facility] [phone] and awaiting call back, will need Hemo input-will d/w Dr Bernard first.  Hyponatremia-FU BMP, Urine Na and Urine Osmo. Close neuro monitoring and any changes in mental satus-should get CT brain stat. Cont Bedrest with spinal precautions. No need for brace while in bed, but will need brace when OOB after surgery. TLSO brace taken for repair-will return 9/13. Per medicine-can give additional stress dose near the timing of surgery. (can consider 50 mg hydrocortisone intravenously just before the procedure and 25 mg of hydrocortisone every eight hours for 24 hours, then resume home PO dose after)FU AM Labs.   [facility] Hemo called for elevated PTT and clearance for OR 9/14 FU.     Cardio note of 7/11-ECHO 10/25/16:  Normal left ventricular systolic function. No segmental wall motion abnormalities. Hypermobile interatrial septum. ECHO 8/24/21: EF 66% grossly nl LV sys fx, mild diastolic dsyfx - stage 1. A/p Patient is a 77yo F , known to practice from prior admission,  with a history of colon cancer s/p right hemicolectomy (approx 2 years ago, outside hospital), DVT (on Eliquis), CKD, adrenal insufficiency presenting as transfer from [facility] for trauma evaluation secondary to a mechanical fall from sitting.-cv stable, no cp, sob. ekg without acute ischemic changes. most recent echo noted with EF 66% grossly nl LV sys fx, mild diastolic dsyfx. -pt cardiac optimized - can proceed to OR with acceptable CV risk. -neuro sx/ trauma f/u. #SVT (hx). -c/w toprol 50 mg daily. # DVT (hx). -recent le doppler negative for acute Attestation Statements: Electronic Signatures: Pablo Orozco)  (Signed 11-Sep-2021 12:26) 	Authored: Attestation Statements Kaitlynn Morgan (NP    Medicine note of 9/13-75 yo F with a history of colon cancer s/p right hemicolectomy (approx 2 years ago, outside hospital), DVT (on Eliquis), CKD, adrenal insufficiency presenting as transfer from [facility] for trauma evaluation secondary to a mechanical fall from sitting with headstrike. Injuries identified include right forehead hematoma, acute minimally displaced right coracoid process scapular fracture, T11/L1 compression fractures and an inferior sternal fracture. Patient hemodynamically stable. # Elevated PTT Mild elevations in PTT can be seen with SC heparin administration, particularly with TID dosing; low plasma protein, impaired renal function, and low BMI have been postulated as contributing factors.  In this pt's case, I doubt she has a clotting factor deficiency.  Last dose of SC heparin was this morning.  I expect her PTT to trend close to normal by the end of today.  I would check the PTT around noontime and one more this evening before tomorrow morning's pre-op labs.  If her PTT is < 40, then would be cleared # S/p Mechanical Fall:Right forehead hematoma, acute minimally displaced right coracoid process scapular fracture, T11/L1 compression fractures and an inferior sternal fracture no surgery for scapular fx. Placed in sling CT spine with fusion L2-5. Fracture through the T12-L1 disc space with widening of the disc space and a L1-2 laminectomy which appears unstable. Diffuse osteopenia. Old T11 and L1 fractures. MRI T/L spine with anterior splaying of the intervertebral disc space at the T12-L1 with edema in the anterior prevertebral soft tissues Neuro surgery offering surgery vs conservative management, TLSO brace in the meantime. The daughter Susie decided to proceed with surgery. Cr has improved. Medically optimized for the OR for 9/14/21. Cardiology clearance appreciated. # Nausea: Pt has been nauseous (no abd pain). ?pain med induced/ Opioids Poor PO intake since being in the hospital. Normally with good appetite at home, with mechanical soft per the daughter. anti-emetics PRN, encourage PO intake the daughter requested IV steroids early, states her PO may not be absorbed with vomiting. Reasonable to start IV hydrocortisone while PO intake is poor. Switched hydrocortisone 10 mg BID to  IV 25 mg BID. can give additional stress dose near the timing of surgery. 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 am cortisol appropriate UA unimpressive. vit D, vit B12 normal. TSH low as expected with hypopit. free T4 sent, pending. 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 Treviño ([facility]). Outpt EMR reviewed; Cr range from 1.4 to 1.6 Monitor I/O's, Serial Cr, Avoid nephrotoxins  Cr is up from baseline. Hold Losartan. s/p gentle IVF for acute kidney failure: likely pre-renal. Her recent TTE reviewed, with normal LV. Her Cr now back to baseline. # CAD: Chronic, stable Cont home CV meds holding Asa for neurosurgery # Hypothyroidism Chronic, stable  TSH low. Total T4 is nl Cont Synthroid 50mcg # Hx of DVT, lower extremity Home med Eliquis, on hold Heparin subq Q8H # GI ppx:Protonix Electronic Signatures: Jerardo Bernard)     Nephro nopte of 9/13-Acute on CKD: Outpatient Nephrologist Dr. Treviño Baseline SCr 1.4-1.6 FELICIANO likely  sec to hemodynamic change Renal function improving Pt optimized from renal stand point for surgery , as long as renal function stable CK level ok, Urine lytes suggestive of ATN  Monitor I/O. Monitor renal function at present. Acidosis: non-AG+AG Continue oral  sodium bicarb 650mg TID HTN: BP fluctuating On Metoprolol Monitor at present Hyponatremia possibly SIADH sec to ? pain CHeck URine NA URine OSm Monitor serum Na Electronic Signatures: James Salazar)     Respiratory: Patient instructed to use incentive spirometer [ X] YES [ ] NO              DVT ppx: [ ] SQL [ ] SQH DC'd 9/13 AM and Venodynes [ ] Left [ ] Right [ X] Bilateral    Discharge Planning:  The patient was evaluated by PT and recommended Acute Rehab.  need PT/OT postop.    More than 30 minutes spent on total encounter: more than 50% of the visit was spent on educating the patient and family regarding condition, medications, follow up plans, signs and symptoms to be concerned with, preparing paperwork, and questions answered regarding discharge.

## 2021-09-13 NOTE — PROGRESS NOTE ADULT - ASSESSMENT
ECHO 10/25/16:  Normal left ventricular systolic function. No segmental wall motion abnormalities. Hypermobile interatrial septum.   ECHO 8/24/21: EF 66% grossly nl LV sys fx, mild diastolic dsyfx - stage 1     a/p    Patient is a 75yo F , known to practice from prior admission,  with a history of colon cancer s/p right hemicolectomy (approx 2 years ago, outside hospital), DVT (on Eliquis), CKD, adrenal insufficiency presenting as transfer from [facility] for trauma evaluation secondary to a mechanical fall from sitting    #Mechanical fall  -MRI noted with anterior splaying of T12/L1 space w/ edema   -CT T done noted with widening of T12/L1 disc space w/ L1 laminectomy  -plans for posterior extension of prior L2-5 fusion and stabilization 9/14  -cv stable, no cp, sob. ekg without acute ischemic changes. most recent echo noted with EF 66% grossly nl LV sys fx, mild diastolic dsyfx   -pt cardiac optimized - can proceed to OR with acceptable CV risk  -neuro sx/ trauma f/u     #SVT (hx)  -c/w toprol 50 mg daily     # DVT (hx)  -recent le doppler negative for acute dvt  -AC on hold for OR      # wm on CKD   -ivf per renal   -renal following     dvt ppx

## 2021-09-13 NOTE — PROGRESS NOTE ADULT - ASSESSMENT
77 yo F with a history of colon cancer s/p right hemicolectomy (approx 2 years ago, outside hospital), DVT (on Eliquis), CKD, adrenal insufficiency presenting as transfer from [facility] for trauma evaluation secondary to a mechanical fall from sitting with headstrike. Injuries identified include right forehead hematoma, acute minimally displaced right coracoid process scapular fracture, T11/L1 compression fractures and an inferior sternal fracture. Patient hemodynamically stable.    # Elevated PTT  Mild elevations in PTT can be seen with SC heparin administration, particularly with TID dosing; low plasma protein, impaired renal function, and low BMI have been postulated as contributing factors.  In this pt's case, I doubt she has a clotting factor deficiency.  Last dose of SC heparin was this morning.  I expect her PTT to trend close to normal by the end of today.  I would check the PTT around noontime and one more this evening before tomorrow morning's pre-op labs.  If her PTT is < 40, then would be cleared    # S/p Mechanical Fall:  Right forehead hematoma, acute minimally displaced right coracoid process scapular fracture, T11/L1 compression fractures and an inferior sternal fracture  no surgery for scapular fx. Placed in sling  CT spine with fusion L2-5. Fracture through the T12-L1 disc space with widening of the disc space and a L1-2 laminectomy which appears unstable. Diffuse osteopenia. Old T11 and L1 fractures.   MRI T/L spine with anterior splaying of the intervertebral disc space at the T12-L1 with edema in the anterior prevertebral soft tissues  Neuro surgery offering surgery vs conservative management, TLSO brace in the meantime.   The daughter Susie decided to proceed with surgery.   Cr has improved.   Medically optimized for the OR for 9/14/21. Cardiology clearance appreciated.     # Nausea:   Pt has been nauseous (no abd pain). ?pain med induced/ Opioids   Poor PO intake since being in the hospital. Normally with good appetite at home, with mechanical soft per the daughter.  anti-emetics PRN, encourage PO intake   the daughter requested IV steroids early, states her PO may not be absorbed with vomiting.  Reasonable to start IV hydrocortisone while PO intake is poor.   Switched hydrocortisone 10 mg BID to  IV 25 mg BID.   can give additional stress dose near the timing of surgery.   (can consider 50 mg hydrocortisone intravenously just before the procedure and 25 mg of hydrocortisone every eight hours for 24 hours, then resume home PO dose after)  or steroid regimen per anesthesia's discretion     # Hypopituitary   She is on Hydrocortisone 10 mg BID at home, so will monitor for adrenal insufficiency. am cortisol appropriate  UA unimpressive. vit D, vit B12 normal. TSH low as expected with hypopit. free T4 sent, pending.   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 Treviño ([facility]). Outpt EMR reviewed; Cr range from 1.4 to 1.6   Monitor I/O's, Serial Cr, Avoid nephrotoxins  Cr is up from baseline. Hold Losartan. s/p gentle IVF for acute kidney failure: likely pre-renal.   Her recent TTE reviewed, with normal LV.   Her Cr now back to baseline.     # CAD: Chronic, stable  Cont home CV meds  holding Asa for neurosurgery     # Hypothyroidism  Chronic, stable  TSH low. Total T4 is nl  Cont Synthroid 50mcg    # Hx of DVT, lower extremity  Home med Eliquis, on hold  Heparin subq Q8H    # GI ppx:  Protonix  75 yo F with a history of colon cancer s/p right hemicolectomy (approx 2 years ago, outside hospital), DVT (on Eliquis), CKD, adrenal insufficiency presenting as transfer from [facility] for trauma evaluation secondary to a mechanical fall from sitting with headstrike. Injuries identified include right forehead hematoma, acute minimally displaced right coracoid process scapular fracture, T11/L1 compression fractures and an inferior sternal fracture. Patient hemodynamically stable.    # Elevated PTT  Mild elevations in PTT can be seen with SC heparin administration, particularly with TID dosing; low plasma protein, impaired renal function, and low BMI have been postulated as contributing factors.  In this pt's case, I doubt she has a clotting factor deficiency.  Last dose of SC heparin was this morning.  I expect her PTT to trend close to normal by the end of today.  I would check the PTT around noontime and one more this evening before tomorrow morning's pre-op labs.  If her PTT is < 40, then would be cleared for OR.  Consulted hematology.    # S/p Mechanical Fall:  Right forehead hematoma, acute minimally displaced right coracoid process scapular fracture, T11/L1 compression fractures and an inferior sternal fracture  no surgery for scapular fx. Placed in sling  CT spine with fusion L2-5. Fracture through the T12-L1 disc space with widening of the disc space and a L1-2 laminectomy which appears unstable. Diffuse osteopenia. Old T11 and L1 fractures.   MRI T/L spine with anterior splaying of the intervertebral disc space at the T12-L1 with edema in the anterior prevertebral soft tissues  Neuro surgery offering surgery vs conservative management, TLSO brace in the meantime.   The daughter Susie decided to proceed with surgery.   Cr has improved.   Pending PTT < 40 prior to OR. Cardiology clearance appreciated.     # Nausea:   Pt has been nauseous (no abd pain). ?pain med induced/ Opioids   Poor PO intake since being in the hospital. Normally with good appetite at home, with mechanical soft per the daughter.  anti-emetics PRN, encourage PO intake   the daughter requested IV steroids early, states her PO may not be absorbed with vomiting.  Reasonable to start IV hydrocortisone while PO intake is poor.   Switched hydrocortisone 10 mg BID to  IV 25 mg BID.   can give additional stress dose near the timing of surgery.   (can consider 50 mg hydrocortisone intravenously just before the procedure and 25 mg of hydrocortisone every eight hours for 24 hours, then resume home PO dose after)  or steroid regimen per anesthesia's discretion     # Hypopituitary   She is on Hydrocortisone 10 mg BID at home, so will monitor for adrenal insufficiency. am cortisol appropriate  UA unimpressive. vit D, vit B12 normal. TSH low as expected with hypopit. free T4 sent, pending.   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 Treviño ([facility]). Outpt EMR reviewed; Cr range from 1.4 to 1.6   Monitor I/O's, Serial Cr, Avoid nephrotoxins  Cr is up from baseline. Hold Losartan. s/p gentle IVF for acute kidney failure: likely pre-renal.   Her recent TTE reviewed, with normal LV.   Her Cr now back to baseline.     # CAD: Chronic, stable  Cont home CV meds  holding Asa for neurosurgery     # Hypothyroidism  Chronic, stable  TSH low. Total T4 is nl  Cont Synthroid 50mcg    # Hx of DVT, lower extremity  Home med Eliquis, on hold  Heparin subq Q8H    # GI ppx:  Protonix

## 2021-09-13 NOTE — PROGRESS NOTE ADULT - SUBJECTIVE AND OBJECTIVE BOX
[facility]   MD KILLIAN JAIME MD RUORU WONG, PA    TEL:  OFFICE: [phone]  DR CHARLES CELL: [phone]  BEENA PISANO CELL: [phone]  DR. LOZANO CELL: [phone]      FROM 5 PM - 7 AM PLEASE CALL ANSWERING SERVICE: [phone]    RENAL FOLLOW UP NOTE--Date of Service 09-13-21 @ 08:44  --------------------------------------------------------------------------------  HPI:      Pt seen and examined at bedside.       PAST HISTORY  --------------------------------------------------------------------------------  No significant changes to PMH, PSH, FHx, SHx, unless otherwise noted    ALLERGIES & MEDICATIONS  --------------------------------------------------------------------------------  Allergies    penicillin (Rash)    Intolerances      Standing Inpatient Medications  atorvastatin 10 milliGRAM(s) Oral at bedtime  calcium carbonate    500 mG (Tums) Chewable 1 Tablet(s) Chew daily  hydrocortisone sodium succinate Injectable 25 milliGRAM(s) IV Push two times a day  influenza   Vaccine 0.5 milliLiter(s) IntraMuscular once  latanoprost 0.005% Ophthalmic Solution 1 Drop(s) Both EYES at bedtime  levothyroxine 50 MICROGram(s) Oral daily  lidocaine   4% Patch 1 Patch Transdermal every 24 hours  metoprolol succinate ER 50 milliGRAM(s) Oral every 24 hours  nystatin Powder 1 Application(s) Topical two times a day  oxyCODONE  ER Tablet 20 milliGRAM(s) Oral every 12 hours  pantoprazole    Tablet 40 milliGRAM(s) Oral before breakfast  polyethylene glycol 3350 17 Gram(s) Oral daily  sodium bicarbonate 650 milliGRAM(s) Oral three times a day    PRN Inpatient Medications  artificial  tears Solution 1 Drop(s) Both EYES every 6 hours PRN  guaiFENesin  milliGRAM(s) Oral every 12 hours PRN  oxyCODONE    IR 5 milliGRAM(s) Oral every 8 hours PRN  senna 2 Tablet(s) Oral at bedtime PRN  traMADol 25 milliGRAM(s) Oral every 6 hours PRN      REVIEW OF SYSTEMS  --------------------------------------------------------------------------------  General: no fever  MSK: no edema     VITALS/PHYSICAL EXAM  --------------------------------------------------------------------------------  T(C): 36.7 (09-13-21 @ 04:56), Max: 37.3 (09-12-21 @ 16:20)  HR: 74 (09-13-21 @ 04:56) (74 - 98)  BP: 154/73 (09-13-21 @ 04:56) (128/77 - 158/89)  RR: 18 (09-13-21 @ 04:56) (18 - 18)  SpO2: 96% (09-13-21 @ 04:56) (93% - 97%)  Wt(kg): --        09-12-21 @ 07:01  -  09-13-21 @ 07:00  --------------------------------------------------------  IN: 120 mL / OUT: 1000 mL / NET: -880 mL      Physical Exam:  	Gen: NAD  	HEENT: MMM  	Pulm: CTA B/L  	CV: S1S2  	Abd: Soft, +BS  	Ext: No LE edema B/L                      Neuro: Awake   	Skin: Warm and Dry   	Vascular access: NO HD catheter            no  ervin  LABS/STUDIES  --------------------------------------------------------------------------------              10.8   11.33 >-----------<  241      [09-13-21 @ 04:27]              30.4     134  |  99  |  13  ----------------------------<  115      [09-13-21 @ 04:27]  3.8   |  21  |  1.11        Ca     9.2     [09-13-21 @ 04:27]      PT/INR: PT 12.9 , INR 1.08       [09-13-21 @ 04:27]  PTT: 125.1      [09-13-21 @ 06:34]      Creatinine Trend:  SCr 1.11 [09-13 @ 04:27]  SCr 1.24 [09-11 @ 06:32]  SCr 1.25 [09-10 @ 09:50]  SCr 1.36 [09-09 @ 14:37]  SCr 2.05 [09-08 @ 10:30]    Urinalysis - [09-12-21 @ 01:58]      Color Light Yellow / Appearance Clear / SG 1.013 / pH 7.0      Gluc Trace / Ketone Negative  / Bili Negative / Urobili Negative       Blood Negative / Protein Trace / Leuk Est Negative / Nitrite Negative      RBC 1 / WBC 0 / Hyaline  / Gran  / Sq Epi  / Non Sq Epi 0 / Bacteria Negative    Urine Creatinine 84      [09-08-21 @ 18:29]  Urine Sodium 70      [09-08-21 @ 18:29]  Urine Potassium 26      [09-08-21 @ 18:29]  Urine Osmolality 443      [09-08-21 @ 18:29]    Vitamin D (25OH) 67.9      [09-08-21 @ 09:59]  HbA1c 5.9      [02-22-17 @ 03:10]  TSH 0.18      [09-08-21 @ 09:59]

## 2021-09-13 NOTE — CONSULT NOTE ADULT - SUBJECTIVE AND OBJECTIVE BOX
Patient is a 76y old  Female who presents with a chief complaint of S/p fall (13 Sep 2021 12:04)      HPI:  Patient is a 75yo F with a history of colon cancer s/p right hemicolectomy (approx 2 years ago, outside hospital), DVT (on Eliquis), CKD, adrenal insufficiency presenting as transfer from [facility] for trauma evaluation secondary to a mechanical fall from sitting. Patient was going to her commode last night when she slipped and fell on her right side, hitting her head. Denies LOC. Patient's daughter came to help her up and brought her to the ED. Since the fall, patient has been endorses right-sided chest pain but no SOB, headache, weakness or dizziness. Patient does not know what medications she takes.     Patient transferred to [facility] for trauma evaluation. In the ED, patient was hemodynamically stable but requiring 2L NC, satting 98%. Labs showed WBC 17, Cr 1.42, otherwise normal. CT head/cervical spine/chest were performed which showed a right forehead hematoma, acute minimally displaced right coracoid process scapular fracture, T11/L1 compression fractures and an inferior sternal fracture.   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    Osteoporosis    Chronic Kidney Disease; Dx 2009    HTN - Hypertension    History of Obesity    Myocardial infarction    History of Laminectomy/ Fusion 1/06; L1-L2    H/O right hemicolectomy        SOCIAL HISTORY:  Smoking - Non smoker   Alcohol - Social  Drugs - No drug use    FAMILY HISTORY:  Family history of hypertension (Mother)    Family history of diabetes mellitus (Sibling)        MEDICATIONS  (STANDING):  atorvastatin 10 milliGRAM(s) Oral at bedtime  calcium carbonate    500 mG (Tums) Chewable 1 Tablet(s) Chew daily  hydrocortisone sodium succinate Injectable 25 milliGRAM(s) IV Push two times a day  influenza   Vaccine 0.5 milliLiter(s) IntraMuscular once  latanoprost 0.005% Ophthalmic Solution 1 Drop(s) Both EYES at bedtime  levothyroxine 50 MICROGram(s) Oral daily  lidocaine   4% Patch 1 Patch Transdermal every 24 hours  metoprolol succinate ER 50 milliGRAM(s) Oral every 24 hours  nystatin Powder 1 Application(s) Topical two times a day  oxyCODONE  ER Tablet 20 milliGRAM(s) Oral every 12 hours  pantoprazole    Tablet 40 milliGRAM(s) Oral before breakfast  polyethylene glycol 3350 17 Gram(s) Oral daily  sodium bicarbonate 650 milliGRAM(s) Oral three times a day  sodium chloride 0.9%. 1000 milliLiter(s) (50 mL/Hr) IV Continuous <Continuous>    MEDICATIONS  (PRN):  artificial  tears Solution 1 Drop(s) Both EYES every 6 hours PRN Dry Eyes  guaiFENesin  milliGRAM(s) Oral every 12 hours PRN Cough  oxyCODONE    IR 5 milliGRAM(s) Oral every 8 hours PRN Severe Pain (7 - 10)  senna 2 Tablet(s) Oral at bedtime PRN Constipation  traMADol 25 milliGRAM(s) Oral every 6 hours PRN Moderate Pain (4 - 6)      Allergies    penicillin (Rash)    Intolerances        Vital Signs Last 24 Hrs  T(C): 36.9 (13 Sep 2021 12:00), Max: 37.3 (12 Sep 2021 16:20)  T(F): 98.5 (13 Sep 2021 12:00), Max: 99.2 (12 Sep 2021 16:20)  HR: 76 (13 Sep 2021 12:00) (74 - 98)  BP: 148/73 (13 Sep 2021 12:00) (128/77 - 154/73)  BP(mean): --  RR: 18 (13 Sep 2021 12:00) (18 - 18)  SpO2: 96% (13 Sep 2021 12:00) (93% - 98%)    PHYSICAL EXAM  General: adult in NAD  HEENT: clear oropharynx, anicteric sclera, pink conjunctiva  Neck: supple  CV: normal S1/S2 with no murmur rubs or gallops  Lungs: clear to auscultation, no wheezes, no rales  Abdomen: soft non-tender non-distended, no hepatosplenomegaly, positive bowel sounds  Ext: no clubbing cyanosis or edema  Skin: no rashes and no petechiae  Lymph Nodes: No LAD in axillae, groin, neck  Neuro: alert and oriented X 3, no focal deficits    LABS:                          10.8   11.33 )-----------( 241      ( 13 Sep 2021 04:27 )             30.4         Mean Cell Volume : 74.1 fl  Mean Cell Hemoglobin : 26.3 pg  Mean Cell Hemoglobin Concentration : 35.5 gm/dL  Auto Neutrophil # : x  Auto Lymphocyte # : x  Auto Monocyte # : x  Auto Eosinophil # : x  Auto Basophil # : x  Auto Neutrophil % : x  Auto Lymphocyte % : x  Auto Monocyte % : x  Auto Eosinophil % : x  Auto Basophil % : x      Serial CBC's  09-13 @ 04:27  Hct-30.4 / Hgb-10.8 / Plat-241 / RBC-4.10 / WBC-11.33  Serial CBC's  09-10 @ 09:50  Hct-29.9 / Hgb-10.6 / Plat-194 / RBC-3.98 / WBC-11.18      09-13    134<L>  |  99  |  13  ----------------------------<  115<H>  3.8   |  21<L>  |  1.11    Ca    9.2      13 Sep 2021 04:27        PT/INR - ( 13 Sep 2021 11:08 )   PT: 13.5 sec;   INR: 1.13 ratio         PTT - ( 13 Sep 2021 11:08 )  PTT:179.5 sec    Vitamin B12, Serum: >2000 pg/mL (09-08 @ 09:59)                Radiology:     Patient is a 76y old  Female who presents with a chief complaint of S/p fall (13 Sep 2021 12:04)      HPI:  Patient is a 77yo F with a history of colon cancer s/p right hemicolectomy (approx 2 years ago, outside hospital), DVT (on Eliquis), CKD, adrenal insufficiency presenting as transfer from [facility] for trauma evaluation secondary to a mechanical fall from sitting. Patient was going to her commode last night when she slipped and fell on her right side, hitting her head. Denies LOC. Patient's daughter came to help her up and brought her to the ED. Since the fall, patient has been endorses right-sided chest pain but no SOB, headache, weakness or dizziness. Patient does not know what medications she takes.     Patient transferred to [facility] for trauma evaluation. In the ED, patient was hemodynamically stable but requiring 2L NC, satting 98%. Labs showed WBC 17, Cr 1.42, otherwise normal. CT head/cervical spine/chest were performed which showed a right forehead hematoma, acute minimally displaced right coracoid process scapular fracture, T11/L1 compression fractures and an inferior sternal fracture.   (05 Sep 2021 17:12)    Pt with acute on chronic renal failure, cr improved. has been nauseous with poor appetite, started on hydrocortisone    is on eliquis for hx of DVT 2 yr prior; started on heparin sc tid as inpatient; PTT were normal on admission-- noted to be prolonged this AM, also prolonged on repeat    no bleeding issues prior per daughter    pt able to ambulate to [facility] with walker, but otherwise mostly resting in wheelchair/bed; no f/c, no cp/dyspnea/cough, no epistaxis/gingival bleed, no melena/brbpr, no hematuria; no other bleeding      PAST MEDICAL & SURGICAL HISTORY:  Lumbar Spinal Stenosis    Adult Hypothyroidism    Adrenal Insufficiency    Pituitary Insufficiency;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    Osteoporosis    Chronic Kidney Disease; Dx 2009    HTN - Hypertension    History of Obesity    Myocardial infarction    History of Laminectomy/ Fusion 1/06; L1-L2    H/O right hemicolectomy        SOCIAL HISTORY:  Smoking - Non smoker   Alcohol - Social  Drugs - No drug use  lives with daughter    FAMILY HISTORY:  Family history of hypertension (Mother)    Family history of diabetes mellitus (Sibling)        MEDICATIONS  (STANDING):  atorvastatin 10 milliGRAM(s) Oral at bedtime  calcium carbonate    500 mG (Tums) Chewable 1 Tablet(s) Chew daily  hydrocortisone sodium succinate Injectable 25 milliGRAM(s) IV Push two times a day  influenza   Vaccine 0.5 milliLiter(s) IntraMuscular once  latanoprost 0.005% Ophthalmic Solution 1 Drop(s) Both EYES at bedtime  levothyroxine 50 MICROGram(s) Oral daily  lidocaine   4% Patch 1 Patch Transdermal every 24 hours  metoprolol succinate ER 50 milliGRAM(s) Oral every 24 hours  nystatin Powder 1 Application(s) Topical two times a day  oxyCODONE  ER Tablet 20 milliGRAM(s) Oral every 12 hours  pantoprazole    Tablet 40 milliGRAM(s) Oral before breakfast  polyethylene glycol 3350 17 Gram(s) Oral daily  sodium bicarbonate 650 milliGRAM(s) Oral three times a day  sodium chloride 0.9%. 1000 milliLiter(s) (50 mL/Hr) IV Continuous <Continuous>    MEDICATIONS  (PRN):  artificial  tears Solution 1 Drop(s) Both EYES every 6 hours PRN Dry Eyes  guaiFENesin  milliGRAM(s) Oral every 12 hours PRN Cough  oxyCODONE    IR 5 milliGRAM(s) Oral every 8 hours PRN Severe Pain (7 - 10)  senna 2 Tablet(s) Oral at bedtime PRN Constipation  traMADol 25 milliGRAM(s) Oral every 6 hours PRN Moderate Pain (4 - 6)      Allergies    penicillin (Rash)    Intolerances    ROS  gen- +weakness, dec appetite  heent- no sore throat, no difficulty swallowing, no epistaxis/gingival bleed  cv- no chest pain, no palpitation  resp- no dyspnea, no diggs, no cough  gi- no n/v/abd pain, no melena/brbpr  gu- no hematuria  musculosk- + back pain  neuro- no HA, no numbness/tingling  skin- scattered ecchymoses  ROS otherwise reviewed and negative      Vital Signs Last 24 Hrs  T(C): 36.9 (13 Sep 2021 12:00), Max: 37.3 (12 Sep 2021 16:20)  T(F): 98.5 (13 Sep 2021 12:00), Max: 99.2 (12 Sep 2021 16:20)  HR: 76 (13 Sep 2021 12:00) (74 - 98)  BP: 148/73 (13 Sep 2021 12:00) (128/77 - 154/73)  BP(mean): --  RR: 18 (13 Sep 2021 12:00) (18 - 18)  SpO2: 96% (13 Sep 2021 12:00) (93% - 98%)    PHYSICAL EXAM  General: frail elderly adult in NAD  HEENT: clear oropharynx, anicteric sclera, pink conjunctiva  Neck: supple  CV: normal S1/S2   Lungs: decreased BS, poor effort  Abdomen: soft non-tender non-distended, positive bowel sounds  Ext: no clubbing cyanosis or edema, no calf tenderness  Skin: no rashes and no petechiae  Lymph Nodes: No LAD in axillae, neck  Neuro: alert and oriented X 3, no focal deficits    LABS:                          10.8   11.33 )-----------( 241      ( 13 Sep 2021 04:27 )             30.4         Mean Cell Volume : 74.1 fl  Mean Cell Hemoglobin : 26.3 pg  Mean Cell Hemoglobin Concentration : 35.5 gm/dL  Auto Neutrophil # : x  Auto Lymphocyte # : x  Auto Monocyte # : x  Auto Eosinophil # : x  Auto Basophil # : x  Auto Neutrophil % : x  Auto Lymphocyte % : x  Auto Monocyte % : x  Auto Eosinophil % : x  Auto Basophil % : x      Serial CBC's  09-13 @ 04:27  Hct-30.4 / Hgb-10.8 / Plat-241 / RBC-4.10 / WBC-11.33  Serial CBC's  09-10 @ 09:50  Hct-29.9 / Hgb-10.6 / Plat-194 / RBC-3.98 / WBC-11.18      09-13    134<L>  |  99  |  13  ----------------------------<  115<H>  3.8   |  21<L>  |  1.11    Ca    9.2      13 Sep 2021 04:27        PT/INR - ( 13 Sep 2021 11:08 )   PT: 13.5 sec;   INR: 1.13 ratio         PTT - ( 13 Sep 2021 11:08 )  PTT:179.5 sec    Vitamin B12, Serum: >2000 pg/mL (09-08 @ 09:59)                Radiology:    < from: [facility] Duplex Lower Ext Vein Scan, Bilat (09.09.21 @ 11:23) >  IMPRESSION:  No evidence of deep venous thrombosis in either lower extremity.    < end of copied text >

## 2021-09-13 NOTE — PROGRESS NOTE ADULT - SUBJECTIVE AND OBJECTIVE BOX
SUBJECTIVE: supine in bed. Had increase back pain on AM rounds, but was medicated and much better. NAD.     OVERNIGHT EVENTS: None    Vital Signs Last 24 Hrs  T(C): 37.1 (13 Sep 2021 09:10), Max: 37.3 (12 Sep 2021 16:20)  T(F): 98.7 (13 Sep 2021 09:10), Max: 99.2 (12 Sep 2021 16:20)  HR: 82 (13 Sep 2021 09:10) (74 - 98)  BP: 131/80 (13 Sep 2021 09:10) (128/77 - 154/73)  BP(mean): --  RR: 18 (13 Sep 2021 09:10) (18 - 18)  SpO2: 98% (13 Sep 2021 09:10) (93% - 98%)  IVF: [X ] IVL [ ]   DIET: [X ] Regular-soft Mech, NPO 12MN [ ] CCD [ ] Renal [ ] Puree [ ] Dysphagia [ ] Tube Feeds:   PCA: [ ] YES [X ] NO   ESCOBEDO: [ ] YES [X ] NO [X ] VOID   BM: [X ] YES-9/12    DRAINS: NA    PHYSICAL EXAM:    Constitutional: No Acute Distress     Neurological: Awake to voice. AAOx3.  Non focal. Following Commands, PERRL, EOMI, Face Symmetrical, Speech Fluent, Moving all extremities, Muscle Strength uppers wnl, lowers rle 4/5 proximal , df/pf 4+, lle proximal 4- and df/pf 4/5 , sensation wnl.                                                  Sensation: [X] intact to light touch  [] decreased:     Pulmonary: Clear to Auscultation, No rales, No rhonchi, No wheezes     Cardiovascular: S1, S2, Regular rate and rhythm     Gastrointestinal: Soft, Non-tender, Non-distended     Extremities: No calf tenderness     Incision: NA    LABS:                        10.8   11.33 )-----------( 241      ( 13 Sep 2021 04:27 )             30.4   09-13    134<L>  |  99  |  13  ----------------------------<  115<H>  3.8   |  21<L>  |  1.11    Ca    9.2      13 Sep 2021 04:27    PT/INR - ( 13 Sep 2021 11:08 )   PT: 13.5 sec;   INR: 1.13 ratio    PTT - ( 13 Sep 2021 11:08 )  PTT:179.5 sec    Type + Screen (09.13.21 @ 05:03)    ABO Interpretation: A    Rh Interpretation: Positive    Antibody Screen: Negative    COVID-19 PCR: NotDetec (12 Sep 2021 07:20)  COVID-19 PCR: NotDetec (08 Sep 2021 09:39)  COVID-19 PCR: NotDetec (05 Sep 2021 14:48)  SARS-CoV-2: NotDetec (05 Sep 2021 08:53)  SARS-CoV-2: NotDetec (22 Aug 2021 20:34)    IMAGING:  < from: [facility] Duplex Lower Ext Vein Scan, Bilat (09.09.21 @ 11:23) >  No evidence of deep venous thrombosis in either lower extremity.    < from: Xray Abdomen 1 View PORTABLE -Urgent (Xray Abdomen 1 View PORTABLE -Urgent .) (09.08.21 @ 07:21) >  Nonobstructive bowel gas pattern.    < from: CT Thoracic Spine No Cont (09.07.21 @ 12:16) >  The thoracic vertebral bodies are normal in height with the exception of T2 and T11. There appear to be old mild compression deformities. There is widening of the T12-L1 disc space anteriorly with apparent disruption of the anterior longitudinal ligament.There is also volume loss to the L1 vertebral body anteriorly. Disc space is bright on T2-weighted series consistent with inflammatory change. There is fusion of L2-L5. There has been a previous laminectomy of L1. The widening of the disc space extends to the posterior disc margin. The ligamentum flavum and posterior longitudinal ligament may be intact. However, no bony spinal canal is present at this level raising the possibility of instability. Disc space narrowing is identified at L1-2 with vacuum disc phenomenon. Transpedicular screws are identified at L2 and L3.    IMPRESSION: The level above the lumbar fusion there is widening of the T12-L1 disc space anteriorly with abnormal signal replacing the disc and compression of the L1 vertebralbody. There has been a L1 laminectomy. In spite of the preservation of the posterior longitudinal ligament and ligamentum flavum this level may be unstable without lack of posterior osseous support.      MEDICATIONS  (STANDING):  atorvastatin 10 milliGRAM(s) Oral at bedtime  calcium carbonate    500 mG (Tums) Chewable 1 Tablet(s) Chew daily  hydrocortisone sodium succinate Injectable 25 milliGRAM(s) IV Push two times a day  influenza   Vaccine 0.5 milliLiter(s) IntraMuscular once  latanoprost 0.005% Ophthalmic Solution 1 Drop(s) Both EYES at bedtime  levothyroxine 50 MICROGram(s) Oral daily  lidocaine   4% Patch 1 Patch Transdermal every 24 hours  metoprolol succinate ER 50 milliGRAM(s) Oral every 24 hours  nystatin Powder 1 Application(s) Topical two times a day  oxyCODONE  ER Tablet 20 milliGRAM(s) Oral every 12 hours  pantoprazole    Tablet 40 milliGRAM(s) Oral before breakfast  polyethylene glycol 3350 17 Gram(s) Oral daily  sodium bicarbonate 650 milliGRAM(s) Oral three times a day  sodium chloride 0.9%. 1000 milliLiter(s) (50 mL/Hr) IV Continuous <Continuous>    MEDICATIONS  (PRN):  artificial  tears Solution 1 Drop(s) Both EYES every 6 hours PRN Dry Eyes  guaiFENesin  milliGRAM(s) Oral every 12 hours PRN Cough  oxyCODONE    IR 5 milliGRAM(s) Oral every 8 hours PRN Severe Pain (7 - 10)  senna 2 Tablet(s) Oral at bedtime PRN Constipation  traMADol 25 milliGRAM(s) Oral every 6 hours PRN Moderate Pain (4 - 6)

## 2021-09-13 NOTE — PROGRESS NOTE ADULT - ASSESSMENT
Patient is a 75yo F with a history of colon cancer s/p right hemicolectomy (approx 2 years ago, outside hospital), DVT (on Eliquis), CKD, adrenal insufficiency presenting after mechanical fall from sitting. CT head/cervical spine/chest were performed which showed a right forehead hematoma, acute minimally displaced right coracoid process scapular fracture, T11/L1 compression fractures and an inferior sternal fracture. Found to have worsened renal function    A/P:  Acute on CKD:  Outpatient Nephrologist Dr. Treviño  Baseline SCr 1.4-1.6  FELICIANO likely  sec to hemodynamic change  Renal function improving   Pt optimized from renal stand point for surgery , as long as renal function stable   CK level ok, Urine lytes suggestive of ATN  Monitor I/O  Monitor renal function at present    Acidosis:  non-AG+AG  Continue oral  sodium bicarb 650mg TID    HTN:  BP fluctuating   On Metoprolol   Monitor at present    Hyponatremia  possibly SIADH sec to ? pain   CHeck URine NA URine OSm  Monitor serum Na

## 2021-09-14 ENCOUNTER — APPOINTMENT (OUTPATIENT)
Dept: NEUROSURGERY | Facility: HOSPITAL | Age: 76
End: 2021-09-14
Payer: MEDICARE

## 2021-09-14 LAB
ANION GAP SERPL CALC-SCNC: 18 MMOL/L — HIGH (ref 5–17)
APTT BLD: 25.4 SEC — LOW (ref 27.5–35.5)
BUN SERPL-MCNC: 13 MG/DL — SIGNIFICANT CHANGE UP (ref 7–23)
CALCIUM SERPL-MCNC: 8.2 MG/DL — LOW (ref 8.4–10.5)
CHLORIDE SERPL-SCNC: 103 MMOL/L — SIGNIFICANT CHANGE UP (ref 96–108)
CO2 SERPL-SCNC: 18 MMOL/L — LOW (ref 22–31)
CREAT SERPL-MCNC: 0.94 MG/DL — SIGNIFICANT CHANGE UP (ref 0.5–1.3)
FERRITIN SERPL-MCNC: 444 NG/ML — HIGH (ref 15–150)
FOLATE SERPL-MCNC: >20 NG/ML — SIGNIFICANT CHANGE UP
GAS PNL BLDA: SIGNIFICANT CHANGE UP
GLUCOSE SERPL-MCNC: 139 MG/DL — HIGH (ref 70–99)
HCT VFR BLD CALC: 30.1 % — LOW (ref 34.5–45)
HGB BLD-MCNC: 10.4 G/DL — LOW (ref 11.5–15.5)
INR BLD: 1.01 RATIO — SIGNIFICANT CHANGE UP (ref 0.88–1.16)
IRON SATN MFR SERPL: 19 % — SIGNIFICANT CHANGE UP (ref 14–50)
IRON SATN MFR SERPL: 44 UG/DL — SIGNIFICANT CHANGE UP (ref 30–160)
MAGNESIUM SERPL-MCNC: 2 MG/DL — SIGNIFICANT CHANGE UP (ref 1.6–2.6)
MCHC RBC-ENTMCNC: 27.2 PG — SIGNIFICANT CHANGE UP (ref 27–34)
MCHC RBC-ENTMCNC: 34.6 GM/DL — SIGNIFICANT CHANGE UP (ref 32–36)
MCV RBC AUTO: 78.6 FL — LOW (ref 80–100)
NRBC # BLD: 0 /100 WBCS — SIGNIFICANT CHANGE UP (ref 0–0)
PHOSPHATE SERPL-MCNC: 4.4 MG/DL — SIGNIFICANT CHANGE UP (ref 2.5–4.5)
PLATELET # BLD AUTO: 152 K/UL — SIGNIFICANT CHANGE UP (ref 150–400)
POTASSIUM SERPL-MCNC: 3.2 MMOL/L — LOW (ref 3.5–5.3)
POTASSIUM SERPL-SCNC: 3.2 MMOL/L — LOW (ref 3.5–5.3)
PROTHROM AB SERPL-ACNC: 12.1 SEC — SIGNIFICANT CHANGE UP (ref 10.6–13.6)
RBC # BLD: 3.83 M/UL — SIGNIFICANT CHANGE UP (ref 3.8–5.2)
RBC # FLD: 18.7 % — HIGH (ref 10.3–14.5)
SODIUM SERPL-SCNC: 139 MMOL/L — SIGNIFICANT CHANGE UP (ref 135–145)
TIBC SERPL-MCNC: 224 UG/DL — SIGNIFICANT CHANGE UP (ref 220–430)
UIBC SERPL-MCNC: 181 UG/DL — SIGNIFICANT CHANGE UP (ref 110–370)
VIT B12 SERPL-MCNC: >2000 PG/ML — HIGH (ref 232–1245)
WBC # BLD: 18.54 K/UL — HIGH (ref 3.8–10.5)
WBC # FLD AUTO: 18.54 K/UL — HIGH (ref 3.8–10.5)

## 2021-09-14 PROCEDURE — 22843 INSERT SPINE FIXATION DEVICE: CPT

## 2021-09-14 PROCEDURE — 22633 ARTHRD CMBN 1NTRSPC LUMBAR: CPT

## 2021-09-14 PROCEDURE — 99291 CRITICAL CARE FIRST HOUR: CPT

## 2021-09-14 PROCEDURE — 22614 ARTHRD PST TQ 1NTRSPC EA ADD: CPT

## 2021-09-14 PROCEDURE — 72080 X-RAY EXAM THORACOLMB 2/> VW: CPT | Mod: 26

## 2021-09-14 PROCEDURE — 22830 EXPLORATION OF SPINAL FUSION: CPT | Mod: 59

## 2021-09-14 PROCEDURE — 22848 INSERT PELV FIXATION DEVICE: CPT

## 2021-09-14 PROCEDURE — 22853 INSJ BIOMECHANICAL DEVICE: CPT

## 2021-09-14 PROCEDURE — 22206 INCIS SPINE 3 COLUMN THORAC: CPT | Mod: 22

## 2021-09-14 PROCEDURE — 61783 SCAN PROC SPINAL: CPT

## 2021-09-14 RX ORDER — LEVOTHYROXINE SODIUM 125 MCG
50 TABLET ORAL DAILY
Refills: 0 | Status: DISCONTINUED | OUTPATIENT
Start: 2021-09-14 | End: 2021-09-14

## 2021-09-14 RX ORDER — POTASSIUM CHLORIDE 20 MEQ
10 PACKET (EA) ORAL
Refills: 0 | Status: COMPLETED | OUTPATIENT
Start: 2021-09-14 | End: 2021-09-15

## 2021-09-14 RX ORDER — METOPROLOL TARTRATE 50 MG
50 TABLET ORAL EVERY 24 HOURS
Refills: 0 | Status: DISCONTINUED | OUTPATIENT
Start: 2021-09-14 | End: 2021-09-14

## 2021-09-14 RX ORDER — DEXMEDETOMIDINE HYDROCHLORIDE IN 0.9% SODIUM CHLORIDE 4 UG/ML
0.2 INJECTION INTRAVENOUS
Qty: 200 | Refills: 0 | Status: DISCONTINUED | OUTPATIENT
Start: 2021-09-14 | End: 2021-09-16

## 2021-09-14 RX ORDER — FENTANYL CITRATE 50 UG/ML
25 INJECTION INTRAVENOUS ONCE
Refills: 0 | Status: DISCONTINUED | OUTPATIENT
Start: 2021-09-14 | End: 2021-09-15

## 2021-09-14 RX ORDER — HYDROMORPHONE HYDROCHLORIDE 2 MG/ML
0.5 INJECTION INTRAMUSCULAR; INTRAVENOUS; SUBCUTANEOUS
Refills: 0 | Status: DISCONTINUED | OUTPATIENT
Start: 2021-09-14 | End: 2021-09-16

## 2021-09-14 RX ORDER — SODIUM CHLORIDE 9 MG/ML
1000 INJECTION INTRAMUSCULAR; INTRAVENOUS; SUBCUTANEOUS
Refills: 0 | Status: DISCONTINUED | OUTPATIENT
Start: 2021-09-14 | End: 2021-09-17

## 2021-09-14 RX ORDER — PANTOPRAZOLE SODIUM 20 MG/1
40 TABLET, DELAYED RELEASE ORAL
Refills: 0 | Status: DISCONTINUED | OUTPATIENT
Start: 2021-09-14 | End: 2021-09-14

## 2021-09-14 RX ORDER — OXYCODONE HYDROCHLORIDE 5 MG/1
5 TABLET ORAL EVERY 8 HOURS
Refills: 0 | Status: DISCONTINUED | OUTPATIENT
Start: 2021-09-14 | End: 2021-09-15

## 2021-09-14 RX ORDER — HYDROCORTISONE 20 MG
25 TABLET ORAL
Refills: 0 | Status: DISCONTINUED | OUTPATIENT
Start: 2021-09-14 | End: 2021-09-15

## 2021-09-14 RX ORDER — ATORVASTATIN CALCIUM 80 MG/1
10 TABLET, FILM COATED ORAL AT BEDTIME
Refills: 0 | Status: DISCONTINUED | OUTPATIENT
Start: 2021-09-14 | End: 2021-09-14

## 2021-09-14 RX ORDER — CEFAZOLIN SODIUM 1 G
1000 VIAL (EA) INJECTION EVERY 8 HOURS
Refills: 0 | Status: COMPLETED | OUTPATIENT
Start: 2021-09-14 | End: 2021-09-15

## 2021-09-14 RX ORDER — LATANOPROST 0.05 MG/ML
1 SOLUTION/ DROPS OPHTHALMIC; TOPICAL AT BEDTIME
Refills: 0 | Status: DISCONTINUED | OUTPATIENT
Start: 2021-09-14 | End: 2021-09-27

## 2021-09-14 RX ORDER — NALOXONE HYDROCHLORIDE 4 MG/.1ML
0.1 SPRAY NASAL
Refills: 0 | Status: DISCONTINUED | OUTPATIENT
Start: 2021-09-14 | End: 2021-09-16

## 2021-09-14 RX ORDER — ATORVASTATIN CALCIUM 80 MG/1
10 TABLET, FILM COATED ORAL AT BEDTIME
Refills: 0 | Status: DISCONTINUED | OUTPATIENT
Start: 2021-09-14 | End: 2021-09-27

## 2021-09-14 RX ORDER — PANTOPRAZOLE SODIUM 20 MG/1
40 TABLET, DELAYED RELEASE ORAL
Refills: 0 | Status: DISCONTINUED | OUTPATIENT
Start: 2021-09-14 | End: 2021-09-15

## 2021-09-14 RX ORDER — PHENYLEPHRINE HYDROCHLORIDE 10 MG/ML
0.1 INJECTION INTRAVENOUS
Qty: 40 | Refills: 0 | Status: DISCONTINUED | OUTPATIENT
Start: 2021-09-14 | End: 2021-09-16

## 2021-09-14 RX ORDER — TRAMADOL HYDROCHLORIDE 50 MG/1
25 TABLET ORAL EVERY 6 HOURS
Refills: 0 | Status: DISCONTINUED | OUTPATIENT
Start: 2021-09-14 | End: 2021-09-17

## 2021-09-14 RX ORDER — SENNA PLUS 8.6 MG/1
2 TABLET ORAL AT BEDTIME
Refills: 0 | Status: DISCONTINUED | OUTPATIENT
Start: 2021-09-14 | End: 2021-09-16

## 2021-09-14 RX ORDER — AMLODIPINE BESYLATE 2.5 MG/1
5 TABLET ORAL DAILY
Refills: 0 | Status: DISCONTINUED | OUTPATIENT
Start: 2021-09-14 | End: 2021-09-15

## 2021-09-14 RX ORDER — OXYCODONE HYDROCHLORIDE 5 MG/1
20 TABLET ORAL EVERY 12 HOURS
Refills: 0 | Status: DISCONTINUED | OUTPATIENT
Start: 2021-09-14 | End: 2021-09-15

## 2021-09-14 RX ORDER — POLYETHYLENE GLYCOL 3350 17 G/17G
17 POWDER, FOR SOLUTION ORAL DAILY
Refills: 0 | Status: DISCONTINUED | OUTPATIENT
Start: 2021-09-14 | End: 2021-09-16

## 2021-09-14 RX ORDER — SODIUM BICARBONATE 1 MEQ/ML
650 SYRINGE (ML) INTRAVENOUS THREE TIMES A DAY
Refills: 0 | Status: DISCONTINUED | OUTPATIENT
Start: 2021-09-14 | End: 2021-09-20

## 2021-09-14 RX ORDER — METOPROLOL TARTRATE 50 MG
25 TABLET ORAL
Refills: 0 | Status: DISCONTINUED | OUTPATIENT
Start: 2021-09-14 | End: 2021-09-15

## 2021-09-14 RX ORDER — CHLORHEXIDINE GLUCONATE 213 G/1000ML
15 SOLUTION TOPICAL EVERY 12 HOURS
Refills: 0 | Status: DISCONTINUED | OUTPATIENT
Start: 2021-09-14 | End: 2021-09-16

## 2021-09-14 RX ORDER — ONDANSETRON 8 MG/1
4 TABLET, FILM COATED ORAL EVERY 6 HOURS
Refills: 0 | Status: DISCONTINUED | OUTPATIENT
Start: 2021-09-14 | End: 2021-09-16

## 2021-09-14 RX ORDER — FENTANYL CITRATE 50 UG/ML
25 INJECTION INTRAVENOUS ONCE
Refills: 0 | Status: DISCONTINUED | OUTPATIENT
Start: 2021-09-14 | End: 2021-09-14

## 2021-09-14 RX ORDER — LOSARTAN POTASSIUM 100 MG/1
25 TABLET, FILM COATED ORAL DAILY
Refills: 0 | Status: DISCONTINUED | OUTPATIENT
Start: 2021-09-14 | End: 2021-09-15

## 2021-09-14 RX ORDER — LEVOTHYROXINE SODIUM 125 MCG
50 TABLET ORAL DAILY
Refills: 0 | Status: DISCONTINUED | OUTPATIENT
Start: 2021-09-14 | End: 2021-09-15

## 2021-09-14 RX ORDER — HYDROMORPHONE HYDROCHLORIDE 2 MG/ML
30 INJECTION INTRAMUSCULAR; INTRAVENOUS; SUBCUTANEOUS
Refills: 0 | Status: DISCONTINUED | OUTPATIENT
Start: 2021-09-14 | End: 2021-09-16

## 2021-09-14 RX ADMIN — Medication 50 MICROGRAM(S): at 05:09

## 2021-09-14 RX ADMIN — FENTANYL CITRATE 25 MICROGRAM(S): 50 INJECTION INTRAVENOUS at 21:15

## 2021-09-14 RX ADMIN — PANTOPRAZOLE SODIUM 40 MILLIGRAM(S): 20 TABLET, DELAYED RELEASE ORAL at 07:38

## 2021-09-14 RX ADMIN — FENTANYL CITRATE 25 MICROGRAM(S): 50 INJECTION INTRAVENOUS at 21:30

## 2021-09-14 RX ADMIN — LIDOCAINE 1 PATCH: 4 CREAM TOPICAL at 07:04

## 2021-09-14 RX ADMIN — Medication 650 MILLIGRAM(S): at 05:09

## 2021-09-14 RX ADMIN — FENTANYL CITRATE 12.5 MICROGRAM(S): 50 INJECTION INTRAVENOUS at 23:40

## 2021-09-14 RX ADMIN — LATANOPROST 1 DROP(S): 0.05 SOLUTION/ DROPS OPHTHALMIC; TOPICAL at 23:48

## 2021-09-14 RX ADMIN — DEXMEDETOMIDINE HYDROCHLORIDE IN 0.9% SODIUM CHLORIDE 4.08 MICROGRAM(S)/KG/HR: 4 INJECTION INTRAVENOUS at 22:02

## 2021-09-14 RX ADMIN — Medication 50 MILLIGRAM(S): at 07:17

## 2021-09-14 RX ADMIN — OXYCODONE HYDROCHLORIDE 5 MILLIGRAM(S): 5 TABLET ORAL at 09:12

## 2021-09-14 RX ADMIN — NYSTATIN CREAM 1 APPLICATION(S): 100000 CREAM TOPICAL at 05:09

## 2021-09-14 RX ADMIN — OXYCODONE HYDROCHLORIDE 5 MILLIGRAM(S): 5 TABLET ORAL at 08:42

## 2021-09-14 RX ADMIN — FENTANYL CITRATE 12.5 MICROGRAM(S): 50 INJECTION INTRAVENOUS at 23:55

## 2021-09-14 RX ADMIN — OXYCODONE HYDROCHLORIDE 20 MILLIGRAM(S): 5 TABLET ORAL at 05:08

## 2021-09-14 RX ADMIN — Medication 100 MILLIGRAM(S): at 23:05

## 2021-09-14 RX ADMIN — OXYCODONE HYDROCHLORIDE 20 MILLIGRAM(S): 5 TABLET ORAL at 07:04

## 2021-09-14 RX ADMIN — Medication 25 MILLIGRAM(S): at 05:13

## 2021-09-14 RX ADMIN — SODIUM CHLORIDE 50 MILLILITER(S): 9 INJECTION INTRAMUSCULAR; INTRAVENOUS; SUBCUTANEOUS at 22:04

## 2021-09-14 NOTE — BRIEF OPERATIVE NOTE - NSICDXBRIEFPREOP_GEN_ALL_CORE_FT
PRE-OP DIAGNOSIS:  Compression fracture of thoracolumbar vertebra 14-Sep-2021 18:01:44  Jorje Yang  
PRE-OP DIAGNOSIS:  Compression fracture of thoracolumbar vertebra 14-Sep-2021 18:01:44  Jorje Yang

## 2021-09-14 NOTE — BRIEF OPERATIVE NOTE - OPERATION/FINDINGS
see nsgy brief op note for details of T10-pelvis procedure. PRS closure with bilateral paraspinal muscle flaps. complex repair of extensive back wound
S/p T10-pelvis with revision of previous L2-5 hardware, L1 expanded PSO, plastics closure

## 2021-09-14 NOTE — PROGRESS NOTE ADULT - ASSESSMENT
Patient is a 75yo F with a history of colon cancer s/p right hemicolectomy (approx 2 years ago, outside hospital), DVT (on Eliquis), CKD, adrenal insufficiency presenting after mechanical fall from sitting. CT head/cervical spine/chest were performed which showed a right forehead hematoma, acute minimally displaced right coracoid process scapular fracture, T11/L1 compression fractures and an inferior sternal fracture. Found to have worsened renal function    A/P:  Acute on CKD:  Outpatient Nephrologist Dr. Treviño  Baseline SCr 1.4-1.6  FELICIANO likely  sec to hemodynamic change  Renal function improving   Pt optimized from renal stand point for surgery ,   CK level ok, Urine lytes suggestive of ATN  Monitor I/O  Monitor renal function at present    Acidosis:  non-AG+AG  Continue oral  sodium bicarb 650mg TID    HTN:  BP fluctuating   On Metoprolol   Monitor at present    Hyponatremia  possibly SIADH sec to ? pain   improved  Monitor serum Na

## 2021-09-14 NOTE — PROGRESS NOTE ADULT - SUBJECTIVE AND OBJECTIVE BOX
HPI:  Patient is a 76 year old female with history of colon ca s/p right hemicolectomy, DVT (on eliquis), CKD, and Adrenal Insufficiency that presented s/p fall.  She was going to her commode and slipped/fell on her right side.  Imaging revealed T11 and L1 compression fractures.  Admitted to the neurosurgery service for further management.    OVERNIGHT EVENTS:  Patient s/p surgery today.  Incisional pain controlled.      Vital Signs Last 24 Hrs  T(C): 98.8 (14 Sep 2021 08:50), Max: 98.8 (14 Sep 2021 08:50)  T(F): 209.8 (14 Sep 2021 08:50), Max: 209.8 (14 Sep 2021 08:50)  HR: 79 (14 Sep 2021 19:44) (78 - 83)  BP: 164/89 (14 Sep 2021 09:09) (136/77 - 164/89)  BP(mean): --  RR: 18 (14 Sep 2021 09:09) (18 - 18)  SpO2: 100% (14 Sep 2021 19:44) (97% - 100%)    I&O's Detail    13 Sep 2021 07:01  -  14 Sep 2021 07:00  --------------------------------------------------------  IN:    Oral Fluid: 240 mL  Total IN: 240 mL    OUT:    Voided (mL): 200 mL  Total OUT: 200 mL    Total NET: 40 mL        I&O's Summary    13 Sep 2021 07:01  -  14 Sep 2021 07:00  --------------------------------------------------------  IN: 240 mL / OUT: 200 mL / NET: 40 mL        PHYSICAL EXAM:  Neurological:     Cardiovascular: +s1, s2  Respiratory: clear to auscultation b/l  Gastrointestinal: soft, non-distended, non-tender  Genitourinary: +ervin  Incision/Wound: mid back vertical incision c/d/i    TUBES/LINES:  [x] hemovac x2  [x[ ervin  [x] a-line    DIET:  [x] npo/ivf      LABS:                        10.8   11.33 )-----------( 241      ( 13 Sep 2021 04:27 )             30.4     09-13    137  |  99  |  14  ----------------------------<  112<H>  3.5   |  24  |  1.16    Ca    9.8      13 Sep 2021 17:11      PT/INR - ( 14 Sep 2021 05:46 )   PT: 12.1 sec;   INR: 1.01 ratio         PTT - ( 14 Sep 2021 05:46 )  PTT:25.4 sec          Allergies    penicillin (Rash)        MEDICATIONS:  Antibiotics:  ceFAZolin   IVPB 1000 milliGRAM(s) IV Intermittent every 8 hours    Neuro:  none    Anticoagulation:  held    OTHER:  chlorhexidine 0.12% Liquid 15 milliLiter(s) Oral Mucosa every 12 hours  influenza   Vaccine 0.5 milliLiter(s) IntraMuscular once    IVF:  none    CULTURES:  none    RADIOLOGY & ADDITIONAL TESTS:  no new imaging     HPI:  Patient is a 76 year old female with history of colon ca s/p right hemicolectomy, DVT (on eliquis), CKD, and Adrenal Insufficiency that presented s/p fall.  She was going to her commode and slipped/fell on her right side.  Imaging revealed T11 and L1 compression fractures.  Admitted to the neurosurgery service for further management.    OVERNIGHT EVENTS:  Patient s/p surgery today.  Has two hemovacs.  Remains intubated.      Vital Signs Last 24 Hrs  T(C): 98.8 (14 Sep 2021 08:50), Max: 98.8 (14 Sep 2021 08:50)  T(F): 209.8 (14 Sep 2021 08:50), Max: 209.8 (14 Sep 2021 08:50)  HR: 79 (14 Sep 2021 19:44) (78 - 83)  BP: 164/89 (14 Sep 2021 09:09) (136/77 - 164/89)  BP(mean): --  RR: 18 (14 Sep 2021 09:09) (18 - 18)  SpO2: 100% (14 Sep 2021 19:44) (97% - 100%)    I&O's Detail    13 Sep 2021 07:01  -  14 Sep 2021 07:00  --------------------------------------------------------  IN:    Oral Fluid: 240 mL  Total IN: 240 mL    OUT:    Voided (mL): 200 mL  Total OUT: 200 mL    Total NET: 40 mL        I&O's Summary    13 Sep 2021 07:01  -  14 Sep 2021 07:00  --------------------------------------------------------  IN: 240 mL / OUT: 200 mL / NET: 40 mL        PHYSICAL EXAM:  Neurological:     Cardiovascular: +s1, s2  Respiratory: clear to auscultation b/l  Gastrointestinal: soft, non-distended, non-tender  Genitourinary: +ervin  Incision/Wound: mid back vertical incision c/d/i    TUBES/LINES:  [x] hemovac x2  [x[ ervin  [x] a-line    DIET:  [x] npo/ivf      LABS:                        10.8   11.33 )-----------( 241      ( 13 Sep 2021 04:27 )             30.4     09-13    137  |  99  |  14  ----------------------------<  112<H>  3.5   |  24  |  1.16    Ca    9.8      13 Sep 2021 17:11      PT/INR - ( 14 Sep 2021 05:46 )   PT: 12.1 sec;   INR: 1.01 ratio         PTT - ( 14 Sep 2021 05:46 )  PTT:25.4 sec          Allergies    penicillin (Rash)        MEDICATIONS:  Antibiotics:  ceFAZolin   IVPB 1000 milliGRAM(s) IV Intermittent every 8 hours    Neuro:  none    Anticoagulation:  held    OTHER:  chlorhexidine 0.12% Liquid 15 milliLiter(s) Oral Mucosa every 12 hours  influenza   Vaccine 0.5 milliLiter(s) IntraMuscular once    IVF:  none    CULTURES:  none    RADIOLOGY & ADDITIONAL TESTS:  no new imaging     HPI:  Patient is a 76 year old female with history of colon ca s/p right hemicolectomy, DVT (on eliquis), CKD, and Adrenal Insufficiency that presented s/p fall.  She was going to her commode and slipped/fell on her right side.  Imaging revealed T11 and L1 compression fractures.  Admitted to the neurosurgery service for further management.    OVERNIGHT EVENTS:  Patient s/p surgery today.  Has two hemovacs.  Remains intubated.      Vital Signs Last 24 Hrs  T(C): 98.8 (14 Sep 2021 08:50), Max: 98.8 (14 Sep 2021 08:50)  T(F): 209.8 (14 Sep 2021 08:50), Max: 209.8 (14 Sep 2021 08:50)  HR: 79 (14 Sep 2021 19:44) (78 - 83)  BP: 164/89 (14 Sep 2021 09:09) (136/77 - 164/89)  BP(mean): --  RR: 18 (14 Sep 2021 09:09) (18 - 18)  SpO2: 100% (14 Sep 2021 19:44) (97% - 100%)    I&O's Detail    13 Sep 2021 07:01  -  14 Sep 2021 07:00  --------------------------------------------------------  IN:    Oral Fluid: 240 mL  Total IN: 240 mL    OUT:    Voided (mL): 200 mL  Total OUT: 200 mL    Total NET: 40 mL        I&O's Summary    13 Sep 2021 07:01  -  14 Sep 2021 07:00  --------------------------------------------------------  IN: 240 mL / OUT: 200 mL / NET: 40 mL        PHYSICAL EXAM:  Neurological: intubated, opens eyes to voice, shakes her head yes/no, follows simple commands, moves upper extremities spontaneously, wiggles toes lower extremities b/l    Cardiovascular: +s1, s2  Respiratory: clear to auscultation b/l  Gastrointestinal: soft, non-distended, non-tender  Genitourinary: +ervin  Incision/Wound: mid back vertical incision c/d/i    TUBES/LINES:  [x] hemovac x2  [x[ ervin  [x] a-line    DIET:  [x] npo/ivf      LABS:                        10.8   11.33 )-----------( 241      ( 13 Sep 2021 04:27 )             30.4     09-13    137  |  99  |  14  ----------------------------<  112<H>  3.5   |  24  |  1.16    Ca    9.8      13 Sep 2021 17:11      PT/INR - ( 14 Sep 2021 05:46 )   PT: 12.1 sec;   INR: 1.01 ratio         PTT - ( 14 Sep 2021 05:46 )  PTT:25.4 sec          Allergies    penicillin (Rash)        MEDICATIONS:  Antibiotics:  ceFAZolin   IVPB 1000 milliGRAM(s) IV Intermittent every 8 hours    Neuro:  none    Anticoagulation:  held    OTHER:  chlorhexidine 0.12% Liquid 15 milliLiter(s) Oral Mucosa every 12 hours  influenza   Vaccine 0.5 milliLiter(s) IntraMuscular once    IVF:  none    CULTURES:  none    RADIOLOGY & ADDITIONAL TESTS:  no new imaging     HPI:  Patient is a 76 year old woman with history of colon ca s/p right hemicolectomy, DVT (on eliquis), CKD, and Adrenal Insufficiency that presented s/p fall.  She was going to her commode and slipped/fell on her right side.  Imaging revealed T11 and L1 compression fractures.  Admitted to the neurosurgery service for further management.    OVERNIGHT EVENTS:  Patient s/p T10-pelvis with revision of previous L2-5 hardware, L1 expanded PSO, plastics closure today.  Has two hemovacs.  Remains intubated. We trialed patient on PS at bedside at 10/5. Patient tachypnic and agitated, shaking head no and not following commands. When precedex increased for agitation, patient becomes apneic.      Vital Signs Last 24 Hrs  T(C): 98.8 (14 Sep 2021 08:50), Max: 98.8 (14 Sep 2021 08:50)  T(F): 209.8 (14 Sep 2021 08:50), Max: 209.8 (14 Sep 2021 08:50)  HR: 79 (14 Sep 2021 19:44) (78 - 83)  BP: 164/89 (14 Sep 2021 09:09) (136/77 - 164/89)  BP(mean): --  RR: 18 (14 Sep 2021 09:09) (18 - 18)  SpO2: 100% (14 Sep 2021 19:44) (97% - 100%)    I&O's Detail    13 Sep 2021 07:01  -  14 Sep 2021 07:00  --------------------------------------------------------  IN:    Oral Fluid: 240 mL  Total IN: 240 mL    OUT:    Voided (mL): 200 mL  Total OUT: 200 mL    Total NET: 40 mL        I&O's Summary    13 Sep 2021 07:01  -  14 Sep 2021 07:00  --------------------------------------------------------  IN: 240 mL / OUT: 200 mL / NET: 40 mL        PHYSICAL EXAM:  Neurological: intubated, opens eyes to voice, shakes her head yes/no, follows simple commands, moves upper extremities spontaneously, wiggles toes lower extremities b/l    Cardiovascular: +s1, s2  Respiratory: clear to auscultation b/l  Gastrointestinal: soft, non-distended, non-tender  Genitourinary: +ervin  Incision/Wound: mid back vertical incision c/d/i    TUBES/LINES:  [x] hemovac x2  [x[ ervin  [x] a-line    DIET:  [x] npo/ivf      LABS:                        10.8   11.33 )-----------( 241      ( 13 Sep 2021 04:27 )             30.4     09-13    137  |  99  |  14  ----------------------------<  112<H>  3.5   |  24  |  1.16    Ca    9.8      13 Sep 2021 17:11      PT/INR - ( 14 Sep 2021 05:46 )   PT: 12.1 sec;   INR: 1.01 ratio         PTT - ( 14 Sep 2021 05:46 )  PTT:25.4 sec          Allergies    penicillin (Rash)        MEDICATIONS:  Antibiotics:  ceFAZolin   IVPB 1000 milliGRAM(s) IV Intermittent every 8 hours    Neuro:  none    Anticoagulation:  held    OTHER:  chlorhexidine 0.12% Liquid 15 milliLiter(s) Oral Mucosa every 12 hours  influenza   Vaccine 0.5 milliLiter(s) IntraMuscular once    IVF:  none    CULTURES:  none    RADIOLOGY & ADDITIONAL TESTS:  no new imaging     HPI:  Patient is a 76 year old woman with history of colon ca s/p right hemicolectomy, DVT (on eliquis), CKD, and Adrenal Insufficiency that presented s/p fall.  She was going to her commode and slipped/fell on her right side.  Imaging revealed T11 and L1 compression fractures.  Admitted to the neurosurgery service for further management.    OVERNIGHT EVENTS:  today s/p T10-pelvis with revision of previous L2-5 hardware, L1 expanded PSO, c/b CSF leak, plastics closure.  Has two hemovacs.  Remains intubated.     We trialed patient on PS at bedside at 10/5. Patient tachypneic and agitated, shaking head no and not following commands. When precedex increased for agitation, patient becomes apneic.      Vital Signs Last 24 Hrs  T(C): 98.8 (14 Sep 2021 08:50), Max: 98.8 (14 Sep 2021 08:50)  T(F): 209.8 (14 Sep 2021 08:50), Max: 209.8 (14 Sep 2021 08:50)  HR: 79 (14 Sep 2021 19:44) (78 - 83)  BP: 164/89 (14 Sep 2021 09:09) (136/77 - 164/89)  BP(mean): --  RR: 18 (14 Sep 2021 09:09) (18 - 18)  SpO2: 100% (14 Sep 2021 19:44) (97% - 100%)    I&O's Detail    13 Sep 2021 07:01  -  14 Sep 2021 07:00  --------------------------------------------------------  IN:    Oral Fluid: 240 mL  Total IN: 240 mL    OUT:    Voided (mL): 200 mL  Total OUT: 200 mL    Total NET: 40 mL        I&O's Summary    13 Sep 2021 07:01  -  14 Sep 2021 07:00  --------------------------------------------------------  IN: 240 mL / OUT: 200 mL / NET: 40 mL        PHYSICAL EXAM:  Neurological: intubated, opens eyes to voice, shakes her head no, does not follow simple commands except for wiggles toes, moves upper extremities spontaneously in the plane of the bed, wiggles toes in LEs and moves legs 2/5 b/l to noxious     Cardiovascular: RRR  Respiratory: clear to auscultation b/l  Gastrointestinal: soft, non-distended  Genitourinary: +ervin  Incision/Wound: mid back vertical incision c/d/i    TUBES/LINES:  [x] hemovac x2  [x[ ervin  [x] a-line    DIET:  [x] npo/ivf      LABS:                        10.8   11.33 )-----------( 241      ( 13 Sep 2021 04:27 )             30.4     09-13    137  |  99  |  14  ----------------------------<  112<H>  3.5   |  24  |  1.16    Ca    9.8      13 Sep 2021 17:11    PT/INR - ( 14 Sep 2021 05:46 )   PT: 12.1 sec;   INR: 1.01 ratio    PTT - ( 14 Sep 2021 05:46 )  PTT:25.4 sec    Allergies  penicillin (Rash)    MEDICATIONS:  Antibiotics:  ceFAZolin   IVPB 1000 milliGRAM(s) IV Intermittent every 8 hours    Neuro:  none    Anticoagulation:  held    OTHER:  chlorhexidine 0.12% Liquid 15 milliLiter(s) Oral Mucosa every 12 hours  influenza   Vaccine 0.5 milliLiter(s) IntraMuscular once

## 2021-09-14 NOTE — PROGRESS NOTE ADULT - ATTENDING COMMENTS
Patient seen and examined by attending on 9/14/2021.    Patient is critically ill due to post-op after spinal surgery and at high risk for neurological deterioration or death due to: potential post-op hemorrhage, respiratory failure

## 2021-09-14 NOTE — PROGRESS NOTE ADULT - SUBJECTIVE AND OBJECTIVE BOX
[facility]   MD KILLIAN JAIME MD RUORU WONG, PA    TEL:  OFFICE: [phone]  DR CHARLES CELL: [phone]  BEENA PISANO CELL: [phone]  DR. LOZANO CELL: [phone]      FROM 5 PM - 7 AM PLEASE CALL ANSWERING SERVICE: [phone]    RENAL FOLLOW UP NOTE--Date of Service 09-14-21 @ 08:56  --------------------------------------------------------------------------------  HPI:      Pt seen and examined at bedside.       PAST HISTORY  --------------------------------------------------------------------------------  No significant changes to PMH, PSH, FHx, SHx, unless otherwise noted    ALLERGIES & MEDICATIONS  --------------------------------------------------------------------------------  Allergies    penicillin (Rash)    Intolerances      Standing Inpatient Medications  atorvastatin 10 milliGRAM(s) Oral at bedtime  calcium carbonate    500 mG (Tums) Chewable 1 Tablet(s) Chew daily  hydrocortisone sodium succinate Injectable 25 milliGRAM(s) IV Push two times a day  influenza   Vaccine 0.5 milliLiter(s) IntraMuscular once  latanoprost 0.005% Ophthalmic Solution 1 Drop(s) Both EYES at bedtime  levothyroxine 50 MICROGram(s) Oral daily  lidocaine   4% Patch 1 Patch Transdermal every 24 hours  metoprolol succinate ER 50 milliGRAM(s) Oral every 24 hours  nystatin Powder 1 Application(s) Topical two times a day  oxyCODONE  ER Tablet 20 milliGRAM(s) Oral every 12 hours  pantoprazole    Tablet 40 milliGRAM(s) Oral before breakfast  polyethylene glycol 3350 17 Gram(s) Oral daily  sodium bicarbonate 650 milliGRAM(s) Oral three times a day  sodium chloride 0.9%. 1000 milliLiter(s) IV Continuous <Continuous>    PRN Inpatient Medications  artificial  tears Solution 1 Drop(s) Both EYES every 6 hours PRN  guaiFENesin  milliGRAM(s) Oral every 12 hours PRN  oxyCODONE    IR 5 milliGRAM(s) Oral every 8 hours PRN  senna 2 Tablet(s) Oral at bedtime PRN  traMADol 25 milliGRAM(s) Oral every 6 hours PRN      REVIEW OF SYSTEMS  --------------------------------------------------------------------------------  General: no fever    MSK: no edema     VITALS/PHYSICAL EXAM  --------------------------------------------------------------------------------  T(C): 98.8 (09-14-21 @ 08:50), Max: 98.8 (09-14-21 @ 08:50)  HR: 78 (09-14-21 @ 08:50) (76 - 84)  BP: 164/89 (09-14-21 @ 08:50) (128/71 - 164/89)  RR: 18 (09-14-21 @ 08:50) (18 - 18)  SpO2: 97% (09-14-21 @ 04:39) (94% - 98%)  Wt(kg): --        09-13-21 @ 07:01  -  09-14-21 @ 07:00  --------------------------------------------------------  IN: 240 mL / OUT: 200 mL / NET: 40 mL      Physical Exam:  	Gen: NAD  	HEENT: MMM  	Pulm: CTA B/L  	CV: S1S2  	Abd: Soft, +BS  	Ext: No LE edema B/L                      Neuro: Awake   	Skin: Warm and Dry   	Vascular access: NO HD catheter            no ervin  LABS/STUDIES  --------------------------------------------------------------------------------              10.8   11.33 >-----------<  241      [09-13-21 @ 04:27]              30.4     137  |  99  |  14  ----------------------------<  112      [09-13-21 @ 17:11]  3.5   |  24  |  1.16        Ca     9.8     [09-13-21 @ 17:11]      PT/INR: PT 12.1 , INR 1.01       [09-14-21 @ 05:46]  PTT: 25.4       [09-14-21 @ 05:46]      Creatinine Trend:  SCr 1.16 [09-13 @ 17:11]  SCr 1.11 [09-13 @ 04:27]  SCr 1.24 [09-11 @ 06:32]  SCr 1.25 [09-10 @ 09:50]  SCr 1.36 [09-09 @ 14:37]    Urinalysis - [09-12-21 @ 01:58]      Color Light Yellow / Appearance Clear / SG 1.013 / pH 7.0      Gluc Trace / Ketone Negative  / Bili Negative / Urobili Negative       Blood Negative / Protein Trace / Leuk Est Negative / Nitrite Negative      RBC 1 / WBC 0 / Hyaline  / Gran  / Sq Epi  / Non Sq Epi 0 / Bacteria Negative    Urine Creatinine 84      [09-08-21 @ 18:29]  Urine Sodium 6      [09-13-21 @ 14:35]  Urine Potassium 26      [09-08-21 @ 18:29]  Urine Osmolality 480      [09-13-21 @ 14:36]    Vitamin D (25OH) 67.9      [09-08-21 @ 09:59]  HbA1c 5.9      [02-22-17 @ 03:10]  TSH 0.18      [09-08-21 @ 09:59]

## 2021-09-14 NOTE — BRIEF OPERATIVE NOTE - NSICDXBRIEFPROCEDURE_GEN_ALL_CORE_FT
PROCEDURES:  Muscle flap of back 14-Sep-2021 18:44:00  Sana Steele  Repair, back, complex, 5.1 cm to 7.5 cm 14-Sep-2021 18:44:42  Sana Steele  
PROCEDURES:  Pedicle subtraction osteotomy, lumbar spine, posterior approach 14-Sep-2021 18:00:22  Jorje Yang  Laminectomy with fusion of thoracolumbar spine 14-Sep-2021 18:00:38  Jorje Yang

## 2021-09-14 NOTE — PROGRESS NOTE ADULT - ASSESSMENT
HPI:  Patient is a 76 year old female with history of colon ca s/p right hemicolectomy, DVT (on eliquis), CKD, and Adrenal Insufficiency that presented s/p fall.  She was going to her commode and slipped/fell on her right side.  Imaging revealed T11 and L1 compression fractures.  Admitted to the neurosurgery service for further management.    PROCEDURE: s/p revision of L2-L5 hardware w/ T10-pelvis fusion, L1 pedicle subtraction osteotomy, csf leak w/ primary repair, and plastics closure on 9/14/2021  POD#0    PLAN:  Neuro:   -q1 hour neuro checks  -dilaudid pca for pain control  -keep flat in bed for intraop csf leak  -continue hemovacs to suction, monitor outputs  -pt eval once no longer flat    Respiratory:   -intubated, wean to extubate    CV:  -keep MAP > 75    Endocrine:   -levothyroxine for hypothyroid  -maintain euglycemia    Heme/Onc:    -chemical dvt prophylaxis on hold secondary to OR today  -SCDs only for DVT prophylaxis    Renal:   -ervin for strict ins and outs    ID:   -monitor cbc    GI:   -npo/livf  -protonix for gi prophylaxis  -senna and miralax for bowel regimen     HPI:  Patient is a 76 year old female with history of colon ca s/p right hemicolectomy, DVT (on eliquis), CKD, and Adrenal Insufficiency that presented s/p fall.  She was going to her commode and slipped/fell on her right side.  Imaging revealed T11 and L1 compression fractures.  Admitted to the neurosurgery service for further management.    PROCEDURE: s/p revision of L2-L5 hardware w/ T10-pelvis fusion, L1 pedicle subtraction osteotomy, csf leak w/ primary repair, and plastics closure on 9/14/2021  POD#0    PLAN:  Neuro:   -q1 hour neuro checks  -fentanyl prn for pain control  -dilaudid pca for pain control once extubated  -keep flat in bed for intraop csf leak  -continue hemovacs to suction, monitor outputs  -pt eval once no longer flat    Respiratory:   -intubated, wean to extubate    CV:  -keep MAP > 75  -hold home antihypertensives while maintaining MAP goals    Endocrine:   -levothyroxine for hypothyroid  -maintain euglycemia    Heme/Onc:    -chemical dvt prophylaxis on hold secondary to OR today  -SCDs only for DVT prophylaxis    Renal:   -ervin for strict ins and outs    ID:   -monitor cbc    GI:   -npo/livf  -protonix for gi prophylaxis  -senna and miralax for bowel regimen     HPI:  Patient is a 76 year old female with history of colon ca s/p right hemicolectomy, DVT (on eliquis), CKD, and Adrenal Insufficiency that presented s/p fall.  She was going to her commode and slipped/fell on her right side.  Imaging revealed T11 and L1 compression fractures.  Admitted to the neurosurgery service for further management.    PROCEDURE: s/p revision of L2-L5 hardware w/ T10-pelvis fusion, L1 pedicle subtraction osteotomy, csf leak w/ primary repair, and plastics closure on 9/14/2021  POD#0    PLAN:  Neuro:   -q1 hour neuro checks  -fentanyl prn for pain control  -dilaudid pca for pain control once extubated  -keep flat in bed for intraop csf leak  -continue hemovacs to suction, monitor outputs  -ct lumbar spine - pending  -pt eval once no longer flat    Respiratory:   -intubated, wean to extubate    CV:  -keep MAP > 75  -hold home antihypertensives while maintaining MAP goals    Endocrine:   -levothyroxine for hypothyroid  -maintain euglycemia    Heme/Onc:    -chemical dvt prophylaxis on hold secondary to OR today  -SCDs only for DVT prophylaxis    Renal:   -ervin for strict ins and outs    ID:   -monitor cbc    GI:   -npo/livf  -protonix for gi prophylaxis  -senna and miralax for bowel regimen     75yo woman with history of colon ca s/p right hemicolectomy, DVT (on eliquis), CKD, and Adrenal Insufficiency that presented s/p fall with imaging notable for T11 and L1 compression fractures s/p T10-pelvis with revision of previous L2-5 hardware, L1 expanded PSO. Remains intubated for agitation, poor RSBI and poor following of commands. B/l LEs difficult to examine but move in the plane of the bed to noxious.     Neuro:   -q1 hour neuro checks  -fentanyl prn for pain control  -dilaudid pca for pain control once extubated  -keep flat in bed for intraop csf leak  -continue hemovacs to suction, monitor outputs  -ct lumbar spine - pending  -pt eval once no longer flat    Respiratory:   -intubated, wean to extubate  -c/w SBT trials     CV:  -keep MAP > 75  -hold home antihypertensives while maintaining MAP goals    Endocrine:   -levothyroxine for hypothyroid  -maintain euglycemia    Heme/Onc:    -chemical dvt prophylaxis on hold secondary to OR today  -SCDs only for DVT prophylaxis    Renal:   -ervin for strict ins and outs    ID:   -monitor cbc    GI:   -npo/livf  -protonix for gi prophylaxis  -senna and miralax for bowel regimen    Endo  -glucose goal 120-180

## 2021-09-14 NOTE — CHART NOTE - NSCHARTNOTEFT_GEN_A_CORE
Repeat labs reviewed    PTT now 25.1 off subcutaneous heparin. PT remains normal    Mixing study 9/13 without complete correction, consistent with heparin effect. Thrombin time also elevated and reptilase time near normal-- also consistent with heparin effect. Fibrinogen normal.     Patient is optimized and stable for OR from Heme perspective    d/w NSGY resident

## 2021-09-14 NOTE — PRE-OP CHECKLIST - 1.
Emotional support provided to patient  Pre-op instruction and orientation to procedure provided to patient

## 2021-09-15 DIAGNOSIS — M81.0 AGE-RELATED OSTEOPOROSIS WITHOUT CURRENT PATHOLOGICAL FRACTURE: ICD-10-CM

## 2021-09-15 DIAGNOSIS — E27.40 UNSPECIFIED ADRENOCORTICAL INSUFFICIENCY: ICD-10-CM

## 2021-09-15 DIAGNOSIS — E03.9 HYPOTHYROIDISM, UNSPECIFIED: ICD-10-CM

## 2021-09-15 LAB
ANION GAP SERPL CALC-SCNC: 11 MMOL/L — SIGNIFICANT CHANGE UP (ref 5–17)
ANION GAP SERPL CALC-SCNC: 11 MMOL/L — SIGNIFICANT CHANGE UP (ref 5–17)
BASE EXCESS BLDA CALC-SCNC: -0.6 MMOL/L — SIGNIFICANT CHANGE UP (ref -2–3)
BUN SERPL-MCNC: 14 MG/DL — SIGNIFICANT CHANGE UP (ref 7–23)
BUN SERPL-MCNC: 14 MG/DL — SIGNIFICANT CHANGE UP (ref 7–23)
CALCIUM SERPL-MCNC: 8.7 MG/DL — SIGNIFICANT CHANGE UP (ref 8.4–10.5)
CALCIUM SERPL-MCNC: 8.9 MG/DL — SIGNIFICANT CHANGE UP (ref 8.4–10.5)
CHLORIDE SERPL-SCNC: 106 MMOL/L — SIGNIFICANT CHANGE UP (ref 96–108)
CHLORIDE SERPL-SCNC: 108 MMOL/L — SIGNIFICANT CHANGE UP (ref 96–108)
CO2 BLDA-SCNC: 24 MMOL/L — SIGNIFICANT CHANGE UP (ref 19–24)
CO2 SERPL-SCNC: 17 MMOL/L — LOW (ref 22–31)
CO2 SERPL-SCNC: 19 MMOL/L — LOW (ref 22–31)
CREAT SERPL-MCNC: 1.15 MG/DL — SIGNIFICANT CHANGE UP (ref 0.5–1.3)
CREAT SERPL-MCNC: 1.17 MG/DL — SIGNIFICANT CHANGE UP (ref 0.5–1.3)
GAS PNL BLDA: SIGNIFICANT CHANGE UP
GAS PNL BLDA: SIGNIFICANT CHANGE UP
GLUCOSE SERPL-MCNC: 118 MG/DL — HIGH (ref 70–99)
GLUCOSE SERPL-MCNC: 120 MG/DL — HIGH (ref 70–99)
HCO3 BLDA-SCNC: 23 MMOL/L — SIGNIFICANT CHANGE UP (ref 21–28)
HCT VFR BLD CALC: 34.2 % — LOW (ref 34.5–45)
HCT VFR BLD CALC: 34.7 % — SIGNIFICANT CHANGE UP (ref 34.5–45)
HGB BLD-MCNC: 12 G/DL — SIGNIFICANT CHANGE UP (ref 11.5–15.5)
HGB BLD-MCNC: 12.1 G/DL — SIGNIFICANT CHANGE UP (ref 11.5–15.5)
HOROWITZ INDEX BLDA+IHG-RTO: 40 — SIGNIFICANT CHANGE UP
MAGNESIUM SERPL-MCNC: 1.9 MG/DL — SIGNIFICANT CHANGE UP (ref 1.6–2.6)
MAGNESIUM SERPL-MCNC: 1.9 MG/DL — SIGNIFICANT CHANGE UP (ref 1.6–2.6)
MCHC RBC-ENTMCNC: 27.5 PG — SIGNIFICANT CHANGE UP (ref 27–34)
MCHC RBC-ENTMCNC: 28.1 PG — SIGNIFICANT CHANGE UP (ref 27–34)
MCHC RBC-ENTMCNC: 34.6 GM/DL — SIGNIFICANT CHANGE UP (ref 32–36)
MCHC RBC-ENTMCNC: 35.4 GM/DL — SIGNIFICANT CHANGE UP (ref 32–36)
MCV RBC AUTO: 79.4 FL — LOW (ref 80–100)
MCV RBC AUTO: 79.6 FL — LOW (ref 80–100)
NRBC # BLD: 0 /100 WBCS — SIGNIFICANT CHANGE UP (ref 0–0)
NRBC # BLD: 0 /100 WBCS — SIGNIFICANT CHANGE UP (ref 0–0)
PCO2 BLDA: 35 MMHG — SIGNIFICANT CHANGE UP (ref 32–45)
PH BLDA: 7.43 — SIGNIFICANT CHANGE UP (ref 7.35–7.45)
PHOSPHATE SERPL-MCNC: 2 MG/DL — LOW (ref 2.5–4.5)
PHOSPHATE SERPL-MCNC: 2.1 MG/DL — LOW (ref 2.5–4.5)
PLATELET # BLD AUTO: 183 K/UL — SIGNIFICANT CHANGE UP (ref 150–400)
PLATELET # BLD AUTO: 189 K/UL — SIGNIFICANT CHANGE UP (ref 150–400)
PO2 BLDA: 137 MMHG — HIGH (ref 83–108)
POTASSIUM SERPL-MCNC: 4.7 MMOL/L — SIGNIFICANT CHANGE UP (ref 3.5–5.3)
POTASSIUM SERPL-MCNC: 5 MMOL/L — SIGNIFICANT CHANGE UP (ref 3.5–5.3)
POTASSIUM SERPL-SCNC: 4.7 MMOL/L — SIGNIFICANT CHANGE UP (ref 3.5–5.3)
POTASSIUM SERPL-SCNC: 5 MMOL/L — SIGNIFICANT CHANGE UP (ref 3.5–5.3)
RBC # BLD: 4.31 M/UL — SIGNIFICANT CHANGE UP (ref 3.8–5.2)
RBC # BLD: 4.36 M/UL — SIGNIFICANT CHANGE UP (ref 3.8–5.2)
RBC # FLD: 19.5 % — HIGH (ref 10.3–14.5)
RBC # FLD: 19.7 % — HIGH (ref 10.3–14.5)
SAO2 % BLDA: 99.9 % — HIGH (ref 94–98)
SODIUM SERPL-SCNC: 136 MMOL/L — SIGNIFICANT CHANGE UP (ref 135–145)
SODIUM SERPL-SCNC: 136 MMOL/L — SIGNIFICANT CHANGE UP (ref 135–145)
WBC # BLD: 19.89 K/UL — HIGH (ref 3.8–10.5)
WBC # BLD: 20 K/UL — HIGH (ref 3.8–10.5)
WBC # FLD AUTO: 19.89 K/UL — HIGH (ref 3.8–10.5)
WBC # FLD AUTO: 20 K/UL — HIGH (ref 3.8–10.5)

## 2021-09-15 PROCEDURE — 99222 1ST HOSP IP/OBS MODERATE 55: CPT

## 2021-09-15 PROCEDURE — 99292 CRITICAL CARE ADDL 30 MIN: CPT

## 2021-09-15 PROCEDURE — 72131 CT LUMBAR SPINE W/O DYE: CPT | Mod: 26

## 2021-09-15 PROCEDURE — 99291 CRITICAL CARE FIRST HOUR: CPT

## 2021-09-15 PROCEDURE — 71045 X-RAY EXAM CHEST 1 VIEW: CPT | Mod: 26

## 2021-09-15 RX ORDER — POTASSIUM CHLORIDE 20 MEQ
10 PACKET (EA) ORAL
Refills: 0 | Status: DISCONTINUED | OUTPATIENT
Start: 2021-09-15 | End: 2021-09-15

## 2021-09-15 RX ORDER — FENTANYL CITRATE 50 UG/ML
25 INJECTION INTRAVENOUS
Refills: 0 | Status: DISCONTINUED | OUTPATIENT
Start: 2021-09-15 | End: 2021-09-15

## 2021-09-15 RX ORDER — HYDROMORPHONE HYDROCHLORIDE 2 MG/ML
0.5 INJECTION INTRAMUSCULAR; INTRAVENOUS; SUBCUTANEOUS ONCE
Refills: 0 | Status: DISCONTINUED | OUTPATIENT
Start: 2021-09-15 | End: 2021-09-15

## 2021-09-15 RX ORDER — PANTOPRAZOLE SODIUM 20 MG/1
40 TABLET, DELAYED RELEASE ORAL DAILY
Refills: 0 | Status: DISCONTINUED | OUTPATIENT
Start: 2021-09-15 | End: 2021-09-27

## 2021-09-15 RX ORDER — HYDROCORTISONE 20 MG
50 TABLET ORAL EVERY 8 HOURS
Refills: 0 | Status: DISCONTINUED | OUTPATIENT
Start: 2021-09-15 | End: 2021-09-17

## 2021-09-15 RX ORDER — LEVOTHYROXINE SODIUM 125 MCG
40 TABLET ORAL AT BEDTIME
Refills: 0 | Status: DISCONTINUED | OUTPATIENT
Start: 2021-09-15 | End: 2021-09-15

## 2021-09-15 RX ORDER — FENTANYL CITRATE 50 UG/ML
50 INJECTION INTRAVENOUS ONCE
Refills: 0 | Status: DISCONTINUED | OUTPATIENT
Start: 2021-09-15 | End: 2021-09-15

## 2021-09-15 RX ORDER — CHLORHEXIDINE GLUCONATE 213 G/1000ML
1 SOLUTION TOPICAL
Refills: 0 | Status: DISCONTINUED | OUTPATIENT
Start: 2021-09-15 | End: 2021-09-17

## 2021-09-15 RX ORDER — FENTANYL CITRATE 50 UG/ML
12.5 INJECTION INTRAVENOUS ONCE
Refills: 0 | Status: DISCONTINUED | OUTPATIENT
Start: 2021-09-15 | End: 2021-09-14

## 2021-09-15 RX ORDER — HYDROCORTISONE 20 MG
10 TABLET ORAL
Refills: 0 | Status: DISCONTINUED | OUTPATIENT
Start: 2021-09-15 | End: 2021-09-15

## 2021-09-15 RX ORDER — HYDROCORTISONE 20 MG
25 TABLET ORAL EVERY 12 HOURS
Refills: 0 | Status: DISCONTINUED | OUTPATIENT
Start: 2021-09-15 | End: 2021-09-15

## 2021-09-15 RX ORDER — LIDOCAINE 4 G/100G
1 CREAM TOPICAL DAILY
Refills: 0 | Status: DISCONTINUED | OUTPATIENT
Start: 2021-09-15 | End: 2021-09-27

## 2021-09-15 RX ORDER — POTASSIUM CHLORIDE 20 MEQ
40 PACKET (EA) ORAL EVERY 4 HOURS
Refills: 0 | Status: COMPLETED | OUTPATIENT
Start: 2021-09-15 | End: 2021-09-15

## 2021-09-15 RX ORDER — LEVOTHYROXINE SODIUM 125 MCG
50 TABLET ORAL DAILY
Refills: 0 | Status: DISCONTINUED | OUTPATIENT
Start: 2021-09-15 | End: 2021-09-27

## 2021-09-15 RX ORDER — OXYCODONE HYDROCHLORIDE 5 MG/1
5 TABLET ORAL EVERY 4 HOURS
Refills: 0 | Status: DISCONTINUED | OUTPATIENT
Start: 2021-09-15 | End: 2021-09-15

## 2021-09-15 RX ORDER — OXYCODONE HYDROCHLORIDE 5 MG/1
5 TABLET ORAL EVERY 6 HOURS
Refills: 0 | Status: DISCONTINUED | OUTPATIENT
Start: 2021-09-15 | End: 2021-09-16

## 2021-09-15 RX ORDER — LEVOTHYROXINE SODIUM 125 MCG
25 TABLET ORAL AT BEDTIME
Refills: 0 | Status: DISCONTINUED | OUTPATIENT
Start: 2021-09-15 | End: 2021-09-15

## 2021-09-15 RX ORDER — FENTANYL CITRATE 50 UG/ML
25 INJECTION INTRAVENOUS ONCE
Refills: 0 | Status: DISCONTINUED | OUTPATIENT
Start: 2021-09-15 | End: 2021-09-15

## 2021-09-15 RX ORDER — OXYCODONE HYDROCHLORIDE 5 MG/1
10 TABLET ORAL EVERY 4 HOURS
Refills: 0 | Status: DISCONTINUED | OUTPATIENT
Start: 2021-09-15 | End: 2021-09-15

## 2021-09-15 RX ORDER — FENTANYL CITRATE 50 UG/ML
50 INJECTION INTRAVENOUS
Refills: 0 | Status: DISCONTINUED | OUTPATIENT
Start: 2021-09-15 | End: 2021-09-16

## 2021-09-15 RX ADMIN — OXYCODONE HYDROCHLORIDE 10 MILLIGRAM(S): 5 TABLET ORAL at 14:57

## 2021-09-15 RX ADMIN — PHENYLEPHRINE HYDROCHLORIDE 3.06 MICROGRAM(S)/KG/MIN: 10 INJECTION INTRAVENOUS at 19:13

## 2021-09-15 RX ADMIN — POLYETHYLENE GLYCOL 3350 17 GRAM(S): 17 POWDER, FOR SOLUTION ORAL at 12:35

## 2021-09-15 RX ADMIN — FENTANYL CITRATE 25 MICROGRAM(S): 50 INJECTION INTRAVENOUS at 04:20

## 2021-09-15 RX ADMIN — SENNA PLUS 2 TABLET(S): 8.6 TABLET ORAL at 21:12

## 2021-09-15 RX ADMIN — Medication 40 MILLIEQUIVALENT(S): at 12:34

## 2021-09-15 RX ADMIN — Medication 650 MILLIGRAM(S): at 14:50

## 2021-09-15 RX ADMIN — Medication 100 MILLIEQUIVALENT(S): at 01:34

## 2021-09-15 RX ADMIN — CHLORHEXIDINE GLUCONATE 1 APPLICATION(S): 213 SOLUTION TOPICAL at 21:13

## 2021-09-15 RX ADMIN — Medication 100 MILLIEQUIVALENT(S): at 03:00

## 2021-09-15 RX ADMIN — FENTANYL CITRATE 25 MICROGRAM(S): 50 INJECTION INTRAVENOUS at 04:05

## 2021-09-15 RX ADMIN — OXYCODONE HYDROCHLORIDE 10 MILLIGRAM(S): 5 TABLET ORAL at 15:30

## 2021-09-15 RX ADMIN — DEXMEDETOMIDINE HYDROCHLORIDE IN 0.9% SODIUM CHLORIDE 4.08 MICROGRAM(S)/KG/HR: 4 INJECTION INTRAVENOUS at 16:52

## 2021-09-15 RX ADMIN — Medication 25 MILLIGRAM(S): at 17:03

## 2021-09-15 RX ADMIN — LIDOCAINE 1 PATCH: 4 CREAM TOPICAL at 19:35

## 2021-09-15 RX ADMIN — Medication 650 MILLIGRAM(S): at 21:12

## 2021-09-15 RX ADMIN — FENTANYL CITRATE 50 MICROGRAM(S): 50 INJECTION INTRAVENOUS at 18:24

## 2021-09-15 RX ADMIN — ATORVASTATIN CALCIUM 10 MILLIGRAM(S): 80 TABLET, FILM COATED ORAL at 21:12

## 2021-09-15 RX ADMIN — CHLORHEXIDINE GLUCONATE 15 MILLILITER(S): 213 SOLUTION TOPICAL at 05:08

## 2021-09-15 RX ADMIN — FENTANYL CITRATE 50 MICROGRAM(S): 50 INJECTION INTRAVENOUS at 18:40

## 2021-09-15 RX ADMIN — SODIUM CHLORIDE 50 MILLILITER(S): 9 INJECTION INTRAMUSCULAR; INTRAVENOUS; SUBCUTANEOUS at 21:14

## 2021-09-15 RX ADMIN — PHENYLEPHRINE HYDROCHLORIDE 3.06 MICROGRAM(S)/KG/MIN: 10 INJECTION INTRAVENOUS at 16:53

## 2021-09-15 RX ADMIN — LATANOPROST 1 DROP(S): 0.05 SOLUTION/ DROPS OPHTHALMIC; TOPICAL at 21:14

## 2021-09-15 RX ADMIN — TRAMADOL HYDROCHLORIDE 25 MILLIGRAM(S): 50 TABLET ORAL at 21:30

## 2021-09-15 RX ADMIN — Medication 40 MILLIEQUIVALENT(S): at 16:50

## 2021-09-15 RX ADMIN — DEXMEDETOMIDINE HYDROCHLORIDE IN 0.9% SODIUM CHLORIDE 4.08 MICROGRAM(S)/KG/HR: 4 INJECTION INTRAVENOUS at 05:09

## 2021-09-15 RX ADMIN — FENTANYL CITRATE 50 MICROGRAM(S): 50 INJECTION INTRAVENOUS at 10:39

## 2021-09-15 RX ADMIN — PANTOPRAZOLE SODIUM 40 MILLIGRAM(S): 20 TABLET, DELAYED RELEASE ORAL at 12:35

## 2021-09-15 RX ADMIN — Medication 100 MILLIGRAM(S): at 13:45

## 2021-09-15 RX ADMIN — FENTANYL CITRATE 50 MICROGRAM(S): 50 INJECTION INTRAVENOUS at 10:55

## 2021-09-15 RX ADMIN — LIDOCAINE 1 PATCH: 4 CREAM TOPICAL at 17:01

## 2021-09-15 RX ADMIN — DEXMEDETOMIDINE HYDROCHLORIDE IN 0.9% SODIUM CHLORIDE 4.08 MICROGRAM(S)/KG/HR: 4 INJECTION INTRAVENOUS at 19:12

## 2021-09-15 RX ADMIN — TRAMADOL HYDROCHLORIDE 25 MILLIGRAM(S): 50 TABLET ORAL at 21:00

## 2021-09-15 RX ADMIN — SODIUM CHLORIDE 50 MILLILITER(S): 9 INJECTION INTRAMUSCULAR; INTRAVENOUS; SUBCUTANEOUS at 16:53

## 2021-09-15 RX ADMIN — Medication 50 MICROGRAM(S): at 17:02

## 2021-09-15 RX ADMIN — Medication 100 MILLIEQUIVALENT(S): at 00:13

## 2021-09-15 RX ADMIN — HYDROMORPHONE HYDROCHLORIDE 0.5 MILLIGRAM(S): 2 INJECTION INTRAMUSCULAR; INTRAVENOUS; SUBCUTANEOUS at 13:22

## 2021-09-15 RX ADMIN — HYDROMORPHONE HYDROCHLORIDE 0.5 MILLIGRAM(S): 2 INJECTION INTRAMUSCULAR; INTRAVENOUS; SUBCUTANEOUS at 13:40

## 2021-09-15 RX ADMIN — FENTANYL CITRATE 50 MICROGRAM(S): 50 INJECTION INTRAVENOUS at 02:15

## 2021-09-15 RX ADMIN — Medication 100 MILLIGRAM(S): at 05:08

## 2021-09-15 RX ADMIN — FENTANYL CITRATE 50 MICROGRAM(S): 50 INJECTION INTRAVENOUS at 02:00

## 2021-09-15 RX ADMIN — CHLORHEXIDINE GLUCONATE 15 MILLILITER(S): 213 SOLUTION TOPICAL at 17:22

## 2021-09-15 NOTE — DIETITIAN INITIAL EVALUATION ADULT. - ORAL INTAKE PTA/DIET HISTORY
Pt intubated; unable to participate in nutrition evaluation. Previously prescribed mechanical soft diet (9/8-9/14). Variable intake noted as per review of nursing flow sheet (between 0-80% of meals). No documented food allergies. Per medication review, pt took multivitamin and calcium supplement PTA.

## 2021-09-15 NOTE — PROGRESS NOTE ADULT - ASSESSMENT
Patient is a 77yo F with a history of colon cancer s/p right hemicolectomy (approx 2 years ago, outside hospital), DVT (on Eliquis), CKD, adrenal insufficiency presenting after mechanical fall from sitting. CT head/cervical spine/chest were performed which showed a right forehead hematoma, acute minimally displaced right coracoid process scapular fracture, T11/L1 compression fractures and an inferior sternal fracture. Found to have worsened renal function    A/P:  Acute on CKD:  Outpatient Nephrologist Dr. Treviño  Baseline SCr 1.4-1.6  FELICIANO likely  sec to hemodynamic change  Renal function improved   On Losartan since 9/14  Monitor I/O  Monitor renal function at present    Acidosis:  non-AG+AG  Continue oral  sodium bicarb 650mg TID    HTN:  BP fluctuating   On Metoprolol , losartan   Monitor at present    Hyponatremia  possibly SIADH sec to ? pain   improved  Monitor serum Na

## 2021-09-15 NOTE — DIETITIAN INITIAL EVALUATION ADULT. - CHIEF COMPLAINT
The patient is a 77 yo F with PMH: colon CA s/p right hemicolectomy, DVT, CKD and adrenal insufficiency. Presented s/p fall. Imaging revealed a T11 and L1 compression fracture. Pt now s/p [id number] with revision of previous L2-5 hardware, L1 expanded PSO, c/b CSF leak, plastics closure. Remains intubated at this time.

## 2021-09-15 NOTE — PROGRESS NOTE ADULT - ATTENDING COMMENTS
Pt seen and examined with the NP. Remain intubated post op. sedated.   Agree with the assessment and plan.  Vitals, labs and radiology results personally reviewed.  Above note edited as appropriate.  Discussed with pt's daughter and son at bedside. Answered all questions.  Neuro ICU care appreciated.     Dr. [facility] ([facility]  [phone]

## 2021-09-15 NOTE — PROGRESS NOTE ADULT - SUBJECTIVE AND OBJECTIVE BOX
HPI:  Patient is a 76 year old woman with history of colon ca s/p right hemicolectomy, DVT (on eliquis), CKD, and Adrenal Insufficiency that presented s/p fall.  She was going to her commode and slipped/fell on her right side.  Imaging revealed T11 and L1 compression fractures.  Admitted to the neurosurgery service for further management.    OVERNIGHT EVENTS:  today s/p T10-pelvis with revision of previous L2-5 hardware, L1 expanded PSO, c/b CSF leak, plastics closure.  Has two hemovacs.  Remains intubated.     We trialed patient on PS at bedside at 10/5. Patient tachypneic and agitated, shaking head no and not following commands. When precedex increased for agitation, patient becomes apneic.      PHYSICAL EXAM:  Neurological: intubated, opens eyes to voice, shakes her head no, does not follow simple commands except for wiggles toes, moves upper extremities spontaneously in the plane of the bed, wiggles toes in LEs and moves legs 2/5 b/l to noxious   Cardiovascular: RRR  Respiratory: clear to auscultation b/l  Gastrointestinal: soft, non-distended  Genitourinary: +ervin  Incision/Wound: mid back vertical incision c/d/i    TUBES/LINES:  [x] hemovac x2  [x[ ervin  [x] a-line            ICU Vital Signs Last 24 Hrs  T(C): 37.1 (14 Sep 2021 23:00), Max: 98.8 (14 Sep 2021 08:50)  T(F): 98.8 (14 Sep 2021 23:00), Max: 209.8 (14 Sep 2021 08:50)  HR: 60 (15 Sep 2021 06:30) (60 - 92)  BP: 113/58 (14 Sep 2021 22:00) (113/58 - 164/89)  BP(mean): 73 (14 Sep 2021 22:00) (73 - 73)  ABP: 144/56 (15 Sep 2021 06:30) (96/50 - 161/63)  ABP(mean): 88 (15 Sep 2021 06:30) (60 - 99)  RR: 14 (15 Sep 2021 06:30) (13 - 29)  SpO2: 100% (15 Sep 2021 06:30) (95% - 100%)      09-13-21 @ 07:01  -  09-14-21 @ 07:00  --------------------------------------------------------  IN: 240 mL / OUT: 200 mL / NET: 40 mL    09-14-21 @ 07:01  -  09-15-21 @ 06:44  --------------------------------------------------------  IN: 459.2 mL / OUT: 300 mL / NET: 159.2 mL        Mode: AC/ CMV (Assist Control/ Continuous Mandatory Ventilation), RR (machine): 14, TV (machine): 450, FiO2: 40, PEEP: 5, ITime: 1, MAP: 10, PIP: 21    artificial  tears Solution 1 Drop(s) Both EYES every 6 hours PRN  atorvastatin 10 milliGRAM(s) Oral at bedtime  ceFAZolin   IVPB 1000 milliGRAM(s) IV Intermittent every 8 hours  chlorhexidine 0.12% Liquid 15 milliLiter(s) Oral Mucosa every 12 hours  dexMEDEtomidine Infusion 0.2 MICROgram(s)/kG/Hr (4.08 mL/Hr) IV Continuous <Continuous>  fentaNYL    Injectable 25 MICROGram(s) IV Push every 2 hours PRN  guaiFENesin  milliGRAM(s) Oral every 12 hours PRN  hydrocortisone sodium succinate Injectable 25 milliGRAM(s) IV Push two times a day  HYDROmorphone PCA (1 mG/mL) 30 milliLiter(s) PCA Continuous PCA Continuous  HYDROmorphone PCA (1 mG/mL) Rescue Clinician Bolus 0.5 milliGRAM(s) IV Push every 15 minutes PRN  influenza   Vaccine 0.5 milliLiter(s) IntraMuscular once  latanoprost 0.005% Ophthalmic Solution 1 Drop(s) Both EYES at bedtime  levothyroxine 50 MICROGram(s) Oral daily  losartan 25 milliGRAM(s) Oral daily  metoprolol tartrate 25 milliGRAM(s) Oral two times a day  naloxone Injectable 0.1 milliGRAM(s) IV Push every 3 minutes PRN  ondansetron Injectable 4 milliGRAM(s) IV Push every 6 hours PRN  oxyCODONE    IR 5 milliGRAM(s) Oral every 8 hours PRN  oxyCODONE  ER Tablet 20 milliGRAM(s) Oral every 12 hours  pantoprazole  Injectable 40 milliGRAM(s) IV Push daily  phenylephrine    Infusion 0.1 MICROgram(s)/kG/Min (3.06 mL/Hr) IV Continuous <Continuous>  polyethylene glycol 3350 17 Gram(s) Oral daily  senna 2 Tablet(s) Oral at bedtime PRN  sodium bicarbonate 650 milliGRAM(s) Oral three times a day  sodium chloride 0.9%. 1000 milliLiter(s) (50 mL/Hr) IV Continuous <Continuous>  traMADol 25 milliGRAM(s) Oral every 6 hours PRN      LABS:  Na: 139 (09-14 @ 22:04), 137 (09-13 @ 17:11), 134 (09-13 @ 04:27)  K: 3.2 (09-14 @ 22:04), 3.5 (09-13 @ 17:11), 3.8 (09-13 @ 04:27)  Cl: 103 (09-14 @ 22:04), 99 (09-13 @ 17:11), 99 (09-13 @ 04:27)  CO2: 18 (09-14 @ 22:04), 24 (09-13 @ 17:11), 21 (09-13 @ 04:27)  BUN: 13 (09-14 @ 22:04), 14 (09-13 @ 17:11), 13 (09-13 @ 04:27)  Cr: 0.94 (09-14 @ 22:04), 1.16 (09-13 @ 17:11), 1.11 (09-13 @ 04:27)  Glu: 139(09-14 @ 22:04), 112(09-13 @ 17:11), 115(09-13 @ 04:27)    Hgb: 10.4 (09-14 @ 22:04), 10.8 (09-13 @ 04:27)  Hct: 30.1 (09-14 @ 22:04), 30.4 (09-13 @ 04:27)  WBC: 18.54 (09-14 @ 22:04), 11.33 (09-13 @ 04:27)  Plt: 152 (09-14 @ 22:04), 241 (09-13 @ 04:27)    INR: 1.01 09-14-21 @ 05:46, 1.05 09-13-21 @ 17:10, 1.13 09-13-21 @ 11:08, 1.13 09-13-21 @ 06:34, 1.08 09-13-21 @ 04:27  PTT: 25.4 09-14-21 @ 05:46, 40.1 09-13-21 @ 23:08, 102.1 09-13-21 @ 17:10, 179.5 09-13-21 @ 11:08, 125.1 09-13-21 @ 06:34, 129.2 09-13-21 @ 04:27      ABG - ( 14 Sep 2021 16:29 )  pH, Arterial: 7.32  pH, Blood: x     /  pCO2: 42    /  pO2: 396   / HCO3: 22    / Base Excess: -4.3  /  SaO2: 100.0                HPI:  Patient is a 76 year old woman with history of colon ca s/p right hemicolectomy, DVT (on eliquis), CKD, and Adrenal Insufficiency that presented s/p fall.  She was going to her commode and slipped/fell on her right side.  Imaging revealed T11 and L1 compression fractures.  Admitted to the neurosurgery service for further management.    OVERNIGHT EVENTS:  no acute events, remained intubated     PHYSICAL EXAM:  Neurological: intubated, opens eyes to voice, shakes her head no, follows simple commands, moves upper extremities spontaneously in the plane of the bed, wiggles toes in LEs and moves legs 2/5 b/l to noxious   Cardiovascular: RRR  Respiratory: clear to auscultation b/l  Gastrointestinal: soft, non-distended  Genitourinary: +ervin  Incision/Wound: mid back vertical incision c/d/i    TUBES/LINES:  [x] hemovac x2  [x[ ervin  [x] a-line    ICU Vital Signs Last 24 Hrs  T(C): 37.1 (14 Sep 2021 23:00), Max: 98.8 (14 Sep 2021 08:50)  T(F): 98.8 (14 Sep 2021 23:00), Max: 209.8 (14 Sep 2021 08:50)  HR: 60 (15 Sep 2021 06:30) (60 - 92)  BP: 113/58 (14 Sep 2021 22:00) (113/58 - 164/89)  BP(mean): 73 (14 Sep 2021 22:00) (73 - 73)  ABP: 144/56 (15 Sep 2021 06:30) (96/50 - 161/63)  ABP(mean): 88 (15 Sep 2021 06:30) (60 - 99)  RR: 14 (15 Sep 2021 06:30) (13 - 29)  SpO2: 100% (15 Sep 2021 06:30) (95% - 100%)      09-13-21 @ 07:01  -  09-14-21 @ 07:00  --------------------------------------------------------  IN: 240 mL / OUT: 200 mL / NET: 40 mL    09-14-21 @ 07:01  -  09-15-21 @ 06:44  --------------------------------------------------------  IN: 459.2 mL / OUT: 300 mL / NET: 159.2 mL        Mode: AC/ CMV (Assist Control/ Continuous Mandatory Ventilation), RR (machine): 14, TV (machine): 450, FiO2: 40, PEEP: 5, ITime: 1, MAP: 10, PIP: 21    artificial  tears Solution 1 Drop(s) Both EYES every 6 hours PRN  atorvastatin 10 milliGRAM(s) Oral at bedtime  ceFAZolin   IVPB 1000 milliGRAM(s) IV Intermittent every 8 hours  chlorhexidine 0.12% Liquid 15 milliLiter(s) Oral Mucosa every 12 hours  dexMEDEtomidine Infusion 0.2 MICROgram(s)/kG/Hr (4.08 mL/Hr) IV Continuous <Continuous>  fentaNYL    Injectable 25 MICROGram(s) IV Push every 2 hours PRN  guaiFENesin  milliGRAM(s) Oral every 12 hours PRN  hydrocortisone sodium succinate Injectable 25 milliGRAM(s) IV Push two times a day  HYDROmorphone PCA (1 mG/mL) 30 milliLiter(s) PCA Continuous PCA Continuous  HYDROmorphone PCA (1 mG/mL) Rescue Clinician Bolus 0.5 milliGRAM(s) IV Push every 15 minutes PRN  influenza   Vaccine 0.5 milliLiter(s) IntraMuscular once  latanoprost 0.005% Ophthalmic Solution 1 Drop(s) Both EYES at bedtime  levothyroxine 50 MICROGram(s) Oral daily  losartan 25 milliGRAM(s) Oral daily  metoprolol tartrate 25 milliGRAM(s) Oral two times a day  naloxone Injectable 0.1 milliGRAM(s) IV Push every 3 minutes PRN  ondansetron Injectable 4 milliGRAM(s) IV Push every 6 hours PRN  oxyCODONE    IR 5 milliGRAM(s) Oral every 8 hours PRN  oxyCODONE  ER Tablet 20 milliGRAM(s) Oral every 12 hours  pantoprazole  Injectable 40 milliGRAM(s) IV Push daily  phenylephrine    Infusion 0.1 MICROgram(s)/kG/Min (3.06 mL/Hr) IV Continuous <Continuous>  polyethylene glycol 3350 17 Gram(s) Oral daily  senna 2 Tablet(s) Oral at bedtime PRN  sodium bicarbonate 650 milliGRAM(s) Oral three times a day  sodium chloride 0.9%. 1000 milliLiter(s) (50 mL/Hr) IV Continuous <Continuous>  traMADol 25 milliGRAM(s) Oral every 6 hours PRN      LABS:  Na: 139 (09-14 @ 22:04), 137 (09-13 @ 17:11), 134 (09-13 @ 04:27)  K: 3.2 (09-14 @ 22:04), 3.5 (09-13 @ 17:11), 3.8 (09-13 @ 04:27)  Cl: 103 (09-14 @ 22:04), 99 (09-13 @ 17:11), 99 (09-13 @ 04:27)  CO2: 18 (09-14 @ 22:04), 24 (09-13 @ 17:11), 21 (09-13 @ 04:27)  BUN: 13 (09-14 @ 22:04), 14 (09-13 @ 17:11), 13 (09-13 @ 04:27)  Cr: 0.94 (09-14 @ 22:04), 1.16 (09-13 @ 17:11), 1.11 (09-13 @ 04:27)  Glu: 139(09-14 @ 22:04), 112(09-13 @ 17:11), 115(09-13 @ 04:27)    Hgb: 10.4 (09-14 @ 22:04), 10.8 (09-13 @ 04:27)  Hct: 30.1 (09-14 @ 22:04), 30.4 (09-13 @ 04:27)  WBC: 18.54 (09-14 @ 22:04), 11.33 (09-13 @ 04:27)  Plt: 152 (09-14 @ 22:04), 241 (09-13 @ 04:27)    INR: 1.01 09-14-21 @ 05:46, 1.05 09-13-21 @ 17:10, 1.13 09-13-21 @ 11:08, 1.13 09-13-21 @ 06:34, 1.08 09-13-21 @ 04:27  PTT: 25.4 09-14-21 @ 05:46, 40.1 09-13-21 @ 23:08, 102.1 09-13-21 @ 17:10, 179.5 09-13-21 @ 11:08, 125.1 09-13-21 @ 06:34, 129.2 09-13-21 @ 04:27      AB - ( 14 Sep 2021 16:29 )  pH, Arterial: 7.32  pH, Blood: x     /  pCO2: 42    /  pO2: 396   / HCO3: 22    / Base Excess: -4.3  /  SaO2: 100.0

## 2021-09-15 NOTE — DIETITIAN INITIAL EVALUATION ADULT. - SIGNS/SYMPTOMS
pt s/p T10-pelvis revision, L1 expanded PSO pt NPO for plan to extubate; variable PO intake documented while pt on PO diet

## 2021-09-15 NOTE — PROGRESS NOTE ADULT - ATTENDING COMMENTS
Agree with/edited as appropriate above  tachypneic, pulling low tidal volume with CPAP trial, unclear if pain related--both surgical and previous sternal fracture  optimize pain mngt, check ABG, wean to extubate

## 2021-09-15 NOTE — PROGRESS NOTE ADULT - SUBJECTIVE AND OBJECTIVE BOX
[facility] [place] [facility]   MD KILLIAN JAIME MD RUORU WONG, PA    TEL:  OFFICE: [phone]  DR CHARLES CELL: [phone]  BEENA PISANO CELL: [phone]  DR. LOZANO CELL: [phone]      FROM 5 PM - 7 AM PLEASE CALL ANSWERING SERVICE: [phone]    RENAL FOLLOW UP NOTE--Date of Service 09-15-21 @ 11:02  --------------------------------------------------------------------------------  HPI:      Pt seen and examined at bedside in NSICU  Intubated,     PAST HISTORY  --------------------------------------------------------------------------------  No significant changes to PMH, PSH, FHx, SHx, unless otherwise noted    ALLERGIES & MEDICATIONS  --------------------------------------------------------------------------------  Allergies    penicillin (Rash)    Intolerances      Standing Inpatient Medications  atorvastatin 10 milliGRAM(s) Oral at bedtime  ceFAZolin   IVPB 1000 milliGRAM(s) IV Intermittent every 8 hours  chlorhexidine 0.12% Liquid 15 milliLiter(s) Oral Mucosa every 12 hours  dexMEDEtomidine Infusion 0.2 MICROgram(s)/kG/Hr IV Continuous <Continuous>  hydrocortisone sodium succinate Injectable 25 milliGRAM(s) IV Push every 12 hours  HYDROmorphone PCA (1 mG/mL) 30 milliLiter(s) PCA Continuous PCA Continuous  influenza   Vaccine 0.5 milliLiter(s) IntraMuscular once  latanoprost 0.005% Ophthalmic Solution 1 Drop(s) Both EYES at bedtime  levothyroxine Injectable 25 MICROGram(s) IV Push at bedtime  losartan 25 milliGRAM(s) Oral daily  metoprolol tartrate 25 milliGRAM(s) Oral two times a day  oxyCODONE  ER Tablet 20 milliGRAM(s) Oral every 12 hours  pantoprazole  Injectable 40 milliGRAM(s) IV Push daily  phenylephrine    Infusion 0.1 MICROgram(s)/kG/Min IV Continuous <Continuous>  polyethylene glycol 3350 17 Gram(s) Oral daily  potassium chloride   Solution 40 milliEquivalent(s) Oral every 4 hours  sodium bicarbonate 650 milliGRAM(s) Oral three times a day  sodium chloride 0.9%. 1000 milliLiter(s) IV Continuous <Continuous>    PRN Inpatient Medications  artificial  tears Solution 1 Drop(s) Both EYES every 6 hours PRN  fentaNYL    Injectable 50 MICROGram(s) IV Push every 2 hours PRN  guaiFENesin  milliGRAM(s) Oral every 12 hours PRN  HYDROmorphone PCA (1 mG/mL) Rescue Clinician Bolus 0.5 milliGRAM(s) IV Push every 15 minutes PRN  naloxone Injectable 0.1 milliGRAM(s) IV Push every 3 minutes PRN  ondansetron Injectable 4 milliGRAM(s) IV Push every 6 hours PRN  senna 2 Tablet(s) Oral at bedtime PRN  traMADol 25 milliGRAM(s) Oral every 6 hours PRN      REVIEW OF SYSTEMS  --------------------------------------------------------------------------------  unable to obtain pt intubated    VITALS/PHYSICAL EXAM  --------------------------------------------------------------------------------  T(C): 35.8 (09-15-21 @ 07:00), Max: 37.1 (09-14-21 @ 23:00)  HR: 64 (09-15-21 @ 10:15) (55 - 92)  BP: 113/58 (09-14-21 @ 22:00) (113/58 - 113/58)  RR: 16 (09-15-21 @ 10:15) (13 - 29)  SpO2: 100% (09-15-21 @ 10:15) (95% - 100%)  Wt(kg): --  Height (cm): 154.9 (09-14-21 @ 09:09)  Weight (kg): 81.6 (09-14-21 @ 09:09)  BMI (kg/m2): 34 (09-14-21 @ 09:09)  BSA (m2): 1.81 (09-14-21 @ 09:09)      09-14-21 @ 07:01  -  09-15-21 @ 07:00  --------------------------------------------------------  IN: 1077.1 mL / OUT: 753 mL / NET: 324.1 mL    09-15-21 @ 07:01  -  09-15-21 @ 11:02  --------------------------------------------------------  IN: 184.7 mL / OUT: 230 mL / NET: -45.3 mL      Physical Exam:  	Gen: NAD  	HEENT: ETT  	Pulm: coarse breath sounds  B/L  	CV: S1S2  	Abd: Soft, +BS  	Ext: No LE edema B/L                      Neuro:sedated  	Skin: Warm and Dry   	Vascular access: NO HD catheter           ROBEL ervin  LABS/STUDIES  --------------------------------------------------------------------------------              10.4   18.54 >-----------<  152      [09-14-21 @ 22:04]              30.1     139  |  103  |  13  ----------------------------<  139      [09-14-21 @ 22:04]  3.2   |  18  |  0.94        Ca     8.2     [09-14-21 @ 22:04]      Mg     2.0     [09-14-21 @ 22:04]      Phos  4.4     [09-14-21 @ 22:04]      PT/INR: PT 12.1 , INR 1.01       [09-14-21 @ 05:46]  PTT: 25.4       [09-14-21 @ 05:46]      Creatinine Trend:  SCr 0.94 [09-14 @ 22:04]  SCr 1.16 [09-13 @ 17:11]  SCr 1.11 [09-13 @ 04:27]  SCr 1.24 [09-11 @ 06:32]  SCr 1.25 [09-10 @ 09:50]    Urinalysis - [09-12-21 @ 01:58]      Color Light Yellow / Appearance Clear / SG 1.013 / pH 7.0      Gluc Trace / Ketone Negative  / Bili Negative / Urobili Negative       Blood Negative / Protein Trace / Leuk Est Negative / Nitrite Negative      RBC 1 / WBC 0 / Hyaline  / Gran  / Sq Epi  / Non Sq Epi 0 / Bacteria Negative    Urine Creatinine 84      [09-08-21 @ 18:29]  Urine Sodium 6      [09-13-21 @ 14:35]  Urine Potassium 26      [09-08-21 @ 18:29]  Urine Osmolality 480      [09-13-21 @ 14:36]    Iron 44, TIBC 224, %sat 19      [09-14-21 @ 10:27]  Ferritin 444      [09-14-21 @ 11:37]  Vitamin D (25OH) 67.9      [09-08-21 @ 09:59]  HbA1c 5.9      [02-22-17 @ 03:10]  TSH 0.18      [09-08-21 @ 09:59]

## 2021-09-15 NOTE — PROGRESS NOTE ADULT - ASSESSMENT
75 y/o female with a history of colon cancer s/p right hemicolectomy (approx 2 years ago, outside hospital), DVT (on Eliquis), CKD, adrenal insufficiency presents s/p fall with right coracoid process scapular fracture, T11/L1 compression fractures and an inferior sternal fracture, noted to have prolonged PTT.    1. prolonged PTT  - no hx of bleeding disorder  - nl PTT on admission 9/5; prolonged to 69 on 9/8, further prolonged 9/13  - blood draws have been peripheral sticks- no central catheter/Port  - prolonged PTT can be seen with subcutaneous heparin administration, usually milder elevations, but this is most likely cause in this patient  - low suspicion for factor deficiency with normal PTT on admission  - PTT normalized off SQ heparin   - no hx of liver disease and PT is normal  - DIC also not likely with normal PT, normal fibrinogen    2.anemia, microcytic  - mildly decreased no overt bleeding  - iron studies with high ferritin, normal B12 and folate  - monitor Hg    3. hx of DVT, 2019- bilateral  - discovered with colon cancer diagnosis  - has been on eliquis  - also with decreased mobility  - duplex LE 9/9- no DVT  - eliquis on hold for now, if off AC for a lengthy period may need to repeat doppler LE    4. s/p fall, with T12- L1 fracture  - s/p thorcao-lumbar fusion per neurosurgery on 9-14-21  -  Has a drain with blood post op monitor counts    Plans per neurosurgery

## 2021-09-15 NOTE — DIETITIAN INITIAL EVALUATION ADULT. - ENTERAL
If EN feeds warranted, consider: Jevity 1.2 at GOAL rate 60ml/hr x 24 hrs. To provide 1440ml, 1728kcal and 80g protein. Based on dosing wt 81.6kg, provides 21.2kcal/kg and based on IBW 47.7kg, provides 1.68g protein/kg.

## 2021-09-15 NOTE — PROGRESS NOTE ADULT - ASSESSMENT
75yo woman with history of colon ca s/p right hemicolectomy, DVT (on eliquis), CKD, and Adrenal Insufficiency that presented s/p fall with imaging notable for T11 and L1 compression fractures s/p T10-pelvis with revision of previous L2-5 hardware, L1 expanded PSO. Remains intubated for agitation, poor RSBI and poor following of commands. B/l LEs difficult to examine but move in the plane of the bed to noxious.     Neuro:   -q1 hour neuro checks  -fentanyl prn for pain control  -dilaudid pca for pain control once extubated  -keep flat in bed for intraop csf leak  -continue hemovacs to suction, monitor outputs  -ct lumbar spine - pending  -pt eval once no longer flat    Respiratory:   -intubated, wean to extubate  -c/w SBT trials     CV:  -keep MAP > 75  -hold home antihypertensives while maintaining MAP goals    Endocrine:   -levothyroxine for hypothyroid  -maintain euglycemia    Heme/Onc:    -chemical dvt prophylaxis on hold secondary to OR today  -SCDs only for DVT prophylaxis    Renal:   -ervin for strict ins and outs    ID:   -monitor cbc    GI:   -npo/livf  -protonix for gi prophylaxis  -senna and miralax for bowel regimen    Endo  -glucose goal 120-180     77yo woman with history of colon ca s/p right hemicolectomy, DVT (on eliquis), CKD, and Adrenal Insufficiency that presented s/p fall with imaging notable for T11 and L1 compression fractures s/p T10-pelvis with revision of previous L2-5 hardware, L1 expanded PSO. Remains intubated for agitation, poor RSBI and poor following of commands. B/l LEs difficult to examine but move in the plane of the bed to noxious.     Neuro:   -q1 hour neuro checks  -CT spine today - post surgical changes, hardware in place   -fentanyl prn for pain control 50q2 PRN  -dilaudid pca for pain control once extubated  -no longer needs to stay flat as per nsgy   -continue hemovacs to suction, monitor outputs L40 R90  -ct lumbar spine - pending  -pt eval    Respiratory:   -intubated, wean to extubate  -c/w SBT trials (+)cuff leak    CV:  -keep MAP > 75  phenylephrine ggt to maintain     Endocrine:   -levothyroxine for hypothyroid transition to IV as no PO access   -TSH 0.18, T(6)  -maintain euglycemia  -adrenal insuffiencey c/w hydrocortisone 10mg BID     Heme/Onc:    -chemical dvt prophylaxis on hold secondary to OR  -SCDs only for DVT prophylaxis  -Mixing study 9/13 without complete correction, consistent with heparin effect. Thrombin time also elevated and reptilase time near normal-- also consistent with heparin effect. Fibrinogen normal.  9/9: negative for DVT  previously on eliquis will f/u w/ hematology re: type of DVT ppx     Renal:   -ervin for strict ins and outs - keep in place as per nsgy     ID:   -monitor cbc  ancef post op     GI:   -npo/livf  -protonix for gi prophylaxis  -senna and miralax for bowel regimen  -placement of NGT

## 2021-09-15 NOTE — PROGRESS NOTE ADULT - SUBJECTIVE AND OBJECTIVE BOX
SUBJECTIVE / OVERNIGHT EVENTS:    remains intubated  family @ beside      --------------------------------------------------------------------------------------------  LABS:                        10.4   18.54 )-----------( 152      ( 14 Sep 2021 22:04 )             30.1     09-14    139  |  103  |  13  ----------------------------<  139<H>  3.2<L>   |  18<L>  |  0.94    Ca    8.2<L>      14 Sep 2021 22:04  Phos  4.4     09-14  Mg     2.0     09-14      PT/INR - ( 14 Sep 2021 05:46 )   PT: 12.1 sec;   INR: 1.01 ratio         PTT - ( 14 Sep 2021 05:46 )  PTT:25.4 sec  CAPILLARY BLOOD GLUCOSE                RADIOLOGY & ADDITIONAL TESTS:    Imaging Personally Reviewed:  [x] YES  [ ] NO    Consultant(s) Notes Reviewed:  [x] YES  [ ] NO    MEDICATIONS  (STANDING):  atorvastatin 10 milliGRAM(s) Oral at bedtime  chlorhexidine 0.12% Liquid 15 milliLiter(s) Oral Mucosa every 12 hours  dexMEDEtomidine Infusion 0.2 MICROgram(s)/kG/Hr (4.08 mL/Hr) IV Continuous <Continuous>  hydrocortisone sodium succinate Injectable 25 milliGRAM(s) IV Push every 12 hours  HYDROmorphone PCA (1 mG/mL) 30 milliLiter(s) PCA Continuous PCA Continuous  influenza   Vaccine 0.5 milliLiter(s) IntraMuscular once  latanoprost 0.005% Ophthalmic Solution 1 Drop(s) Both EYES at bedtime  levothyroxine Injectable 40 MICROGram(s) IV Push at bedtime  lidocaine   4% Patch 1 Patch Transdermal daily  oxyCODONE  ER Tablet 20 milliGRAM(s) Oral every 12 hours  pantoprazole  Injectable 40 milliGRAM(s) IV Push daily  phenylephrine    Infusion 0.1 MICROgram(s)/kG/Min (3.06 mL/Hr) IV Continuous <Continuous>  polyethylene glycol 3350 17 Gram(s) Oral daily  potassium chloride   Solution 40 milliEquivalent(s) Oral every 4 hours  sodium bicarbonate 650 milliGRAM(s) Oral three times a day  sodium chloride 0.9%. 1000 milliLiter(s) (50 mL/Hr) IV Continuous <Continuous>    MEDICATIONS  (PRN):  artificial  tears Solution 1 Drop(s) Both EYES every 6 hours PRN Dry Eyes  fentaNYL    Injectable 50 MICROGram(s) IV Push every 2 hours PRN Severe Pain (7 - 10)  HYDROmorphone PCA (1 mG/mL) Rescue Clinician Bolus 0.5 milliGRAM(s) IV Push every 15 minutes PRN for Pain Scale GREATER THAN 6  naloxone Injectable 0.1 milliGRAM(s) IV Push every 3 minutes PRN For ANY of the following changes in patient status:  A. RR LESS THAN 10 breaths per minute, B. Oxygen saturation LESS THAN 90%, C. Sedation score of 6  ondansetron Injectable 4 milliGRAM(s) IV Push every 6 hours PRN Nausea  oxyCODONE    IR 10 milliGRAM(s) Oral every 4 hours PRN Severe Pain (7 - 10)  senna 2 Tablet(s) Oral at bedtime PRN Constipation  traMADol 25 milliGRAM(s) Oral every 6 hours PRN Moderate Pain (4 - 6)      Care Discussed with Consultants/Other Providers [x] YES  [ ] NO    Vital Signs Last 24 Hrs  T(C): 37 (15 Sep 2021 11:00), Max: 37.1 (14 Sep 2021 23:00)  T(F): 98.6 (15 Sep 2021 11:00), Max: 98.8 (14 Sep 2021 23:00)  HR: 72 (15 Sep 2021 14:30) (55 - 92)  BP: 113/58 (14 Sep 2021 22:00) (113/58 - 113/58)  BP(mean): 73 (14 Sep 2021 22:00) (73 - 73)  RR: 28 (15 Sep 2021 14:30) (13 - 35)  SpO2: 99% (15 Sep 2021 14:30) (66% - 100%)  I&O's Summary    14 Sep 2021 07:01  -  15 Sep 2021 07:00  --------------------------------------------------------  IN: 1077.1 mL / OUT: 753 mL / NET: 324.1 mL    15 Sep 2021 07:01  -  15 Sep 2021 15:47  --------------------------------------------------------  IN: 447 mL / OUT: 230 mL / NET: 217 mL    PHYSICAL EXAM:  GENERAL: NAD, well-developed, intubated  HEAD:  Atraumatic, Normocephalic  EYES: EOMI, PERRLA, conjunctiva and sclera clear, legally blind  NECK: Supple, No JVD  CHEST/LUNG: mild decrease breath sounds bilaterally; No wheeze   HEART: Regular rate and rhythm; No murmurs, rubs, or gallops  ABDOMEN: Soft, Nontender, Nondistended; Bowel sounds present  NEURO: intubated, no focal weakness, 5/5 b/l upper extremity strength, 4/5 lower extremity strength  EXTREMITIES:  2+ Peripheral Pulses, No clubbing, cyanosis, trace b/l edema  SKIN: No rashes or lesions   BACK: incision c/d/i     SUBJECTIVE / OVERNIGHT EVENTS:    remains intubated  family @ beside  Monitor WBC    --------------------------------------------------------------------------------------------  LABS:                        10.4   18.54 )-----------( 152      ( 14 Sep 2021 22:04 )             30.1     09-14    139  |  103  |  13  ----------------------------<  139<H>  3.2<L>   |  18<L>  |  0.94    Ca    8.2<L>      14 Sep 2021 22:04  Phos  4.4     09-14  Mg     2.0     09-14      PT/INR - ( 14 Sep 2021 05:46 )   PT: 12.1 sec;   INR: 1.01 ratio         PTT - ( 14 Sep 2021 05:46 )  PTT:25.4 sec  CAPILLARY BLOOD GLUCOSE                RADIOLOGY & ADDITIONAL TESTS:    Imaging Personally Reviewed:  [x] YES  [ ] NO    Consultant(s) Notes Reviewed:  [x] YES  [ ] NO    MEDICATIONS  (STANDING):  atorvastatin 10 milliGRAM(s) Oral at bedtime  chlorhexidine 0.12% Liquid 15 milliLiter(s) Oral Mucosa every 12 hours  dexMEDEtomidine Infusion 0.2 MICROgram(s)/kG/Hr (4.08 mL/Hr) IV Continuous <Continuous>  hydrocortisone sodium succinate Injectable 25 milliGRAM(s) IV Push every 12 hours  HYDROmorphone PCA (1 mG/mL) 30 milliLiter(s) PCA Continuous PCA Continuous  influenza   Vaccine 0.5 milliLiter(s) IntraMuscular once  latanoprost 0.005% Ophthalmic Solution 1 Drop(s) Both EYES at bedtime  levothyroxine Injectable 40 MICROGram(s) IV Push at bedtime  lidocaine   4% Patch 1 Patch Transdermal daily  oxyCODONE  ER Tablet 20 milliGRAM(s) Oral every 12 hours  pantoprazole  Injectable 40 milliGRAM(s) IV Push daily  phenylephrine    Infusion 0.1 MICROgram(s)/kG/Min (3.06 mL/Hr) IV Continuous <Continuous>  polyethylene glycol 3350 17 Gram(s) Oral daily  potassium chloride   Solution 40 milliEquivalent(s) Oral every 4 hours  sodium bicarbonate 650 milliGRAM(s) Oral three times a day  sodium chloride 0.9%. 1000 milliLiter(s) (50 mL/Hr) IV Continuous <Continuous>    MEDICATIONS  (PRN):  artificial  tears Solution 1 Drop(s) Both EYES every 6 hours PRN Dry Eyes  fentaNYL    Injectable 50 MICROGram(s) IV Push every 2 hours PRN Severe Pain (7 - 10)  HYDROmorphone PCA (1 mG/mL) Rescue Clinician Bolus 0.5 milliGRAM(s) IV Push every 15 minutes PRN for Pain Scale GREATER THAN 6  naloxone Injectable 0.1 milliGRAM(s) IV Push every 3 minutes PRN For ANY of the following changes in patient status:  A. RR LESS THAN 10 breaths per minute, B. Oxygen saturation LESS THAN 90%, C. Sedation score of 6  ondansetron Injectable 4 milliGRAM(s) IV Push every 6 hours PRN Nausea  oxyCODONE    IR 10 milliGRAM(s) Oral every 4 hours PRN Severe Pain (7 - 10)  senna 2 Tablet(s) Oral at bedtime PRN Constipation  traMADol 25 milliGRAM(s) Oral every 6 hours PRN Moderate Pain (4 - 6)      Care Discussed with Consultants/Other Providers [x] YES  [ ] NO    Vital Signs Last 24 Hrs  T(C): 37 (15 Sep 2021 11:00), Max: 37.1 (14 Sep 2021 23:00)  T(F): 98.6 (15 Sep 2021 11:00), Max: 98.8 (14 Sep 2021 23:00)  HR: 72 (15 Sep 2021 14:30) (55 - 92)  BP: 113/58 (14 Sep 2021 22:00) (113/58 - 113/58)  BP(mean): 73 (14 Sep 2021 22:00) (73 - 73)  RR: 28 (15 Sep 2021 14:30) (13 - 35)  SpO2: 99% (15 Sep 2021 14:30) (66% - 100%)  I&O's Summary    14 Sep 2021 07:01  -  15 Sep 2021 07:00  --------------------------------------------------------  IN: 1077.1 mL / OUT: 753 mL / NET: 324.1 mL    15 Sep 2021 07:01  -  15 Sep 2021 15:47  --------------------------------------------------------  IN: 447 mL / OUT: 230 mL / NET: 217 mL    PHYSICAL EXAM:  GENERAL: NAD, well-developed, intubated  HEAD:  Atraumatic, Normocephalic  EYES: EOMI, PERRLA, conjunctiva and sclera clear, legally blind  NECK: Supple, No JVD  CHEST/LUNG: mild decrease breath sounds bilaterally; No wheeze   HEART: Regular rate and rhythm; No murmurs, rubs, or gallops  ABDOMEN: Soft, Nontender, Nondistended; Bowel sounds present  NEURO: intubated, no focal weakness, 5/5 b/l upper extremity strength, 4/5 lower extremity strength  EXTREMITIES:  2+ Peripheral Pulses, No clubbing, cyanosis, trace b/l edema  SKIN: No rashes or lesions   BACK: incision c/d/i

## 2021-09-15 NOTE — DIETITIAN INITIAL EVALUATION ADULT. - ADD RECOMMEND
1. Obtain further subjective wt/diet history from pt/family PRN. 2. Determine nutritional plan of care - resume PO diet if applicable if extubated; defer consistency to medical team, SLP. If EN initiated, monitor GI tolerance. RD to adjust EN formulary, volume/rate PRN. 3. Monitor wt trends/labs/skin integrity/hydration status/bowel regularity. 4. Consider multivitamin to aid in prevention of micronutrient deficiencies (if no medication contraindications noted).

## 2021-09-15 NOTE — PROGRESS NOTE ADULT - ASSESSMENT
75 yo F with a history of colon cancer s/p right hemicolectomy (approx 2 years ago, outside hospital), DVT (on Eliquis), CKD, adrenal insufficiency presenting as transfer from [facility] for trauma evaluation secondary to a mechanical fall from sitting with headstrike. Injuries identified include right forehead hematoma, acute minimally displaced right coracoid process scapular fracture, T11/L1 compression fractures and an inferior sternal fracture. Patient hemodynamically stable.    # Elevated PTT  Mild elevations in PTT can be seen with SC heparin administration, particularly with TID dosing; low plasma protein, impaired renal function, and low BMI have been postulated as contributing factors.  In this pt's case, I doubt she has a clotting factor deficiency.  Last dose of SC heparin was this morning.  I expect her PTT to trend close to normal by the end of today.  I would check the PTT around noontime and one more this evening before tomorrow morning's pre-op labs.  If her PTT is < 40, then would be cleared for OR.  Consulted hematology.    # S/p Mechanical Fall:  Right forehead hematoma, acute minimally displaced right coracoid process scapular fracture, T11/L1 compression fractures and an inferior sternal fracture  no surgery for scapular fx. Placed in sling  CT spine with fusion L2-5. Fracture through the T12-L1 disc space with widening of the disc space and a L1-2 laminectomy which appears unstable. Diffuse osteopenia. Old T11 and L1 fractures.   MRI T/L spine with anterior splaying of the intervertebral disc space at the T12-L1 with edema in the anterior prevertebral soft tissues  CT T done noted with widening of T12/L1 disc space w/ L1 laminectomy  Neuro surgery offering surgery vs conservative management, TLSO brace in the meantime.   The daughter Susie decided to proceed with surgery.   Cr has improved.   Pending PTT < 40 prior to OR.   s/p revision of prior L2-L5 fusion w/ extension from T10- pelvis w/o L1 PSO, and plastics closure on 9/14  Monitor outpt via drain  Dsg changes as per orders  Care per primary team    # Nausea:   Pt has been nauseous (no abd pain). ?pain med induced/ Opioids   Poor PO intake since being in the hospital. Normally with good appetite at home, with mechanical soft per the daughter.  anti-emetics PRN, encourage PO intake   the daughter requested IV steroids early, states her PO may not be absorbed with vomiting.  Reasonable to start IV hydrocortisone while PO intake is poor.   Switched hydrocortisone 10 mg BID to  IV 25 mg BID.   can give additional stress dose near the timing of surgery.   (can consider 50 mg hydrocortisone intravenously just before the procedure and 25 mg of hydrocortisone every eight hours for 24 hours, then resume home PO dose after)  or steroid regimen per anesthesia's discretion     # Hypopituitary   She is on Hydrocortisone 10 mg BID at home, so will monitor for adrenal insufficiency. am cortisol appropriate  UA unimpressive. vit D, vit B12 normal. TSH low as expected with hypopit. free T4 sent, pending.   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 Treviño ([facility]). Outpt EMR reviewed; Cr range from 1.4 to 1.6   Monitor I/O's, Serial Cr, Avoid nephrotoxins  Cr is up from baseline. Hold Losartan. s/p gentle IVF for acute kidney failure: likely pre-renal.   Her recent TTE reviewed, with normal LV.   Her Cr now back to baseline (1.4-1.6)     # CAD: Chronic, stable  Cont home CV meds  holding ASA for neurosurgery     # Hypothyroidism  Chronic, stable  TSH low. Total T4 is nl  Levothyroxine for hypothyroid transition to IV as no PO access   TSH 0.18, T(6)    # Hx of DVT, lower extremity  Home med Eliquis, on hold   SCDs    # GI ppx:  Protonix

## 2021-09-15 NOTE — DIETITIAN INITIAL EVALUATION ADULT. - NUTRITION DIAGNOSITC TERMINOLOGY #2
[Normal] : mucosa is normal [Midline] : trachea located in midline position [Removed] : palatine tonsils previously removed [de-identified] : well healed.  [employer] Intake

## 2021-09-15 NOTE — PROGRESS NOTE ADULT - SUBJECTIVE AND OBJECTIVE BOX
Plastic Surgery Progress Note (pg [facility]: [id number], NS: [phone])    SUBJECTIVE:  Patient seen and examined in NSCU this AM. Intubated.     OBJECTIVE:     ** VITAL SIGNS / I&O's **    Vital Signs Last 24 Hrs  T(C): 37.1 (14 Sep 2021 23:00), Max: 98.8 (14 Sep 2021 08:50)  T(F): 98.8 (14 Sep 2021 23:00), Max: 209.8 (14 Sep 2021 08:50)  HR: 60 (15 Sep 2021 06:50) (60 - 92)  BP: 113/58 (14 Sep 2021 22:00) (113/58 - 164/89)  BP(mean): 73 (14 Sep 2021 22:00) (73 - 73)  RR: 14 (15 Sep 2021 06:50) (13 - 29)  SpO2: 100% (15 Sep 2021 06:50) (95% - 100%)      14 Sep 2021 07:01  -  15 Sep 2021 07:00  --------------------------------------------------------  IN:    Dexmedetomidine: 122.1 mL    IV PiggyBack: 400 mL    Phenylephrine: 31 mL    sodium chloride 0.9%: 450 mL  Total IN: 1003.1 mL    OUT:    Accordian (mL): 40 mL    Accordian (mL): 90 mL    Indwelling Catheter - Urethral (mL): 593 mL  Total OUT: 723 mL    Total NET: 280.1 mL          ** PHYSICAL EXAM **    -- CONSTITUTIONAL: Intubated  -- BACK: Dressing c/d/i, no palpable collections, b/l HV SS      **MEDS**  artificial  tears Solution 1 Drop(s) Both EYES every 6 hours PRN  atorvastatin 10 milliGRAM(s) Oral at bedtime  ceFAZolin   IVPB 1000 milliGRAM(s) IV Intermittent every 8 hours  chlorhexidine 0.12% Liquid 15 milliLiter(s) Oral Mucosa every 12 hours  dexMEDEtomidine Infusion 0.2 MICROgram(s)/kG/Hr IV Continuous <Continuous>  fentaNYL    Injectable 25 MICROGram(s) IV Push every 2 hours PRN  guaiFENesin  milliGRAM(s) Oral every 12 hours PRN  hydrocortisone sodium succinate Injectable 25 milliGRAM(s) IV Push two times a day  HYDROmorphone PCA (1 mG/mL) 30 milliLiter(s) PCA Continuous PCA Continuous  HYDROmorphone PCA (1 mG/mL) Rescue Clinician Bolus 0.5 milliGRAM(s) IV Push every 15 minutes PRN  influenza   Vaccine 0.5 milliLiter(s) IntraMuscular once  latanoprost 0.005% Ophthalmic Solution 1 Drop(s) Both EYES at bedtime  levothyroxine 50 MICROGram(s) Oral daily  losartan 25 milliGRAM(s) Oral daily  metoprolol tartrate 25 milliGRAM(s) Oral two times a day  naloxone Injectable 0.1 milliGRAM(s) IV Push every 3 minutes PRN  ondansetron Injectable 4 milliGRAM(s) IV Push every 6 hours PRN  oxyCODONE    IR 5 milliGRAM(s) Oral every 8 hours PRN  oxyCODONE  ER Tablet 20 milliGRAM(s) Oral every 12 hours  pantoprazole  Injectable 40 milliGRAM(s) IV Push daily  phenylephrine    Infusion 0.1 MICROgram(s)/kG/Min IV Continuous <Continuous>  polyethylene glycol 3350 17 Gram(s) Oral daily  senna 2 Tablet(s) Oral at bedtime PRN  sodium bicarbonate 650 milliGRAM(s) Oral three times a day  sodium chloride 0.9%. 1000 milliLiter(s) IV Continuous <Continuous>  traMADol 25 milliGRAM(s) Oral every 6 hours PRN      ** LABS **                          10.4   18.54 )-----------( 152      ( 14 Sep 2021 22:04 )             30.1     14 Sep 2021 22:04    139    |  103    |  13     ----------------------------<  139    3.2     |  18     |  0.94     Ca    8.2        14 Sep 2021 22:04  Phos  4.4       14 Sep 2021 22:04  Mg     2.0       14 Sep 2021 22:04      PT/INR - ( 14 Sep 2021 05:46 )   PT: 12.1 sec;   INR: 1.01 ratio         PTT - ( 14 Sep 2021 05:46 )  PTT:25.4 sec  CAPILLARY BLOOD GLUCOSE

## 2021-09-15 NOTE — PROGRESS NOTE ADULT - SUBJECTIVE AND OBJECTIVE BOX
Patient seen and examined s/p Revision of prior L2-L5 fusion w/ extension from T10-pelvis w/ L1 PSO, and Plastics Closure.     Intubated, shaking head no, minimal movement in LE to noxious    T(C): 37.1 (09-14-21 @ 23:00), Max: 98.8 (09-14-21 @ 08:50)  HR: 89 (09-14-21 @ 23:55) (60 - 92)  BP: 113/58 (09-14-21 @ 22:00) (113/58 - 164/89)  RR: 22 (09-14-21 @ 23:55) (14 - 29)  SpO2: 100% (09-14-21 @ 23:55) (95% - 100%)                          10.4   18.54 )-----------( 152      ( 14 Sep 2021 22:04 )             30.1     09-14    139  |  103  |  13  ----------------------------<  139<H>  3.2<L>   |  18<L>  |  0.94    Ca    8.2<L>      14 Sep 2021 22:04  Phos  4.4     09-14  Mg     2.0     09-14      PT/INR - ( 14 Sep 2021 05:46 )   PT: 12.1 sec;   INR: 1.01 ratio         PTT - ( 14 Sep 2021 05:46 )  PTT:25.4 sec        CAPILLARY BLOOD GLUCOSE

## 2021-09-15 NOTE — CHART NOTE - NSCHARTNOTEFT_GEN_A_CORE
Repaired TLSO redelivered and left bedside. To be worn when out of bed. Contact information given. To notify office with any other issues questions or concerns.    Kenny XIE.  [facility]  [phone]

## 2021-09-15 NOTE — CONSULT NOTE ADULT - PROBLEM SELECTOR RECOMMENDATION 3
Patient has had multiple fractures while on treatment with risedronate  Intolerant of prolia  Consider switch to reclast  Daughter will discuss with Dr Lim (endo)

## 2021-09-15 NOTE — CONSULT NOTE ADULT - ASSESSMENT
76 yr old F with Hx of colon CA, DVT, CKD and hypopituitarism (secondary AI and hypothyroidism), osteoporosis here with multiple fractures s/p mechanical fall.

## 2021-09-15 NOTE — PROGRESS NOTE ADULT - ASSESSMENT
ASSESSMENT: 76F w/ Revision of prior L2-L5 fusion w/ extension from T10-pelvis w/ L1 PSO and paraspinal muscle flap closure w/ plastic surgery     PLAN:   -c/w drain care  -Dressing to be changed POD5   -care per primary    Plastic Surgery (pg [facility]: [id number], NS: [phone])

## 2021-09-15 NOTE — PROGRESS NOTE ADULT - SUBJECTIVE AND OBJECTIVE BOX
CARDIOLOGY FOLLOW UP - Dr. Orozco  DATE OF SERVICE: 09-15-21    CC remains intubated.   family at bedside .    REVIEW OF SYSTEMS:   CONSTITUTIONAL: No fever, weight loss, or fatigue   RESPIRATORY:  No cough, wheezing, chills or hemoptysis; No SOB  CARDIOVASCULAR: No chest pain, palpitations, passing out, dizziness, or leg swelling   GASTROINTESTINAL: No abdominal or epigastric pain. No nausea, vomiting, or hematemesis, no diarreha, or constipation, No melena or hematochezia   VASCULAR: no edema.       PHYSICAL EXAM:  T(C): 37 (09-15-21 @ 11:00), Max: 37.1 (09-14-21 @ 23:00)  HR: 74 (09-15-21 @ 14:15) (55 - 92)  BP: 113/58 (09-14-21 @ 22:00) (113/58 - 113/58)  RR: 24 (09-15-21 @ 14:15) (13 - 35)  SpO2: 100% (09-15-21 @ 14:15) (66% - 100%)  Wt(kg): --  I&O's Summary    14 Sep 2021 07:01  -  15 Sep 2021 07:00  --------------------------------------------------------  IN: 1077.1 mL / OUT: 753 mL / NET: 324.1 mL    15 Sep 2021 07:01  -  15 Sep 2021 14:28  --------------------------------------------------------  IN: 447 mL / OUT: 230 mL / NET: 217 mL        Appearance: Nad   Cardiovascular: Normal S1 S2,RRR, No JVD, No murmurs  Respiratory: Lungs clear to auscultation	  Gastrointestinal:  Soft, Non-tender, + BS	  Extremities: No edema      HOME MEDICATIONS:  acetaminophen 325 mg oral tablet: 2 tab(s) orally every 6 hours, As needed, Mild Pain (1 - 3) (24 Sep 2020 11:16)  Artificial Tears ophthalmic solution: 1 drop(s) to each affected eye 4 times a day, As Needed (22 Aug 2021 23:28)  aspirin 81 mg oral delayed release tablet: 1 tab(s) orally once a day (27 Aug 2021 13:38)  calcium (as carbonate) 600 mg oral tablet: 1 cap(s) orally once a day (24 Sep 2020 11:16)  Eliquis 2.5 mg oral tablet: 1 tab(s) orally 2 times a day (24 Sep 2020 11:16)  felodipine 5 mg oral tablet, extended release: 1 tab(s) orally once a day (22 Aug 2021 23:23)  guaiFENesin 600 mg oral tablet, extended release: 1 tab(s) orally every 12 hours as needed for cough  (27 Aug 2021 13:38)  hydrocortisone 10 mg oral tablet: 1 tab(s) orally 2 times a day (24 Sep 2020 11:16)  latanoprost 0.005% ophthalmic solution: 1 drop(s) to each affected eye once a day (in the evening) (22 Aug 2021 23:27)  levothyroxine 50 mcg (0.05 mg) oral tablet: 1 tab(s) orally once a day (24 Sep 2020 11:16)  Lipitor 10 mg oral tablet: 1 tab(s) orally once a day (22 Aug 2021 23:41)  Macular Vitamin Benefit oral tablet: 1 tab(s) orally once a day (24 Sep 2020 11:16)  metoprolol succinate 50 mg oral tablet, extended release: 1 tab(s) orally once a day (22 Aug 2021 23:24)  Multiple Vitamins oral tablet: 1 tab(s) orally once a day (22 Aug 2021 23:22)  olmesartan 20 mg oral tablet: 1 tab(s) orally once a day (22 Aug 2021 23:22)  omeprazole 20 mg oral delayed release capsule: 1 cap(s) orally once a day (22 Aug 2021 23:21)  oxyCODONE 5 mg oral tablet: 1 tab(s) orally every 8 hours as needed for severe pain (27 Aug 2021 13:35)  OxyCONTIN 20 mg oral tablet, extended release: 1 tab(s) orally every 12 hours (24 Sep 2020 11:16)  risedronate 35 mg oral tablet: 1 tab(s) orally once a week (Mondays) (05 Sep 2021 18:30)  senna oral tablet: 2 tab(s) orally once a day (at bedtime), As Needed (05 Sep 2021 18:31)      MEDICATIONS  (STANDING):  atorvastatin 10 milliGRAM(s) Oral at bedtime  chlorhexidine 0.12% Liquid 15 milliLiter(s) Oral Mucosa every 12 hours  dexMEDEtomidine Infusion 0.2 MICROgram(s)/kG/Hr (4.08 mL/Hr) IV Continuous <Continuous>  hydrocortisone sodium succinate Injectable 25 milliGRAM(s) IV Push every 12 hours  HYDROmorphone PCA (1 mG/mL) 30 milliLiter(s) PCA Continuous PCA Continuous  influenza   Vaccine 0.5 milliLiter(s) IntraMuscular once  latanoprost 0.005% Ophthalmic Solution 1 Drop(s) Both EYES at bedtime  levothyroxine Injectable 40 MICROGram(s) IV Push at bedtime  oxyCODONE  ER Tablet 20 milliGRAM(s) Oral every 12 hours  pantoprazole  Injectable 40 milliGRAM(s) IV Push daily  phenylephrine    Infusion 0.1 MICROgram(s)/kG/Min (3.06 mL/Hr) IV Continuous <Continuous>  polyethylene glycol 3350 17 Gram(s) Oral daily  potassium chloride   Solution 40 milliEquivalent(s) Oral every 4 hours  sodium bicarbonate 650 milliGRAM(s) Oral three times a day  sodium chloride 0.9%. 1000 milliLiter(s) (50 mL/Hr) IV Continuous <Continuous>      TELEMETRY: nsr 	    ECG:  	  RADIOLOGY:   DIAGNOSTIC TESTING:  [ ] Echocardiogram:  [ ]  Catheterization:  [ ] Stress Test:    OTHER: 	    LABS:	 	                                10.4   18.54 )-----------( 152      ( 14 Sep 2021 22:04 )             30.1     09-14    139  |  103  |  13  ----------------------------<  139<H>  3.2<L>   |  18<L>  |  0.94    Ca    8.2<L>      14 Sep 2021 22:04  Phos  4.4     09-14  Mg     2.0     09-14      PT/INR - ( 14 Sep 2021 05:46 )   PT: 12.1 sec;   INR: 1.01 ratio         PTT - ( 14 Sep 2021 05:46 )  PTT:25.4 sec

## 2021-09-15 NOTE — DIETITIAN INITIAL EVALUATION ADULT. - OTHER INFO
Dosing wt (9/14): 179.5 lbs.   Wt trend:     Pt currently NPO at this time with possible plan to wean to extubate.     Skin: skin lesions (left 3rd toe, b/l buttocks, right under upper arm/skin tear)   Edema: non edema noted  GI: last BM (9/10): x 1. last emesis: (9/12) x 1. Dosing wt (9/14): 179.5 lbs.   Usual wt unclear at this time.     Pt currently NPO at this time with possible plan to wean to extubate. Continues on NaCl 0.9% continuous IVF for hydration.     Skin: skin lesions (left 3rd toe, b/l buttocks, right under upper arm/skin tear)   Edema: no edema noted  GI: last BM (9/10): x 1. last emesis: (9/12) x 1. On bowel regimen as ordered (senna, Miralax).

## 2021-09-15 NOTE — DIETITIAN INITIAL EVALUATION ADULT. - ETIOLOGY
increased demand for nutrient in the setting of neurosurgical intervention complex clinical course deferring ability of pt to consume adequate energy by mouth in context of intubation

## 2021-09-15 NOTE — DIETITIAN INITIAL EVALUATION ADULT. - PERTINENT MEDS FT
Ancef, Peridex, NaCl 0.9%, Protonix, Lipitor, Precedex, Mucinex, Dilaudid, Synthroid, Cozaar, Lopressor, Narcan, Oxycodone, Phenylephrine, Miralax, Senna, Sodium Bicarbonate

## 2021-09-15 NOTE — PROGRESS NOTE ADULT - SUBJECTIVE AND OBJECTIVE BOX
Chief Complaint: on ventilator    History of Present Illness:    Patient on ventilator post op.        MEDICATIONS  (STANDING):  atorvastatin 10 milliGRAM(s) Oral at bedtime  chlorhexidine 0.12% Liquid 15 milliLiter(s) Oral Mucosa every 12 hours  dexMEDEtomidine Infusion 0.2 MICROgram(s)/kG/Hr (4.08 mL/Hr) IV Continuous <Continuous>  hydrocortisone sodium succinate Injectable 25 milliGRAM(s) IV Push every 12 hours  HYDROmorphone PCA (1 mG/mL) 30 milliLiter(s) PCA Continuous PCA Continuous  influenza   Vaccine 0.5 milliLiter(s) IntraMuscular once  latanoprost 0.005% Ophthalmic Solution 1 Drop(s) Both EYES at bedtime  levothyroxine 50 MICROGram(s) Oral daily  lidocaine   4% Patch 1 Patch Transdermal daily  oxyCODONE  ER Tablet 20 milliGRAM(s) Oral every 12 hours  pantoprazole  Injectable 40 milliGRAM(s) IV Push daily  phenylephrine    Infusion 0.1 MICROgram(s)/kG/Min (3.06 mL/Hr) IV Continuous <Continuous>  polyethylene glycol 3350 17 Gram(s) Oral daily  sodium bicarbonate 650 milliGRAM(s) Oral three times a day  sodium chloride 0.9%. 1000 milliLiter(s) (50 mL/Hr) IV Continuous <Continuous>    MEDICATIONS  (PRN):  artificial  tears Solution 1 Drop(s) Both EYES every 6 hours PRN Dry Eyes  fentaNYL    Injectable 50 MICROGram(s) IV Push every 2 hours PRN Severe Pain (7 - 10)  HYDROmorphone PCA (1 mG/mL) Rescue Clinician Bolus 0.5 milliGRAM(s) IV Push every 15 minutes PRN for Pain Scale GREATER THAN 6  naloxone Injectable 0.1 milliGRAM(s) IV Push every 3 minutes PRN For ANY of the following changes in patient status:  A. RR LESS THAN 10 breaths per minute, B. Oxygen saturation LESS THAN 90%, C. Sedation score of 6  ondansetron Injectable 4 milliGRAM(s) IV Push every 6 hours PRN Nausea  oxyCODONE    IR 10 milliGRAM(s) Oral every 4 hours PRN Severe Pain (7 - 10)  senna 2 Tablet(s) Oral at bedtime PRN Constipation  traMADol 25 milliGRAM(s) Oral every 6 hours PRN Moderate Pain (4 - 6)      Allergies    penicillin (Rash)    Intolerances        Vital Signs Last 24 Hrs  T(C): 37 (15 Sep 2021 11:00), Max: 37.1 (14 Sep 2021 23:00)  T(F): 98.6 (15 Sep 2021 11:00), Max: 98.8 (14 Sep 2021 23:00)  HR: 81 (15 Sep 2021 17:15) (55 - 92)  BP: 113/58 (14 Sep 2021 22:00) (113/58 - 113/58)  BP(mean): 73 (14 Sep 2021 22:00) (73 - 73)  RR: 27 (15 Sep 2021 17:15) (13 - 42)  SpO2: 100% (15 Sep 2021 17:00) (66% - 100%)    PHYSICAL EXAM  General: on ventilator  HEENT: positive ETT  CV: normal S1/S2  Lungs: clear on anterior   Abdomen: soft , mildly distended  Ext: no LE edema  Skin: no rashes  BACK:  Has a drain with blood in it.  Neuro: grinding her teeth, on vent    LABS:                          10.4   18.54 )-----------( 152      ( 14 Sep 2021 22:04 )             30.1         Mean Cell Volume : 78.6 fl  Mean Cell Hemoglobin : 27.2 pg  Mean Cell Hemoglobin Concentration : 34.6 gm/dL  Auto Neutrophil # : x  Auto Lymphocyte # : x  Auto Monocyte # : x  Auto Eosinophil # : x  Auto Basophil # : x  Auto Neutrophil % : x  Auto Lymphocyte % : x  Auto Monocyte % : x  Auto Eosinophil % : x  Auto Basophil % : x      Serial CBC's  09-14 @ 22:04  Hct-30.1 / Hgb-10.4 / Plat-152 / RBC-3.83 / WBC-18.54  Serial CBC's  09-13 @ 04:27  Hct-30.4 / Hgb-10.8 / Plat-241 / RBC-4.10 / WBC-11.33      09-14    139  |  103  |  13  ----------------------------<  139<H>  3.2<L>   |  18<L>  |  0.94    Ca    8.2<L>      14 Sep 2021 22:04  Phos  4.4     09-14  Mg     2.0     09-14        PT/INR - ( 14 Sep 2021 05:46 )   PT: 12.1 sec;   INR: 1.01 ratio         PTT - ( 14 Sep 2021 05:46 )  PTT:25.4 sec    Ferritin, Serum: 444 ng/mL (09-14 @ 11:37)  Vitamin B12, Serum: >2000 pg/mL (09-14 @ 11:37)  Folate, Serum: >20.0 ng/mL (09-14 @ 11:37)  Iron - Total Binding Capacity.: 224 ug/dL (09-14 @ 10:27)

## 2021-09-15 NOTE — DIETITIAN INITIAL EVALUATION ADULT. - PERTINENT LABORATORY DATA
(9/14): Hgb 10.4, Hct 30.1, K 3.2, Glu 139, Ca 8.2, GFR 58, Ferritin 44, Vitamin B12 >200 (9/14): Hgb 10.4, Hct 30.1, K 3.2, Glu 139, Ca 8.2, Ferritin 44, Vitamin B12 >200

## 2021-09-15 NOTE — CONSULT NOTE ADULT - SUBJECTIVE AND OBJECTIVE BOX
HPI:  Patient is a 77yo F with a history of colon cancer s/p right hemicolectomy (approx 2 years ago, outside hospital), DVT (on Eliquis), CKD, adrenal insufficiency presenting as transfer from [facility] for trauma evaluation secondary to a mechanical fall from sitting. Patient was going to her commode last night when she slipped and fell on her right side, hitting her head. Denies LOC. Patient's daughter came to help her up and brought her to the ED. Since the fall, patient has been endorses right-sided chest pain but no SOB, headache, weakness or dizziness. Patient does not know what medications she takes.     Patient transferred to [facility] for trauma evaluation. In the ED, patient was hemodynamically stable but requiring 2L NC, satting 98%. Labs showed WBC 17, Cr 1.42, otherwise normal. CT head/cervical spine/chest were performed which showed a right forehead hematoma, acute minimally displaced right coracoid process scapular fracture, T11/L1 compression fractures and an inferior sternal fracture.    VITALS:  Vital Signs Last 24 Hrs  T(C): 36.3 (05 Sep 2021 14:52), Max: 36.8 (05 Sep 2021 07:46)  T(F): 97.4 (05 Sep 2021 14:52), Max: 98.3 (05 Sep 2021 07:46)  HR: 68 (05 Sep 2021 14:52) (66 - 77)  BP: 155/62 (05 Sep 2021 14:52) (145/75 - 174/75)  BP(mean): 88 (05 Sep 2021 14:52) (88 - 93)  RR: 20 (05 Sep 2021 14:52) (16 - 20)  SpO2: 96% (05 Sep 2021 14:52) (95% - 100%)    PRIMARY SURVEY:  A - Airway intact.  B - Bilateral breath sounds and equal chest rise. SpO2 98% 2L NC  C - Initially BP: 155/62 (09-05-21 @ 14:52), palpable pulses in all extremities.  D - GCS 15.  E - Exposure obtained.    SECONDARY SURVEY:  Gen: No acute distress.  HEENT: Normocephalic, right forehead hematoma, slightly tender to palpation. No midline cervical tenderness. Trachea midline.  Pulm: No respiratory distress.   Chest: inferior sternal tenderness, no crepitus over bilateral chest  Abd: Soft, nontender, nondistended, no rebound, no guarding.  Hips: Stable.   Ext: No gross deformities. Sensation and strength intact. Palpable radial pulses bilaterally, palpable dorsalis pedis pulses bilaterally. +right knee tenderness, +right shoulder tenderness  Back: No tenderness to palpation of spine, paraspinal tenderness, no palpable runoff, stepoff, or deformity. 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    Osteoporosis    Chronic Kidney Disease; Dx 2009    HTN - Hypertension    History of Obesity    Myocardial infarction    History of Laminectomy/ Fusion 1/06; L1-L2    H/O right hemicolectomy        FAMILY HISTORY:  Family history of hypertension (Mother)    Family history of diabetes mellitus (Sibling)        Social History:    Outpatient Medications:    MEDICATIONS  (STANDING):  atorvastatin 10 milliGRAM(s) Oral at bedtime  ceFAZolin   IVPB 1000 milliGRAM(s) IV Intermittent every 8 hours  chlorhexidine 0.12% Liquid 15 milliLiter(s) Oral Mucosa every 12 hours  dexMEDEtomidine Infusion 0.2 MICROgram(s)/kG/Hr (4.08 mL/Hr) IV Continuous <Continuous>  hydrocortisone sodium succinate Injectable 25 milliGRAM(s) IV Push every 12 hours  HYDROmorphone PCA (1 mG/mL) 30 milliLiter(s) PCA Continuous PCA Continuous  influenza   Vaccine 0.5 milliLiter(s) IntraMuscular once  latanoprost 0.005% Ophthalmic Solution 1 Drop(s) Both EYES at bedtime  levothyroxine Injectable 40 MICROGram(s) IV Push at bedtime  oxyCODONE  ER Tablet 20 milliGRAM(s) Oral every 12 hours  pantoprazole  Injectable 40 milliGRAM(s) IV Push daily  phenylephrine    Infusion 0.1 MICROgram(s)/kG/Min (3.06 mL/Hr) IV Continuous <Continuous>  polyethylene glycol 3350 17 Gram(s) Oral daily  potassium chloride   Solution 40 milliEquivalent(s) Oral every 4 hours  sodium bicarbonate 650 milliGRAM(s) Oral three times a day  sodium chloride 0.9%. 1000 milliLiter(s) (50 mL/Hr) IV Continuous <Continuous>    MEDICATIONS  (PRN):  artificial  tears Solution 1 Drop(s) Both EYES every 6 hours PRN Dry Eyes  fentaNYL    Injectable 50 MICROGram(s) IV Push every 2 hours PRN Severe Pain (7 - 10)  HYDROmorphone PCA (1 mG/mL) Rescue Clinician Bolus 0.5 milliGRAM(s) IV Push every 15 minutes PRN for Pain Scale GREATER THAN 6  naloxone Injectable 0.1 milliGRAM(s) IV Push every 3 minutes PRN For ANY of the following changes in patient status:  A. RR LESS THAN 10 breaths per minute, B. Oxygen saturation LESS THAN 90%, C. Sedation score of 6  ondansetron Injectable 4 milliGRAM(s) IV Push every 6 hours PRN Nausea  senna 2 Tablet(s) Oral at bedtime PRN Constipation  traMADol 25 milliGRAM(s) Oral every 6 hours PRN Moderate Pain (4 - 6)      Allergies    penicillin (Rash)    Intolerances      Review of Systems:  Constitutional: No fever  Eyes: No blurry vision  Neuro: No tremors  HEENT: No pain  Cardiovascular: No chest pain, palpitations  Respiratory: No SOB, no cough  GI: No nausea, vomiting, abdominal pain  : No dysuria  Skin: no rash  Psych: no depression  Endocrine: no polyuria, polydipsia  Hem/lymph: no swelling  Osteoporosis: no fractures    ALL OTHER SYSTEMS REVIEWED AND NEGATIVE    UNABLE TO OBTAIN    PHYSICAL EXAM:  VITALS: T(C): 35.8 (09-15-21 @ 07:00)  T(F): 96.5 (09-15-21 @ 07:00), Max: 98.8 (09-14-21 @ 23:00)  HR: 70 (09-15-21 @ 12:00) (55 - 92)  BP: 113/58 (09-14-21 @ 22:00) (113/58 - 113/58)  RR:  (13 - 29)  SpO2:  (95% - 100%)  Wt(kg): --  GENERAL: NAD, well-groomed, well-developed  EYES: No proptosis, no lid lag, anicteric  HEENT:  Atraumatic, Normocephalic, moist mucous membranes  THYROID: Normal size, no palpable nodules  RESPIRATORY: Clear to auscultation bilaterally; No rales, rhonchi, wheezing, or rubs  CARDIOVASCULAR: Regular rate and rhythm; No murmurs; no peripheral edema  GI: Soft, nontender, non distended, normal bowel sounds  SKIN: Dry, intact, No rashes or lesions  MUSCULOSKELETAL: Full range of motion, normal strength  NEURO: sensation intact, extraocular movements intact, no tremor, normal reflexes  PSYCH: Alert and oriented x 3, normal affect, normal mood  CUSHING'S SIGNS: no striae                              10.4   18.54 )-----------( 152      ( 14 Sep 2021 22:04 )             30.1       09-14    139  |  103  |  13  ----------------------------<  139<H>  3.2<L>   |  18<L>  |  0.94    EGFR if : 67  EGFR if non : 58<L>    Ca    8.2<L>      09-14  Mg     2.0     09-14  Phos  4.4     09-14        Thyroid Function Tests:  09-08 @ 09:59 TSH 0.18 FreeT4 -- T3 -- Anti TPO -- Anti Thyroglobulin Ab -- TSI --  08-27 @ 07:27 TSH -- FreeT4 1.1 T3 -- Anti TPO -- Anti Thyroglobulin Ab -- TSI --              Radiology:                  HPI:  Patient is a 75yo F with a history of colon cancer s/p right hemicolectomy (approx 2 years ago, outside hospital), DVT (on Eliquis), CKD, adrenal insufficiency presenting as transfer from [facility] for trauma evaluation secondary to a mechanical fall from sitting. Patient was going to her commode last night when she slipped and fell on her right side, hitting her head. Denies LOC. Patient's daughter came to help her up and brought her to the ED. Since the fall, patient has been endorses right-sided chest pain but no SOB, headache, weakness or dizziness. Patient does not know what medications she takes.     Patient transferred to [facility] for trauma evaluation. In the ED, patient was hemodynamically stable but requiring 2L NC, satting 98%. Labs showed WBC 17, Cr 1.42, otherwise normal. CT head/cervical spine/chest were performed which showed a right forehead hematoma, acute minimally displaced right coracoid process scapular fracture, T11/L1 compression fractures and an inferior sternal fracture.    Endocrine History: Patient is currently intubated and Hx obtained from daughter  76 yr old F with Hx of colon CA, DVT, CKD and hypopituitarism (secondary AI and hypothyroidism), osteoporosis here with multiple fractures s/p mechanical fall. Patient was diagnosed with hypopituitarism 30 years ago. At home she takes hydrocortisone 10mg po BID and and levothyroxine 50mcg po daily. Currently takes risedronate for osteoporosis. Had bone pain when she was given prolia in past. She follows with endocrinologist Dr Lim. No Hx of pituitary mass. No Hx of Cinthya's syndrome. No Hx of infiltrative disease. Unclear cause of hypopituitarism.       PAST MEDICAL & SURGICAL HISTORY:  Lumbar Spinal Stenosis    Adult Hypothyroidism    Adrenal Insufficiency    Pituitary Insufficiency    Osteoporosis    Chronic Kidney Disease; Dx 2009    HTN - Hypertension    History of Obesity    Myocardial infarction    History of Laminectomy/ Fusion 1/06; L1-L2    H/O right hemicolectomy        FAMILY HISTORY:  Family history of hypertension (Mother)    Family history of diabetes mellitus (Sibling)        Social History: cared for by daughter    Outpatient Medications:  · 	aspirin 81 mg oral delayed release tablet: 1 tab(s) orally once a day  · 	guaiFENesin 600 mg oral tablet, extended release: 1 tab(s) orally every 12 hours as needed for cough   · 	senna oral tablet: 2 tab(s) orally once a day (at bedtime), As Needed  · 	acetaminophen 325 mg oral tablet: 2 tab(s) orally every 6 hours, As needed, Mild Pain (1 - 3)  · 	hydrocortisone 10 mg oral tablet: 1 tab(s) orally 2 times a day  · 	OxyCONTIN 20 mg oral tablet, extended release: 1 tab(s) orally every 12 hours  · 	Eliquis 2.5 mg oral tablet: 1 tab(s) orally 2 times a day  · 	omeprazole 20 mg oral delayed release capsule: 1 cap(s) orally once a day  · 	Multiple Vitamins oral tablet: 1 tab(s) orally once a day  · 	olmesartan 20 mg oral tablet: 1 tab(s) orally once a day  · 	felodipine 5 mg oral tablet, extended release: 1 tab(s) orally once a day  · 	metoprolol succinate 50 mg oral tablet, extended release: 1 tab(s) orally once a day  · 	latanoprost 0.005% ophthalmic solution: 1 drop(s) to each affected eye once a day (in the evening)  · 	Artificial Tears ophthalmic solution: 1 drop(s) to each affected eye 4 times a day, As Needed  · 	Lipitor 10 mg oral tablet: 1 tab(s) orally once a day  · 	oxyCODONE 5 mg oral tablet: 1 tab(s) orally every 8 hours as needed for severe pain  · 	levothyroxine 50 mcg (0.05 mg) oral tablet: 1 tab(s) orally once a day  · 	calcium (as carbonate) 600 mg oral tablet: 1 cap(s) orally once a day  · 	Macular Vitamin Benefit oral tablet: 1 tab(s) orally once a day  · 	risedronate 35 mg oral tablet: 1 tab(s) orally once a week (Mondays)    MEDICATIONS  (STANDING):  atorvastatin 10 milliGRAM(s) Oral at bedtime  ceFAZolin   IVPB 1000 milliGRAM(s) IV Intermittent every 8 hours  chlorhexidine 0.12% Liquid 15 milliLiter(s) Oral Mucosa every 12 hours  dexMEDEtomidine Infusion 0.2 MICROgram(s)/kG/Hr (4.08 mL/Hr) IV Continuous <Continuous>  hydrocortisone sodium succinate Injectable 25 milliGRAM(s) IV Push every 12 hours  HYDROmorphone PCA (1 mG/mL) 30 milliLiter(s) PCA Continuous PCA Continuous  influenza   Vaccine 0.5 milliLiter(s) IntraMuscular once  latanoprost 0.005% Ophthalmic Solution 1 Drop(s) Both EYES at bedtime  levothyroxine Injectable 40 MICROGram(s) IV Push at bedtime  oxyCODONE  ER Tablet 20 milliGRAM(s) Oral every 12 hours  pantoprazole  Injectable 40 milliGRAM(s) IV Push daily  phenylephrine    Infusion 0.1 MICROgram(s)/kG/Min (3.06 mL/Hr) IV Continuous <Continuous>  polyethylene glycol 3350 17 Gram(s) Oral daily  potassium chloride   Solution 40 milliEquivalent(s) Oral every 4 hours  sodium bicarbonate 650 milliGRAM(s) Oral three times a day  sodium chloride 0.9%. 1000 milliLiter(s) (50 mL/Hr) IV Continuous <Continuous>    MEDICATIONS  (PRN):  artificial  tears Solution 1 Drop(s) Both EYES every 6 hours PRN Dry Eyes  fentaNYL    Injectable 50 MICROGram(s) IV Push every 2 hours PRN Severe Pain (7 - 10)  HYDROmorphone PCA (1 mG/mL) Rescue Clinician Bolus 0.5 milliGRAM(s) IV Push every 15 minutes PRN for Pain Scale GREATER THAN 6  naloxone Injectable 0.1 milliGRAM(s) IV Push every 3 minutes PRN For ANY of the following changes in patient status:  A. RR LESS THAN 10 breaths per minute, B. Oxygen saturation LESS THAN 90%, C. Sedation score of 6  ondansetron Injectable 4 milliGRAM(s) IV Push every 6 hours PRN Nausea  senna 2 Tablet(s) Oral at bedtime PRN Constipation  traMADol 25 milliGRAM(s) Oral every 6 hours PRN Moderate Pain (4 - 6)      Allergies    penicillin (Rash)    Intolerances      Review of Systems:  Constitutional: No fever  Eyes: No blurry vision  Neuro: No tremors  HEENT: No pain  Cardiovascular: No chest pain, palpitations  Respiratory: No SOB, no cough  GI: No nausea, vomiting, abdominal pain  : No dysuria  Skin: no rash  Psych: no depression  Endocrine: no polyuria, polydipsia      ALL OTHER SYSTEMS REVIEWED AND NEGATIVE        PHYSICAL EXAM:  VITALS: T(C): 35.8 (09-15-21 @ 07:00)  T(F): 96.5 (09-15-21 @ 07:00), Max: 98.8 (09-14-21 @ 23:00)  HR: 70 (09-15-21 @ 12:00) (55 - 92)  BP: 113/58 (09-14-21 @ 22:00) (113/58 - 113/58)  RR:  (13 - 29)  SpO2:  (95% - 100%)  Wt(kg): --  GENERAL: NAD  EYES: No proptosis  HEENT:  Intubated  RESPIRATORY: Clear to auscultation bilaterally; No rales, rhonchi, wheezing, or rubs  CARDIOVASCULAR: Regular rate and rhythm  GI: Soft, nontender, non distended, normal bowel sounds  SKIN: Dry, intact, No rashes or lesions  PSYCH: sedated                                10.4   18.54 )-----------( 152      ( 14 Sep 2021 22:04 )             30.1       09-14    139  |  103  |  13  ----------------------------<  139<H>  3.2<L>   |  18<L>  |  0.94    EGFR if : 67  EGFR if non : 58<L>    Ca    8.2<L>      09-14  Mg     2.0     09-14  Phos  4.4     09-14        Thyroid Function Tests:  09-08 @ 09:59 TSH 0.18 FreeT4 -- T3 -- Anti TPO -- Anti Thyroglobulin Ab -- TSI --  08-27 @ 07:27 TSH -- FreeT4 1.1 T3 -- Anti TPO -- Anti Thyroglobulin Ab -- TSI --

## 2021-09-15 NOTE — PROGRESS NOTE ADULT - ASSESSMENT
ECHO 10/25/16:  Normal left ventricular systolic function. No segmental wall motion abnormalities. Hypermobile interatrial septum.   ECHO 8/24/21: EF 66% grossly nl LV sys fx, mild diastolic dsyfx - stage 1     a/p    Patient is a 75yo F , known to practice from prior admission,  with a history of colon cancer s/p right hemicolectomy (approx 2 years ago, outside hospital), DVT (on Eliquis), CKD, adrenal insufficiency presenting as transfer from [facility] for trauma evaluation secondary to a mechanical fall from sitting    #Mechanical fall  -MRI noted with anterior splaying of T12/L1 space w/ edema   -CT T done noted with widening of T12/L1 disc space w/ L1 laminectomy  -s/p T10-pelvis with revision of previous L2-5 hardware, L1 expanded PSO.  -remains intubated in NSCU,  wean to extubate  -CV stable   -management per neuro sx , NSCU     #SVT (hx)  -off bb post-op /critically ill   -will eventually resume     # DVT (hx)  -recent le doppler negative for acute dvt  -AC on hold post-op    # wm on CKD   -ivf per renal   -renal following     dvt ppx

## 2021-09-15 NOTE — PROGRESS NOTE ADULT - SUBJECTIVE AND OBJECTIVE BOX
EVENTS:   No acute events overnight.  On PS 10/5 throughout the day.     VITALS:  T(C): , Max: 37.1 (09-14-21 @ 23:00)  HR:  (55 - 96)  BP:  (113/58 - 113/58)  ABP:  (96/50 - 179/68)  RR:  (13 - 35)  SpO2:  (66% - 100%)  Wt(kg): --  Device: Avea, Mode: CPAP with PS, FiO2: 40, PEEP: 5, PS: 10, MAP: 8    09-14-21 @ 07:01  -  09-15-21 @ 07:00  --------------------------------------------------------  IN: 1077.1 mL / OUT: 753 mL / NET: 324.1 mL    09-15-21 @ 07:01  -  09-15-21 @ 19:44  --------------------------------------------------------  IN: 775.5 mL / OUT: 530 mL / NET: 245.5 mL      LABS:  Na: 136 (09-15 @ 17:46), 139 (09-14 @ 22:04), 137 (09-13 @ 17:11), 134 (09-13 @ 04:27)  K: 5.0 (09-15 @ 17:46), 3.2 (09-14 @ 22:04), 3.5 (09-13 @ 17:11), 3.8 (09-13 @ 04:27)  Cl: 106 (09-15 @ 17:46), 103 (09-14 @ 22:04), 99 (09-13 @ 17:11), 99 (09-13 @ 04:27)  CO2: 19 (09-15 @ 17:46), 18 (09-14 @ 22:04), 24 (09-13 @ 17:11), 21 (09-13 @ 04:27)  BUN: 14 (09-15 @ 17:46), 13 (09-14 @ 22:04), 14 (09-13 @ 17:11), 13 (09-13 @ 04:27)  Cr: 1.15 (09-15 @ 17:46), 0.94 (09-14 @ 22:04), 1.16 (09-13 @ 17:11), 1.11 (09-13 @ 04:27)  Glu: 118(09-15 @ 17:46), 139(09-14 @ 22:04), 112(09-13 @ 17:11), 115(09-13 @ 04:27)    Hgb: 12.1 (09-15 @ 17:46), 10.4 (09-14 @ 22:04), 10.8 (09-13 @ 04:27)  Hct: 34.2 (09-15 @ 17:46), 30.1 (09-14 @ 22:04), 30.4 (09-13 @ 04:27)  WBC: 20.00 (09-15 @ 17:46), 18.54 (09-14 @ 22:04), 11.33 (09-13 @ 04:27)  Plt: 183 (09-15 @ 17:46), 152 (09-14 @ 22:04), 241 (09-13 @ 04:27)    INR: 1.01 09-14-21 @ 05:46, 1.05 09-13-21 @ 17:10, 1.13 09-13-21 @ 11:08, 1.13 09-13-21 @ 06:34, 1.08 09-13-21 @ 04:27  PTT: 25.4 09-14-21 @ 05:46, 40.1 09-13-21 @ 23:08, 102.1 09-13-21 @ 17:10, 179.5 09-13-21 @ 11:08, 125.1 09-13-21 @ 06:34, 129.2 09-13-21 @ 04:27    MEDICATIONS:  artificial  tears Solution 1 Drop(s) Both EYES every 6 hours PRN  atorvastatin 10 milliGRAM(s) Oral at bedtime  chlorhexidine 0.12% Liquid 15 milliLiter(s) Oral Mucosa every 12 hours  chlorhexidine 4% Liquid 1 Application(s) Topical <User Schedule>  dexMEDEtomidine Infusion 0.2 MICROgram(s)/kG/Hr IV Continuous <Continuous>  fentaNYL    Injectable 50 MICROGram(s) IV Push every 2 hours PRN  hydrocortisone sodium succinate Injectable 25 milliGRAM(s) IV Push every 12 hours  HYDROmorphone PCA (1 mG/mL) 30 milliLiter(s) PCA Continuous PCA Continuous  HYDROmorphone PCA (1 mG/mL) Rescue Clinician Bolus 0.5 milliGRAM(s) IV Push every 15 minutes PRN  influenza   Vaccine 0.5 milliLiter(s) IntraMuscular once  latanoprost 0.005% Ophthalmic Solution 1 Drop(s) Both EYES at bedtime  levothyroxine 50 MICROGram(s) Oral daily  lidocaine   4% Patch 1 Patch Transdermal daily  naloxone Injectable 0.1 milliGRAM(s) IV Push every 3 minutes PRN  ondansetron Injectable 4 milliGRAM(s) IV Push every 6 hours PRN  pantoprazole  Injectable 40 milliGRAM(s) IV Push daily  phenylephrine    Infusion 0.1 MICROgram(s)/kG/Min IV Continuous <Continuous>  polyethylene glycol 3350 17 Gram(s) Oral daily  senna 2 Tablet(s) Oral at bedtime PRN  sodium bicarbonate 650 milliGRAM(s) Oral three times a day  sodium chloride 0.9%. 1000 milliLiter(s) IV Continuous <Continuous>  traMADol 25 milliGRAM(s) Oral every 6 hours PRN    EXAMINATION:  General:  calm  HEENT:  MMM  Neuro:  awake, alert, oriented x 3, follows commands, moves all extremities  Cards:  RRR  Respiratory:  no respiratory distress  Adomen:  soft  Extremities:  no edema  Skin:  warm/dry    77yo woman with history of colon ca s/p right hemicolectomy, DVT (on eliquis), CKD, and Adrenal Insufficiency that presented s/p fall with imaging notable for T11 and L1 compression fractures s/p T10-pelvis with revision of previous L2-5 hardware, L1 expanded PSO. Remains intubated for agitation, poor RSBI and poor following of commands. B/l LEs difficult to examine but move in the plane of the bed to noxious.     Neuro:   -q1 hour neuro checks  -fentanyl prn for pain control  -dilaudid pca for pain control once extubated  -keep flat in bed for intraop csf leak  -continue hemovacs to suction, monitor outputs  -ct lumbar spine - pending  -pt eval once no longer flat    Respiratory:   -intubated, wean to extubate  -c/w SBT trials     CV:  -keep MAP > 75  -hold home antihypertensives while maintaining MAP goals    Endocrine:   -levothyroxine for hypothyroid  -maintain euglycemia    Heme/Onc: hx of DVT, 2019- bilateral  -chemical dvt prophylaxis on hold secondary to OR today  -SCDs only for DVT prophylaxis    Renal: Baseline SCr 1.4-1.6  -ervin for strict ins and outs    ID: leukocytosis likely 2/2 surgery/steroids   -monitor cbc    GI:   -npo/livf  -protonix for gi prophylaxis  -senna and miralax for bowel regimen    Endo: hypopit on hydrocort 10mg BID at home  -glucose goal 120-180  -continue hydrocortisone 25mg IV BID  - stress dose steroids??   EVENTS:   No acute events overnight.  On PS 10/5 throughout the day but tachypneic to 50 towards the evening, despite Precedex and fentanyl, therefore placed on VC with improvement in tachypnea.   On rohith, Hg stable  Last BM today    VITALS:  T(C): , Max: 37.1 (09-14-21 @ 23:00)  HR:  (55 - 96)  BP:  (113/58 - 113/58)  ABP:  (96/50 - 179/68)  RR:  (13 - 35)  SpO2:  (66% - 100%)  Wt(kg): --  Device: Avea, Mode: CPAP with PS, FiO2: 40, PEEP: 5, PS: 10, MAP: 8    09-14-21 @ 07:01  -  09-15-21 @ 07:00  --------------------------------------------------------  IN: 1077.1 mL / OUT: 753 mL / NET: 324.1 mL    09-15-21 @ 07:01  -  09-15-21 @ 19:44  --------------------------------------------------------  IN: 775.5 mL / OUT: 530 mL / NET: 245.5 mL      LABS:  Na: 136 (09-15 @ 17:46), 139 (09-14 @ 22:04), 137 (09-13 @ 17:11), 134 (09-13 @ 04:27)  K: 5.0 (09-15 @ 17:46), 3.2 (09-14 @ 22:04), 3.5 (09-13 @ 17:11), 3.8 (09-13 @ 04:27)  Cl: 106 (09-15 @ 17:46), 103 (09-14 @ 22:04), 99 (09-13 @ 17:11), 99 (09-13 @ 04:27)  CO2: 19 (09-15 @ 17:46), 18 (09-14 @ 22:04), 24 (09-13 @ 17:11), 21 (09-13 @ 04:27)  BUN: 14 (09-15 @ 17:46), 13 (09-14 @ 22:04), 14 (09-13 @ 17:11), 13 (09-13 @ 04:27)  Cr: 1.15 (09-15 @ 17:46), 0.94 (09-14 @ 22:04), 1.16 (09-13 @ 17:11), 1.11 (09-13 @ 04:27)  Glu: 118(09-15 @ 17:46), 139(09-14 @ 22:04), 112(09-13 @ 17:11), 115(09-13 @ 04:27)    Hgb: 12.1 (09-15 @ 17:46), 10.4 (09-14 @ 22:04), 10.8 (09-13 @ 04:27)  Hct: 34.2 (09-15 @ 17:46), 30.1 (09-14 @ 22:04), 30.4 (09-13 @ 04:27)  WBC: 20.00 (09-15 @ 17:46), 18.54 (09-14 @ 22:04), 11.33 (09-13 @ 04:27)  Plt: 183 (09-15 @ 17:46), 152 (09-14 @ 22:04), 241 (09-13 @ 04:27)    INR: 1.01 09-14-21 @ 05:46, 1.05 09-13-21 @ 17:10, 1.13 09-13-21 @ 11:08, 1.13 09-13-21 @ 06:34, 1.08 09-13-21 @ 04:27  PTT: 25.4 09-14-21 @ 05:46, 40.1 09-13-21 @ 23:08, 102.1 09-13-21 @ 17:10, 179.5 09-13-21 @ 11:08, 125.1 09-13-21 @ 06:34, 129.2 09-13-21 @ 04:27    MEDICATIONS:  artificial  tears Solution 1 Drop(s) Both EYES every 6 hours PRN  atorvastatin 10 milliGRAM(s) Oral at bedtime  chlorhexidine 0.12% Liquid 15 milliLiter(s) Oral Mucosa every 12 hours  chlorhexidine 4% Liquid 1 Application(s) Topical <User Schedule>  dexMEDEtomidine Infusion 0.2 MICROgram(s)/kG/Hr IV Continuous <Continuous>  fentaNYL    Injectable 50 MICROGram(s) IV Push every 2 hours PRN  hydrocortisone sodium succinate Injectable 25 milliGRAM(s) IV Push every 12 hours  HYDROmorphone PCA (1 mG/mL) 30 milliLiter(s) PCA Continuous PCA Continuous  HYDROmorphone PCA (1 mG/mL) Rescue Clinician Bolus 0.5 milliGRAM(s) IV Push every 15 minutes PRN  influenza   Vaccine 0.5 milliLiter(s) IntraMuscular once  latanoprost 0.005% Ophthalmic Solution 1 Drop(s) Both EYES at bedtime  levothyroxine 50 MICROGram(s) Oral daily  lidocaine   4% Patch 1 Patch Transdermal daily  naloxone Injectable 0.1 milliGRAM(s) IV Push every 3 minutes PRN  ondansetron Injectable 4 milliGRAM(s) IV Push every 6 hours PRN  pantoprazole  Injectable 40 milliGRAM(s) IV Push daily  phenylephrine    Infusion 0.1 MICROgram(s)/kG/Min IV Continuous <Continuous>  polyethylene glycol 3350 17 Gram(s) Oral daily  senna 2 Tablet(s) Oral at bedtime PRN  sodium bicarbonate 650 milliGRAM(s) Oral three times a day  sodium chloride 0.9%. 1000 milliLiter(s) IV Continuous <Continuous>  traMADol 25 milliGRAM(s) Oral every 6 hours PRN    EXAMINATION:  General:  calm  HEENT:  MMM  Neuro:  awake, alert, oriented x 3, follows commands, moves all extremities  Cards:  RRR  Respiratory:  no respiratory distress  Adomen:  soft  Extremities:  no edema  Skin:  warm/dry    75yo woman with history of colon ca s/p right hemicolectomy, DVT (on eliquis), CKD, and Adrenal Insufficiency that presented s/p fall with imaging notable for T11 and L1 compression fractures s/p T10-pelvis with revision of previous L2-5 hardware, L1 expanded PSO. Remains intubated for agitation, poor RSBI and poor following of commands. B/l LEs difficult to examine but move in the plane of the bed to noxious.     Neuro:   -q1 hour neuro checks  -fentanyl prn for pain control  - start oxy 5mg q6h for rib fractures to pain  -dilaudid pca for pain control once extubated  -keep flat in bed for intraop csf leak  -continue hemovacs to suction, monitor outputs  -ct lumbar spine - pending  -pt eval once no longer flat    Respiratory:   -intubated, wean to extubate  -c/w SBT trials     CV:  -keep MAP > 75  -hold home antihypertensives while maintaining MAP goals    Endocrine:   -levothyroxine for hypothyroid  -maintain euglycemia    Heme/Onc: hx of DVT, 2019- bilateral  -chemical dvt prophylaxis on hold secondary to OR today  -LE doppler (last 8/23 neg)  -consider Lov ppx tomorrow AM     Renal: Baseline SCr 1.4-1.6  -ervin for strict ins and outs    ID: leukocytosis likely 2/2 surgery/steroids   -monitor cbc    GI:   -npo/livf  -protonix for gi prophylaxis  -senna and miralax for bowel regimen    Endo: hypopit on hydrocort 10mg BID at home  -glucose goal 120-180  -continue hydrocortisone 25mg IV BID  - stress dose steroids??   EVENTS:   No acute events overnight.  On PS 10/5 throughout the day but tachypneic to 50 towards the evening, despite Precedex and fentanyl, therefore placed on VC with improvement in tachypnea.   On rohith, Hg stable  Last BM today    VITALS:  T(C): , Max: 37.1 (09-14-21 @ 23:00)  HR:  (55 - 96)  BP:  (113/58 - 113/58)  ABP:  (96/50 - 179/68)  RR:  (13 - 35)  SpO2:  (66% - 100%)  Wt(kg): --  Device: Avea, Mode: CPAP with PS, FiO2: 40, PEEP: 5, PS: 10, MAP: 8    09-14-21 @ 07:01  -  09-15-21 @ 07:00  --------------------------------------------------------  IN: 1077.1 mL / OUT: 753 mL / NET: 324.1 mL    09-15-21 @ 07:01  -  09-15-21 @ 19:44  --------------------------------------------------------  IN: 775.5 mL / OUT: 530 mL / NET: 245.5 mL      LABS:  Na: 136 (09-15 @ 17:46), 139 (09-14 @ 22:04), 137 (09-13 @ 17:11), 134 (09-13 @ 04:27)  K: 5.0 (09-15 @ 17:46), 3.2 (09-14 @ 22:04), 3.5 (09-13 @ 17:11), 3.8 (09-13 @ 04:27)  Cl: 106 (09-15 @ 17:46), 103 (09-14 @ 22:04), 99 (09-13 @ 17:11), 99 (09-13 @ 04:27)  CO2: 19 (09-15 @ 17:46), 18 (09-14 @ 22:04), 24 (09-13 @ 17:11), 21 (09-13 @ 04:27)  BUN: 14 (09-15 @ 17:46), 13 (09-14 @ 22:04), 14 (09-13 @ 17:11), 13 (09-13 @ 04:27)  Cr: 1.15 (09-15 @ 17:46), 0.94 (09-14 @ 22:04), 1.16 (09-13 @ 17:11), 1.11 (09-13 @ 04:27)  Glu: 118(09-15 @ 17:46), 139(09-14 @ 22:04), 112(09-13 @ 17:11), 115(09-13 @ 04:27)    Hgb: 12.1 (09-15 @ 17:46), 10.4 (09-14 @ 22:04), 10.8 (09-13 @ 04:27)  Hct: 34.2 (09-15 @ 17:46), 30.1 (09-14 @ 22:04), 30.4 (09-13 @ 04:27)  WBC: 20.00 (09-15 @ 17:46), 18.54 (09-14 @ 22:04), 11.33 (09-13 @ 04:27)  Plt: 183 (09-15 @ 17:46), 152 (09-14 @ 22:04), 241 (09-13 @ 04:27)    INR: 1.01 09-14-21 @ 05:46, 1.05 09-13-21 @ 17:10, 1.13 09-13-21 @ 11:08, 1.13 09-13-21 @ 06:34, 1.08 09-13-21 @ 04:27  PTT: 25.4 09-14-21 @ 05:46, 40.1 09-13-21 @ 23:08, 102.1 09-13-21 @ 17:10, 179.5 09-13-21 @ 11:08, 125.1 09-13-21 @ 06:34, 129.2 09-13-21 @ 04:27    MEDICATIONS:  artificial  tears Solution 1 Drop(s) Both EYES every 6 hours PRN  atorvastatin 10 milliGRAM(s) Oral at bedtime  chlorhexidine 0.12% Liquid 15 milliLiter(s) Oral Mucosa every 12 hours  chlorhexidine 4% Liquid 1 Application(s) Topical <User Schedule>  dexMEDEtomidine Infusion 0.2 MICROgram(s)/kG/Hr IV Continuous <Continuous>  fentaNYL    Injectable 50 MICROGram(s) IV Push every 2 hours PRN  hydrocortisone sodium succinate Injectable 25 milliGRAM(s) IV Push every 12 hours  HYDROmorphone PCA (1 mG/mL) 30 milliLiter(s) PCA Continuous PCA Continuous  HYDROmorphone PCA (1 mG/mL) Rescue Clinician Bolus 0.5 milliGRAM(s) IV Push every 15 minutes PRN  influenza   Vaccine 0.5 milliLiter(s) IntraMuscular once  latanoprost 0.005% Ophthalmic Solution 1 Drop(s) Both EYES at bedtime  levothyroxine 50 MICROGram(s) Oral daily  lidocaine   4% Patch 1 Patch Transdermal daily  naloxone Injectable 0.1 milliGRAM(s) IV Push every 3 minutes PRN  ondansetron Injectable 4 milliGRAM(s) IV Push every 6 hours PRN  pantoprazole  Injectable 40 milliGRAM(s) IV Push daily  phenylephrine    Infusion 0.1 MICROgram(s)/kG/Min IV Continuous <Continuous>  polyethylene glycol 3350 17 Gram(s) Oral daily  senna 2 Tablet(s) Oral at bedtime PRN  sodium bicarbonate 650 milliGRAM(s) Oral three times a day  sodium chloride 0.9%. 1000 milliLiter(s) IV Continuous <Continuous>  traMADol 25 milliGRAM(s) Oral every 6 hours PRN    EXAMINATION:  General:  calm  HEENT:  MMM  Neuro:  awake, alert, oriented x 3, follows commands, moves all extremities  Cards:  RRR  Respiratory:  no respiratory distress  Adomen:  soft  Extremities:  no edema  Skin:  warm/dry    77yo woman with history of colon ca s/p right hemicolectomy, DVT (on eliquis), CKD, and Adrenal Insufficiency that presented s/p fall with imaging notable for T11 and L1 compression fractures s/p T10-pelvis with revision of previous L2-5 hardware, L1 expanded PSO. Remains intubated for agitation, poor RSBI and poor following of commands. B/l LEs difficult to examine but move in the plane of the bed to noxious.     Neuro:   -q1 hour neuro checks  -fentanyl prn for pain control  - start oxy 5mg q6h for rib fractures to pain  -dilaudid pca for pain control once extubated  -keep flat in bed for intraop csf leak  -continue hemovacs to suction, monitor outputs  -ct lumbar spine - pending  -pt eval once no longer flat    Respiratory:   -intubated, wean to extubate  -c/w SBT trials     CV:  -keep MAP > 75  -hold home antihypertensives while maintaining MAP goals    Endocrine:   -levothyroxine for hypothyroid  -maintain euglycemia    Heme/Onc: hx of DVT, 2019- bilateral  -chemical dvt prophylaxis on hold secondary to OR today  -LE doppler last 9/9 neg  -consider Lov ppx tomorrow AM     Renal: Baseline SCr 1.4-1.6  -ervin for strict ins and outs    ID: leukocytosis likely 2/2 surgery/steroids   -monitor cbc    GI:   -npo/livf  -protonix for gi prophylaxis  -senna and miralax for bowel regimen    Endo: hypopit on hydrocort 10mg BID at home  -glucose goal 120-180  -continue hydrocortisone 25mg IV BID  - stress dose steroids??   EVENTS:   No acute events overnight.  On PS 10/5 throughout the day but tachypneic to 50 towards the evening, despite Precedex and fentanyl, therefore placed on VC with improvement in tachypnea.   On rohith, Hg stable  Last BM today  Reports pain by nodding    VITALS:  T(C): , Max: 37.1 (09-14-21 @ 23:00)  HR:  (55 - 96)  BP:  (113/58 - 113/58)  ABP:  (96/50 - 179/68)  RR:  (13 - 35)  SpO2:  (66% - 100%)  Wt(kg): --  Device: Avea, Mode: CPAP with PS, FiO2: 40, PEEP: 5, PS: 10, MAP: 8    09-14-21 @ 07:01  -  09-15-21 @ 07:00  --------------------------------------------------------  IN: 1077.1 mL / OUT: 753 mL / NET: 324.1 mL    09-15-21 @ 07:01  -  09-15-21 @ 19:44  --------------------------------------------------------  IN: 775.5 mL / OUT: 530 mL / NET: 245.5 mL      LABS:  Na: 136 (09-15 @ 17:46), 139 (09-14 @ 22:04), 137 (09-13 @ 17:11), 134 (09-13 @ 04:27)  K: 5.0 (09-15 @ 17:46), 3.2 (09-14 @ 22:04), 3.5 (09-13 @ 17:11), 3.8 (09-13 @ 04:27)  Cl: 106 (09-15 @ 17:46), 103 (09-14 @ 22:04), 99 (09-13 @ 17:11), 99 (09-13 @ 04:27)  CO2: 19 (09-15 @ 17:46), 18 (09-14 @ 22:04), 24 (09-13 @ 17:11), 21 (09-13 @ 04:27)  BUN: 14 (09-15 @ 17:46), 13 (09-14 @ 22:04), 14 (09-13 @ 17:11), 13 (09-13 @ 04:27)  Cr: 1.15 (09-15 @ 17:46), 0.94 (09-14 @ 22:04), 1.16 (09-13 @ 17:11), 1.11 (09-13 @ 04:27)  Glu: 118(09-15 @ 17:46), 139(09-14 @ 22:04), 112(09-13 @ 17:11), 115(09-13 @ 04:27)    Hgb: 12.1 (09-15 @ 17:46), 10.4 (09-14 @ 22:04), 10.8 (09-13 @ 04:27)  Hct: 34.2 (09-15 @ 17:46), 30.1 (09-14 @ 22:04), 30.4 (09-13 @ 04:27)  WBC: 20.00 (09-15 @ 17:46), 18.54 (09-14 @ 22:04), 11.33 (09-13 @ 04:27)  Plt: 183 (09-15 @ 17:46), 152 (09-14 @ 22:04), 241 (09-13 @ 04:27)    INR: 1.01 09-14-21 @ 05:46, 1.05 09-13-21 @ 17:10, 1.13 09-13-21 @ 11:08, 1.13 09-13-21 @ 06:34, 1.08 09-13-21 @ 04:27  PTT: 25.4 09-14-21 @ 05:46, 40.1 09-13-21 @ 23:08, 102.1 09-13-21 @ 17:10, 179.5 09-13-21 @ 11:08, 125.1 09-13-21 @ 06:34, 129.2 09-13-21 @ 04:27    MEDICATIONS:  artificial  tears Solution 1 Drop(s) Both EYES every 6 hours PRN  atorvastatin 10 milliGRAM(s) Oral at bedtime  chlorhexidine 0.12% Liquid 15 milliLiter(s) Oral Mucosa every 12 hours  chlorhexidine 4% Liquid 1 Application(s) Topical <User Schedule>  dexMEDEtomidine Infusion 0.2 MICROgram(s)/kG/Hr IV Continuous <Continuous>  fentaNYL    Injectable 50 MICROGram(s) IV Push every 2 hours PRN  hydrocortisone sodium succinate Injectable 25 milliGRAM(s) IV Push every 12 hours  HYDROmorphone PCA (1 mG/mL) 30 milliLiter(s) PCA Continuous PCA Continuous  HYDROmorphone PCA (1 mG/mL) Rescue Clinician Bolus 0.5 milliGRAM(s) IV Push every 15 minutes PRN  influenza   Vaccine 0.5 milliLiter(s) IntraMuscular once  latanoprost 0.005% Ophthalmic Solution 1 Drop(s) Both EYES at bedtime  levothyroxine 50 MICROGram(s) Oral daily  lidocaine   4% Patch 1 Patch Transdermal daily  naloxone Injectable 0.1 milliGRAM(s) IV Push every 3 minutes PRN  ondansetron Injectable 4 milliGRAM(s) IV Push every 6 hours PRN  pantoprazole  Injectable 40 milliGRAM(s) IV Push daily  phenylephrine    Infusion 0.1 MICROgram(s)/kG/Min IV Continuous <Continuous>  polyethylene glycol 3350 17 Gram(s) Oral daily  senna 2 Tablet(s) Oral at bedtime PRN  sodium bicarbonate 650 milliGRAM(s) Oral three times a day  sodium chloride 0.9%. 1000 milliLiter(s) IV Continuous <Continuous>  traMADol 25 milliGRAM(s) Oral every 6 hours PRN    EXAMINATION:  General:  calm  HEENT:  MMM  Neuro:  opens eyes to commands, nods yes and no appropriately, follows simple commands,  5/5 and able to move arms in the plane of the bed elwo the elbow but not able to raise antigravity, able to KE 3/5 with legs dupported b/l, able to wiggle b/l toes.   Cards:  RRR  Respiratory:  no respiratory distress  Adomen:  soft  Extremities:  no edema  Skin:  warm/dry    75yo woman with history of colon ca s/p right hemicolectomy, DVT (on eliquis), CKD, and Adrenal Insufficiency that presented s/p fall with imaging notable for T11 and L1 compression fractures s/p T10-pelvis with revision of previous L2-5 hardware, L1 expanded PSO. Remains intubated for agitation, poor RSBI and poor following of commands. B/l LEs difficult to examine but move in the plane of the bed to noxious.     Neuro:   -q1 hour neuro checks  -fentanyl prn for pain control  - start oxy 5mg q6h for rib fractures to pain  -dilaudid pca for pain control once extubated  -keep flat in bed for intraop csf leak  -continue hemovacs to suction, monitor outputs  -ct lumbar spine - pending  -pt eval once no longer flat    Respiratory:   -intubated, wean to extubate  -c/w SBT trials     CV:  -keep MAP > 75  -hold home antihypertensives while maintaining MAP goals    Endocrine:   -levothyroxine for hypothyroid  -maintain euglycemia    Heme/Onc: hx of DVT, 2019- bilateral  -chemical dvt prophylaxis on hold secondary to OR today  -LE doppler last 9/9 neg  -consider Lov ppx tomorrow AM     Renal: Baseline SCr 1.4-1.6  -ervin for strict ins and outs    ID: leukocytosis likely 2/2 surgery/steroids   -monitor cbc    GI:   -npo/livf  -protonix for gi prophylaxis  -senna and miralax for bowel regimen    Endo: hypopit on hydrocort 10mg BID at home  -glucose goal 120-180  -continue hydrocortisone 25mg IV BID  - stress dose steroids??   EVENTS:   No acute events overnight.  On PS 10/5 throughout the day but tachypneic to 50 towards the evening, despite Precedex and fentanyl, therefore placed on VC with improvement in tachypnea.   On rohith, Hg stable  Last BM today  Reports pain by nodding    VITALS:  T(C): , Max: 37.1 (09-14-21 @ 23:00)  HR:  (55 - 96)  BP:  (113/58 - 113/58)  ABP:  (96/50 - 179/68)  RR:  (13 - 35)  SpO2:  (66% - 100%)  Wt(kg): --  Device: Avea, Mode: CPAP with PS, FiO2: 40, PEEP: 5, PS: 10, MAP: 8    09-14-21 @ 07:01  -  09-15-21 @ 07:00  --------------------------------------------------------  IN: 1077.1 mL / OUT: 753 mL / NET: 324.1 mL    09-15-21 @ 07:01  -  09-15-21 @ 19:44  --------------------------------------------------------  IN: 775.5 mL / OUT: 530 mL / NET: 245.5 mL      LABS:  Na: 136 (09-15 @ 17:46), 139 (09-14 @ 22:04), 137 (09-13 @ 17:11), 134 (09-13 @ 04:27)  K: 5.0 (09-15 @ 17:46), 3.2 (09-14 @ 22:04), 3.5 (09-13 @ 17:11), 3.8 (09-13 @ 04:27)  Cl: 106 (09-15 @ 17:46), 103 (09-14 @ 22:04), 99 (09-13 @ 17:11), 99 (09-13 @ 04:27)  CO2: 19 (09-15 @ 17:46), 18 (09-14 @ 22:04), 24 (09-13 @ 17:11), 21 (09-13 @ 04:27)  BUN: 14 (09-15 @ 17:46), 13 (09-14 @ 22:04), 14 (09-13 @ 17:11), 13 (09-13 @ 04:27)  Cr: 1.15 (09-15 @ 17:46), 0.94 (09-14 @ 22:04), 1.16 (09-13 @ 17:11), 1.11 (09-13 @ 04:27)  Glu: 118(09-15 @ 17:46), 139(09-14 @ 22:04), 112(09-13 @ 17:11), 115(09-13 @ 04:27)    Hgb: 12.1 (09-15 @ 17:46), 10.4 (09-14 @ 22:04), 10.8 (09-13 @ 04:27)  Hct: 34.2 (09-15 @ 17:46), 30.1 (09-14 @ 22:04), 30.4 (09-13 @ 04:27)  WBC: 20.00 (09-15 @ 17:46), 18.54 (09-14 @ 22:04), 11.33 (09-13 @ 04:27)  Plt: 183 (09-15 @ 17:46), 152 (09-14 @ 22:04), 241 (09-13 @ 04:27)    INR: 1.01 09-14-21 @ 05:46, 1.05 09-13-21 @ 17:10, 1.13 09-13-21 @ 11:08, 1.13 09-13-21 @ 06:34, 1.08 09-13-21 @ 04:27  PTT: 25.4 09-14-21 @ 05:46, 40.1 09-13-21 @ 23:08, 102.1 09-13-21 @ 17:10, 179.5 09-13-21 @ 11:08, 125.1 09-13-21 @ 06:34, 129.2 09-13-21 @ 04:27    MEDICATIONS:  artificial  tears Solution 1 Drop(s) Both EYES every 6 hours PRN  atorvastatin 10 milliGRAM(s) Oral at bedtime  chlorhexidine 0.12% Liquid 15 milliLiter(s) Oral Mucosa every 12 hours  chlorhexidine 4% Liquid 1 Application(s) Topical <User Schedule>  dexMEDEtomidine Infusion 0.2 MICROgram(s)/kG/Hr IV Continuous <Continuous>  fentaNYL    Injectable 50 MICROGram(s) IV Push every 2 hours PRN  hydrocortisone sodium succinate Injectable 25 milliGRAM(s) IV Push every 12 hours  HYDROmorphone PCA (1 mG/mL) 30 milliLiter(s) PCA Continuous PCA Continuous  HYDROmorphone PCA (1 mG/mL) Rescue Clinician Bolus 0.5 milliGRAM(s) IV Push every 15 minutes PRN  influenza   Vaccine 0.5 milliLiter(s) IntraMuscular once  latanoprost 0.005% Ophthalmic Solution 1 Drop(s) Both EYES at bedtime  levothyroxine 50 MICROGram(s) Oral daily  lidocaine   4% Patch 1 Patch Transdermal daily  naloxone Injectable 0.1 milliGRAM(s) IV Push every 3 minutes PRN  ondansetron Injectable 4 milliGRAM(s) IV Push every 6 hours PRN  pantoprazole  Injectable 40 milliGRAM(s) IV Push daily  phenylephrine    Infusion 0.1 MICROgram(s)/kG/Min IV Continuous <Continuous>  polyethylene glycol 3350 17 Gram(s) Oral daily  senna 2 Tablet(s) Oral at bedtime PRN  sodium bicarbonate 650 milliGRAM(s) Oral three times a day  sodium chloride 0.9%. 1000 milliLiter(s) IV Continuous <Continuous>  traMADol 25 milliGRAM(s) Oral every 6 hours PRN    EXAMINATION:  General:  calm  HEENT:  MMM  Neuro:  opens eyes to command, nods yes and no appropriately, follows simple commands,  5/5 and able to move arms in the plane of the bed below the elbow but not able to raise arms antigravity, able to KE 3/5 with legs supported b/l, able to wiggle b/l toes.   Cards:  RRR  Respiratory: mechanically ventilated   Adomen:  soft  Extremities:  no edema  Skin:  warm/dry    Assessment/Plan:  75yo woman with history of colon ca s/p right hemicolectomy, b/l DVT 2019 (on eliquis), CKD, and Adrenal Insufficiency that presented s/p fall with imaging notable for T11 and L1 compression fractures s/p T10-pelvis with revision of previous L2-5 hardware, L1 expanded PSO. Remains intubated for tachypnea and low tidal volumes, possible 2/2 splinting from rib fractures.    Neuro:   -q1 hour neuro checks  -fentanyl prn for pain control  -start oxy 5mg q6h for rib fractures to improve pain control and HERE  -dilaudid pca for pain control once extubated  -keep flat in bed for intraop csf leak  -continue hemovacs to suction, monitor outputs  -ct lumbar spine - pending  -pt eval once no longer flat    Respiratory:   -intubated, wean to extubate  -c/w SBT trials     CV:  -keep MAP > 75  -hold home antihypertensives while maintaining MAP goals    Endocrine:   -levothyroxine for hypothyroid  -maintain euglycemia    Heme/Onc: hx of DVT, 2019- bilateral  -chemical dvt prophylaxis on hold secondary to OR today  -LE doppler last 9/9 neg  -consider Lov ppx tomorrow AM     Renal: Baseline SCr 1.4-1.6  -ervin for strict ins and outs    ID: leukocytosis likely 2/2 surgery/steroids   -monitor cbc    GI:   -npo/livf  -protonix for gi prophylaxis  -senna and miralax for bowel regimen    Endo: hypopit on hydrocort 10mg BID at home  -glucose goal 120-180  -continue hydrocortisone 25mg IV BID  - stress dose steroids??   EVENTS:   No acute events overnight.  On PS 10/5 throughout the day but tachypneic to 50 towards the evening, despite Precedex and fentanyl, therefore placed on VC with improvement in tachypnea.   On rohith, Hg stable  Last BM today  Reports pain by nodding    VITALS:  T(C): , Max: 37.1 (09-14-21 @ 23:00)  HR:  (55 - 96)  BP:  (113/58 - 113/58)  ABP:  (96/50 - 179/68)  RR:  (13 - 35)  SpO2:  (66% - 100%)  Wt(kg): --  Device: Avea, Mode: CPAP with PS, FiO2: 40, PEEP: 5, PS: 10, MAP: 8    09-14-21 @ 07:01  -  09-15-21 @ 07:00  --------------------------------------------------------  IN: 1077.1 mL / OUT: 753 mL / NET: 324.1 mL    09-15-21 @ 07:01  -  09-15-21 @ 19:44  --------------------------------------------------------  IN: 775.5 mL / OUT: 530 mL / NET: 245.5 mL      LABS:  Na: 136 (09-15 @ 17:46), 139 (09-14 @ 22:04), 137 (09-13 @ 17:11), 134 (09-13 @ 04:27)  K: 5.0 (09-15 @ 17:46), 3.2 (09-14 @ 22:04), 3.5 (09-13 @ 17:11), 3.8 (09-13 @ 04:27)  Cl: 106 (09-15 @ 17:46), 103 (09-14 @ 22:04), 99 (09-13 @ 17:11), 99 (09-13 @ 04:27)  CO2: 19 (09-15 @ 17:46), 18 (09-14 @ 22:04), 24 (09-13 @ 17:11), 21 (09-13 @ 04:27)  BUN: 14 (09-15 @ 17:46), 13 (09-14 @ 22:04), 14 (09-13 @ 17:11), 13 (09-13 @ 04:27)  Cr: 1.15 (09-15 @ 17:46), 0.94 (09-14 @ 22:04), 1.16 (09-13 @ 17:11), 1.11 (09-13 @ 04:27)  Glu: 118(09-15 @ 17:46), 139(09-14 @ 22:04), 112(09-13 @ 17:11), 115(09-13 @ 04:27)    Hgb: 12.1 (09-15 @ 17:46), 10.4 (09-14 @ 22:04), 10.8 (09-13 @ 04:27)  Hct: 34.2 (09-15 @ 17:46), 30.1 (09-14 @ 22:04), 30.4 (09-13 @ 04:27)  WBC: 20.00 (09-15 @ 17:46), 18.54 (09-14 @ 22:04), 11.33 (09-13 @ 04:27)  Plt: 183 (09-15 @ 17:46), 152 (09-14 @ 22:04), 241 (09-13 @ 04:27)    INR: 1.01 09-14-21 @ 05:46, 1.05 09-13-21 @ 17:10, 1.13 09-13-21 @ 11:08, 1.13 09-13-21 @ 06:34, 1.08 09-13-21 @ 04:27  PTT: 25.4 09-14-21 @ 05:46, 40.1 09-13-21 @ 23:08, 102.1 09-13-21 @ 17:10, 179.5 09-13-21 @ 11:08, 125.1 09-13-21 @ 06:34, 129.2 09-13-21 @ 04:27    MEDICATIONS:  artificial  tears Solution 1 Drop(s) Both EYES every 6 hours PRN  atorvastatin 10 milliGRAM(s) Oral at bedtime  chlorhexidine 0.12% Liquid 15 milliLiter(s) Oral Mucosa every 12 hours  chlorhexidine 4% Liquid 1 Application(s) Topical <User Schedule>  dexMEDEtomidine Infusion 0.2 MICROgram(s)/kG/Hr IV Continuous <Continuous>  fentaNYL    Injectable 50 MICROGram(s) IV Push every 2 hours PRN  hydrocortisone sodium succinate Injectable 25 milliGRAM(s) IV Push every 12 hours  HYDROmorphone PCA (1 mG/mL) 30 milliLiter(s) PCA Continuous PCA Continuous  HYDROmorphone PCA (1 mG/mL) Rescue Clinician Bolus 0.5 milliGRAM(s) IV Push every 15 minutes PRN  influenza   Vaccine 0.5 milliLiter(s) IntraMuscular once  latanoprost 0.005% Ophthalmic Solution 1 Drop(s) Both EYES at bedtime  levothyroxine 50 MICROGram(s) Oral daily  lidocaine   4% Patch 1 Patch Transdermal daily  naloxone Injectable 0.1 milliGRAM(s) IV Push every 3 minutes PRN  ondansetron Injectable 4 milliGRAM(s) IV Push every 6 hours PRN  pantoprazole  Injectable 40 milliGRAM(s) IV Push daily  phenylephrine    Infusion 0.1 MICROgram(s)/kG/Min IV Continuous <Continuous>  polyethylene glycol 3350 17 Gram(s) Oral daily  senna 2 Tablet(s) Oral at bedtime PRN  sodium bicarbonate 650 milliGRAM(s) Oral three times a day  sodium chloride 0.9%. 1000 milliLiter(s) IV Continuous <Continuous>  traMADol 25 milliGRAM(s) Oral every 6 hours PRN    EXAMINATION:  General:  calm  HEENT:  MMM  Neuro:  opens eyes to command, nods yes and no appropriately, follows simple commands,  5/5 and able to move arms in the plane of the bed below the elbow but not able to raise arms antigravity, able to KE 3/5 with legs supported b/l, able to wiggle b/l toes.   Cards:  RRR  Respiratory: mechanically ventilated   Adomen:  soft  Extremities:  no edema  Skin:  warm/dry    Assessment/Plan:  77yo woman with history of colon ca s/p right hemicolectomy, b/l DVT 2019 (on eliquis), CKD, and Adrenal Insufficiency that presented s/p fall with imaging notable for T11 and L1 compression fractures s/p T10-pelvis with revision of previous L2-5 hardware, L1 expanded PSO. Remains intubated for tachypnea and low tidal volumes, possible 2/2 splinting from rib fractures.    Neuro:   -q1 hour neuro checks  -fentanyl prn for pain control  -start oxy 5mg q6h for rib fractures to improve pain control to help with tachypnea and TV  -keep flat in bed for intraop csf leak  -continue hemovacs to suction, monitor outputs    Respiratory:   -intubated, wean to extubate  -c/w SBT trials     CV:  -MAP goal relaxed to >65 by neurosurgery   -hold home antihypertensives while maintaining MAP goals  - wean rohith    Heme/Onc: hx of DVT, 2019- bilateral  -LE doppler last 9/9 neg  -consider Lov ppx tomorrow AM     Renal: Baseline SCr 1.4-1.6  -ervin for strict ins and outs    ID: leukocytosis likely 2/2 surgery/steroids   -monitor cbc    GI:   -npo for possible extubation tomorrow   -protonix for gi prophylaxis  -senna and miralax for bowel regimen    Endo: hypopit on hydrocort 10mg BID at home  -glucose goal 120-180  -start stress dose steroids 50 mg q8h to wean off pressors     Patient seen and examined by attending on 9/14/2021.    Patient is critically ill due to spine surgery and at high risk for neurological deterioration or death due to: respiratory failure requiring intubation, at risk of post-op hemorrhage     EVENTS:   No acute events overnight.  On PS 10/5 throughout the day but tachypneic to 50 towards the evening, despite Precedex and fentanyl, therefore placed on VC with improvement in tachypnea.   On rohith, Hg stable  Last BM today  Reports pain by nodding    VITALS:  T(C): , Max: 37.1 (09-14-21 @ 23:00)  HR:  (55 - 96)  BP:  (113/58 - 113/58)  ABP:  (96/50 - 179/68)  RR:  (13 - 35)  SpO2:  (66% - 100%)  Wt(kg): --  Device: Avea, Mode: CPAP with PS, FiO2: 40, PEEP: 5, PS: 10, MAP: 8    09-14-21 @ 07:01  -  09-15-21 @ 07:00  --------------------------------------------------------  IN: 1077.1 mL / OUT: 753 mL / NET: 324.1 mL    09-15-21 @ 07:01  -  09-15-21 @ 19:44  --------------------------------------------------------  IN: 775.5 mL / OUT: 530 mL / NET: 245.5 mL      LABS:  Na: 136 (09-15 @ 17:46), 139 (09-14 @ 22:04), 137 (09-13 @ 17:11), 134 (09-13 @ 04:27)  K: 5.0 (09-15 @ 17:46), 3.2 (09-14 @ 22:04), 3.5 (09-13 @ 17:11), 3.8 (09-13 @ 04:27)  Cl: 106 (09-15 @ 17:46), 103 (09-14 @ 22:04), 99 (09-13 @ 17:11), 99 (09-13 @ 04:27)  CO2: 19 (09-15 @ 17:46), 18 (09-14 @ 22:04), 24 (09-13 @ 17:11), 21 (09-13 @ 04:27)  BUN: 14 (09-15 @ 17:46), 13 (09-14 @ 22:04), 14 (09-13 @ 17:11), 13 (09-13 @ 04:27)  Cr: 1.15 (09-15 @ 17:46), 0.94 (09-14 @ 22:04), 1.16 (09-13 @ 17:11), 1.11 (09-13 @ 04:27)  Glu: 118(09-15 @ 17:46), 139(09-14 @ 22:04), 112(09-13 @ 17:11), 115(09-13 @ 04:27)    Hgb: 12.1 (09-15 @ 17:46), 10.4 (09-14 @ 22:04), 10.8 (09-13 @ 04:27)  Hct: 34.2 (09-15 @ 17:46), 30.1 (09-14 @ 22:04), 30.4 (09-13 @ 04:27)  WBC: 20.00 (09-15 @ 17:46), 18.54 (09-14 @ 22:04), 11.33 (09-13 @ 04:27)  Plt: 183 (09-15 @ 17:46), 152 (09-14 @ 22:04), 241 (09-13 @ 04:27)    INR: 1.01 09-14-21 @ 05:46, 1.05 09-13-21 @ 17:10, 1.13 09-13-21 @ 11:08, 1.13 09-13-21 @ 06:34, 1.08 09-13-21 @ 04:27  PTT: 25.4 09-14-21 @ 05:46, 40.1 09-13-21 @ 23:08, 102.1 09-13-21 @ 17:10, 179.5 09-13-21 @ 11:08, 125.1 09-13-21 @ 06:34, 129.2 09-13-21 @ 04:27    MEDICATIONS:  artificial  tears Solution 1 Drop(s) Both EYES every 6 hours PRN  atorvastatin 10 milliGRAM(s) Oral at bedtime  chlorhexidine 0.12% Liquid 15 milliLiter(s) Oral Mucosa every 12 hours  chlorhexidine 4% Liquid 1 Application(s) Topical <User Schedule>  dexMEDEtomidine Infusion 0.2 MICROgram(s)/kG/Hr IV Continuous <Continuous>  fentaNYL    Injectable 50 MICROGram(s) IV Push every 2 hours PRN  hydrocortisone sodium succinate Injectable 25 milliGRAM(s) IV Push every 12 hours  HYDROmorphone PCA (1 mG/mL) 30 milliLiter(s) PCA Continuous PCA Continuous  HYDROmorphone PCA (1 mG/mL) Rescue Clinician Bolus 0.5 milliGRAM(s) IV Push every 15 minutes PRN  influenza   Vaccine 0.5 milliLiter(s) IntraMuscular once  latanoprost 0.005% Ophthalmic Solution 1 Drop(s) Both EYES at bedtime  levothyroxine 50 MICROGram(s) Oral daily  lidocaine   4% Patch 1 Patch Transdermal daily  naloxone Injectable 0.1 milliGRAM(s) IV Push every 3 minutes PRN  ondansetron Injectable 4 milliGRAM(s) IV Push every 6 hours PRN  pantoprazole  Injectable 40 milliGRAM(s) IV Push daily  phenylephrine    Infusion 0.1 MICROgram(s)/kG/Min IV Continuous <Continuous>  polyethylene glycol 3350 17 Gram(s) Oral daily  senna 2 Tablet(s) Oral at bedtime PRN  sodium bicarbonate 650 milliGRAM(s) Oral three times a day  sodium chloride 0.9%. 1000 milliLiter(s) IV Continuous <Continuous>  traMADol 25 milliGRAM(s) Oral every 6 hours PRN    EXAMINATION:  General:  calm  HEENT:  MMM  Neuro:  opens eyes to command, nods yes and no appropriately, follows simple commands,  5/5 and able to move arms in the plane of the bed below the elbow but not able to raise arms antigravity, able to KE 3/5 with legs supported b/l, able to wiggle b/l toes.   Cards:  RRR  Respiratory: mechanically ventilated   Adomen:  soft  Extremities:  no edema  Skin:  warm/dry    Assessment/Plan:  77yo woman with history of colon ca s/p right hemicolectomy, b/l DVT 2019 (on eliquis), CKD, and Adrenal Insufficiency that presented s/p fall with imaging notable for T11 and L1 compression fractures s/p T10-pelvis with revision of previous L2-5 hardware, L1 expanded PSO. Remains intubated for tachypnea and low tidal volumes, possible 2/2 splinting from rib fractures.    Neuro:   -q1 hour neuro checks  -fentanyl prn for pain control  -start oxy 5mg q6h for rib fractures to improve pain control to help with tachypnea and TV  -keep flat in bed for intraop csf leak  -continue hemovacs to suction, monitor outputs    Respiratory:   -intubated, wean to extubate  -c/w SBT trials     CV:  -MAP goal relaxed to >65 by neurosurgery   -hold home antihypertensives while maintaining MAP goals  - wean rohith    Heme/Onc: hx of DVT, 2019- bilateral  -LE doppler last 9/9 neg  -consider Lov ppx tomorrow AM     Renal: Baseline SCr 1.4-1.6  -ervin for strict ins and outs    ID: leukocytosis likely 2/2 surgery/steroids   -monitor cbc    GI:   -npo for possible extubation tomorrow   -protonix for gi prophylaxis  -senna and miralax for bowel regimen    Endo: hypopit on hydrocort 10mg BID at home  -glucose goal 120-180  -start stress dose steroids 50 mg q8h to wean off pressors     Patient seen and examined by attending on 9/15/2021.    Patient is critically ill due to spine surgery and at high risk for neurological deterioration or death due to: respiratory failure requiring intubation, at risk of post-op hemorrhage

## 2021-09-16 LAB
ANION GAP SERPL CALC-SCNC: 11 MMOL/L — SIGNIFICANT CHANGE UP (ref 5–17)
APTT BLD: 31.4 SEC — SIGNIFICANT CHANGE UP (ref 27.5–35.5)
BUN SERPL-MCNC: 14 MG/DL — SIGNIFICANT CHANGE UP (ref 7–23)
CALCIUM SERPL-MCNC: 8 MG/DL — LOW (ref 8.4–10.5)
CHLORIDE SERPL-SCNC: 113 MMOL/L — HIGH (ref 96–108)
CO2 SERPL-SCNC: 18 MMOL/L — LOW (ref 22–31)
CREAT SERPL-MCNC: 1.02 MG/DL — SIGNIFICANT CHANGE UP (ref 0.5–1.3)
GLUCOSE SERPL-MCNC: 126 MG/DL — HIGH (ref 70–99)
INR BLD: 1.41 RATIO — HIGH (ref 0.88–1.16)
MAGNESIUM SERPL-MCNC: 2.1 MG/DL — SIGNIFICANT CHANGE UP (ref 1.6–2.6)
PHOSPHATE SERPL-MCNC: 3 MG/DL — SIGNIFICANT CHANGE UP (ref 2.5–4.5)
POTASSIUM SERPL-MCNC: 4 MMOL/L — SIGNIFICANT CHANGE UP (ref 3.5–5.3)
POTASSIUM SERPL-SCNC: 4 MMOL/L — SIGNIFICANT CHANGE UP (ref 3.5–5.3)
PROTHROM AB SERPL-ACNC: 16.6 SEC — HIGH (ref 10.6–13.6)
SODIUM SERPL-SCNC: 142 MMOL/L — SIGNIFICANT CHANGE UP (ref 135–145)

## 2021-09-16 PROCEDURE — 99232 SBSQ HOSP IP/OBS MODERATE 35: CPT

## 2021-09-16 PROCEDURE — 71045 X-RAY EXAM CHEST 1 VIEW: CPT | Mod: 26,77

## 2021-09-16 PROCEDURE — 99291 CRITICAL CARE FIRST HOUR: CPT

## 2021-09-16 PROCEDURE — 71045 X-RAY EXAM CHEST 1 VIEW: CPT | Mod: 26

## 2021-09-16 PROCEDURE — 93970 EXTREMITY STUDY: CPT | Mod: 26

## 2021-09-16 RX ORDER — ENOXAPARIN SODIUM 100 MG/ML
30 INJECTION SUBCUTANEOUS
Refills: 0 | Status: DISCONTINUED | OUTPATIENT
Start: 2021-09-16 | End: 2021-09-27

## 2021-09-16 RX ORDER — VALPROIC ACID (AS SODIUM SALT) 250 MG/5ML
500 SOLUTION, ORAL ORAL ONCE
Refills: 0 | Status: DISCONTINUED | OUTPATIENT
Start: 2021-09-16 | End: 2021-09-16

## 2021-09-16 RX ORDER — MIDODRINE HYDROCHLORIDE 2.5 MG/1
10 TABLET ORAL THREE TIMES A DAY
Refills: 0 | Status: DISCONTINUED | OUTPATIENT
Start: 2021-09-16 | End: 2021-09-17

## 2021-09-16 RX ORDER — POLYETHYLENE GLYCOL 3350 17 G/17G
17 POWDER, FOR SOLUTION ORAL
Refills: 0 | Status: DISCONTINUED | OUTPATIENT
Start: 2021-09-16 | End: 2021-09-19

## 2021-09-16 RX ORDER — SENNA PLUS 8.6 MG/1
2 TABLET ORAL AT BEDTIME
Refills: 0 | Status: DISCONTINUED | OUTPATIENT
Start: 2021-09-16 | End: 2021-09-19

## 2021-09-16 RX ORDER — ACETAMINOPHEN 500 MG
1000 TABLET ORAL ONCE
Refills: 0 | Status: COMPLETED | OUTPATIENT
Start: 2021-09-16 | End: 2021-09-16

## 2021-09-16 RX ORDER — MAGNESIUM SULFATE 500 MG/ML
1 VIAL (ML) INJECTION ONCE
Refills: 0 | Status: COMPLETED | OUTPATIENT
Start: 2021-09-16 | End: 2021-09-16

## 2021-09-16 RX ORDER — OXYCODONE HYDROCHLORIDE 5 MG/1
5 TABLET ORAL EVERY 4 HOURS
Refills: 0 | Status: DISCONTINUED | OUTPATIENT
Start: 2021-09-16 | End: 2021-09-17

## 2021-09-16 RX ADMIN — CHLORHEXIDINE GLUCONATE 15 MILLILITER(S): 213 SOLUTION TOPICAL at 05:25

## 2021-09-16 RX ADMIN — Medication 62.5 MILLIMOLE(S): at 03:07

## 2021-09-16 RX ADMIN — TRAMADOL HYDROCHLORIDE 25 MILLIGRAM(S): 50 TABLET ORAL at 23:00

## 2021-09-16 RX ADMIN — MIDODRINE HYDROCHLORIDE 10 MILLIGRAM(S): 2.5 TABLET ORAL at 17:03

## 2021-09-16 RX ADMIN — TRAMADOL HYDROCHLORIDE 25 MILLIGRAM(S): 50 TABLET ORAL at 12:00

## 2021-09-16 RX ADMIN — OXYCODONE HYDROCHLORIDE 5 MILLIGRAM(S): 5 TABLET ORAL at 11:00

## 2021-09-16 RX ADMIN — LIDOCAINE 1 PATCH: 4 CREAM TOPICAL at 08:30

## 2021-09-16 RX ADMIN — OXYCODONE HYDROCHLORIDE 5 MILLIGRAM(S): 5 TABLET ORAL at 20:00

## 2021-09-16 RX ADMIN — Medication 50 MILLIGRAM(S): at 21:27

## 2021-09-16 RX ADMIN — FENTANYL CITRATE 50 MICROGRAM(S): 50 INJECTION INTRAVENOUS at 01:00

## 2021-09-16 RX ADMIN — MIDODRINE HYDROCHLORIDE 10 MILLIGRAM(S): 2.5 TABLET ORAL at 11:36

## 2021-09-16 RX ADMIN — TRAMADOL HYDROCHLORIDE 25 MILLIGRAM(S): 50 TABLET ORAL at 05:25

## 2021-09-16 RX ADMIN — SODIUM CHLORIDE 50 MILLILITER(S): 9 INJECTION INTRAMUSCULAR; INTRAVENOUS; SUBCUTANEOUS at 17:03

## 2021-09-16 RX ADMIN — Medication 650 MILLIGRAM(S): at 05:25

## 2021-09-16 RX ADMIN — LATANOPROST 1 DROP(S): 0.05 SOLUTION/ DROPS OPHTHALMIC; TOPICAL at 21:27

## 2021-09-16 RX ADMIN — ATORVASTATIN CALCIUM 10 MILLIGRAM(S): 80 TABLET, FILM COATED ORAL at 21:26

## 2021-09-16 RX ADMIN — TRAMADOL HYDROCHLORIDE 25 MILLIGRAM(S): 50 TABLET ORAL at 12:30

## 2021-09-16 RX ADMIN — PANTOPRAZOLE SODIUM 40 MILLIGRAM(S): 20 TABLET, DELAYED RELEASE ORAL at 11:37

## 2021-09-16 RX ADMIN — CHLORHEXIDINE GLUCONATE 1 APPLICATION(S): 213 SOLUTION TOPICAL at 21:27

## 2021-09-16 RX ADMIN — Medication 1000 MILLIGRAM(S): at 14:30

## 2021-09-16 RX ADMIN — OXYCODONE HYDROCHLORIDE 5 MILLIGRAM(S): 5 TABLET ORAL at 20:30

## 2021-09-16 RX ADMIN — LIDOCAINE 1 PATCH: 4 CREAM TOPICAL at 20:19

## 2021-09-16 RX ADMIN — TRAMADOL HYDROCHLORIDE 25 MILLIGRAM(S): 50 TABLET ORAL at 05:55

## 2021-09-16 RX ADMIN — Medication 50 MILLIGRAM(S): at 14:40

## 2021-09-16 RX ADMIN — Medication 650 MILLIGRAM(S): at 21:27

## 2021-09-16 RX ADMIN — LIDOCAINE 1 PATCH: 4 CREAM TOPICAL at 05:26

## 2021-09-16 RX ADMIN — ENOXAPARIN SODIUM 30 MILLIGRAM(S): 100 INJECTION SUBCUTANEOUS at 17:03

## 2021-09-16 RX ADMIN — LIDOCAINE 1 PATCH: 4 CREAM TOPICAL at 19:18

## 2021-09-16 RX ADMIN — OXYCODONE HYDROCHLORIDE 5 MILLIGRAM(S): 5 TABLET ORAL at 11:30

## 2021-09-16 RX ADMIN — OXYCODONE HYDROCHLORIDE 5 MILLIGRAM(S): 5 TABLET ORAL at 02:00

## 2021-09-16 RX ADMIN — Medication 650 MILLIGRAM(S): at 14:06

## 2021-09-16 RX ADMIN — SENNA PLUS 2 TABLET(S): 8.6 TABLET ORAL at 21:26

## 2021-09-16 RX ADMIN — TRAMADOL HYDROCHLORIDE 25 MILLIGRAM(S): 50 TABLET ORAL at 23:30

## 2021-09-16 RX ADMIN — Medication 50 MICROGRAM(S): at 05:25

## 2021-09-16 RX ADMIN — Medication 250 GRAM(S): at 03:07

## 2021-09-16 RX ADMIN — SODIUM CHLORIDE 50 MILLILITER(S): 9 INJECTION INTRAMUSCULAR; INTRAVENOUS; SUBCUTANEOUS at 19:43

## 2021-09-16 RX ADMIN — FENTANYL CITRATE 50 MICROGRAM(S): 50 INJECTION INTRAVENOUS at 00:45

## 2021-09-16 RX ADMIN — PHENYLEPHRINE HYDROCHLORIDE 3.06 MICROGRAM(S)/KG/MIN: 10 INJECTION INTRAVENOUS at 07:00

## 2021-09-16 RX ADMIN — POLYETHYLENE GLYCOL 3350 17 GRAM(S): 17 POWDER, FOR SOLUTION ORAL at 17:20

## 2021-09-16 RX ADMIN — Medication 400 MILLIGRAM(S): at 14:00

## 2021-09-16 RX ADMIN — Medication 50 MILLIGRAM(S): at 05:25

## 2021-09-16 RX ADMIN — OXYCODONE HYDROCHLORIDE 5 MILLIGRAM(S): 5 TABLET ORAL at 01:30

## 2021-09-16 NOTE — PHYSICAL THERAPY INITIAL EVALUATION ADULT - DID THE PATIENT HAVE SURGERY?
s/p T10-pelvis with revision of previous L2-5 hardware, L1 expanded PSO, c/b CSF leak, plastics closure.  Has two hemovacs./yes

## 2021-09-16 NOTE — PHYSICAL THERAPY INITIAL EVALUATION ADULT - MANUAL MUSCLE TESTING RESULTS, REHAB EVAL
functionally at least 2+/5 t/o, RUE not formally assessed/grossly assessed due to
grossly 3/5 b/l UE and LE extremities/grossly assessed due to

## 2021-09-16 NOTE — PROGRESS NOTE ADULT - SUBJECTIVE AND OBJECTIVE BOX
Patient seen and examined    T(C): 37 (09-16-21 @ 15:00), Max: 37 (09-16-21 @ 15:00)  HR: 93 (09-16-21 @ 18:00) (68 - 104)  BP: --  RR: 21 (09-16-21 @ 18:00) (12 - 27)  SpO2: 100% (09-16-21 @ 18:00) (95% - 100%)  09-15-21 @ 07:01  -  09-16-21 @ 07:00  --------------------------------------------------------  IN: 2561.7 mL / OUT: 890 mL / NET: 1671.7 mL    09-16-21 @ 07:01  -  09-16-21 @ 19:04  --------------------------------------------------------  IN: 775.8 mL / OUT: 840 mL / NET: -64.2 mL    artificial  tears Solution 1 Drop(s) Both EYES every 6 hours PRN  atorvastatin 10 milliGRAM(s) Oral at bedtime  chlorhexidine 4% Liquid 1 Application(s) Topical <User Schedule>  enoxaparin Injectable 30 milliGRAM(s) SubCutaneous <User Schedule>  hydrocortisone sodium succinate Injectable 50 milliGRAM(s) IV Push every 8 hours  influenza   Vaccine 0.5 milliLiter(s) IntraMuscular once  latanoprost 0.005% Ophthalmic Solution 1 Drop(s) Both EYES at bedtime  levothyroxine 50 MICROGram(s) Oral daily  lidocaine   4% Patch 1 Patch Transdermal daily  midodrine. 10 milliGRAM(s) Oral three times a day  oxyCODONE    Solution 5 milliGRAM(s) Oral every 4 hours PRN  pantoprazole  Injectable 40 milliGRAM(s) IV Push daily  phenylephrine    Infusion 0.1 MICROgram(s)/kG/Min IV Continuous <Continuous>  polyethylene glycol 3350 17 Gram(s) Oral two times a day  senna 2 Tablet(s) Oral at bedtime  sodium bicarbonate 650 milliGRAM(s) Oral three times a day  sodium chloride 0.9%. 1000 milliLiter(s) IV Continuous <Continuous>  traMADol 25 milliGRAM(s) Oral every 6 hours PRN  Mode: CPAP with PS, FiO2: 40, PEEP: 5, PS: 5, MAP: 8, PIP: 19      Exam stable    labs and imaging reviewed     Continue same management     Angelique Jacob [facility] attending  Patient seen and examined    T(C): 37 (09-16-21 @ 15:00), Max: 37 (09-16-21 @ 15:00)  HR: 93 (09-16-21 @ 18:00) (68 - 104)  BP: --  RR: 21 (09-16-21 @ 18:00) (12 - 27)  SpO2: 100% (09-16-21 @ 18:00) (95% - 100%)  09-15-21 @ 07:01  -  09-16-21 @ 07:00  --------------------------------------------------------  IN: 2561.7 mL / OUT: 890 mL / NET: 1671.7 mL    09-16-21 @ 07:01  -  09-16-21 @ 19:04  --------------------------------------------------------  IN: 775.8 mL / OUT: 840 mL / NET: -64.2 mL    artificial  tears Solution 1 Drop(s) Both EYES every 6 hours PRN  atorvastatin 10 milliGRAM(s) Oral at bedtime  chlorhexidine 4% Liquid 1 Application(s) Topical <User Schedule>  enoxaparin Injectable 30 milliGRAM(s) SubCutaneous <User Schedule>  hydrocortisone sodium succinate Injectable 50 milliGRAM(s) IV Push every 8 hours  influenza   Vaccine 0.5 milliLiter(s) IntraMuscular once  latanoprost 0.005% Ophthalmic Solution 1 Drop(s) Both EYES at bedtime  levothyroxine 50 MICROGram(s) Oral daily  lidocaine   4% Patch 1 Patch Transdermal daily  midodrine. 10 milliGRAM(s) Oral three times a day  oxyCODONE    Solution 5 milliGRAM(s) Oral every 4 hours PRN  pantoprazole  Injectable 40 milliGRAM(s) IV Push daily  phenylephrine    Infusion 0.1 MICROgram(s)/kG/Min IV Continuous <Continuous>  polyethylene glycol 3350 17 Gram(s) Oral two times a day  senna 2 Tablet(s) Oral at bedtime  sodium bicarbonate 650 milliGRAM(s) Oral three times a day  sodium chloride 0.9%. 1000 milliLiter(s) IV Continuous <Continuous>  traMADol 25 milliGRAM(s) Oral every 6 hours PRN  Mode: CPAP with PS, FiO2: 40, PEEP: 5, PS: 5, MAP: 8, PIP: 19      Exam stable    labs and imaging reviewed     Continue same management   remains NPO given that she was just got extubated      Angelique Jacob [facility] attending

## 2021-09-16 NOTE — PHYSICAL THERAPY INITIAL EVALUATION ADULT - GAIT TRAINING, PT EVAL
GOALS: Pt will ambulate 50ft with RW & modA in 4wks
GOAL: Pt will ambulate 75' independently with  RW  in 2-3 wks.

## 2021-09-16 NOTE — OCCUPATIONAL THERAPY INITIAL EVALUATION ADULT - GENERAL OBSERVATIONS, REHAB EVAL
Pt received semisupine in bed + ICU monitoring + NGT + HMV x2 + A-Line + IVL + primafit + intubated but following 100% commands.

## 2021-09-16 NOTE — PROGRESS NOTE ADULT - ATTENDING COMMENTS
Pt seen and examined with the NP. Agree with the assessment and plan.  Vitals, labs and radiology results personally reviewed.  Above note edited as appropriate.  Neuro ICU care appreciated. s/p extubation today.  close monitoring. PICC for difficult access.     Dr. Hope ([facility])  [phone]

## 2021-09-16 NOTE — PHYSICAL THERAPY INITIAL EVALUATION ADULT - ADDITIONAL COMMENTS
unable to obtain h/o from patient
Patient lives in pvt house with daughter, 1 step to enter.  Patient ambulated with rolling walker independent. pt. owns rw, bed side commode, hospital bed, w/c. HHA 8hrs x 7 days.

## 2021-09-16 NOTE — PROGRESS NOTE ADULT - SUBJECTIVE AND OBJECTIVE BOX
Patient seen and examined s/p Revision of prior L2-L5 fusion w/ extension from T10-pelvis w/ L1 PSO, and Plastics Closure.     Intubated, FC x 4, BLE Pf/DF 4/5, HF 2/5, UE 5/5    T(C): 37.1 (09-14-21 @ 23:00), Max: 98.8 (09-14-21 @ 08:50)  HR: 89 (09-14-21 @ 23:55) (60 - 92)  BP: 113/58 (09-14-21 @ 22:00) (113/58 - 164/89)  RR: 22 (09-14-21 @ 23:55) (14 - 29)  SpO2: 100% (09-14-21 @ 23:55) (95% - 100%)                          10.4   18.54 )-----------( 152      ( 14 Sep 2021 22:04 )             30.1     09-14    139  |  103  |  13  ----------------------------<  139<H>  3.2<L>   |  18<L>  |  0.94    Ca    8.2<L>      14 Sep 2021 22:04  Phos  4.4     09-14  Mg     2.0     09-14      PT/INR - ( 14 Sep 2021 05:46 )   PT: 12.1 sec;   INR: 1.01 ratio         PTT - ( 14 Sep 2021 05:46 )  PTT:25.4 sec        CAPILLARY BLOOD GLUCOSE

## 2021-09-16 NOTE — PROGRESS NOTE ADULT - ASSESSMENT
76 yr old F with Hx of colon CA, DVT, CKD and hypopituitarism (secondary AI and hypothyroidism), osteoporosis here with multiple fractures s/p mechanical fall.     1. Adrenal insufficiency.   Patient with hypotensive episode therefore back on hydrocortisone 50mg tid  Taper down as per clinical status. Currently still on pressors.   Endocrine team will follow and help in transition back to home dose hydrocortisone.    2. Hypothyroidism   Can transition back to LT4 50mcg po daily via NG tube  Check Free T4 in AM.    3. Osteoporosis.   Patient has had multiple fractures while on treatment with risedronate  Intolerant of Prolia  Consider switch to Reclast vs. Forteo/Tymlos  To discuss with outpatient endocrinologist Dr. Lim.

## 2021-09-16 NOTE — PHYSICAL THERAPY INITIAL EVALUATION ADULT - PERTINENT HX OF CURRENT PROBLEM, REHAB EVAL
76F hx prior L2-5 Fxn w/. Dr Jane (ortho) at [facility] in early 2000s, revision x1, chronic LBP, s/p mechanical fall w/ RUE pain found to have R scapular fx and sternal fx, adm to trauma, CT T/L:  Fusion L2-5. Fracture through the T12-L1 disc space with widening of the disc space and a L1-2 laminectomy which appears unstable. Diffuse osteopenia. Old T11 and L1 fractures
76 year old woman with history of colon ca s/p right hemicolectomy, DVT (on eliquis), CKD, and Adrenal Insufficiency that presented s/p fall.  She was going to her commode and slipped/fell on her right side.  Imaging revealed T11 and L1 compression fractures.

## 2021-09-16 NOTE — PROGRESS NOTE ADULT - SUBJECTIVE AND OBJECTIVE BOX
HPI:  Patient is a 76 year old woman with history of colon ca s/p right hemicolectomy, DVT (on eliquis), CKD, and Adrenal Insufficiency that presented s/p fall.  She was going to her commode and slipped/fell on her right side.  Imaging revealed T11 and L1 compression fractures.  Admitted to the neurosurgery service for further management.    OVERNIGHT EVENTS:  no acute events, remained intubated     PHYSICAL EXAM:  Neurological: intubated, opens eyes to voice, shakes her head no, follows simple commands, moves upper extremities spontaneously in the plane of the bed, wiggles toes in LEs and moves legs 2/5 b/l to noxious   Cardiovascular: RRR  Respiratory: clear to auscultation b/l  Gastrointestinal: soft, non-distended  Genitourinary: +ervin  Incision/Wound: mid back vertical incision c/d/i    TUBES/LINES:  [x] hemovac x2  [x[ ervin  [x] a-line            ICU Vital Signs Last 24 Hrs  T(C): 36.7 (16 Sep 2021 03:00), Max: 37.1 (15 Sep 2021 19:00)  T(F): 98.1 (16 Sep 2021 03:00), Max: 98.7 (15 Sep 2021 19:00)  HR: 77 (16 Sep 2021 05:00) (55 - 104)  BP: --  BP(mean): --  ABP: 120/52 (16 Sep 2021 05:00) (100/45 - 179/68)  ABP(mean): 76 (16 Sep 2021 05:00) (60 - 118)  RR: 19 (16 Sep 2021 05:00) (12 - 35)  SpO2: 98% (16 Sep 2021 04:20) (66% - 100%)      09-14-21 @ 07:01  -  09-15-21 @ 07:00  --------------------------------------------------------  IN: 1077.1 mL / OUT: 753 mL / NET: 324.1 mL    09-15-21 @ 07:01  -  09-16-21 @ 06:42  --------------------------------------------------------  IN: 2395.7 mL / OUT: 530 mL / NET: 1865.7 mL        Mode: CPAP with PS, FiO2: 40, PEEP: 5, PS: 10, MAP: 10, PIP: 19    artificial  tears Solution 1 Drop(s) Both EYES every 6 hours PRN  atorvastatin 10 milliGRAM(s) Oral at bedtime  chlorhexidine 0.12% Liquid 15 milliLiter(s) Oral Mucosa every 12 hours  chlorhexidine 4% Liquid 1 Application(s) Topical <User Schedule>  dexMEDEtomidine Infusion 0.2 MICROgram(s)/kG/Hr (4.08 mL/Hr) IV Continuous <Continuous>  fentaNYL    Injectable 50 MICROGram(s) IV Push every 2 hours PRN  hydrocortisone sodium succinate Injectable 50 milliGRAM(s) IV Push every 8 hours  HYDROmorphone PCA (1 mG/mL) 30 milliLiter(s) PCA Continuous PCA Continuous  HYDROmorphone PCA (1 mG/mL) Rescue Clinician Bolus 0.5 milliGRAM(s) IV Push every 15 minutes PRN  influenza   Vaccine 0.5 milliLiter(s) IntraMuscular once  latanoprost 0.005% Ophthalmic Solution 1 Drop(s) Both EYES at bedtime  levothyroxine 50 MICROGram(s) Oral daily  lidocaine   4% Patch 1 Patch Transdermal daily  naloxone Injectable 0.1 milliGRAM(s) IV Push every 3 minutes PRN  ondansetron Injectable 4 milliGRAM(s) IV Push every 6 hours PRN  oxyCODONE    Solution 5 milliGRAM(s) Oral every 6 hours PRN  pantoprazole  Injectable 40 milliGRAM(s) IV Push daily  phenylephrine    Infusion 0.1 MICROgram(s)/kG/Min (3.06 mL/Hr) IV Continuous <Continuous>  polyethylene glycol 3350 17 Gram(s) Oral daily  senna 2 Tablet(s) Oral at bedtime PRN  sodium bicarbonate 650 milliGRAM(s) Oral three times a day  sodium chloride 0.9%. 1000 milliLiter(s) (50 mL/Hr) IV Continuous <Continuous>  traMADol 25 milliGRAM(s) Oral every 6 hours PRN      LABS:  Na: 136 (09-15 @ 21:18), 136 (09-15 @ 17:46), 139 (09-14 @ 22:04), 137 (09-13 @ 17:11)  K: 4.7 (09-15 @ 21:18), 5.0 (09-15 @ 17:46), 3.2 (09-14 @ 22:04), 3.5 (09-13 @ 17:11)  Cl: 108 (09-15 @ 21:18), 106 (09-15 @ 17:46), 103 (09-14 @ 22:04), 99 (09-13 @ 17:11)  CO2: 17 (09-15 @ 21:18), 19 (09-15 @ 17:46), 18 (09-14 @ 22:04), 24 (09-13 @ 17:11)  BUN: 14 (09-15 @ 21:18), 14 (09-15 @ 17:46), 13 (09-14 @ 22:04), 14 (09-13 @ 17:11)  Cr: 1.17 (09-15 @ 21:18), 1.15 (09-15 @ 17:46), 0.94 (09-14 @ 22:04), 1.16 (09-13 @ 17:11)  Glu: 120(09-15 @ 21:18), 118(09-15 @ 17:46), 139(09-14 @ 22:04), 112(09-13 @ 17:11)    Hgb: 12.0 (09-15 @ 21:18), 12.1 (09-15 @ 17:46), 10.4 (09-14 @ 22:04)  Hct: 34.7 (09-15 @ 21:18), 34.2 (09-15 @ 17:46), 30.1 (09-14 @ 22:04)  WBC: 19.89 (09-15 @ 21:18), 20.00 (09-15 @ 17:46), 18.54 (09-14 @ 22:04)  Plt: 189 (09-15 @ 21:18), 183 (09-15 @ 17:46), 152 (09-14 @ 22:04)    INR: 1.01 09-14-21 @ 05:46, 1.05 09-13-21 @ 17:10, 1.13 09-13-21 @ 11:08  PTT: 25.4 09-14-21 @ 05:46, 40.1 09-13-21 @ 23:08, 102.1 09-13-21 @ 17:10, 179.5 09-13-21 @ 11:08    [facility] - ( 15 Sep 2021 16:28 )  pH, Arterial: 7.43  pH, Blood: x     /  pCO2: 35    /  pO2: 137   / HCO3: 23    / Base Excess: -0.6  /  SaO2: 99.9                 HPI:  Patient is a 76 year old woman with history of colon ca s/p right hemicolectomy, DVT (on eliquis), CKD, and Adrenal Insufficiency that presented s/p fall.  She was going to her commode and slipped/fell on her right side.  Imaging revealed T11 and L1 compression fractures.  Admitted to the neurosurgery service for further management.    OVERNIGHT EVENTS:  extubated in AM     PHYSICAL EXAM:  Neurological: follows commands, AG b/l upper, spontaneous LE AG   Cardiovascular: RRR  Respiratory: clear to auscultation b/l  Gastrointestinal: soft, non-distended  Genitourinary: +ervin  Incision/Wound: mid back vertical incision c/d/i    TUBES/LINES:  [x] hemovac x2  [x[ ervin  [x] a-line    ICU Vital Signs Last 24 Hrs  T(C): 36.7 (16 Sep 2021 03:00), Max: 37.1 (15 Sep 2021 19:00)  T(F): 98.1 (16 Sep 2021 03:00), Max: 98.7 (15 Sep 2021 19:00)  HR: 77 (16 Sep 2021 05:00) (55 - 104)  ABP: 120/52 (16 Sep 2021 05:00) (100/45 - 179/68)  ABP(mean): 76 (16 Sep 2021 05:00) (60 - 118)  RR: 19 (16 Sep 2021 05:00) (12 - 35)  SpO2: 98% (16 Sep 2021 04:20) (66% - 100%)      09-14-21 @ 07:01  -  09-15-21 @ 07:00  --------------------------------------------------------  IN: 1077.1 mL / OUT: 753 mL / NET: 324.1 mL    09-15-21 @ 07:01  -  09-16-21 @ 06:42  --------------------------------------------------------  IN: 2395.7 mL / OUT: 530 mL / NET: 1865.7 mL        Mode: CPAP with PS, FiO2: 40, PEEP: 5, PS: 10, MAP: 10, PIP: 19    artificial  tears Solution 1 Drop(s) Both EYES every 6 hours PRN  atorvastatin 10 milliGRAM(s) Oral at bedtime  chlorhexidine 0.12% Liquid 15 milliLiter(s) Oral Mucosa every 12 hours  chlorhexidine 4% Liquid 1 Application(s) Topical <User Schedule>  dexMEDEtomidine Infusion 0.2 MICROgram(s)/kG/Hr (4.08 mL/Hr) IV Continuous <Continuous>  fentaNYL    Injectable 50 MICROGram(s) IV Push every 2 hours PRN  hydrocortisone sodium succinate Injectable 50 milliGRAM(s) IV Push every 8 hours  HYDROmorphone PCA (1 mG/mL) 30 milliLiter(s) PCA Continuous PCA Continuous  HYDROmorphone PCA (1 mG/mL) Rescue Clinician Bolus 0.5 milliGRAM(s) IV Push every 15 minutes PRN  influenza   Vaccine 0.5 milliLiter(s) IntraMuscular once  latanoprost 0.005% Ophthalmic Solution 1 Drop(s) Both EYES at bedtime  levothyroxine 50 MICROGram(s) Oral daily  lidocaine   4% Patch 1 Patch Transdermal daily  naloxone Injectable 0.1 milliGRAM(s) IV Push every 3 minutes PRN  ondansetron Injectable 4 milliGRAM(s) IV Push every 6 hours PRN  oxyCODONE    Solution 5 milliGRAM(s) Oral every 6 hours PRN  pantoprazole  Injectable 40 milliGRAM(s) IV Push daily  phenylephrine    Infusion 0.1 MICROgram(s)/kG/Min (3.06 mL/Hr) IV Continuous <Continuous>  polyethylene glycol 3350 17 Gram(s) Oral daily  senna 2 Tablet(s) Oral at bedtime PRN  sodium bicarbonate 650 milliGRAM(s) Oral three times a day  sodium chloride 0.9%. 1000 milliLiter(s) (50 mL/Hr) IV Continuous <Continuous>  traMADol 25 milliGRAM(s) Oral every 6 hours PRN      LABS:  Na: 136 (09-15 @ 21:18), 136 (09-15 @ 17:46), 139 (09-14 @ 22:04), 137 (09-13 @ 17:11)  K: 4.7 (09-15 @ 21:18), 5.0 (09-15 @ 17:46), 3.2 (09-14 @ 22:04), 3.5 (09-13 @ 17:11)  Cl: 108 (09-15 @ 21:18), 106 (09-15 @ 17:46), 103 (09-14 @ 22:04), 99 (09-13 @ 17:11)  CO2: 17 (09-15 @ 21:18), 19 (09-15 @ 17:46), 18 (09-14 @ 22:04), 24 (09-13 @ 17:11)  BUN: 14 (09-15 @ 21:18), 14 (09-15 @ 17:46), 13 (09-14 @ 22:04), 14 (09-13 @ 17:11)  Cr: 1.17 (09-15 @ 21:18), 1.15 (09-15 @ 17:46), 0.94 (09-14 @ 22:04), 1.16 (09-13 @ 17:11)  Glu: 120(09-15 @ 21:18), 118(09-15 @ 17:46), 139(09-14 @ 22:04), 112(09-13 @ 17:11)    Hgb: 12.0 (09-15 @ 21:18), 12.1 (09-15 @ 17:46), 10.4 (09-14 @ 22:04)  Hct: 34.7 (09-15 @ 21:18), 34.2 (09-15 @ 17:46), 30.1 (09-14 @ 22:04)  WBC: 19.89 (09-15 @ 21:18), 20.00 (09-15 @ 17:46), 18.54 (09-14 @ 22:04)  Plt: 189 (09-15 @ 21:18), 183 (09-15 @ 17:46), 152 (09-14 @ 22:04)    INR: 1.01 09-14-21 @ 05:46, 1.05 09-13-21 @ 17:10, 1.13 09-13-21 @ 11:08  PTT: 25.4 09-14-21 @ 05:46, 40.1 09-13-21 @ 23:08, 102.1 09-13-21 @ 17:10, 179.5 09-13-21 @ 11:08    [facility] - ( 15 Sep 2021 16:28 )  pH, Arterial: 7.43  pH, Blood: x     /  pCO2: 35    /  pO2: 137   / HCO3: 23    / Base Excess: -0.6  /  SaO2: 99.9

## 2021-09-16 NOTE — PROGRESS NOTE ADULT - ASSESSMENT
77 yo F with a history of colon cancer s/p right hemicolectomy (approx 2 years ago, outside hospital), DVT (on Eliquis), CKD, adrenal insufficiency presenting as transfer from [facility] for trauma evaluation secondary to a mechanical fall from sitting with headstrike. Injuries identified include right forehead hematoma, acute minimally displaced right coracoid process scapular fracture, T11/L1 compression fractures and an inferior sternal fracture. Patient hemodynamically stable.    # Elevated PTT  Mild elevations in PTT can be seen with SC heparin administration, particularly with TID dosing; low plasma protein, impaired renal function, and low BMI have been postulated as contributing factors.  In this pt's case, I doubt she has a clotting factor deficiency.  Last dose of SC heparin was this morning.  I expect her PTT to trend close to normal by the end of today.  I would check the PTT around noontime and one more this evening before tomorrow morning's pre-op labs.  If her PTT is < 40, then would be cleared for OR.  Consulted hematology.    # S/p Mechanical Fall:  Right forehead hematoma, acute minimally displaced right coracoid process scapular fracture, T11/L1 compression fractures and an inferior sternal fracture  no surgery for scapular fx. Placed in sling  CT spine with fusion L2-5. Fracture through the T12-L1 disc space with widening of the disc space and a L1-2 laminectomy which appears unstable. Diffuse osteopenia. Old T11 and L1 fractures.   MRI T/L spine with anterior splaying of the intervertebral disc space at the T12-L1 with edema in the anterior prevertebral soft tissues  CT T done noted with widening of T12/L1 disc space w/ L1 laminectomy  Neuro surgery offering surgery vs conservative management, TLSO brace in the meantime.   The daughter Susie decided to proceed with surgery.   Cr has improved.   Pending PTT < 40 prior to OR.   s/p revision of prior L2-L5 fusion w/ extension from T10- pelvis w/o L1 PSO, and plastics closure on 9/14  Neuro checks  Monitor outpt via drain  Dsg changes as per orders  Care per primary team    # Nausea:   Pt has been nauseous (no abd pain). ?pain med induced/ Opioids   Poor PO intake since being in the hospital. Normally with good appetite at home, with mechanical soft per the daughter.  anti-emetics PRN, encourage PO intake   the daughter requested IV steroids early, states her PO may not be absorbed with vomiting.  Reasonable to start IV hydrocortisone while PO intake is poor.   Switched hydrocortisone 10 mg BID to  IV 25 mg BID.   can give additional stress dose near the timing of surgery.   (can consider 50 mg hydrocortisone intravenously just before the procedure and 25 mg of hydrocortisone every eight hours for 24 hours, then resume home PO dose after)  or steroid regimen per anesthesia's discretion     # Hypopituitary   She is on Hydrocortisone 10 mg BID at home, so will monitor for adrenal insufficiency. am cortisol appropriate  UA unimpressive. vit D, vit B12 normal. TSH low as expected with hypopit. free T4 sent, pending.   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 Treviño ([facility]). Outpt EMR reviewed; Cr range from 1.4 to 1.6   Monitor I/O's, Serial Cr, Avoid nephrotoxins  Cr is up from baseline. Hold Losartan. s/p gentle IVF for acute kidney failure: likely pre-renal.   Her recent TTE reviewed, with normal LV.   Her Cr now back to baseline (1.4-1.6)     # CAD: Chronic, stable  Cont home CV meds  holding ASA for neurosurgery     # Hypothyroidism  Chronic, stable  TSH low. Total T4 is nl  Levothyroxine for hypothyroid transition to IV as no PO access   TSH 0.18, T(6)  Maintain euglycemia  Adrenal insuffiencey c/w hydrocortisone 50MG Q8H  Endocrine following        # Hx of DVT, lower extremity  Home med Eliquis, on hold   SCDs    # GI ppx:  Protonix

## 2021-09-16 NOTE — PROGRESS NOTE ADULT - ASSESSMENT
Patient is a 77yo F with a history of colon cancer s/p right hemicolectomy (approx 2 years ago, outside hospital), DVT (on Eliquis), CKD, adrenal insufficiency presenting after mechanical fall from sitting. CT head/cervical spine/chest were performed which showed a right forehead hematoma, acute minimally displaced right coracoid process scapular fracture, T11/L1 compression fractures and an inferior sternal fracture. Found to have worsened renal function    A/P:  Acute on CKD:  Outpatient Nephrologist Dr. Treviño  Baseline SCr 1.4-1.6  FELICIANO likely  sec to hemodynamic change  Renal function improved   Off  Losartan   Monitor I/O  Monitor renal function at present    Acidosis:  non-AG+AG  Continue oral  sodium bicarb 650mg TID    HTN:  BP fluctuating   On MIdodrine now  Monitor at present    Hyponatremia  possibly SIADH sec to ? pain   improved  Monitor serum Na

## 2021-09-16 NOTE — PHYSICAL THERAPY INITIAL EVALUATION ADULT - DISCHARGE DISPOSITION, PT EVAL
TBD upon further functional eval. Pt's daughter prefers d/c to home. Sub acute rehab. However patient's daughter prefers d/c home. If patient d/c home,would recommend home with Home PT and assist for all functional mobility.

## 2021-09-16 NOTE — PHYSICAL THERAPY INITIAL EVALUATION ADULT - PATIENT/FAMILY AGREES WITH PLAN
TBD upon further functional eval. pt's daughter prefers d/c home. Sub acute rehab. However patient's daughter prefers d/c home. If patient d/c home,would recommend home with Home PT and assist for all functional mobility.

## 2021-09-16 NOTE — PHYSICAL THERAPY INITIAL EVALUATION ADULT - BED MOBILITY TRAINING, PT EVAL
GOALS: Pt will perform bed mobility via logroll with modA in 4wks
GOAL: Pt will perform all bed mobility independently within 2-3 wks.

## 2021-09-16 NOTE — PROGRESS NOTE ADULT - SUBJECTIVE AND OBJECTIVE BOX
[facility]   MD KILLIAN JAIME MD RUORU WONG, PA    TEL:  OFFICE: [phone]  DR CHARLES CELL: [phone]  BEENA PISANO CELL: [phone]  DR. LOZANO CELL: [phone]      FROM 5 PM - 7 AM PLEASE CALL ANSWERING SERVICE: [phone]    RENAL FOLLOW UP NOTE--Date of Service 09-16-21 @ 12:37  --------------------------------------------------------------------------------  HPI:      Pt seen and examined at bedside.       PAST HISTORY  --------------------------------------------------------------------------------  No significant changes to PMH, PSH, FHx, SHx, unless otherwise noted    ALLERGIES & MEDICATIONS  --------------------------------------------------------------------------------  Allergies    penicillin (Rash)    Intolerances      Standing Inpatient Medications  atorvastatin 10 milliGRAM(s) Oral at bedtime  chlorhexidine 4% Liquid 1 Application(s) Topical <User Schedule>  hydrocortisone sodium succinate Injectable 50 milliGRAM(s) IV Push every 8 hours  influenza   Vaccine 0.5 milliLiter(s) IntraMuscular once  latanoprost 0.005% Ophthalmic Solution 1 Drop(s) Both EYES at bedtime  levothyroxine 50 MICROGram(s) Oral daily  lidocaine   4% Patch 1 Patch Transdermal daily  midodrine. 10 milliGRAM(s) Oral three times a day  pantoprazole  Injectable 40 milliGRAM(s) IV Push daily  phenylephrine    Infusion 0.1 MICROgram(s)/kG/Min IV Continuous <Continuous>  polyethylene glycol 3350 17 Gram(s) Oral two times a day  senna 2 Tablet(s) Oral at bedtime  sodium bicarbonate 650 milliGRAM(s) Oral three times a day  sodium chloride 0.9%. 1000 milliLiter(s) IV Continuous <Continuous>    PRN Inpatient Medications  artificial  tears Solution 1 Drop(s) Both EYES every 6 hours PRN  oxyCODONE    Solution 5 milliGRAM(s) Oral every 4 hours PRN  traMADol 25 milliGRAM(s) Oral every 6 hours PRN      REVIEW OF SYSTEMS  --------------------------------------------------------------------------------  General: no fever  MSK: no edema     VITALS/PHYSICAL EXAM  --------------------------------------------------------------------------------  T(C): 36.9 (09-16-21 @ 11:00), Max: 37.1 (09-15-21 @ 19:00)  HR: 86 (09-16-21 @ 11:30) (68 - 104)  BP: --  RR: 20 (09-16-21 @ 11:30) (12 - 32)  SpO2: 98% (09-16-21 @ 11:30) (95% - 100%)  Wt(kg): --        09-15-21 @ 07:01  -  09-16-21 @ 07:00  --------------------------------------------------------  IN: 2561.7 mL / OUT: 890 mL / NET: 1671.7 mL    09-16-21 @ 07:01  -  09-16-21 @ 12:37  --------------------------------------------------------  IN: 325.8 mL / OUT: 0 mL / NET: 325.8 mL      Physical Exam:  	Gen: NAD  	HEENT: MMM  	Pulm: CTA B/L  	CV: S1S2  	Abd: Soft, +BS  	Ext: No LE edema B/L                      Neuro: Awake   	Skin: Warm and Dry   	Vascular access: NO HD catheter           ROBEL ervin  LABS/STUDIES  --------------------------------------------------------------------------------              12.0   19.89 >-----------<  189      [09-15-21 @ 21:18]              34.7     136  |  108  |  14  ----------------------------<  120      [09-15-21 @ 21:18]  4.7   |  17  |  1.17        Ca     8.9     [09-15-21 @ 21:18]      Mg     1.9     [09-15-21 @ 21:18]      Phos  2.0     [09-15-21 @ 21:18]            Creatinine Trend:  SCr 1.17 [09-15 @ 21:18]  SCr 1.15 [09-15 @ 17:46]  SCr 0.94 [09-14 @ 22:04]  SCr 1.16 [09-13 @ 17:11]  SCr 1.11 [09-13 @ 04:27]    Urinalysis - [09-12-21 @ 01:58]      Color Light Yellow / Appearance Clear / SG 1.013 / pH 7.0      Gluc Trace / Ketone Negative  / Bili Negative / Urobili Negative       Blood Negative / Protein Trace / Leuk Est Negative / Nitrite Negative      RBC 1 / WBC 0 / Hyaline  / Gran  / Sq Epi  / Non Sq Epi 0 / Bacteria Negative    Urine Sodium 6      [09-13-21 @ 14:35]  Urine Osmolality 480      [09-13-21 @ 14:36]    Iron 44, TIBC 224, %sat 19      [09-14-21 @ 10:27]  Ferritin 444      [09-14-21 @ 11:37]  Vitamin D (25OH) 67.9      [09-08-21 @ 09:59]  HbA1c 5.9      [02-22-17 @ 03:10]  TSH 0.18      [09-08-21 @ 09:59]

## 2021-09-16 NOTE — PROGRESS NOTE ADULT - SUBJECTIVE AND OBJECTIVE BOX
SUBJECTIVE / OVERNIGHT EVENTS:    extubated this AM  patient seen and examined    --------------------------------------------------------------------------------------------  LABS:                        12.0   19.89 )-----------( 189      ( 15 Sep 2021 21:18 )             34.7     09-15    136  |  108  |  14  ----------------------------<  120<H>  4.7   |  17<L>  |  1.17    Ca    8.9      15 Sep 2021 21:18  Phos  2.0     09-15  Mg     1.9     09-15      PT/INR - ( 16 Sep 2021 12:36 )   PT: 16.6 sec;   INR: 1.41 ratio         PTT - ( 16 Sep 2021 12:36 )  PTT:31.4 sec  CAPILLARY BLOOD GLUCOSE                RADIOLOGY & ADDITIONAL TESTS:    Imaging Personally Reviewed:  [x] YES  [ ] NO    Consultant(s) Notes Reviewed:  [x] YES  [ ] NO    MEDICATIONS  (STANDING):  atorvastatin 10 milliGRAM(s) Oral at bedtime  chlorhexidine 4% Liquid 1 Application(s) Topical <User Schedule>  enoxaparin Injectable 30 milliGRAM(s) SubCutaneous <User Schedule>  hydrocortisone sodium succinate Injectable 50 milliGRAM(s) IV Push every 8 hours  influenza   Vaccine 0.5 milliLiter(s) IntraMuscular once  latanoprost 0.005% Ophthalmic Solution 1 Drop(s) Both EYES at bedtime  levothyroxine 50 MICROGram(s) Oral daily  lidocaine   4% Patch 1 Patch Transdermal daily  midodrine. 10 milliGRAM(s) Oral three times a day  pantoprazole  Injectable 40 milliGRAM(s) IV Push daily  phenylephrine    Infusion 0.1 MICROgram(s)/kG/Min (3.06 mL/Hr) IV Continuous <Continuous>  polyethylene glycol 3350 17 Gram(s) Oral two times a day  senna 2 Tablet(s) Oral at bedtime  sodium bicarbonate 650 milliGRAM(s) Oral three times a day  sodium chloride 0.9%. 1000 milliLiter(s) (50 mL/Hr) IV Continuous <Continuous>    MEDICATIONS  (PRN):  artificial  tears Solution 1 Drop(s) Both EYES every 6 hours PRN Dry Eyes  oxyCODONE    Solution 5 milliGRAM(s) Oral every 4 hours PRN Severe Pain (7 - 10)  traMADol 25 milliGRAM(s) Oral every 6 hours PRN Moderate Pain (4 - 6)      Care Discussed with Consultants/Other Providers [x] YES  [ ] NO    Vital Signs Last 24 Hrs  T(C): 36.9 (16 Sep 2021 11:00), Max: 37.1 (15 Sep 2021 19:00)  T(F): 98.4 (16 Sep 2021 11:00), Max: 98.7 (15 Sep 2021 19:00)  HR: 95 (16 Sep 2021 14:00) (68 - 104)  BP: --  BP(mean): --  RR: 20 (16 Sep 2021 14:00) (12 - 28)  SpO2: 99% (16 Sep 2021 14:00) (95% - 100%)  I&O's Summary    15 Sep 2021 07:01  -  16 Sep 2021 07:00  --------------------------------------------------------  IN: 2561.7 mL / OUT: 890 mL / NET: 1671.7 mL    16 Sep 2021 07:01  -  16 Sep 2021 14:53  --------------------------------------------------------  IN: 425.8 mL / OUT: 375 mL / NET: 50.8 mL    PHYSICAL EXAM:  GENERAL: NAD, well-developed,   HEAD:  Atraumatic, Normocephalic  EYES: EOMI, PERRLA, conjunctiva and sclera clear, legally blind  NECK: Supple, No JVD  CHEST/LUNG: mild decrease breath sounds bilaterally; No wheeze   HEART: Regular rate and rhythm; No murmurs, rubs, or gallops  ABDOMEN: Soft, Nontender, Nondistended; Bowel sounds present  NEURO: no focal weakness, 5/5 b/l upper extremity strength, 4/5 lower extremity strength  EXTREMITIES:  2+ Peripheral Pulses, No clubbing, cyanosis, trace b/l edema  SKIN: No rashes or lesions   BACK: incision c/d/i

## 2021-09-16 NOTE — PHYSICAL THERAPY INITIAL EVALUATION ADULT - IMPAIRMENTS CONTRIBUTING IMPAIRED BED MOBILITY, REHAB EVAL
impaired balance/cognition/impaired coordination/pain/impaired postural control/decreased strength
impaired balance/pain/decreased strength

## 2021-09-16 NOTE — PROGRESS NOTE ADULT - ASSESSMENT
ECHO 10/25/16:  Normal left ventricular systolic function. No segmental wall motion abnormalities. Hypermobile interatrial septum.   ECHO 8/24/21: EF 66% grossly nl LV sys fx, mild diastolic dsyfx - stage 1     a/p    Patient is a 75yo F , known to practice from prior admission,  with a history of colon cancer s/p right hemicolectomy (approx 2 years ago, outside hospital), DVT (on Eliquis), CKD, adrenal insufficiency presenting as transfer from [facility] for trauma evaluation secondary to a mechanical fall from sitting    #Mechanical fall  -MRI noted with anterior splaying of T12/L1 space w/ edema   -CT T done noted with widening of T12/L1 disc space w/ L1 laminectomy  -s/p T10-pelvis with revision of previous L2-5 hardware, L1 expanded PSO.  -s/p intubation- now extubated 9/16 on venti mask  -CV stable   -management per neuro sx , NSCU     #SVT (hx)  -off bb post-op /critically ill   -will eventually resume   -on pressor. mido per NSCU     # DVT (hx)  -recent le doppler negative for acute dvt  -AC on hold post-op- hem to follow up for further a/c recommendations    # wm on CKD   -renal f/u     dvt ppx

## 2021-09-16 NOTE — OCCUPATIONAL THERAPY INITIAL EVALUATION ADULT - LEVEL OF INDEPENDENCE: GROOMING, OT EVAL
TBA Information: Selecting Yes will display possible errors in your note based on the variables you have selected. This validation is only offered as a suggestion for you. PLEASE NOTE THAT THE VALIDATION TEXT WILL BE REMOVED WHEN YOU FINALIZE YOUR NOTE. IF YOU WANT TO FAX A PRELIMINARY NOTE YOU WILL NEED TO TOGGLE THIS TO 'NO' IF YOU DO NOT WANT IT IN YOUR FAXED NOTE.

## 2021-09-16 NOTE — PROGRESS NOTE ADULT - ASSESSMENT
75yo woman with history of colon ca s/p right hemicolectomy, DVT (on eliquis), CKD, and Adrenal Insufficiency that presented s/p fall with imaging notable for T11 and L1 compression fractures s/p T10-pelvis with revision of previous L2-5 hardware, L1 expanded PSO. Remains intubated for agitation, poor RSBI and poor following of commands. B/l LEs difficult to examine but move in the plane of the bed to noxious.     Neuro:   -q1 hour neuro checks  -CT spine today - post surgical changes, hardware in place   -fentanyl prn for pain control 50q2 PRN  -dilaudid pca for pain control once extubated  -no longer needs to stay flat as per nsgy   -continue hemovacs to suction, monitor outputs L40 R90  -ct lumbar spine - pending  -pt eval    Respiratory:   -intubated, wean to extubate  -c/w SBT trials (+)cuff leak    CV:  -keep MAP > 75  phenylephrine ggt to maintain     Endocrine:   -levothyroxine for hypothyroid transition to IV as no PO access   -TSH 0.18, T(6)  -maintain euglycemia  -adrenal insuffiencey c/w hydrocortisone 10mg BID     Heme/Onc:    -chemical dvt prophylaxis on hold secondary to OR  -SCDs only for DVT prophylaxis  -Mixing study 9/13 without complete correction, consistent with heparin effect. Thrombin time also elevated and reptilase time near normal-- also consistent with heparin effect. Fibrinogen normal.  9/9: negative for DVT  previously on eliquis will f/u w/ hematology re: type of DVT ppx     Renal:   -ervin for strict ins and outs - keep in place as per nsgy     ID:   -monitor cbc  ancef post op     GI:   -npo/livf  -protonix for gi prophylaxis  -senna and miralax for bowel regimen  -placement of NGT       77yo woman with history of colon ca s/p right hemicolectomy, DVT (on eliquis), CKD, and Adrenal Insufficiency that presented s/p fall with imaging notable for T11 and L1 compression fractures s/p T10-pelvis with revision of previous L2-5 hardware, L1 expanded PSO. Remains intubated for agitation, poor RSBI and poor following of commands. B/l LEs difficult to examine but move in the plane of the bed to noxious.     Neuro:   -q2 hour neuro checks  -CT spine today - post surgical changes, hardware in place   -oxy 5/tramadol 25  -continue hemovacs to suction, monitor outputs L140 R120  -pt eval      Respiratory:   -extuabted on venti mask  monitor for respiratory distress     CV:  -keep MAP > 65  -phenylephrine wean off, may assist w/ midodrine 5q8  -TTE - EF 66% minimal MR, mild diastolic dfx     Endocrine:   -oral synthroid 50mcg qd   -TSH 0.18, T(6)  -maintain euglycemia  -adrenal insuffiencey c/w hydrocortisone 50 mg q8  -endocrine following      Heme/Onc:    -chemical dvt prophylaxis possbile use of lovenox will f/u   -SCDs only for DVT prophylaxis  -Mixing study 9/13 without complete correction, consistent with heparin effect. Thrombin time also elevated and reptilase time near normal-- also consistent with heparin effect. Fibrinogen normal.  9/9: negative for DVT  previously on eliquis will f/u w/ hematology re: type of DVT ppx     Renal:   ervin removed     ID:   -monitor cbc  ancef post op     GI:   -npo w/ meds, consider starting feeds if respiratory stable   -protonix for gi prophylaxis  -senna and miralax for bowel regimen  LBM 9/15

## 2021-09-16 NOTE — PROGRESS NOTE ADULT - SUBJECTIVE AND OBJECTIVE BOX
Contact info:   Gaurang Barbour MD  pager [phone], please provide 10 digit call back number.   Please note that this patient may be followed by another provider tomorrow.   If no answer or after hours, please contact [phone]    Interval History/Subjective: Spoke with NSICDAYSI MAYA, patient was hypotensive overnight therefore stress does steroid was increased.     MEDICATIONS  (STANDING):    acetaminophen  IVPB .. 1000 milliGRAM(s) IV Intermittent once  atorvastatin 10 milliGRAM(s) Oral at bedtime  chlorhexidine 4% Liquid 1 Application(s) Topical <User Schedule>  enoxaparin Injectable 30 milliGRAM(s) SubCutaneous <User Schedule>  hydrocortisone sodium succinate Injectable 50 milliGRAM(s) IV Push every 8 hours  influenza   Vaccine 0.5 milliLiter(s) IntraMuscular once  latanoprost 0.005% Ophthalmic Solution 1 Drop(s) Both EYES at bedtime  levothyroxine 50 MICROGram(s) Oral daily  lidocaine   4% Patch 1 Patch Transdermal daily  midodrine. 10 milliGRAM(s) Oral three times a day  pantoprazole  Injectable 40 milliGRAM(s) IV Push daily  phenylephrine    Infusion 0.1 MICROgram(s)/kG/Min (3.06 mL/Hr) IV Continuous <Continuous>  polyethylene glycol 3350 17 Gram(s) Oral two times a day  senna 2 Tablet(s) Oral at bedtime  sodium bicarbonate 650 milliGRAM(s) Oral three times a day  sodium chloride 0.9%. 1000 milliLiter(s) (50 mL/Hr) IV Continuous <Continuous>    MEDICATIONS  (PRN):  artificial  tears Solution 1 Drop(s) Both EYES every 6 hours PRN Dry Eyes  oxyCODONE    Solution 5 milliGRAM(s) Oral every 4 hours PRN Severe Pain (7 - 10)  traMADol 25 milliGRAM(s) Oral every 6 hours PRN Moderate Pain (4 - 6)      Allergies    penicillin (Rash)    Intolerances      Review of Systems:  Unable to obtain     PHYSICAL EXAM:  VITALS: T(C): 36.9 (09-16-21 @ 11:00)  T(F): 98.4 (09-16-21 @ 11:00), Max: 98.7 (09-15-21 @ 19:00)  HR: 95 (09-16-21 @ 14:00) (68 - 104)  BP: --  RR:  (12 - 28)  SpO2:  (95% - 100%)  Wt(kg): --    GENERAL: NAD, well-developed,   HEAD:  Atraumatic, Normocephalic  ABDOMEN: Soft, Nontender, Nondistended; Bowel sounds present  EXTREMITIES:  2+ Peripheral Pulses, No clubbing, cyanosis, trace b/l edema  SKIN: No rashes or lesions     09-15    136  |  108  |  14  ----------------------------<  120<H>  4.7   |  17<L>  |  1.17    EGFR if : 52<L>  EGFR if non : 45<L>    Ca    8.9      09-15  Mg     1.9     09-15  Phos  2.0     09-15    Thyroid Function Tests:  09-08 @ 09:59 TSH 0.18 FreeT4 -- T3 -- Anti TPO -- Anti Thyroglobulin Ab -- TSI --  08-27 @ 07:27 TSH -- FreeT4 1.1 T3 -- Anti TPO -- Anti Thyroglobulin Ab -- TSI --

## 2021-09-16 NOTE — OCCUPATIONAL THERAPY INITIAL EVALUATION ADULT - LIVES WITH, PROFILE
Patient lives in pvt house with daughter, 1 step to enter.  Patient ambulated with rolling walker independent. pt. owns rw, bed side commode, hospital bed, w/c. HHA 8hrs x 7 days.

## 2021-09-16 NOTE — OCCUPATIONAL THERAPY INITIAL EVALUATION ADULT - BALANCE TRAINING, PT EVAL
GOAL: Pt will demonstrate improved static/dynamic balance to GOOD- in order to increase safety and independence in ADLs within 4 weeks

## 2021-09-16 NOTE — AIRWAY REMOVAL NOTE  ADULT & PEDS - ARTIFICAL AIRWAY REMOVAL COMMENTS
Written order for extubation verified.The patient was identified by full name and birth date compared to the identification band. Present during the procedure was Anayeli Rey)

## 2021-09-16 NOTE — OCCUPATIONAL THERAPY INITIAL EVALUATION ADULT - PRECAUTIONS/LIMITATIONS, REHAB EVAL
She was going to her commode and slipped/fell on her right side.  Imaging revealed T11 and L1 compression fractures.  Admitted to the neurosurgery service for further management. s/p T10-pelvis with revision of previous L2-5 hardware, L1 expanded PSO. Remains intubated for agitation, poor RSBI and poor following of commands. B/l LEs difficult to examine but move in the plane of the bed to noxious. She was going to her commode and slipped/fell on her right side.  Imaging revealed T11 and L1 compression fractures.  Admitted to the neurosurgery service for further management. s/p T10-pelvis with revision of previous L2-5 hardware, L1 expanded PSO. Remains intubated for agitation, poor RSBI and poor following of commands. B/l LEs difficult to examine but move in the plane of the bed to noxious. T head/cervical spine/chest were performed which showed a right forehead hematoma, acute minimally displaced right coracoid process scapular fracture, T11/L1 compression fractures and an inferior sternal fracture.

## 2021-09-16 NOTE — OCCUPATIONAL THERAPY INITIAL EVALUATION ADULT - PERTINENT HX OF CURRENT PROBLEM, REHAB EVAL
Patient is a 76 year old woman with history of colon ca s/p right hemicolectomy, DVT (on eliquis), CKD, and Adrenal Insufficiency that presented s/p fall.

## 2021-09-16 NOTE — PHYSICAL THERAPY INITIAL EVALUATION ADULT - GENERAL OBSERVATIONS, REHAB EVAL
Pt seen for 30 min PT bedside eval to assist orthotist Kenny Reddy with fitting TLSO brace, pt received supine in bed, A&OX1-2, confused, following commands, willing to participate, admitted s/p fall, right scapular fx, sternal fx, RUE NWB in sling, T12-L2 fx, pt pending OR on tuesday (9/14), pt on strict bedrest precuations at this time.
received supine in bed in NAD. intubated.

## 2021-09-16 NOTE — OCCUPATIONAL THERAPY INITIAL EVALUATION ADULT - DIAGNOSIS, OT EVAL
Pt p/w decreased strength, balance, ROM, endurance, and cognition impairing independence with ADLs and functional mobility

## 2021-09-16 NOTE — PHYSICAL THERAPY INITIAL EVALUATION ADULT - PRECAUTIONS/LIMITATIONS, REHAB EVAL
CT head/cervical spine/chest were performed which showed a right forehead hematoma, acute minimally displaced right coracoid process scapular fracture, T11/L1 compression fractures and an inferior sternal fracture.. XRayPelvis: (-) XRayRKnee:/spinal precautions

## 2021-09-16 NOTE — PROGRESS NOTE ADULT - SUBJECTIVE AND OBJECTIVE BOX
CARDIOLOGY FOLLOW UP - Dr. Orozco  DATE OF SERVICE: 9/16/21    CC now extubated, in nAD  son at bedside        REVIEW OF SYSTEMS:  CONSTITUTIONAL: No fever, weight loss, or fatigue  RESPIRATORY: No cough, wheezing, chills or hemoptysis; No Shortness of Breath  CARDIOVASCULAR: No chest pain, palpitations, passing out, dizziness, or leg swelling  GASTROINTESTINAL: No abdominal or epigastric pain. No nausea, vomiting, or hematemesis; No diarrhea or constipation. No melena or hematochezia.  VASCULAR: No edema     PHYSICAL EXAM:  T(C): 36.9 (09-16-21 @ 11:00), Max: 37.1 (09-15-21 @ 19:00)  HR: 86 (09-16-21 @ 11:30) (68 - 104)  BP: --  RR: 20 (09-16-21 @ 11:30) (12 - 32)  SpO2: 98% (09-16-21 @ 11:30) (95% - 100%)  Wt(kg): --  I&O's Summary    15 Sep 2021 07:01  -  16 Sep 2021 07:00  --------------------------------------------------------  IN: 2561.7 mL / OUT: 890 mL / NET: 1671.7 mL    16 Sep 2021 07:01  -  16 Sep 2021 12:31  --------------------------------------------------------  IN: 325.8 mL / OUT: 0 mL / NET: 325.8 mL        Appearance: Normal	  Cardiovascular: Normal S1 S2,RRR, No JVD, No murmurs  Respiratory: Lungs clear to auscultation	  Gastrointestinal:  Soft, Non-tender, + BS	  Extremities: Normal range of motion, No clubbing, cyanosis or edema      Home Medications:  acetaminophen 325 mg oral tablet: 2 tab(s) orally every 6 hours, As needed, Mild Pain (1 - 3) (24 Sep 2020 11:16)  Artificial Tears ophthalmic solution: 1 drop(s) to each affected eye 4 times a day, As Needed (22 Aug 2021 23:28)  aspirin 81 mg oral delayed release tablet: 1 tab(s) orally once a day (27 Aug 2021 13:38)  calcium (as carbonate) 600 mg oral tablet: 1 cap(s) orally once a day (24 Sep 2020 11:16)  Eliquis 2.5 mg oral tablet: 1 tab(s) orally 2 times a day (24 Sep 2020 11:16)  felodipine 5 mg oral tablet, extended release: 1 tab(s) orally once a day (22 Aug 2021 23:23)  guaiFENesin 600 mg oral tablet, extended release: 1 tab(s) orally every 12 hours as needed for cough  (27 Aug 2021 13:38)  hydrocortisone 10 mg oral tablet: 1 tab(s) orally 2 times a day (24 Sep 2020 11:16)  latanoprost 0.005% ophthalmic solution: 1 drop(s) to each affected eye once a day (in the evening) (22 Aug 2021 23:27)  levothyroxine 50 mcg (0.05 mg) oral tablet: 1 tab(s) orally once a day (24 Sep 2020 11:16)  Lipitor 10 mg oral tablet: 1 tab(s) orally once a day (22 Aug 2021 23:41)  Macular Vitamin Benefit oral tablet: 1 tab(s) orally once a day (24 Sep 2020 11:16)  metoprolol succinate 50 mg oral tablet, extended release: 1 tab(s) orally once a day (22 Aug 2021 23:24)  Multiple Vitamins oral tablet: 1 tab(s) orally once a day (22 Aug 2021 23:22)  olmesartan 20 mg oral tablet: 1 tab(s) orally once a day (22 Aug 2021 23:22)  omeprazole 20 mg oral delayed release capsule: 1 cap(s) orally once a day (22 Aug 2021 23:21)  oxyCODONE 5 mg oral tablet: 1 tab(s) orally every 8 hours as needed for severe pain (27 Aug 2021 13:35)  OxyCONTIN 20 mg oral tablet, extended release: 1 tab(s) orally every 12 hours (24 Sep 2020 11:16)  risedronate 35 mg oral tablet: 1 tab(s) orally once a week (Mondays) (05 Sep 2021 18:30)  senna oral tablet: 2 tab(s) orally once a day (at bedtime), As Needed (05 Sep 2021 18:31)      MEDICATIONS  (STANDING):  atorvastatin 10 milliGRAM(s) Oral at bedtime  chlorhexidine 4% Liquid 1 Application(s) Topical <User Schedule>  hydrocortisone sodium succinate Injectable 50 milliGRAM(s) IV Push every 8 hours  influenza   Vaccine 0.5 milliLiter(s) IntraMuscular once  latanoprost 0.005% Ophthalmic Solution 1 Drop(s) Both EYES at bedtime  levothyroxine 50 MICROGram(s) Oral daily  lidocaine   4% Patch 1 Patch Transdermal daily  midodrine. 10 milliGRAM(s) Oral three times a day  pantoprazole  Injectable 40 milliGRAM(s) IV Push daily  phenylephrine    Infusion 0.1 MICROgram(s)/kG/Min (3.06 mL/Hr) IV Continuous <Continuous>  polyethylene glycol 3350 17 Gram(s) Oral two times a day  senna 2 Tablet(s) Oral at bedtime  sodium bicarbonate 650 milliGRAM(s) Oral three times a day  sodium chloride 0.9%. 1000 milliLiter(s) (50 mL/Hr) IV Continuous <Continuous>      TELEMETRY: 	    ECG:  	  RADIOLOGY:   DIAGNOSTIC TESTING:  [ ] Echocardiogram:  [ ]  Catheterization:  [ ] Stress Test:    OTHER: 	    LABS:	 	                            12.0   19.89 )-----------( 189      ( 15 Sep 2021 21:18 )             34.7     09-15    136  |  108  |  14  ----------------------------<  120<H>  4.7   |  17<L>  |  1.17    Ca    8.9      15 Sep 2021 21:18  Phos  2.0     09-15  Mg     1.9     09-15

## 2021-09-17 LAB
APTT BLD: 32.4 SEC — SIGNIFICANT CHANGE UP (ref 27.5–35.5)
INR BLD: 1.43 RATIO — HIGH (ref 0.88–1.16)
PROTHROM AB SERPL-ACNC: 16.9 SEC — HIGH (ref 10.6–13.6)
T4 FREE SERPL-MCNC: 1.2 NG/DL — SIGNIFICANT CHANGE UP (ref 0.9–1.8)

## 2021-09-17 PROCEDURE — 71275 CT ANGIOGRAPHY CHEST: CPT | Mod: 26

## 2021-09-17 PROCEDURE — 99233 SBSQ HOSP IP/OBS HIGH 50: CPT

## 2021-09-17 PROCEDURE — 99232 SBSQ HOSP IP/OBS MODERATE 35: CPT

## 2021-09-17 RX ORDER — MULTIVIT WITH MIN/MFOLATE/K2 340-15/3 G
1 POWDER (GRAM) ORAL ONCE
Refills: 0 | Status: COMPLETED | OUTPATIENT
Start: 2021-09-17 | End: 2021-09-17

## 2021-09-17 RX ORDER — LOSARTAN POTASSIUM 100 MG/1
50 TABLET, FILM COATED ORAL DAILY
Refills: 0 | Status: DISCONTINUED | OUTPATIENT
Start: 2021-09-17 | End: 2021-09-17

## 2021-09-17 RX ORDER — LOSARTAN POTASSIUM 100 MG/1
50 TABLET, FILM COATED ORAL DAILY
Refills: 0 | Status: DISCONTINUED | OUTPATIENT
Start: 2021-09-17 | End: 2021-09-27

## 2021-09-17 RX ORDER — OXYCODONE HYDROCHLORIDE 5 MG/1
10 TABLET ORAL EVERY 4 HOURS
Refills: 0 | Status: DISCONTINUED | OUTPATIENT
Start: 2021-09-17 | End: 2021-09-23

## 2021-09-17 RX ORDER — METOPROLOL TARTRATE 50 MG
25 TABLET ORAL
Refills: 0 | Status: DISCONTINUED | OUTPATIENT
Start: 2021-09-17 | End: 2021-09-19

## 2021-09-17 RX ORDER — HYDROCORTISONE 20 MG
25 TABLET ORAL EVERY 8 HOURS
Refills: 0 | Status: DISCONTINUED | OUTPATIENT
Start: 2021-09-17 | End: 2021-09-18

## 2021-09-17 RX ORDER — CYCLOBENZAPRINE HYDROCHLORIDE 10 MG/1
10 TABLET, FILM COATED ORAL THREE TIMES A DAY
Refills: 0 | Status: DISCONTINUED | OUTPATIENT
Start: 2021-09-17 | End: 2021-09-18

## 2021-09-17 RX ORDER — ACETAMINOPHEN 500 MG
1000 TABLET ORAL ONCE
Refills: 0 | Status: COMPLETED | OUTPATIENT
Start: 2021-09-17 | End: 2021-09-17

## 2021-09-17 RX ORDER — OXYCODONE HYDROCHLORIDE 5 MG/1
10 TABLET ORAL EVERY 4 HOURS
Refills: 0 | Status: DISCONTINUED | OUTPATIENT
Start: 2021-09-17 | End: 2021-09-17

## 2021-09-17 RX ORDER — SODIUM CHLORIDE 9 MG/ML
500 INJECTION INTRAMUSCULAR; INTRAVENOUS; SUBCUTANEOUS ONCE
Refills: 0 | Status: COMPLETED | OUTPATIENT
Start: 2021-09-17 | End: 2021-09-17

## 2021-09-17 RX ORDER — OXYCODONE HYDROCHLORIDE 5 MG/1
5 TABLET ORAL EVERY 4 HOURS
Refills: 0 | Status: DISCONTINUED | OUTPATIENT
Start: 2021-09-17 | End: 2021-09-23

## 2021-09-17 RX ORDER — LABETALOL HCL 100 MG
10 TABLET ORAL ONCE
Refills: 0 | Status: COMPLETED | OUTPATIENT
Start: 2021-09-17 | End: 2021-09-17

## 2021-09-17 RX ORDER — IPRATROPIUM/ALBUTEROL SULFATE 18-103MCG
3 AEROSOL WITH ADAPTER (GRAM) INHALATION ONCE
Refills: 0 | Status: COMPLETED | OUTPATIENT
Start: 2021-09-17 | End: 2021-09-17

## 2021-09-17 RX ORDER — TRAMADOL HYDROCHLORIDE 50 MG/1
50 TABLET ORAL EVERY 4 HOURS
Refills: 0 | Status: DISCONTINUED | OUTPATIENT
Start: 2021-09-17 | End: 2021-09-24

## 2021-09-17 RX ORDER — CYCLOBENZAPRINE HYDROCHLORIDE 10 MG/1
5 TABLET, FILM COATED ORAL THREE TIMES A DAY
Refills: 0 | Status: DISCONTINUED | OUTPATIENT
Start: 2021-09-17 | End: 2021-09-17

## 2021-09-17 RX ORDER — OXYCODONE HYDROCHLORIDE 5 MG/1
5 TABLET ORAL ONCE
Refills: 0 | Status: DISCONTINUED | OUTPATIENT
Start: 2021-09-17 | End: 2021-09-17

## 2021-09-17 RX ORDER — SODIUM CHLORIDE 9 MG/ML
1000 INJECTION INTRAMUSCULAR; INTRAVENOUS; SUBCUTANEOUS
Refills: 0 | Status: DISCONTINUED | OUTPATIENT
Start: 2021-09-17 | End: 2021-09-20

## 2021-09-17 RX ADMIN — Medication 650 MILLIGRAM(S): at 21:46

## 2021-09-17 RX ADMIN — Medication 1000 MILLIGRAM(S): at 04:50

## 2021-09-17 RX ADMIN — MIDODRINE HYDROCHLORIDE 10 MILLIGRAM(S): 2.5 TABLET ORAL at 05:10

## 2021-09-17 RX ADMIN — LATANOPROST 1 DROP(S): 0.05 SOLUTION/ DROPS OPHTHALMIC; TOPICAL at 21:47

## 2021-09-17 RX ADMIN — Medication 50 MILLIGRAM(S): at 13:26

## 2021-09-17 RX ADMIN — LIDOCAINE 1 PATCH: 4 CREAM TOPICAL at 09:15

## 2021-09-17 RX ADMIN — OXYCODONE HYDROCHLORIDE 5 MILLIGRAM(S): 5 TABLET ORAL at 02:40

## 2021-09-17 RX ADMIN — CYCLOBENZAPRINE HYDROCHLORIDE 5 MILLIGRAM(S): 10 TABLET, FILM COATED ORAL at 09:15

## 2021-09-17 RX ADMIN — Medication 25 MILLIGRAM(S): at 14:46

## 2021-09-17 RX ADMIN — Medication 25 MILLIGRAM(S): at 14:45

## 2021-09-17 RX ADMIN — OXYCODONE HYDROCHLORIDE 5 MILLIGRAM(S): 5 TABLET ORAL at 03:10

## 2021-09-17 RX ADMIN — OXYCODONE HYDROCHLORIDE 5 MILLIGRAM(S): 5 TABLET ORAL at 07:45

## 2021-09-17 RX ADMIN — OXYCODONE HYDROCHLORIDE 5 MILLIGRAM(S): 5 TABLET ORAL at 14:00

## 2021-09-17 RX ADMIN — Medication 10 MILLIGRAM(S): at 12:00

## 2021-09-17 RX ADMIN — Medication 400 MILLIGRAM(S): at 04:35

## 2021-09-17 RX ADMIN — OXYCODONE HYDROCHLORIDE 5 MILLIGRAM(S): 5 TABLET ORAL at 21:47

## 2021-09-17 RX ADMIN — Medication 50 MICROGRAM(S): at 05:10

## 2021-09-17 RX ADMIN — POLYETHYLENE GLYCOL 3350 17 GRAM(S): 17 POWDER, FOR SOLUTION ORAL at 05:10

## 2021-09-17 RX ADMIN — OXYCODONE HYDROCHLORIDE 5 MILLIGRAM(S): 5 TABLET ORAL at 07:15

## 2021-09-17 RX ADMIN — Medication 25 MILLIGRAM(S): at 21:46

## 2021-09-17 RX ADMIN — SENNA PLUS 2 TABLET(S): 8.6 TABLET ORAL at 21:46

## 2021-09-17 RX ADMIN — LOSARTAN POTASSIUM 50 MILLIGRAM(S): 100 TABLET, FILM COATED ORAL at 21:48

## 2021-09-17 RX ADMIN — OXYCODONE HYDROCHLORIDE 5 MILLIGRAM(S): 5 TABLET ORAL at 22:34

## 2021-09-17 RX ADMIN — Medication 1 BOTTLE: at 11:32

## 2021-09-17 RX ADMIN — SODIUM CHLORIDE 1000 MILLILITER(S): 9 INJECTION INTRAMUSCULAR; INTRAVENOUS; SUBCUTANEOUS at 08:24

## 2021-09-17 RX ADMIN — OXYCODONE HYDROCHLORIDE 10 MILLIGRAM(S): 5 TABLET ORAL at 11:30

## 2021-09-17 RX ADMIN — SODIUM CHLORIDE 50 MILLILITER(S): 9 INJECTION INTRAMUSCULAR; INTRAVENOUS; SUBCUTANEOUS at 13:24

## 2021-09-17 RX ADMIN — SODIUM CHLORIDE 50 MILLILITER(S): 9 INJECTION INTRAMUSCULAR; INTRAVENOUS; SUBCUTANEOUS at 09:15

## 2021-09-17 RX ADMIN — OXYCODONE HYDROCHLORIDE 10 MILLIGRAM(S): 5 TABLET ORAL at 11:00

## 2021-09-17 RX ADMIN — TRAMADOL HYDROCHLORIDE 50 MILLIGRAM(S): 50 TABLET ORAL at 12:30

## 2021-09-17 RX ADMIN — LIDOCAINE 1 PATCH: 4 CREAM TOPICAL at 21:46

## 2021-09-17 RX ADMIN — Medication 3 MILLILITER(S): at 11:21

## 2021-09-17 RX ADMIN — Medication 10 MILLIGRAM(S): at 11:34

## 2021-09-17 RX ADMIN — SODIUM CHLORIDE 50 MILLILITER(S): 9 INJECTION INTRAMUSCULAR; INTRAVENOUS; SUBCUTANEOUS at 18:48

## 2021-09-17 RX ADMIN — OXYCODONE HYDROCHLORIDE 5 MILLIGRAM(S): 5 TABLET ORAL at 01:10

## 2021-09-17 RX ADMIN — OXYCODONE HYDROCHLORIDE 5 MILLIGRAM(S): 5 TABLET ORAL at 01:40

## 2021-09-17 RX ADMIN — ATORVASTATIN CALCIUM 10 MILLIGRAM(S): 80 TABLET, FILM COATED ORAL at 21:46

## 2021-09-17 RX ADMIN — OXYCODONE HYDROCHLORIDE 5 MILLIGRAM(S): 5 TABLET ORAL at 14:30

## 2021-09-17 RX ADMIN — LIDOCAINE 1 PATCH: 4 CREAM TOPICAL at 20:35

## 2021-09-17 RX ADMIN — PANTOPRAZOLE SODIUM 40 MILLIGRAM(S): 20 TABLET, DELAYED RELEASE ORAL at 11:31

## 2021-09-17 RX ADMIN — Medication 50 MILLIGRAM(S): at 05:10

## 2021-09-17 RX ADMIN — Medication 650 MILLIGRAM(S): at 13:25

## 2021-09-17 RX ADMIN — ENOXAPARIN SODIUM 30 MILLIGRAM(S): 100 INJECTION SUBCUTANEOUS at 18:47

## 2021-09-17 RX ADMIN — Medication 650 MILLIGRAM(S): at 05:09

## 2021-09-17 NOTE — PROGRESS NOTE ADULT - SUBJECTIVE AND OBJECTIVE BOX
HPI:  Patient is a 76 year old woman with history of colon ca s/p right hemicolectomy, DVT (on eliquis), CKD, and Adrenal Insufficiency that presented s/p fall.  She was going to her commode and slipped/fell on her right side.  Imaging revealed T11 and L1 compression fractures.  Admitted to the neurosurgery service for further management.    OVERNIGHT EVENTS:  extubated in AM     PHYSICAL EXAM:  Neurological: follows commands, AG b/l upper, spontaneous LE AG   Cardiovascular: RRR  Respiratory: clear to auscultation b/l  Gastrointestinal: soft, non-distended  Genitourinary: +ervin  Incision/Wound: mid back vertical incision c/d/i    TUBES/LINES:  [x] hemovac x2  [x[ ervin  [x] a-line      ICU Vital Signs Last 24 Hrs  T(C): 36.7 (17 Sep 2021 03:00), Max: 37.2 (16 Sep 2021 19:00)  T(F): 98 (17 Sep 2021 03:00), Max: 99 (16 Sep 2021 19:00)  HR: 118 (17 Sep 2021 06:00) (76 - 118)  ABP: 137/87 (17 Sep 2021 06:00) (104/54 - 198/86)  ABP(mean): 108 (17 Sep 2021 06:00) (69 - 140)  RR: 25 (17 Sep 2021 06:00) (16 - 27)  SpO2: 93% (17 Sep 2021 06:00) (93% - 100%)      09-15-21 @ 07:01  -  09-16-21 @ 07:00  --------------------------------------------------------  IN: 2561.7 mL / OUT: 890 mL / NET: 1671.7 mL    09-16-21 @ 07:01  -  09-17-21 @ 06:26  --------------------------------------------------------  IN: 2015.8 mL / OUT: 1305 mL / NET: 710.8 mL    Mode: CPAP with PS, FiO2: 40, PEEP: 5, PS: 5, MAP: 8    artificial  tears Solution 1 Drop(s) Both EYES every 6 hours PRN  atorvastatin 10 milliGRAM(s) Oral at bedtime  chlorhexidine 4% Liquid 1 Application(s) Topical <User Schedule>  enoxaparin Injectable 30 milliGRAM(s) SubCutaneous <User Schedule>  hydrocortisone sodium succinate Injectable 50 milliGRAM(s) IV Push every 8 hours  influenza   Vaccine 0.5 milliLiter(s) IntraMuscular once  latanoprost 0.005% Ophthalmic Solution 1 Drop(s) Both EYES at bedtime  levothyroxine 50 MICROGram(s) Oral daily  lidocaine   4% Patch 1 Patch Transdermal daily  midodrine. 10 milliGRAM(s) Oral three times a day  oxyCODONE    Solution 5 milliGRAM(s) Oral every 4 hours PRN  pantoprazole  Injectable 40 milliGRAM(s) IV Push daily  polyethylene glycol 3350 17 Gram(s) Oral two times a day  senna 2 Tablet(s) Oral at bedtime  sodium bicarbonate 650 milliGRAM(s) Oral three times a day  sodium chloride 0.9%. 1000 milliLiter(s) (50 mL/Hr) IV Continuous <Continuous>  traMADol 25 milliGRAM(s) Oral every 6 hours PRN      LABS:  Na: 142 (09-16 @ 21:09), 136 (09-15 @ 21:18), 136 (09-15 @ 17:46), 139 (09-14 @ 22:04)  K: 4.0 (09-16 @ 21:09), 4.7 (09-15 @ 21:18), 5.0 (09-15 @ 17:46), 3.2 (09-14 @ 22:04)  Cl: 113 (09-16 @ 21:09), 108 (09-15 @ 21:18), 106 (09-15 @ 17:46), 103 (09-14 @ 22:04)  CO2: 18 (09-16 @ 21:09), 17 (09-15 @ 21:18), 19 (09-15 @ 17:46), 18 (09-14 @ 22:04)  BUN: 14 (09-16 @ 21:09), 14 (09-15 @ 21:18), 14 (09-15 @ 17:46), 13 (09-14 @ 22:04)  Cr: 1.02 (09-16 @ 21:09), 1.17 (09-15 @ 21:18), 1.15 (09-15 @ 17:46), 0.94 (09-14 @ 22:04)  Glu: 126(09-16 @ 21:09), 120(09-15 @ 21:18), 118(09-15 @ 17:46), 139(09-14 @ 22:04)    Hgb: 12.0 (09-15 @ 21:18), 12.1 (09-15 @ 17:46), 10.4 (09-14 @ 22:04)  Hct: 34.7 (09-15 @ 21:18), 34.2 (09-15 @ 17:46), 30.1 (09-14 @ 22:04)  WBC: 19.89 (09-15 @ 21:18), 20.00 (09-15 @ 17:46), 18.54 (09-14 @ 22:04)  Plt: 189 (09-15 @ 21:18), 183 (09-15 @ 17:46), 152 (09-14 @ 22:04)    INR: 1.41 09-16-21 @ 12:36  PTT: 31.4 09-16-21 @ 12:36    ABG - ( 15 Sep 2021 16:28 )  pH, Arterial: 7.43  pH, Blood: x     /  pCO2: 35    /  pO2: 137   / HCO3: 23    / Base Excess: -0.6  /  SaO2: 99.9                 HPI:  Patient is a 76 year old woman with history of colon ca s/p right hemicolectomy, DVT (on eliquis), CKD, and Adrenal Insufficiency that presented s/p fall.  She was going to her commode and slipped/fell on her right side.  Imaging revealed T11 and L1 compression fractures.  Admitted to the neurosurgery service for further management.    OVERNIGHT EVENTS:  acetaminophen x1     PHYSICAL EXAM:  Neurological: follows commands, AG b/l upper 4-/5, RLE 4/5 prox, df/pf 5/5   LLE proxmil 3/5 df/pf 4/5 (pain limited & effort limited exam)  Cardiovascular: RRR  Respiratory: clear to auscultation b/l  Gastrointestinal: soft, non-distended  Incision/Wound: mid back vertical incision c/d/i    TUBES/LINES:  [x] hemovac x2    ICU Vital Signs Last 24 Hrs  T(C): 36.7 (17 Sep 2021 03:00), Max: 37.2 (16 Sep 2021 19:00)  T(F): 98 (17 Sep 2021 03:00), Max: 99 (16 Sep 2021 19:00)  HR: 118 (17 Sep 2021 06:00) (76 - 118)  ABP: 137/87 (17 Sep 2021 06:00) (104/54 - 198/86)  ABP(mean): 108 (17 Sep 2021 06:00) (69 - 140)  RR: 25 (17 Sep 2021 06:00) (16 - 27)  SpO2: 93% (17 Sep 2021 06:00) (93% - 100%)      09-15-21 @ 07:01  -  09-16-21 @ 07:00  --------------------------------------------------------  IN: 2561.7 mL / OUT: 890 mL / NET: 1671.7 mL    09-16-21 @ 07:01  -  09-17-21 @ 06:26  --------------------------------------------------------  IN: 2015.8 mL / OUT: 1305 mL / NET: 710.8 mL    Mode: CPAP with PS, FiO2: 40, PEEP: 5, PS: 5, MAP: 8    artificial  tears Solution 1 Drop(s) Both EYES every 6 hours PRN  atorvastatin 10 milliGRAM(s) Oral at bedtime  chlorhexidine 4% Liquid 1 Application(s) Topical <User Schedule>  enoxaparin Injectable 30 milliGRAM(s) SubCutaneous <User Schedule>  hydrocortisone sodium succinate Injectable 50 milliGRAM(s) IV Push every 8 hours  influenza   Vaccine 0.5 milliLiter(s) IntraMuscular once  latanoprost 0.005% Ophthalmic Solution 1 Drop(s) Both EYES at bedtime  levothyroxine 50 MICROGram(s) Oral daily  lidocaine   4% Patch 1 Patch Transdermal daily  midodrine. 10 milliGRAM(s) Oral three times a day  oxyCODONE    Solution 5 milliGRAM(s) Oral every 4 hours PRN  pantoprazole  Injectable 40 milliGRAM(s) IV Push daily  polyethylene glycol 3350 17 Gram(s) Oral two times a day  senna 2 Tablet(s) Oral at bedtime  sodium bicarbonate 650 milliGRAM(s) Oral three times a day  sodium chloride 0.9%. 1000 milliLiter(s) (50 mL/Hr) IV Continuous <Continuous>  traMADol 25 milliGRAM(s) Oral every 6 hours PRN      LABS:  Na: 142 (09-16 @ 21:09), 136 (09-15 @ 21:18), 136 (09-15 @ 17:46), 139 (09-14 @ 22:04)  K: 4.0 (09-16 @ 21:09), 4.7 (09-15 @ 21:18), 5.0 (09-15 @ 17:46), 3.2 (09-14 @ 22:04)  Cl: 113 (09-16 @ 21:09), 108 (09-15 @ 21:18), 106 (09-15 @ 17:46), 103 (09-14 @ 22:04)  CO2: 18 (09-16 @ 21:09), 17 (09-15 @ 21:18), 19 (09-15 @ 17:46), 18 (09-14 @ 22:04)  BUN: 14 (09-16 @ 21:09), 14 (09-15 @ 21:18), 14 (09-15 @ 17:46), 13 (09-14 @ 22:04)  Cr: 1.02 (09-16 @ 21:09), 1.17 (09-15 @ 21:18), 1.15 (09-15 @ 17:46), 0.94 (09-14 @ 22:04)  Glu: 126(09-16 @ 21:09), 120(09-15 @ 21:18), 118(09-15 @ 17:46), 139(09-14 @ 22:04)    Hgb: 12.0 (09-15 @ 21:18), 12.1 (09-15 @ 17:46), 10.4 (09-14 @ 22:04)  Hct: 34.7 (09-15 @ 21:18), 34.2 (09-15 @ 17:46), 30.1 (09-14 @ 22:04)  WBC: 19.89 (09-15 @ 21:18), 20.00 (09-15 @ 17:46), 18.54 (09-14 @ 22:04)  Plt: 189 (09-15 @ 21:18), 183 (09-15 @ 17:46), 152 (09-14 @ 22:04)    INR: 1.41 09-16-21 @ 12:36  PTT: 31.4 09-16-21 @ 12:36    ABG - ( 15 Sep 2021 16:28 )  pH, Arterial: 7.43  pH, Blood: x     /  pCO2: 35    /  pO2: 137   / HCO3: 23    / Base Excess: -0.6  /  SaO2: 99.9                 HPI:  Patient is a 76 year old woman with history of colon ca s/p right hemicolectomy, DVT (on eliquis), CKD, and Adrenal Insufficiency that presented s/p fall.  She was going to her commode and slipped/fell on her right side.  Imaging revealed T11 and L1 compression fractures.  Admitted to the neurosurgery service for further management.    OVERNIGHT EVENTS:  remains extubated, tachycardic    REVIEW OF SYSTEMS: [] Unable to Assess due to neurologic exam   [x ] All ROS addressed below are non-contributory, except:  Neuro: [ ] Headache [x ] Back pain [ ] Numbness [ ] Weakness [ ] Ataxia [ ] Dizziness [ ] Aphasia [ ] Dysarthria [ ] Visual disturbance  Resp: [ ] Shortness of breath/dyspnea [ ] Orthopnea [ ] Cough  CV: [ ] Chest pain [ ] Palpitation [ ] Lightheadedness [ ] Syncope  Renal: [ ] Thirst [ ] Edema  GI: [ ] Nausea [ ] Emesis [ ] Abdominal pain [ ] Constipation [ ] Diarrhea  Hem: [ ] Hematemesis [ ] bBright red blood per rectum  ID: [ ] Fever [ ] Chills [ ] Dysuria  ENT: [ ] Rhinorrhea    PHYSICAL EXAM:  Neurological: follows commands, AG b/l upper 4-/5, RLE 4/5 prox, df/pf 5/5   LLE proxmil 3/5 df/pf 4/5 (pain limited & effort limited exam)  Cardiovascular: RRR  Respiratory: clear to auscultation b/l  Gastrointestinal: soft, non-distended  Incision/Wound: mid back vertical incision c/d/i    TUBES/LINES:  [x] hemovac x2    ICU Vital Signs Last 24 Hrs  T(C): 36.7 (17 Sep 2021 03:00), Max: 37.2 (16 Sep 2021 19:00)  T(F): 98 (17 Sep 2021 03:00), Max: 99 (16 Sep 2021 19:00)  HR: 118 (17 Sep 2021 06:00) (76 - 118)  ABP: 137/87 (17 Sep 2021 06:00) (104/54 - 198/86)  ABP(mean): 108 (17 Sep 2021 06:00) (69 - 140)  RR: 25 (17 Sep 2021 06:00) (16 - 27)  SpO2: 93% (17 Sep 2021 06:00) (93% - 100%)      09-15-21 @ 07:01  -  09-16-21 @ 07:00  --------------------------------------------------------  IN: 2561.7 mL / OUT: 890 mL / NET: 1671.7 mL    09-16-21 @ 07:01  -  09-17-21 @ 06:26  --------------------------------------------------------  IN: 2015.8 mL / OUT: 1305 mL / NET: 710.8 mL    Mode: CPAP with PS, FiO2: 40, PEEP: 5, PS: 5, MAP: 8    artificial  tears Solution 1 Drop(s) Both EYES every 6 hours PRN  atorvastatin 10 milliGRAM(s) Oral at bedtime  chlorhexidine 4% Liquid 1 Application(s) Topical <User Schedule>  enoxaparin Injectable 30 milliGRAM(s) SubCutaneous <User Schedule>  hydrocortisone sodium succinate Injectable 50 milliGRAM(s) IV Push every 8 hours  influenza   Vaccine 0.5 milliLiter(s) IntraMuscular once  latanoprost 0.005% Ophthalmic Solution 1 Drop(s) Both EYES at bedtime  levothyroxine 50 MICROGram(s) Oral daily  lidocaine   4% Patch 1 Patch Transdermal daily  midodrine. 10 milliGRAM(s) Oral three times a day  oxyCODONE    Solution 5 milliGRAM(s) Oral every 4 hours PRN  pantoprazole  Injectable 40 milliGRAM(s) IV Push daily  polyethylene glycol 3350 17 Gram(s) Oral two times a day  senna 2 Tablet(s) Oral at bedtime  sodium bicarbonate 650 milliGRAM(s) Oral three times a day  sodium chloride 0.9%. 1000 milliLiter(s) (50 mL/Hr) IV Continuous <Continuous>  traMADol 25 milliGRAM(s) Oral every 6 hours PRN      LABS:  Na: 142 (09-16 @ 21:09), 136 (09-15 @ 21:18), 136 (09-15 @ 17:46), 139 (09-14 @ 22:04)  K: 4.0 (09-16 @ 21:09), 4.7 (09-15 @ 21:18), 5.0 (09-15 @ 17:46), 3.2 (09-14 @ 22:04)  Cl: 113 (09-16 @ 21:09), 108 (09-15 @ 21:18), 106 (09-15 @ 17:46), 103 (09-14 @ 22:04)  CO2: 18 (09-16 @ 21:09), 17 (09-15 @ 21:18), 19 (09-15 @ 17:46), 18 (09-14 @ 22:04)  BUN: 14 (09-16 @ 21:09), 14 (09-15 @ 21:18), 14 (09-15 @ 17:46), 13 (09-14 @ 22:04)  Cr: 1.02 (09-16 @ 21:09), 1.17 (09-15 @ 21:18), 1.15 (09-15 @ 17:46), 0.94 (09-14 @ 22:04)  Glu: 126(09-16 @ 21:09), 120(09-15 @ 21:18), 118(09-15 @ 17:46), 139(09-14 @ 22:04)    Hgb: 12.0 (09-15 @ 21:18), 12.1 (09-15 @ 17:46), 10.4 (09-14 @ 22:04)  Hct: 34.7 (09-15 @ 21:18), 34.2 (09-15 @ 17:46), 30.1 (09-14 @ 22:04)  WBC: 19.89 (09-15 @ 21:18), 20.00 (09-15 @ 17:46), 18.54 (09-14 @ 22:04)  Plt: 189 (09-15 @ 21:18), 183 (09-15 @ 17:46), 152 (09-14 @ 22:04)    INR: 1.41 09-16-21 @ 12:36  PTT: 31.4 09-16-21 @ 12:36    ABG - ( 15 Sep 2021 16:28 )  pH, Arterial: 7.43  pH, Blood: x     /  pCO2: 35    /  pO2: 137   / HCO3: 23    / Base Excess: -0.6  /  SaO2: 99.9

## 2021-09-17 NOTE — PROGRESS NOTE ADULT - ASSESSMENT
75 y/o female with a history of colon cancer s/p right hemicolectomy (approx 2 years ago, outside hospital), DVT (on Eliquis), CKD, adrenal insufficiency presents s/p fall with right coracoid process scapular fracture, T11/L1 compression fractures and an inferior sternal fracture, noted to have prolonged PTT.    1. prolonged PTT  - no hx of bleeding disorder  - nl PTT on admission 9/5; prolonged to 69 on 9/8, further prolonged 9/13  - blood draws have been peripheral sticks- no central catheter/Port  - prolonged PTT can be seen with subcutaneous heparin administration, usually milder elevations, but this is most likely cause in this patient  - low suspicion for factor deficiency with normal PTT on admission  - no hx of liver disease and PT was normal prior  - no evidence of DIC,, normal fibrinogen  - mixing study, thrombin time and reptilase time were consistent with heparin effect  - PTT normalized off of subcut heparin and remains normal    2.anemia, microcytic  - iron studies pre op with high ferritin, normal B12 and folate  - s/p 2 unit PRBC intra op  - monitor Hg    3. hx of DVT, 2019- bilateral  - discovered with colon cancer diagnosis  - has been on eliquis, maintained on AC with decreased mobility  - duplex LE 9/9- no DVT  - eliquis on hold for now, repeat Duplex 9/16 remain negative  - continue prophylaxis with lovenox for now; full AC to resume when stable post op    4. s/p fall, with T12- L1 fracture  - s/p thorcao-lumbar fusion per neurosurgery on 9-14-21  - extensive wound, + drain    5. tachypnea, with pain; CTA chest pending w/ hx of DVT    [place] [facility]   75 y/o female with a history of colon cancer s/p right hemicolectomy (approx 2 years ago, outside hospital), DVT (on Eliquis), CKD, adrenal insufficiency presents s/p fall with right coracoid process scapular fracture, T11/L1 compression fractures and an inferior sternal fracture, noted to have prolonged PTT.    1. prolonged PTT  - no hx of bleeding disorder  - nl PTT on admission 9/5; prolonged to 69 on 9/8, further prolonged 9/13  - blood draws have been peripheral sticks- no central catheter/Port  - prolonged PTT can be seen with subcutaneous heparin administration, usually milder elevations, but this is most likely cause in this patient  - low suspicion for factor deficiency with normal PTT on admission  - no hx of liver disease and PT was normal prior  - no evidence of DIC,, normal fibrinogen  - mixing study, thrombin time and reptilase time were consistent with heparin effect  - PTT normalized off of subcut heparin and remains normal    2.anemia, microcytic  - iron studies pre op with high ferritin, normal B12 and folate  - s/p 2 unit PRBC intra op  - monitor Hg    3. hx of DVT, 2019- bilateral  - discovered with colon cancer diagnosis  - has been on eliquis, maintained on AC with decreased mobility  - duplex LE 9/9- no DVT  - eliquis on hold for now, repeat Duplex 9/16 remain negative  - continue prophylaxis with lovenox for now; full AC to resume when stable post op    4. s/p fall, with T12- L1 fracture  - s/p thorcao-lumbar fusion per neurosurgery on 9-14-21  - extensive wound, + drain    5. tachypnea, with pain; CTA chest pending w/ hx of DVT    d/w [facility]  d/w son bedside

## 2021-09-17 NOTE — PROGRESS NOTE ADULT - SUBJECTIVE AND OBJECTIVE BOX
[facility]   MD KILLIAN JAIME MD RUORU WONG, PA    TEL:  OFFICE: [phone]  DR CHARLES CELL: [phone]  BEENA PISANO CELL: [phone]  DR. LOZANO CELL: [phone]      FROM 5 PM - 7 AM PLEASE CALL ANSWERING SERVICE: [phone]    RENAL FOLLOW UP NOTE--Date of Service 09-17-21 @ 10:49  --------------------------------------------------------------------------------  HPI:      Pt seen and examined at bedside.       PAST HISTORY  --------------------------------------------------------------------------------  No significant changes to PMH, PSH, FHx, SHx, unless otherwise noted    ALLERGIES & MEDICATIONS  --------------------------------------------------------------------------------  Allergies    penicillin (Rash)    Intolerances      Standing Inpatient Medications  atorvastatin 10 milliGRAM(s) Oral at bedtime  bisacodyl Suppository 10 milliGRAM(s) Rectal daily  chlorhexidine 4% Liquid 1 Application(s) Topical <User Schedule>  enoxaparin Injectable 30 milliGRAM(s) SubCutaneous <User Schedule>  hydrocortisone sodium succinate Injectable 50 milliGRAM(s) IV Push every 8 hours  influenza   Vaccine 0.5 milliLiter(s) IntraMuscular once  latanoprost 0.005% Ophthalmic Solution 1 Drop(s) Both EYES at bedtime  levothyroxine 50 MICROGram(s) Oral daily  lidocaine   4% Patch 1 Patch Transdermal daily  magnesium citrate Oral Solution 1 Bottle Oral once  pantoprazole  Injectable 40 milliGRAM(s) IV Push daily  polyethylene glycol 3350 17 Gram(s) Oral two times a day  senna 2 Tablet(s) Oral at bedtime  sodium bicarbonate 650 milliGRAM(s) Oral three times a day  sodium chloride 0.9% Bolus 500 milliLiter(s) IV Bolus once    PRN Inpatient Medications  artificial  tears Solution 1 Drop(s) Both EYES every 6 hours PRN  cyclobenzaprine 10 milliGRAM(s) Oral three times a day PRN  oxyCODONE    Solution 5 milliGRAM(s) Oral every 4 hours PRN  oxyCODONE    Solution 10 milliGRAM(s) Oral every 4 hours PRN  traMADol 50 milliGRAM(s) Oral every 4 hours PRN      REVIEW OF SYSTEMS  --------------------------------------------------------------------------------  General: no fever  MSK: no edema     VITALS/PHYSICAL EXAM  --------------------------------------------------------------------------------  T(C): 37.2 (09-17-21 @ 07:00), Max: 37.2 (09-16-21 @ 19:00)  HR: 122 (09-17-21 @ 07:00) (81 - 122)  BP: --  RR: 21 (09-17-21 @ 07:00) (16 - 25)  SpO2: 98% (09-17-21 @ 07:00) (93% - 100%)  Wt(kg): --        09-16-21 @ 07:01  -  09-17-21 @ 07:00  --------------------------------------------------------  IN: 2105.8 mL / OUT: 1305 mL / NET: 800.8 mL      Physical Exam:  	Gen: NAD  	HEENT: MMM  	Pulm: CTA B/L  	CV: S1S2  	Abd: Soft, +BS  	Ext: No LE edema B/L                      Neuro: Awake   	Skin: Warm and Dry   	Vascular access: NO HD catheter             no ervin  LABS/STUDIES  --------------------------------------------------------------------------------              12.0   19.89 >-----------<  189      [09-15-21 @ 21:18]              34.7     142  |  113  |  14  ----------------------------<  126      [09-16-21 @ 21:09]  4.0   |  18  |  1.02        Ca     8.0     [09-16-21 @ 21:09]      Mg     2.1     [09-16-21 @ 21:09]      Phos  3.0     [09-16-21 @ 21:09]      PT/INR: PT 16.6 , INR 1.41       [09-16-21 @ 12:36]  PTT: 31.4       [09-16-21 @ 12:36]      Creatinine Trend:  SCr 1.02 [09-16 @ 21:09]  SCr 1.17 [09-15 @ 21:18]  SCr 1.15 [09-15 @ 17:46]  SCr 0.94 [09-14 @ 22:04]  SCr 1.16 [09-13 @ 17:11]    Urinalysis - [09-12-21 @ 01:58]      Color Light Yellow / Appearance Clear / SG 1.013 / pH 7.0      Gluc Trace / Ketone Negative  / Bili Negative / Urobili Negative       Blood Negative / Protein Trace / Leuk Est Negative / Nitrite Negative      RBC 1 / WBC 0 / Hyaline  / Gran  / Sq Epi  / Non Sq Epi 0 / Bacteria Negative    Urine Sodium 6      [09-13-21 @ 14:35]  Urine Osmolality 480      [09-13-21 @ 14:36]    Iron 44, TIBC 224, %sat 19      [09-14-21 @ 10:27]  Ferritin 444      [09-14-21 @ 11:37]  Vitamin D (25OH) 67.9      [09-08-21 @ 09:59]  HbA1c 5.9      [02-22-17 @ 03:10]  TSH 0.18      [09-08-21 @ 09:59]

## 2021-09-17 NOTE — PROGRESS NOTE ADULT - ASSESSMENT
Patient is a 77yo F with a history of colon cancer s/p right hemicolectomy (approx 2 years ago, outside hospital), DVT (on Eliquis), CKD, adrenal insufficiency presenting after mechanical fall from sitting. CT head/cervical spine/chest were performed which showed a right forehead hematoma, acute minimally displaced right coracoid process scapular fracture, T11/L1 compression fractures and an inferior sternal fracture. Found to have worsened renal function    A/P:  Acute on CKD:  Outpatient Nephrologist Dr. Treviño  Baseline SCr 1.4-1.6  FELICIANO likely  sec to hemodynamic change  Renal function improved   Off  Losartan   Monitor I/O  Monitor renal function at present    Acidosis:  non-AG+AG  Continue oral  sodium bicarb 650mg TID    HTN:  BP fluctuating   Off midrodrine  Monitor at present    Hyponatremia  possibly SIADH sec to ? pain   improved  Monitor serum Na

## 2021-09-17 NOTE — PROGRESS NOTE ADULT - ASSESSMENT
Patient seen and examined at bedside.  -extubated  -CT hardware in good location  -likely xfer floor when respiratory status stable

## 2021-09-17 NOTE — PROGRESS NOTE ADULT - SUBJECTIVE AND OBJECTIVE BOX
Patient seen and examined at bedside.    --Anticoagulation--  enoxaparin Injectable 30 milliGRAM(s) SubCutaneous <User Schedule>    T(C): 37.2 (09-16-21 @ 23:00), Max: 37.2 (09-16-21 @ 19:00)  HR: 107 (09-17-21 @ 01:00) (72 - 107)  BP: --  RR: 21 (09-17-21 @ 01:00) (12 - 27)  SpO2: 100% (09-17-21 @ 01:00) (95% - 100%)  Wt(kg): --    Exam:    Intubated, FC x 4, BLE Pf/DF 4/5, HF 2/5, UE 5/5

## 2021-09-17 NOTE — PROGRESS NOTE ADULT - SUBJECTIVE AND OBJECTIVE BOX
Chief Complaint: fu    History of Present Illness:  on FM; opens eyes to voice, +back pain, ROS otherwise limited; no reported bleeding; s/p CTA      MEDICATIONS  (STANDING):  atorvastatin 10 milliGRAM(s) Oral at bedtime  bisacodyl Suppository 10 milliGRAM(s) Rectal daily  chlorhexidine 4% Liquid 1 Application(s) Topical <User Schedule>  enoxaparin Injectable 30 milliGRAM(s) SubCutaneous <User Schedule>  hydrocortisone sodium succinate Injectable 25 milliGRAM(s) IV Push every 8 hours  influenza   Vaccine 0.5 milliLiter(s) IntraMuscular once  latanoprost 0.005% Ophthalmic Solution 1 Drop(s) Both EYES at bedtime  levothyroxine 50 MICROGram(s) Oral daily  lidocaine   4% Patch 1 Patch Transdermal daily  losartan 50 milliGRAM(s) Oral daily  losartan 50 milliGRAM(s) Oral daily  metoprolol tartrate 25 milliGRAM(s) Oral two times a day  pantoprazole  Injectable 40 milliGRAM(s) IV Push daily  polyethylene glycol 3350 17 Gram(s) Oral two times a day  senna 2 Tablet(s) Oral at bedtime  sodium bicarbonate 650 milliGRAM(s) Oral three times a day  sodium chloride 0.9%. 1000 milliLiter(s) (50 mL/Hr) IV Continuous <Continuous>    MEDICATIONS  (PRN):  artificial  tears Solution 1 Drop(s) Both EYES every 6 hours PRN Dry Eyes  cyclobenzaprine 10 milliGRAM(s) Oral three times a day PRN Muscle Spasm  oxyCODONE    Solution 5 milliGRAM(s) Oral every 4 hours PRN Moderate Pain (4 - 6)  oxyCODONE    Solution 10 milliGRAM(s) Oral every 4 hours PRN Severe Pain (7 - 10)  traMADol 50 milliGRAM(s) Oral every 4 hours PRN breakthrough pain      Allergies    penicillin (Rash)    Intolerances        Vital Signs Last 24 Hrs  T(C): 37.1 (17 Sep 2021 11:00), Max: 37.2 (16 Sep 2021 19:00)  T(F): 98.7 (17 Sep 2021 11:00), Max: 99 (16 Sep 2021 19:00)  HR: 98 (17 Sep 2021 13:00) (81 - 129)  BP: 154/83 (17 Sep 2021 13:00) (129/68 - 190/146)  BP(mean): 101 (17 Sep 2021 13:00) (87 - 161)  RR: 20 (17 Sep 2021 13:00) (16 - 30)  SpO2: 98% (17 Sep 2021 13:00) (93% - 100%)    PHYSICAL EXAM  General: adult in NAD  HEENT:  anicteric sclera, pink conjunctiva  CV: normal S1/S2   Lungs: clear anteriorly  Abdomen: soft non-tender non-distended, positive bowel sounds  Ext: no calf tenderness, no edema, b/l SCDs  Skin: no rashes and no petechiae; + drain  Neuro: responsive    LABS:                          12.0   19.89 )-----------( 189      ( 15 Sep 2021 21:18 )             34.7         Mean Cell Volume : 79.6 fl  Mean Cell Hemoglobin : 27.5 pg  Mean Cell Hemoglobin Concentration : 34.6 gm/dL  Auto Neutrophil # : x  Auto Lymphocyte # : x  Auto Monocyte # : x  Auto Eosinophil # : x  Auto Basophil # : x  Auto Neutrophil % : x  Auto Lymphocyte % : x  Auto Monocyte % : x  Auto Eosinophil % : x  Auto Basophil % : x      Serial CBC's  09-15 @ 21:18  Hct-34.7 / Hgb-12.0 / Plat-189 / RBC-4.36 / WBC-19.89  Serial CBC's  09-15 @ 17:46  Hct-34.2 / Hgb-12.1 / Plat-183 / RBC-4.31 / WBC-20.00  Serial CBC's  09-14 @ 22:04  Hct-30.1 / Hgb-10.4 / Plat-152 / RBC-3.83 / WBC-18.54      09-16    142  |  113<H>  |  14  ----------------------------<  126<H>  4.0   |  18<L>  |  1.02    Ca    8.0<L>      16 Sep 2021 21:09  Phos  3.0     09-16  Mg     2.1     09-16        PT/INR - ( 17 Sep 2021 11:16 )   PT: 16.9 sec;   INR: 1.43 ratio         PTT - ( 17 Sep 2021 11:16 )  PTT:32.4 sec    Ferritin, Serum: 444 ng/mL (09-14 @ 11:37)  Vitamin B12, Serum: >2000 pg/mL (09-14 @ 11:37)  Folate, Serum: >20.0 ng/mL (09-14 @ 11:37)  Iron - Total Binding Capacity.: 224 ug/dL (09-14 @ 10:27)              Radiology:    < from: [facility] Dup[facility] Lower Ext Vein Scan, Bilat (09.16.21 @ 12:03) >    IMPRESSION:  No evidence of deep venous thrombosis in either lower extremity.    < end of copied text >

## 2021-09-17 NOTE — PROGRESS NOTE ADULT - ASSESSMENT
75yo woman with history of colon ca s/p right hemicolectomy, DVT (on eliquis), CKD, and Adrenal Insufficiency that presented s/p fall with imaging notable for T11 and L1 compression fractures s/p T10-pelvis with revision of previous L2-5 hardware, L1 expanded PSO. Remains intubated for agitation, poor RSBI and poor following of commands. B/l LEs difficult to examine but move in the plane of the bed to noxious.     Neuro:   -q2 hour neuro checks  -CT spine today - post surgical changes, hardware in place   -oxy 5/tramadol 25  -continue hemovacs to suction, monitor outputs L140 R120  -pt eval      Respiratory:   -extuabted on venti mask  monitor for respiratory distress     CV:  -keep MAP > 65  -phenylephrine wean off, may assist w/ midodrine 5q8  -TTE - EF 66% minimal MR, mild diastolic dfx     Endocrine:   -oral synthroid 50mcg qd   -TSH 0.18, T(6)  -maintain euglycemia  -adrenal insuffiencey c/w hydrocortisone 50 mg q8  -endocrine following      Heme/Onc:    -chemical dvt prophylaxis possbile use of lovenox will f/u   -SCDs only for DVT prophylaxis  -Mixing study 9/13 without complete correction, consistent with heparin effect. Thrombin time also elevated and reptilase time near normal-- also consistent with heparin effect. Fibrinogen normal.  9/9: negative for DVT  previously on eliquis will f/u w/ hematology re: type of DVT ppx     Renal:   ervin removed     ID:   -monitor cbc  ancef post op     GI:   -npo w/ meds, consider starting feeds if respiratory stable   -protonix for gi prophylaxis  -senna and miralax for bowel regimen  LBM 9/15       77yo woman with history of colon ca s/p right hemicolectomy, DVT (on eliquis), CKD, and Adrenal Insufficiency that presented s/p fall with imaging notable for T11 and L1 compression fractures s/p T10-pelvis with revision of previous L2-5 hardware, L1 expanded PSO. Remains intubated for agitation, poor RSBI and poor following of commands. B/l LEs difficult to examine but move in the plane of the bed to noxious.     Neuro:   -q4 hour neuro checks  -CT spine post surgical changes, hardware in place   -oxy 5 & 10 /tramadol 50 breakthrough   -continue hemovacs to suction, monitor outputs L85 R45 - mgnt per nsgy  -pt eval      Respiratory:   -extuabted on venti mask  -tachypenia ?insetting of pain       CV:  SBP goal 100-160   -d/c midodrine 5q8  -TTE - EF 66% minimal MR, mild diastolic dfx     Endocrine:   -oral synthroid 50mcg qd   -TSH 0.18, T(6) , check free T4  -maintain euglycemia  -adrenal insuffiencey c/w hydrocortisone 50 mg q8  -endocrine following      Heme/Onc:    -chemical dvt prophylaxis lovenox qd  -SCDs   -Mixing study 9/13 without complete correction, consistent with heparin effect. Thrombin time also elevated and reptilase time near normal-- also consistent with heparin effect. Fibrinogen normal.  9/9: negative for DVT  previously on eliquis will f/u w/ hematology re: type of DVT ppx     Renal:   IVL    ID:   -monitor cbc  ancef post op     GI:   -tube feeds @ 50cc/hr goal 60  attempt to dysphagia screen  GERD - on home protonix   -senna and Miralax for bowel regimen agg mg citrate   LBM 9/15       75yo woman with history of colon ca s/p right hemicolectomy, DVT (on eliquis), CKD, and Adrenal Insufficiency that presented s/p fall with imaging notable for T11 and L1 compression fractures s/p T10-pelvis with revision of previous L2-5 hardware, L1 expanded PSO. Remains intubated for agitation, poor RSBI and poor following of commands. B/l LEs difficult to examine but move in the plane of the bed to noxious.     Neuro:   -q4 hour neuro checks  -CT spine post surgical changes, hardware in place   -oxy 5 & 10 /tramadol 50 breakthrough   -continue hemovacs to suction, monitor outputs L85 R45 - mgnt per nsgy  -pt eval      Respiratory:   -extuabted on venti mask  -tachypenia ?insetting of pain   CTA chest to r/o PE      CV:  SBP goal 100-160   -d/c midodrine 5q8  -TTE - EF 66% minimal MR, mild diastolic dfx     Endocrine:   -oral synthroid 50mcg qd   -TSH 0.18, T(6) , check free T4  -maintain euglycemia  -adrenal insuffiencey c/w hydrocortisone 50 mg q8  -endocrine following      Heme/Onc:    -chemical dvt prophylaxis lovenox qd  -SCDs   -Mixing study 9/13 without complete correction, consistent with heparin effect. Thrombin time also elevated and reptilase time near normal-- also consistent with heparin effect. Fibrinogen normal.  9/9: negative for DVT  previously on eliquis will f/u w/ hematology re: type of DVT ppx     Renal:   IVL    ID:   -monitor cbc  ancef post op     GI:   -tube feeds @ 50cc/hr goal 60  attempt to dysphagia screen  GERD - on home protonix   -senna and Miralax for bowel regimen agg mg citrate   LBM 9/15

## 2021-09-17 NOTE — PROGRESS NOTE ADULT - SUBJECTIVE AND OBJECTIVE BOX
Chief Complaint:     History:    MEDICATIONS  (STANDING):  atorvastatin 10 milliGRAM(s) Oral at bedtime  chlorhexidine 4% Liquid 1 Application(s) Topical <User Schedule>  enoxaparin Injectable 30 milliGRAM(s) SubCutaneous <User Schedule>  hydrocortisone sodium succinate Injectable 50 milliGRAM(s) IV Push every 8 hours  influenza   Vaccine 0.5 milliLiter(s) IntraMuscular once  latanoprost 0.005% Ophthalmic Solution 1 Drop(s) Both EYES at bedtime  levothyroxine 50 MICROGram(s) Oral daily  lidocaine   4% Patch 1 Patch Transdermal daily  pantoprazole  Injectable 40 milliGRAM(s) IV Push daily  polyethylene glycol 3350 17 Gram(s) Oral two times a day  senna 2 Tablet(s) Oral at bedtime  sodium bicarbonate 650 milliGRAM(s) Oral three times a day  sodium chloride 0.9% Bolus 500 milliLiter(s) IV Bolus once  sodium chloride 0.9%. 1000 milliLiter(s) (50 mL/Hr) IV Continuous <Continuous>    MEDICATIONS  (PRN):  artificial  tears Solution 1 Drop(s) Both EYES every 6 hours PRN Dry Eyes  cyclobenzaprine 10 milliGRAM(s) Oral three times a day PRN Muscle Spasm  oxyCODONE    Solution 5 milliGRAM(s) Oral every 4 hours PRN Moderate Pain (4 - 6)  oxyCODONE    Solution 10 milliGRAM(s) Oral every 4 hours PRN Severe Pain (7 - 10)  traMADol 50 milliGRAM(s) Oral every 4 hours PRN breakthrough pain      Allergies    penicillin (Rash)    Intolerances      Review of Systems:  Constitutional: No fever  Eyes: No blurry vision  Neuro: No tremors  HEENT: No pain  Cardiovascular: No chest pain, palpitations  Respiratory: No SOB, no cough  GI: No nausea, vomiting, abdominal pain  : No dysuria  Skin: no rash  Psych: no depression  Endocrine: no polyuria, polydipsia  Hem/lymph: no swelling  Osteoporosis: no fractures    ALL OTHER SYSTEMS REVIEWED AND NEGATIVE    UNABLE TO OBTAIN    PHYSICAL EXAM:  VITALS: T(C): 37.2 (09-17-21 @ 07:00)  T(F): 98.9 (09-17-21 @ 07:00), Max: 99 (09-16-21 @ 19:00)  HR: 122 (09-17-21 @ 07:00) (81 - 122)  BP: --  RR:  (16 - 25)  SpO2:  (93% - 100%)  Wt(kg): --  GENERAL: NAD, well-groomed, well-developed  EYES: No proptosis, no lid lag, anicteric  HEENT:  Atraumatic, Normocephalic, moist mucous membranes  THYROID: Normal size, no palpable nodules  RESPIRATORY: Clear to auscultation bilaterally; No rales, rhonchi, wheezing, or rubs  CARDIOVASCULAR: Regular rate and rhythm; No murmurs; no peripheral edema  GI: Soft, nontender, non distended, normal bowel sounds  SKIN: Dry, intact, No rashes or lesions  MUSCULOSKELETAL: Full range of motion, normal strength  NEURO: sensation intact, extraocular movements intact, no tremor, normal reflexes  PSYCH: Alert and oriented x 3, normal affect, normal mood  CUSHING'S SIGNS: no striae        09-16    142  |  113<H>  |  14  ----------------------------<  126<H>  4.0   |  18<L>  |  1.02    EGFR if : 61  EGFR if non : 53<L>    Ca    8.0<L>      09-16  Mg     2.1     09-16  Phos  3.0     09-16            Thyroid Function Tests:  09-08 @ 09:59 TSH 0.18 FreeT4 -- T3 -- Anti TPO -- Anti Thyroglobulin Ab -- TSI --  08-27 @ 07:27 TSH -- FreeT4 1.1 T3 -- Anti TPO -- Anti Thyroglobulin Ab -- TSI --                           Chief Complaint: AI/HyppT/Osteoporosis    History: Patient has been hypertensive. Hydrocortisone decreased from 50mg IV q 8 hours to 25mg IV q 8 hours.    MEDICATIONS  (STANDING):  atorvastatin 10 milliGRAM(s) Oral at bedtime  chlorhexidine 4% Liquid 1 Application(s) Topical <User Schedule>  enoxaparin Injectable 30 milliGRAM(s) SubCutaneous <User Schedule>  hydrocortisone sodium succinate Injectable 50 milliGRAM(s) IV Push every 8 hours  influenza   Vaccine 0.5 milliLiter(s) IntraMuscular once  latanoprost 0.005% Ophthalmic Solution 1 Drop(s) Both EYES at bedtime  levothyroxine 50 MICROGram(s) Oral daily  lidocaine   4% Patch 1 Patch Transdermal daily  pantoprazole  Injectable 40 milliGRAM(s) IV Push daily  polyethylene glycol 3350 17 Gram(s) Oral two times a day  senna 2 Tablet(s) Oral at bedtime  sodium bicarbonate 650 milliGRAM(s) Oral three times a day  sodium chloride 0.9% Bolus 500 milliLiter(s) IV Bolus once  sodium chloride 0.9%. 1000 milliLiter(s) (50 mL/Hr) IV Continuous <Continuous>    MEDICATIONS  (PRN):  artificial  tears Solution 1 Drop(s) Both EYES every 6 hours PRN Dry Eyes  cyclobenzaprine 10 milliGRAM(s) Oral three times a day PRN Muscle Spasm  oxyCODONE    Solution 5 milliGRAM(s) Oral every 4 hours PRN Moderate Pain (4 - 6)  oxyCODONE    Solution 10 milliGRAM(s) Oral every 4 hours PRN Severe Pain (7 - 10)  traMADol 50 milliGRAM(s) Oral every 4 hours PRN breakthrough pain      Allergies    penicillin (Rash)    Intolerances      Review of Systems:  Constitutional: No fever  Eyes: No blurry vision  Neuro: No tremors  HEENT: No pain  Cardiovascular: No chest pain, palpitations  Respiratory: No SOB, no cough  GI: No nausea, vomiting, abdominal pain  : No dysuria  Skin: no rash  Psych: no depression  Endocrine: no polyuria, polydipsia      ALL OTHER SYSTEMS REVIEWED AND NEGATIVE        PHYSICAL EXAM:  VITALS: T(C): 37.2 (09-17-21 @ 07:00)  T(F): 98.9 (09-17-21 @ 07:00), Max: 99 (09-16-21 @ 19:00)  HR: 122 (09-17-21 @ 07:00) (81 - 122)  BP: --  RR:  (16 - 25)  SpO2:  (93% - 100%)  Wt(kg): --  GENERAL: NAD  EYES: No proptosis  HEENT:  Atraumatic, Normocephalic, moist mucous membranes  RESPIRATORY: Clear to auscultation bilaterally  CARDIOVASCULAR: Regular rate and rhythm  GI: Soft, nontender, non distended, normal bowel sounds  SKIN: Dry, intact, No rashes or lesions            09-16    142  |  113<H>  |  14  ----------------------------<  126<H>  4.0   |  18<L>  |  1.02    EGFR if : 61  EGFR if non : 53<L>    Ca    8.0<L>      09-16  Mg     2.1     09-16  Phos  3.0     09-16            Thyroid Function Tests:  09-08 @ 09:59 TSH 0.18 FreeT4 -- T3 -- Anti TPO -- Anti Thyroglobulin Ab -- TSI --  08-27 @ 07:27 TSH -- FreeT4 1.1 T3 -- Anti TPO -- Anti Thyroglobulin Ab -- TSI --                           Chief Complaint: AI/HypoT/Osteoporosis    History: Patient has been hypertensive. Hydrocortisone decreased from 50mg IV q 8 hours to 25mg IV q 8 hours.    MEDICATIONS  (STANDING):  atorvastatin 10 milliGRAM(s) Oral at bedtime  chlorhexidine 4% Liquid 1 Application(s) Topical <User Schedule>  enoxaparin Injectable 30 milliGRAM(s) SubCutaneous <User Schedule>  hydrocortisone sodium succinate Injectable 50 milliGRAM(s) IV Push every 8 hours  influenza   Vaccine 0.5 milliLiter(s) IntraMuscular once  latanoprost 0.005% Ophthalmic Solution 1 Drop(s) Both EYES at bedtime  levothyroxine 50 MICROGram(s) Oral daily  lidocaine   4% Patch 1 Patch Transdermal daily  pantoprazole  Injectable 40 milliGRAM(s) IV Push daily  polyethylene glycol 3350 17 Gram(s) Oral two times a day  senna 2 Tablet(s) Oral at bedtime  sodium bicarbonate 650 milliGRAM(s) Oral three times a day  sodium chloride 0.9% Bolus 500 milliLiter(s) IV Bolus once  sodium chloride 0.9%. 1000 milliLiter(s) (50 mL/Hr) IV Continuous <Continuous>    MEDICATIONS  (PRN):  artificial  tears Solution 1 Drop(s) Both EYES every 6 hours PRN Dry Eyes  cyclobenzaprine 10 milliGRAM(s) Oral three times a day PRN Muscle Spasm  oxyCODONE    Solution 5 milliGRAM(s) Oral every 4 hours PRN Moderate Pain (4 - 6)  oxyCODONE    Solution 10 milliGRAM(s) Oral every 4 hours PRN Severe Pain (7 - 10)  traMADol 50 milliGRAM(s) Oral every 4 hours PRN breakthrough pain      Allergies    penicillin (Rash)    Intolerances      Review of Systems:  Constitutional: No fever  Eyes: No blurry vision  Neuro: No tremors  HEENT: No pain  Cardiovascular: No chest pain, palpitations  Respiratory: No SOB, no cough  GI: No nausea, vomiting, abdominal pain  : No dysuria  Skin: no rash  Psych: no depression  Endocrine: no polyuria, polydipsia      ALL OTHER SYSTEMS REVIEWED AND NEGATIVE        PHYSICAL EXAM:  VITALS: T(C): 37.2 (09-17-21 @ 07:00)  T(F): 98.9 (09-17-21 @ 07:00), Max: 99 (09-16-21 @ 19:00)  HR: 122 (09-17-21 @ 07:00) (81 - 122)  BP: --  RR:  (16 - 25)  SpO2:  (93% - 100%)  Wt(kg): --  GENERAL: NAD  EYES: No proptosis  HEENT:  Atraumatic, Normocephalic, moist mucous membranes  RESPIRATORY: Clear to auscultation bilaterally  CARDIOVASCULAR: Regular rate and rhythm  GI: Soft, nontender, non distended, normal bowel sounds  SKIN: Dry, intact, No rashes or lesions            09-16    142  |  113<H>  |  14  ----------------------------<  126<H>  4.0   |  18<L>  |  1.02    EGFR if : 61  EGFR if non : 53<L>    Ca    8.0<L>      09-16  Mg     2.1     09-16  Phos  3.0     09-16            Thyroid Function Tests:  09-08 @ 09:59 TSH 0.18 FreeT4 -- T3 -- Anti TPO -- Anti Thyroglobulin Ab -- TSI --  08-27 @ 07:27 TSH -- FreeT4 1.1 T3 -- Anti TPO -- Anti Thyroglobulin Ab -- TSI --

## 2021-09-17 NOTE — PROGRESS NOTE ADULT - SUBJECTIVE AND OBJECTIVE BOX
CARDIOLOGY FOLLOW UP - Dr. Orozco  DATE OF SERVICE: 09-17-21     CC on non rebreather  ST on monitor  minimally responsive.     REVIEW OF SYSTEMS:   unable to assess     PHYSICAL EXAM:  T(C): 37.1 (09-17-21 @ 11:00), Max: 37.2 (09-16-21 @ 19:00)  HR: 100 (09-17-21 @ 12:15) (81 - 129)  BP: 129/68 (09-17-21 @ 12:15) (129/68 - 190/146)  RR: 23 (09-17-21 @ 12:15) (16 - 30)  SpO2: 100% (09-17-21 @ 12:15) (93% - 100%)  Wt(kg): --  I&O's Summary    16 Sep 2021 07:01  -  17 Sep 2021 07:00  --------------------------------------------------------  IN: 2105.8 mL / OUT: 1305 mL / NET: 800.8 mL    17 Sep 2021 07:01  -  17 Sep 2021 13:07  --------------------------------------------------------  IN: 720 mL / OUT: 800 mL / NET: -80 mL        Appearance: Nad   Cardiovascular: Normal S1 S2,RRR, No JVD, No murmurs  Respiratory: rhonchi   Gastrointestinal:  Soft, Non-tender, + BS	  Extremities: trace edema       HOME MEDICATIONS:  acetaminophen 325 mg oral tablet: 2 tab(s) orally every 6 hours, As needed, Mild Pain (1 - 3) (24 Sep 2020 11:16)  Artificial Tears ophthalmic solution: 1 drop(s) to each affected eye 4 times a day, As Needed (22 Aug 2021 23:28)  aspirin 81 mg oral delayed release tablet: 1 tab(s) orally once a day (27 Aug 2021 13:38)  calcium (as carbonate) 600 mg oral tablet: 1 cap(s) orally once a day (24 Sep 2020 11:16)  Eliquis 2.5 mg oral tablet: 1 tab(s) orally 2 times a day (24 Sep 2020 11:16)  felodipine 5 mg oral tablet, extended release: 1 tab(s) orally once a day (22 Aug 2021 23:23)  guaiFENesin 600 mg oral tablet, extended release: 1 tab(s) orally every 12 hours as needed for cough  (27 Aug 2021 13:38)  hydrocortisone 10 mg oral tablet: 1 tab(s) orally 2 times a day (24 Sep 2020 11:16)  latanoprost 0.005% ophthalmic solution: 1 drop(s) to each affected eye once a day (in the evening) (22 Aug 2021 23:27)  levothyroxine 50 mcg (0.05 mg) oral tablet: 1 tab(s) orally once a day (24 Sep 2020 11:16)  Lipitor 10 mg oral tablet: 1 tab(s) orally once a day (22 Aug 2021 23:41)  Macular Vitamin Benefit oral tablet: 1 tab(s) orally once a day (24 Sep 2020 11:16)  metoprolol succinate 50 mg oral tablet, extended release: 1 tab(s) orally once a day (22 Aug 2021 23:24)  Multiple Vitamins oral tablet: 1 tab(s) orally once a day (22 Aug 2021 23:22)  olmesartan 20 mg oral tablet: 1 tab(s) orally once a day (22 Aug 2021 23:22)  omeprazole 20 mg oral delayed release capsule: 1 cap(s) orally once a day (22 Aug 2021 23:21)  oxyCODONE 5 mg oral tablet: 1 tab(s) orally every 8 hours as needed for severe pain (27 Aug 2021 13:35)  OxyCONTIN 20 mg oral tablet, extended release: 1 tab(s) orally every 12 hours (24 Sep 2020 11:16)  risedronate 35 mg oral tablet: 1 tab(s) orally once a week (Mondays) (05 Sep 2021 18:30)  senna oral tablet: 2 tab(s) orally once a day (at bedtime), As Needed (05 Sep 2021 18:31)      MEDICATIONS  (STANDING):  atorvastatin 10 milliGRAM(s) Oral at bedtime  bisacodyl Suppository 10 milliGRAM(s) Rectal daily  chlorhexidine 4% Liquid 1 Application(s) Topical <User Schedule>  enoxaparin Injectable 30 milliGRAM(s) SubCutaneous <User Schedule>  hydrocortisone sodium succinate Injectable 50 milliGRAM(s) IV Push every 8 hours  influenza   Vaccine 0.5 milliLiter(s) IntraMuscular once  labetalol Injectable 10 milliGRAM(s) IV Push once  latanoprost 0.005% Ophthalmic Solution 1 Drop(s) Both EYES at bedtime  levothyroxine 50 MICROGram(s) Oral daily  lidocaine   4% Patch 1 Patch Transdermal daily  metoprolol tartrate 25 milliGRAM(s) Oral two times a day  pantoprazole  Injectable 40 milliGRAM(s) IV Push daily  polyethylene glycol 3350 17 Gram(s) Oral two times a day  senna 2 Tablet(s) Oral at bedtime  sodium bicarbonate 650 milliGRAM(s) Oral three times a day  sodium chloride 0.9% Bolus 500 milliLiter(s) IV Bolus once  sodium chloride 0.9%. 1000 milliLiter(s) (50 mL/Hr) IV Continuous <Continuous>      TELEMETRY: 	    ECG:  	  RADIOLOGY:   DIAGNOSTIC TESTING:  [ ] Echocardiogram:  [ ]  Catheterization:  [ ] Stress Test:    OTHER: 	    LABS:	 	                                12.0   19.89 )-----------( 189      ( 15 Sep 2021 21:18 )             34.7     09-16    142  |  113<H>  |  14  ----------------------------<  126<H>  4.0   |  18<L>  |  1.02    Ca    8.0<L>      16 Sep 2021 21:09  Phos  3.0     09-16  Mg     2.1     09-16      PT/INR - ( 17 Sep 2021 11:16 )   PT: 16.9 sec;   INR: 1.43 ratio         PTT - ( 17 Sep 2021 11:16 )  PTT:32.4 sec

## 2021-09-17 NOTE — PROGRESS NOTE ADULT - SUBJECTIVE AND OBJECTIVE BOX
SUBJECTIVE / OVERNIGHT EVENTS:    no events overnight  patient seen and examined  minimally responsive today  tachycardic   CTA completed to r/o PE    --------------------------------------------------------------------------------------------  LABS:                        12.0   19.89 )-----------( 189      ( 15 Sep 2021 21:18 )             34.7     09-16    142  |  113<H>  |  14  ----------------------------<  126<H>  4.0   |  18<L>  |  1.02    Ca    8.0<L>      16 Sep 2021 21:09  Phos  3.0     09-16  Mg     2.1     09-16      PT/INR - ( 17 Sep 2021 11:16 )   PT: 16.9 sec;   INR: 1.43 ratio         PTT - ( 17 Sep 2021 11:16 )  PTT:32.4 sec  CAPILLARY BLOOD GLUCOSE                RADIOLOGY & ADDITIONAL TESTS:    Imaging Personally Reviewed:  [x] YES  [ ] NO    Consultant(s) Notes Reviewed:  [x] YES  [ ] NO    MEDICATIONS  (STANDING):  atorvastatin 10 milliGRAM(s) Oral at bedtime  bisacodyl Suppository 10 milliGRAM(s) Rectal daily  chlorhexidine 4% Liquid 1 Application(s) Topical <User Schedule>  enoxaparin Injectable 30 milliGRAM(s) SubCutaneous <User Schedule>  hydrocortisone sodium succinate Injectable 50 milliGRAM(s) IV Push every 8 hours  influenza   Vaccine 0.5 milliLiter(s) IntraMuscular once  latanoprost 0.005% Ophthalmic Solution 1 Drop(s) Both EYES at bedtime  levothyroxine 50 MICROGram(s) Oral daily  lidocaine   4% Patch 1 Patch Transdermal daily  metoprolol tartrate 25 milliGRAM(s) Oral two times a day  pantoprazole  Injectable 40 milliGRAM(s) IV Push daily  polyethylene glycol 3350 17 Gram(s) Oral two times a day  senna 2 Tablet(s) Oral at bedtime  sodium bicarbonate 650 milliGRAM(s) Oral three times a day  sodium chloride 0.9%. 1000 milliLiter(s) (50 mL/Hr) IV Continuous <Continuous>    MEDICATIONS  (PRN):  artificial  tears Solution 1 Drop(s) Both EYES every 6 hours PRN Dry Eyes  cyclobenzaprine 10 milliGRAM(s) Oral three times a day PRN Muscle Spasm  oxyCODONE    Solution 5 milliGRAM(s) Oral every 4 hours PRN Moderate Pain (4 - 6)  oxyCODONE    Solution 10 milliGRAM(s) Oral every 4 hours PRN Severe Pain (7 - 10)  traMADol 50 milliGRAM(s) Oral every 4 hours PRN breakthrough pain      Care Discussed with Consultants/Other Providers [x] YES  [ ] NO    Vital Signs Last 24 Hrs  T(C): 37.1 (17 Sep 2021 11:00), Max: 37.2 (16 Sep 2021 19:00)  T(F): 98.7 (17 Sep 2021 11:00), Max: 99 (16 Sep 2021 19:00)  HR: 98 (17 Sep 2021 13:00) (81 - 129)  BP: 154/83 (17 Sep 2021 13:00) (129/68 - 190/146)  BP(mean): 101 (17 Sep 2021 13:00) (87 - 161)  RR: 20 (17 Sep 2021 13:00) (16 - 30)  SpO2: 98% (17 Sep 2021 13:00) (93% - 100%)  I&O's Summary    16 Sep 2021 07:01  -  17 Sep 2021 07:00  --------------------------------------------------------  IN: 2105.8 mL / OUT: 1305 mL / NET: 800.8 mL    17 Sep 2021 07:01  -  17 Sep 2021 13:43  --------------------------------------------------------  IN: 720 mL / OUT: 800 mL / NET: -80 mL    PHYSICAL EXAM:  GENERAL: NAD, well-developed, lethargic    HEAD:  Atraumatic, Normocephalic  EYES: EOMI, PERRLA, conjunctiva and sclera clear, legally blind  NECK: Supple, No JVD  CHEST/LUNG: mild decrease breath sounds bilaterally; No wheeze   HEART: Regular rate and rhythm; No murmurs, rubs, or gallops  ABDOMEN: Soft, Nontender, Nondistended; Bowel sounds present  NEURO: no focal weakness, 5/5 b/l upper extremity strength, 4/5 lower extremity strength  EXTREMITIES:  2+ Peripheral Pulses, No clubbing, cyanosis, trace b/l edema  SKIN: No rashes or lesions   BACK: incision c/d/i

## 2021-09-17 NOTE — PROGRESS NOTE ADULT - ASSESSMENT
77 yo F with a history of colon cancer s/p right hemicolectomy (approx 2 years ago, outside hospital), DVT (on Eliquis), CKD, adrenal insufficiency presenting as transfer from [facility] for trauma evaluation secondary to a mechanical fall from sitting with headstrike. Injuries identified include right forehead hematoma, acute minimally displaced right coracoid process scapular fracture, T11/L1 compression fractures and an inferior sternal fracture. Patient hemodynamically stable.    # Elevated PTT  Mild elevations in PTT can be seen with SC heparin administration, particularly with TID dosing; low plasma protein, impaired renal function, and low BMI have been postulated as contributing factors.  In this pt's case, I doubt she has a clotting factor deficiency.  Last dose of SC heparin was this morning.  I expect her PTT to trend close to normal by the end of today.  I would check the PTT around noontime and one more this evening before tomorrow morning's pre-op labs.  If her PTT is < 40, then would be cleared for OR.  Consulted hematology.    # S/p Mechanical Fall:  Right forehead hematoma, acute minimally displaced right coracoid process scapular fracture, T11/L1 compression fractures and an inferior sternal fracture  no surgery for scapular fx. Placed in sling  CT spine with fusion L2-5. Fracture through the T12-L1 disc space with widening of the disc space and a L1-2 laminectomy which appears unstable. Diffuse osteopenia. Old T11 and L1 fractures.   MRI T/L spine with anterior splaying of the intervertebral disc space at the T12-L1 with edema in the anterior prevertebral soft tissues  CT T done noted with widening of T12/L1 disc space w/ L1 laminectomy  Neuro surgery offering surgery vs conservative management, TLSO brace in the meantime.   The daughter Susie decided to proceed with surgery.   Cr has improved.   Pending PTT < 40 prior to OR.   s/p revision of prior L2-L5 fusion w/ extension from T10- pelvis w/o L1 PSO, and plastics closure on 9/14  Neuro checks  Monitor outpt via drain  Dsg changes as per orders  Care per primary team    # Nausea:   Pt has been nauseous (no abd pain). ?pain med induced/ Opioids   Poor PO intake since being in the hospital. Normally with good appetite at home, with mechanical soft per the daughter.  anti-emetics PRN, encourage PO intake   the daughter requested IV steroids early, states her PO may not be absorbed with vomiting.  Reasonable to start IV hydrocortisone while PO intake is poor.   Switched hydrocortisone 10 mg BID to  IV 25 mg BID.   can give additional stress dose near the timing of surgery.   (can consider 50 mg hydrocortisone intravenously just before the procedure and 25 mg of hydrocortisone every eight hours for 24 hours, then resume home PO dose after)  or steroid regimen per anesthesia's discretion     # Hypopituitary   She is on Hydrocortisone 10 mg BID at home, so will monitor for adrenal insufficiency. am cortisol appropriate  UA unimpressive. vit D, vit B12 normal. TSH low as expected with hypopit. free T4 sent, pending.   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 Treviño ([facility]). Outpt EMR reviewed; Cr range from 1.4 to 1.6   Monitor I/O's, Serial Cr, Avoid nephrotoxins  Cr is up from baseline. Hold Losartan. s/p gentle IVF for acute kidney failure: likely pre-renal.   Her recent TTE reviewed, with normal LV.   Her Cr now back to baseline (1.4-1.6)     # CAD: Chronic, stable  Cont home CV meds  holding ASA for neurosurgery     # Hx of SVT  ST on monitor 120s  BB resumed by cards, uptitrate as BP tolerates   Appears volume overloaded today on exam, cards recommending Lasix 40mg IVP x 1   Cards following    # Hypothyroidism  Chronic, stable  TSH low. Total T4 is nl  Levothyroxine for hypothyroid transition to IV as no PO access   TSH 0.18, T(6)  Can transition back to LT4 50mcg po daily via NG tube as per Endo  Check Free T4 in AM  Endocrine following      # Hx of DVT, lower extremity  Home med Eliquis, on hold   SCDs    # GI ppx:  Protonix  77 yo F with a history of colon cancer s/p right hemicolectomy (approx 2 years ago, outside hospital), DVT (on Eliquis), CKD, adrenal insufficiency presenting as transfer from [facility] for trauma evaluation secondary to a mechanical fall from sitting with headstrike. Injuries identified include right forehead hematoma, acute minimally displaced right coracoid process scapular fracture, T11/L1 compression fractures and an inferior sternal fracture. Patient hemodynamically stable.    # Elevated PTT  Mild elevations in PTT can be seen with SC heparin administration, particularly with TID dosing  low plasma protein, impaired renal function, and low BMI have been postulated as contributing factors.    hematology following.    # S/p Mechanical Fall:  Right forehead hematoma, acute minimally displaced right coracoid process scapular fracture, T11/L1 compression fractures and an inferior sternal fracture  no surgery for scapular fx. Placed in sling  CT spine with fusion L2-5. Fracture through the T12-L1 disc space with widening of the disc space and a L1-2 laminectomy which appears unstable. Diffuse osteopenia. Old T11 and L1 fractures.   MRI T/L spine with anterior splaying of the intervertebral disc space at the T12-L1 with edema in the anterior prevertebral soft tissues  CT T done noted with widening of T12/L1 disc space w/ L1 laminectomy  Neurosurgery offering surgery vs conservative management, TLSO brace in the meantime.   The daughter Susie decided to proceed with surgery.   Cr has improved.   pt s/p OR: revision of prior L2-L5 fusion w/ extension from T10- pelvis w/o L1 PSO, and plastics closure on 9/14  Neuro checks, Monitor outpt via drain, monitored in NICU post op, extubated 9/16/21.     # Nausea:   Pt has been nauseous (no abd pain). ?pain med induced/ Opioids   Switched hydrocortisone 10 mg BID to IV 25 mg BID while poor PO intake.   s/p stress dose steroids.     # Hypopituitary   She is on Hydrocortisone 10 mg BID at home, so will monitor for adrenal insufficiency. am cortisol appropriate  UA unimpressive. vit D, vit B12 normal. TSH low as expected with hypopit. free T4 sent, normal.   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 Treviño ([facility]). Outpt EMR reviewed; Cr range from 1.4 to 1.6   Monitor I/O's, Serial Cr, Avoid nephrotoxins  Cr is up from baseline. Hold Losartan. s/p gentle IVF for acute kidney failure: likely pre-renal.   Her recent TTE reviewed, with normal LV.   Her Cr now back to baseline (1.4-1.6)     # CAD: Chronic, stable  Cont home CV meds  holding ASA for neurosurgery     # Hx of SVT  ST on monitor 120s  BB resumed by cards, uptitrate as BP tolerates     # Hypothyroidism  Chronic, stable  TSH low. Total T4 is nl  Levothyroxine for hypothyroid transition to IV as no PO access   TSH 0.18, T(6)  Can transition back to LT4 50mcg po daily via NG tube as per Endo  Check Free T4 in AM  Endocrine following      # Hx of DVT, lower extremity  Home med Eliquis, on hold   SCDs    # GI ppx:  Protonix

## 2021-09-17 NOTE — PROGRESS NOTE ADULT - ASSESSMENT
76 yr old F with Hx of colon CA, DVT, CKD and hypopituitarism (secondary AI and hypothyroidism), osteoporosis here with multiple fractures s/p mechanical fall.     1. Adrenal insufficiency.   Patient with hypotensive episode therefore back on hydrocortisone 50mg tid  Taper down as per clinical status. Currently still on pressors.   Endocrine team will follow and help in transition back to home dose hydrocortisone.    2. Hypothyroidism   Can transition back to LT4 50mcg po daily via NG tube  Check Free T4 in AM.    3. Osteoporosis.   Patient has had multiple fractures while on treatment with risedronate  Intolerant of Prolia  Consider switch to Reclast vs. Forteo/Tymlos  To discuss with outpatient endocrinologist Dr. Lim.  76 yr old F with Hx of colon CA, DVT, CKD and hypopituitarism (secondary AI and hypothyroidism), osteoporosis here with multiple fractures s/p mechanical fall.     1. Adrenal insufficiency.   Agree with taper of hydrocortisone to 25mg IV q 8 hours  If patient is taken back to OR, recommend restarting stress dose steroids.   Endocrine team will follow and help in transition back to home dose hydrocortisone once appropriate    2. Hypothyroidism  c/w  LT4 50mcg po daily via NG tube  Check Free T4.    3. Osteoporosis.   Patient has had multiple fractures while on treatment with risedronate  Intolerant of Prolia  Consider switch to Reclast  To discuss with outpatient endocrinologist Dr. Lim.

## 2021-09-17 NOTE — PROGRESS NOTE ADULT - ASSESSMENT
ECHO 10/25/16:  Normal left ventricular systolic function. No segmental wall motion abnormalities. Hypermobile interatrial septum.   ECHO 8/24/21: EF 66% grossly nl LV sys fx, mild diastolic dsyfx - stage 1     a/p    Patient is a 77yo F , known to practice from prior admission,  with a history of colon cancer s/p right hemicolectomy (approx 2 years ago, outside hospital), DVT (on Eliquis), CKD, adrenal insufficiency presenting as transfer from [facility] for trauma evaluation secondary to a mechanical fall from sitting    #Mechanical fall  -MRI noted with anterior splaying of T12/L1 space w/ edema   -CT T done noted with widening of T12/L1 disc space w/ L1 laminectomy  -s/p T10-pelvis with revision of previous L2-5 hardware, L1 expanded PSO.  -s/p intubation- now extubated 9/16 on venti mask  -CV stable   -management per neuro sx , NSCU     #SVT (hx)  -ST on monitor 120s  -bb resumed, uptitrate as bp tolerates   -appears volume overloaded today on exam, would give lasix 40mg IVP x 1     # DVT (hx)  -recent le doppler negative for acute dvt  -AC on hold post-op- hem to follow up for further a/c recommendations    # wm on CKD   -renal f/u     dvt ppx

## 2021-09-17 NOTE — PROGRESS NOTE ADULT - ATTENDING COMMENTS
Pt seen and examined with the NP. Agree with the assessment and plan.  Vitals, labs and radiology results personally reviewed.  Above note edited as appropriate.  s/p extubation to ventimask. awake, back to baseline mental status. with NG tube feeding.  close monitoring. PICC for difficult access.   Cr now baseline.   on IV Hydrocortisone 25 mg TID.   Neuro ICU care appreciated.     Dr. Hope ([facility])  [phone] .

## 2021-09-18 LAB
ANION GAP SERPL CALC-SCNC: 13 MMOL/L — SIGNIFICANT CHANGE UP (ref 5–17)
APTT BLD: 26.1 SEC — LOW (ref 27.5–35.5)
BUN SERPL-MCNC: 15 MG/DL — SIGNIFICANT CHANGE UP (ref 7–23)
CALCIUM SERPL-MCNC: 8.7 MG/DL — SIGNIFICANT CHANGE UP (ref 8.4–10.5)
CHLORIDE SERPL-SCNC: 107 MMOL/L — SIGNIFICANT CHANGE UP (ref 96–108)
CO2 SERPL-SCNC: 23 MMOL/L — SIGNIFICANT CHANGE UP (ref 22–31)
CREAT SERPL-MCNC: 0.83 MG/DL — SIGNIFICANT CHANGE UP (ref 0.5–1.3)
GLUCOSE SERPL-MCNC: 200 MG/DL — HIGH (ref 70–99)
HCT VFR BLD CALC: 28.9 % — LOW (ref 34.5–45)
HGB BLD-MCNC: 10.1 G/DL — LOW (ref 11.5–15.5)
INR BLD: 1.04 RATIO — SIGNIFICANT CHANGE UP (ref 0.88–1.16)
LMWH PPP CHRO-ACNC: 0.15 IU/ML — LOW (ref 0.5–1.1)
MCHC RBC-ENTMCNC: 28.7 PG — SIGNIFICANT CHANGE UP (ref 27–34)
MCHC RBC-ENTMCNC: 34.9 GM/DL — SIGNIFICANT CHANGE UP (ref 32–36)
MCV RBC AUTO: 82.1 FL — SIGNIFICANT CHANGE UP (ref 80–100)
NRBC # BLD: 0 /100 WBCS — SIGNIFICANT CHANGE UP (ref 0–0)
PLATELET # BLD AUTO: 172 K/UL — SIGNIFICANT CHANGE UP (ref 150–400)
POTASSIUM SERPL-MCNC: 3.2 MMOL/L — LOW (ref 3.5–5.3)
POTASSIUM SERPL-SCNC: 3.2 MMOL/L — LOW (ref 3.5–5.3)
PROTHROM AB SERPL-ACNC: 12.5 SEC — SIGNIFICANT CHANGE UP (ref 10.6–13.6)
RBC # BLD: 3.52 M/UL — LOW (ref 3.8–5.2)
RBC # FLD: 21.4 % — HIGH (ref 10.3–14.5)
SARS-COV-2 RNA SPEC QL NAA+PROBE: SIGNIFICANT CHANGE UP
SODIUM SERPL-SCNC: 143 MMOL/L — SIGNIFICANT CHANGE UP (ref 135–145)
WBC # BLD: 15.66 K/UL — HIGH (ref 3.8–10.5)
WBC # FLD AUTO: 15.66 K/UL — HIGH (ref 3.8–10.5)

## 2021-09-18 RX ORDER — CYCLOBENZAPRINE HYDROCHLORIDE 10 MG/1
5 TABLET, FILM COATED ORAL THREE TIMES A DAY
Refills: 0 | Status: DISCONTINUED | OUTPATIENT
Start: 2021-09-18 | End: 2021-09-27

## 2021-09-18 RX ORDER — POTASSIUM CHLORIDE 20 MEQ
40 PACKET (EA) ORAL EVERY 4 HOURS
Refills: 0 | Status: DISCONTINUED | OUTPATIENT
Start: 2021-09-18 | End: 2021-09-18

## 2021-09-18 RX ORDER — METOPROLOL TARTRATE 50 MG
5 TABLET ORAL ONCE
Refills: 0 | Status: COMPLETED | OUTPATIENT
Start: 2021-09-18 | End: 2021-09-18

## 2021-09-18 RX ORDER — BACITRACIN ZINC 500 UNIT/G
1 OINTMENT IN PACKET (EA) TOPICAL
Refills: 0 | Status: DISCONTINUED | OUTPATIENT
Start: 2021-09-18 | End: 2021-09-27

## 2021-09-18 RX ORDER — HYDROCORTISONE 20 MG
25 TABLET ORAL
Refills: 0 | Status: DISCONTINUED | OUTPATIENT
Start: 2021-09-18 | End: 2021-09-20

## 2021-09-18 RX ORDER — NYSTATIN CREAM 100000 [USP'U]/G
1 CREAM TOPICAL
Refills: 0 | Status: DISCONTINUED | OUTPATIENT
Start: 2021-09-18 | End: 2021-09-27

## 2021-09-18 RX ORDER — IPRATROPIUM/ALBUTEROL SULFATE 18-103MCG
3 AEROSOL WITH ADAPTER (GRAM) INHALATION ONCE
Refills: 0 | Status: COMPLETED | OUTPATIENT
Start: 2021-09-18 | End: 2021-09-18

## 2021-09-18 RX ORDER — POTASSIUM CHLORIDE 20 MEQ
40 PACKET (EA) ORAL EVERY 4 HOURS
Refills: 0 | Status: COMPLETED | OUTPATIENT
Start: 2021-09-18 | End: 2021-09-18

## 2021-09-18 RX ADMIN — OXYCODONE HYDROCHLORIDE 10 MILLIGRAM(S): 5 TABLET ORAL at 17:46

## 2021-09-18 RX ADMIN — Medication 650 MILLIGRAM(S): at 13:35

## 2021-09-18 RX ADMIN — OXYCODONE HYDROCHLORIDE 10 MILLIGRAM(S): 5 TABLET ORAL at 05:30

## 2021-09-18 RX ADMIN — OXYCODONE HYDROCHLORIDE 10 MILLIGRAM(S): 5 TABLET ORAL at 18:20

## 2021-09-18 RX ADMIN — Medication 25 MILLIGRAM(S): at 13:35

## 2021-09-18 RX ADMIN — PANTOPRAZOLE SODIUM 40 MILLIGRAM(S): 20 TABLET, DELAYED RELEASE ORAL at 11:50

## 2021-09-18 RX ADMIN — ATORVASTATIN CALCIUM 10 MILLIGRAM(S): 80 TABLET, FILM COATED ORAL at 20:37

## 2021-09-18 RX ADMIN — Medication 5 MILLIGRAM(S): at 16:03

## 2021-09-18 RX ADMIN — OXYCODONE HYDROCHLORIDE 10 MILLIGRAM(S): 5 TABLET ORAL at 10:05

## 2021-09-18 RX ADMIN — Medication 50 MICROGRAM(S): at 05:29

## 2021-09-18 RX ADMIN — TRAMADOL HYDROCHLORIDE 50 MILLIGRAM(S): 50 TABLET ORAL at 01:21

## 2021-09-18 RX ADMIN — Medication 40 MILLIEQUIVALENT(S): at 17:46

## 2021-09-18 RX ADMIN — TRAMADOL HYDROCHLORIDE 50 MILLIGRAM(S): 50 TABLET ORAL at 00:33

## 2021-09-18 RX ADMIN — Medication 3 MILLILITER(S): at 16:04

## 2021-09-18 RX ADMIN — Medication 25 MILLIGRAM(S): at 17:46

## 2021-09-18 RX ADMIN — OXYCODONE HYDROCHLORIDE 10 MILLIGRAM(S): 5 TABLET ORAL at 07:00

## 2021-09-18 RX ADMIN — LIDOCAINE 1 PATCH: 4 CREAM TOPICAL at 23:03

## 2021-09-18 RX ADMIN — TRAMADOL HYDROCHLORIDE 50 MILLIGRAM(S): 50 TABLET ORAL at 23:06

## 2021-09-18 RX ADMIN — OXYCODONE HYDROCHLORIDE 5 MILLIGRAM(S): 5 TABLET ORAL at 21:14

## 2021-09-18 RX ADMIN — POLYETHYLENE GLYCOL 3350 17 GRAM(S): 17 POWDER, FOR SOLUTION ORAL at 05:31

## 2021-09-18 RX ADMIN — SODIUM CHLORIDE 50 MILLILITER(S): 9 INJECTION INTRAMUSCULAR; INTRAVENOUS; SUBCUTANEOUS at 17:47

## 2021-09-18 RX ADMIN — TRAMADOL HYDROCHLORIDE 50 MILLIGRAM(S): 50 TABLET ORAL at 12:30

## 2021-09-18 RX ADMIN — SENNA PLUS 2 TABLET(S): 8.6 TABLET ORAL at 20:35

## 2021-09-18 RX ADMIN — Medication 650 MILLIGRAM(S): at 05:30

## 2021-09-18 RX ADMIN — ENOXAPARIN SODIUM 30 MILLIGRAM(S): 100 INJECTION SUBCUTANEOUS at 17:45

## 2021-09-18 RX ADMIN — TRAMADOL HYDROCHLORIDE 50 MILLIGRAM(S): 50 TABLET ORAL at 11:51

## 2021-09-18 RX ADMIN — LATANOPROST 1 DROP(S): 0.05 SOLUTION/ DROPS OPHTHALMIC; TOPICAL at 23:06

## 2021-09-18 RX ADMIN — Medication 40 MILLIEQUIVALENT(S): at 13:36

## 2021-09-18 RX ADMIN — NYSTATIN CREAM 1 APPLICATION(S): 100000 CREAM TOPICAL at 17:46

## 2021-09-18 RX ADMIN — Medication 25 MILLIGRAM(S): at 05:30

## 2021-09-18 RX ADMIN — OXYCODONE HYDROCHLORIDE 5 MILLIGRAM(S): 5 TABLET ORAL at 20:44

## 2021-09-18 RX ADMIN — Medication 650 MILLIGRAM(S): at 20:37

## 2021-09-18 RX ADMIN — LOSARTAN POTASSIUM 50 MILLIGRAM(S): 100 TABLET, FILM COATED ORAL at 06:00

## 2021-09-18 RX ADMIN — Medication 1 APPLICATION(S): at 18:24

## 2021-09-18 RX ADMIN — OXYCODONE HYDROCHLORIDE 10 MILLIGRAM(S): 5 TABLET ORAL at 09:34

## 2021-09-18 RX ADMIN — LIDOCAINE 1 PATCH: 4 CREAM TOPICAL at 11:50

## 2021-09-18 RX ADMIN — Medication 25 MILLIGRAM(S): at 20:36

## 2021-09-18 NOTE — SWALLOW BEDSIDE ASSESSMENT ADULT - SLP PERTINENT HISTORY OF CURRENT PROBLEM
Patient is a 75yo F with a history of colon cancer s/p right hemicolectomy (approx 2 years ago, outside hospital), DVT (on Eliquis), CKD, adrenal insufficiency presenting as transfer from [facility] for trauma evaluation secondary to a mechanical fall from sitting with headstrike. Injuries identified include right forehead hematoma, acute minimally displaced right coracoid process scapular fracture, T11/L1 compression fractures and an inferior sternal fracture. Patient hemodynamically stable. Pt admitted to trauma service.
Patient is a 77yo F with a history of colon cancer s/p right hemicolectomy (approx 2 years ago, outside hospital), DVT (on Eliquis), CKD, adrenal insufficiency presenting as transfer from [facility] for trauma evaluation secondary to a mechanical fall from sitting with headstrike. Injuries identified include right forehead hematoma, acute minimally displaced right coracoid process scapular fracture, T11/L1 compression fractures and an inferior sternal fracture. Patient hemodynamically stable. Pt admitted to trauma service.

## 2021-09-18 NOTE — PROGRESS NOTE ADULT - SUBJECTIVE AND OBJECTIVE BOX
[facility] [place] [facility]   MD James Gill MD, PA    From 7 AM - 5 PM:  OFFICE: [phone]  Dr. Mohan cell: [phone]  Dr. Salazar cell: [phone]  ZULMA Perez cell: [phone]    From 5 PM - 7 AM: Answering Service: [phone]  Date of service: 09-18-21 @ 11:47    RENAL FOLLOW UP NOTE  --------------------------------------------------------------------------------  HPI:  Pt seen and examined at bedside.   Denies SOB, chest pain     PAST HISTORY  --------------------------------------------------------------------------------  No significant changes to PMH, PSH, FHx, SHx, unless otherwise noted    ALLERGIES & MEDICATIONS  --------------------------------------------------------------------------------  Allergies    penicillin (Rash)    Intolerances      Standing Inpatient Medications  atorvastatin 10 milliGRAM(s) Oral at bedtime  BACItracin   Ointment 1 Application(s) Topical two times a day  bisacodyl Suppository 10 milliGRAM(s) Rectal daily  enoxaparin Injectable 30 milliGRAM(s) SubCutaneous <User Schedule>  hydrocortisone sodium succinate Injectable 25 milliGRAM(s) IV Push every 8 hours  influenza   Vaccine 0.5 milliLiter(s) IntraMuscular once  latanoprost 0.005% Ophthalmic Solution 1 Drop(s) Both EYES at bedtime  levothyroxine 50 MICROGram(s) Oral daily  lidocaine   4% Patch 1 Patch Transdermal daily  losartan 50 milliGRAM(s) Oral daily  metoprolol tartrate 25 milliGRAM(s) Oral two times a day  nystatin Powder 1 Application(s) Topical two times a day  pantoprazole  Injectable 40 milliGRAM(s) IV Push daily  polyethylene glycol 3350 17 Gram(s) Oral two times a day  senna 2 Tablet(s) Oral at bedtime  sodium bicarbonate 650 milliGRAM(s) Oral three times a day  sodium chloride 0.9%. 1000 milliLiter(s) IV Continuous <Continuous>    PRN Inpatient Medications  artificial  tears Solution 1 Drop(s) Both EYES every 6 hours PRN  cyclobenzaprine 10 milliGRAM(s) Oral three times a day PRN  oxyCODONE    Solution 5 milliGRAM(s) Oral every 4 hours PRN  oxyCODONE    Solution 10 milliGRAM(s) Oral every 4 hours PRN  traMADol 50 milliGRAM(s) Oral every 4 hours PRN      REVIEW OF SYSTEMS  --------------------------------------------------------------------------------  General: no fever  CVS: no chest pain  RESP: no sob, no cough  ABD: no abdominal pain  : no dysuria,  MSK: no edema     VITALS/PHYSICAL EXAM  --------------------------------------------------------------------------------  T(C): 36.7 (09-18-21 @ 11:35), Max: 37.6 (09-17-21 @ 17:50)  HR: 100 (09-18-21 @ 11:35) (88 - 119)  BP: 138/71 (09-18-21 @ 11:35) (129/68 - 190/146)  RR: 24 (09-18-21 @ 11:35) (16 - 24)  SpO2: 99% (09-18-21 @ 11:35) (94% - 100%)  Wt(kg): --        09-17-21 @ 07:01  -  09-18-21 @ 07:00  --------------------------------------------------------  IN: 2570 mL / OUT: 2575 mL / NET: -5 mL      Physical Exam:  	Gen: NAD  	HEENT: MMM  	Pulm: CTA B/L  	CV: S1S2  	Abd: Soft, +BS  	Ext: No LE edema B/L                      Neuro: Awake   	Skin: Warm and Dry       LABS/STUDIES  --------------------------------------------------------------------------------              10.1   15.66 >-----------<  172      [09-18-21 @ 05:46]              28.9     143  |  107  |  15  ----------------------------<  200      [09-18-21 @ 05:46]  3.2   |  23  |  0.83        Ca     8.7     [09-18-21 @ 05:46]      Mg     2.1     [09-16-21 @ 21:09]      Phos  3.0     [09-16-21 @ 21:09]      PT/INR: PT 12.5 , INR 1.04       [09-18-21 @ 05:46]  PTT: 26.1       [09-18-21 @ 05:46]      Creatinine Trend:  SCr 0.83 [09-18 @ 05:46]  SCr 1.02 [09-16 @ 21:09]  SCr 1.17 [09-15 @ 21:18]  SCr 1.15 [09-15 @ 17:46]  SCr 0.94 [09-14 @ 22:04]    Urinalysis - [09-12-21 @ 01:58]      Color Light Yellow / Appearance Clear / SG 1.013 / pH 7.0      Gluc Trace / Ketone Negative  / Bili Negative / Urobili Negative       Blood Negative / Protein Trace / Leuk Est Negative / Nitrite Negative      RBC 1 / WBC 0 / Hyaline  / Gran  / Sq Epi  / Non Sq Epi 0 / Bacteria Negative    Urine Sodium 6      [09-13-21 @ 14:35]  Urine Osmolality 480      [09-13-21 @ 14:36]    Iron 44, TIBC 224, %sat 19      [09-14-21 @ 10:27]  Ferritin 444      [09-14-21 @ 11:37]  Vitamin D (25OH) 67.9      [09-08-21 @ 09:59]  HbA1c 5.9      [02-22-17 @ 03:10]  TSH 0.18      [09-08-21 @ 09:59]

## 2021-09-18 NOTE — PROGRESS NOTE ADULT - ASSESSMENT
75 y/o female with a history of colon cancer s/p right hemicolectomy (approx 2 years ago, outside hospital), DVT (on Eliquis), CKD, adrenal insufficiency presents s/p fall with right coracoid process scapular fracture, T11/L1 compression fractures and an inferior sternal fracture, noted to have prolonged PTT.    1. prolonged PTT  - no hx of bleeding disorder  - nl PTT on admission 9/5; prolonged to 69 on 9/8, further prolonged 9/13  - blood draws have been peripheral sticks- no central catheter/Port  - prolonged PTT can be seen with subcutaneous heparin administration, usually milder elevations, but this is most likely cause in this patient  - low suspicion for factor deficiency with normal PTT on admission  - no hx of liver disease and PT was normal prior  - no evidence of DIC, normal fibrinogen  - mixing study, thrombin time and reptilase time were consistent with heparin effect  - PTT normalized off of subcut heparin and remains normal on SQ lovenox    2.anemia, microcytic  - iron studies pre op with high ferritin, normal B12 and folate  - s/p 2 unit PRBC intra op  - monitor Hg 10.1 on 9-18-21    3. hx of DVT, 2019- bilateral  - discovered with colon cancer diagnosis  - has been on eliquis, maintained on AC with decreased mobility  - duplex LE 9/9- no DVT  - eliquis on HOLD for now, repeat Duplex 9/16 remain negative  - continue prophylaxis with lovenox for now; full AC to resume when stable post op  -  CTA chest with no obvious PE on 9-17-21    4. s/p fall, with T12- L1 fracture  - s/p thorcao-lumbar fusion per neurosurgery on 9-14-21  - extensive wound, + drain

## 2021-09-18 NOTE — PROGRESS NOTE ADULT - SUBJECTIVE AND OBJECTIVE BOX
CARDIOLOGY FOLLOW UP - Dr. Orozco  Date of Service: 9/18/21  CC: no cp/sob, reports intermittent back pain     Review of Systems:  Constitutional: No fever, weight loss, or fatigue  Respiratory: No cough, wheezing, or hemoptysis, no shortness of breath  Cardiovascular: No chest pain, palpitations, passing out, dizziness, or leg swelling  Gastrointestinal: No abd or epigastric pain.  No nausea, vomiting, or hematemesis; no diarrhea or constipation, no melena or hematochezia  Vascular: no edema       PHYSICAL EXAM:  T(C): 36.7 (09-18-21 @ 05:00), Max: 37.6 (09-17-21 @ 17:50)  HR: 104 (09-18-21 @ 05:00) (88 - 126)  BP: 170/99 (09-18-21 @ 05:00) (129/68 - 190/146)  RR: 24 (09-18-21 @ 05:00) (16 - 30)  SpO2: 95% (09-18-21 @ 05:00) (94% - 100%)  Wt(kg): --  I&O's Summary    17 Sep 2021 07:01  -  18 Sep 2021 07:00  --------------------------------------------------------  IN: 2570 mL / OUT: 2575 mL / NET: -5 mL        Appearance: Normal	  Cardiovascular: Normal S1 S2,RRR, No JVD, No murmurs  Respiratory: Lungs clear to auscultation	  Gastrointestinal:  Soft, Non-tender, + BS	  Extremities: Normal range of motion, No clubbing, cyanosis or edema      Home Medications:  acetaminophen 325 mg oral tablet: 2 tab(s) orally every 6 hours, As needed, Mild Pain (1 - 3) (24 Sep 2020 11:16)  Artificial Tears ophthalmic solution: 1 drop(s) to each affected eye 4 times a day, As Needed (22 Aug 2021 23:28)  aspirin 81 mg oral delayed release tablet: 1 tab(s) orally once a day (27 Aug 2021 13:38)  calcium (as carbonate) 600 mg oral tablet: 1 cap(s) orally once a day (24 Sep 2020 11:16)  Eliquis 2.5 mg oral tablet: 1 tab(s) orally 2 times a day (24 Sep 2020 11:16)  felodipine 5 mg oral tablet, extended release: 1 tab(s) orally once a day (22 Aug 2021 23:23)  guaiFENesin 600 mg oral tablet, extended release: 1 tab(s) orally every 12 hours as needed for cough  (27 Aug 2021 13:38)  hydrocortisone 10 mg oral tablet: 1 tab(s) orally 2 times a day (24 Sep 2020 11:16)  latanoprost 0.005% ophthalmic solution: 1 drop(s) to each affected eye once a day (in the evening) (22 Aug 2021 23:27)  levothyroxine 50 mcg (0.05 mg) oral tablet: 1 tab(s) orally once a day (24 Sep 2020 11:16)  Lipitor 10 mg oral tablet: 1 tab(s) orally once a day (22 Aug 2021 23:41)  Macular Vitamin Benefit oral tablet: 1 tab(s) orally once a day (24 Sep 2020 11:16)  metoprolol succinate 50 mg oral tablet, extended release: 1 tab(s) orally once a day (22 Aug 2021 23:24)  Multiple Vitamins oral tablet: 1 tab(s) orally once a day (22 Aug 2021 23:22)  olmesartan 20 mg oral tablet: 1 tab(s) orally once a day (22 Aug 2021 23:22)  omeprazole 20 mg oral delayed release capsule: 1 cap(s) orally once a day (22 Aug 2021 23:21)  oxyCODONE 5 mg oral tablet: 1 tab(s) orally every 8 hours as needed for severe pain (27 Aug 2021 13:35)  OxyCONTIN 20 mg oral tablet, extended release: 1 tab(s) orally every 12 hours (24 Sep 2020 11:16)  risedronate 35 mg oral tablet: 1 tab(s) orally once a week (Mondays) (05 Sep 2021 18:30)  senna oral tablet: 2 tab(s) orally once a day (at bedtime), As Needed (05 Sep 2021 18:31)      MEDICATIONS  (STANDING):  atorvastatin 10 milliGRAM(s) Oral at bedtime  bisacodyl Suppository 10 milliGRAM(s) Rectal daily  enoxaparin Injectable 30 milliGRAM(s) SubCutaneous <User Schedule>  hydrocortisone sodium succinate Injectable 25 milliGRAM(s) IV Push every 8 hours  influenza   Vaccine 0.5 milliLiter(s) IntraMuscular once  latanoprost 0.005% Ophthalmic Solution 1 Drop(s) Both EYES at bedtime  levothyroxine 50 MICROGram(s) Oral daily  lidocaine   4% Patch 1 Patch Transdermal daily  losartan 50 milliGRAM(s) Oral daily  metoprolol tartrate 25 milliGRAM(s) Oral two times a day  pantoprazole  Injectable 40 milliGRAM(s) IV Push daily  polyethylene glycol 3350 17 Gram(s) Oral two times a day  senna 2 Tablet(s) Oral at bedtime  sodium bicarbonate 650 milliGRAM(s) Oral three times a day  sodium chloride 0.9%. 1000 milliLiter(s) (50 mL/Hr) IV Continuous <Continuous>      TELEMETRY: 	    ECG:  	  RADIOLOGY: < from: CT Angio Chest PE Protocol w/ IV Cont (09.17.21 @ 12:36) >  FINDINGS:    PULMONARY EMBOLISM: No pulmonary embolism.    AIRWAYS AND LUNGS: The central tracheobronchial tree is patent.  Patchy mild opacities both upper lobes.    MEDIASTINUM AND PLEURA: There are no enlarged mediastinal, hilar or axillary lymph nodes. The visualized portion of the thyroid gland is unremarkable. Small bilateral pleural effusions with partial collapse of both lower lobes. There is no pneumothorax.    HEART AND VESSELS: There is mild cardiomegaly.   There are atherosclerotic calcifications of the aorta and coronary arteries.  There is no pericardial effusion.    UPPER ABDOMEN: Images of the upper abdomen demonstrate enteric tube tip courses past stomach. Gallbladder fossa high density foci with adjacent focus of air.    BONES AND SOFT TISSUES: Right corticated process fracture. Age indeterminate sternal fracture. spine post procedural changes with drainage catheter    TUBES/LINES: Left-sided central venous catheter with tip in the left brachiocephalic vein    IMPRESSION:  No pulmonary embolism. Bilateral upper lobe mild patchy opacities are new from the prior study and indeterminate.    Gallbladder fossa high density foci with adjacent focus of air is indeterminate, possibly postprocedural    --- End of Report ---    < end of copied text >    DIAGNOSTIC TESTING:  [ ] Echocardiogram:  [ ]  Catheterization:  [ ] Stress Test:    OTHER: 	    LABS:	 	                            10.1   15.66 )-----------( 172      ( 18 Sep 2021 05:46 )             28.9     09-18    143  |  107  |  15  ----------------------------<  200<H>  3.2<L>   |  23  |  0.83    Ca    8.7      18 Sep 2021 05:46  Phos  3.0     09-16  Mg     2.1     09-16      PT/INR - ( 18 Sep 2021 05:46 )   PT: 12.5 sec;   INR: 1.04 ratio         PTT - ( 18 Sep 2021 05:46 )  PTT:26.1 sec

## 2021-09-18 NOTE — PROGRESS NOTE ADULT - ASSESSMENT
77 yo F with a history of colon cancer s/p right hemicolectomy (approx 2 years ago, outside hospital), DVT (on Eliquis), CKD, adrenal insufficiency presenting as transfer from [facility] for trauma evaluation secondary to a mechanical fall from sitting with headstrike. Injuries identified include right forehead hematoma, acute minimally displaced right coracoid process scapular fracture, T11/L1 compression fractures and an inferior sternal fracture. Patient hemodynamically stable.    # Elevated PTT  Mild elevations in PTT can be seen with SC heparin administration, particularly with TID dosing  low plasma protein, impaired renal function, and low BMI have been postulated as contributing factors.    hematology following.    # S/p Mechanical Fall:  Right forehead hematoma, acute minimally displaced right coracoid process scapular fracture, T11/L1 compression fractures and an inferior sternal fracture  no surgery for scapular fx. Placed in sling  CT spine with fusion L2-5. Fracture through the T12-L1 disc space with widening of the disc space and a L1-2 laminectomy which appears unstable. Diffuse osteopenia. Old T11 and L1 fractures.   MRI T/L spine with anterior splaying of the intervertebral disc space at the T12-L1 with edema in the anterior prevertebral soft tissues  CT T done noted with widening of T12/L1 disc space w/ L1 laminectomy  Neurosurgery offering surgery vs conservative management, TLSO brace in the meantime.   The daughter Susie decided to proceed with surgery.   Cr has improved.   pt s/p OR: revision of prior L2-L5 fusion w/ extension from T10- pelvis w/o L1 PSO, and plastics closure on 9/14  Neuro checks, Monitor outpt via drain, monitored in NICU post op, extubated 9/16/21.     # Nausea:   Pt has been nauseous (no abd pain). ?pain med induced/ Opioids   Switched hydrocortisone 10 mg BID to IV 25 mg BID while poor PO intake.   s/p stress dose steroids.     # Hypopituitary   She is on Hydrocortisone 10 mg BID at home, so will monitor for adrenal insufficiency. am cortisol appropriate  UA unimpressive. vit D, vit B12 normal. TSH low as expected with hypopit. free T4 sent, normal.   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 Treviño ([facility]). Outpt EMR reviewed; Cr range from 1.4 to 1.6   Monitor I/O's, Serial Cr, Avoid nephrotoxins  Cr is up from baseline. Hold Losartan. s/p gentle IVF for acute kidney failure: likely pre-renal.   Her recent TTE reviewed, with normal LV.   Her Cr now back to baseline (1.4-1.6)     # CAD: Chronic, stable  Cont home CV meds  holding ASA for neurosurgery     # Hx of SVT  ST on monitor 120s  BB resumed by cards, uptitrate as BP tolerates     # Hypothyroidism  Chronic, stable  TSH low. Total T4 is nl  Levothyroxine for hypothyroid transition to IV as no PO access   TSH 0.18, T(6)  Can transition back to LT4 50mcg po daily via NG tube as per Endo  Check Free T4 in AM  Endocrine following      # Hx of DVT, lower extremity  Home med Eliquis, on hold   SCDs    # GI ppx:  Protonix  77 yo F with a history of colon cancer s/p right hemicolectomy (approx 2 years ago, outside hospital), DVT (on Eliquis), CKD, adrenal insufficiency presenting as transfer from [facility] for trauma evaluation secondary to a mechanical fall from sitting with headstrike. Injuries identified include right forehead hematoma, acute minimally displaced right coracoid process scapular fracture, T11/L1 compression fractures and an inferior sternal fracture. Patient hemodynamically stable.    # Elevated PTT  Mild elevations in PTT can be seen with SC heparin administration, particularly with TID dosing  low plasma protein, impaired renal function, and low BMI have been postulated as contributing factors.    hematology following.    # S/p Mechanical Fall:  Right forehead hematoma, acute minimally displaced right coracoid process scapular fracture, T11/L1 compression fractures and an inferior sternal fracture  no surgery for scapular fx. Placed in sling  CT spine with fusion L2-5. Fracture through the T12-L1 disc space with widening of the disc space and a L1-2 laminectomy which appears unstable. Diffuse osteopenia. Old T11 and L1 fractures.   MRI T/L spine with anterior splaying of the intervertebral disc space at the T12-L1 with edema in the anterior prevertebral soft tissues  CT T done noted with widening of T12/L1 disc space w/ L1 laminectomy  Neurosurgery offering surgery vs conservative management, TLSO brace in the meantime.   The daughter Susie decided to proceed with surgery.   Cr has improved.   pt s/p OR: revision of prior L2-L5 fusion w/ extension from T10- pelvis w/o L1 PSO, and plastics closure on 9/14  Neuro checks, Monitor outpt via drain, monitored in NICU post op, extubated 9/16/21. Doing well, transferred to the floor on 9/18/21.     # Nausea:   Pt has been nauseous (no abd pain). ?pain med induced/ Opioids   Switched hydrocortisone 10 mg BID to IV 25 mg BID while poor PO intake.   s/p stress dose steroids.     # Hypopituitary   She is on Hydrocortisone 10 mg BID at home, so will monitor for adrenal insufficiency. am cortisol appropriate  UA unimpressive. vit D, vit B12 normal. TSH low as expected with hypopit. free T4 sent, normal.   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 Treviño ([facility]). Outpt EMR reviewed; Cr range from 1.4 to 1.6   Monitor I/O's, Serial Cr, Avoid nephrotoxins  Cr is up from baseline. Hold Losartan. s/p gentle IVF for acute kidney failure: likely pre-renal.   Her recent TTE reviewed, with normal LV.   Her Cr now back to baseline (1.4-1.6)     # CAD: Chronic, stable  Cont home CV meds  holding ASA for neurosurgery     # Hx of SVT  ST on monitor 120s  BB resumed by cards, uptitrate as BP tolerates     # Hypothyroidism  Chronic, stable  TSH low. Total T4 is nl  Levothyroxine for hypothyroid transition to IV as no PO access   TSH 0.18, T(6)  Can transition back to LT4 50mcg po daily via NG tube as per Endo  Check Free T4 in AM  Endocrine following      # Hx of DVT, lower extremity  Home med Eliquis, on hold   SCDs    # GI ppx:  Protonix

## 2021-09-18 NOTE — SWALLOW BEDSIDE ASSESSMENT ADULT - SPECIFY REASON(S)
to subjectively assess the swallow mechanism r/o dysphagia
to subjectively assess swallow mechanism r/o dysphagia

## 2021-09-18 NOTE — SWALLOW BEDSIDE ASSESSMENT ADULT - SLP GENERAL OBSERVATIONS
Pt found in bed, awake and alert. Son at bedside. Pt on 2 liters nasal cannula. Able to sit upright in bed, must position slowly. Voice hypophonic. Son states voice improving post extubation. He endorses "no sound at all when they took the tube out". Vocal loudness improves with cues for increased breath support. Cooperative for evaluation and following directions for evaluation purposes. +NGT.

## 2021-09-18 NOTE — PROGRESS NOTE ADULT - ASSESSMENT
ECHO 10/25/16:  Normal left ventricular systolic function. No segmental wall motion abnormalities. Hypermobile interatrial septum.   ECHO 8/24/21: EF 66% grossly nl LV sys fx, mild diastolic dsyfx - stage 1     a/p    Patient is a 75yo F , known to practice from prior admission,  with a history of colon cancer s/p right hemicolectomy (approx 2 years ago, outside hospital), DVT (on Eliquis), CKD, adrenal insufficiency presenting as transfer from [facility] for trauma evaluation secondary to a mechanical fall from sitting    #Mechanical fall  -MRI noted with anterior splaying of T12/L1 space w/ edema   -CT T done noted with widening of T12/L1 disc space w/ L1 laminectomy  -s/p T10-pelvis with revision of previous L2-5 hardware, L1 expanded PSO.  -s/p intubation- now extubated 9/16 , on NC  -CV stable   -management per neuro sx , NSCU     #SVT (hx)  -BB resumed, HR improving  -inc bb as needed     #Dyspnea  -CT A neg for PE  -pt appears mildly overloaded - can trial IVP lasix 40 mg x1     # DVT (hx)  -recent le doppler negative for acute dvt  -AC on hold post-op- hem to follow up for further a/c recommendations    # wm on CKD   -renal f/u     dvt ppx

## 2021-09-18 NOTE — PROGRESS NOTE ADULT - ASSESSMENT
HPI:  Patient is a 75yo F with a history of colon cancer s/p right hemicolectomy (approx 2 years ago, outside hospital), DVT (on Eliquis), CKD, adrenal insufficiency presenting as transfer from [facility] for trauma evaluation secondary to a mechanical fall from sitting. Patient was going to her commode last night when she slipped and fell on her right side, hitting her head. Denies LOC. Patient's daughter came to help her up and brought her to the ED. Since the fall, patient has been endorses right-sided chest pain but no SOB, headache, weakness or dizziness. Patient does not know what medications she takes.     Patient transferred to [facility] for trauma evaluation. In the ED, patient was hemodynamically stable but requiring 2L NC, satting 98%. Labs showed WBC 17, Cr 1.42, otherwise normal. CT head/cervical spine/chest were performed which showed a right forehead hematoma, acute minimally displaced right coracoid process scapular fracture, T11/L1 compression fractures and an inferior sternal fracture.    PRIMARY SURVEY:  A - Airway intact.  B - Bilateral breath sounds and equal chest rise. SpO2 98% 2L NC  C - Initially BP: 155/62 (09-05-21 @ 14:52), palpable pulses in all extremities.  D - GCS 15.  E - Exposure obtained.    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    Osteoporosis    Chronic Kidney Disease; Dx 2009    HTN - Hypertension    History of Obesity    Myocardial infarction    History of Laminectomy/ Fusion 1/06; L1-L2    H/O right hemicolectomy    PROCEDURE:  9/14/21 s/p S/p T10-pelvis with revision of previous L2-5 hardware, L1 expanded PSO, with plastics closure for failed back syndrome, L1 chance fracture, and coronal plane deformity.  9/14 s/p transfusion 2 U PRBCs, acute post op blood loss anemia on chronic improved and now stable  POD#4    PLAN:  Neuro: pain meds PRN,   Respiratory: patient instructed on incentive spirometer  CV: tachycardia stable- no PE on CTA, cont losartan and metoprolol for HTN  Endocrine: cont hydrocortisone taper per endocrine consult for adrenal insufficiency (chronic steroid use)   Heme/Onc: stable acute post op blood loss anemia on chronic anemia, had elevated PTT preop and now normalized, hematology following  will need to restart her eliquis for A/C for h/o DVT in setting of colon CA  DVT ppx: [] SQH [x] SQL and venodynes bilaterally  Renal: FELICIANO resolved on CKD II/III- nephology following  ID: afebrile  GI: bowel regimen, cont NGT feeds for now, awaiting speech/swallow  PT/OT: subacute rehab upon d/c  appreciate medicine follow up  f/u am labs  hypokalemia supplemented k= 3.2, f/u am labs  leukocytosis likely due to steroids as she is afebrile  son updated at bedside and daughter updated via phone    Will discuss with Dr Heidy Sykes # [phone]       HPI:  Patient is a 77yo F with a history of colon cancer s/p right hemicolectomy (approx 2 years ago, outside hospital), DVT (on Eliquis), CKD, adrenal insufficiency presenting as transfer from [facility] for trauma evaluation secondary to a mechanical fall from sitting. Patient was going to her commode last night when she slipped and fell on her right side, hitting her head. Denies LOC. Patient's daughter came to help her up and brought her to the ED. Since the fall, patient has been endorses right-sided chest pain but no SOB, headache, weakness or dizziness. Patient does not know what medications she takes.     Patient transferred to [facility] for trauma evaluation. In the ED, patient was hemodynamically stable but requiring 2L NC, satting 98%. Labs showed WBC 17, Cr 1.42, otherwise normal. CT head/cervical spine/chest were performed which showed a right forehead hematoma, acute minimally displaced right coracoid process scapular fracture, T11/L1 compression fractures and an inferior sternal fracture.    PRIMARY SURVEY:  A - Airway intact.  B - Bilateral breath sounds and equal chest rise. SpO2 98% 2L NC  C - Initially BP: 155/62 (09-05-21 @ 14:52), palpable pulses in all extremities.  D - GCS 15.  E - Exposure obtained.    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    Osteoporosis    Chronic Kidney Disease; Dx 2009    HTN - Hypertension    History of Obesity    Myocardial infarction    History of Laminectomy/ Fusion 1/06; L1-L2    H/O right hemicolectomy    PROCEDURE:  9/14/21 s/p S/p T10-pelvis with revision of previous L2-5 hardware, L1 expanded PSO, with plastics closure for failed back syndrome, L1 chance fracture, and coronal plane deformity.  9/14 s/p transfusion 2 U PRBCs, acute post op blood loss anemia on chronic improved and now stable  POD#4    PLAN:  Neuro: pain meds PRN,   Respiratory: patient instructed on incentive spirometer  CV: tachycardia stable- no PE on CTA, cont losartan and metoprolol for HTN  Endocrine: cont hydrocortisone taper per endocrine consult for adrenal insufficiency (chronic steroid use)   Heme/Onc: stable acute post op blood loss anemia on chronic anemia, had elevated PTT preop and now normalized, hematology following  will need to restart her eliquis for A/C for h/o DVT in setting of colon CA  DVT ppx: [] SQH [x] SQL and venodynes bilaterally  Renal: FELICIANO resolved on CKD II/III- nephology following; requiring Straight cath multiple times for urinary retention  ID: afebrile  GI: bowel regimen, cont NGT feeds for now, awaiting speech/swallow  PT/OT: subacute rehab upon d/c  appreciate medicine follow up  f/u am labs  hypokalemia supplemented k= 3.2, f/u am labs  leukocytosis likely due to steroids as she is afebrile  son updated at bedside and daughter updated via phone    Will discuss with Dr Heidy Sykes # [id number]       HPI:  Patient is a 77yo F with a history of colon cancer s/p right hemicolectomy (approx 2 years ago, outside hospital), DVT (on Eliquis), CKD, adrenal insufficiency presenting as transfer from [facility] for trauma evaluation secondary to a mechanical fall from sitting. Patient was going to her commode last night when she slipped and fell on her right side, hitting her head. Denies LOC. Patient's daughter came to help her up and brought her to the ED. Since the fall, patient has been endorses right-sided chest pain but no SOB, headache, weakness or dizziness. Patient does not know what medications she takes.     Patient transferred to [facility] for trauma evaluation. In the ED, patient was hemodynamically stable but requiring 2L NC, satting 98%. Labs showed WBC 17, Cr 1.42, otherwise normal. CT head/cervical spine/chest were performed which showed a right forehead hematoma, acute minimally displaced right coracoid process scapular fracture, T11/L1 compression fractures and an inferior sternal fracture.    PRIMARY SURVEY:  A - Airway intact.  B - Bilateral breath sounds and equal chest rise. SpO2 98% 2L NC  C - Initially BP: 155/62 (09-05-21 @ 14:52), palpable pulses in all extremities.  D - GCS 15.  E - Exposure obtained.    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    Osteoporosis    Chronic Kidney Disease; Dx 2009    HTN - Hypertension    History of Obesity    Myocardial infarction    History of Laminectomy/ Fusion 1/06; L1-L2    H/O right hemicolectomy    PROCEDURE:  9/14/21 s/p S/p T10-pelvis with revision of previous L2-5 hardware, L1 expanded PSO, with plastics closure for failed back syndrome, L1 chance fracture, and coronal plane deformity.  9/14 s/p transfusion 2 U PRBCs, acute post op blood loss anemia on chronic improved and now stable  POD#4    PLAN:  Neuro: pain meds PRN, maintain HMVC x 2 and record outputs  Respiratory: patient instructed on incentive spirometer  CV: tachycardia stable- no PE on CTA, cont losartan and metoprolol for HTN  Endocrine: cont hydrocortisone taper per endocrine consult for adrenal insufficiency (chronic steroid use)   Heme/Onc: stable acute post op blood loss anemia on chronic anemia, had elevated PTT preop and now normalized, hematology following  will need to restart her eliquis for A/C for h/o DVT in setting of colon CA  DVT ppx: [] SQH [x] SQL and venodynes bilaterally  Renal: FELICIANO resolved on CKD II/III- nephology following; requiring Straight cath multiple times for urinary retention  ID: afebrile  GI: bowel regimen, cont NGT feeds for now, awaiting speech/swallow  PT/OT: subacute rehab upon d/c  appreciate medicine follow up  f/u am labs  hypokalemia supplemented k= 3.2, f/u am labs  leukocytosis likely due to steroids as she is afebrile  son updated at bedside and daughter updated via phone    Will discuss with Dr Heidy Sykes # [id number]

## 2021-09-18 NOTE — PROGRESS NOTE ADULT - SUBJECTIVE AND OBJECTIVE BOX
Chief Complaint: Patient alert but lethargic.    History of Present Illness:    The patient lying in bed alert but very weak.  She does not seem to be in any distress at this time.  No obvious nausea.  No fevers.  She did open her eyes and said she was not in any pain.        MEDICATIONS  (STANDING):  atorvastatin 10 milliGRAM(s) Oral at bedtime  bisacodyl Suppository 10 milliGRAM(s) Rectal daily  enoxaparin Injectable 30 milliGRAM(s) SubCutaneous <User Schedule>  hydrocortisone sodium succinate Injectable 25 milliGRAM(s) IV Push every 8 hours  influenza   Vaccine 0.5 milliLiter(s) IntraMuscular once  latanoprost 0.005% Ophthalmic Solution 1 Drop(s) Both EYES at bedtime  levothyroxine 50 MICROGram(s) Oral daily  lidocaine   4% Patch 1 Patch Transdermal daily  losartan 50 milliGRAM(s) Oral daily  metoprolol tartrate 25 milliGRAM(s) Oral two times a day  pantoprazole  Injectable 40 milliGRAM(s) IV Push daily  polyethylene glycol 3350 17 Gram(s) Oral two times a day  senna 2 Tablet(s) Oral at bedtime  sodium bicarbonate 650 milliGRAM(s) Oral three times a day  sodium chloride 0.9%. 1000 milliLiter(s) (50 mL/Hr) IV Continuous <Continuous>    MEDICATIONS  (PRN):  artificial  tears Solution 1 Drop(s) Both EYES every 6 hours PRN Dry Eyes  cyclobenzaprine 10 milliGRAM(s) Oral three times a day PRN Muscle Spasm  oxyCODONE    Solution 5 milliGRAM(s) Oral every 4 hours PRN Moderate Pain (4 - 6)  oxyCODONE    Solution 10 milliGRAM(s) Oral every 4 hours PRN Severe Pain (7 - 10)  traMADol 50 milliGRAM(s) Oral every 4 hours PRN breakthrough pain      Allergies    penicillin (Rash)    Intolerances        Vital Signs Last 24 Hrs  T(C): 36.6 (18 Sep 2021 08:38), Max: 37.6 (17 Sep 2021 17:50)  T(F): 97.8 (18 Sep 2021 08:38), Max: 99.7 (17 Sep 2021 17:50)  HR: 89 (18 Sep 2021 08:38) (88 - 126)  BP: 154/95 (18 Sep 2021 08:38) (129/68 - 190/146)  BP(mean): 99 (17 Sep 2021 15:00) (87 - 161)  RR: 22 (18 Sep 2021 08:38) (16 - 30)  SpO2: 96% (18 Sep 2021 08:38) (94% - 100%)    PHYSICAL EXAM  General: weak  HEENT: clear oropharynx, positive NGT  Neck: supple  CV: normal S1/S2   Lungs: decreased at the bases  Abdomen: soft non-tender non-distended  Ext: bilateral venodynes  Neuro: alert and lethargic    LABS:                          10.1   15.66 )-----------( 172      ( 18 Sep 2021 05:46 )             28.9         Mean Cell Volume : 82.1 fl  Mean Cell Hemoglobin : 28.7 pg  Mean Cell Hemoglobin Concentration : 34.9 gm/dL  Auto Neutrophil # : x  Auto Lymphocyte # : x  Auto Monocyte # : x  Auto Eosinophil # : x  Auto Basophil # : x  Auto Neutrophil % : x  Auto Lymphocyte % : x  Auto Monocyte % : x  Auto Eosinophil % : x  Auto Basophil % : x      Serial CBC's  09-18 @ 05:46  Hct-28.9 / Hgb-10.1 / Plat-172 / RBC-3.52 / WBC-15.66  Serial CBC's  09-15 @ 21:18  Hct-34.7 / Hgb-12.0 / Plat-189 / RBC-4.36 / WBC-19.89  Serial CBC's  09-15 @ 17:46  Hct-34.2 / Hgb-12.1 / Plat-183 / RBC-4.31 / WBC-20.00  Serial CBC's  09-14 @ 22:04  Hct-30.1 / Hgb-10.4 / Plat-152 / RBC-3.83 / WBC-18.54      09-18    143  |  107  |  15  ----------------------------<  200<H>  3.2<L>   |  23  |  0.83    Ca    8.7      18 Sep 2021 05:46  Phos  3.0     09-16  Mg     2.1     09-16        PT/INR - ( 18 Sep 2021 05:46 )   PT: 12.5 sec;   INR: 1.04 ratio         PTT - ( 18 Sep 2021 05:46 )  PTT:26.1 sec    Ferritin, Serum: 444 ng/mL (09-14 @ 11:37)  Vitamin B12, Serum: >2000 pg/mL (09-14 @ 11:37)  Folate, Serum: >20.0 ng/mL (09-14 @ 11:37)  Iron - Total Binding Capacity.: 224 ug/dL (09-14 @ 10:27)

## 2021-09-18 NOTE — SWALLOW BEDSIDE ASSESSMENT ADULT - SWALLOW EVAL: DIAGNOSIS
Consult for bedside swallow evaluation received and appreciated. Chart reviewed and exam attempted. Pt c/o pain when seated upright. Recently given pain medication pre RN. Will reattempt in PM. D/W son at bedside, ZULMA Herrera and Sushil Perez.
Patient is a 75yo F with a history of colon cancer s/p right hemicolectomy (approx 2 years ago, outside hospital), DVT (on Eliquis), CKD, adrenal insufficiency presenting after mechanical fall from sitting. S/p T10-pelvis with revision of previous L2-5 hardware, L1 expanded PSO, with plastics closure for failed back syndrome. Pt now presents with evidence of an oropharyngeal dysphagia characterized by reduced hyolaryngeal excursion upon palpation,  multiple swallows indicative of post swallow residue and s/s suggestive of laryngeal penetration/aspiration on nectar thick liquid.

## 2021-09-18 NOTE — PROGRESS NOTE ADULT - ATTENDING COMMENTS
Pt seen and examined with the NP. Agree with the assessment and plan.  Vitals, labs and radiology results personally reviewed.  Above note edited as appropriate.  s/p extubation to ventimask. awake, back to baseline mental status. with NG tube feeding.  close monitoring. PICC for difficult access.   Cr now baseline.   on IV Hydrocortisone 25 mg TID.   Neuro ICU care appreciated.     Dr. Hope ([facility])  [phone] .

## 2021-09-18 NOTE — PROGRESS NOTE ADULT - ASSESSMENT
Patient is a 75yo F with a history of colon cancer s/p right hemicolectomy (approx 2 years ago, outside hospital), DVT (on Eliquis), CKD, adrenal insufficiency presenting after mechanical fall from sitting. CT head/cervical spine/chest were performed which showed a right forehead hematoma, acute minimally displaced right coracoid process scapular fracture, T11/L1 compression fractures and an inferior sternal fracture. 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 Treviño  FELICIANO likely  sec to hemodynamic change  Renal function improved   Off  Losartan   Monitor I/O  Monitor renal function at present    Acidosis:  non-AG+AG  Continue oral sodium bicarb 650mg TID    HTN:  BP controlled   Off midrodrine  Monitor at present    Hyponatremia  possibly SIADH sec to ? pain   Na improved  Monitor serum Na

## 2021-09-18 NOTE — SWALLOW BEDSIDE ASSESSMENT ADULT - PHARYNGEAL PHASE
multiple swallows x3-5/Decreased laryngeal elevation multiple swallows x4/Decreased laryngeal elevation/Cough post oral intake

## 2021-09-18 NOTE — PROGRESS NOTE ADULT - SUBJECTIVE AND OBJECTIVE BOX
SUBJECTIVE / OVERNIGHT EVENTS:  report some pain this am but now feeling better  no cp, no sob, no n/v/d. no abdominal pain.  no headache, no dizziness.   the son at bedside  pt is now on the floor (transferred out of ICU)    --------------------------------------------------------------------------------------------  LABS:                        10.1   15.66 )-----------( 172      ( 18 Sep 2021 05:46 )             28.9     09-18    143  |  107  |  15  ----------------------------<  200<H>  3.2<L>   |  23  |  0.83    Ca    8.7      18 Sep 2021 05:46  Phos  3.0     09-16  Mg     2.1     09-16      PT/INR - ( 18 Sep 2021 05:46 )   PT: 12.5 sec;   INR: 1.04 ratio         PTT - ( 18 Sep 2021 05:46 )  PTT:26.1 sec  CAPILLARY BLOOD GLUCOSE                RADIOLOGY & ADDITIONAL TESTS:    Imaging Personally Reviewed:  [x] YES  [ ] NO    Consultant(s) Notes Reviewed:  [x] YES  [ ] NO    MEDICATIONS  (STANDING):  albuterol/ipratropium for Nebulization. 3 milliLiter(s) Nebulizer once  atorvastatin 10 milliGRAM(s) Oral at bedtime  BACItracin   Ointment 1 Application(s) Topical two times a day  bisacodyl Suppository 10 milliGRAM(s) Rectal daily  enoxaparin Injectable 30 milliGRAM(s) SubCutaneous <User Schedule>  hydrocortisone sodium succinate Injectable 25 milliGRAM(s) IV Push every 8 hours  influenza   Vaccine 0.5 milliLiter(s) IntraMuscular once  latanoprost 0.005% Ophthalmic Solution 1 Drop(s) Both EYES at bedtime  levothyroxine 50 MICROGram(s) Oral daily  lidocaine   4% Patch 1 Patch Transdermal daily  losartan 50 milliGRAM(s) Oral daily  metoprolol tartrate 25 milliGRAM(s) Oral two times a day  nystatin Powder 1 Application(s) Topical two times a day  pantoprazole  Injectable 40 milliGRAM(s) IV Push daily  polyethylene glycol 3350 17 Gram(s) Oral two times a day  potassium chloride   Solution 40 milliEquivalent(s) Oral every 4 hours  senna 2 Tablet(s) Oral at bedtime  sodium bicarbonate 650 milliGRAM(s) Oral three times a day  sodium chloride 0.9%. 1000 milliLiter(s) (50 mL/Hr) IV Continuous <Continuous>    MEDICATIONS  (PRN):  artificial  tears Solution 1 Drop(s) Both EYES every 6 hours PRN Dry Eyes  cyclobenzaprine 5 milliGRAM(s) Oral three times a day PRN Muscle Spasm  oxyCODONE    Solution 5 milliGRAM(s) Oral every 4 hours PRN Moderate Pain (4 - 6)  oxyCODONE    Solution 10 milliGRAM(s) Oral every 4 hours PRN Severe Pain (7 - 10)  traMADol 50 milliGRAM(s) Oral every 4 hours PRN breakthrough pain      Care Discussed with Consultants/Other Providers [x] YES  [ ] NO    Vital Signs Last 24 Hrs  T(C): 36.7 (18 Sep 2021 11:35), Max: 37.6 (17 Sep 2021 17:50)  T(F): 98 (18 Sep 2021 11:35), Max: 99.7 (17 Sep 2021 17:50)  HR: 100 (18 Sep 2021 11:35) (89 - 109)  BP: 138/71 (18 Sep 2021 11:35) (138/71 - 170/99)  BP(mean): --  RR: 24 (18 Sep 2021 11:35) (18 - 24)  SpO2: 99% (18 Sep 2021 11:35) (95% - 100%)  I&O's Summary    17 Sep 2021 07:01  -  18 Sep 2021 07:00  --------------------------------------------------------  IN: 2570 mL / OUT: 2575 mL / NET: -5 mL    18 Sep 2021 07:01  -  18 Sep 2021 15:25  --------------------------------------------------------  IN: 180 mL / OUT: 850 mL / NET: -670 mL      PHYSICAL EXAM:  GENERAL: NAD, well-developed, awake alert, on NC  HEAD:  Atraumatic, Normocephalic  EYES: EOMI, PERRLA, conjunctiva and sclera clear, legally blind  NECK: Supple, No JVD  CHEST/LUNG: mild decrease breath sounds bilaterally; No wheeze   HEART: Regular rate and rhythm; No murmurs, rubs, or gallops  ABDOMEN: Soft, Nontender, Nondistended; Bowel sounds present  NEURO: awake alert, no focal weakness, 5/5 b/l upper extremity strength, 4/5 lower extremity strength  EXTREMITIES:  2+ Peripheral Pulses, No clubbing, cyanosis, trace b/l edema  SKIN: No rashes or lesions   BACK: incision c/d/i

## 2021-09-18 NOTE — PROGRESS NOTE ADULT - SUBJECTIVE AND OBJECTIVE BOX
CHIEF COMPLAINT: patient in bed, complaints of incisional back pain- relieved with meds; +HMVC x 2, +NGT,   straight cathing for urinary retention >300cc multiple times    OVERNIGHT EVENTS: transferred from NSCU; extubated 9/16    Vital Signs Last 24 Hrs  T(C): 36.7 (18 Sep 2021 11:35), Max: 37.6 (17 Sep 2021 17:50)  T(F): 98 (18 Sep 2021 11:35), Max: 99.7 (17 Sep 2021 17:50)  HR: 100 (18 Sep 2021 11:35) (88 - 109)  BP: 138/71 (18 Sep 2021 11:35) (138/71 - 171/85)  BP(mean): 99 (17 Sep 2021 15:00) (99 - 109)  RR: 24 (18 Sep 2021 11:35) (16 - 24)  SpO2: 99% (18 Sep 2021 11:35) (94% - 100%)    DRAINS: [] LIEN (cc/24h) [x] HMV (L 75cc/24h, R 50cc/24h)    PHYSICAL EXAM:    General: No Acute Distress     Neurological: Awake, alert oriented to person, place and time, Following Commands, PERRL, EOMI, Face Symmetrical, Speech Fluent,   Sensation to Light Touch Intact, bilateral UEs 4-/5, RLE 4/5 prox 5/5 distal; LLE prox 3/5 distal 4/5 (pain/ effort limited exam)    Pulmonary: Clear to Auscultation, No Rales, No Rhonchi, No Wheezes     Cardiovascular: S1, S2, Regular Rate and Rhythm     Gastrointestinal: Soft, Nontender, Nondistended     Incision: +HMVC x 2, aquacel in place     LABS:                        10.1   15.66 )-----------( 172      ( 18 Sep 2021 05:46 )             28.9    09-18    143  |  107  |  15  ----------------------------<  200<H>  3.2<L>   |  23  |  0.83    Ca    8.7      18 Sep 2021 05:46  Phos  3.0     09-16  Mg     2.1     09-16    PT/INR - ( 18 Sep 2021 05:46 )   PT: 12.5 sec;   INR: 1.04 ratio      PTT - ( 18 Sep 2021 05:46 )  PTT:26.1 sec    09-17 @ 07:01  -  09-18 @ 07:00  --------------------------------------------------------  IN: 2570 mL / OUT: 2575 mL / NET: -5 mL    09-18 @ 07:01  -  09-18 @ 13:52  --------------------------------------------------------  IN: 180 mL / OUT: 400 mL / NET: -220 mL    MEDICATIONS:  Anticoagulation:  enoxaparin Injectable 30 milliGRAM(s) SubCutaneous <User Schedule>    Endo:  atorvastatin 10 milliGRAM(s) Oral at bedtime  hydrocortisone sodium succinate Injectable 25 milliGRAM(s) IV Push every 8 hours  levothyroxine 50 MICROGram(s) Oral daily    Neuro:  cyclobenzaprine 10 milliGRAM(s) Oral three times a day PRN Muscle Spasm  oxyCODONE    Solution 5 milliGRAM(s) Oral every 4 hours PRN Moderate Pain (4 - 6)  oxyCODONE    Solution 10 milliGRAM(s) Oral every 4 hours PRN Severe Pain (7 - 10)  traMADol 50 milliGRAM(s) Oral every 4 hours PRN breakthrough pain    Cardiac:  losartan 50 milliGRAM(s) Oral daily  metoprolol tartrate 25 milliGRAM(s) Oral two times a day    GI/:  bisacodyl Suppository 10 milliGRAM(s) Rectal daily  pantoprazole  Injectable 40 milliGRAM(s) IV Push daily  polyethylene glycol 3350 17 Gram(s) Oral two times a day  senna 2 Tablet(s) Oral at bedtime    Other:   artificial  tears Solution 1 Drop(s) Both EYES every 6 hours PRN Dry Eyes  BACItracin   Ointment 1 Application(s) Topical two times a day  influenza   Vaccine 0.5 milliLiter(s) IntraMuscular once  latanoprost 0.005% Ophthalmic Solution 1 Drop(s) Both EYES at bedtime  lidocaine   4% Patch 1 Patch Transdermal daily  nystatin Powder 1 Application(s) Topical two times a day  potassium chloride   Solution 40 milliEquivalent(s) Oral every 4 hours  sodium bicarbonate 650 milliGRAM(s) Oral three times a day  sodium chloride 0.9%. 1000 milliLiter(s) IV Continuous <Continuous>    DIET: [] Regular [] CCD [] Renal [] Puree [] Dysphagia [x] Tube Feeds:   Jevity @ 60cc    IMAGING:   < from: CT Angio Chest PE Protocol w/ IV Cont (09.17.21 @ 12:36) >  INTERPRETATION:  EXAMINATION: CT ANGIO CHEST PULMONARY ARTERY WITHOUT AND WITH IC    CLINICAL INDICATION: Tachycardia    TECHNIQUE: CTA of the chest was performed after the administration of 56 mL Omnipaque 350.  MIP images were reconstructed.    COMPARISON: 9/3/2014.    FINDINGS:    PULMONARY EMBOLISM: No pulmonary embolism.    AIRWAYS AND LUNGS: The central tracheobronchial tree is patent.  Patchy mild opacities both upper lobes.    MEDIASTINUM AND PLEURA: There are no enlarged mediastinal, hilar or axillary lymph nodes. The visualized portion of the thyroid gland is unremarkable. Small bilateral pleural effusions with partial collapse of both lower lobes. There is no pneumothorax.    HEART AND VESSELS: There is mild cardiomegaly.   There are atherosclerotic calcifications of the aorta and coronary arteries.  There is no pericardial effusion.    UPPER ABDOMEN: Images of the upper abdomen demonstrate enteric tube tip courses past stomach. Gallbladder fossa high density foci with adjacent focus of air.    BONES AND SOFT TISSUES: Right corticated process fracture. Age indeterminate sternal fracture. spine post procedural changes with drainage catheter    TUBES/LINES: Left-sided central venous catheter with tip in the left brachiocephalic vein    IMPRESSION:  No pulmonary embolism. Bilateral upper lobe mild patchy opacities are new from the prior study and indeterminate.    Gallbladder fossa high density foci with adjacent focus of air is indeterminate, possibly postprocedural      < end of copied text >

## 2021-09-18 NOTE — SWALLOW BEDSIDE ASSESSMENT ADULT - COMMENTS
Continued hospital course: Pt started on regular diet. +Vomiting and poor PO intake noted as per medicine on 9/7. Plan for posterior extension of prior L2-5 fusion and stabilization  with nsx but need medical clearance given new FELICIANO/advanced age.   Diet changed to mechanical soft 9/8. hematology consulted for prolonged PTT, suspect due to subcutaneous heparin administration Pt cleared from cards, medicine and hematology for surgery on 9/14. S/P  T10-pelvis with revision of previous L2-5 hardware, L1 expanded PSO, c/b CSF leak, plastics closure. Remained intubated post procedure due to tachypnea and agitated. Extubated 9/16.   CT Chest ordered to r/o PE: IMPRESSION: No pulmonary embolism. Bilateral upper lobe mild patchy opacities are new from the prior study and indeterminate. Gallbladder fossa high density foci with adjacent focus of air is indeterminate, possibly postprocedural
Continued hospital course: Pt started on regular diet. +Vomiting and poor PO intake noted as per medicine on 9/7. Plan for posterior extension of prior L2-5 fusion and stabilization  with nsx but need medical clearance given new FELICIANO/advanced age.   Diet changed to mechanical soft 9/8. hematology consulted for prolonged PTT, suspect due to subcutaneous heparin administration Pt cleared from cards, medicine and hematology for surgery on 9/14. S/P  T10-pelvis with revision of previous L2-5 hardware, L1 expanded PSO, c/b CSF leak, plastics closure. Remained intubated post procedure due to tachypnea and agitated. Extubated 9/16.   CT Chest ordered to r/o PE: IMPRESSION: No pulmonary embolism. Bilateral upper lobe mild patchy opacities are new from the prior study and indeterminate. Gallbladder fossa high density foci with adjacent focus of air is indeterminate, possibly postprocedural

## 2021-09-19 LAB
ANION GAP SERPL CALC-SCNC: 12 MMOL/L — SIGNIFICANT CHANGE UP (ref 5–17)
APPEARANCE UR: ABNORMAL
APPEARANCE UR: CLEAR — SIGNIFICANT CHANGE UP
BACTERIA # UR AUTO: ABNORMAL
BACTERIA # UR AUTO: ABNORMAL
BILIRUB UR-MCNC: NEGATIVE — SIGNIFICANT CHANGE UP
BILIRUB UR-MCNC: NEGATIVE — SIGNIFICANT CHANGE UP
BUN SERPL-MCNC: 19 MG/DL — SIGNIFICANT CHANGE UP (ref 7–23)
CALCIUM SERPL-MCNC: 9.1 MG/DL — SIGNIFICANT CHANGE UP (ref 8.4–10.5)
CHLORIDE SERPL-SCNC: 109 MMOL/L — HIGH (ref 96–108)
CO2 SERPL-SCNC: 25 MMOL/L — SIGNIFICANT CHANGE UP (ref 22–31)
COLOR SPEC: SIGNIFICANT CHANGE UP
COLOR SPEC: SIGNIFICANT CHANGE UP
CREAT SERPL-MCNC: 1.23 MG/DL — SIGNIFICANT CHANGE UP (ref 0.5–1.3)
DIFF PNL FLD: ABNORMAL
DIFF PNL FLD: ABNORMAL
EPI CELLS # UR: 0 /HPF — SIGNIFICANT CHANGE UP
EPI CELLS # UR: 1 /HPF — SIGNIFICANT CHANGE UP
GLUCOSE SERPL-MCNC: 140 MG/DL — HIGH (ref 70–99)
GLUCOSE UR QL: ABNORMAL
GLUCOSE UR QL: ABNORMAL
HCT VFR BLD CALC: 31.2 % — LOW (ref 34.5–45)
HGB BLD-MCNC: 10.6 G/DL — LOW (ref 11.5–15.5)
HYALINE CASTS # UR AUTO: 0 /LPF — SIGNIFICANT CHANGE UP (ref 0–2)
HYALINE CASTS # UR AUTO: 2 /LPF — SIGNIFICANT CHANGE UP (ref 0–2)
KETONES UR-MCNC: NEGATIVE — SIGNIFICANT CHANGE UP
KETONES UR-MCNC: NEGATIVE — SIGNIFICANT CHANGE UP
LEUKOCYTE ESTERASE UR-ACNC: ABNORMAL
LEUKOCYTE ESTERASE UR-ACNC: ABNORMAL
MCHC RBC-ENTMCNC: 27.5 PG — SIGNIFICANT CHANGE UP (ref 27–34)
MCHC RBC-ENTMCNC: 34 GM/DL — SIGNIFICANT CHANGE UP (ref 32–36)
MCV RBC AUTO: 81 FL — SIGNIFICANT CHANGE UP (ref 80–100)
NITRITE UR-MCNC: NEGATIVE — SIGNIFICANT CHANGE UP
NITRITE UR-MCNC: NEGATIVE — SIGNIFICANT CHANGE UP
NRBC # BLD: 0 /100 WBCS — SIGNIFICANT CHANGE UP (ref 0–0)
PH UR: 8 — SIGNIFICANT CHANGE UP (ref 5–8)
PH UR: 8 — SIGNIFICANT CHANGE UP (ref 5–8)
PLATELET # BLD AUTO: 191 K/UL — SIGNIFICANT CHANGE UP (ref 150–400)
POTASSIUM SERPL-MCNC: 4.3 MMOL/L — SIGNIFICANT CHANGE UP (ref 3.5–5.3)
POTASSIUM SERPL-SCNC: 4.3 MMOL/L — SIGNIFICANT CHANGE UP (ref 3.5–5.3)
PROT UR-MCNC: ABNORMAL
PROT UR-MCNC: ABNORMAL
RBC # BLD: 3.85 M/UL — SIGNIFICANT CHANGE UP (ref 3.8–5.2)
RBC # FLD: 21.8 % — HIGH (ref 10.3–14.5)
RBC CASTS # UR COMP ASSIST: 103 /HPF — HIGH (ref 0–4)
RBC CASTS # UR COMP ASSIST: 173 /HPF — HIGH (ref 0–4)
SODIUM SERPL-SCNC: 146 MMOL/L — HIGH (ref 135–145)
SP GR SPEC: 1.01 — LOW (ref 1.01–1.02)
SP GR SPEC: 1.01 — SIGNIFICANT CHANGE UP (ref 1.01–1.02)
UROBILINOGEN FLD QL: NEGATIVE — SIGNIFICANT CHANGE UP
UROBILINOGEN FLD QL: NEGATIVE — SIGNIFICANT CHANGE UP
WBC # BLD: 13.84 K/UL — HIGH (ref 3.8–10.5)
WBC # FLD AUTO: 13.84 K/UL — HIGH (ref 3.8–10.5)
WBC UR QL: 12 /HPF — HIGH (ref 0–5)
WBC UR QL: 151 /HPF — HIGH (ref 0–5)

## 2021-09-19 RX ORDER — CEFTRIAXONE 500 MG/1
1000 INJECTION, POWDER, FOR SOLUTION INTRAMUSCULAR; INTRAVENOUS ONCE
Refills: 0 | Status: COMPLETED | OUTPATIENT
Start: 2021-09-19 | End: 2021-09-19

## 2021-09-19 RX ORDER — METOPROLOL TARTRATE 50 MG
25 TABLET ORAL ONCE
Refills: 0 | Status: COMPLETED | OUTPATIENT
Start: 2021-09-19 | End: 2021-09-19

## 2021-09-19 RX ORDER — CEFTRIAXONE 500 MG/1
INJECTION, POWDER, FOR SOLUTION INTRAMUSCULAR; INTRAVENOUS
Refills: 0 | Status: COMPLETED | OUTPATIENT
Start: 2021-09-19 | End: 2021-09-23

## 2021-09-19 RX ORDER — SIMETHICONE 80 MG/1
80 TABLET, CHEWABLE ORAL THREE TIMES A DAY
Refills: 0 | Status: DISCONTINUED | OUTPATIENT
Start: 2021-09-19 | End: 2021-09-27

## 2021-09-19 RX ORDER — FUROSEMIDE 40 MG
40 TABLET ORAL ONCE
Refills: 0 | Status: COMPLETED | OUTPATIENT
Start: 2021-09-19 | End: 2021-09-19

## 2021-09-19 RX ORDER — PSYLLIUM SEED (WITH DEXTROSE)
1 POWDER (GRAM) ORAL
Refills: 0 | Status: DISCONTINUED | OUTPATIENT
Start: 2021-09-19 | End: 2021-09-20

## 2021-09-19 RX ORDER — METOPROLOL TARTRATE 50 MG
50 TABLET ORAL
Refills: 0 | Status: DISCONTINUED | OUTPATIENT
Start: 2021-09-19 | End: 2021-09-27

## 2021-09-19 RX ORDER — CEFTRIAXONE 500 MG/1
1000 INJECTION, POWDER, FOR SOLUTION INTRAMUSCULAR; INTRAVENOUS EVERY 24 HOURS
Refills: 0 | Status: COMPLETED | OUTPATIENT
Start: 2021-09-20 | End: 2021-09-22

## 2021-09-19 RX ADMIN — Medication 1 PACKET(S): at 18:53

## 2021-09-19 RX ADMIN — Medication 650 MILLIGRAM(S): at 14:08

## 2021-09-19 RX ADMIN — TRAMADOL HYDROCHLORIDE 50 MILLIGRAM(S): 50 TABLET ORAL at 18:45

## 2021-09-19 RX ADMIN — NYSTATIN CREAM 1 APPLICATION(S): 100000 CREAM TOPICAL at 05:36

## 2021-09-19 RX ADMIN — Medication 1 PACKET(S): at 05:36

## 2021-09-19 RX ADMIN — Medication 650 MILLIGRAM(S): at 05:35

## 2021-09-19 RX ADMIN — Medication 25 MILLIGRAM(S): at 09:46

## 2021-09-19 RX ADMIN — Medication 50 MICROGRAM(S): at 05:35

## 2021-09-19 RX ADMIN — LIDOCAINE 1 PATCH: 4 CREAM TOPICAL at 00:00

## 2021-09-19 RX ADMIN — CEFTRIAXONE 100 MILLIGRAM(S): 500 INJECTION, POWDER, FOR SOLUTION INTRAMUSCULAR; INTRAVENOUS at 11:44

## 2021-09-19 RX ADMIN — OXYCODONE HYDROCHLORIDE 10 MILLIGRAM(S): 5 TABLET ORAL at 14:47

## 2021-09-19 RX ADMIN — TRAMADOL HYDROCHLORIDE 50 MILLIGRAM(S): 50 TABLET ORAL at 00:00

## 2021-09-19 RX ADMIN — Medication 650 MILLIGRAM(S): at 21:53

## 2021-09-19 RX ADMIN — LATANOPROST 1 DROP(S): 0.05 SOLUTION/ DROPS OPHTHALMIC; TOPICAL at 21:54

## 2021-09-19 RX ADMIN — Medication 1 APPLICATION(S): at 18:53

## 2021-09-19 RX ADMIN — ENOXAPARIN SODIUM 30 MILLIGRAM(S): 100 INJECTION SUBCUTANEOUS at 18:20

## 2021-09-19 RX ADMIN — Medication 1 APPLICATION(S): at 05:40

## 2021-09-19 RX ADMIN — Medication 25 MILLIGRAM(S): at 05:35

## 2021-09-19 RX ADMIN — Medication 25 MILLIGRAM(S): at 21:54

## 2021-09-19 RX ADMIN — OXYCODONE HYDROCHLORIDE 10 MILLIGRAM(S): 5 TABLET ORAL at 14:17

## 2021-09-19 RX ADMIN — OXYCODONE HYDROCHLORIDE 10 MILLIGRAM(S): 5 TABLET ORAL at 09:46

## 2021-09-19 RX ADMIN — LIDOCAINE 1 PATCH: 4 CREAM TOPICAL at 14:08

## 2021-09-19 RX ADMIN — LOSARTAN POTASSIUM 50 MILLIGRAM(S): 100 TABLET, FILM COATED ORAL at 05:35

## 2021-09-19 RX ADMIN — LIDOCAINE 1 PATCH: 4 CREAM TOPICAL at 19:00

## 2021-09-19 RX ADMIN — CYCLOBENZAPRINE HYDROCHLORIDE 5 MILLIGRAM(S): 10 TABLET, FILM COATED ORAL at 18:45

## 2021-09-19 RX ADMIN — OXYCODONE HYDROCHLORIDE 10 MILLIGRAM(S): 5 TABLET ORAL at 10:16

## 2021-09-19 RX ADMIN — Medication 50 MILLIGRAM(S): at 18:23

## 2021-09-19 RX ADMIN — PANTOPRAZOLE SODIUM 40 MILLIGRAM(S): 20 TABLET, DELAYED RELEASE ORAL at 14:08

## 2021-09-19 RX ADMIN — ATORVASTATIN CALCIUM 10 MILLIGRAM(S): 80 TABLET, FILM COATED ORAL at 21:53

## 2021-09-19 RX ADMIN — NYSTATIN CREAM 1 APPLICATION(S): 100000 CREAM TOPICAL at 18:21

## 2021-09-19 RX ADMIN — Medication 40 MILLIGRAM(S): at 11:44

## 2021-09-19 NOTE — PROGRESS NOTE ADULT - ASSESSMENT
Patient is a 77yo F with a history of colon cancer s/p right hemicolectomy (approx 2 years ago, outside hospital), DVT (on Eliquis), CKD, adrenal insufficiency presenting after mechanical fall from sitting. CT head/cervical spine/chest were performed which showed a right forehead hematoma, acute minimally displaced right coracoid process scapular fracture, T11/L1 compression fractures and an inferior sternal fracture. 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 Treviño  FELICIANO likely sec to hemodynamic change  Renal function stable  restarted on Losartan 9/17  Monitor I/O  Monitor renal function at present    Acidosis:  non-AG+AG  Continue oral sodium bicarb 650mg TID    HTN:  BP controlled   Off midrodrine  Monitor at present    Hyponatremia  possibly SIADH sec to ? pain   Na improved  Monitor serum Na

## 2021-09-19 NOTE — PROGRESS NOTE ADULT - SUBJECTIVE AND OBJECTIVE BOX
CHIEF COMPLAINT: patient in bed, complaints of incisional back pain- relieved with meds; +HMVC x 2, +NGT,   straight cathing for urinary retention >300cc multiple times and multiple loose stools overnight- will replace ervin for retention; feeds on hold with less output    OVERNIGHT EVENTS: multiple loose stools, feeds on hold with less output    Vital Signs Last 24 Hrs  T(C): 36.6 (19 Sep 2021 08:32), Max: 37.1 (18 Sep 2021 23:16)  T(F): 97.9 (19 Sep 2021 08:32), Max: 98.7 (18 Sep 2021 23:16)  HR: 87 (19 Sep 2021 08:32) (81 - 103)  BP: 167/101 (19 Sep 2021 08:32) (158/89 - 171/89)  BP(mean): --  RR: 21 (19 Sep 2021 08:32) (20 - 22)  SpO2: 95% (19 Sep 2021 08:32) (93% - 97%)    DRAINS: [] LIEN (cc/24h) [x] HMV (L 50cc/24h, R 75cc/24h)    PHYSICAL EXAM:    General: No Acute Distress     Neurological: Awake, alert oriented to person, place and time, Following Commands, PERRL, EOMI, Face Symmetrical, Speech Fluent,   Sensation to Light Touch Intact, bilateral UEs 4-/5, RLE 4/5 prox 5/5 distal; LLE prox 3/5 distal 4/5 (pain/ effort limited exam)    Pulmonary: Clear to Auscultation, No Rales, No Rhonchi, No Wheezes     Cardiovascular: S1, S2, Regular Rate and Rhythm     Gastrointestinal: Soft, Nontender, Nondistended     Incision: +HMVC x 2, aquacel in place     LABS:                              10.6   13.84 )-----------( 191      ( 19 Sep 2021 07:14 )             31.2   09-19    146<H>  |  109<H>  |  19  ----------------------------<  140<H>  4.3   |  25  |  1.23    Ca    9.1      19 Sep 2021 07:14    Urine Microscopic-Add On (NC) (09.19.21 @ 09:33)    Red Blood Cell - Urine: 103 /hpf    White Blood Cell - Urine: 151 /HPF    Hyaline Casts: 2 /lpf    Bacteria: Moderate    Epithelial Cells: 0 /hpf    Urinalysis (09.19.21 @ 09:33)    Glucose Qualitative, Urine: 100 mg/dL    Blood, Urine: Large    pH Urine: 8.0    Color: Light Yellow    Urine Appearance: Slightly Turbid    Bilirubin: Negative    Ketone - Urine: Negative    Specific Gravity: 1.011    Protein, Urine: 30 mg/dL    Urobilinogen: Negative    Nitrite: Negative    Leukocyte Esterase Concentration: Large    MEDICATIONS  (STANDING):  atorvastatin 10 milliGRAM(s) Oral at bedtime  BACItracin   Ointment 1 Application(s) Topical two times a day  bisacodyl Suppository 10 milliGRAM(s) Rectal daily  cefTRIAXone   IVPB      enoxaparin Injectable 30 milliGRAM(s) SubCutaneous <User Schedule>  hydrocortisone sodium succinate Injectable 25 milliGRAM(s) IV Push two times a day  influenza   Vaccine 0.5 milliLiter(s) IntraMuscular once  latanoprost 0.005% Ophthalmic Solution 1 Drop(s) Both EYES at bedtime  levothyroxine 50 MICROGram(s) Oral daily  lidocaine   4% Patch 1 Patch Transdermal daily  losartan 50 milliGRAM(s) Oral daily  metoprolol tartrate 50 milliGRAM(s) Oral two times a day  nystatin Powder 1 Application(s) Topical two times a day  pantoprazole  Injectable 40 milliGRAM(s) IV Push daily  psyllium Powder 1 Packet(s) Oral two times a day  sodium bicarbonate 650 milliGRAM(s) Oral three times a day  sodium chloride 0.9%. 1000 milliLiter(s) (50 mL/Hr) IV Continuous <Continuous>    MEDICATIONS  (PRN):  artificial  tears Solution 1 Drop(s) Both EYES every 6 hours PRN Dry Eyes  cyclobenzaprine 5 milliGRAM(s) Oral three times a day PRN Muscle Spasm  oxyCODONE    Solution 5 milliGRAM(s) Oral every 4 hours PRN Moderate Pain (4 - 6)  oxyCODONE    Solution 10 milliGRAM(s) Oral every 4 hours PRN Severe Pain (7 - 10)  simethicone 80 milliGRAM(s) Chew three times a day PRN gas/diarrhea  traMADol 50 milliGRAM(s) Oral every 4 hours PRN breakthrough pain    DIET: [] Regular [] CCD [] Renal [] Puree [] Dysphagia [x] Tube Feeds:   Jevity @ 60cc goal (on hold) will restart @ 30cc     IMAGING:   < from: CT Angio Chest PE Protocol w/ IV Cont (09.17.21 @ 12:36) >  INTERPRETATION:  EXAMINATION: CT ANGIO CHEST PULMONARY ARTERY WITHOUT AND WITH IC    CLINICAL INDICATION: Tachycardia    TECHNIQUE: CTA of the chest was performed after the administration of 56 mL Omnipaque 350.  MIP images were reconstructed.    COMPARISON: 9/3/2014.    FINDINGS:    PULMONARY EMBOLISM: No pulmonary embolism.    AIRWAYS AND LUNGS: The central tracheobronchial tree is patent.  Patchy mild opacities both upper lobes.    MEDIASTINUM AND PLEURA: There are no enlarged mediastinal, hilar or axillary lymph nodes. The visualized portion of the thyroid gland is unremarkable. Small bilateral pleural effusions with partial collapse of both lower lobes. There is no pneumothorax.    HEART AND VESSELS: There is mild cardiomegaly.   There are atherosclerotic calcifications of the aorta and coronary arteries.  There is no pericardial effusion.    UPPER ABDOMEN: Images of the upper abdomen demonstrate enteric tube tip courses past stomach. Gallbladder fossa high density foci with adjacent focus of air.    BONES AND SOFT TISSUES: Right corticated process fracture. Age indeterminate sternal fracture. spine post procedural changes with drainage catheter    TUBES/LINES: Left-sided central venous catheter with tip in the left brachiocephalic vein    IMPRESSION:  No pulmonary embolism. Bilateral upper lobe mild patchy opacities are new from the prior study and indeterminate.    Gallbladder fossa high density foci with adjacent focus of air is indeterminate, possibly postprocedural      < end of copied text >

## 2021-09-19 NOTE — PROGRESS NOTE ADULT - SUBJECTIVE AND OBJECTIVE BOX
Plastic Surgery Progress Note    S: Patient seen and examined. No acute events overnight. No complaints this AM.    O:  Vital Signs Last 24 Hrs  T(C): 36.6 (19 Sep 2021 08:32), Max: 37.1 (18 Sep 2021 23:16)  T(F): 97.9 (19 Sep 2021 08:32), Max: 98.7 (18 Sep 2021 23:16)  HR: 87 (19 Sep 2021 08:32) (81 - 103)  BP: 167/101 (19 Sep 2021 08:32) (138/71 - 171/89)  BP(mean): --  RR: 21 (19 Sep 2021 08:32) (20 - 24)  SpO2: 95% (19 Sep 2021 08:32) (93% - 99%)    I&O's Detail    18 Sep 2021 07:01  -  19 Sep 2021 07:00  --------------------------------------------------------  IN:    Enteral Tube Flush: 180 mL    Jevity 1.2: 840 mL    sodium chloride 0.9%: 600 mL  Total IN: 1620 mL    OUT:    Accordian (mL): 50 mL    Accordian (mL): 75 mL    Intermittent Catheterization - Urethral (mL): 950 mL    Voided (mL): 950 mL  Total OUT: 2025 mL    Total NET: -405 mL          MEDICATIONS  (STANDING):  atorvastatin 10 milliGRAM(s) Oral at bedtime  BACItracin   Ointment 1 Application(s) Topical two times a day  bisacodyl Suppository 10 milliGRAM(s) Rectal daily  enoxaparin Injectable 30 milliGRAM(s) SubCutaneous <User Schedule>  hydrocortisone sodium succinate Injectable 25 milliGRAM(s) IV Push two times a day  influenza   Vaccine 0.5 milliLiter(s) IntraMuscular once  latanoprost 0.005% Ophthalmic Solution 1 Drop(s) Both EYES at bedtime  levothyroxine 50 MICROGram(s) Oral daily  lidocaine   4% Patch 1 Patch Transdermal daily  losartan 50 milliGRAM(s) Oral daily  metoprolol tartrate 25 milliGRAM(s) Oral once  metoprolol tartrate 50 milliGRAM(s) Oral two times a day  nystatin Powder 1 Application(s) Topical two times a day  pantoprazole  Injectable 40 milliGRAM(s) IV Push daily  psyllium Powder 1 Packet(s) Oral two times a day  sodium bicarbonate 650 milliGRAM(s) Oral three times a day  sodium chloride 0.9%. 1000 milliLiter(s) (50 mL/Hr) IV Continuous <Continuous>    MEDICATIONS  (PRN):  artificial  tears Solution 1 Drop(s) Both EYES every 6 hours PRN Dry Eyes  cyclobenzaprine 5 milliGRAM(s) Oral three times a day PRN Muscle Spasm  oxyCODONE    Solution 5 milliGRAM(s) Oral every 4 hours PRN Moderate Pain (4 - 6)  oxyCODONE    Solution 10 milliGRAM(s) Oral every 4 hours PRN Severe Pain (7 - 10)  simethicone 80 milliGRAM(s) Chew three times a day PRN gas/diarrhea  traMADol 50 milliGRAM(s) Oral every 4 hours PRN breakthrough pain                            10.6   13.84 )-----------( 191      ( 19 Sep 2021 07:14 )             31.2       09-19    146<H>  |  109<H>  |  19  ----------------------------<  140<H>  4.3   |  25  |  1.23    Ca    9.1      19 Sep 2021 07:14        Physical Exam:  Gen: Laying in bed, NAD  Resp: Unlabored breathing  Back: Dressing c/d/i, no palpable collections. Both HMV with SS output.  Ext: WWP  Skin: No rashes

## 2021-09-19 NOTE — PROGRESS NOTE ADULT - ASSESSMENT
ECHO 10/25/16:  Normal left ventricular systolic function. No segmental wall motion abnormalities. Hypermobile interatrial septum.   ECHO 8/24/21: EF 66% grossly nl LV sys fx, mild diastolic dsyfx - stage 1     a/p    Patient is a 77yo F , known to practice from prior admission,  with a history of colon cancer s/p right hemicolectomy (approx 2 years ago, outside hospital), DVT (on Eliquis), CKD, adrenal insufficiency presenting as transfer from [facility] for trauma evaluation secondary to a mechanical fall from sitting    #Mechanical fall  -MRI noted with anterior splaying of T12/L1 space w/ edema   -CT T done noted with widening of T12/L1 disc space w/ L1 laminectomy  -s/p T10-pelvis with revision of previous L2-5 hardware, L1 expanded PSO.  -s/p intubation- now extubated 9/16 , on NC  -CV stable   -management per neuro sx     #SVT (hx)  -BB resumed, HR improving  -inc bb as needed     #Dyspnea  -CT A neg for PE  -pt appears mildly overloaded - can trial IVP lasix 40 mg x1     # DVT (hx)  -recent le doppler negative for acute dvt  -AC on hold post-op- hem to follow up for further a/c recommendations    # wm on CKD   -renal f/u     dvt ppx    ECHO 10/25/16:  Normal left ventricular systolic function. No segmental wall motion abnormalities. Hypermobile interatrial septum.   ECHO 8/24/21: EF 66% grossly nl LV sys fx, mild diastolic dsyfx - stage 1     a/p    Patient is a 75yo F , known to practice from prior admission,  with a history of colon cancer s/p right hemicolectomy (approx 2 years ago, outside hospital), DVT (on Eliquis), CKD, adrenal insufficiency presenting as transfer from [facility] for trauma evaluation secondary to a mechanical fall from sitting    #Mechanical fall  -MRI noted with anterior splaying of T12/L1 space w/ edema   -CT T done noted with widening of T12/L1 disc space w/ L1 laminectomy  -s/p T10-pelvis with revision of previous L2-5 hardware, L1 expanded PSO.  -s/p intubation- now extubated 9/16 , on NC  -CV stable   -management per neuro sx     #SVT (hx)  -BB resumed, HR improving  -inc bb as needed     #Dyspnea  -CT A neg for PE  -pt appears mildly overloaded - can trial IVP lasix 40 mg x1     # DVT (hx)  -recent le doppler negative for acute dvt  -AC on hold post-op- hem to follow up for further a/c recommendations    # wm on CKD   -renal f/u     #UTI  -tx per primary team     dvt ppx

## 2021-09-19 NOTE — PROGRESS NOTE ADULT - SUBJECTIVE AND OBJECTIVE BOX
[facility]   MD James Gill MD, PA    From 7 AM - 5 PM:  OFFICE: [phone]  Dr. Mohan cell: [phone]  Dr. Salazar cell: [phone]  ZULMA Perez cell: [phone]    From 5 PM - 7 AM: Answering Service: [phone]  Date of service: 09-19-21 @ 12:26    RENAL FOLLOW UP NOTE  --------------------------------------------------------------------------------  HPI:  Pt seen and examined at bedside.       PAST HISTORY  --------------------------------------------------------------------------------  No significant changes to PMH, PSH, FHx, SHx, unless otherwise noted    ALLERGIES & MEDICATIONS  --------------------------------------------------------------------------------  Allergies    penicillin (Rash)    Intolerances      Standing Inpatient Medications  atorvastatin 10 milliGRAM(s) Oral at bedtime  BACItracin   Ointment 1 Application(s) Topical two times a day  cefTRIAXone   IVPB      enoxaparin Injectable 30 milliGRAM(s) SubCutaneous <User Schedule>  hydrocortisone sodium succinate Injectable 25 milliGRAM(s) IV Push two times a day  influenza   Vaccine 0.5 milliLiter(s) IntraMuscular once  latanoprost 0.005% Ophthalmic Solution 1 Drop(s) Both EYES at bedtime  levothyroxine 50 MICROGram(s) Oral daily  lidocaine   4% Patch 1 Patch Transdermal daily  losartan 50 milliGRAM(s) Oral daily  metoprolol tartrate 50 milliGRAM(s) Oral two times a day  nystatin Powder 1 Application(s) Topical two times a day  pantoprazole  Injectable 40 milliGRAM(s) IV Push daily  psyllium Powder 1 Packet(s) Oral two times a day  sodium bicarbonate 650 milliGRAM(s) Oral three times a day  sodium chloride 0.9%. 1000 milliLiter(s) IV Continuous <Continuous>    PRN Inpatient Medications  artificial  tears Solution 1 Drop(s) Both EYES every 6 hours PRN  cyclobenzaprine 5 milliGRAM(s) Oral three times a day PRN  oxyCODONE    Solution 5 milliGRAM(s) Oral every 4 hours PRN  oxyCODONE    Solution 10 milliGRAM(s) Oral every 4 hours PRN  simethicone 80 milliGRAM(s) Chew three times a day PRN  traMADol 50 milliGRAM(s) Oral every 4 hours PRN      REVIEW OF SYSTEMS  --------------------------------------------------------------------------------  General: no fever  CVS: no chest pain  RESP: no sob, no cough  ABD: no abdominal pain  : no dysuria,  MSK: no edema     VITALS/PHYSICAL EXAM  --------------------------------------------------------------------------------  T(C): 37.6 (09-19-21 @ 12:15), Max: 37.6 (09-19-21 @ 12:15)  HR: 86 (09-19-21 @ 12:15) (81 - 103)  BP: 141/80 (09-19-21 @ 12:15) (141/80 - 171/89)  RR: 20 (09-19-21 @ 12:15) (20 - 22)  SpO2: 95% (09-19-21 @ 12:15) (93% - 97%)  Wt(kg): --        09-18-21 @ 07:01  -  09-19-21 @ 07:00  --------------------------------------------------------  IN: 1620 mL / OUT: 2025 mL / NET: -405 mL    09-19-21 @ 07:01  -  09-19-21 @ 12:26  --------------------------------------------------------  IN: 0 mL / OUT: 800 mL / NET: -800 mL      Physical Exam:  	Gen: NAD  	HEENT: MMM  	Pulm: CTA B/L  	CV: S1S2  	Abd: Soft, +BS  	Ext: No LE edema B/L                      Neuro: Awake   	Skin: Warm and Dry       LABS/STUDIES  --------------------------------------------------------------------------------              10.6   13.84 >-----------<  191      [09-19-21 @ 07:14]              31.2     146  |  109  |  19  ----------------------------<  140      [09-19-21 @ 07:14]  4.3   |  25  |  1.23        Ca     9.1     [09-19-21 @ 07:14]      PT/INR: PT 12.5 , INR 1.04       [09-18-21 @ 05:46]  PTT: 26.1       [09-18-21 @ 05:46]      Creatinine Trend:  SCr 1.23 [09-19 @ 07:14]  SCr 0.83 [09-18 @ 05:46]  SCr 1.02 [09-16 @ 21:09]  SCr 1.17 [09-15 @ 21:18]  SCr 1.15 [09-15 @ 17:46]    Urinalysis - [09-19-21 @ 09:33]      Color Light Yellow / Appearance Slightly Turbid / SG 1.011 / pH 8.0      Gluc 100 mg/dL / Ketone Negative  / Bili Negative / Urobili Negative       Blood Large / Protein 30 mg/dL / Leuk Est Large / Nitrite Negative       /  / Hyaline 2 / Gran  / Sq Epi  / Non Sq Epi 0 / Bacteria Moderate    Urine Sodium 6      [09-13-21 @ 14:35]  Urine Osmolality 480      [09-13-21 @ 14:36]    Iron 44, TIBC 224, %sat 19      [09-14-21 @ 10:27]  Ferritin 444      [09-14-21 @ 11:37]  Vitamin D (25OH) 67.9      [09-08-21 @ 09:59]  HbA1c 5.9      [02-22-17 @ 03:10]  TSH 0.18      [09-08-21 @ 09:59]

## 2021-09-19 NOTE — PROGRESS NOTE ADULT - ASSESSMENT
75 yo F with a history of colon cancer s/p right hemicolectomy (approx 2 years ago, outside hospital), DVT (on Eliquis), CKD, adrenal insufficiency presenting as transfer from [facility] for trauma evaluation secondary to a mechanical fall from sitting with headstrike. Injuries identified include right forehead hematoma, acute minimally displaced right coracoid process scapular fracture, T11/L1 compression fractures and an inferior sternal fracture. Patient hemodynamically stable.    # Elevated PTT  Mild elevations in PTT can be seen with SC heparin administration, particularly with TID dosing  low plasma protein, impaired renal function, and low BMI have been postulated as contributing factors.    hematology following.    # S/p Mechanical Fall:  Right forehead hematoma, acute minimally displaced right coracoid process scapular fracture, T11/L1 compression fractures and an inferior sternal fracture  no surgery for scapular fx. Placed in sling  CT spine with fusion L2-5. Fracture through the T12-L1 disc space with widening of the disc space and a L1-2 laminectomy which appears unstable. Diffuse osteopenia. Old T11 and L1 fractures.   MRI T/L spine with anterior splaying of the intervertebral disc space at the T12-L1 with edema in the anterior prevertebral soft tissues  CT T done noted with widening of T12/L1 disc space w/ L1 laminectomy  Neurosurgery offering surgery vs conservative management, TLSO brace in the meantime.   The daughter Susie decided to proceed with surgery.   Cr has improved.   pt s/p OR: revision of prior L2-L5 fusion w/ extension from T10- pelvis w/o L1 PSO, and plastics closure on 9/14  Neuro checks, Monitor outpt via drain, monitored in NICU post op, extubated 9/16/21. Doing well, transferred to the floor on 9/18/21.     # Nausea:   Pt has been nauseous (no abd pain). ?pain med induced/ Opioids   Switched hydrocortisone 10 mg BID to IV 25 mg BID while poor PO intake.   s/p stress dose steroids.     # Hypopituitary   She is on Hydrocortisone 10 mg BID at home, so will monitor for adrenal insufficiency. am cortisol appropriate  UA unimpressive. vit D, vit B12 normal. TSH low as expected with hypopit. free T4 sent, normal.   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 Treviño ([facility]). Outpt EMR reviewed; Cr range from 1.4 to 1.6   Monitor I/O's, Serial Cr, Avoid nephrotoxins  Cr is up from baseline. Hold Losartan. s/p gentle IVF for acute kidney failure: likely pre-renal.   Her recent TTE reviewed, with normal LV.   Her Cr now back to baseline (1.4-1.6)     # UTI  UA+, urine culture pending  Started Ceftriaxone IV and tolerating   no urinary symptoms.     # CAD: Chronic, stable  Cont home CV meds  holding ASA for neurosurgery     # Hx of SVT  ST on monitor 120s  BB resumed by cards, uptitrate as BP tolerates     # Hypothyroidism  Chronic, stable  TSH low. Total T4 is nl  Levothyroxine for hypothyroid transition to IV as no PO access   TSH 0.18, T(6)  Can transition back to LT4 50mcg po daily via NG tube as per Endo  Check Free T4 in AM  Endocrine following      # Hx of DVT, lower extremity  Home med Eliquis, on hold   SCDs    # GI ppx:  Protonix

## 2021-09-19 NOTE — PROGRESS NOTE ADULT - SUBJECTIVE AND OBJECTIVE BOX
CARDIOLOGY FOLLOW UP - Dr. Orozco  Date of Service: 9/19/21  CC: no cp/sob. intermittent back pain     Review of Systems:  Constitutional: No fever, weight loss, or fatigue  Respiratory: No cough, wheezing, or hemoptysis, no shortness of breath  Cardiovascular: No chest pain, palpitations, passing out, dizziness, or leg swelling  Gastrointestinal: No abd or epigastric pain.  No nausea, vomiting, or hematemesis; no diarrhea or constipation, no melena or hematochezia  Vascular: no edema       PHYSICAL EXAM:  T(C): 36.8 (09-19-21 @ 04:43), Max: 37.1 (09-18-21 @ 23:16)  HR: 103 (09-19-21 @ 04:43) (81 - 103)  BP: 165/93 (09-19-21 @ 04:43) (138/71 - 171/89)  RR: 20 (09-19-21 @ 04:43) (20 - 24)  SpO2: 93% (09-19-21 @ 04:43) (93% - 99%)  Wt(kg): --  I&O's Summary    18 Sep 2021 07:01  -  19 Sep 2021 07:00  --------------------------------------------------------  IN: 1620 mL / OUT: 2025 mL / NET: -405 mL        Appearance: Normal	  Cardiovascular: Normal S1 S2,RRR, No JVD, No murmurs  Respiratory: Lungs clear to auscultation	  Gastrointestinal:  Soft, Non-tender, + BS	  Extremities: Normal range of motion, No clubbing, cyanosis or edema      Home Medications:  acetaminophen 325 mg oral tablet: 2 tab(s) orally every 6 hours, As needed, Mild Pain (1 - 3) (24 Sep 2020 11:16)  Artificial Tears ophthalmic solution: 1 drop(s) to each affected eye 4 times a day, As Needed (22 Aug 2021 23:28)  aspirin 81 mg oral delayed release tablet: 1 tab(s) orally once a day (27 Aug 2021 13:38)  calcium (as carbonate) 600 mg oral tablet: 1 cap(s) orally once a day (24 Sep 2020 11:16)  Eliquis 2.5 mg oral tablet: 1 tab(s) orally 2 times a day (24 Sep 2020 11:16)  felodipine 5 mg oral tablet, extended release: 1 tab(s) orally once a day (22 Aug 2021 23:23)  guaiFENesin 600 mg oral tablet, extended release: 1 tab(s) orally every 12 hours as needed for cough  (27 Aug 2021 13:38)  hydrocortisone 10 mg oral tablet: 1 tab(s) orally 2 times a day (24 Sep 2020 11:16)  latanoprost 0.005% ophthalmic solution: 1 drop(s) to each affected eye once a day (in the evening) (22 Aug 2021 23:27)  levothyroxine 50 mcg (0.05 mg) oral tablet: 1 tab(s) orally once a day (24 Sep 2020 11:16)  Lipitor 10 mg oral tablet: 1 tab(s) orally once a day (22 Aug 2021 23:41)  Macular Vitamin Benefit oral tablet: 1 tab(s) orally once a day (24 Sep 2020 11:16)  metoprolol succinate 50 mg oral tablet, extended release: 1 tab(s) orally once a day (22 Aug 2021 23:24)  Multiple Vitamins oral tablet: 1 tab(s) orally once a day (22 Aug 2021 23:22)  olmesartan 20 mg oral tablet: 1 tab(s) orally once a day (22 Aug 2021 23:22)  omeprazole 20 mg oral delayed release capsule: 1 cap(s) orally once a day (22 Aug 2021 23:21)  oxyCODONE 5 mg oral tablet: 1 tab(s) orally every 8 hours as needed for severe pain (27 Aug 2021 13:35)  OxyCONTIN 20 mg oral tablet, extended release: 1 tab(s) orally every 12 hours (24 Sep 2020 11:16)  risedronate 35 mg oral tablet: 1 tab(s) orally once a week (Mondays) (05 Sep 2021 18:30)  senna oral tablet: 2 tab(s) orally once a day (at bedtime), As Needed (05 Sep 2021 18:31)      MEDICATIONS  (STANDING):  atorvastatin 10 milliGRAM(s) Oral at bedtime  BACItracin   Ointment 1 Application(s) Topical two times a day  bisacodyl Suppository 10 milliGRAM(s) Rectal daily  enoxaparin Injectable 30 milliGRAM(s) SubCutaneous <User Schedule>  hydrocortisone sodium succinate Injectable 25 milliGRAM(s) IV Push two times a day  influenza   Vaccine 0.5 milliLiter(s) IntraMuscular once  latanoprost 0.005% Ophthalmic Solution 1 Drop(s) Both EYES at bedtime  levothyroxine 50 MICROGram(s) Oral daily  lidocaine   4% Patch 1 Patch Transdermal daily  losartan 50 milliGRAM(s) Oral daily  metoprolol tartrate 25 milliGRAM(s) Oral two times a day  nystatin Powder 1 Application(s) Topical two times a day  pantoprazole  Injectable 40 milliGRAM(s) IV Push daily  polyethylene glycol 3350 17 Gram(s) Oral two times a day  psyllium Powder 1 Packet(s) Oral two times a day  senna 2 Tablet(s) Oral at bedtime  sodium bicarbonate 650 milliGRAM(s) Oral three times a day  sodium chloride 0.9%. 1000 milliLiter(s) (50 mL/Hr) IV Continuous <Continuous>      TELEMETRY: 	    ECG:  	  RADIOLOGY:   DIAGNOSTIC TESTING:  [ ] Echocardiogram:  [ ]  Catheterization:  [ ] Stress Test:    OTHER: 	    LABS:	 	                            10.6   13.84 )-----------( 191      ( 19 Sep 2021 07:14 )             31.2     09-19    146<H>  |  109<H>  |  19  ----------------------------<  140<H>  4.3   |  25  |  1.23    Ca    9.1      19 Sep 2021 07:14      PT/INR - ( 18 Sep 2021 05:46 )   PT: 12.5 sec;   INR: 1.04 ratio         PTT - ( 18 Sep 2021 05:46 )  PTT:26.1 sec

## 2021-09-19 NOTE — PROGRESS NOTE ADULT - SUBJECTIVE AND OBJECTIVE BOX
SUBJECTIVE / OVERNIGHT EVENTS:  diarrhea with NG tube feeding, resolved after discontinuing Jevity  +UTI, abx started  otherwise feels tired.  the son and the daughter at bedside.  pain is controlled.         --------------------------------------------------------------------------------------------  LABS:                        10.6   13.84 )-----------( 191      ( 19 Sep 2021 07:14 )             31.2     09-    146<H>  |  109<H>  |  19  ----------------------------<  140<H>  4.3   |  25  |  1.23    Ca    9.1      19 Sep 2021 07:14      PT/INR - ( 18 Sep 2021 05:46 )   PT: 12.5 sec;   INR: 1.04 ratio         PTT - ( 18 Sep 2021 05:46 )  PTT:26.1 sec  CAPILLARY BLOOD GLUCOSE            Urinalysis Basic - ( 19 Sep 2021 09:33 )    Color: Light Yellow / Appearance: Slightly Turbid / S.011 / pH: x  Gluc: x / Ketone: Negative  / Bili: Negative / Urobili: Negative   Blood: x / Protein: 30 mg/dL / Nitrite: Negative   Leuk Esterase: Large / RBC: 103 /hpf /  /HPF   Sq Epi: x / Non Sq Epi: 0 /hpf / Bacteria: Moderate        RADIOLOGY & ADDITIONAL TESTS:    Imaging Personally Reviewed:  [x] YES  [ ] NO    Consultant(s) Notes Reviewed:  [x] YES  [ ] NO    MEDICATIONS  (STANDING):  atorvastatin 10 milliGRAM(s) Oral at bedtime  BACItracin   Ointment 1 Application(s) Topical two times a day  cefTRIAXone   IVPB      enoxaparin Injectable 30 milliGRAM(s) SubCutaneous <User Schedule>  hydrocortisone sodium succinate Injectable 25 milliGRAM(s) IV Push two times a day  influenza   Vaccine 0.5 milliLiter(s) IntraMuscular once  latanoprost 0.005% Ophthalmic Solution 1 Drop(s) Both EYES at bedtime  levothyroxine 50 MICROGram(s) Oral daily  lidocaine   4% Patch 1 Patch Transdermal daily  losartan 50 milliGRAM(s) Oral daily  metoprolol tartrate 50 milliGRAM(s) Oral two times a day  nystatin Powder 1 Application(s) Topical two times a day  pantoprazole  Injectable 40 milliGRAM(s) IV Push daily  psyllium Powder 1 Packet(s) Oral two times a day  sodium bicarbonate 650 milliGRAM(s) Oral three times a day  sodium chloride 0.9%. 1000 milliLiter(s) (50 mL/Hr) IV Continuous <Continuous>    MEDICATIONS  (PRN):  artificial  tears Solution 1 Drop(s) Both EYES every 6 hours PRN Dry Eyes  cyclobenzaprine 5 milliGRAM(s) Oral three times a day PRN Muscle Spasm  oxyCODONE    Solution 5 milliGRAM(s) Oral every 4 hours PRN Moderate Pain (4 - 6)  oxyCODONE    Solution 10 milliGRAM(s) Oral every 4 hours PRN Severe Pain (7 - 10)  simethicone 80 milliGRAM(s) Chew three times a day PRN gas/diarrhea  traMADol 50 milliGRAM(s) Oral every 4 hours PRN breakthrough pain      Care Discussed with Consultants/Other Providers [x] YES  [ ] NO    Vital Signs Last 24 Hrs  T(C): 36.9 (19 Sep 2021 15:43), Max: 37.6 (19 Sep 2021 12:15)  T(F): 98.5 (19 Sep 2021 15:43), Max: 99.6 (19 Sep 2021 12:15)  HR: 79 (19 Sep 2021 15:43) (79 - 103)  BP: 154/92 (19 Sep 2021 15:43) (141/80 - 174/95)  BP(mean): --  RR: 20 (19 Sep 2021 15:43) (20 - 21)  SpO2: 99% (19 Sep 2021 15:43) (93% - 99%)  I&O's Summary    18 Sep 2021 07:01  -  19 Sep 2021 07:00  --------------------------------------------------------  IN: 1620 mL / OUT:  mL / NET: -405 mL    19 Sep 2021 07:01  -  19 Sep 2021 20:23  --------------------------------------------------------  IN: 935 mL / OUT: 2530 mL / NET: -1595 mL      PHYSICAL EXAM:  GENERAL: NAD, well-developed, awake alert, on NC, NG tube in place  HEAD:  Atraumatic, Normocephalic  EYES: EOMI, PERRLA, conjunctiva and sclera clear, legally blind  NECK: Supple, No JVD  CHEST/LUNG: mild decrease breath sounds bilaterally; No wheeze   HEART: Regular rate and rhythm; No murmurs, rubs, or gallops  ABDOMEN: Soft, Nontender, Nondistended; Bowel sounds present  NEURO: awake alert, no focal weakness, 5/5 b/l upper extremity strength, 4/5 lower extremity strength  EXTREMITIES:  2+ Peripheral Pulses, No clubbing, cyanosis, trace b/l edema  SKIN: No rashes or lesions   BACK: incision c/d/i

## 2021-09-19 NOTE — CHART NOTE - NSCHARTNOTEFT_GEN_A_CORE
Pt seen for reassessment of swallow to determine safety of initiating an oral diet.   Pt found in bed, awake and alert. Pt on 2L nasal cannula. +HMVC x2, +KFT for feeding. Able to be seated upright slowly in bed. Son present for exam. Per ZULMA Herrera and YOUNG Perez, patient with multiple episodes of diarrhea last night. Suspect 2/2 tube feeds. Pt following directions for evaluation purposes.     Pt administered crushed ice chips x2. Effortful swallows, reduced hyolaryngeal excursion upon palpation and facial grimacing. X1 tsp of nectar thick liquid administered. Pt began coughing, gagging, wet vocal quality noted and expectoration of thick green secretions noted. No further PO trials administered. Pt will require objective swallow study to determine safety of PO feeding. Son in agreement with exam. Pt left in no distress. Purposeful proactive rounding completed.     Impressions: Pt presents with evidence of an oropharyngeal dysphagia with s/s indicative of laryngeal penetration/aspiration.   Recommendations: NPO, with non-oral nutrition/hydration/medications. FEES to determine candidacy of initiating an oral diet. Maintain good oral hygiene and aspiration precautions.     Malgorzata Christianson #[phone]   Speech Pathology

## 2021-09-19 NOTE — PROGRESS NOTE ADULT - ASSESSMENT
76F w/ revision of prior L2-L5 fusion w/ extension from T10-pelvis w/ L1 PSO and paraspinal muscle flap closure w/ plastic surgery.    - Dressing changed this AM  - Continue with drain care  - Rest of care per primary team    Plastic Surgery   [facility]: [id number]  [facility]: [phone]

## 2021-09-19 NOTE — PROGRESS NOTE ADULT - NSPROGADDITIONALINFOA_GEN_ALL_CORE
d/w pt and the daughter.  d/w neurosurgery and trauma team.    - Dr. ELMER Hope ([facility])  - [phone]
d/w pt and the son at bedside.     Jerardo Steward will be covering for me starting 9/11/21. He can be reached at  if needed.      - Dr. ELMER Hope ([facility])  - [phone]
d/w pt and the daughter at bedside.      - Dr. ELMER Hope ([facility])  - [phone]
d/w pt, the son and the daughter.  d/w neurosurgical PA.    - Dr. ELMER Hope ([facility])  - [phone]

## 2021-09-19 NOTE — PROGRESS NOTE ADULT - ASSESSMENT
HPI:  Patient is a 75yo F with a history of colon cancer s/p right hemicolectomy (approx 2 years ago, outside hospital), DVT (on Eliquis), CKD, adrenal insufficiency presenting as transfer from [facility] for trauma evaluation secondary to a mechanical fall from sitting. Patient was going to her commode last night when she slipped and fell on her right side, hitting her head. Denies LOC. Patient's daughter came to help her up and brought her to the ED. Since the fall, patient has been endorses right-sided chest pain but no SOB, headache, weakness or dizziness. Patient does not know what medications she takes.     Patient transferred to [facility] for trauma evaluation. In the ED, patient was hemodynamically stable but requiring 2L NC, satting 98%. Labs showed WBC 17, Cr 1.42, otherwise normal. CT head/cervical spine/chest were performed which showed a right forehead hematoma, acute minimally displaced right coracoid process scapular fracture, T11/L1 compression fractures and an inferior sternal fracture.    PRIMARY SURVEY:  A - Airway intact.  B - Bilateral breath sounds and equal chest rise. SpO2 98% 2L NC  C - Initially BP: 155/62 (09-05-21 @ 14:52), palpable pulses in all extremities.  D - GCS 15.  E - Exposure obtained.    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    Osteoporosis    Chronic Kidney Disease; Dx 2009    HTN - Hypertension    History of Obesity    Myocardial infarction    History of Laminectomy/ Fusion 1/06; L1-L2    H/O right hemicolectomy    PROCEDURE:  9/14/21 s/p S/p T10-pelvis with revision of previous L2-5 hardware, L1 expanded PSO, with plastics closure for failed back syndrome, L1 chance fracture, and coronal plane deformity.  9/14 s/p transfusion 2 U PRBCs, acute post op blood loss anemia on chronic improved and now stable  POD#5    PLAN:  Neuro: pain meds PRN, maintain HMVC x 2 and record outputs, plastics changed dressing this am  Respiratory: patient instructed on incentive spirometer  CV: tachycardia stable- no PE on CTA, cont losartan and increased metoprolol for HTN, lasix 40mg x 1 per cardiology follow up   Endocrine: cont hydrocortisone taper per endocrine consult for adrenal insufficiency (chronic steroid use) now on hydrocortisone 25mg BID  Heme/Onc: stable acute post op blood loss anemia on chronic anemia, had elevated PTT preop and now normalized, hematology following  will need to restart her eliquis for A/C for h/o DVT in setting of colon CA  DVT ppx: [] SQH [x] SQL and venodynes bilaterally  Renal: FELICIANO resolved on CKD II/III- nephology following; requiring Straight cath multiple times for urinary retention so ervin reinserted today  +UA- urine culture sent, started on ceftriaxone as discussed with medicine as she gets a rash from PCN  ID: afebrile  GI: bowel regimen on hold, cont half NGT feeds for now, awaiting speech/swallow re- eval  PT/OT: subacute rehab upon d/c  appreciate medicine follow up  f/u am labs  hypokalemia resolved after supplementation k= 4.3, f/u am labs  leukocytosis likely due to steroids as she is afebrile  son updated at bedside and daughter updated via phone    Will discuss with Dr Heidy Sykes # [phone]

## 2021-09-20 DIAGNOSIS — R13.10 DYSPHAGIA, UNSPECIFIED: ICD-10-CM

## 2021-09-20 LAB
ALBUMIN SERPL ELPH-MCNC: 2.7 G/DL — LOW (ref 3.3–5)
ALP SERPL-CCNC: 72 U/L — SIGNIFICANT CHANGE UP (ref 40–120)
ALT FLD-CCNC: 25 U/L — SIGNIFICANT CHANGE UP (ref 10–45)
ANION GAP SERPL CALC-SCNC: 10 MMOL/L — SIGNIFICANT CHANGE UP (ref 5–17)
AST SERPL-CCNC: 45 U/L — HIGH (ref 10–40)
BASOPHILS # BLD AUTO: 0.03 K/UL — SIGNIFICANT CHANGE UP (ref 0–0.2)
BASOPHILS NFR BLD AUTO: 0.3 % — SIGNIFICANT CHANGE UP (ref 0–2)
BILIRUB SERPL-MCNC: 1 MG/DL — SIGNIFICANT CHANGE UP (ref 0.2–1.2)
BUN SERPL-MCNC: 21 MG/DL — SIGNIFICANT CHANGE UP (ref 7–23)
CALCIUM SERPL-MCNC: 8.6 MG/DL — SIGNIFICANT CHANGE UP (ref 8.4–10.5)
CHLORIDE SERPL-SCNC: 109 MMOL/L — HIGH (ref 96–108)
CO2 SERPL-SCNC: 29 MMOL/L — SIGNIFICANT CHANGE UP (ref 22–31)
CREAT SERPL-MCNC: 1.13 MG/DL — SIGNIFICANT CHANGE UP (ref 0.5–1.3)
EOSINOPHIL # BLD AUTO: 0.05 K/UL — SIGNIFICANT CHANGE UP (ref 0–0.5)
EOSINOPHIL NFR BLD AUTO: 0.4 % — SIGNIFICANT CHANGE UP (ref 0–6)
GLUCOSE SERPL-MCNC: 133 MG/DL — HIGH (ref 70–99)
HCT VFR BLD CALC: 27.4 % — LOW (ref 34.5–45)
HGB BLD-MCNC: 9.1 G/DL — LOW (ref 11.5–15.5)
IMM GRANULOCYTES NFR BLD AUTO: 3.2 % — HIGH (ref 0–1.5)
LYMPHOCYTES # BLD AUTO: 1.57 K/UL — SIGNIFICANT CHANGE UP (ref 1–3.3)
LYMPHOCYTES # BLD AUTO: 13.9 % — SIGNIFICANT CHANGE UP (ref 13–44)
MCHC RBC-ENTMCNC: 27.4 PG — SIGNIFICANT CHANGE UP (ref 27–34)
MCHC RBC-ENTMCNC: 33.2 GM/DL — SIGNIFICANT CHANGE UP (ref 32–36)
MCV RBC AUTO: 82.5 FL — SIGNIFICANT CHANGE UP (ref 80–100)
MONOCYTES # BLD AUTO: 1.39 K/UL — HIGH (ref 0–0.9)
MONOCYTES NFR BLD AUTO: 12.3 % — SIGNIFICANT CHANGE UP (ref 2–14)
NEUTROPHILS # BLD AUTO: 7.91 K/UL — HIGH (ref 1.8–7.4)
NEUTROPHILS NFR BLD AUTO: 69.9 % — SIGNIFICANT CHANGE UP (ref 43–77)
NRBC # BLD: 0 /100 WBCS — SIGNIFICANT CHANGE UP (ref 0–0)
PLATELET # BLD AUTO: 173 K/UL — SIGNIFICANT CHANGE UP (ref 150–400)
POTASSIUM SERPL-MCNC: 3.5 MMOL/L — SIGNIFICANT CHANGE UP (ref 3.5–5.3)
POTASSIUM SERPL-SCNC: 3.5 MMOL/L — SIGNIFICANT CHANGE UP (ref 3.5–5.3)
PROT SERPL-MCNC: 5.4 G/DL — LOW (ref 6–8.3)
RBC # BLD: 3.32 M/UL — LOW (ref 3.8–5.2)
RBC # FLD: 22 % — HIGH (ref 10.3–14.5)
SODIUM SERPL-SCNC: 148 MMOL/L — HIGH (ref 135–145)
WBC # BLD: 11.31 K/UL — HIGH (ref 3.8–10.5)
WBC # FLD AUTO: 11.31 K/UL — HIGH (ref 3.8–10.5)

## 2021-09-20 PROCEDURE — 99222 1ST HOSP IP/OBS MODERATE 55: CPT | Mod: 25

## 2021-09-20 PROCEDURE — 31575 DIAGNOSTIC LARYNGOSCOPY: CPT

## 2021-09-20 PROCEDURE — 71045 X-RAY EXAM CHEST 1 VIEW: CPT | Mod: 26

## 2021-09-20 PROCEDURE — 99232 SBSQ HOSP IP/OBS MODERATE 35: CPT | Mod: GC

## 2021-09-20 RX ORDER — HYDROCORTISONE 20 MG
10 TABLET ORAL
Refills: 0 | Status: DISCONTINUED | OUTPATIENT
Start: 2021-09-20 | End: 2021-09-27

## 2021-09-20 RX ORDER — SODIUM CHLORIDE 9 MG/ML
3 INJECTION INTRAMUSCULAR; INTRAVENOUS; SUBCUTANEOUS EVERY 8 HOURS
Refills: 0 | Status: DISCONTINUED | OUTPATIENT
Start: 2021-09-20 | End: 2021-09-27

## 2021-09-20 RX ORDER — ACETAMINOPHEN 500 MG
1000 TABLET ORAL ONCE
Refills: 0 | Status: COMPLETED | OUTPATIENT
Start: 2021-09-20 | End: 2021-09-20

## 2021-09-20 RX ADMIN — Medication 1000 MILLIGRAM(S): at 18:19

## 2021-09-20 RX ADMIN — LIDOCAINE 1 PATCH: 4 CREAM TOPICAL at 23:34

## 2021-09-20 RX ADMIN — PANTOPRAZOLE SODIUM 40 MILLIGRAM(S): 20 TABLET, DELAYED RELEASE ORAL at 12:09

## 2021-09-20 RX ADMIN — Medication 50 MICROGRAM(S): at 06:03

## 2021-09-20 RX ADMIN — NYSTATIN CREAM 1 APPLICATION(S): 100000 CREAM TOPICAL at 06:04

## 2021-09-20 RX ADMIN — ENOXAPARIN SODIUM 30 MILLIGRAM(S): 100 INJECTION SUBCUTANEOUS at 17:49

## 2021-09-20 RX ADMIN — NYSTATIN CREAM 1 APPLICATION(S): 100000 CREAM TOPICAL at 17:55

## 2021-09-20 RX ADMIN — Medication 50 MILLIGRAM(S): at 06:03

## 2021-09-20 RX ADMIN — OXYCODONE HYDROCHLORIDE 10 MILLIGRAM(S): 5 TABLET ORAL at 12:13

## 2021-09-20 RX ADMIN — Medication 10 MILLIGRAM(S): at 17:48

## 2021-09-20 RX ADMIN — Medication 1 APPLICATION(S): at 18:00

## 2021-09-20 RX ADMIN — CYCLOBENZAPRINE HYDROCHLORIDE 5 MILLIGRAM(S): 10 TABLET, FILM COATED ORAL at 12:08

## 2021-09-20 RX ADMIN — Medication 1 APPLICATION(S): at 06:04

## 2021-09-20 RX ADMIN — TRAMADOL HYDROCHLORIDE 50 MILLIGRAM(S): 50 TABLET ORAL at 00:35

## 2021-09-20 RX ADMIN — TRAMADOL HYDROCHLORIDE 50 MILLIGRAM(S): 50 TABLET ORAL at 00:05

## 2021-09-20 RX ADMIN — CEFTRIAXONE 100 MILLIGRAM(S): 500 INJECTION, POWDER, FOR SOLUTION INTRAMUSCULAR; INTRAVENOUS at 12:12

## 2021-09-20 RX ADMIN — LATANOPROST 1 DROP(S): 0.05 SOLUTION/ DROPS OPHTHALMIC; TOPICAL at 21:44

## 2021-09-20 RX ADMIN — LIDOCAINE 1 PATCH: 4 CREAM TOPICAL at 12:08

## 2021-09-20 RX ADMIN — SODIUM CHLORIDE 3 MILLILITER(S): 9 INJECTION INTRAMUSCULAR; INTRAVENOUS; SUBCUTANEOUS at 21:46

## 2021-09-20 RX ADMIN — ATORVASTATIN CALCIUM 10 MILLIGRAM(S): 80 TABLET, FILM COATED ORAL at 21:44

## 2021-09-20 RX ADMIN — Medication 400 MILLIGRAM(S): at 17:49

## 2021-09-20 RX ADMIN — LIDOCAINE 1 PATCH: 4 CREAM TOPICAL at 21:06

## 2021-09-20 RX ADMIN — SODIUM CHLORIDE 3 MILLILITER(S): 9 INJECTION INTRAMUSCULAR; INTRAVENOUS; SUBCUTANEOUS at 15:51

## 2021-09-20 RX ADMIN — Medication 650 MILLIGRAM(S): at 06:03

## 2021-09-20 RX ADMIN — LOSARTAN POTASSIUM 50 MILLIGRAM(S): 100 TABLET, FILM COATED ORAL at 06:03

## 2021-09-20 RX ADMIN — Medication 50 MILLIGRAM(S): at 17:49

## 2021-09-20 RX ADMIN — OXYCODONE HYDROCHLORIDE 10 MILLIGRAM(S): 5 TABLET ORAL at 13:00

## 2021-09-20 RX ADMIN — Medication 1 APPLICATION(S): at 17:48

## 2021-09-20 NOTE — CONSULT NOTE ADULT - PROBLEM SELECTOR RECOMMENDATION 9
Please see EMR for final FEES report.  Call ENT with questions   Conside mucinex for thick secretions
Patient is on pressors at this time  Would continue hydrocortisone 25mg IV BID for now  Endocrine team will follow and help in transition back to home dose hydrocortisone

## 2021-09-20 NOTE — PROGRESS NOTE ADULT - ASSESSMENT
76 yr old F with Hx of colon CA, DVT, CKD and hypopituitarism (secondary AI and hypothyroidism), osteoporosis here with multiple fractures s/p mechanical fall.     1. Adrenal insufficiency.   Agree with taper of hydrocortisone to 25mg IV q 8 hours  If patient is taken back to OR, recommend restarting stress dose steroids.   Endocrine team will follow and help in transition back to home dose hydrocortisone once appropriate    2. Hypothyroidism  c/w  LT4 50mcg po daily via NG tube  Check Free T4.    3. Osteoporosis.   Patient has had multiple fractures while on treatment with risedronate  Intolerant of Prolia  Consider switch to Reclast  To discuss with outpatient endocrinologist Dr. Lim.  76 yr old F with Hx of colon CA, DVT, CKD and hypopituitarism (secondary AI and hypothyroidism), osteoporosis here with multiple fractures s/p mechanical fall.     1. Adrenal insufficiency.   Tapered down to home dose Hydrocortisone 10 mg BID on 9/20. (was on Hydrocortisone 25 mg IV TID, then BID on 9/19). BP is stable. Electrolytes wnl, slight hypernatremia noted  Recs:   -Can monitor patient's clinically status on home dose Hydrocortisone 10 mg BID  -If patient is taken back to OR, recommend restarting stress dose steroids.     2. Hypothyroidism  FT4 on 9/17 was 1.70  -Continue with LT4 50mcg po daily via NG tube (weight based dosing is ~ 130mcg)     3. Osteoporosis.   Patient has had multiple fractures while on treatment with risedronate  Intolerant of Prolia (bone pain in the past)  -Consider switch to Reclast  -To discuss with outpatient endocrinologist Dr. Lim.     Rhoda Reyes MD  Endocrine Fellow  Pager: [phone]/CARMEN [phone]  Consults 9am-5pm: [phone]  After 5pm and weekends: [phone]   76 yr old F with Hx of colon CA, DVT, CKD and hypopituitarism (secondary AI and hypothyroidism), osteoporosis here with multiple fractures s/p mechanical fall.     1. Adrenal insufficiency.   Tapered down to home dose Hydrocortisone 10 mg BID on 9/20. (was on Hydrocortisone 25 mg IV TID, then BID on 9/19). BP is stable. Electrolytes wnl, slight hypernatremia noted  Recs:   -Can monitor patient's clinically on home dose Hydrocortisone 10 mg BID  -If patient is taken back to OR, recommend restarting stress dose steroids.     2. Hypothyroidism  FT4 on 9/17 was 1.70  -Continue with LT4 50mcg po daily via NG tube (weight based dosing is ~ 130mcg)     3. Osteoporosis.   Patient has had multiple fractures while on treatment with risedronate  Intolerant of Prolia (bone pain in the past)  -Consider switch to Reclast as an outpatient    Discussed with Dr. Jackson and primary team    Will sign off at this time    Rhoda Reyes MD  Endocrine Fellow  Pager: [phone]/CARMEN [phone]  Consults 9am-5pm: [phone]  After 5pm and weekends: [phone]   76 yr old F with Hx of colon CA, DVT, CKD and hypopituitarism (secondary AI and hypothyroidism), osteoporosis here with multiple fractures s/p mechanical fall.     1. Adrenal insufficiency.   Tapered down to home dose Hydrocortisone 10 mg po BID on 9/20. (was on Hydrocortisone 25 mg IV TID, then BID on 9/19). BP is stable. Electrolytes wnl, slight hypernatremia noted  Recs:   -Can monitor patient's clinically on home dose Hydrocortisone 10 mg po BID  -If patient is taken back to OR, recommend restarting stress dose steroids HC 100mg pre-op x 1 and then 50mg IVP q8 post-op.     2. Hypothyroidism  FT4 on 9/17 was 1.70  -Continue with LT4 50mcg po daily via NG tube (weight based dosing is ~ 130mcg)     3. Osteoporosis.   Patient has had multiple fractures while on treatment with risedronate  Intolerant of Prolia (bone pain in the past)  -Consider switch to Reclast as an outpatient    Discussed with Dr. Jackson and primary team    Will sign off at this time    Rhoda Reyes MD  Endocrine Fellow  Pager: [phone]/CARMEN [phone]  Consults 9am-5pm: [phone]  After 5pm and weekends: [phone]

## 2021-09-20 NOTE — CONSULT NOTE ADULT - SUBJECTIVE AND OBJECTIVE BOX
Wound Surgery Consult Note:    HPI:  Patient is a 75yo F with a history of colon cancer s/p right hemicolectomy (approx 2 years ago, outside hospital), DVT (on Eliquis), CKD, adrenal insufficiency presenting as transfer from [facility] for trauma evaluation secondary to a mechanical fall from sitting. Patient was going to her commode last night when she slipped and fell on her right side, hitting her head. Denies LOC. Patient's daughter came to help her up and brought her to the ED. Since the fall, patient has been endorses right-sided chest pain but no SOB, headache, weakness or dizziness. Patient does not know what medications she takes.     Patient transferred to [facility] for trauma evaluation. In the ED, patient was hemodynamically stable but requiring 2L NC, satting 98%. Labs showed WBC 17, Cr 1.42, otherwise normal. CT head/cervical spine/chest were performed which showed a right forehead hematoma, acute minimally displaced right coracoid process scapular fracture, T11/L1 compression fractures and an inferior sternal fracture.    Request for wound care consult for multiple skin fold skin breakdown received from nursing. Ms. Solomon was encountered on an alternating air with low air loss surface.  Her body habitus with pendulous breast and obese, overhanging pannus create a moist, hot environment in which moisture dermatitis has developed with the suggestion of a fungal component. Interdry textile found in skin folds. Discussed with nurse that this product does not accommodate the  moisture related to open wounds and therefore, an antifungal ointment is being ordered. Her extreme immobility, inactivity, incontinence of urine and stool as well as poor nutritional status all contribute to her high risk for pressure injury development and hinder healing. Therefore, pressure injury prevention is also recommended.    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  Osteoporosis  Chronic Kidney Disease; Dx   HTN - Hypertension  History of Obesity  Myocardial infarction  History of Laminectomy/ Fusion ; L1-L2  H/O right hemicolectomy    MEDICATIONS  (STANDING):  acetaminophen  IVPB .. 1000 milliGRAM(s) IV Intermittent once  atorvastatin 10 milliGRAM(s) Oral at bedtime  BACItracin   Ointment 1 Application(s) Topical two times a day  cefTRIAXone   IVPB      cefTRIAXone   IVPB 1000 milliGRAM(s) IV Intermittent every 24 hours  enoxaparin Injectable 30 milliGRAM(s) SubCutaneous <User Schedule>  hydrocortisone 10 milliGRAM(s) Oral <User Schedule>  influenza   Vaccine 0.5 milliLiter(s) IntraMuscular once  latanoprost 0.005% Ophthalmic Solution 1 Drop(s) Both EYES at bedtime  levothyroxine 50 MICROGram(s) Oral daily  lidocaine   4% Patch 1 Patch Transdermal daily  losartan 50 milliGRAM(s) Oral daily  metoprolol tartrate 50 milliGRAM(s) Oral two times a day  nystatin Powder 1 Application(s) Topical two times a day  pantoprazole  Injectable 40 milliGRAM(s) IV Push daily  sodium chloride 3%  Inhalation 3 milliLiter(s) Inhalation every 8 hours    MEDICATIONS  (PRN):  artificial  tears Solution 1 Drop(s) Both EYES every 6 hours PRN Dry Eyes  cyclobenzaprine 5 milliGRAM(s) Oral three times a day PRN Muscle Spasm  oxyCODONE    Solution 5 milliGRAM(s) Oral every 4 hours PRN Moderate Pain (4 - 6)  oxyCODONE    Solution 10 milliGRAM(s) Oral every 4 hours PRN Severe Pain (7 - 10)  simethicone 80 milliGRAM(s) Chew three times a day PRN gas/diarrhea  traMADol 50 milliGRAM(s) Oral every 4 hours PRN breakthrough pain    Allergies    penicillin (Rash)    Intolerances    Vital Signs Last 24 Hrs  T(C): 36.7 (20 Sep 2021 12:27), Max: 37.5 (20 Sep 2021 04:44)  T(F): 98 (20 Sep 2021 12:27), Max: 99.5 (20 Sep 2021 04:44)  HR: 78 (20 Sep 2021 12:27) (67 - 79)  BP: 166/78 (20 Sep 2021 12:27) (134/79 - 166/78)  BP(mean): --  RR: 20 (20 Sep 2021 12:27) (18 - 20)  SpO2: 96% (20 Sep 2021 12:27) (95% - 100%)    Physical Exam:  General: Alert, obese, frail, weak appearing   Respiratory: no SOB on room air  Gastrointestinal: soft NT/ND, obese abdomen  Neurology: weakened strength & sensation grossly intact  Musculoskeletal: no contractures  Vascular: BLE edema equal  Skin:  Left under breast skin fold with erythema with superficially denuded skin L 2cm X W 6cm xD 0.1cm with pink wound bed, no necrotic tissue, and scant cheesy drainage, +TTP  RIght under breast skin fold with erythema, no denuded skin or drainage  Right under abd pannus with erythema with superficially denuded skin L 1.5cm X W 3cm xD 0.1cm with pink wound bed, no necrotic tissue, and scant cheesy drainage, +TTP  Left under abd pannus skin fold with erythema, no denuded skin or drainage   No odor, induration, fluctuance      LABS:      148<H>  |  109<H>  |  21  ----------------------------<  133<H>  3.5   |  29  |  1.13    Ca    8.6      20 Sep 2021 06:41    TPro  5.4<L>  /  Alb  2.7<L>  /  TBili  1.0  /  DBili  x   /  AST  45<H>  /  ALT  25  /  AlkPhos  72                            9.1    11.31 )-----------( 173      ( 20 Sep 2021 06:41 )             27.4       Urinalysis Basic - ( 19 Sep 2021 09:33 )    Color: Light Yellow / Appearance: Slightly Turbid / S.011 / pH: x  Gluc: x / Ketone: Negative  / Bili: Negative / Urobili: Negative   Blood: x / Protein: 30 mg/dL / Nitrite: Negative   Leuk Esterase: Large / RBC: 103 /hpf /  /HPF   Sq Epi: x / Non Sq Epi: 0 /hpf / Bacteria: Moderate

## 2021-09-20 NOTE — CONSULT NOTE ADULT - ASSESSMENT
76 female with significant PMHx admitted for trauma eval following a fall, now with dysphagia S/P FEES with S/S. bedside indirect laryngoscopy was unremarkable. Please see EMR for final FEES report.

## 2021-09-20 NOTE — PROGRESS NOTE ADULT - ASSESSMENT
ECHO 10/25/16:  Normal left ventricular systolic function. No segmental wall motion abnormalities. Hypermobile interatrial septum.   ECHO 8/24/21: EF 66% grossly nl LV sys fx, mild diastolic dsyfx - stage 1     a/p    Patient is a 75yo F , known to practice from prior admission,  with a history of colon cancer s/p right hemicolectomy (approx 2 years ago, outside hospital), DVT (on Eliquis), CKD, adrenal insufficiency presenting as transfer from [facility] for trauma evaluation secondary to a mechanical fall from sitting    #Mechanical fall  -MRI noted with anterior splaying of T12/L1 space w/ edema   -CT T done noted with widening of T12/L1 disc space w/ L1 laminectomy  -s/p T10-pelvis with revision of previous L2-5 hardware, L1 expanded PSO.  -s/p intubation- now extubated 9/16 , on NC  -CV stable   -management per neuro sx     #SVT (hx)  -hr stable   -cont bb   -inc bb as needed     #Dyspnea  -CT A neg for PE  -dyspnea improving - s/p IVP lasix 40 mg x 1 with good UO  -CXR noted   -lasix PRN  -monitor vol status closely while on IVF     # DVT (hx)  -recent le doppler negative for acute dvt  -AC on hold post-op- hem to follow up for further a/c recommendations    # wm on CKD   -renal f/u     #UTI  -tx per primary team     #HTN  -bp improving  -cont bb, arb     dvt ppx   d/w son at bedside

## 2021-09-20 NOTE — PROGRESS NOTE ADULT - SUBJECTIVE AND OBJECTIVE BOX
[facility]   MD KILLIAN JAIME MD RUORU WONG, PA    TEL:  OFFICE: [phone]  DR CHARLES CELL: [phone]  BEENA PISANO CELL: [phone]  DR. LOZANO CELL: [phone]      FROM 5 PM - 7 AM PLEASE CALL ANSWERING SERVICE: [phone]    RENAL FOLLOW UP NOTE--Date of Service 09-20-21 @ 09:37  --------------------------------------------------------------------------------  HPI:      Pt seen and examined at bedside.       PAST HISTORY  --------------------------------------------------------------------------------  No significant changes to PMH, PSH, FHx, SHx, unless otherwise noted    ALLERGIES & MEDICATIONS  --------------------------------------------------------------------------------  Allergies    penicillin (Rash)    Intolerances      Standing Inpatient Medications  atorvastatin 10 milliGRAM(s) Oral at bedtime  BACItracin   Ointment 1 Application(s) Topical two times a day  cefTRIAXone   IVPB      cefTRIAXone   IVPB 1000 milliGRAM(s) IV Intermittent every 24 hours  enoxaparin Injectable 30 milliGRAM(s) SubCutaneous <User Schedule>  hydrocortisone sodium succinate Injectable 25 milliGRAM(s) IV Push two times a day  influenza   Vaccine 0.5 milliLiter(s) IntraMuscular once  latanoprost 0.005% Ophthalmic Solution 1 Drop(s) Both EYES at bedtime  levothyroxine 50 MICROGram(s) Oral daily  lidocaine   4% Patch 1 Patch Transdermal daily  losartan 50 milliGRAM(s) Oral daily  metoprolol tartrate 50 milliGRAM(s) Oral two times a day  nystatin Powder 1 Application(s) Topical two times a day  pantoprazole  Injectable 40 milliGRAM(s) IV Push daily  psyllium Powder 1 Packet(s) Oral two times a day  sodium bicarbonate 650 milliGRAM(s) Oral three times a day    PRN Inpatient Medications  artificial  tears Solution 1 Drop(s) Both EYES every 6 hours PRN  cyclobenzaprine 5 milliGRAM(s) Oral three times a day PRN  oxyCODONE    Solution 5 milliGRAM(s) Oral every 4 hours PRN  oxyCODONE    Solution 10 milliGRAM(s) Oral every 4 hours PRN  simethicone 80 milliGRAM(s) Chew three times a day PRN  traMADol 50 milliGRAM(s) Oral every 4 hours PRN      REVIEW OF SYSTEMS  --------------------------------------------------------------------------------  General: no fever  MSK: no edema     VITALS/PHYSICAL EXAM  --------------------------------------------------------------------------------  T(C): 37 (09-20-21 @ 08:09), Max: 37.6 (09-19-21 @ 12:15)  HR: 67 (09-20-21 @ 08:09) (67 - 86)  BP: 154/84 (09-20-21 @ 08:09) (134/79 - 157/84)  RR: 18 (09-20-21 @ 08:09) (18 - 20)  SpO2: 97% (09-20-21 @ 08:09) (95% - 100%)  Wt(kg): --        09-19-21 @ 07:01  -  09-20-21 @ 07:00  --------------------------------------------------------  IN: 935 mL / OUT: 2950 mL / NET: -2015 mL      Physical Exam:  	Gen: NAD  	HEENT: MMM  	Pulm: CTA B/L  	CV: S1S2  	Abd: Soft, +BS  	Ext: No LE edema B/L                      Neuro: Awake   	Skin: Warm and Dry   	Vascular access: NO HD catheter           ROBEL ervin  LABS/STUDIES  --------------------------------------------------------------------------------              9.1    11.31 >-----------<  173      [09-20-21 @ 06:41]              27.4     148  |  109  |  21  ----------------------------<  133      [09-20-21 @ 06:41]  3.5   |  29  |  1.13        Ca     8.6     [09-20-21 @ 06:41]    TPro  5.4  /  Alb  2.7  /  TBili  1.0  /  DBili  x   /  AST  45  /  ALT  25  /  AlkPhos  72  [09-20-21 @ 06:41]          Creatinine Trend:  SCr 1.13 [09-20 @ 06:41]  SCr 1.23 [09-19 @ 07:14]  SCr 0.83 [09-18 @ 05:46]  SCr 1.02 [09-16 @ 21:09]  SCr 1.17 [09-15 @ 21:18]    Urinalysis - [09-19-21 @ 09:33]      Color Light Yellow / Appearance Slightly Turbid / SG 1.011 / pH 8.0      Gluc 100 mg/dL / Ketone Negative  / Bili Negative / Urobili Negative       Blood Large / Protein 30 mg/dL / Leuk Est Large / Nitrite Negative       /  / Hyaline 2 / Gran  / Sq Epi  / Non Sq Epi 0 / Bacteria Moderate    Urine Sodium 6      [09-13-21 @ 14:35]  Urine Osmolality 480      [09-13-21 @ 14:36]    Iron 44, TIBC 224, %sat 19      [09-14-21 @ 10:27]  Ferritin 444      [09-14-21 @ 11:37]  Vitamin D (25OH) 67.9      [09-08-21 @ 09:59]  HbA1c 5.9      [02-22-17 @ 03:10]  TSH 0.18      [09-08-21 @ 09:59]

## 2021-09-20 NOTE — PROGRESS NOTE ADULT - ASSESSMENT
Patient is a 75yo F with a history of colon cancer s/p right hemicolectomy (approx 2 years ago, outside hospital), DVT (on Eliquis), CKD, adrenal insufficiency presenting after mechanical fall from sitting. CT head/cervical spine/chest were performed which showed a right forehead hematoma, acute minimally displaced right coracoid process scapular fracture, T11/L1 compression fractures and an inferior sternal fracture. 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 Treviño  FELICIANO likely sec to hemodynamic change  Renal function stable  restarted on Losartan 9/17  Monitor I/O  Monitor renal function at present    Acidosis:  non-AG+AG  Hold oral sodium bicarb 650mg TID    HTN:  BP controlled   Off midrodrine  Monitor at present    Hyponatremia/Hypernatremia  Started on free water flushes by neuro--consider increasing to 250 cc Q 6hrs   Monitor serum Na

## 2021-09-20 NOTE — SWALLOW FEES ASSESSMENT ADULT - COMMENTS
Continued hospital course: Pt started on regular diet. +Vomiting and poor PO intake noted as per medicine on 9/7. Plan for posterior extension of prior L2-5 fusion and stabilization  with nsx but need medical clearance given new FELICIANO/advanced age.   Diet changed to mechanical soft 9/8. hematology consulted for prolonged PTT, suspect due to subcutaneous heparin administration Pt cleared from cards, medicine and hematology for surgery on 9/14. S/P  T10-pelvis with revision of previous L2-5 hardware, L1 expanded PSO, c/b CSF leak, plastics closure. Remained intubated post procedure due to tachypnea and agitated. Extubated 9/16.   CT Chest ordered to r/o PE: IMPRESSION: No pulmonary embolism. Bilateral upper lobe mild patchy opacities are new from the prior study and indeterminate. Gallbladder fossa high density foci with adjacent focus of air is indeterminate, possibly postprocedural Per RN and NP, oral hygiene and deep suctioning provided prior to exam.   Pt with min-mod baseline pooling of secretions throughout pharynx/larynx- thick secretions noted along PPW, valleculae, over arytenoids and into laryngeal vestibule. Pt not sensate to secretions.   Erythema noted along laryngeal surface of epiglottis Continued hospital course: Pt started on regular diet. +Vomiting and poor PO intake noted as per medicine on 9/7. Plan for posterior extension of prior L2-5 fusion and stabilization  with nsx but need medical clearance given new FELICIANO/advanced age.   Diet changed to mechanical soft 9/8. hematology consulted for prolonged PTT, suspect due to subcutaneous heparin administration Pt cleared from cards, medicine and hematology for surgery on 9/14. S/P  T10-pelvis with revision of previous L2-5 hardware, L1 expanded PSO, c/b CSF leak, plastics closure. Remained intubated post procedure due to tachypnea and agitated. Extubated 9/16.   CT Chest ordered to r/o PE: IMPRESSION: No pulmonary embolism. Bilateral upper lobe mild patchy opacities are new from the prior study and indeterminate. Gallbladder fossa high density foci with adjacent focus of air is indeterminate, possibly postprocedural  CXR 9/20: The heart is enlarged. Elevated right hemidiaphragm. Small right pleural effusion cannot be ruled out entirely. Platelike atelectasis right lower lobe. An NG tube was placed and the tip is either in the antrum of the stomach or the pylorus. Orthopedic hardware is seen in the thoracolumbar spine. A central line is seen on the right and the tip is in the right atrium.

## 2021-09-20 NOTE — CONSULT NOTE ADULT - ASSESSMENT
Impression:    Bilateral under breast and abdominal pannus skin fold moisture dermatitis   Suggestive of fungal rash  High risk for pressure injury     Recommend:  1.) topical therapy: Left under breast and Right under abdominal pannus skin folds - cleanse with soap and water, pat dry, apply clotrimazole 1% cream, place abd pad to separate skin folds BID  Right under breast and Left under abdominal pannus, bilateral groin skin folds - cleanse with soap and water, pat dry, apply interdry textile Q3 days or PRN if soiled  2.) Incontinence Management - incontinence cleanser, pads, pericare BID, continue female external urinary catheter  3.) Maintain on an alternating air with low air loss surface  4.) Turn and reposition Q 2 hours  5.) Nutrition optimization  6.) Offload heels/feet with complete cair air fluidized boots; ensure that the soles of the feet are not resting on the foot board of the bed.    Care as per medicine. Will not actively follow but will remain available. Please recall for new issues or deterioration.  Upon discharge [facility] [address] [place] [address] [phone]  Discussed with clinical nurse  Thank you for this consult  Gerri Roth, NAHOMI-C, CWOCN [id number]

## 2021-09-20 NOTE — PROGRESS NOTE ADULT - SUBJECTIVE AND OBJECTIVE BOX
SUBJECTIVE / OVERNIGHT EVENTS:    no events overnight  patient seen and examined  son at bedside  + cough with thick mucous as per son (Mucinex to be started as per son)  Cont NPO    --------------------------------------------------------------------------------------------  LABS:                        9.1    11.31 )-----------( 173      ( 20 Sep 2021 06:41 )             27.4     09-20    148<H>  |  109<H>  |  21  ----------------------------<  133<H>  3.5   |  29  |  1.13    Ca    8.6      20 Sep 2021 06:41    TPro  5.4<L>  /  Alb  2.7<L>  /  TBili  1.0  /  DBili  x   /  AST  45<H>  /  ALT  25  /  AlkPhos  72  09-20      CAPILLARY BLOOD GLUCOSE            Urinalysis Basic - ( 19 Sep 2021 09:33 )    Color: Light Yellow / Appearance: Slightly Turbid / S.011 / pH: x  Gluc: x / Ketone: Negative  / Bili: Negative / Urobili: Negative   Blood: x / Protein: 30 mg/dL / Nitrite: Negative   Leuk Esterase: Large / RBC: 103 /hpf /  /HPF   Sq Epi: x / Non Sq Epi: 0 /hpf / Bacteria: Moderate        RADIOLOGY & ADDITIONAL TESTS:    Imaging Personally Reviewed:  [x] YES  [ ] NO    Consultant(s) Notes Reviewed:  [x] YES  [ ] NO    MEDICATIONS  (STANDING):  acetaminophen  IVPB .. 1000 milliGRAM(s) IV Intermittent once  atorvastatin 10 milliGRAM(s) Oral at bedtime  BACItracin   Ointment 1 Application(s) Topical two times a day  cefTRIAXone   IVPB      cefTRIAXone   IVPB 1000 milliGRAM(s) IV Intermittent every 24 hours  clotrimazole 1% Cream 1 Application(s) Topical two times a day  enoxaparin Injectable 30 milliGRAM(s) SubCutaneous <User Schedule>  hydrocortisone 10 milliGRAM(s) Oral <User Schedule>  influenza   Vaccine 0.5 milliLiter(s) IntraMuscular once  latanoprost 0.005% Ophthalmic Solution 1 Drop(s) Both EYES at bedtime  levothyroxine 50 MICROGram(s) Oral daily  lidocaine   4% Patch 1 Patch Transdermal daily  losartan 50 milliGRAM(s) Oral daily  metoprolol tartrate 50 milliGRAM(s) Oral two times a day  nystatin Powder 1 Application(s) Topical two times a day  pantoprazole  Injectable 40 milliGRAM(s) IV Push daily  sodium chloride 3%  Inhalation 3 milliLiter(s) Inhalation every 8 hours    MEDICATIONS  (PRN):  artificial  tears Solution 1 Drop(s) Both EYES every 6 hours PRN Dry Eyes  cyclobenzaprine 5 milliGRAM(s) Oral three times a day PRN Muscle Spasm  oxyCODONE    Solution 5 milliGRAM(s) Oral every 4 hours PRN Moderate Pain (4 - 6)  oxyCODONE    Solution 10 milliGRAM(s) Oral every 4 hours PRN Severe Pain (7 - 10)  simethicone 80 milliGRAM(s) Chew three times a day PRN gas/diarrhea  traMADol 50 milliGRAM(s) Oral every 4 hours PRN breakthrough pain      Care Discussed with Consultants/Other Providers [x] YES  [ ] NO    Vital Signs Last 24 Hrs  T(C): 36.7 (20 Sep 2021 12:27), Max: 37.5 (20 Sep 2021 04:44)  T(F): 98 (20 Sep 2021 12:27), Max: 99.5 (20 Sep 2021 04:44)  HR: 78 (20 Sep 2021 12:27) (67 - 79)  BP: 166/78 (20 Sep 2021 12:27) (134/79 - 166/78)  BP(mean): --  RR: 20 (20 Sep 2021 12:27) (18 - 20)  SpO2: 96% (20 Sep 2021 12:27) (95% - 100%)  I&O's Summary    19 Sep 2021 07:01  -  20 Sep 2021 07:00  --------------------------------------------------------  IN: 935 mL / OUT: 2950 mL / NET: - mL    PHYSICAL EXAM:  GENERAL: NAD, well-developed, awake alert, on NC, NG tube in place  HEAD:  Atraumatic, Normocephalic  EYES: EOMI, PERRLA, conjunctiva and sclera clear, legally blind  NECK: Supple, No JVD  CHEST/LUNG: mild decrease breath sounds bilaterally; No wheeze   HEART: Regular rate and rhythm; No murmurs, rubs, or gallops  ABDOMEN: Soft, Nontender, Nondistended; Bowel sounds present  NEURO: awake alert, no focal weakness, 5/5 b/l upper extremity strength, 4/5 lower extremity strength  EXTREMITIES:  2+ Peripheral Pulses, No clubbing, cyanosis, trace b/l edema  SKIN: No rashes or lesions   BACK: incision c/d/i

## 2021-09-20 NOTE — PROGRESS NOTE ADULT - ASSESSMENT
75 yo F with a history of colon cancer s/p right hemicolectomy (approx 2 years ago, outside hospital), DVT (on Eliquis), CKD, adrenal insufficiency presenting as transfer from [facility] for trauma evaluation secondary to a mechanical fall from sitting with headstrike. Injuries identified include right forehead hematoma, acute minimally displaced right coracoid process scapular fracture, T11/L1 compression fractures and an inferior sternal fracture. Patient hemodynamically stable.    # Dysphagia  s/p FEES with speech and swallow, full assessments reviewed  Bedside indirect laryngoscopy was unremarkable  NPO, with non-oral nutrition/hydration/medications  Repeat FEES pending improved secretion management, tentatively x3 days  Consider Mucinex for thick secretions    # Elevated PTT  Mild elevations in PTT can be seen with SC heparin administration, particularly with TID dosing  low plasma protein, impaired renal function, and low BMI have been postulated as contributing factors.    hematology following.    # S/p Mechanical Fall:  Right forehead hematoma, acute minimally displaced right coracoid process scapular fracture, T11/L1 compression fractures and an inferior sternal fracture  no surgery for scapular fx. Placed in sling  CT spine with fusion L2-5. Fracture through the T12-L1 disc space with widening of the disc space and a L1-2 laminectomy which appears unstable. Diffuse osteopenia. Old T11 and L1 fractures.   MRI T/L spine with anterior splaying of the intervertebral disc space at the T12-L1 with edema in the anterior prevertebral soft tissues  CT T done noted with widening of T12/L1 disc space w/ L1 laminectomy  Neurosurgery offering surgery vs conservative management, TLSO brace in the meantime.   The daughter Susie decided to proceed with surgery.   Cr has improved.   pt s/p OR: revision of prior L2-L5 fusion w/ extension from T10- pelvis w/o L1 PSO, and plastics closure on 9/14  Neuro checks, Monitor outpt via drain, monitored in NICU post op, extubated 9/16/21. Doing well, transferred to the floor on 9/18/21.     # Nausea:   Pt has been nauseous (no abd pain). ?pain med induced/ Opioids   Switched hydrocortisone 10 mg BID to IV 25 mg BID while poor PO intake.   s/p stress dose steroids.     # Hypopituitary   She is on Hydrocortisone 10 mg BID at home, so will monitor for adrenal insufficiency. am cortisol appropriate  UA unimpressive. vit D, vit B12 normal. TSH low as expected with hypopit. free T4 sent, normal.   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 Treviño ([facility]). Outpt EMR reviewed; Cr range from 1.4 to 1.6   Monitor I/O's, Serial Cr, Avoid nephrotoxins  Cr is up from baseline. Hold Losartan. s/p gentle IVF for acute kidney failure: likely pre-renal.   Her recent TTE reviewed, with normal LV.   Her Cr now back to baseline (1.4-1.6)   Losartan restarted    # UTI  UA+, urine culture pending  Started Ceftriaxone IV and tolerating   no urinary symptoms.     # CAD: Chronic, stable  Cont home CV meds  holding ASA for neurosurgery     # Hx of SVT  ST on monitor 120s  BB resumed by cards, uptitrate as BP tolerates     # Hypothyroidism  Chronic, stable  TSH low. Total T4 is nl  Levothyroxine for hypothyroid transition to IV as no PO access   TSH 0.18, T(6)  Can transition back to LT4 50mcg po daily via NG tube as per Endo  Check Free T4 in AM  Endocrine following      # Hx of DVT, lower extremity  Home med Eliquis, on hold   SCDs    # GI ppx:  Protonix

## 2021-09-20 NOTE — PROGRESS NOTE ADULT - ATTENDING COMMENTS
Pt seen and examined with the NP. Agree with the assessment and plan.  Vitals, labs and radiology results personally reviewed.  Above note edited as appropriate.  Discussed with the pt, son and the daughter at bedside, answered all questions.  remain NPO unfortunately. with NG tube feeding. FEES/speech f/u.    Dr. Hope ([facility])  [phone]

## 2021-09-20 NOTE — SWALLOW FEES ASSESSMENT ADULT - DIAGNOSTIC IMPRESSIONS
77yo F with a history of colon cancer s/p right hemicolectomy (approx 2 years ago, outside hospital), DVT (on Eliquis), CKD, adrenal insufficiency presenting after mechanical fall from sitting. S/p T10-pelvis with revision of previous L2-5 hardware, L1 expanded PSO, with plastics closure for failed back syndrome. Seen today for FEES. At baseline, there was pooling of secretions throughout pharynx and larynx, into laryngeal vestibule. PO trials provided of honey, nectar, crushed ice chips and thin puree textures via teaspoon. There was adequate acceptance and oral clearance. Swallow triggered at the valleculae. There was trace penetration without retrieval of thin puree. No penetration or aspiration noted with other consistencies. Min residue noted mixing with secretions in valleculae post swallow. Throughout exam, Pt noted with cough in absence of penetration/aspiration which resulted in thick secretions in trachea and stranding throughout laryngeal vestibule from arytenoid to vocal cords. Pt insensate to secretions and required cued cough and cued swallow which was minimally effective in clearing. At this time, Pt would benefit from decongestant to improve secretion management and crushed ice chips to preserve swallow function prior to repeat FEES exam. 75yo F with a history of colon cancer s/p right hemicolectomy (approx 2 years ago, outside hospital), DVT (on Eliquis), CKD, adrenal insufficiency presenting after mechanical fall from sitting. S/p T10-pelvis with revision of previous L2-5 hardware, L1 expanded PSO, with plastics closure for failed back syndrome. Seen today for FEES. At baseline, there was pooling of secretions throughout pharynx and larynx, into laryngeal vestibule. PO trials provided of honey, nectar, crushed ice chips and thin puree textures via teaspoon. There was adequate acceptance and oral clearance. Swallow triggered at the valleculae. There was trace penetration without retrieval of thin puree. No penetration or aspiration noted with other consistencies. Min residue noted mixing with secretions in valleculae post swallow. Throughout exam, Pt noted with cough in absence of penetration/aspiration which resulted in thick secretions in trachea and stranding throughout laryngeal vestibule from arytenoid to vocal cords. Pt insensate to secretions and required cued cough and cued swallow which was weak and minimally effective in clearing. At this time, Pt would benefit from decongestant to improve secretion management and crushed ice chips to preserve swallow function prior to repeat FEES exam.

## 2021-09-20 NOTE — PROGRESS NOTE ADULT - SUBJECTIVE AND OBJECTIVE BOX
Admitted for:    Following for:    Subjective:       MEDICATIONS  (STANDING):  acetaminophen  IVPB .. 1000 milliGRAM(s) IV Intermittent once  atorvastatin 10 milliGRAM(s) Oral at bedtime  BACItracin   Ointment 1 Application(s) Topical two times a day  cefTRIAXone   IVPB      cefTRIAXone   IVPB 1000 milliGRAM(s) IV Intermittent every 24 hours  clotrimazole 1% Cream 1 Application(s) Topical two times a day  enoxaparin Injectable 30 milliGRAM(s) SubCutaneous <User Schedule>  hydrocortisone 10 milliGRAM(s) Oral <User Schedule>  influenza   Vaccine 0.5 milliLiter(s) IntraMuscular once  latanoprost 0.005% Ophthalmic Solution 1 Drop(s) Both EYES at bedtime  levothyroxine 50 MICROGram(s) Oral daily  lidocaine   4% Patch 1 Patch Transdermal daily  losartan 50 milliGRAM(s) Oral daily  metoprolol tartrate 50 milliGRAM(s) Oral two times a day  nystatin Powder 1 Application(s) Topical two times a day  pantoprazole  Injectable 40 milliGRAM(s) IV Push daily  sodium chloride 3%  Inhalation 3 milliLiter(s) Inhalation every 8 hours    MEDICATIONS  (PRN):  artificial  tears Solution 1 Drop(s) Both EYES every 6 hours PRN Dry Eyes  cyclobenzaprine 5 milliGRAM(s) Oral three times a day PRN Muscle Spasm  oxyCODONE    Solution 5 milliGRAM(s) Oral every 4 hours PRN Moderate Pain (4 - 6)  oxyCODONE    Solution 10 milliGRAM(s) Oral every 4 hours PRN Severe Pain (7 - 10)  simethicone 80 milliGRAM(s) Chew three times a day PRN gas/diarrhea  traMADol 50 milliGRAM(s) Oral every 4 hours PRN breakthrough pain      Allergies    penicillin (Rash)    Intolerances        Review of Systems:  Constitutional: No fever  Eyes: No blurry vision  Neuro: No tremors  HEENT: No pain  Cardiovascular: No chest pain, palpitations  Respiratory: No SOB, no cough  GI: No nausea, vomiting, abdominal pain  : No dysuria  Skin: no rash  Psych: no depression  Endocrine: no polyuria, polydipsia  Hem/lymph: no swelling  Osteoporosis: no fractures    PHYSICAL EXAM:  VITALS: T(C): 36.7 (09-20-21 @ 12:27)  T(F): 98 (09-20-21 @ 12:27), Max: 99.5 (09-20-21 @ 04:44)  HR: 78 (09-20-21 @ 12:27) (67 - 79)  BP: 166/78 (09-20-21 @ 12:27) (134/79 - 166/78)  RR:  (18 - 20)  SpO2:  (95% - 100%)  Wt(kg): --  GENERAL: NAD  EYES: No proptosis, no lid lag, anicteric  HEENT:  Atraumatic  THYROID: Normal size, no palpable nodules  RESPIRATORY: Clear to auscultation bilaterally  CARDIOVASCULAR: Regular rate and rhythm; No murmurs; no peripheral edema  GI: Soft, nontender, non distended, normal bowel sounds  SKIN: Dry  PSYCH: Alert and oriented x 3, flat affect              09-20    148<H>  |  109<H>  |  21  ----------------------------<  133<H>  3.5   |  29  |  1.13    EGFR if : 54<L>  EGFR if non : 47<L>    Ca    8.6      09-20    TPro  5.4<L>  /  Alb  2.7<L>  /  TBili  1.0  /  DBili  x   /  AST  45<H>  /  ALT  25  /  AlkPhos  72  09-20      Thyroid Function Tests:  09-17 @ 16:05 TSH -- FreeT4 1.2 T3 -- Anti TPO -- Anti Thyroglobulin Ab -- TSI --  09-08 @ 09:59 TSH 0.18 FreeT4 -- T3 -- Anti TPO -- Anti Thyroglobulin Ab -- TSI --                         Admitted for:     Following for:    Subjective:       MEDICATIONS  (STANDING):  acetaminophen  IVPB .. 1000 milliGRAM(s) IV Intermittent once  atorvastatin 10 milliGRAM(s) Oral at bedtime  BACItracin   Ointment 1 Application(s) Topical two times a day  cefTRIAXone   IVPB      cefTRIAXone   IVPB 1000 milliGRAM(s) IV Intermittent every 24 hours  clotrimazole 1% Cream 1 Application(s) Topical two times a day  enoxaparin Injectable 30 milliGRAM(s) SubCutaneous <User Schedule>  hydrocortisone 10 milliGRAM(s) Oral <User Schedule>  influenza   Vaccine 0.5 milliLiter(s) IntraMuscular once  latanoprost 0.005% Ophthalmic Solution 1 Drop(s) Both EYES at bedtime  levothyroxine 50 MICROGram(s) Oral daily  lidocaine   4% Patch 1 Patch Transdermal daily  losartan 50 milliGRAM(s) Oral daily  metoprolol tartrate 50 milliGRAM(s) Oral two times a day  nystatin Powder 1 Application(s) Topical two times a day  pantoprazole  Injectable 40 milliGRAM(s) IV Push daily  sodium chloride 3%  Inhalation 3 milliLiter(s) Inhalation every 8 hours    MEDICATIONS  (PRN):  artificial  tears Solution 1 Drop(s) Both EYES every 6 hours PRN Dry Eyes  cyclobenzaprine 5 milliGRAM(s) Oral three times a day PRN Muscle Spasm  oxyCODONE    Solution 5 milliGRAM(s) Oral every 4 hours PRN Moderate Pain (4 - 6)  oxyCODONE    Solution 10 milliGRAM(s) Oral every 4 hours PRN Severe Pain (7 - 10)  simethicone 80 milliGRAM(s) Chew three times a day PRN gas/diarrhea  traMADol 50 milliGRAM(s) Oral every 4 hours PRN breakthrough pain      Allergies    penicillin (Rash)    Intolerances        Review of Systems:  Constitutional: No fever  Eyes: No blurry vision  Neuro: No tremors  HEENT: No pain  Cardiovascular: No chest pain, palpitations  Respiratory: No SOB, no cough  GI: No nausea, vomiting, abdominal pain  : No dysuria  Skin: no rash  Psych: no depression  Endocrine: no polyuria, polydipsia  Hem/lymph: no swelling  Osteoporosis: no fractures    PHYSICAL EXAM:  VITALS: T(C): 36.7 (09-20-21 @ 12:27)  T(F): 98 (09-20-21 @ 12:27), Max: 99.5 (09-20-21 @ 04:44)  HR: 78 (09-20-21 @ 12:27) (67 - 79)  BP: 166/78 (09-20-21 @ 12:27) (134/79 - 166/78)  RR:  (18 - 20)  SpO2:  (95% - 100%)  Wt(kg): --  GENERAL: NAD  EYES: No proptosis, no lid lag, anicteric  HEENT:  Atraumatic  THYROID: Normal size, no palpable nodules  RESPIRATORY: Clear to auscultation bilaterally  CARDIOVASCULAR: Regular rate and rhythm; No murmurs; no peripheral edema  GI: Soft, nontender, non distended, normal bowel sounds  SKIN: Dry  PSYCH: Alert and oriented x 3, flat affect              09-20    148<H>  |  109<H>  |  21  ----------------------------<  133<H>  3.5   |  29  |  1.13    EGFR if : 54<L>  EGFR if non : 47<L>    Ca    8.6      09-20    TPro  5.4<L>  /  Alb  2.7<L>  /  TBili  1.0  /  DBili  x   /  AST  45<H>  /  ALT  25  /  AlkPhos  72  09-20      Thyroid Function Tests:  09-17 @ 16:05 TSH -- FreeT4 1.2 T3 -- Anti TPO -- Anti Thyroglobulin Ab -- TSI --  09-08 @ 09:59 TSH 0.18 FreeT4 -- T3 -- Anti TPO -- Anti Thyroglobulin Ab -- TSI --                         Admitted for:  trauma evaluation    Following for: adrenal insufficiency    Subjective: Patient feeling fine. Endorses back pain. No N/V, abdominal pain      MEDICATIONS  (STANDING):  acetaminophen  IVPB .. 1000 milliGRAM(s) IV Intermittent once  atorvastatin 10 milliGRAM(s) Oral at bedtime  BACItracin   Ointment 1 Application(s) Topical two times a day  cefTRIAXone   IVPB      cefTRIAXone   IVPB 1000 milliGRAM(s) IV Intermittent every 24 hours  clotrimazole 1% Cream 1 Application(s) Topical two times a day  enoxaparin Injectable 30 milliGRAM(s) SubCutaneous <User Schedule>  hydrocortisone 10 milliGRAM(s) Oral <User Schedule>  influenza   Vaccine 0.5 milliLiter(s) IntraMuscular once  latanoprost 0.005% Ophthalmic Solution 1 Drop(s) Both EYES at bedtime  levothyroxine 50 MICROGram(s) Oral daily  lidocaine   4% Patch 1 Patch Transdermal daily  losartan 50 milliGRAM(s) Oral daily  metoprolol tartrate 50 milliGRAM(s) Oral two times a day  nystatin Powder 1 Application(s) Topical two times a day  pantoprazole  Injectable 40 milliGRAM(s) IV Push daily  sodium chloride 3%  Inhalation 3 milliLiter(s) Inhalation every 8 hours    MEDICATIONS  (PRN):  artificial  tears Solution 1 Drop(s) Both EYES every 6 hours PRN Dry Eyes  cyclobenzaprine 5 milliGRAM(s) Oral three times a day PRN Muscle Spasm  oxyCODONE    Solution 5 milliGRAM(s) Oral every 4 hours PRN Moderate Pain (4 - 6)  oxyCODONE    Solution 10 milliGRAM(s) Oral every 4 hours PRN Severe Pain (7 - 10)  simethicone 80 milliGRAM(s) Chew three times a day PRN gas/diarrhea  traMADol 50 milliGRAM(s) Oral every 4 hours PRN breakthrough pain      Allergies    penicillin (Rash)    Intolerances      PHYSICAL EXAM:  VITALS: T(C): 36.7 (09-20-21 @ 12:27)  T(F): 98 (09-20-21 @ 12:27), Max: 99.5 (09-20-21 @ 04:44)  HR: 78 (09-20-21 @ 12:27) (67 - 79)  BP: 166/78 (09-20-21 @ 12:27) (134/79 - 166/78)  RR:  (18 - 20)  SpO2:  (95% - 100%)  Wt(kg): --  GENERAL: NAD, NGT in place  EYES: No proptosis, no lid lag, anicteric  HEENT:  Atraumatic  THYROID: Normal size, no palpable nodules  RESPIRATORY: Clear to auscultation bilaterally  CARDIOVASCULAR: Regular rate and rhythm; No murmurs; no peripheral edema  GI: Soft, nontender, non distended, normal bowel sounds  SKIN: Dry  PSYCH: Alert and oriented x 2-3, flat affect              09-20    148<H>  |  109<H>  |  21  ----------------------------<  133<H>  3.5   |  29  |  1.13    EGFR if : 54<L>  EGFR if non : 47<L>    Ca    8.6      09-20    TPro  5.4<L>  /  Alb  2.7<L>  /  TBili  1.0  /  DBili  x   /  AST  45<H>  /  ALT  25  /  AlkPhos  72  09-20      Thyroid Function Tests:  09-17 @ 16:05 TSH -- FreeT4 1.2 T3 -- Anti TPO -- Anti Thyroglobulin Ab -- TSI --  09-08 @ 09:59 TSH 0.18 FreeT4 -- T3 -- Anti TPO -- Anti Thyroglobulin Ab -- TSI --                         Admitted for:  trauma evaluation    Following for: adrenal insufficiency    Subjective: Patient feeling fine. Endorses back pain. No N/V, abdominal pain      MEDICATIONS  (STANDING):  acetaminophen  IVPB .. 1000 milliGRAM(s) IV Intermittent once  atorvastatin 10 milliGRAM(s) Oral at bedtime  BACItracin   Ointment 1 Application(s) Topical two times a day  cefTRIAXone   IVPB      cefTRIAXone   IVPB 1000 milliGRAM(s) IV Intermittent every 24 hours  clotrimazole 1% Cream 1 Application(s) Topical two times a day  enoxaparin Injectable 30 milliGRAM(s) SubCutaneous <User Schedule>  hydrocortisone 10 milliGRAM(s) Oral <User Schedule>  influenza   Vaccine 0.5 milliLiter(s) IntraMuscular once  latanoprost 0.005% Ophthalmic Solution 1 Drop(s) Both EYES at bedtime  levothyroxine 50 MICROGram(s) Oral daily  lidocaine   4% Patch 1 Patch Transdermal daily  losartan 50 milliGRAM(s) Oral daily  metoprolol tartrate 50 milliGRAM(s) Oral two times a day  nystatin Powder 1 Application(s) Topical two times a day  pantoprazole  Injectable 40 milliGRAM(s) IV Push daily  sodium chloride 3%  Inhalation 3 milliLiter(s) Inhalation every 8 hours    MEDICATIONS  (PRN):  artificial  tears Solution 1 Drop(s) Both EYES every 6 hours PRN Dry Eyes  cyclobenzaprine 5 milliGRAM(s) Oral three times a day PRN Muscle Spasm  oxyCODONE    Solution 5 milliGRAM(s) Oral every 4 hours PRN Moderate Pain (4 - 6)  oxyCODONE    Solution 10 milliGRAM(s) Oral every 4 hours PRN Severe Pain (7 - 10)  simethicone 80 milliGRAM(s) Chew three times a day PRN gas/diarrhea  traMADol 50 milliGRAM(s) Oral every 4 hours PRN breakthrough pain          PHYSICAL EXAM:  VITALS: T(C): 36.7 (09-20-21 @ 12:27)  T(F): 98 (09-20-21 @ 12:27), Max: 99.5 (09-20-21 @ 04:44)  HR: 78 (09-20-21 @ 12:27) (67 - 79)  BP: 166/78 (09-20-21 @ 12:27) (134/79 - 166/78)  RR:  (18 - 20)  SpO2:  (95% - 100%)  Wt(kg): --  GENERAL: NAD, NGT in place  EYES: No proptosis, no lid lag, anicteric  HEENT:  Atraumatic  RESPIRATORY: Clear to auscultation bilaterally  CARDIOVASCULAR: Regular rate and rhythm; No murmurs; no peripheral edema  GI: Soft, nontender, non distended, normal bowel sounds            09-20    148<H>  |  109<H>  |  21  ----------------------------<  133<H>  3.5   |  29  |  1.13    EGFR if : 54<L>  EGFR if non : 47<L>    Ca    8.6      09-20    TPro  5.4<L>  /  Alb  2.7<L>  /  TBili  1.0  /  DBili  x   /  AST  45<H>  /  ALT  25  /  AlkPhos  72  09-20      Thyroid Function Tests:  09-17 @ 16:05 FreeT4 1.2  09-08 @ 09:59 TSH 0.18

## 2021-09-20 NOTE — PROGRESS NOTE ADULT - ASSESSMENT
76F hx of CKD adrenal insufficiency CAD  HTN Hypothyroidism prior L2-5 Fxn w/. Dr Jane (ortho) at [facility] in early 2000s, revision x1, chronic LBP, h/o DVT  is on eliquis for hx of DVT 2 yr prior;- s/p mechanical fall w/ RUE pain found to have R scapular fx and sternal fx, adm to trauma, CT T/L w/ significant  adjacent  segment degenerative disease  above prior fusion w/ chronic comp fx, T10-L1 c  mild retropulsion, c/f unstable fx.  started on heparin sc tid as inpatient; and preop labs with elevated PTT. Seen by hematology   s/p /p T10-pelvis with revision of previous L2-5 hardware, L1 expanded PSO, plastics closure 9/14/21 . Post op course c/b slow to wake up, respiratory failure dysphagia & Sinus tachycardia . Remained intubated until 9/16/21. LE duplex negative for DVT     Plan    Neuro stable. Maintain surgical drains as per plastics   CAD/ sinus tachycardia - On Metoprolol 50 BID   HTN- On Losartan 50mg   Thick secretions likely from prolonged intubation - chest PT/ oral care . Mucinex  & 3% inhalation   Dysphagia s/p FEEST- Cleared for ice chips. Revaluate in 2 days   DVT ppx   labs wnl   OOB w PT.   pain control on oxy 5/10 mg  prn & flexeril 5mg prn  76F hx of CKD adrenal insufficiency CAD  HTN Hypothyroidism prior L2-5 Fxn w/. Dr Jane (ortho) at [facility] in early 2000s, revision x1, chronic LBP, h/o DVT  is on eliquis for hx of DVT 2 yr prior;- s/p mechanical fall w/ RUE pain found to have R scapular fx and sternal fx, adm to trauma, CT T/L w/ significant  adjacent  segment degenerative disease  above prior fusion w/ chronic comp fx, T10-L1 c  mild retropulsion, c/f unstable fx.  started on heparin sc tid as inpatient; and preop labs with elevated PTT. Seen by hematology   s/p /p T10-pelvis with revision of previous L2-5 hardware, L1 expanded PSO, plastics closure 9/14/21 . Post op course c/b slow to wake up, respiratory failure dysphagia & Sinus tachycardia . Remained intubated until 9/16/21. LE duplex negative for DVT     Plan    Neuro stable. Maintain surgical drains as per plastics   CAD/ sinus tachycardia - On Metoprolol 50 BID   HTN- On Losartan 50mg   CKD stable.  Adrenal insufficiency- Tapered steroids to Hydrocortisone 10 BID . Will continue to monitor   Mild Hypernatremia- Likely free water deficit. Free water BID   Thick secretions likely from prolonged intubation - chest PT/ oral care . Mucinex  & 3% inhalation   Dysphagia s/p FEEST- Cleared for ice chips. Revaluate in 2 days   DVT ppx   labs wnl   OOB w PT.   pain control on oxy 5/10 mg  prn & flexeril 5mg prn

## 2021-09-20 NOTE — PROGRESS NOTE ADULT - SUBJECTIVE AND OBJECTIVE BOX
SUBJECTIVE:   Patient seen & examined. c/o back pain . Thick secretions back of throat this morning   OVERNIGHT EVENTS: none    Vital Signs Last 24 Hrs  T(C): 37 (20 Sep 2021 08:09), Max: 37.6 (19 Sep 2021 12:15)  T(F): 98.6 (20 Sep 2021 08:09), Max: 99.6 (19 Sep 2021 12:15)  HR: 67 (20 Sep 2021 08:09) (67 - 86)  BP: 154/84 (20 Sep 2021 08:09) (134/79 - 157/84)  BP(mean): --  RR: 18 (20 Sep 2021 08:09) (18 - 20)  SpO2: 97% (20 Sep 2021 08:09) (95% - 100%)    PHYSICAL EXAM:    Constitutional: No Acute Distress     Neurological: Awake alert Ox3, Speech clear Following Commands, Moving all Extremities B/L UE 4/5. B/L LE HF 2/5 KF & KE 4-/5 DF/PF 4-/5. Midline back aquacel AG C/D/I HMV left 60cc/ 24 hrs 40cc/ 24 hrs.     Pulmonary: Clear to Auscultation    Cardiovascular: S1, S2, Regular rate and rhythm     Gastrointestinal: Soft, Non-tender, Non-distended     Extremities: No calf tenderness     LABS:                        9.1    11.31 )-----------( 173      ( 20 Sep 2021 06:41 )             27.4    09-20    148<H>  |  109<H>  |  21  ----------------------------<  133<H>  3.5   |  29  |  1.13    Ca    8.6      20 Sep 2021 06:41    TPro  5.4<L>  /  Alb  2.7<L>  /  TBili  1.0  /  DBili  x   /  AST  45<H>  /  ALT  25  /  AlkPhos  72  09-20      IMAGING:         MEDICATIONS:  Antibiotics:  cefTRIAXone   IVPB      cefTRIAXone   IVPB 1000 milliGRAM(s) IV Intermittent every 24 hours  cyclobenzaprine 5 milliGRAM(s) Oral three times a day PRN Muscle Spasm  oxyCODONE    Solution 5 milliGRAM(s) Oral every 4 hours PRN Moderate Pain (4 - 6)  oxyCODONE    Solution 10 milliGRAM(s) Oral every 4 hours PRN Severe Pain (7 - 10)  traMADol 50 milliGRAM(s) Oral every 4 hours PRN breakthrough pain  losartan 50 milliGRAM(s) Oral daily  metoprolol tartrate 50 milliGRAM(s) Oral two times a day  pantoprazole  Injectable 40 milliGRAM(s) IV Push daily  psyllium Powder 1 Packet(s) Oral two times a day  simethicone 80 milliGRAM(s) Chew three times a day PRN gas/diarrhea  artificial  tears Solution 1 Drop(s) Both EYES every 6 hours PRN Dry Eyes  atorvastatin 10 milliGRAM(s) Oral at bedtime  BACItracin   Ointment 1 Application(s) Topical two times a day  enoxaparin Injectable 30 milliGRAM(s) SubCutaneous <User Schedule>  hydrocortisone sodium succinate Injectable 25 milliGRAM(s) IV Push two times a day  influenza   Vaccine 0.5 milliLiter(s) IntraMuscular once  latanoprost 0.005% Ophthalmic Solution 1 Drop(s) Both EYES at bedtime  levothyroxine 50 MICROGram(s) Oral daily  lidocaine   4% Patch 1 Patch Transdermal daily  nystatin Powder 1 Application(s) Topical two times a day      DIET:

## 2021-09-20 NOTE — CONSULT NOTE ADULT - SUBJECTIVE AND OBJECTIVE BOX
CC:  FEES, Dysphagia    HPI: Patient is a 77yo F with a history of colon cancer s/p right hemicolectomy (approx 2 years ago, outside hospital), DVT (on Eliquis), CKD, adrenal insufficiency presenting as transfer from [facility] for trauma evaluation secondary to a mechanical fall from sitting. Patient was going to her commode last night when she slipped and fell on her right side, hitting her head. Denies LOC. Patient's daughter came to help her up and brought her to the ED. ENT was consulted for FEES with S/S. Pt denies sore throat, cough, SOB.        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    Osteoporosis    Chronic Kidney Disease; Dx 2009    HTN - Hypertension    History of Obesity    Myocardial infarction    History of Laminectomy/ Fusion 1/06; L1-L2    H/O right hemicolectomy      Allergies    penicillin (Rash)    Intolerances      MEDICATIONS  (STANDING):  acetaminophen  IVPB .. 1000 milliGRAM(s) IV Intermittent once  atorvastatin 10 milliGRAM(s) Oral at bedtime  BACItracin   Ointment 1 Application(s) Topical two times a day  cefTRIAXone   IVPB      cefTRIAXone   IVPB 1000 milliGRAM(s) IV Intermittent every 24 hours  enoxaparin Injectable 30 milliGRAM(s) SubCutaneous <User Schedule>  hydrocortisone 10 milliGRAM(s) Oral <User Schedule>  influenza   Vaccine 0.5 milliLiter(s) IntraMuscular once  latanoprost 0.005% Ophthalmic Solution 1 Drop(s) Both EYES at bedtime  levothyroxine 50 MICROGram(s) Oral daily  lidocaine   4% Patch 1 Patch Transdermal daily  losartan 50 milliGRAM(s) Oral daily  metoprolol tartrate 50 milliGRAM(s) Oral two times a day  nystatin Powder 1 Application(s) Topical two times a day  pantoprazole  Injectable 40 milliGRAM(s) IV Push daily  sodium chloride 3%  Inhalation 3 milliLiter(s) Inhalation every 8 hours    MEDICATIONS  (PRN):  artificial  tears Solution 1 Drop(s) Both EYES every 6 hours PRN Dry Eyes  cyclobenzaprine 5 milliGRAM(s) Oral three times a day PRN Muscle Spasm  oxyCODONE    Solution 5 milliGRAM(s) Oral every 4 hours PRN Moderate Pain (4 - 6)  oxyCODONE    Solution 10 milliGRAM(s) Oral every 4 hours PRN Severe Pain (7 - 10)  simethicone 80 milliGRAM(s) Chew three times a day PRN gas/diarrhea  traMADol 50 milliGRAM(s) Oral every 4 hours PRN breakthrough pain      Social History: no tobacco use    Family history: no pertinent FHx    ROS:   ENT: all negative except as noted in HPI   CV: denies palpitations  Pulm: denies SOB, cough, hemoptysis  GI: denies change in apetite, indigestion, n/v  : denies pertinent urinary symptoms, urgency  Neuro: denies numbness/tingling, loss of sensation  Psych: denies anxiety  MS: denies muscle weakness, instability  Heme: denies easy bruising or bleeding  Endo: denies heat/cold intolerance, excessive sweating  Vascular: denies LE edema    Vital Signs Last 24 Hrs  T(C): 36.7 (20 Sep 2021 12:27), Max: 37.5 (20 Sep 2021 04:44)  T(F): 98 (20 Sep 2021 12:27), Max: 99.5 (20 Sep 2021 04:44)  HR: 78 (20 Sep 2021 12:27) (67 - 79)  BP: 166/78 (20 Sep 2021 12:27) (134/79 - 166/78)  BP(mean): --  RR: 20 (20 Sep 2021 12:27) (18 - 20)  SpO2: 96% (20 Sep 2021 12:27) (95% - 100%)                          9.1    11.31 )-----------( 173      ( 20 Sep 2021 06:41 )             27.4    09-20    148<H>  |  109<H>  |  21  ----------------------------<  133<H>  3.5   |  29  |  1.13    Ca    8.6      20 Sep 2021 06:41    TPro  5.4<L>  /  Alb  2.7<L>  /  TBili  1.0  /  DBili  x   /  AST  45<H>  /  ALT  25  /  AlkPhos  72  09-20       PHYSICAL EXAM:  Gen: NAD  Skin: No rashes, bruises, or lesions  Head: Normocephalic, Atraumatic  Face: no edema, erythema, or fluctuance. Parotid glands soft without mass  Eyes: no scleral injection  Nose: Nares bilaterally patent, no discharge  Mouth: No Stridor / Drooling / Trismus.  Mucosa moist, tongue/uvula midline, oropharynx clear  Neck: Flat, supple, no lymphadenopathy, trachea midline, no masses  Lymphatic: No lymphadenopathy  Resp: breathing easily, no stridor  CV: no peripheral edema/cyanosis  GI: nondistended   Peripheral vascular: no JVD or edema  Neuro: facial nerve intact, no facial droop      Fiberoptic Indirect laryngoscopy:  (Scope #2 used)    Reason for Laryngoscopy:    Patient was unable to cooperate with mirror.  Thick secretions noted around the NGT and post cricoid region. Nasopharynx, oropharynx, and hypopharynx clear, no bleeding. Tongue base, posterior pharyngeal wall, vallecula, epiglottis, and subglottis appear normal. No erythema, edema, masses or lesions. Airway patent, no foreign body visualized. No glottic/supraglottic edema. True vocal cords, arytenoids, vestibular folds, ventricles, pyriform sinuses, and aryepiglottic folds appear normal bilaterally. Vocal cords mobile with good contact b/l.

## 2021-09-20 NOTE — SWALLOW FEES ASSESSMENT ADULT - ADDITIONAL RECOMMENDATIONS
1. Allow crushed ice chips, sparingly, not with any other consistencies with supervision when fully alert only.   2. Decongestant  3. Repeat FEES pending improved secretion management, tentatively x3 days.   Importance of oral care to reduce oral pathogens communicated with Pt, Pt's son, and NSx team. 1. Allow crushed ice chips, sparingly, not with any other consistencies with supervision when fully alert only.   2. Decongestant  3. Humidified face tent  4. Repeat FEES pending improved secretion management, tentatively x3 days.   Importance of oral care to reduce oral pathogens communicated with Pt, Pt's son, and NSx team.

## 2021-09-20 NOTE — PROGRESS NOTE ADULT - SUBJECTIVE AND OBJECTIVE BOX
CARDIOLOGY FOLLOW UP - Dr. Orozco  Date of Service: 9/20/21  CC: dyspnea improving, no cp     Review of Systems:  Constitutional: No fever, weight loss, or fatigue  Respiratory: No cough, wheezing, or hemoptysis, no shortness of breath  Cardiovascular: No chest pain, palpitations, passing out, dizziness, or leg swelling  Gastrointestinal: No abd or epigastric pain.  No nausea, vomiting, or hematemesis; no diarrhea or constipation, no melena or hematochezia  Vascular: no edema       PHYSICAL EXAM:  T(C): 37 (09-20-21 @ 08:09), Max: 37.6 (09-19-21 @ 12:15)  HR: 67 (09-20-21 @ 08:09) (67 - 86)  BP: 154/84 (09-20-21 @ 08:09) (134/79 - 157/84)  RR: 18 (09-20-21 @ 08:09) (18 - 20)  SpO2: 97% (09-20-21 @ 08:09) (95% - 100%)  Wt(kg): --  I&O's Summary    19 Sep 2021 07:01  -  20 Sep 2021 07:00  --------------------------------------------------------  IN: 935 mL / OUT: 2950 mL / NET: -2015 mL        Appearance: Normal	  Cardiovascular: Normal S1 S2,RRR, No JVD, No murmurs  Respiratory: Lungs clear to auscultation	  Gastrointestinal:  Soft, Non-tender, + BS	  Extremities: Normal range of motion, No clubbing, cyanosis or edema      Home Medications:  acetaminophen 325 mg oral tablet: 2 tab(s) orally every 6 hours, As needed, Mild Pain (1 - 3) (24 Sep 2020 11:16)  Artificial Tears ophthalmic solution: 1 drop(s) to each affected eye 4 times a day, As Needed (22 Aug 2021 23:28)  aspirin 81 mg oral delayed release tablet: 1 tab(s) orally once a day (27 Aug 2021 13:38)  calcium (as carbonate) 600 mg oral tablet: 1 cap(s) orally once a day (24 Sep 2020 11:16)  Eliquis 2.5 mg oral tablet: 1 tab(s) orally 2 times a day (24 Sep 2020 11:16)  felodipine 5 mg oral tablet, extended release: 1 tab(s) orally once a day (22 Aug 2021 23:23)  guaiFENesin 600 mg oral tablet, extended release: 1 tab(s) orally every 12 hours as needed for cough  (27 Aug 2021 13:38)  hydrocortisone 10 mg oral tablet: 1 tab(s) orally 2 times a day (24 Sep 2020 11:16)  latanoprost 0.005% ophthalmic solution: 1 drop(s) to each affected eye once a day (in the evening) (22 Aug 2021 23:27)  levothyroxine 50 mcg (0.05 mg) oral tablet: 1 tab(s) orally once a day (24 Sep 2020 11:16)  Lipitor 10 mg oral tablet: 1 tab(s) orally once a day (22 Aug 2021 23:41)  Macular Vitamin Benefit oral tablet: 1 tab(s) orally once a day (24 Sep 2020 11:16)  metoprolol succinate 50 mg oral tablet, extended release: 1 tab(s) orally once a day (22 Aug 2021 23:24)  Multiple Vitamins oral tablet: 1 tab(s) orally once a day (22 Aug 2021 23:22)  olmesartan 20 mg oral tablet: 1 tab(s) orally once a day (22 Aug 2021 23:22)  omeprazole 20 mg oral delayed release capsule: 1 cap(s) orally once a day (22 Aug 2021 23:21)  oxyCODONE 5 mg oral tablet: 1 tab(s) orally every 8 hours as needed for severe pain (27 Aug 2021 13:35)  OxyCONTIN 20 mg oral tablet, extended release: 1 tab(s) orally every 12 hours (24 Sep 2020 11:16)  risedronate 35 mg oral tablet: 1 tab(s) orally once a week (Mondays) (05 Sep 2021 18:30)  senna oral tablet: 2 tab(s) orally once a day (at bedtime), As Needed (05 Sep 2021 18:31)      MEDICATIONS  (STANDING):  atorvastatin 10 milliGRAM(s) Oral at bedtime  BACItracin   Ointment 1 Application(s) Topical two times a day  cefTRIAXone   IVPB      cefTRIAXone   IVPB 1000 milliGRAM(s) IV Intermittent every 24 hours  enoxaparin Injectable 30 milliGRAM(s) SubCutaneous <User Schedule>  hydrocortisone sodium succinate Injectable 25 milliGRAM(s) IV Push two times a day  influenza   Vaccine 0.5 milliLiter(s) IntraMuscular once  latanoprost 0.005% Ophthalmic Solution 1 Drop(s) Both EYES at bedtime  levothyroxine 50 MICROGram(s) Oral daily  lidocaine   4% Patch 1 Patch Transdermal daily  losartan 50 milliGRAM(s) Oral daily  metoprolol tartrate 50 milliGRAM(s) Oral two times a day  nystatin Powder 1 Application(s) Topical two times a day  pantoprazole  Injectable 40 milliGRAM(s) IV Push daily  psyllium Powder 1 Packet(s) Oral two times a day      TELEMETRY: 	    ECG:  	  RADIOLOGY:   < from: Xray Chest 1 View- PORTABLE-Urgent (Xray Chest 1 View- PORTABLE-Urgent .) (09.20.21 @ 09:20) >  IMPRESSION:    The heart is enlarged. Elevated right hemidiaphragm. Small right pleural effusion cannot be ruled out entirely. Platelike atelectasis right lower lobe. An NG tube was placed and the tip is either in the antrum of the stomach or the pylorus. Orthopedic hardware is seen in the thoracolumbar spine. A central line is seen on the right and the tip is in the right atrium.    --- End of Report ---    < end of copied text >    DIAGNOSTIC TESTING:  [ ] Echocardiogram:  [ ]  Catheterization:  [ ] Stress Test:    OTHER: 	    LABS:	 	                            9.1    11.31 )-----------( 173      ( 20 Sep 2021 06:41 )             27.4     09-20    148<H>  |  109<H>  |  21  ----------------------------<  133<H>  3.5   |  29  |  1.13    Ca    8.6      20 Sep 2021 06:41    TPro  5.4<L>  /  Alb  2.7<L>  /  TBili  1.0  /  DBili  x   /  AST  45<H>  /  ALT  25  /  AlkPhos  72  09-20

## 2021-09-20 NOTE — PROGRESS NOTE ADULT - ATTENDING COMMENTS
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  Outpt f/u with her endo Dr. Moni Lim.  Jhoana Jackson MD  Endocrinology Attending  Mondays and Tuesdays 9am-6pm: [phone] (pager)  Other days, night and weekend: [phone]

## 2021-09-21 PROCEDURE — 99233 SBSQ HOSP IP/OBS HIGH 50: CPT

## 2021-09-21 RX ORDER — ACETAMINOPHEN 500 MG
1000 TABLET ORAL ONCE
Refills: 0 | Status: COMPLETED | OUTPATIENT
Start: 2021-09-21 | End: 2021-09-21

## 2021-09-21 RX ADMIN — Medication 1 APPLICATION(S): at 17:49

## 2021-09-21 RX ADMIN — LATANOPROST 1 DROP(S): 0.05 SOLUTION/ DROPS OPHTHALMIC; TOPICAL at 21:58

## 2021-09-21 RX ADMIN — Medication 1000 MILLIGRAM(S): at 17:56

## 2021-09-21 RX ADMIN — Medication 10 MILLIGRAM(S): at 11:53

## 2021-09-21 RX ADMIN — Medication 400 MILLIGRAM(S): at 17:46

## 2021-09-21 RX ADMIN — CEFTRIAXONE 100 MILLIGRAM(S): 500 INJECTION, POWDER, FOR SOLUTION INTRAMUSCULAR; INTRAVENOUS at 12:23

## 2021-09-21 RX ADMIN — Medication 50 MICROGRAM(S): at 05:29

## 2021-09-21 RX ADMIN — OXYCODONE HYDROCHLORIDE 10 MILLIGRAM(S): 5 TABLET ORAL at 12:24

## 2021-09-21 RX ADMIN — ATORVASTATIN CALCIUM 10 MILLIGRAM(S): 80 TABLET, FILM COATED ORAL at 21:54

## 2021-09-21 RX ADMIN — OXYCODONE HYDROCHLORIDE 10 MILLIGRAM(S): 5 TABLET ORAL at 02:48

## 2021-09-21 RX ADMIN — NYSTATIN CREAM 1 APPLICATION(S): 100000 CREAM TOPICAL at 17:48

## 2021-09-21 RX ADMIN — Medication 50 MILLIGRAM(S): at 05:30

## 2021-09-21 RX ADMIN — SODIUM CHLORIDE 3 MILLILITER(S): 9 INJECTION INTRAMUSCULAR; INTRAVENOUS; SUBCUTANEOUS at 21:55

## 2021-09-21 RX ADMIN — OXYCODONE HYDROCHLORIDE 10 MILLIGRAM(S): 5 TABLET ORAL at 03:41

## 2021-09-21 RX ADMIN — Medication 10 MILLIGRAM(S): at 17:47

## 2021-09-21 RX ADMIN — NYSTATIN CREAM 1 APPLICATION(S): 100000 CREAM TOPICAL at 05:30

## 2021-09-21 RX ADMIN — Medication 50 MILLIGRAM(S): at 17:48

## 2021-09-21 RX ADMIN — SODIUM CHLORIDE 3 MILLILITER(S): 9 INJECTION INTRAMUSCULAR; INTRAVENOUS; SUBCUTANEOUS at 05:31

## 2021-09-21 RX ADMIN — PANTOPRAZOLE SODIUM 40 MILLIGRAM(S): 20 TABLET, DELAYED RELEASE ORAL at 12:24

## 2021-09-21 RX ADMIN — LIDOCAINE 1 PATCH: 4 CREAM TOPICAL at 12:25

## 2021-09-21 RX ADMIN — Medication 1 APPLICATION(S): at 05:31

## 2021-09-21 RX ADMIN — LOSARTAN POTASSIUM 50 MILLIGRAM(S): 100 TABLET, FILM COATED ORAL at 05:29

## 2021-09-21 RX ADMIN — LIDOCAINE 1 PATCH: 4 CREAM TOPICAL at 02:00

## 2021-09-21 RX ADMIN — SODIUM CHLORIDE 3 MILLILITER(S): 9 INJECTION INTRAMUSCULAR; INTRAVENOUS; SUBCUTANEOUS at 13:43

## 2021-09-21 RX ADMIN — OXYCODONE HYDROCHLORIDE 10 MILLIGRAM(S): 5 TABLET ORAL at 13:43

## 2021-09-21 RX ADMIN — ENOXAPARIN SODIUM 30 MILLIGRAM(S): 100 INJECTION SUBCUTANEOUS at 17:47

## 2021-09-21 RX ADMIN — Medication 1 APPLICATION(S): at 05:43

## 2021-09-21 NOTE — CONSULT NOTE ADULT - CONSULT REQUESTED BY NAME
ED
Dr Moody
Dr. Bernard
Dr. Hope
ED
Dr. Mancia (PCP Dr. Janelle Segura [facility])
Neurosurgery team
RN
Dr. Hope
medicine

## 2021-09-21 NOTE — PROGRESS NOTE ADULT - SUBJECTIVE AND OBJECTIVE BOX
SUBJECTIVE / OVERNIGHT EVENTS:    no events overnight  patient seen and examined  sitting up in chair      --------------------------------------------------------------------------------------------  LABS:                        9.1    11.31 )-----------( 173      ( 20 Sep 2021 06:41 )             27.4     09-20    148<H>  |  109<H>  |  21  ----------------------------<  133<H>  3.5   |  29  |  1.13    Ca    8.6      20 Sep 2021 06:41    TPro  5.4<L>  /  Alb  2.7<L>  /  TBili  1.0  /  DBili  x   /  AST  45<H>  /  ALT  25  /  AlkPhos  72  09-20      CAPILLARY BLOOD GLUCOSE                RADIOLOGY & ADDITIONAL TESTS:    Imaging Personally Reviewed:  [x] YES  [ ] NO    Consultant(s) Notes Reviewed:  [x] YES  [ ] NO    MEDICATIONS  (STANDING):  atorvastatin 10 milliGRAM(s) Oral at bedtime  BACItracin   Ointment 1 Application(s) Topical two times a day  cefTRIAXone   IVPB      cefTRIAXone   IVPB 1000 milliGRAM(s) IV Intermittent every 24 hours  clotrimazole 1% Cream 1 Application(s) Topical two times a day  enoxaparin Injectable 30 milliGRAM(s) SubCutaneous <User Schedule>  hydrocortisone 10 milliGRAM(s) Oral <User Schedule>  influenza   Vaccine 0.5 milliLiter(s) IntraMuscular once  latanoprost 0.005% Ophthalmic Solution 1 Drop(s) Both EYES at bedtime  levothyroxine 50 MICROGram(s) Oral daily  lidocaine   4% Patch 1 Patch Transdermal daily  losartan 50 milliGRAM(s) Oral daily  metoprolol tartrate 50 milliGRAM(s) Oral two times a day  nystatin Powder 1 Application(s) Topical two times a day  pantoprazole  Injectable 40 milliGRAM(s) IV Push daily  sodium chloride 3%  Inhalation 3 milliLiter(s) Inhalation every 8 hours    MEDICATIONS  (PRN):  artificial  tears Solution 1 Drop(s) Both EYES every 6 hours PRN Dry Eyes  cyclobenzaprine 5 milliGRAM(s) Oral three times a day PRN Muscle Spasm  oxyCODONE    Solution 10 milliGRAM(s) Oral every 4 hours PRN Severe Pain (7 - 10)  oxyCODONE    Solution 5 milliGRAM(s) Oral every 4 hours PRN Moderate Pain (4 - 6)  simethicone 80 milliGRAM(s) Chew three times a day PRN gas/diarrhea  traMADol 50 milliGRAM(s) Oral every 4 hours PRN breakthrough pain      Care Discussed with Consultants/Other Providers [x] YES  [ ] NO    Vital Signs Last 24 Hrs  T(C): 36.8 (21 Sep 2021 11:40), Max: 36.9 (20 Sep 2021 19:42)  T(F): 98.2 (21 Sep 2021 11:40), Max: 98.4 (20 Sep 2021 19:42)  HR: 79 (21 Sep 2021 11:40) (67 - 79)  BP: 138/65 (21 Sep 2021 11:40) (107/69 - 158/71)  BP(mean): --  RR: 20 (21 Sep 2021 11:40) (18 - 20)  SpO2: 95% (21 Sep 2021 07:25) (95% - 100%)  I&O's Summary    20 Sep 2021 07:01  -  21 Sep 2021 07:00  --------------------------------------------------------  IN: 0 mL / OUT: 35 mL / NET: -35 mL    PHYSICAL EXAM:  GENERAL: NAD, well-developed, awake alert, on NC, NG tube in place  HEAD:  Atraumatic, Normocephalic  EYES: EOMI, PERRLA, conjunctiva and sclera clear, legally blind  NECK: Supple, No JVD  CHEST/LUNG: mild decrease breath sounds bilaterally; No wheeze   HEART: Regular rate and rhythm; No murmurs, rubs, or gallops  ABDOMEN: Soft, Nontender, Nondistended; Bowel sounds present  NEURO: awake alert, no focal weakness, 5/5 b/l upper extremity strength, 4/5 lower extremity strength  EXTREMITIES:  2+ Peripheral Pulses, No clubbing, cyanosis, trace b/l edema  SKIN: No rashes or lesions   BACK: incision c/d/i       SUBJECTIVE / OVERNIGHT EVENTS:    loose stools overnight  patient seen and examined  sitting up in chair  feeding held    --------------------------------------------------------------------------------------------  LABS:                        9.1    11.31 )-----------( 173      ( 20 Sep 2021 06:41 )             27.4     09-20    148<H>  |  109<H>  |  21  ----------------------------<  133<H>  3.5   |  29  |  1.13    Ca    8.6      20 Sep 2021 06:41    TPro  5.4<L>  /  Alb  2.7<L>  /  TBili  1.0  /  DBili  x   /  AST  45<H>  /  ALT  25  /  AlkPhos  72  09-20      CAPILLARY BLOOD GLUCOSE                RADIOLOGY & ADDITIONAL TESTS:    Imaging Personally Reviewed:  [x] YES  [ ] NO    Consultant(s) Notes Reviewed:  [x] YES  [ ] NO    MEDICATIONS  (STANDING):  atorvastatin 10 milliGRAM(s) Oral at bedtime  BACItracin   Ointment 1 Application(s) Topical two times a day  cefTRIAXone   IVPB      cefTRIAXone   IVPB 1000 milliGRAM(s) IV Intermittent every 24 hours  clotrimazole 1% Cream 1 Application(s) Topical two times a day  enoxaparin Injectable 30 milliGRAM(s) SubCutaneous <User Schedule>  hydrocortisone 10 milliGRAM(s) Oral <User Schedule>  influenza   Vaccine 0.5 milliLiter(s) IntraMuscular once  latanoprost 0.005% Ophthalmic Solution 1 Drop(s) Both EYES at bedtime  levothyroxine 50 MICROGram(s) Oral daily  lidocaine   4% Patch 1 Patch Transdermal daily  losartan 50 milliGRAM(s) Oral daily  metoprolol tartrate 50 milliGRAM(s) Oral two times a day  nystatin Powder 1 Application(s) Topical two times a day  pantoprazole  Injectable 40 milliGRAM(s) IV Push daily  sodium chloride 3%  Inhalation 3 milliLiter(s) Inhalation every 8 hours    MEDICATIONS  (PRN):  artificial  tears Solution 1 Drop(s) Both EYES every 6 hours PRN Dry Eyes  cyclobenzaprine 5 milliGRAM(s) Oral three times a day PRN Muscle Spasm  oxyCODONE    Solution 10 milliGRAM(s) Oral every 4 hours PRN Severe Pain (7 - 10)  oxyCODONE    Solution 5 milliGRAM(s) Oral every 4 hours PRN Moderate Pain (4 - 6)  simethicone 80 milliGRAM(s) Chew three times a day PRN gas/diarrhea  traMADol 50 milliGRAM(s) Oral every 4 hours PRN breakthrough pain      Care Discussed with Consultants/Other Providers [x] YES  [ ] NO    Vital Signs Last 24 Hrs  T(C): 36.8 (21 Sep 2021 11:40), Max: 36.9 (20 Sep 2021 19:42)  T(F): 98.2 (21 Sep 2021 11:40), Max: 98.4 (20 Sep 2021 19:42)  HR: 79 (21 Sep 2021 11:40) (67 - 79)  BP: 138/65 (21 Sep 2021 11:40) (107/69 - 158/71)  BP(mean): --  RR: 20 (21 Sep 2021 11:40) (18 - 20)  SpO2: 95% (21 Sep 2021 07:25) (95% - 100%)  I&O's Summary    20 Sep 2021 07:01  -  21 Sep 2021 07:00  --------------------------------------------------------  IN: 0 mL / OUT: 35 mL / NET: -35 mL    PHYSICAL EXAM:  GENERAL: NAD, well-developed, awake alert, on NC, NG tube in place  HEAD:  Atraumatic, Normocephalic  EYES: EOMI, PERRLA, conjunctiva and sclera clear, legally blind  NECK: Supple, No JVD  CHEST/LUNG: mild decrease breath sounds bilaterally; No wheeze   HEART: Regular rate and rhythm; No murmurs, rubs, or gallops  ABDOMEN: Soft, Nontender, Nondistended; Bowel sounds present  NEURO: awake alert, no focal weakness, 5/5 b/l upper extremity strength, 4/5 lower extremity strength  EXTREMITIES:  2+ Peripheral Pulses, No clubbing, cyanosis, trace b/l edema  SKIN: No rashes or lesions   BACK: incision c/d/i

## 2021-09-21 NOTE — PROGRESS NOTE ADULT - NS MD NEURO CONDITIONS_RENAL
CKD/FELICIANO
FELICIANO on CKD II/III- improving will trend/FELICIANO/CKD Stage 2/CKD Stage 3
CKD/FELICIANO
CKD/FELICIANO
FELICIANO on CKD II/III- improving will trend/FELICIANO/CKD Stage 2/CKD Stage 3
FELICIANO on CKD II/III- improving will trend/FELICIANO/CKD Stage 2/CKD Stage 3
CKD Stage 2
FELICIANO/CKD Stage 1
CKD Stage 2
CKD/FELICIANO
FELICIANO/CKD Stage 1

## 2021-09-21 NOTE — PROGRESS NOTE ADULT - SUBJECTIVE AND OBJECTIVE BOX
CHIEF COMPLAINT: patient sitting in chair, c/o incisional back pain- relieved with meds; +HMVC x 2, +NGT, FEEST done yesterday with alot of secretions  multiple loose stools again, feeds changed to Vital, 9/19 ervin for retention    OVERNIGHT EVENTS: multiple loose stools again, feeds on hold with no output    Vital Signs Last 24 Hrs  T(C): 36.8 (21 Sep 2021 11:40), Max: 36.9 (20 Sep 2021 19:42)  T(F): 98.2 (21 Sep 2021 11:40), Max: 98.4 (20 Sep 2021 19:42)  HR: 79 (21 Sep 2021 11:40) (67 - 79)  BP: 138/65 (21 Sep 2021 11:40) (107/69 - 158/71)  BP(mean): --  RR: 20 (21 Sep 2021 11:40) (18 - 20)  SpO2: 95% (21 Sep 2021 07:25) (95% - 100%)    DRAINS: [] LIEN (cc/24h) [x] HMV (L 10cc/24h, R 25cc/24h)    PHYSICAL EXAM:    General: No Acute Distress     Neurological: Awake, alert oriented to person, place and time, Following Commands, PERRL, EOMI, Face Symmetrical, Speech Fluent,   Sensation to Light Touch Intact, bilateral UEs 4-/5, RLE 3/5 prox 4/5 distal; LLE prox 3/5 distal 4/5 (pain/ effort limited exam)    Pulmonary: Clear to Auscultation, No Rales, No Rhonchi, No Wheezes     Cardiovascular: S1, S2, Regular Rate and Rhythm     Gastrointestinal: Soft, Nontender, Nondistended     Incision: +HMVC x 2, aquacel in place     LABS:                                          9.1    11.31 )-----------( 173      ( 20 Sep 2021 06:41 )             27.4   09-20    148<H>  |  109<H>  |  21  ----------------------------<  133<H>  3.5   |  29  |  1.13    Ca    8.6      20 Sep 2021 06:41    TPro  5.4<L>  /  Alb  2.7<L>  /  TBili  1.0  /  DBili  x   /  AST  45<H>  /  ALT  25  /  AlkPhos  72  09-20    Culture - Urine (09.19.21 @ 14:15)    Specimen Source: Catheterized Catheterized    Culture Results:   >100,000 CFU/ml Gram Negative Rods    MEDICATIONS  (STANDING):  atorvastatin 10 milliGRAM(s) Oral at bedtime  BACItracin   Ointment 1 Application(s) Topical two times a day  cefTRIAXone   IVPB      cefTRIAXone   IVPB 1000 milliGRAM(s) IV Intermittent every 24 hours  clotrimazole 1% Cream 1 Application(s) Topical two times a day  enoxaparin Injectable 30 milliGRAM(s) SubCutaneous <User Schedule>  hydrocortisone 10 milliGRAM(s) Oral <User Schedule>  influenza   Vaccine 0.5 milliLiter(s) IntraMuscular once  latanoprost 0.005% Ophthalmic Solution 1 Drop(s) Both EYES at bedtime  levothyroxine 50 MICROGram(s) Oral daily  lidocaine   4% Patch 1 Patch Transdermal daily  losartan 50 milliGRAM(s) Oral daily  metoprolol tartrate 50 milliGRAM(s) Oral two times a day  nystatin Powder 1 Application(s) Topical two times a day  pantoprazole  Injectable 40 milliGRAM(s) IV Push daily  sodium chloride 3%  Inhalation 3 milliLiter(s) Inhalation every 8 hours    MEDICATIONS  (PRN):  artificial  tears Solution 1 Drop(s) Both EYES every 6 hours PRN Dry Eyes  cyclobenzaprine 5 milliGRAM(s) Oral three times a day PRN Muscle Spasm  oxyCODONE    Solution 5 milliGRAM(s) Oral every 4 hours PRN Moderate Pain (4 - 6)  oxyCODONE    Solution 10 milliGRAM(s) Oral every 4 hours PRN Severe Pain (7 - 10)  simethicone 80 milliGRAM(s) Chew three times a day PRN gas/diarrhea  traMADol 50 milliGRAM(s) Oral every 4 hours PRN breakthrough pain    DIET: [] Regular [] CCD [] Renal [] Puree [] Dysphagia [x] Tube Feeds:   Vital 20cc/h     IMAGING:   < from: CT Angio Chest PE Protocol w/ IV Cont (09.17.21 @ 12:36) >  INTERPRETATION:  EXAMINATION: CT ANGIO CHEST PULMONARY ARTERY WITHOUT AND WITH IC    CLINICAL INDICATION: Tachycardia    TECHNIQUE: CTA of the chest was performed after the administration of 56 mL Omnipaque 350.  MIP images were reconstructed.    COMPARISON: 9/3/2014.    FINDINGS:    PULMONARY EMBOLISM: No pulmonary embolism.    AIRWAYS AND LUNGS: The central tracheobronchial tree is patent.  Patchy mild opacities both upper lobes.    MEDIASTINUM AND PLEURA: There are no enlarged mediastinal, hilar or axillary lymph nodes. The visualized portion of the thyroid gland is unremarkable. Small bilateral pleural effusions with partial collapse of both lower lobes. There is no pneumothorax.    HEART AND VESSELS: There is mild cardiomegaly.   There are atherosclerotic calcifications of the aorta and coronary arteries.  There is no pericardial effusion.    UPPER ABDOMEN: Images of the upper abdomen demonstrate enteric tube tip courses past stomach. Gallbladder fossa high density foci with adjacent focus of air.    BONES AND SOFT TISSUES: Right corticated process fracture. Age indeterminate sternal fracture. spine post procedural changes with drainage catheter    TUBES/LINES: Left-sided central venous catheter with tip in the left brachiocephalic vein    IMPRESSION:  No pulmonary embolism. Bilateral upper lobe mild patchy opacities are new from the prior study and indeterminate.    Gallbladder fossa high density foci with adjacent focus of air is indeterminate, possibly postprocedural      < end of copied text >

## 2021-09-21 NOTE — CONSULT NOTE ADULT - PROVIDER SPECIALTY LIST ADULT
Orthopedics
Cardiology
Neurosurgery
Endocrinology
Heme/Onc
Nephrology
Gastroenterology
Internal Medicine
Wound Care
ENT

## 2021-09-21 NOTE — PROGRESS NOTE ADULT - SUBJECTIVE AND OBJECTIVE BOX
CARDIOLOGY FOLLOW UP - Dr. Orozco  DATE OF SERVICE: 09-21-21    CC no cp/sob     REVIEW OF SYSTEMS:   CONSTITUTIONAL: No fever, weight loss, or fatigue   RESPIRATORY:  No cough, wheezing, chills or hemoptysis; No SOB  CARDIOVASCULAR: No chest pain, palpitations, passing out, dizziness, or leg swelling   GASTROINTESTINAL: No abdominal or epigastric pain. No nausea, vomiting, or hematemesis, no diarreha, or constipation, No melena or hematochezia   VASCULAR: no edema.       PHYSICAL EXAM:  T(C): 36.8 (09-21-21 @ 11:40), Max: 36.9 (09-20-21 @ 19:42)  HR: 79 (09-21-21 @ 11:40) (67 - 79)  BP: 138/65 (09-21-21 @ 11:40) (107/69 - 166/78)  RR: 20 (09-21-21 @ 11:40) (18 - 20)  SpO2: 95% (09-21-21 @ 07:25) (95% - 100%)  Wt(kg): --  I&O's Summary    20 Sep 2021 07:01  -  21 Sep 2021 07:00  --------------------------------------------------------  IN: 0 mL / OUT: 35 mL / NET: -35 mL        Appearance: Normal	  Cardiovascular: Normal S1 S2,RRR, No JVD, No murmurs  Respiratory: Lungs clear to auscultation	  Gastrointestinal:  Soft, Non-tender, + BS	  Extremities: Normal range of motion, No clubbing, cyanosis or edema      HOME MEDICATIONS:  acetaminophen 325 mg oral tablet: 2 tab(s) orally every 6 hours, As needed, Mild Pain (1 - 3) (24 Sep 2020 11:16)  Artificial Tears ophthalmic solution: 1 drop(s) to each affected eye 4 times a day, As Needed (22 Aug 2021 23:28)  aspirin 81 mg oral delayed release tablet: 1 tab(s) orally once a day (27 Aug 2021 13:38)  calcium (as carbonate) 600 mg oral tablet: 1 cap(s) orally once a day (24 Sep 2020 11:16)  Eliquis 2.5 mg oral tablet: 1 tab(s) orally 2 times a day (24 Sep 2020 11:16)  felodipine 5 mg oral tablet, extended release: 1 tab(s) orally once a day (22 Aug 2021 23:23)  guaiFENesin 600 mg oral tablet, extended release: 1 tab(s) orally every 12 hours as needed for cough  (27 Aug 2021 13:38)  hydrocortisone 10 mg oral tablet: 1 tab(s) orally 2 times a day (24 Sep 2020 11:16)  latanoprost 0.005% ophthalmic solution: 1 drop(s) to each affected eye once a day (in the evening) (22 Aug 2021 23:27)  levothyroxine 50 mcg (0.05 mg) oral tablet: 1 tab(s) orally once a day (24 Sep 2020 11:16)  Lipitor 10 mg oral tablet: 1 tab(s) orally once a day (22 Aug 2021 23:41)  Macular Vitamin Benefit oral tablet: 1 tab(s) orally once a day (24 Sep 2020 11:16)  metoprolol succinate 50 mg oral tablet, extended release: 1 tab(s) orally once a day (22 Aug 2021 23:24)  Multiple Vitamins oral tablet: 1 tab(s) orally once a day (22 Aug 2021 23:22)  olmesartan 20 mg oral tablet: 1 tab(s) orally once a day (22 Aug 2021 23:22)  omeprazole 20 mg oral delayed release capsule: 1 cap(s) orally once a day (22 Aug 2021 23:21)  oxyCODONE 5 mg oral tablet: 1 tab(s) orally every 8 hours as needed for severe pain (27 Aug 2021 13:35)  OxyCONTIN 20 mg oral tablet, extended release: 1 tab(s) orally every 12 hours (24 Sep 2020 11:16)  risedronate 35 mg oral tablet: 1 tab(s) orally once a week (Mondays) (05 Sep 2021 18:30)  senna oral tablet: 2 tab(s) orally once a day (at bedtime), As Needed (05 Sep 2021 18:31)      MEDICATIONS  (STANDING):  atorvastatin 10 milliGRAM(s) Oral at bedtime  BACItracin   Ointment 1 Application(s) Topical two times a day  cefTRIAXone   IVPB      cefTRIAXone   IVPB 1000 milliGRAM(s) IV Intermittent every 24 hours  clotrimazole 1% Cream 1 Application(s) Topical two times a day  enoxaparin Injectable 30 milliGRAM(s) SubCutaneous <User Schedule>  hydrocortisone 10 milliGRAM(s) Oral <User Schedule>  influenza   Vaccine 0.5 milliLiter(s) IntraMuscular once  latanoprost 0.005% Ophthalmic Solution 1 Drop(s) Both EYES at bedtime  levothyroxine 50 MICROGram(s) Oral daily  lidocaine   4% Patch 1 Patch Transdermal daily  losartan 50 milliGRAM(s) Oral daily  metoprolol tartrate 50 milliGRAM(s) Oral two times a day  nystatin Powder 1 Application(s) Topical two times a day  pantoprazole  Injectable 40 milliGRAM(s) IV Push daily  sodium chloride 3%  Inhalation 3 milliLiter(s) Inhalation every 8 hours      TELEMETRY: 	    ECG:  	  RADIOLOGY:   DIAGNOSTIC TESTING:  [ ] Echocardiogram:  [ ]  Catheterization:  [ ] Stress Test:    OTHER: 	    LABS:	 	                                9.1    11.31 )-----------( 173      ( 20 Sep 2021 06:41 )             27.4     09-20    148<H>  |  109<H>  |  21  ----------------------------<  133<H>  3.5   |  29  |  1.13    Ca    8.6      20 Sep 2021 06:41    TPro  5.4<L>  /  Alb  2.7<L>  /  TBili  1.0  /  DBili  x   /  AST  45<H>  /  ALT  25  /  AlkPhos  72  09-20

## 2021-09-21 NOTE — CONSULT NOTE ADULT - ATTENDING COMMENTS
1. s/p falls, s/p FEES    plan; continue ng tube feeds     2. diarrhea, ? due to antibiotoics, feeds    plan; stool for c.diff    continue management as above.

## 2021-09-21 NOTE — PROGRESS NOTE ADULT - SUBJECTIVE AND OBJECTIVE BOX
[facility]   MD KILLIAN JAIME MD RUORU WONG, PA    TEL:  OFFICE: [phone]  DR CHARLES CELL: [phone]  BEENA PISANO CELL: [phone]  DR. LOZANO CELL: [phone]      FROM 5 PM - 7 AM PLEASE CALL ANSWERING SERVICE: [phone]    RENAL FOLLOW UP NOTE--Date of Service 09-21-21 @ 10:09  --------------------------------------------------------------------------------  HPI:      Pt seen and examined at bedside.       PAST HISTORY  --------------------------------------------------------------------------------  No significant changes to PMH, PSH, FHx, SHx, unless otherwise noted    ALLERGIES & MEDICATIONS  --------------------------------------------------------------------------------  Allergies    penicillin (Rash)    Intolerances      Standing Inpatient Medications  atorvastatin 10 milliGRAM(s) Oral at bedtime  BACItracin   Ointment 1 Application(s) Topical two times a day  cefTRIAXone   IVPB      cefTRIAXone   IVPB 1000 milliGRAM(s) IV Intermittent every 24 hours  clotrimazole 1% Cream 1 Application(s) Topical two times a day  enoxaparin Injectable 30 milliGRAM(s) SubCutaneous <User Schedule>  hydrocortisone 10 milliGRAM(s) Oral <User Schedule>  influenza   Vaccine 0.5 milliLiter(s) IntraMuscular once  latanoprost 0.005% Ophthalmic Solution 1 Drop(s) Both EYES at bedtime  levothyroxine 50 MICROGram(s) Oral daily  lidocaine   4% Patch 1 Patch Transdermal daily  losartan 50 milliGRAM(s) Oral daily  metoprolol tartrate 50 milliGRAM(s) Oral two times a day  nystatin Powder 1 Application(s) Topical two times a day  pantoprazole  Injectable 40 milliGRAM(s) IV Push daily  sodium chloride 3%  Inhalation 3 milliLiter(s) Inhalation every 8 hours    PRN Inpatient Medications  artificial  tears Solution 1 Drop(s) Both EYES every 6 hours PRN  cyclobenzaprine 5 milliGRAM(s) Oral three times a day PRN  oxyCODONE    Solution 10 milliGRAM(s) Oral every 4 hours PRN  oxyCODONE    Solution 5 milliGRAM(s) Oral every 4 hours PRN  simethicone 80 milliGRAM(s) Chew three times a day PRN  traMADol 50 milliGRAM(s) Oral every 4 hours PRN      REVIEW OF SYSTEMS  --------------------------------------------------------------------------------  General: no fever  MSK: no edema     VITALS/PHYSICAL EXAM  --------------------------------------------------------------------------------  T(C): 36.8 (09-21-21 @ 04:21), Max: 36.9 (09-20-21 @ 19:42)  HR: 77 (09-21-21 @ 04:21) (70 - 78)  BP: 135/61 (09-21-21 @ 04:21) (107/69 - 166/78)  RR: 18 (09-21-21 @ 04:21) (18 - 20)  SpO2: 100% (09-21-21 @ 04:21) (96% - 100%)  Wt(kg): --        09-20-21 @ 07:01  -  09-21-21 @ 07:00  --------------------------------------------------------  IN: 0 mL / OUT: 35 mL / NET: -35 mL      Physical Exam:  	Gen: NAD  	HEENT: MMM  	Pulm: CTA B/L  	CV: S1S2  	Abd: Soft, +BS  	Ext: No LE edema B/L                      Neuro: Awake   	Skin: Warm and Dry   	Vascular access: NO HD catheter           ROBEL ervin  LABS/STUDIES  --------------------------------------------------------------------------------              9.1    11.31 >-----------<  173      [09-20-21 @ 06:41]              27.4     148  |  109  |  21  ----------------------------<  133      [09-20-21 @ 06:41]  3.5   |  29  |  1.13        Ca     8.6     [09-20-21 @ 06:41]    TPro  5.4  /  Alb  2.7  /  TBili  1.0  /  DBili  x   /  AST  45  /  ALT  25  /  AlkPhos  72  [09-20-21 @ 06:41]          Creatinine Trend:  SCr 1.13 [09-20 @ 06:41]  SCr 1.23 [09-19 @ 07:14]  SCr 0.83 [09-18 @ 05:46]  SCr 1.02 [09-16 @ 21:09]  SCr 1.17 [09-15 @ 21:18]    Urinalysis - [09-19-21 @ 09:33]      Color Light Yellow / Appearance Slightly Turbid / SG 1.011 / pH 8.0      Gluc 100 mg/dL / Ketone Negative  / Bili Negative / Urobili Negative       Blood Large / Protein 30 mg/dL / Leuk Est Large / Nitrite Negative       /  / Hyaline 2 / Gran  / Sq Epi  / Non Sq Epi 0 / Bacteria Moderate      Iron 44, TIBC 224, %sat 19      [09-14-21 @ 10:27]  Ferritin 444      [09-14-21 @ 11:37]  Vitamin D (25OH) 67.9      [09-08-21 @ 09:59]  HbA1c 5.9      [02-22-17 @ 03:10]  TSH 0.18      [09-08-21 @ 09:59]

## 2021-09-21 NOTE — PROGRESS NOTE ADULT - ASSESSMENT
HPI:  Patient is a 75yo F with a history of colon cancer s/p right hemicolectomy (approx 2 years ago, outside hospital), DVT (on Eliquis), CKD, adrenal insufficiency presenting as transfer from [facility] for trauma evaluation secondary to a mechanical fall from sitting. Patient was going to her commode last night when she slipped and fell on her right side, hitting her head. Denies LOC. Patient's daughter came to help her up and brought her to the ED. Since the fall, patient has been endorses right-sided chest pain but no SOB, headache, weakness or dizziness. Patient does not know what medications she takes.     Patient transferred to [facility] for trauma evaluation. In the ED, patient was hemodynamically stable but requiring 2L NC, satting 98%. Labs showed WBC 17, Cr 1.42, otherwise normal. CT head/cervical spine/chest were performed which showed a right forehead hematoma, acute minimally displaced right coracoid process scapular fracture, T11/L1 compression fractures and an inferior sternal fracture.    PRIMARY SURVEY:  A - Airway intact.  B - Bilateral breath sounds and equal chest rise. SpO2 98% 2L NC  C - Initially BP: 155/62 (09-05-21 @ 14:52), palpable pulses in all extremities.  D - GCS 15.  E - Exposure obtained.    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    Osteoporosis    Chronic Kidney Disease; Dx 2009    HTN - Hypertension    History of Obesity    Myocardial infarction    History of Laminectomy/ Fusion 1/06; L1-L2    H/O right hemicolectomy    PROCEDURE:  9/14/21 s/p S/p T10-pelvis with revision of previous L2-5 hardware, L1 expanded PSO, with plastics closure for failed back syndrome, L1 chance fracture, and coronal plane deformity.  9/14 s/p transfusion 2 U PRBCs, acute post op blood loss anemia on chronic improved and now stable    PLAN:  Neuro: pain meds PRN, maintain HMVC x 2 and record outputs, plastics changed dressing; unable to tolerate TLSO brace due to pain and decreased inspiratory effort- wear as tolerated OOB and must be on when working with PT   Respiratory: patient instructed on incentive spirometer  CV: tachycardia improved with current meds- no PE on CTA, cont losartan and increased metoprolol for HTN,   Endocrine: cont hydrocortisone taper per endocrine consult for adrenal insufficiency (chronic steroid use) now on hydrocortisone 10mg BID  Heme/Onc: stable acute post op blood loss anemia on chronic anemia, had elevated PTT preop and now normalized, hematology following  will need to restart her eliquis for A/C for h/o DVT in setting of colon CA, once cleared by surgeon  DVT ppx: [] SQH [x] SQL and venodynes bilaterally  Renal: FELICIANO resolved on CKD II/III- nephology following; urinary retention so ervin reinserted 9/19  +UA/ culture- +UTI on ceftriaxone as discussed with medicine as she gets a rash from PCN  ID: afebrile  GI: bowel regimen on hold, cont vital NGT feeds for now, awaiting speech/swallow repeat FEEST later this week given excessive secretions- 3% and humidified O2  PT/OT: subacute rehab upon d/c  appreciate medicine follow up  f/u am labs  hypernatremia- will increase free water 250cc Q8hs.   leukocytosis likely due to steroids as she is afebrile  f/u am labs    Will discuss with Dr Heidy Sykes # [phone]

## 2021-09-21 NOTE — CONSULT NOTE ADULT - CONSULT REASON
R scapula fracture
diarrhea, hx colon CA s/p resection
comp fx
AI/Hypothyroid
FEES with speech and swallow
prolonged PTT
Bilateral under breast skin fold and bilateral under abdominal pannus skin fold rash
medicine co mgmt
cards clearance
Renal failure

## 2021-09-21 NOTE — CONSULT NOTE ADULT - ASSESSMENT
75yo F with a history of colon cancer s/p right hemicolectomy (approx 2 years ago, outside hospital), DVT (on Eliquis), CKD, adrenal insufficiency presenting as transfer from [facility] for trauma evaluation secondary to a mechanical fall from sitting with associated head strike    #Dysphagia  s/p FEES with SLP and ENT - bedside laryngoscopy unremarkable  NPO with non oral nutrition/hydration/meds  plan for repeat FEES pending improved secretion management (tentatively planned for later this week)  -NGT for feeds/meds  -PPI (IV) for GI prophylaxis and as pt on steroids for adrenal insufficiency    #Diarrhea - suspect TF associated  s/p right hemicolectomy, would benefit from semi-elemental formula  Given pt on Abx, keep a close eye on BMs  Clinically benign abdominal exam  -recommend start low rate VITAL AF feeds (20 cc/hour) and monitor  -recommend Banatrol flakes  -if has rising WBC or abdominal pain/distension or watery stools, check stool for  C diff (last stools thick/formed per RN reports)  -if has increase stooling, would recommend placement of Dignicare fecal management to protect against further skin irritation/breakdown      Discussed with Neurosurgery team    Thank you for the courtesy of this consult.    Sean Dahl PA-C    [facility]  [phone]  After hours and weekend coverage [phone]

## 2021-09-21 NOTE — PROGRESS NOTE ADULT - ASSESSMENT
ECHO 10/25/16:  Normal left ventricular systolic function. No segmental wall motion abnormalities. Hypermobile interatrial septum.   ECHO 8/24/21: EF 66% grossly nl LV sys fx, mild diastolic dsyfx - stage 1     a/p    Patient is a 75yo F , known to practice from prior admission,  with a history of colon cancer s/p right hemicolectomy (approx 2 years ago, outside hospital), DVT (on Eliquis), CKD, adrenal insufficiency presenting as transfer from [facility] for trauma evaluation secondary to a mechanical fall from sitting    #Mechanical fall  -MRI noted with anterior splaying of T12/L1 space w/ edema   -CT T done noted with widening of T12/L1 disc space w/ L1 laminectomy  -s/p T10-pelvis with revision of previous L2-5 hardware, L1 expanded PSO.  -s/p intubation- now extubated 9/16 , on NC  -CV stable   -management per neuro sx     #SVT (hx)  -hr stable   -cont bb   -inc bb as needed     #Dyspnea  -CT A neg for PE  -dyspnea improving - s/p IVP lasix 40 mg x 1 with good UO  -CXR noted   -lasix PRN  -monitor vol status closely while on IVF     # DVT (hx)  -recent le doppler negative for acute dvt  -AC on hold post-op- hem to follow up for further a/c recommendations    # wm on CKD   -renal f/u     #UTI  -tx per primary team     #HTN  -bp improving  -cont bb, arb     dvt ppx

## 2021-09-21 NOTE — PROGRESS NOTE ADULT - ASSESSMENT
Patient is a 77yo F with a history of colon cancer s/p right hemicolectomy (approx 2 years ago, outside hospital), DVT (on Eliquis), CKD, adrenal insufficiency presenting after mechanical fall from sitting. CT head/cervical spine/chest were performed which showed a right forehead hematoma, acute minimally displaced right coracoid process scapular fracture, T11/L1 compression fractures and an inferior sternal fracture. 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 Treviño  FELICIANO likely sec to hemodynamic change  Renal function stable  restarted on Losartan 9/17  Monitor I/O  Monitor renal function at present    Acidosis:  non-AG+AG  Hold oral sodium bicarb 650mg TID    HTN:  BP controlled   Off midrodrine  Monitor at present    Hyponatremia/Hypernatremia  Started on free water flushes by neuro--consider increasing to 300 cc Q 6hrs if sodium does not improve today   Monitor serum Na

## 2021-09-21 NOTE — PROGRESS NOTE ADULT - ATTENDING COMMENTS
Pt seen and examined with the NP. Agree with the assessment and plan.  Vitals, labs and radiology results personally reviewed.  Above note edited as appropriate.  Discussed with the pt, son and the daughter at bedside, answered all questions.  remain NPO unfortunately. with NG tube feeding. FEES/speech f/u.  monitor for diarrhea.     [facility]  [phone]

## 2021-09-21 NOTE — PROGRESS NOTE ADULT - SUBJECTIVE AND OBJECTIVE BOX
Chief Complaint: fu    History of Present Illness: pt resting, +incisional pain "burning" per daughter; no f/c, no cp, no dyspnea/cough, no n/v, some abd cramping, diarrhea improved on alternate TF; no overt bleeding, +weakness      MEDICATIONS  (STANDING):  atorvastatin 10 milliGRAM(s) Oral at bedtime  BACItracin   Ointment 1 Application(s) Topical two times a day  cefTRIAXone   IVPB      cefTRIAXone   IVPB 1000 milliGRAM(s) IV Intermittent every 24 hours  clotrimazole 1% Cream 1 Application(s) Topical two times a day  enoxaparin Injectable 30 milliGRAM(s) SubCutaneous <User Schedule>  hydrocortisone 10 milliGRAM(s) Oral <User Schedule>  influenza   Vaccine 0.5 milliLiter(s) IntraMuscular once  latanoprost 0.005% Ophthalmic Solution 1 Drop(s) Both EYES at bedtime  levothyroxine 50 MICROGram(s) Oral daily  lidocaine   4% Patch 1 Patch Transdermal daily  losartan 50 milliGRAM(s) Oral daily  metoprolol tartrate 50 milliGRAM(s) Oral two times a day  nystatin Powder 1 Application(s) Topical two times a day  pantoprazole  Injectable 40 milliGRAM(s) IV Push daily  sodium chloride 3%  Inhalation 3 milliLiter(s) Inhalation every 8 hours    MEDICATIONS  (PRN):  artificial  tears Solution 1 Drop(s) Both EYES every 6 hours PRN Dry Eyes  cyclobenzaprine 5 milliGRAM(s) Oral three times a day PRN Muscle Spasm  oxyCODONE    Solution 5 milliGRAM(s) Oral every 4 hours PRN Moderate Pain (4 - 6)  oxyCODONE    Solution 10 milliGRAM(s) Oral every 4 hours PRN Severe Pain (7 - 10)  simethicone 80 milliGRAM(s) Chew three times a day PRN gas/diarrhea  traMADol 50 milliGRAM(s) Oral every 4 hours PRN breakthrough pain      Allergies    penicillin (Rash)    Intolerances        Vital Signs Last 24 Hrs  T(C): 36.8 (21 Sep 2021 11:40), Max: 36.9 (20 Sep 2021 19:42)  T(F): 98.2 (21 Sep 2021 11:40), Max: 98.4 (20 Sep 2021 19:42)  HR: 79 (21 Sep 2021 11:40) (67 - 79)  BP: 138/65 (21 Sep 2021 11:40) (107/69 - 158/71)  BP(mean): --  RR: 20 (21 Sep 2021 11:40) (18 - 20)  SpO2: 95% (21 Sep 2021 07:25) (95% - 100%)    PHYSICAL EXAM  General: adult in NAD  HEENT: clear oropharynx, anicteric sclera, pink conjunctiva  Neck: supple  CV: normal S1/S2   Lungs: clear to auscultation, no wheezes, no rales  Abdomen: soft non-tender non-distended, positive bowel sounds  Ext: no clubbing cyanosis or edema  Skin: no rashes and no petechiae; +drains  Lymph Nodes: No LAD in neck  Neuro: alert and oriented X 3, no focal deficits    LABS:                          9.1    11.31 )-----------( 173      ( 20 Sep 2021 06:41 )             27.4         Mean Cell Volume : 82.5 fl  Mean Cell Hemoglobin : 27.4 pg  Mean Cell Hemoglobin Concentration : 33.2 gm/dL  Auto Neutrophil # : 7.91 K/uL  Auto Lymphocyte # : 1.57 K/uL  Auto Monocyte # : 1.39 K/uL  Auto Eosinophil # : 0.05 K/uL  Auto Basophil # : 0.03 K/uL  Auto Neutrophil % : 69.9 %  Auto Lymphocyte % : 13.9 %  Auto Monocyte % : 12.3 %  Auto Eosinophil % : 0.4 %  Auto Basophil % : 0.3 %      Serial CBC's  09-20 @ 06:41  Hct-27.4 / Hgb-9.1 / Plat-173 / RBC-3.32 / WBC-11.31  Serial CBC's  09-19 @ 07:14  Hct-31.2 / Hgb-10.6 / Plat-191 / RBC-3.85 / WBC-13.84  Serial CBC's  09-18 @ 05:46  Hct-28.9 / Hgb-10.1 / Plat-172 / RBC-3.52 / WBC-15.66      09-20    148<H>  |  109<H>  |  21  ----------------------------<  133<H>  3.5   |  29  |  1.13    Ca    8.6      20 Sep 2021 06:41    TPro  5.4<L>  /  Alb  2.7<L>  /  TBili  1.0  /  DBili  x   /  AST  45<H>  /  ALT  25  /  AlkPhos  72  09-20                      Radiology:

## 2021-09-21 NOTE — CONSULT NOTE ADULT - SUBJECTIVE AND OBJECTIVE BOX
Patient is a 76y old  Female who presents with a chief complaint of S/p fall (21 Sep 2021 13:28)      HPI:  Patient is a 75yo F with a history of colon cancer s/p right hemicolectomy (approx 2 years ago, outside hospital), DVT (on Eliquis), CKD, adrenal insufficiency presenting as transfer from [facility] for trauma evaluation secondary to a mechanical fall from sitting. Patient was going to her commode last night when she slipped and fell on her right side, hitting her head. Denies LOC. Patient's daughter came to help her up and brought her to the ED. Since the fall, patient has been endorses right-sided chest pain but no SOB, headache, weakness or dizziness. Patient does not know what medications she takes.     Patient transferred to [facility] for trauma evaluation. In the ED, patient was hemodynamically stable but requiring 2L NC, satting 98%. Labs showed WBC 17, Cr 1.42, otherwise normal. CT head/cervical spine/chest were performed which showed a right forehead hematoma, acute minimally displaced right coracoid process scapular fracture, T11/L1 compression fractures and an inferior sternal fracture.    s/p T10-pelvis  revision of previous L2-5 hardware, expanded PSO with plastics closure on 9/14  post-op course complicated by slow to wake up, respiratory failure, dysphagia and sinus tachycardia.  Remained intubated until 9/16; thick secretions suspected from prolonged intubation - chest PT/oral care, Mucinex and 3% inhalation NaCl  LE duplex negative for DVT    Noted to have increased secretions  s/p FEES with SLP and ENT - bedside laryngoscopy unremarkable  NPO with non oral nutrition/hydration/meds  plan for repeat FEES pending improved secretion management (tentatively planned for later this week)    Has been on NGTF - profuse diarrhea on Jevity  feeds changed to Vital AF - still noted to have large copious stool.   Feeds changed to half volume yesterday and still noted to have large amount of stool (not watery) yesterday, some greenish mucoid component along with thick brown stool.  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    Osteoporosis    Chronic Kidney Disease; Dx 2009    HTN - Hypertension    History of Obesity    Myocardial infarction    History of Laminectomy/ Fusion 1/06; L1-L2    H/O right hemicolectomy        Allergies  penicillin (Rash)        MEDICATIONS  (STANDING):  atorvastatin 10 milliGRAM(s) Oral at bedtime  BACItracin   Ointment 1 Application(s) Topical two times a day  cefTRIAXone   IVPB      cefTRIAXone   IVPB 1000 milliGRAM(s) IV Intermittent every 24 hours  clotrimazole 1% Cream 1 Application(s) Topical two times a day  enoxaparin Injectable 30 milliGRAM(s) SubCutaneous <User Schedule>  hydrocortisone 10 milliGRAM(s) Oral <User Schedule>  influenza   Vaccine 0.5 milliLiter(s) IntraMuscular once  latanoprost 0.005% Ophthalmic Solution 1 Drop(s) Both EYES at bedtime  levothyroxine 50 MICROGram(s) Oral daily  lidocaine   4% Patch 1 Patch Transdermal daily  losartan 50 milliGRAM(s) Oral daily  metoprolol tartrate 50 milliGRAM(s) Oral two times a day  nystatin Powder 1 Application(s) Topical two times a day  pantoprazole  Injectable 40 milliGRAM(s) IV Push daily  sodium chloride 3%  Inhalation 3 milliLiter(s) Inhalation every 8 hours    MEDICATIONS  (PRN):  artificial  tears Solution 1 Drop(s) Both EYES every 6 hours PRN Dry Eyes  cyclobenzaprine 5 milliGRAM(s) Oral three times a day PRN Muscle Spasm  oxyCODONE    Solution 5 milliGRAM(s) Oral every 4 hours PRN Moderate Pain (4 - 6)  oxyCODONE    Solution 10 milliGRAM(s) Oral every 4 hours PRN Severe Pain (7 - 10)  simethicone 80 milliGRAM(s) Chew three times a day PRN gas/diarrhea  traMADol 50 milliGRAM(s) Oral every 4 hours PRN breakthrough pain      Social History:  lives with daughter  no tobacco or ETOH use    Family History   IBD (  ) Yes   (X  ) No  GI Malignancy (  )  Yes    (X  ) No    Health Management     Last Colonoscopy - 9/24/2020  patent end to side ileo-colonic anastomosis in the transverse colon, internal hemorrhoids (small). No polyps or mucosal abnormalities noted      Advanced Directives: (  X   ) None    (      ) DNR    (     ) DNI    (     ) Health Care Proxy:     Review of Systems:    General:  No wt loss, fevers, chills, night sweats  CV:  No pain, see HPI  Resp:  +secretions  s/p recent intubation  GI:  see HPI  :  No pain, bleeding, incontinence, nocturia +CKD +ervin  Muscle:  see HPI  Neuro:  see HPI  Psych:  No fatigue, insomnia, mood problems, depression  Endocrine:  No polyuria, polydypsia, +adrenal insufficiency  Heme:  No petechiae, ecchymosis, easy bruisability  Skin:  No tattoos, scars, edema +dermatitis      Vital Signs Last 24 Hrs  T(C): 36.8 (21 Sep 2021 11:40), Max: 36.9 (20 Sep 2021 19:42)  T(F): 98.2 (21 Sep 2021 11:40), Max: 98.4 (20 Sep 2021 19:42)  HR: 79 (21 Sep 2021 11:40) (67 - 79)  BP: 138/65 (21 Sep 2021 11:40) (107/69 - 158/71)  BP(mean): --  RR: 20 (21 Sep 2021 11:40) (18 - 20)  SpO2: 95% (21 Sep 2021 07:25) (95% - 100%)    PHYSICAL EXAM:    Constitutional: elderly AA female OOB to chair.  responsive +NGT + nasal cannula +HMV drains  Neck: No LAD, no JVD  Respiratory: +rhonchi   no accessory muscle use   Cardiovascular: S1 and S2, RRR  Gastrointestinal: BS+, obese WH surgical scars (laparoscopic) soft, NT/ND, neg HSM, +lower abdomen pannus +ervin  Extremities: No peripheral edema, neg clubbing, cyanosis  Vascular: 2+ peripheral pulses  Neurological: A/O x 3, no focal asymmetry  Psychiatric: normal affect  Skin: No rashes  +dermatitis in skin folds at groin under pannus        LABS:                        9.1    11.31 )-----------( 173      ( 20 Sep 2021 06:41 )             27.4     09-20    148<H>  |  109<H>  |  21  ----------------------------<  133<H>  3.5   |  29  |  1.13    Ca    8.6      20 Sep 2021 06:41    TPro  5.4<L>  /  Alb  2.7<L>  /  TBili  1.0  /  DBili  x   /  AST  45<H>  /  ALT  25  /  AlkPhos  72  09-20      RADIOLOGY & ADDITIONAL TESTS:

## 2021-09-21 NOTE — PROGRESS NOTE ADULT - ASSESSMENT
75 y/o female with a history of colon cancer s/p right hemicolectomy (approx 2 years ago, outside hospital), DVT (on Eliquis), CKD, adrenal insufficiency presents s/p fall with right coracoid process scapular fracture, T11/L1 compression fractures and an inferior sternal fracture, noted to have prolonged PTT.    1. prolonged PTT  - no hx of bleeding disorder  - nl PTT on admission 9/5; prolonged to 69 on 9/8, further prolonged 9/13  - blood draws have been peripheral sticks- no central catheter/Port  - prolonged PTT can be seen with subcutaneous heparin administration, usually milder elevations, but this is most likely cause in this patient  - low suspicion for factor deficiency with normal PTT on admission  - no hx of liver disease and PT was normal prior  - no evidence of DIC, normal fibrinogen  - mixing study, thrombin time and reptilase time were consistent with heparin effect  - PTT normalized off of subcut heparin and remains normal on SQ lovenox    2.anemia, microcytic- acute blood loss with surgery  - iron studies pre op with high ferritin, normal B12 and folate  - s/p 2 unit PRBC intra op  - monitor Hg, with drains in place    3. hx of DVT, 2019- bilateral  - discovered with colon cancer diagnosis  - has been on eliquis, maintained on AC with decreased mobility  - duplex LE 9/9- no DVT  - eliquis on HOLD for now, repeat Duplex 9/16 remain negative  - CTA chest with no obvious PE on 9-17-21  - continue prophylaxis with lovenox for now; full AC to resume when cleared by surgery    4. s/p fall, with T12- L1 fracture  - s/p thorcao-lumbar fusion per neurosurgery on 9-14-21  - extensive wound, + drain    d/w daughter bedside

## 2021-09-21 NOTE — PROGRESS NOTE ADULT - ASSESSMENT
77 yo F with a history of colon cancer s/p right hemicolectomy (approx 2 years ago, outside hospital), DVT (on Eliquis), CKD, adrenal insufficiency presenting as transfer from [facility] for trauma evaluation secondary to a mechanical fall from sitting with headstrike. Injuries identified include right forehead hematoma, acute minimally displaced right coracoid process scapular fracture, T11/L1 compression fractures and an inferior sternal fracture. Patient hemodynamically stable.    # Dysphagia  s/p FEES with speech and swallow, full assessments reviewed  Bedside indirect laryngoscopy was unremarkable  NPO, with non-oral nutrition/hydration/medications  Repeat FEES pending improved secretion management, tentatively x3 days  Consider Mucinex for thick secretions    # Elevated PTT  Mild elevations in PTT can be seen with SC heparin administration, particularly with TID dosing  low plasma protein, impaired renal function, and low BMI have been postulated as contributing factors.    hematology following.    # S/p Mechanical Fall:  Right forehead hematoma, acute minimally displaced right coracoid process scapular fracture, T11/L1 compression fractures and an inferior sternal fracture  no surgery for scapular fx. Placed in sling  CT spine with fusion L2-5. Fracture through the T12-L1 disc space with widening of the disc space and a L1-2 laminectomy which appears unstable. Diffuse osteopenia. Old T11 and L1 fractures.   MRI T/L spine with anterior splaying of the intervertebral disc space at the T12-L1 with edema in the anterior prevertebral soft tissues  CT T done noted with widening of T12/L1 disc space w/ L1 laminectomy  Neurosurgery offering surgery vs conservative management, TLSO brace in the meantime.   The daughter Susie decided to proceed with surgery.   Cr has improved.   pt s/p OR: revision of prior L2-L5 fusion w/ extension from T10- pelvis w/o L1 PSO, and plastics closure on 9/14  Neuro checks, Monitor outpt via drain, monitored in NICU post op, extubated 9/16/21. Doing well, transferred to the floor on 9/18/21.     # Nausea:   Pt has been nauseous (no abd pain). ?pain med induced/ Opioids   Switched hydrocortisone 10 mg BID to IV 25 mg BID while poor PO intake.   s/p stress dose steroids.     # Hypopituitary   She is on Hydrocortisone 10 mg BID at home, so will monitor for adrenal insufficiency. am cortisol appropriate  UA unimpressive. vit D, vit B12 normal. TSH low as expected with hypopit. free T4 sent, normal.   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 Treviño ([facility]). Outpt EMR reviewed; Cr range from 1.4 to 1.6   Monitor I/O's, Serial Cr, Avoid nephrotoxins  Cr is up from baseline. Hold Losartan. s/p gentle IVF for acute kidney failure: likely pre-renal.   Her recent TTE reviewed, with normal LV.   Her Cr now back to baseline (1.4-1.6)   Losartan restarted    # UTI  UA+, urine culture pending  Started Ceftriaxone IV and tolerating   no urinary symptoms.     # CAD: Chronic, stable  Cont home CV meds  holding ASA for neurosurgery     # Hx of SVT  ST on monitor 120s  BB resumed by cards, uptitrate as BP tolerates     # Hypothyroidism  Chronic, stable  TSH low. Total T4 is nl  Levothyroxine for hypothyroid transition to IV as no PO access   TSH 0.18, T(6)  Can transition back to LT4 50mcg po daily via NG tube as per Endo  Check Free T4 in AM  Endocrine following      # Hx of DVT, lower extremity  Home med Eliquis, on hold   SCDs    # GI ppx:  Protonix

## 2021-09-22 LAB
-  AMIKACIN: SIGNIFICANT CHANGE UP
-  AZTREONAM: SIGNIFICANT CHANGE UP
-  CEFEPIME: SIGNIFICANT CHANGE UP
-  CEFTAZIDIME: SIGNIFICANT CHANGE UP
-  CIPROFLOXACIN: SIGNIFICANT CHANGE UP
-  GENTAMICIN: SIGNIFICANT CHANGE UP
-  IMIPENEM: SIGNIFICANT CHANGE UP
-  LEVOFLOXACIN: SIGNIFICANT CHANGE UP
-  MEROPENEM: SIGNIFICANT CHANGE UP
-  PIPERACILLIN/TAZOBACTAM: SIGNIFICANT CHANGE UP
-  TOBRAMYCIN: SIGNIFICANT CHANGE UP
ANION GAP SERPL CALC-SCNC: 10 MMOL/L — SIGNIFICANT CHANGE UP (ref 5–17)
BASOPHILS # BLD AUTO: 0.03 K/UL — SIGNIFICANT CHANGE UP (ref 0–0.2)
BASOPHILS NFR BLD AUTO: 0.3 % — SIGNIFICANT CHANGE UP (ref 0–2)
BUN SERPL-MCNC: 20 MG/DL — SIGNIFICANT CHANGE UP (ref 7–23)
CALCIUM SERPL-MCNC: 8.9 MG/DL — SIGNIFICANT CHANGE UP (ref 8.4–10.5)
CHLORIDE SERPL-SCNC: 107 MMOL/L — SIGNIFICANT CHANGE UP (ref 96–108)
CO2 SERPL-SCNC: 28 MMOL/L — SIGNIFICANT CHANGE UP (ref 22–31)
CREAT SERPL-MCNC: 1.08 MG/DL — SIGNIFICANT CHANGE UP (ref 0.5–1.3)
CULTURE RESULTS: SIGNIFICANT CHANGE UP
EOSINOPHIL # BLD AUTO: 0.23 K/UL — SIGNIFICANT CHANGE UP (ref 0–0.5)
EOSINOPHIL NFR BLD AUTO: 2.4 % — SIGNIFICANT CHANGE UP (ref 0–6)
GLUCOSE SERPL-MCNC: 90 MG/DL — SIGNIFICANT CHANGE UP (ref 70–99)
HCT VFR BLD CALC: 28.2 % — LOW (ref 34.5–45)
HGB BLD-MCNC: 9.4 G/DL — LOW (ref 11.5–15.5)
IMM GRANULOCYTES NFR BLD AUTO: 2.4 % — HIGH (ref 0–1.5)
LYMPHOCYTES # BLD AUTO: 1.72 K/UL — SIGNIFICANT CHANGE UP (ref 1–3.3)
LYMPHOCYTES # BLD AUTO: 17.6 % — SIGNIFICANT CHANGE UP (ref 13–44)
MCHC RBC-ENTMCNC: 27.8 PG — SIGNIFICANT CHANGE UP (ref 27–34)
MCHC RBC-ENTMCNC: 33.3 GM/DL — SIGNIFICANT CHANGE UP (ref 32–36)
MCV RBC AUTO: 83.4 FL — SIGNIFICANT CHANGE UP (ref 80–100)
METHOD TYPE: SIGNIFICANT CHANGE UP
MONOCYTES # BLD AUTO: 1.05 K/UL — HIGH (ref 0–0.9)
MONOCYTES NFR BLD AUTO: 10.7 % — SIGNIFICANT CHANGE UP (ref 2–14)
NEUTROPHILS # BLD AUTO: 6.51 K/UL — SIGNIFICANT CHANGE UP (ref 1.8–7.4)
NEUTROPHILS NFR BLD AUTO: 66.6 % — SIGNIFICANT CHANGE UP (ref 43–77)
NRBC # BLD: 0 /100 WBCS — SIGNIFICANT CHANGE UP (ref 0–0)
ORGANISM # SPEC MICROSCOPIC CNT: SIGNIFICANT CHANGE UP
ORGANISM # SPEC MICROSCOPIC CNT: SIGNIFICANT CHANGE UP
PLATELET # BLD AUTO: 164 K/UL — SIGNIFICANT CHANGE UP (ref 150–400)
POTASSIUM SERPL-MCNC: 3.1 MMOL/L — LOW (ref 3.5–5.3)
POTASSIUM SERPL-SCNC: 3.1 MMOL/L — LOW (ref 3.5–5.3)
RBC # BLD: 3.38 M/UL — LOW (ref 3.8–5.2)
RBC # FLD: 21.8 % — HIGH (ref 10.3–14.5)
SODIUM SERPL-SCNC: 145 MMOL/L — SIGNIFICANT CHANGE UP (ref 135–145)
SPECIMEN SOURCE: SIGNIFICANT CHANGE UP
WBC # BLD: 9.77 K/UL — SIGNIFICANT CHANGE UP (ref 3.8–10.5)
WBC # FLD AUTO: 9.77 K/UL — SIGNIFICANT CHANGE UP (ref 3.8–10.5)

## 2021-09-22 PROCEDURE — 99233 SBSQ HOSP IP/OBS HIGH 50: CPT

## 2021-09-22 RX ORDER — ACETAMINOPHEN 500 MG
1000 TABLET ORAL ONCE
Refills: 0 | Status: COMPLETED | OUTPATIENT
Start: 2021-09-22 | End: 2021-09-22

## 2021-09-22 RX ORDER — POTASSIUM CHLORIDE 20 MEQ
40 PACKET (EA) ORAL EVERY 4 HOURS
Refills: 0 | Status: COMPLETED | OUTPATIENT
Start: 2021-09-22 | End: 2021-09-22

## 2021-09-22 RX ADMIN — OXYCODONE HYDROCHLORIDE 10 MILLIGRAM(S): 5 TABLET ORAL at 18:16

## 2021-09-22 RX ADMIN — OXYCODONE HYDROCHLORIDE 10 MILLIGRAM(S): 5 TABLET ORAL at 17:46

## 2021-09-22 RX ADMIN — Medication 400 MILLIGRAM(S): at 12:56

## 2021-09-22 RX ADMIN — OXYCODONE HYDROCHLORIDE 10 MILLIGRAM(S): 5 TABLET ORAL at 03:00

## 2021-09-22 RX ADMIN — PANTOPRAZOLE SODIUM 40 MILLIGRAM(S): 20 TABLET, DELAYED RELEASE ORAL at 12:27

## 2021-09-22 RX ADMIN — NYSTATIN CREAM 1 APPLICATION(S): 100000 CREAM TOPICAL at 05:18

## 2021-09-22 RX ADMIN — Medication 1 APPLICATION(S): at 17:46

## 2021-09-22 RX ADMIN — OXYCODONE HYDROCHLORIDE 10 MILLIGRAM(S): 5 TABLET ORAL at 10:45

## 2021-09-22 RX ADMIN — Medication 1 APPLICATION(S): at 18:00

## 2021-09-22 RX ADMIN — OXYCODONE HYDROCHLORIDE 10 MILLIGRAM(S): 5 TABLET ORAL at 22:35

## 2021-09-22 RX ADMIN — Medication 10 MILLIGRAM(S): at 16:00

## 2021-09-22 RX ADMIN — Medication 1 APPLICATION(S): at 05:21

## 2021-09-22 RX ADMIN — SODIUM CHLORIDE 3 MILLILITER(S): 9 INJECTION INTRAMUSCULAR; INTRAVENOUS; SUBCUTANEOUS at 15:00

## 2021-09-22 RX ADMIN — CEFTRIAXONE 100 MILLIGRAM(S): 500 INJECTION, POWDER, FOR SOLUTION INTRAMUSCULAR; INTRAVENOUS at 12:28

## 2021-09-22 RX ADMIN — ENOXAPARIN SODIUM 30 MILLIGRAM(S): 100 INJECTION SUBCUTANEOUS at 17:44

## 2021-09-22 RX ADMIN — OXYCODONE HYDROCHLORIDE 10 MILLIGRAM(S): 5 TABLET ORAL at 11:15

## 2021-09-22 RX ADMIN — Medication 1000 MILLIGRAM(S): at 13:26

## 2021-09-22 RX ADMIN — TRAMADOL HYDROCHLORIDE 50 MILLIGRAM(S): 50 TABLET ORAL at 05:47

## 2021-09-22 RX ADMIN — ATORVASTATIN CALCIUM 10 MILLIGRAM(S): 80 TABLET, FILM COATED ORAL at 22:05

## 2021-09-22 RX ADMIN — OXYCODONE HYDROCHLORIDE 10 MILLIGRAM(S): 5 TABLET ORAL at 02:30

## 2021-09-22 RX ADMIN — Medication 1 APPLICATION(S): at 05:17

## 2021-09-22 RX ADMIN — Medication 40 MILLIEQUIVALENT(S): at 17:45

## 2021-09-22 RX ADMIN — OXYCODONE HYDROCHLORIDE 10 MILLIGRAM(S): 5 TABLET ORAL at 22:05

## 2021-09-22 RX ADMIN — LATANOPROST 1 DROP(S): 0.05 SOLUTION/ DROPS OPHTHALMIC; TOPICAL at 22:06

## 2021-09-22 RX ADMIN — Medication 50 MILLIGRAM(S): at 17:45

## 2021-09-22 RX ADMIN — Medication 50 MICROGRAM(S): at 05:18

## 2021-09-22 RX ADMIN — Medication 50 MILLIGRAM(S): at 05:18

## 2021-09-22 RX ADMIN — Medication 10 MILLIGRAM(S): at 08:30

## 2021-09-22 RX ADMIN — SODIUM CHLORIDE 3 MILLILITER(S): 9 INJECTION INTRAMUSCULAR; INTRAVENOUS; SUBCUTANEOUS at 05:17

## 2021-09-22 RX ADMIN — TRAMADOL HYDROCHLORIDE 50 MILLIGRAM(S): 50 TABLET ORAL at 05:17

## 2021-09-22 RX ADMIN — LOSARTAN POTASSIUM 50 MILLIGRAM(S): 100 TABLET, FILM COATED ORAL at 05:18

## 2021-09-22 RX ADMIN — SODIUM CHLORIDE 3 MILLILITER(S): 9 INJECTION INTRAMUSCULAR; INTRAVENOUS; SUBCUTANEOUS at 22:04

## 2021-09-22 RX ADMIN — LIDOCAINE 1 PATCH: 4 CREAM TOPICAL at 19:20

## 2021-09-22 RX ADMIN — LIDOCAINE 1 PATCH: 4 CREAM TOPICAL at 12:25

## 2021-09-22 RX ADMIN — Medication 40 MILLIEQUIVALENT(S): at 22:04

## 2021-09-22 RX ADMIN — NYSTATIN CREAM 1 APPLICATION(S): 100000 CREAM TOPICAL at 17:47

## 2021-09-22 NOTE — PROGRESS NOTE ADULT - ASSESSMENT
75yo F with a history of colon cancer s/p right hemicolectomy (approx 2 years ago, outside hospital), DVT (on Eliquis), CKD, adrenal insufficiency presenting as transfer from [facility] for trauma evaluation secondary to a mechanical fall from sitting with associated head strike    #Dysphagia  s/p FEES with SLP and ENT - bedside laryngoscopy unremarkable  NPO with non oral nutrition/hydration/meds  plan for repeat FEES pending improved secretion management (tentatively planned for later this week)  -NGT for feeds/meds  -PPI (IV) for GI prophylaxis and as pt on steroids for adrenal insufficiency    #Diarrhea - suspect TF associated. stools not loose or watery, reported to be thick  s/p right hemicolectomy, would benefit from semi-elemental formula  Given pt on Abx, keep a close eye on BMs  Clinically benign abdominal exam  -placement of Dignicare fecal management to protect against further skin irritation/breakdown; pt agreeable to this   -recommend reart low rate VITAL AF feeds (20 cc/hour) and monitor  -recommend Banatrol flakes  -if has rising WBC or abdominal pain/distension or watery stools, check stool for  C diff (last stools thick/formed per RN reports)    Discussed with Neurosurgery team    Sean Dahl PA-C    [facility]  [phone]  After hours and weekend coverage [phone]

## 2021-09-22 NOTE — PROGRESS NOTE ADULT - SUBJECTIVE AND OBJECTIVE BOX
Lt hemovac 30 cc total drainage from yesterday  Rt hemovac 0 cc total recorded from yesterday  Tolerating TF    Vital Signs Last 24 Hrs  T(C): 36.9 (22 Sep 2021 11:34), Max: 36.9 (21 Sep 2021 23:56)  T(F): 98.4 (22 Sep 2021 11:34), Max: 98.5 (21 Sep 2021 23:56)  HR: 97 (22 Sep 2021 11:34) (63 - 97)  BP: 128/74 (22 Sep 2021 11:34) (102/66 - 145/80)  BP(mean): --  RR: 20 (22 Sep 2021 11:34) (18 - 20)  SpO2: 96% (22 Sep 2021 11:34) (96% - 100%)    I&O's Summary    09-21-21 @ 07:01  -  09-22-21 @ 07:00  --------------------------------------------------------  IN: 0 mL / OUT: 330 mL / NET: -330 mL        PHYSICAL EXAM:  GENERAL: NAD, well-developed, awake alert, on NC, NG tube in place  HEAD:  Atraumatic, Normocephalic  EYES: EOMI, PERRLA, conjunctiva and sclera clear, legally blind  NECK: Supple, No JVD  CHEST/LUNG: mild decrease breath sounds bilaterally; No wheeze   HEART: Regular rate and rhythm; No murmurs, rubs, or gallops  ABDOMEN: Soft, Nontender, Nondistended; Bowel sounds present  NEURO: awake alert, no focal weakness, 5/5 b/l upper extremity strength, 4/5 lower extremity strength  EXTREMITIES:  2+ Peripheral Pulses, No clubbing, cyanosis, trace b/l edema  SKIN: No rashes or lesions   BACK: incision c/d/i    LABS:                        9.4    9.77  )-----------( 164      ( 22 Sep 2021 05:41 )             28.2     09-22    145  |  107  |  20  ----------------------------<  90  3.1<L>   |  28  |  1.08    Ca    8.9      22 Sep 2021 05:41        CAPILLARY BLOOD GLUCOSE                RADIOLOGY & ADDITIONAL TESTS:    Imaging Personally Reviewed:  [x] YES  [ ] NO    Will obtain old records:  [ ] YES  [x] NO

## 2021-09-22 NOTE — PROGRESS NOTE ADULT - SUBJECTIVE AND OBJECTIVE BOX
CARDIOLOGY FOLLOW UP - Dr. Orozco  DATE OF SERVICE: 09-22-21      no acute complaints   appears lethargic today     REVIEW OF SYSTEMS:   CONSTITUTIONAL: No fever, weight loss, or fatigue   RESPIRATORY:  No cough, wheezing, chills or hemoptysis; No SOB  CARDIOVASCULAR: No chest pain, palpitations, passing out, dizziness, or leg swelling   GASTROINTESTINAL: No abdominal or epigastric pain. No nausea, vomiting, or hematemesis, no diarreha, or constipation, No melena or hematochezia   VASCULAR: no edema.       PHYSICAL EXAM:  T(C): 36.9 (09-22-21 @ 11:34), Max: 36.9 (09-21-21 @ 23:56)  HR: 97 (09-22-21 @ 11:34) (63 - 97)  BP: 128/74 (09-22-21 @ 11:34) (102/66 - 145/80)  RR: 20 (09-22-21 @ 11:34) (18 - 20)  SpO2: 96% (09-22-21 @ 11:34) (96% - 100%)  Wt(kg): --  I&O's Summary    21 Sep 2021 07:01  -  22 Sep 2021 07:00  --------------------------------------------------------  IN: 0 mL / OUT: 330 mL / NET: -330 mL        Appearance: Normal	  Cardiovascular: Normal S1 S2,RRR, No JVD, No murmurs  Respiratory: Lungs clear to auscultation	  Gastrointestinal:  Soft, Non-tender, + BS	  Extremities: Normal range of motion, No clubbing, cyanosis or edema      HOME MEDICATIONS:  acetaminophen 325 mg oral tablet: 2 tab(s) orally every 6 hours, As needed, Mild Pain (1 - 3) (24 Sep 2020 11:16)  Artificial Tears ophthalmic solution: 1 drop(s) to each affected eye 4 times a day, As Needed (22 Aug 2021 23:28)  aspirin 81 mg oral delayed release tablet: 1 tab(s) orally once a day (27 Aug 2021 13:38)  calcium (as carbonate) 600 mg oral tablet: 1 cap(s) orally once a day (24 Sep 2020 11:16)  Eliquis 2.5 mg oral tablet: 1 tab(s) orally 2 times a day (24 Sep 2020 11:16)  felodipine 5 mg oral tablet, extended release: 1 tab(s) orally once a day (22 Aug 2021 23:23)  guaiFENesin 600 mg oral tablet, extended release: 1 tab(s) orally every 12 hours as needed for cough  (27 Aug 2021 13:38)  hydrocortisone 10 mg oral tablet: 1 tab(s) orally 2 times a day (24 Sep 2020 11:16)  latanoprost 0.005% ophthalmic solution: 1 drop(s) to each affected eye once a day (in the evening) (22 Aug 2021 23:27)  levothyroxine 50 mcg (0.05 mg) oral tablet: 1 tab(s) orally once a day (24 Sep 2020 11:16)  Lipitor 10 mg oral tablet: 1 tab(s) orally once a day (22 Aug 2021 23:41)  Macular Vitamin Benefit oral tablet: 1 tab(s) orally once a day (24 Sep 2020 11:16)  metoprolol succinate 50 mg oral tablet, extended release: 1 tab(s) orally once a day (22 Aug 2021 23:24)  Multiple Vitamins oral tablet: 1 tab(s) orally once a day (22 Aug 2021 23:22)  olmesartan 20 mg oral tablet: 1 tab(s) orally once a day (22 Aug 2021 23:22)  omeprazole 20 mg oral delayed release capsule: 1 cap(s) orally once a day (22 Aug 2021 23:21)  oxyCODONE 5 mg oral tablet: 1 tab(s) orally every 8 hours as needed for severe pain (27 Aug 2021 13:35)  OxyCONTIN 20 mg oral tablet, extended release: 1 tab(s) orally every 12 hours (24 Sep 2020 11:16)  risedronate 35 mg oral tablet: 1 tab(s) orally once a week (Mondays) (05 Sep 2021 18:30)  senna oral tablet: 2 tab(s) orally once a day (at bedtime), As Needed (05 Sep 2021 18:31)      MEDICATIONS  (STANDING):  atorvastatin 10 milliGRAM(s) Oral at bedtime  BACItracin   Ointment 1 Application(s) Topical two times a day  clotrimazole 1% Cream 1 Application(s) Topical two times a day  enoxaparin Injectable 30 milliGRAM(s) SubCutaneous <User Schedule>  hydrocortisone 10 milliGRAM(s) Oral <User Schedule>  influenza   Vaccine 0.5 milliLiter(s) IntraMuscular once  latanoprost 0.005% Ophthalmic Solution 1 Drop(s) Both EYES at bedtime  levothyroxine 50 MICROGram(s) Oral daily  lidocaine   4% Patch 1 Patch Transdermal daily  losartan 50 milliGRAM(s) Oral daily  metoprolol tartrate 50 milliGRAM(s) Oral two times a day  nystatin Powder 1 Application(s) Topical two times a day  pantoprazole  Injectable 40 milliGRAM(s) IV Push daily  sodium chloride 3%  Inhalation 3 milliLiter(s) Inhalation every 8 hours      TELEMETRY: 	    ECG:  	  RADIOLOGY:   DIAGNOSTIC TESTING:  [ ] Echocardiogram:  [ ]  Catheterization:  [ ] Stress Test:    OTHER: 	    LABS:	 	                                9.4    9.77  )-----------( 164      ( 22 Sep 2021 05:41 )             28.2     09-22    145  |  107  |  20  ----------------------------<  90  3.1<L>   |  28  |  1.08    Ca    8.9      22 Sep 2021 05:41

## 2021-09-22 NOTE — PROGRESS NOTE ADULT - ASSESSMENT
76F hx of CKD adrenal insufficiency h/o colectomy CAD  HTN Hypothyroidism prior L2-5 Fxn w/. Dr Jane (ortho) at [facility] in early 2000s, revision x1, chronic LBP, h/o DVT  is on eliquis for hx of DVT 2 yr prior;- s/p mechanical fall w/ RUE pain found to have R scapular fx and sternal fx, adm to trauma, CT T/L w/ significant  adjacent  segment degenerative disease  above prior fusion w/ chronic comp fx, T10-L1 c  mild retropulsion, c/f unstable fx.  started on heparin sc tid as inpatient; and preop labs with elevated PTT. Seen by hematology   s/p /p T10-pelvis with revision of previous L2-5 hardware, L1 expanded PSO, plastics closure 9/14/21 . Post op course c/b slow to wake up, respiratory failure dysphagia & Sinus tachycardia . Remained intubated until 9/16/21. LE duplex negative for DVT     Plan    Neuro stable. d/c drains   CAD/ sinus tachycardia - On Metoprolol 50 BID   HTN- On Losartan 50mg   CKD stable.  Malnutrition- diarrhea w tube feeds. On Vital tube feeds , changed 9/20/21 Rectal tube placed. GI following   Adrenal insufficiency- Tapered steroids to Hydrocortisone 10 BID .  Pseudomonas UTI- On Cefriaxone- f/u sensitivities   hypokalemia - Replete K  S na wnl  Free water BID    Dysphagia rpt FEEST in am Cleared for ice chips.   DVT ppx   pain control on oxy 5/10 mg  prn & flexeril 5mg prn

## 2021-09-22 NOTE — PROGRESS NOTE ADULT - ASSESSMENT
77 y/o female with a history of colon cancer s/p right hemicolectomy (approx 2 years ago, outside hospital), DVT (on Eliquis), CKD, adrenal insufficiency presents s/p fall with right coracoid process scapular fracture, T11/L1 compression fractures and an inferior sternal fracture, noted to have prolonged PTT.    1. prolonged PTT  - no hx of bleeding disorder  - nl PTT on admission 9/5; prolonged to 69 on 9/8, further prolonged 9/13  - blood draws have been peripheral sticks- no central catheter/Port  - prolonged PTT can be seen with subcutaneous heparin administration, usually milder elevations, but this is most likely cause in this patient  - low suspicion for factor deficiency with normal PTT on admission  - no hx of liver disease and PT was normal prior  - no evidence of DIC, normal fibrinogen  - mixing study, thrombin time and reptilase time were consistent with heparin effect  - PTT normalized off of subcut heparin and remains normal on SQ lovenox    2.anemia, microcytic- acute blood loss with surgery  - iron studies pre op with high ferritin, normal B12 and folate  - s/p 2 unit PRBC intra op  - monitor Hg, with drains in place; hg 9.4 9/22    3. hx of DVT, 2019- bilateral  - discovered with colon cancer diagnosis  - has been on eliquis, maintained on AC with decreased mobility  - duplex LE 9/9- no DVT  - eliquis on HOLD for now, repeat Duplex 9/16 remain negative  - CTA chest with no obvious PE on 9/17  - continue prophylaxis with lovenox for now; full AC to resume when cleared by surgery  - with reported calf/leg pain- exam benign-- if persistent, recheck Duplex LE    4. s/p fall, with T12- L1 fracture  - s/p thorcao-lumbar fusion per neurosurgery on 9-14-21  - extensive wound, + drain    d/w daughter bedside

## 2021-09-22 NOTE — PROGRESS NOTE ADULT - ASSESSMENT
Patient is a 75yo F with a history of colon cancer s/p right hemicolectomy (approx 2 years ago, outside hospital), DVT (on Eliquis), CKD, adrenal insufficiency presenting after mechanical fall from sitting. CT head/cervical spine/chest were performed which showed a right forehead hematoma, acute minimally displaced right coracoid process scapular fracture, T11/L1 compression fractures and an inferior sternal fracture. 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 Treviño  FELICIANO likely sec to hemodynamic change  Renal function stable  restarted on Losartan 9/17  Monitor I/O  Monitor renal function at present    Acidosis:  non-AG+AG  Hold oral sodium bicarb    HTN:  BP controlled   Off midrodrine  Monitor at present    Hyponatremia/Hypernatremia  On free water  Monitor serum Na

## 2021-09-22 NOTE — PROGRESS NOTE ADULT - SUBJECTIVE AND OBJECTIVE BOX
Patient is a 76y old  Female who presented with a chief complaint of S/p fall (22 Sep 2021 12:36)      INTERVAL HPI/OVERNIGHT EVENTS:  large BM reported overnight - not watery  had episode of abdominal discomfort and feeds were held    currently no abdominal pain  no fever or chills  no further stooling  NGT remains in place  planned FEEST tomorrow    +back pain - improved with analgesics  HMV x 2 remain in place    MEDICATIONS  (STANDING):  atorvastatin 10 milliGRAM(s) Oral at bedtime  BACItracin   Ointment 1 Application(s) Topical two times a day  clotrimazole 1% Cream 1 Application(s) Topical two times a day  enoxaparin Injectable 30 milliGRAM(s) SubCutaneous <User Schedule>  hydrocortisone 10 milliGRAM(s) Oral <User Schedule>  influenza   Vaccine 0.5 milliLiter(s) IntraMuscular once  latanoprost 0.005% Ophthalmic Solution 1 Drop(s) Both EYES at bedtime  levothyroxine 50 MICROGram(s) Oral daily  lidocaine   4% Patch 1 Patch Transdermal daily  losartan 50 milliGRAM(s) Oral daily  metoprolol tartrate 50 milliGRAM(s) Oral two times a day  nystatin Powder 1 Application(s) Topical two times a day  pantoprazole  Injectable 40 milliGRAM(s) IV Push daily  sodium chloride 3%  Inhalation 3 milliLiter(s) Inhalation every 8 hours    MEDICATIONS  (PRN):  artificial  tears Solution 1 Drop(s) Both EYES every 6 hours PRN Dry Eyes  cyclobenzaprine 5 milliGRAM(s) Oral three times a day PRN Muscle Spasm  oxyCODONE    Solution 5 milliGRAM(s) Oral every 4 hours PRN Moderate Pain (4 - 6)  oxyCODONE    Solution 10 milliGRAM(s) Oral every 4 hours PRN Severe Pain (7 - 10)  simethicone 80 milliGRAM(s) Chew three times a day PRN gas/diarrhea  traMADol 50 milliGRAM(s) Oral every 4 hours PRN breakthrough pain      Allergies  penicillin (Rash)      Review of Systems:  General:  No wt loss, fevers, chills, night sweats  CV:  No pain, see HPI  Resp:  +secretions  s/p recent intubation  GI:  see HPI  :  No pain, bleeding, incontinence, nocturia +CKD +ervin  Muscle:  see HPI  Neuro:  see HPI  Psych:  No fatigue, insomnia, mood problems, depression  Endocrine:  No polyuria, polydypsia, +adrenal insufficiency  Heme:  No petechiae, ecchymosis, easy bruisability  Skin:  No tattoos, scars, edema +dermatitis      Vital Signs Last 24 Hrs  T(C): 36.9 (22 Sep 2021 11:34), Max: 36.9 (21 Sep 2021 23:56)  T(F): 98.4 (22 Sep 2021 11:34), Max: 98.5 (21 Sep 2021 23:56)  HR: 97 (22 Sep 2021 11:34) (63 - 97)  BP: 128/74 (22 Sep 2021 11:34) (102/66 - 145/80)  BP(mean): --  RR: 20 (22 Sep 2021 11:34) (18 - 20)  SpO2: 96% (22 Sep 2021 11:34) (96% - 100%)    PHYSICAL EXAM:    Constitutional: elderly AA female sl anxious appearing, alert and responsive +NGT + nasal cannula +HMV drains  Neck: No LAD, no JVD  Respiratory: +rhonchi   no accessory muscle use   Cardiovascular: S1 and S2, RRR  Gastrointestinal: BS+, obese WH surgical scars (laparoscopic) soft, NT/ND, neg HSM, +lower abdomen pannus +ervin  Extremities: No peripheral edema, neg clubbing, cyanosis  Vascular: 2+ peripheral pulses  Neurological: A/O x 3, no focal asymmetry  Psychiatric: normal affect  Skin: No rashes  +dermatitis in skin folds at groin under pannus      LABS:                        9.4    9.77  )-----------( 164      ( 22 Sep 2021 05:41 )             28.2     09-22    145  |  107  |  20  ----------------------------<  90  3.1<L>   |  28  |  1.08    Ca    8.9      22 Sep 2021 05:41      LIVER FUNCTIONS - ( 20 Sep 2021 06:41 )  Alb: 2.7 g/dL / Pro: 5.4 g/dL / ALK PHOS: 72 U/L / ALT: 25 U/L / AST: 45 U/L / GGT: x             RADIOLOGY & ADDITIONAL TESTS:

## 2021-09-22 NOTE — PROGRESS NOTE ADULT - SUBJECTIVE AND OBJECTIVE BOX
SUBJECTIVE:     OVERNIGHT EVENTS: none    Vital Signs Last 24 Hrs  T(C): 36.7 (22 Sep 2021 15:09), Max: 36.9 (21 Sep 2021 23:56)  T(F): 98.1 (22 Sep 2021 15:09), Max: 98.5 (21 Sep 2021 23:56)  HR: 83 (22 Sep 2021 15:09) (63 - 97)  BP: 135/72 (22 Sep 2021 15:09) (102/66 - 145/80)  BP(mean): --  RR: 18 (22 Sep 2021 15:09) (18 - 20)  SpO2: 97% (22 Sep 2021 15:09) (96% - 100%)    PHYSICAL EXAM:    Constitutional: No Acute Distress     Constitutional: No Acute Distress     Neurological: Awake alert Ox3, Speech clear Following Commands, Moving all Extremities B/L UE 4/5. B/L LE HF 2/5 KF & KE 4-/5 DF/PF 4-/5. Midline back aquacel AG C/D/I L HMV left 30cc/ 24 hrs R HMV no output .     Pulmonary: Clear to Auscultation    Cardiovascular: S1, S2, Regular rate and rhythm     Gastrointestinal: Soft, Non-tender, Non-distended     Extremities: No calf tenderness     LABS:                        9.4    9.77  )-----------( 164      ( 22 Sep 2021 05:41 )             28.2    09-22    145  |  107  |  20  ----------------------------<  90  3.1<L>   |  28  |  1.08    Ca    8.9      22 Sep 2021 05:41            IMAGING:         MEDICATIONS:    cyclobenzaprine 5 milliGRAM(s) Oral three times a day PRN Muscle Spasm  oxyCODONE    Solution 5 milliGRAM(s) Oral every 4 hours PRN Moderate Pain (4 - 6)  oxyCODONE    Solution 10 milliGRAM(s) Oral every 4 hours PRN Severe Pain (7 - 10)  traMADol 50 milliGRAM(s) Oral every 4 hours PRN breakthrough pain  losartan 50 milliGRAM(s) Oral daily  metoprolol tartrate 50 milliGRAM(s) Oral two times a day  sodium chloride 3%  Inhalation 3 milliLiter(s) Inhalation every 8 hour  pantoprazole  Injectable 40 milliGRAM(s) IV Push daily  simethicone 80 milliGRAM(s) Chew three times a day PRN gas/diarrhea  artificial  tears Solution 1 Drop(s) Both EYES every 6 hours PRN Dry Eyes  atorvastatin 10 milliGRAM(s) Oral at bedtime  BACItracin   Ointment 1 Application(s) Topical two times a day  clotrimazole 1% Cream 1 Application(s) Topical two times a day  enoxaparin Injectable 30 milliGRAM(s) SubCutaneous <User Schedule>  hydrocortisone 10 milliGRAM(s) Oral <User Schedule>  influenza   Vaccine 0.5 milliLiter(s) IntraMuscular once  latanoprost 0.005% Ophthalmic Solution 1 Drop(s) Both EYES at bedtime  levothyroxine 50 MICROGram(s) Oral daily  lidocaine   4% Patch 1 Patch Transdermal daily  nystatin Powder 1 Application(s) Topical two times a day      DIET:

## 2021-09-22 NOTE — PROVIDER CONTACT NOTE (OTHER) - ACTION/TREATMENT ORDERED:
Provider assessed patient at bedside and drain incision site. Hemovac was occluded so provider replaced the hemovac,

## 2021-09-22 NOTE — PROGRESS NOTE ADULT - ASSESSMENT
77 yo F with a history of colon cancer s/p right hemicolectomy (approx 2 years ago, outside hospital), DVT (on Eliquis), CKD, adrenal insufficiency presenting as transfer from [facility] for trauma evaluation secondary to a mechanical fall from sitting with headstrike. Injuries identified include right forehead hematoma, acute minimally displaced right coracoid process scapular fracture, T11/L1 compression fractures and an inferior sternal fracture. Patient hemodynamically stable.    # Dysphagia  s/p FEES with speech and swallow, full assessments reviewed  Bedside indirect laryngoscopy was unremarkable  NPO on tube feeds   Repeat FEES pending improved secretion management, tentatively x3 days  Consider Mucinex for thick secretions    # Elevated PTT  Mild elevations in PTT can be seen with SC heparin administration, particularly with TID dosing  low plasma protein, impaired renal function, and low BMI have been postulated as contributing factors.    Appreciate hematology    # S/p Mechanical Fall:  Right forehead hematoma, acute minimally displaced right coracoid process scapular fracture, T11/L1 compression fractures and an inferior sternal fracture  no surgery for scapular fx. Placed in sling  CT spine with fusion L2-5. Fracture through the T12-L1 disc space with widening of the disc space and a L1-2 laminectomy which appears unstable. Diffuse osteopenia. Old T11 and L1 fractures.   MRI T/L spine with anterior splaying of the intervertebral disc space at the T12-L1 with edema in the anterior prevertebral soft tissues  CT T done noted with widening of T12/L1 disc space w/ L1 laminectomy  Neurosurgery offering surgery vs conservative management, TLSO brace in the meantime.   The daughter Susie decided to proceed with surgery.   Cr has improved.   Pt s/p OR: revision of prior L2-L5 fusion w/ extension from T10- pelvis w/o L1 PSO, and plastics closure on 9/14  Neuro checks, Monitor outpt via drain, monitored in NICU post op, extubated 9/16/21. Doing well, transferred to the floor on 9/18/21.     # Nausea:   Pt has been nauseous (no abd pain). ?pain med induced/ Opioids   Switched hydrocortisone 10 mg BID to IV 25 mg BID while poor PO intake.   s/p stress dose steroids.     # Hypopituitary   She is on Hydrocortisone 10 mg BID at home, so will monitor for adrenal insufficiency. am cortisol appropriate  UA unimpressive. vit D, vit B12 normal. TSH low as expected with hypopit. free T4 sent, normal.   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 Treviño ([facility]). Outpt EMR reviewed; Cr range from 1.4 to 1.6   Monitor I/O's, Serial Cr, Avoid nephrotoxins  Cr is up from baseline. Hold Losartan. s/p gentle IVF for acute kidney failure: likely pre-renal.   Her recent TTE reviewed, with normal LV.   Her Cr now back to baseline (1.4-1.6)   Losartan restarted    # UTI  UA+, urine culture grew > 100K Pseudomonas aeruginosa  S/p 4 doses of ceftriaxone 9/19-9/22    # CAD: Chronic, stable  Cont home CV meds  Held ASA for neurosurgery     # Hx of SVT  ST on monitor 120s  BB resumed by cards, uptitrate as BP tolerates     # Hypothyroidism  Chronic, stable  TSH low. Total T4 is nl  Levothyroxine for hypothyroid transition to IV as no PO access   TSH 0.18, T(6)  Can transition back to LT4 50mcg po daily via NG tube as per Endo  Check Free T4 in AM  Endocrine following      # Hx of DVT, lower extremity  Home med Eliquis, on hold   Lovenox 30 mg SC daily    # GI ppx:  Protonix IV daily

## 2021-09-22 NOTE — PROGRESS NOTE ADULT - SUBJECTIVE AND OBJECTIVE BOX
Chief Complaint: fu    History of Present Illness:  uncomfortable with pain- was just cleaned and repositioned; no f/c, no cp/dyspnea, sl cough, no n/v/abd pain, reports leg pain bilateral, no reported bleeding, + drains remain in place      MEDICATIONS  (STANDING):  atorvastatin 10 milliGRAM(s) Oral at bedtime  BACItracin   Ointment 1 Application(s) Topical two times a day  cefTRIAXone   IVPB      cefTRIAXone   IVPB 1000 milliGRAM(s) IV Intermittent every 24 hours  clotrimazole 1% Cream 1 Application(s) Topical two times a day  enoxaparin Injectable 30 milliGRAM(s) SubCutaneous <User Schedule>  hydrocortisone 10 milliGRAM(s) Oral <User Schedule>  influenza   Vaccine 0.5 milliLiter(s) IntraMuscular once  latanoprost 0.005% Ophthalmic Solution 1 Drop(s) Both EYES at bedtime  levothyroxine 50 MICROGram(s) Oral daily  lidocaine   4% Patch 1 Patch Transdermal daily  losartan 50 milliGRAM(s) Oral daily  metoprolol tartrate 50 milliGRAM(s) Oral two times a day  nystatin Powder 1 Application(s) Topical two times a day  pantoprazole  Injectable 40 milliGRAM(s) IV Push daily  sodium chloride 3%  Inhalation 3 milliLiter(s) Inhalation every 8 hours    MEDICATIONS  (PRN):  artificial  tears Solution 1 Drop(s) Both EYES every 6 hours PRN Dry Eyes  cyclobenzaprine 5 milliGRAM(s) Oral three times a day PRN Muscle Spasm  oxyCODONE    Solution 5 milliGRAM(s) Oral every 4 hours PRN Moderate Pain (4 - 6)  oxyCODONE    Solution 10 milliGRAM(s) Oral every 4 hours PRN Severe Pain (7 - 10)  simethicone 80 milliGRAM(s) Chew three times a day PRN gas/diarrhea  traMADol 50 milliGRAM(s) Oral every 4 hours PRN breakthrough pain      Allergies    penicillin (Rash)    Intolerances        Vital Signs Last 24 Hrs  T(C): 36.7 (22 Sep 2021 07:27), Max: 36.9 (21 Sep 2021 23:56)  T(F): 98.1 (22 Sep 2021 07:27), Max: 98.5 (21 Sep 2021 23:56)  HR: 63 (22 Sep 2021 07:27) (63 - 88)  BP: 126/76 (22 Sep 2021 07:27) (102/66 - 145/80)  BP(mean): --  RR: 20 (22 Sep 2021 07:27) (18 - 20)  SpO2: 98% (22 Sep 2021 07:27) (98% - 100%)    PHYSICAL EXAM  General: adult in NAD  HEENT: clear oropharynx, anicteric sclera, pink conjunctiva  Neck: supple  CV: normal S1/S2   Lungs: clear to auscultation anterior  Abdomen: soft non-tender non-distended, positive bowel sounds  Ext: no calf tenderness, no edema, no erythema  Skin: no rashes and no petechiae  Lymph Nodes: No LAD in  neck  Neuro: alert and oriented X 3, no focal deficits    LABS:                          9.4    9.77  )-----------( 164      ( 22 Sep 2021 05:41 )             28.2         Mean Cell Volume : 83.4 fl  Mean Cell Hemoglobin : 27.8 pg  Mean Cell Hemoglobin Concentration : 33.3 gm/dL  Auto Neutrophil # : 6.51 K/uL  Auto Lymphocyte # : 1.72 K/uL  Auto Monocyte # : 1.05 K/uL  Auto Eosinophil # : 0.23 K/uL  Auto Basophil # : 0.03 K/uL  Auto Neutrophil % : 66.6 %  Auto Lymphocyte % : 17.6 %  Auto Monocyte % : 10.7 %  Auto Eosinophil % : 2.4 %  Auto Basophil % : 0.3 %      Serial CBC's  09-22 @ 05:41  Hct-28.2 / Hgb-9.4 / Plat-164 / RBC-3.38 / WBC-9.77  Serial CBC's  09-20 @ 06:41  Hct-27.4 / Hgb-9.1 / Plat-173 / RBC-3.32 / WBC-11.31  Serial CBC's  09-19 @ 07:14  Hct-31.2 / Hgb-10.6 / Plat-191 / RBC-3.85 / WBC-13.84      09-22    145  |  107  |  20  ----------------------------<  90  3.1<L>   |  28  |  1.08    Ca    8.9      22 Sep 2021 05:41                        Radiology:

## 2021-09-22 NOTE — PROGRESS NOTE ADULT - ATTENDING COMMENTS
dysphagia,  tolerating ng tube feeds, h/o colon cancr    plan monitor for diarrhea,rectal tube        management as above.

## 2021-09-22 NOTE — PROGRESS NOTE ADULT - ASSESSMENT
ECHO 10/25/16:  Normal left ventricular systolic function. No segmental wall motion abnormalities. Hypermobile interatrial septum.   ECHO 8/24/21: EF 66% grossly nl LV sys fx, mild diastolic dsyfx - stage 1     a/p    Patient is a 77yo F , known to practice from prior admission,  with a history of colon cancer s/p right hemicolectomy (approx 2 years ago, outside hospital), DVT (on Eliquis), CKD, adrenal insufficiency presenting as transfer from [facility] for trauma evaluation secondary to a mechanical fall from sitting.     #Mechanical fall  -MRI noted with anterior splaying of T12/L1 space w/ edema   -CT T done noted with widening of T12/L1 disc space w/ L1 laminectomy  -s/p T10-pelvis with revision of previous L2-5 hardware, L1 expanded PSO.  -s/p intubation- now extubated 9/16 , on NC  -CV stable   -management per neuro sx     #SVT (hx)  -hr stable   -cont bb   -inc bb as needed     #Dyspnea  -CT A neg for PE  -dyspnea improving - s/p IVP lasix 40 mg x 1 with good UO  -CXR noted   -lasix PRN  -monitor vol status closely while on IVF     # DVT (hx)  -recent le doppler negative for acute dvt  -on SQ lovenox  -heme f/u     # wm on CKD   -renal f/u     #UTI  -tx per primary team     #HTN  -bp improving  -cont bb, arb     dvt ppx

## 2021-09-22 NOTE — PROGRESS NOTE ADULT - SUBJECTIVE AND OBJECTIVE BOX
[facility]   MD KILLIAN JAIME MD RUORU WONG, PA    TEL:  OFFICE: [phone]  DR CHARLES CELL: [phone]  BEENA PISANO CELL: [phone]  DR. LOZANO CELL: [phone]      FROM 5 PM - 7 AM PLEASE CALL ANSWERING SERVICE: [phone]    RENAL FOLLOW UP NOTE--Date of Service 09-22-21 @ 09:03  --------------------------------------------------------------------------------  HPI:      Pt seen and examined at bedside.       PAST HISTORY  --------------------------------------------------------------------------------  No significant changes to PMH, PSH, FHx, SHx, unless otherwise noted    ALLERGIES & MEDICATIONS  --------------------------------------------------------------------------------  Allergies    penicillin (Rash)    Intolerances      Standing Inpatient Medications  atorvastatin 10 milliGRAM(s) Oral at bedtime  BACItracin   Ointment 1 Application(s) Topical two times a day  cefTRIAXone   IVPB      cefTRIAXone   IVPB 1000 milliGRAM(s) IV Intermittent every 24 hours  clotrimazole 1% Cream 1 Application(s) Topical two times a day  enoxaparin Injectable 30 milliGRAM(s) SubCutaneous <User Schedule>  hydrocortisone 10 milliGRAM(s) Oral <User Schedule>  influenza   Vaccine 0.5 milliLiter(s) IntraMuscular once  latanoprost 0.005% Ophthalmic Solution 1 Drop(s) Both EYES at bedtime  levothyroxine 50 MICROGram(s) Oral daily  lidocaine   4% Patch 1 Patch Transdermal daily  losartan 50 milliGRAM(s) Oral daily  metoprolol tartrate 50 milliGRAM(s) Oral two times a day  nystatin Powder 1 Application(s) Topical two times a day  pantoprazole  Injectable 40 milliGRAM(s) IV Push daily  sodium chloride 3%  Inhalation 3 milliLiter(s) Inhalation every 8 hours    PRN Inpatient Medications  artificial  tears Solution 1 Drop(s) Both EYES every 6 hours PRN  cyclobenzaprine 5 milliGRAM(s) Oral three times a day PRN  oxyCODONE    Solution 5 milliGRAM(s) Oral every 4 hours PRN  oxyCODONE    Solution 10 milliGRAM(s) Oral every 4 hours PRN  simethicone 80 milliGRAM(s) Chew three times a day PRN  traMADol 50 milliGRAM(s) Oral every 4 hours PRN      REVIEW OF SYSTEMS  --------------------------------------------------------------------------------  General: no fever  MSK: no edema     VITALS/PHYSICAL EXAM  --------------------------------------------------------------------------------  T(C): 36.7 (09-22-21 @ 07:27), Max: 36.9 (09-21-21 @ 23:56)  HR: 63 (09-22-21 @ 07:27) (63 - 88)  BP: 126/76 (09-22-21 @ 07:27) (102/66 - 145/80)  RR: 20 (09-22-21 @ 07:27) (18 - 20)  SpO2: 98% (09-22-21 @ 07:27) (98% - 100%)  Wt(kg): --        09-21-21 @ 07:01  -  09-22-21 @ 07:00  --------------------------------------------------------  IN: 0 mL / OUT: 330 mL / NET: -330 mL      Physical Exam:  	Gen: NAD  	HEENT: MMM, NGT  	Pulm: CTA B/L  	CV: S1S2  	Abd: Soft, +BS  	Ext: No LE edema B/L                      Neuro: Awake   	Skin: Warm and Dry   	Vascular access: NO HD catheter           ROBEL ervin  LABS/STUDIES  --------------------------------------------------------------------------------              9.4    9.77  >-----------<  164      [09-22-21 @ 05:41]              28.2     145  |  107  |  20  ----------------------------<  90      [09-22-21 @ 05:41]  3.1   |  28  |  1.08        Ca     8.9     [09-22-21 @ 05:41]            Creatinine Trend:  SCr 1.08 [09-22 @ 05:41]  SCr 1.13 [09-20 @ 06:41]  SCr 1.23 [09-19 @ 07:14]  SCr 0.83 [09-18 @ 05:46]  SCr 1.02 [09-16 @ 21:09]    Urinalysis - [09-19-21 @ 09:33]      Color Light Yellow / Appearance Slightly Turbid / SG 1.011 / pH 8.0      Gluc 100 mg/dL / Ketone Negative  / Bili Negative / Urobili Negative       Blood Large / Protein 30 mg/dL / Leuk Est Large / Nitrite Negative       /  / Hyaline 2 / Gran  / Sq Epi  / Non Sq Epi 0 / Bacteria Moderate      Iron 44, TIBC 224, %sat 19      [09-14-21 @ 10:27]  Ferritin 444      [09-14-21 @ 11:37]  Vitamin D (25OH) 67.9      [09-08-21 @ 09:59]  HbA1c 5.9      [02-22-17 @ 03:10]  TSH 0.18      [09-08-21 @ 09:59]

## 2021-09-23 DIAGNOSIS — J34.89 OTHER SPECIFIED DISORDERS OF NOSE AND NASAL SINUSES: ICD-10-CM

## 2021-09-23 LAB
ANION GAP SERPL CALC-SCNC: 8 MMOL/L — SIGNIFICANT CHANGE UP (ref 5–17)
BUN SERPL-MCNC: 21 MG/DL — SIGNIFICANT CHANGE UP (ref 7–23)
CALCIUM SERPL-MCNC: 8.6 MG/DL — SIGNIFICANT CHANGE UP (ref 8.4–10.5)
CHLORIDE SERPL-SCNC: 110 MMOL/L — HIGH (ref 96–108)
CO2 SERPL-SCNC: 24 MMOL/L — SIGNIFICANT CHANGE UP (ref 22–31)
CREAT SERPL-MCNC: 1.09 MG/DL — SIGNIFICANT CHANGE UP (ref 0.5–1.3)
GLUCOSE SERPL-MCNC: 138 MG/DL — HIGH (ref 70–99)
HCT VFR BLD CALC: 27.9 % — LOW (ref 34.5–45)
HGB BLD-MCNC: 9.4 G/DL — LOW (ref 11.5–15.5)
MCHC RBC-ENTMCNC: 28.2 PG — SIGNIFICANT CHANGE UP (ref 27–34)
MCHC RBC-ENTMCNC: 33.7 GM/DL — SIGNIFICANT CHANGE UP (ref 32–36)
MCV RBC AUTO: 83.8 FL — SIGNIFICANT CHANGE UP (ref 80–100)
NRBC # BLD: 0 /100 WBCS — SIGNIFICANT CHANGE UP (ref 0–0)
PLATELET # BLD AUTO: 164 K/UL — SIGNIFICANT CHANGE UP (ref 150–400)
POTASSIUM SERPL-MCNC: 4.1 MMOL/L — SIGNIFICANT CHANGE UP (ref 3.5–5.3)
POTASSIUM SERPL-SCNC: 4.1 MMOL/L — SIGNIFICANT CHANGE UP (ref 3.5–5.3)
RBC # BLD: 3.33 M/UL — LOW (ref 3.8–5.2)
RBC # FLD: 22.3 % — HIGH (ref 10.3–14.5)
SODIUM SERPL-SCNC: 142 MMOL/L — SIGNIFICANT CHANGE UP (ref 135–145)
WBC # BLD: 10.36 K/UL — SIGNIFICANT CHANGE UP (ref 3.8–10.5)
WBC # FLD AUTO: 10.36 K/UL — SIGNIFICANT CHANGE UP (ref 3.8–10.5)

## 2021-09-23 PROCEDURE — 99231 SBSQ HOSP IP/OBS SF/LOW 25: CPT

## 2021-09-23 PROCEDURE — 99232 SBSQ HOSP IP/OBS MODERATE 35: CPT | Mod: 25

## 2021-09-23 PROCEDURE — 31575 DIAGNOSTIC LARYNGOSCOPY: CPT

## 2021-09-23 RX ORDER — OXYCODONE HYDROCHLORIDE 5 MG/1
5 TABLET ORAL EVERY 6 HOURS
Refills: 0 | Status: DISCONTINUED | OUTPATIENT
Start: 2021-09-23 | End: 2021-09-27

## 2021-09-23 RX ORDER — CHLORHEXIDINE GLUCONATE 213 G/1000ML
1 SOLUTION TOPICAL
Refills: 0 | Status: DISCONTINUED | OUTPATIENT
Start: 2021-09-23 | End: 2021-09-27

## 2021-09-23 RX ORDER — OXYCODONE HYDROCHLORIDE 5 MG/1
10 TABLET ORAL EVERY 6 HOURS
Refills: 0 | Status: DISCONTINUED | OUTPATIENT
Start: 2021-09-23 | End: 2021-09-27

## 2021-09-23 RX ADMIN — OXYCODONE HYDROCHLORIDE 10 MILLIGRAM(S): 5 TABLET ORAL at 22:00

## 2021-09-23 RX ADMIN — NYSTATIN CREAM 1 APPLICATION(S): 100000 CREAM TOPICAL at 05:32

## 2021-09-23 RX ADMIN — ATORVASTATIN CALCIUM 10 MILLIGRAM(S): 80 TABLET, FILM COATED ORAL at 21:13

## 2021-09-23 RX ADMIN — SODIUM CHLORIDE 3 MILLILITER(S): 9 INJECTION INTRAMUSCULAR; INTRAVENOUS; SUBCUTANEOUS at 05:31

## 2021-09-23 RX ADMIN — Medication 1 APPLICATION(S): at 05:31

## 2021-09-23 RX ADMIN — OXYCODONE HYDROCHLORIDE 10 MILLIGRAM(S): 5 TABLET ORAL at 07:07

## 2021-09-23 RX ADMIN — Medication 10 MILLIGRAM(S): at 08:20

## 2021-09-23 RX ADMIN — OXYCODONE HYDROCHLORIDE 10 MILLIGRAM(S): 5 TABLET ORAL at 12:00

## 2021-09-23 RX ADMIN — Medication 50 MILLIGRAM(S): at 17:12

## 2021-09-23 RX ADMIN — Medication 50 MICROGRAM(S): at 05:31

## 2021-09-23 RX ADMIN — CHLORHEXIDINE GLUCONATE 1 APPLICATION(S): 213 SOLUTION TOPICAL at 23:00

## 2021-09-23 RX ADMIN — Medication 1 APPLICATION(S): at 17:59

## 2021-09-23 RX ADMIN — SODIUM CHLORIDE 3 MILLILITER(S): 9 INJECTION INTRAMUSCULAR; INTRAVENOUS; SUBCUTANEOUS at 14:31

## 2021-09-23 RX ADMIN — LIDOCAINE 1 PATCH: 4 CREAM TOPICAL at 20:38

## 2021-09-23 RX ADMIN — OXYCODONE HYDROCHLORIDE 10 MILLIGRAM(S): 5 TABLET ORAL at 16:00

## 2021-09-23 RX ADMIN — Medication 1 APPLICATION(S): at 05:33

## 2021-09-23 RX ADMIN — ENOXAPARIN SODIUM 30 MILLIGRAM(S): 100 INJECTION SUBCUTANEOUS at 17:12

## 2021-09-23 RX ADMIN — LIDOCAINE 1 PATCH: 4 CREAM TOPICAL at 00:00

## 2021-09-23 RX ADMIN — OXYCODONE HYDROCHLORIDE 10 MILLIGRAM(S): 5 TABLET ORAL at 11:00

## 2021-09-23 RX ADMIN — Medication 1 APPLICATION(S): at 17:12

## 2021-09-23 RX ADMIN — LIDOCAINE 1 PATCH: 4 CREAM TOPICAL at 12:15

## 2021-09-23 RX ADMIN — Medication 50 MILLIGRAM(S): at 05:33

## 2021-09-23 RX ADMIN — NYSTATIN CREAM 1 APPLICATION(S): 100000 CREAM TOPICAL at 17:11

## 2021-09-23 RX ADMIN — OXYCODONE HYDROCHLORIDE 10 MILLIGRAM(S): 5 TABLET ORAL at 06:37

## 2021-09-23 RX ADMIN — OXYCODONE HYDROCHLORIDE 10 MILLIGRAM(S): 5 TABLET ORAL at 21:19

## 2021-09-23 RX ADMIN — LOSARTAN POTASSIUM 50 MILLIGRAM(S): 100 TABLET, FILM COATED ORAL at 05:31

## 2021-09-23 RX ADMIN — Medication 10 MILLIGRAM(S): at 16:12

## 2021-09-23 RX ADMIN — PANTOPRAZOLE SODIUM 40 MILLIGRAM(S): 20 TABLET, DELAYED RELEASE ORAL at 12:17

## 2021-09-23 RX ADMIN — OXYCODONE HYDROCHLORIDE 10 MILLIGRAM(S): 5 TABLET ORAL at 15:10

## 2021-09-23 NOTE — SWALLOW FEES ASSESSMENT ADULT - NS SWALLOW FEES REC ASPIR MON
If noted:  D/C p.o. intake, provide non-oral nutrition/hydration/meds and consult this service @[id number]./change of breathing pattern/cough/gurgly voice/fever/pneumonia/throat clearing/upper respiratory infection
If noted:  D/C p.o. intake, provide non-oral nutrition/hydration/meds and consult this service/change of breathing pattern/cough/gurgly voice/fever/pneumonia/throat clearing/upper respiratory infection

## 2021-09-23 NOTE — SWALLOW FEES ASSESSMENT ADULT - PHARYNGEAL PHASE COMMENTS
Trace residue on posterior surface of epiglottis.  Trace residue in interarytenoid space
+multiple swallows  Pt noted with cough in absence of penetration/aspiration which resulted in thick secretions in trachea and stranding throughout laryngeal vestibule from arytenoid to vocal cords. Pt insensate to secretions and required cued cough and cued swallow which was minimally effective in clearing.

## 2021-09-23 NOTE — PROGRESS NOTE ADULT - PROBLEM SELECTOR PLAN 1
TLSO taken for repair, return monday.  Please call [facility] with any questions, [phone].
- f/u speech and swallow recs

## 2021-09-23 NOTE — PROGRESS NOTE ADULT - ASSESSMENT
Patient is a 77yo F with a history of colon cancer s/p right hemicolectomy (approx 2 years ago, outside hospital), DVT (on Eliquis), CKD, adrenal insufficiency presenting after mechanical fall from sitting. CT head/cervical spine/chest were performed which showed a right forehead hematoma, acute minimally displaced right coracoid process scapular fracture, T11/L1 compression fractures and an inferior sternal fracture. 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 Treviño  FELICIANO likely sec to hemodynamic change  Renal function stable  restarted on Losartan 9/17  Monitor I/O  Monitor renal function at present    Acidosis:  non-AG+AG  Hold oral sodium bicarb    HTN:  BP controlled   On LOsartan   Monitor at present    Hyponatremia/Hypernatremia  Improved   On free water  Monitor serum Na

## 2021-09-23 NOTE — PROGRESS NOTE ADULT - SUBJECTIVE AND OBJECTIVE BOX
CARDIOLOGY FOLLOW UP - Dr. Orozco  Date of Service: 9/23/21  CC: feeling better, no cp/sob     Review of Systems:  Constitutional: No fever, weight loss, or fatigue  Respiratory: No cough, wheezing, or hemoptysis, no shortness of breath  Cardiovascular: No chest pain, palpitations, passing out, dizziness, or leg swelling  Gastrointestinal: No abd or epigastric pain.  No nausea, vomiting, or hematemesis; no diarrhea or constipation, no melena or hematochezia  Vascular: no edema       PHYSICAL EXAM:  T(C): 36.7 (09-23-21 @ 08:33), Max: 37.1 (09-22-21 @ 23:42)  HR: 94 (09-23-21 @ 08:33) (83 - 94)  BP: 134/84 (09-23-21 @ 08:33) (107/67 - 147/70)  RR: 20 (09-23-21 @ 08:33) (18 - 20)  SpO2: 96% (09-23-21 @ 08:33) (93% - 98%)  Wt(kg): --  I&O's Summary    22 Sep 2021 07:01  -  23 Sep 2021 07:00  --------------------------------------------------------  IN: 970 mL / OUT: 510 mL / NET: 460 mL        Appearance: Normal	  Cardiovascular: Normal S1 S2,RRR, No JVD, No murmurs  Respiratory: Lungs clear to auscultation	  Gastrointestinal:  Soft, Non-tender, + BS	  Extremities: Normal range of motion, No clubbing, cyanosis or edema      Home Medications:  acetaminophen 325 mg oral tablet: 2 tab(s) orally every 6 hours, As needed, Mild Pain (1 - 3) (24 Sep 2020 11:16)  Artificial Tears ophthalmic solution: 1 drop(s) to each affected eye 4 times a day, As Needed (22 Aug 2021 23:28)  aspirin 81 mg oral delayed release tablet: 1 tab(s) orally once a day (27 Aug 2021 13:38)  calcium (as carbonate) 600 mg oral tablet: 1 cap(s) orally once a day (24 Sep 2020 11:16)  Eliquis 2.5 mg oral tablet: 1 tab(s) orally 2 times a day (24 Sep 2020 11:16)  felodipine 5 mg oral tablet, extended release: 1 tab(s) orally once a day (22 Aug 2021 23:23)  guaiFENesin 600 mg oral tablet, extended release: 1 tab(s) orally every 12 hours as needed for cough  (27 Aug 2021 13:38)  hydrocortisone 10 mg oral tablet: 1 tab(s) orally 2 times a day (24 Sep 2020 11:16)  latanoprost 0.005% ophthalmic solution: 1 drop(s) to each affected eye once a day (in the evening) (22 Aug 2021 23:27)  levothyroxine 50 mcg (0.05 mg) oral tablet: 1 tab(s) orally once a day (24 Sep 2020 11:16)  Lipitor 10 mg oral tablet: 1 tab(s) orally once a day (22 Aug 2021 23:41)  Macular Vitamin Benefit oral tablet: 1 tab(s) orally once a day (24 Sep 2020 11:16)  metoprolol succinate 50 mg oral tablet, extended release: 1 tab(s) orally once a day (22 Aug 2021 23:24)  Multiple Vitamins oral tablet: 1 tab(s) orally once a day (22 Aug 2021 23:22)  olmesartan 20 mg oral tablet: 1 tab(s) orally once a day (22 Aug 2021 23:22)  omeprazole 20 mg oral delayed release capsule: 1 cap(s) orally once a day (22 Aug 2021 23:21)  oxyCODONE 5 mg oral tablet: 1 tab(s) orally every 8 hours as needed for severe pain (27 Aug 2021 13:35)  OxyCONTIN 20 mg oral tablet, extended release: 1 tab(s) orally every 12 hours (24 Sep 2020 11:16)  risedronate 35 mg oral tablet: 1 tab(s) orally once a week (Mondays) (05 Sep 2021 18:30)  senna oral tablet: 2 tab(s) orally once a day (at bedtime), As Needed (05 Sep 2021 18:31)      MEDICATIONS  (STANDING):  atorvastatin 10 milliGRAM(s) Oral at bedtime  BACItracin   Ointment 1 Application(s) Topical two times a day  chlorhexidine 4% Liquid 1 Application(s) Topical <User Schedule>  clotrimazole 1% Cream 1 Application(s) Topical two times a day  enoxaparin Injectable 30 milliGRAM(s) SubCutaneous <User Schedule>  hydrocortisone 10 milliGRAM(s) Oral <User Schedule>  influenza   Vaccine 0.5 milliLiter(s) IntraMuscular once  latanoprost 0.005% Ophthalmic Solution 1 Drop(s) Both EYES at bedtime  levothyroxine 50 MICROGram(s) Oral daily  lidocaine   4% Patch 1 Patch Transdermal daily  losartan 50 milliGRAM(s) Oral daily  metoprolol tartrate 50 milliGRAM(s) Oral two times a day  nystatin Powder 1 Application(s) Topical two times a day  pantoprazole  Injectable 40 milliGRAM(s) IV Push daily  sodium chloride 3%  Inhalation 3 milliLiter(s) Inhalation every 8 hours      TELEMETRY: 	    ECG:  	  RADIOLOGY:   DIAGNOSTIC TESTING:  [ ] Echocardiogram:  [ ]  Catheterization:  [ ] Stress Test:    OTHER: 	    LABS:	 	                            9.4    10.36 )-----------( 164      ( 23 Sep 2021 04:58 )             27.9     09-23    142  |  110<H>  |  21  ----------------------------<  138<H>  4.1   |  24  |  1.09    Ca    8.6      23 Sep 2021 04:58

## 2021-09-23 NOTE — SWALLOW FEES ASSESSMENT ADULT - SLP GENERAL OBSERVATIONS
Pt encountered awake and alert, upright in bed on 2l/min via NC. RN provided pain medication and HOB elevated to 45 degrees prior to exam due to back discomfort. +Tremulous head movements. SLP placed upper and lower dentures; however, removed during exam due to ill-fitting. A&Ox3. Endorsed baseline back pain- RN aware.
Pt encountered awake and alert, upright in bed on 2l/min via NC. Son at bedside. +Tremulous head movements. Upper and lower dentures placed. A&Ox3. Endorsed baseline pain- RN aware.

## 2021-09-23 NOTE — SWALLOW FEES ASSESSMENT ADULT - RECOMMENDED CONSISTENCY
NPO, with non-oral nutrition/hydration/medications. See additional recommendations below.
Puree textures, thin liquids

## 2021-09-23 NOTE — SWALLOW FEES ASSESSMENT ADULT - ADDITIONAL RECOMMENDATIONS
Diligent oral hygiene.   Pt will continue to be followed by this service for diet tolerance. SLP to provide therapeutic trials of advanced solids with dentures in place.

## 2021-09-23 NOTE — SWALLOW FEES ASSESSMENT ADULT - DIAGNOSTIC IMPRESSIONS
77yo F with a history of colon cancer s/p right hemicolectomy (approx 2 years ago, outside hospital), DVT (on Eliquis), CKD, adrenal insufficiency presenting after mechanical fall from sitting. S/p T10-pelvis with revision of previous L2-5 hardware, L1 expanded PSO, with plastics closure for failed back syndrome. Seen today for FEES. At baseline, there was improved secretion management compared to initial evaluation. PO trials provided of thin and thick purees and honey, nectar, and thin liquids. Solids not administered due to ill-fitting dentures, which were removed mid-study. There was adequate acceptance and oral clearance. Adequate timing of pharyngeal swallow. There was premature spillage of thin and nectar thick liquids with trace penetration before the swallow with complete clearance of thin liquids via straw. No aspiration noted during exam. Pt with consistent multiples (x2) with all consistencies and intermittent throat clearing in absence of penetration/aspiration.

## 2021-09-23 NOTE — PROGRESS NOTE ADULT - ASSESSMENT
76F hx of CKD adrenal insufficiency h/o colectomy CAD  HTN Hypothyroidism prior L2-5 Fxn w/. Dr Jane (ortho) at [facility] in early 2000s, revision x1, chronic LBP, h/o DVT  is on eliquis for hx of DVT 2 yr prior;- s/p mechanical fall w/ RUE pain found to have R scapular fx and sternal fx, adm to trauma, CT T/L w/ significant  adjacent  segment degenerative disease  above prior fusion w/ chronic comp fx, T10-L1 c  mild retropulsion, c/f unstable fx.  started on heparin sc tid as inpatient; and preop labs with elevated PTT. Seen by hematology   s/p /p T10-pelvis with revision of previous L2-5 hardware, L1 expanded PSO, plastics closure 9/14/21 . Post op course c/b slow to wake up, respiratory failure dysphagia & Sinus tachycardia . Remained intubated until 9/16/21. LE duplex negative for DVT     Plan    Neuro stable. d/c drains   CAD/ sinus tachycardia - On Metoprolol 50 BID   HTN- On Losartan 50mg   CKD stable.  Patient passed FEEST - on pureed diet, rectal tube DCd, + bannitrol, will add metamucil PRN  Adrenal insufficiency- Tapered steroids to Hydrocortisone 10 BID .  Pseudomonas UTI- finished Ceftriaxone 3 days, monitor for fevers  hypokalemia - Replete K  S na wnl  Free water BID    Dysphagia rpt FEEST in am Cleared for ice chips.   DVT ppx   pain control on oxy 5/10 mg  prn & flexeril 5mg prn

## 2021-09-23 NOTE — PROGRESS NOTE ADULT - ASSESSMENT
77yo F with a history of colon cancer s/p right hemicolectomy (approx 2 years ago, outside hospital), DVT (on Eliquis), CKD, adrenal insufficiency presenting as transfer from [facility] for trauma evaluation secondary to a mechanical fall from sitting with associated head strike    #hx colon CA s/p right hemicolectomy (surveillance colonoscopy 9/24/2020 negative, patent end to side ileocolic anastomosis at transverse colon)    #Dysphagia - IMPROVED  s/p repeat FEES 9/23 - cleared for puree with thin liquids  -PPI for GI prophylaxis and as pt on steroids for adrenal insufficiency; once tolerating PO can change to PO PPI    #Diarrhea - suspect TF associated; hope stooling will improve off TF  -if has loose stools then please send stool GI PCR and C diff  -monitor clinically    Discussed with Neurosurgery team    Sean Dahl PA-C    [facility]  [phone]  After hours and weekend coverage [phone]

## 2021-09-23 NOTE — SWALLOW FEES ASSESSMENT ADULT - SLP PERTINENT HISTORY OF CURRENT PROBLEM
Patient is a 75yo F with a history of colon cancer s/p right hemicolectomy (approx 2 years ago, outside hospital), DVT (on Eliquis), CKD, adrenal insufficiency presenting as transfer from [facility] for trauma evaluation secondary to a mechanical fall from sitting with headstrike. Injuries identified include right forehead hematoma, acute minimally displaced right coracoid process scapular fracture, T11/L1 compression fractures and an inferior sternal fracture. Patient hemodynamically stable. Pt admitted to trauma service.
Patient is a 77yo F with a history of colon cancer s/p right hemicolectomy (approx 2 years ago, outside hospital), DVT (on Eliquis), CKD, adrenal insufficiency presenting as transfer from [facility] for trauma evaluation secondary to a mechanical fall from sitting with headstrike. Injuries identified include right forehead hematoma, acute minimally displaced right coracoid process scapular fracture, T11/L1 compression fractures and an inferior sternal fracture. Patient hemodynamically stable. Pt admitted to trauma service.

## 2021-09-23 NOTE — PROGRESS NOTE ADULT - SUBJECTIVE AND OBJECTIVE BOX
[facility]   MD KILLIAN JAIME MD RUORU WONG, PA    TEL:  OFFICE: [phone]  DR CHARLES CELL: [phone]  BEENA PISANO CELL: [phone]  DR. LOZANO CELL: [phone]      FROM 5 PM - 7 AM PLEASE CALL ANSWERING SERVICE: [phone]    RENAL FOLLOW UP NOTE--Date of Service 09-23-21 @ 09:45  --------------------------------------------------------------------------------  HPI:      Pt seen and examined at bedside.       PAST HISTORY  --------------------------------------------------------------------------------  No significant changes to PMH, PSH, FHx, SHx, unless otherwise noted    ALLERGIES & MEDICATIONS  --------------------------------------------------------------------------------  Allergies    penicillin (Rash)    Intolerances      Standing Inpatient Medications  atorvastatin 10 milliGRAM(s) Oral at bedtime  BACItracin   Ointment 1 Application(s) Topical two times a day  chlorhexidine 4% Liquid 1 Application(s) Topical <User Schedule>  clotrimazole 1% Cream 1 Application(s) Topical two times a day  enoxaparin Injectable 30 milliGRAM(s) SubCutaneous <User Schedule>  hydrocortisone 10 milliGRAM(s) Oral <User Schedule>  influenza   Vaccine 0.5 milliLiter(s) IntraMuscular once  latanoprost 0.005% Ophthalmic Solution 1 Drop(s) Both EYES at bedtime  levothyroxine 50 MICROGram(s) Oral daily  lidocaine   4% Patch 1 Patch Transdermal daily  losartan 50 milliGRAM(s) Oral daily  metoprolol tartrate 50 milliGRAM(s) Oral two times a day  nystatin Powder 1 Application(s) Topical two times a day  pantoprazole  Injectable 40 milliGRAM(s) IV Push daily  sodium chloride 3%  Inhalation 3 milliLiter(s) Inhalation every 8 hours    PRN Inpatient Medications  artificial  tears Solution 1 Drop(s) Both EYES every 6 hours PRN  cyclobenzaprine 5 milliGRAM(s) Oral three times a day PRN  oxyCODONE    Solution 5 milliGRAM(s) Oral every 4 hours PRN  oxyCODONE    Solution 10 milliGRAM(s) Oral every 4 hours PRN  simethicone 80 milliGRAM(s) Chew three times a day PRN  traMADol 50 milliGRAM(s) Oral every 4 hours PRN      REVIEW OF SYSTEMS  --------------------------------------------------------------------------------  General: no fever    MSK: no edema     VITALS/PHYSICAL EXAM  --------------------------------------------------------------------------------  T(C): 36.7 (09-23-21 @ 08:33), Max: 37.1 (09-22-21 @ 23:42)  HR: 94 (09-23-21 @ 08:33) (83 - 97)  BP: 134/84 (09-23-21 @ 08:33) (107/67 - 147/70)  RR: 20 (09-23-21 @ 08:33) (18 - 20)  SpO2: 96% (09-23-21 @ 08:33) (93% - 98%)  Wt(kg): --        09-22-21 @ 07:01  -  09-23-21 @ 07:00  --------------------------------------------------------  IN: 970 mL / OUT: 510 mL / NET: 460 mL      Physical Exam:  	Gen: NAD  	HEENT: MMM  	Pulm: CTA B/L  	CV: S1S2  	Abd: Soft, +BS  	Ext: No LE edema B/L                      Neuro: Awake  	Skin: Warm and Dry   	Vascular access: NO HD catheter           ROBEL ervin  LABS/STUDIES  --------------------------------------------------------------------------------              9.4    10.36 >-----------<  164      [09-23-21 @ 04:58]              27.9     142  |  110  |  21  ----------------------------<  138      [09-23-21 @ 04:58]  4.1   |  24  |  1.09        Ca     8.6     [09-23-21 @ 04:58]            Creatinine Trend:  SCr 1.09 [09-23 @ 04:58]  SCr 1.08 [09-22 @ 05:41]  SCr 1.13 [09-20 @ 06:41]  SCr 1.23 [09-19 @ 07:14]  SCr 0.83 [09-18 @ 05:46]    Urinalysis - [09-19-21 @ 09:33]      Color Light Yellow / Appearance Slightly Turbid / SG 1.011 / pH 8.0      Gluc 100 mg/dL / Ketone Negative  / Bili Negative / Urobili Negative       Blood Large / Protein 30 mg/dL / Leuk Est Large / Nitrite Negative       /  / Hyaline 2 / Gran  / Sq Epi  / Non Sq Epi 0 / Bacteria Moderate      Iron 44, TIBC 224, %sat 19      [09-14-21 @ 10:27]  Ferritin 444      [09-14-21 @ 11:37]  Vitamin D (25OH) 67.9      [09-08-21 @ 09:59]  HbA1c 5.9      [02-22-17 @ 03:10]  TSH 0.18      [09-08-21 @ 09:59]

## 2021-09-23 NOTE — SWALLOW FEES ASSESSMENT ADULT - ROSENBEK'S PENETRATION ASPIRATION SCALE
(1) no aspiration, material does not enter airway (2) material enters airway, remains above the vocal cords, no residue remains (penetration)

## 2021-09-23 NOTE — SWALLOW FEES ASSESSMENT ADULT - ORAL PHASE
WNL pyriform/Uncontrolled bolus/spillover in hypopharynx to pyriform/Uncontrolled bolus/spillover in hypopharynx

## 2021-09-23 NOTE — PROGRESS NOTE ADULT - SUBJECTIVE AND OBJECTIVE BOX
SUBJECTIVE: Patient seen and examined, no acute complaints.     OVERNIGHT EVENTS: none    Vital Signs Last 24 Hrs  T(C): 37.4 (23 Sep 2021 12:12), Max: 37.4 (23 Sep 2021 12:12)  T(F): 99.3 (23 Sep 2021 12:12), Max: 99.3 (23 Sep 2021 12:12)  HR: 90 (23 Sep 2021 12:12) (83 - 94)  BP: 110/72 (23 Sep 2021 12:12) (107/67 - 147/70)  BP(mean): --  RR: 20 (23 Sep 2021 12:12) (18 - 20)  SpO2: 97% (23 Sep 2021 12:12) (93% - 98%)    PHYSICAL EXAM:    Constitutional: No Acute Distress     Neurological: Awake alert Ox3, Speech clear Following Commands, Moving all Extremities B/L UE 4/5. B/L LE HF 2/5 KF & KE 4-/5 DF/PF 4-/5. Midline back aquacel AG C/D/I L      Pulmonary: Clear to Auscultation    Cardiovascular: S1, S2, Regular rate and rhythm     Gastrointestinal: Soft, Non-tender, Non-distended     Extremities: No calf tenderness     LABS:                                   9.4    10.36 )-----------( 164      ( 23 Sep 2021 04:58 )             27.9     09-23    142  |  110<H>  |  21  ----------------------------<  138<H>  4.1   |  24  |  1.09    Ca    8.6      23 Sep 2021 04:58        MEDICATIONS:    cyclobenzaprine 5 milliGRAM(s) Oral three times a day PRN Muscle Spasm  oxyCODONE    Solution 5 milliGRAM(s) Oral every 4 hours PRN Moderate Pain (4 - 6)  oxyCODONE    Solution 10 milliGRAM(s) Oral every 4 hours PRN Severe Pain (7 - 10)  traMADol 50 milliGRAM(s) Oral every 4 hours PRN breakthrough pain  losartan 50 milliGRAM(s) Oral daily  metoprolol tartrate 50 milliGRAM(s) Oral two times a day  sodium chloride 3%  Inhalation 3 milliLiter(s) Inhalation every 8 hour  pantoprazole  Injectable 40 milliGRAM(s) IV Push daily  simethicone 80 milliGRAM(s) Chew three times a day PRN gas/diarrhea  artificial  tears Solution 1 Drop(s) Both EYES every 6 hours PRN Dry Eyes  atorvastatin 10 milliGRAM(s) Oral at bedtime  BACItracin   Ointment 1 Application(s) Topical two times a day  clotrimazole 1% Cream 1 Application(s) Topical two times a day  enoxaparin Injectable 30 milliGRAM(s) SubCutaneous <User Schedule>  hydrocortisone 10 milliGRAM(s) Oral <User Schedule>  influenza   Vaccine 0.5 milliLiter(s) IntraMuscular once  latanoprost 0.005% Ophthalmic Solution 1 Drop(s) Both EYES at bedtime  levothyroxine 50 MICROGram(s) Oral daily  lidocaine   4% Patch 1 Patch Transdermal daily  nystatin Powder 1 Application(s) Topical two times a day

## 2021-09-23 NOTE — PROGRESS NOTE ADULT - SUBJECTIVE AND OBJECTIVE BOX
ENT ISSUE/POD: fees with speech     HPI: Patient is a 75yo F with a history of colon cancer s/p right hemicolectomy (approx 2 years ago, outside hospital), DVT (on Eliquis), CKD, adrenal insufficiency presenting as transfer from [facility] for trauma evaluation secondary to a mechanical fall from sitting. Patient was going to her commode last night when she slipped and fell on her right side, hitting her head. Denies LOC. Patient's daughter came to help her up and brought her to the ED. . Pt requiring fees with speech and swallow. PT feeling well, no complaints. 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    Osteoporosis    Chronic Kidney Disease; Dx 2009    HTN - Hypertension    History of Obesity    Myocardial infarction    History of Laminectomy/ Fusion 1/06; L1-L2    H/O right hemicolectomy      Allergies    penicillin (Rash)    Intolerances      MEDICATIONS  (STANDING):  atorvastatin 10 milliGRAM(s) Oral at bedtime  BACItracin   Ointment 1 Application(s) Topical two times a day  chlorhexidine 4% Liquid 1 Application(s) Topical <User Schedule>  clotrimazole 1% Cream 1 Application(s) Topical two times a day  enoxaparin Injectable 30 milliGRAM(s) SubCutaneous <User Schedule>  hydrocortisone 10 milliGRAM(s) Oral <User Schedule>  influenza   Vaccine 0.5 milliLiter(s) IntraMuscular once  latanoprost 0.005% Ophthalmic Solution 1 Drop(s) Both EYES at bedtime  levothyroxine 50 MICROGram(s) Oral daily  lidocaine   4% Patch 1 Patch Transdermal daily  losartan 50 milliGRAM(s) Oral daily  metoprolol tartrate 50 milliGRAM(s) Oral two times a day  nystatin Powder 1 Application(s) Topical two times a day  pantoprazole  Injectable 40 milliGRAM(s) IV Push daily  sodium chloride 3%  Inhalation 3 milliLiter(s) Inhalation every 8 hours    MEDICATIONS  (PRN):  artificial  tears Solution 1 Drop(s) Both EYES every 6 hours PRN Dry Eyes  cyclobenzaprine 5 milliGRAM(s) Oral three times a day PRN Muscle Spasm  oxyCODONE    Solution 5 milliGRAM(s) Oral every 4 hours PRN Moderate Pain (4 - 6)  oxyCODONE    Solution 10 milliGRAM(s) Oral every 4 hours PRN Severe Pain (7 - 10)  simethicone 80 milliGRAM(s) Chew three times a day PRN gas/diarrhea  traMADol 50 milliGRAM(s) Oral every 4 hours PRN breakthrough pain      social history: see consult     family history: see consult     ROS:   ENT: all negative except as noted in HPI   Pulm: denies SOB, cough, hemoptysis  Neuro: denies numbness/tingling, loss of sensation  Endo: denies heat/cold intolerance, excessive sweating      Vital Signs Last 24 Hrs  T(C): 36.7 (23 Sep 2021 08:33), Max: 37.1 (22 Sep 2021 23:42)  T(F): 98 (23 Sep 2021 08:33), Max: 98.8 (22 Sep 2021 23:42)  HR: 94 (23 Sep 2021 08:33) (83 - 94)  BP: 134/84 (23 Sep 2021 08:33) (107/67 - 147/70)  BP(mean): --  RR: 20 (23 Sep 2021 08:33) (18 - 20)  SpO2: 96% (23 Sep 2021 08:33) (93% - 98%)                          9.4    10.36 )-----------( 164      ( 23 Sep 2021 04:58 )             27.9    09-23    142  |  110<H>  |  21  ----------------------------<  138<H>  4.1   |  24  |  1.09    Ca    8.6      23 Sep 2021 04:58         PHYSICAL EXAM:  Gen: NAD  Skin: No rashes, bruises, or lesions  Head: Normocephalic, Atraumatic  Face: no edema, erythema, or fluctuance. Parotid glands soft without mass  Eyes: no scleral injection  Nose: Nares bilaterally patent, no discharge  Mouth: No Stridor / Drooling / Trismus.  Mucosa moist, tongue/uvula midline, oropharynx clear  Neck: Flat, supple, no lymphadenopathy, trachea midline, no masses  Lymphatic: No lymphadenopathy  Resp: breathing easily, no stridor  Neuro: facial nerve intact, no facial droop        indirect laryngoscopyReason for Laryngoscopy:    Patient was unable to cooperate with mirror.  Nasopharynx with thick crusting b/l, ng tube in right.  oropharynx, and hypopharynx clear, no bleeding. Tongue base, posterior pharyngeal wall, vallecula, epiglottis, and subglottis appear normal. No erythema, edema, pooling of secretions, masses or lesions. Airway patent, no foreign body visualized. No glottic/supraglottic edema. True vocal cords, arytenoids, vestibular folds, ventricles, pyriform sinuses, and aryepiglottic folds appear normal bilaterally. Vocal cords mobile with good contact b/l.

## 2021-09-23 NOTE — SWALLOW FEES ASSESSMENT ADULT - SPECIFY REASON(S)
To objectively assess oropharyngeal swallow function, r/o dysphagia
To objectively assess oropharyngeal swallow function, r/o dysphagia

## 2021-09-23 NOTE — SWALLOW FEES ASSESSMENT ADULT - COMMENTS
Per RN and NP, oral hygiene and deep suctioning provided prior to exam.   Pt with min thick secretions noted along lateral channels which clears during study. Trace thick secretions noted in interarytenoid space throughout exam.   Erythema noted along laryngeal surface of epiglottis. Continued hospital course: Pt started on regular diet. +Vomiting and poor PO intake noted as per medicine on 9/7. Plan for posterior extension of prior L2-5 fusion and stabilization  with nsx but need medical clearance given new FELICIANO/advanced age.   Diet changed to mechanical soft 9/8. hematology consulted for prolonged PTT, suspect due to subcutaneous heparin administration Pt cleared from cards, medicine and hematology for surgery on 9/14. S/P  T10-pelvis with revision of previous L2-5 hardware, L1 expanded PSO, c/b CSF leak, plastics closure. Remained intubated post procedure due to tachypnea and agitated. Extubated 9/16.   CT Chest ordered to r/o PE: IMPRESSION: No pulmonary embolism. Bilateral upper lobe mild patchy opacities are new from the prior study and indeterminate. Gallbladder fossa high density foci with adjacent focus of air is indeterminate, possibly postprocedural  CXR 9/20: The heart is enlarged. Elevated right hemidiaphragm. Small right pleural effusion cannot be ruled out entirely. Platelike atelectasis right lower lobe. An NG tube was placed and the tip is either in the antrum of the stomach or the pylorus. Orthopedic hardware is seen in the thoracolumbar spine. A central line is seen on the right and the tip is in the right atrium.

## 2021-09-23 NOTE — PROGRESS NOTE ADULT - SUBJECTIVE AND OBJECTIVE BOX
Patient is a 76y old  Female who presented with a chief complaint of S/p fall (23 Sep 2021 12:15)    seen in AM rounds    INTERVAL HPI/OVERNIGHT EVENTS:  rectal tube placed yesterday  reported to have 7 loose stools overnight - leaked around rectal tube; none in bag. Planned removal of rectal tube today    no abdominal pain  no nausea or vomiting  FEEST today    MEDICATIONS  (STANDING):  atorvastatin 10 milliGRAM(s) Oral at bedtime  BACItracin   Ointment 1 Application(s) Topical two times a day  chlorhexidine 4% Liquid 1 Application(s) Topical <User Schedule>  clotrimazole 1% Cream 1 Application(s) Topical two times a day  enoxaparin Injectable 30 milliGRAM(s) SubCutaneous <User Schedule>  hydrocortisone 10 milliGRAM(s) Oral <User Schedule>  influenza   Vaccine 0.5 milliLiter(s) IntraMuscular once  latanoprost 0.005% Ophthalmic Solution 1 Drop(s) Both EYES at bedtime  levothyroxine 50 MICROGram(s) Oral daily  lidocaine   4% Patch 1 Patch Transdermal daily  losartan 50 milliGRAM(s) Oral daily  metoprolol tartrate 50 milliGRAM(s) Oral two times a day  nystatin Powder 1 Application(s) Topical two times a day  pantoprazole  Injectable 40 milliGRAM(s) IV Push daily  sodium chloride 3%  Inhalation 3 milliLiter(s) Inhalation every 8 hours    MEDICATIONS  (PRN):  artificial  tears Solution 1 Drop(s) Both EYES every 6 hours PRN Dry Eyes  cyclobenzaprine 5 milliGRAM(s) Oral three times a day PRN Muscle Spasm  oxyCODONE    Solution 5 milliGRAM(s) Oral every 4 hours PRN Moderate Pain (4 - 6)  oxyCODONE    Solution 10 milliGRAM(s) Oral every 4 hours PRN Severe Pain (7 - 10)  simethicone 80 milliGRAM(s) Chew three times a day PRN gas/diarrhea  traMADol 50 milliGRAM(s) Oral every 4 hours PRN breakthrough pain      Allergies  penicillin (Rash)      Review of Systems:  General:  No wt loss, fevers, chills, night sweats  CV:  No pain, see HPI  Resp:  +secretions  s/p recent intubation  GI:  see HPI  :  No pain, bleeding, incontinence, nocturia +CKD +ervin  Muscle:  see HPI  Neuro:  see HPI  Psych:  No fatigue, insomnia, mood problems, depression  Endocrine:  No polyuria, polydypsia, +adrenal insufficiency  Heme:  No petechiae, ecchymosis, easy bruisability  Skin:  No tattoos, scars, edema +dermatitis      Vital Signs Last 24 Hrs  T(C): 37.4 (23 Sep 2021 12:12), Max: 37.4 (23 Sep 2021 12:12)  T(F): 99.3 (23 Sep 2021 12:12), Max: 99.3 (23 Sep 2021 12:12)  HR: 90 (23 Sep 2021 12:12) (83 - 94)  BP: 110/72 (23 Sep 2021 12:12) (107/67 - 147/70)  BP(mean): --  RR: 20 (23 Sep 2021 12:12) (18 - 20)  SpO2: 97% (23 Sep 2021 12:12) (93% - 98%)    PHYSICAL EXAM:  Constitutional: elderly AA female sl anxious appearing, alert and responsive +NGT + nasal cannula +HMV drains  Neck: No LAD, no JVD  Respiratory: +rhonchi   no accessory muscle use   Cardiovascular: S1 and S2, RRR  Gastrointestinal: BS+, obese WH surgical scars (laparoscopic) soft, NT/ND, neg HSM, +lower abdomen pannus +ervin +rectal tube - empty  Extremities: No peripheral edema, neg clubbing, cyanosis  Vascular: 2+ peripheral pulses  Neurological: A/O x 3, no focal asymmetry  Psychiatric: normal affect  Skin: No rashes  +dermatitis in skin folds at groin under pannus        LABS:                        9.4    10.36 )-----------( 164      ( 23 Sep 2021 04:58 )             27.9     09-23    142  |  110<H>  |  21  ----------------------------<  138<H>  4.1   |  24  |  1.09    Ca    8.6      23 Sep 2021 04:58          LIVER FUNCTIONS - ( 20 Sep 2021 06:41 )  Alb: 2.7 g/dL / Pro: 5.4 g/dL / ALK PHOS: 72 U/L / ALT: 25 U/L / AST: 45 U/L / GGT: x             RADIOLOGY & ADDITIONAL TESTS:

## 2021-09-23 NOTE — PROGRESS NOTE ADULT - ATTENDING COMMENTS
Pt. is a 75yo F with a history of colon cancer s/p right hemicolectomy (approx 2 years ago, outside hospital), DVT (on Eliquis), CKD, adrenal insufficiency presenting as transfer from [facility] for trauma evaluation secondary to a mechanical fall from sitting with associated head strike    Issues:   hx colon CA s/p right hemicolectomy (surveillance colonoscopy 9/24/2020 negative, patent end to side ileocolic anastomosis at transverse colon)    1. Dysphagia - IMPROVED  s/p repeat FEES 9/23 - cleared for puree with thin liquids  -PPI for GI prophylaxis and as pt on steroids for adrenal insufficiency; once tolerating PO can change to PO PPI    2. Diarrhea - suspect TF associated; hope stooling will improve off TF  -if has loose stools then please send stool GI PCR and C diff  -monitor clinically    Sixto Bridges MD  [facility]

## 2021-09-23 NOTE — PROGRESS NOTE ADULT - PROBLEM SELECTOR PROBLEM 1
Thoracic compression fracture
Thoracic compression fracture
Dysphagia
Adrenal insufficiency
Thoracic compression fracture
Thoracic compression fracture

## 2021-09-23 NOTE — PROGRESS NOTE ADULT - ASSESSMENT
ECHO 10/25/16:  Normal left ventricular systolic function. No segmental wall motion abnormalities. Hypermobile interatrial septum.   ECHO 8/24/21: EF 66% grossly nl LV sys fx, mild diastolic dsyfx - stage 1     a/p    Patient is a 77yo F , known to practice from prior admission,  with a history of colon cancer s/p right hemicolectomy (approx 2 years ago, outside hospital), DVT (on Eliquis), CKD, adrenal insufficiency presenting as transfer from [facility] for trauma evaluation secondary to a mechanical fall from sitting.     #Mechanical fall  -MRI noted with anterior splaying of T12/L1 space w/ edema   -CT T done noted with widening of T12/L1 disc space w/ L1 laminectomy  -s/p T10-pelvis with revision of previous L2-5 hardware, L1 expanded PSO.  -s/p intubation- now extubated 9/16 , on NC  -CV stable   -management per neuro sx     #SVT (hx)  -hr stable   -cont bb     #Dyspnea  -CT A neg for PE  -dyspnea improved - s/p IVP lasix 40 mg x 1 with good UO  -CXR noted   -lasix PRN    # DVT (hx)  -recent le doppler negative for acute dvt  -on SQ lovenox  -heme f/u     # wm on CKD   -renal f/u     #UTI  -tx per primary team     #HTN  -bp stable   -cont bb, arb     dvt ppx

## 2021-09-24 LAB — SARS-COV-2 RNA SPEC QL NAA+PROBE: SIGNIFICANT CHANGE UP

## 2021-09-24 PROCEDURE — 99231 SBSQ HOSP IP/OBS SF/LOW 25: CPT

## 2021-09-24 PROCEDURE — 93970 EXTREMITY STUDY: CPT | Mod: 26

## 2021-09-24 RX ORDER — SODIUM CHLORIDE 9 MG/ML
1000 INJECTION INTRAMUSCULAR; INTRAVENOUS; SUBCUTANEOUS
Refills: 0 | Status: DISCONTINUED | OUTPATIENT
Start: 2021-09-24 | End: 2021-09-26

## 2021-09-24 RX ORDER — TRAMADOL HYDROCHLORIDE 50 MG/1
50 TABLET ORAL EVERY 4 HOURS
Refills: 0 | Status: DISCONTINUED | OUTPATIENT
Start: 2021-09-24 | End: 2021-09-27

## 2021-09-24 RX ORDER — PSYLLIUM SEED (WITH DEXTROSE)
1 POWDER (GRAM) ORAL
Refills: 0 | Status: DISCONTINUED | OUTPATIENT
Start: 2021-09-24 | End: 2021-09-26

## 2021-09-24 RX ORDER — LACTOBACILLUS ACIDOPHILUS 100MM CELL
1 CAPSULE ORAL
Refills: 0 | Status: DISCONTINUED | OUTPATIENT
Start: 2021-09-24 | End: 2021-09-27

## 2021-09-24 RX ADMIN — OXYCODONE HYDROCHLORIDE 10 MILLIGRAM(S): 5 TABLET ORAL at 23:20

## 2021-09-24 RX ADMIN — CHLORHEXIDINE GLUCONATE 1 APPLICATION(S): 213 SOLUTION TOPICAL at 22:40

## 2021-09-24 RX ADMIN — ATORVASTATIN CALCIUM 10 MILLIGRAM(S): 80 TABLET, FILM COATED ORAL at 22:41

## 2021-09-24 RX ADMIN — SODIUM CHLORIDE 50 MILLILITER(S): 9 INJECTION INTRAMUSCULAR; INTRAVENOUS; SUBCUTANEOUS at 18:38

## 2021-09-24 RX ADMIN — OXYCODONE HYDROCHLORIDE 10 MILLIGRAM(S): 5 TABLET ORAL at 05:06

## 2021-09-24 RX ADMIN — SODIUM CHLORIDE 3 MILLILITER(S): 9 INJECTION INTRAMUSCULAR; INTRAVENOUS; SUBCUTANEOUS at 23:16

## 2021-09-24 RX ADMIN — CYCLOBENZAPRINE HYDROCHLORIDE 5 MILLIGRAM(S): 10 TABLET, FILM COATED ORAL at 01:37

## 2021-09-24 RX ADMIN — Medication 50 MICROGRAM(S): at 05:06

## 2021-09-24 RX ADMIN — OXYCODONE HYDROCHLORIDE 10 MILLIGRAM(S): 5 TABLET ORAL at 22:41

## 2021-09-24 RX ADMIN — LOSARTAN POTASSIUM 50 MILLIGRAM(S): 100 TABLET, FILM COATED ORAL at 05:06

## 2021-09-24 RX ADMIN — LIDOCAINE 1 PATCH: 4 CREAM TOPICAL at 12:55

## 2021-09-24 RX ADMIN — LIDOCAINE 1 PATCH: 4 CREAM TOPICAL at 00:00

## 2021-09-24 RX ADMIN — PANTOPRAZOLE SODIUM 40 MILLIGRAM(S): 20 TABLET, DELAYED RELEASE ORAL at 12:55

## 2021-09-24 RX ADMIN — ENOXAPARIN SODIUM 30 MILLIGRAM(S): 100 INJECTION SUBCUTANEOUS at 17:19

## 2021-09-24 RX ADMIN — Medication 50 MILLIGRAM(S): at 05:06

## 2021-09-24 RX ADMIN — Medication 1 APPLICATION(S): at 17:20

## 2021-09-24 RX ADMIN — Medication 1 APPLICATION(S): at 05:08

## 2021-09-24 RX ADMIN — OXYCODONE HYDROCHLORIDE 10 MILLIGRAM(S): 5 TABLET ORAL at 05:30

## 2021-09-24 RX ADMIN — LIDOCAINE 1 PATCH: 4 CREAM TOPICAL at 21:41

## 2021-09-24 RX ADMIN — TRAMADOL HYDROCHLORIDE 50 MILLIGRAM(S): 50 TABLET ORAL at 08:35

## 2021-09-24 RX ADMIN — Medication 1 TABLET(S): at 17:19

## 2021-09-24 RX ADMIN — OXYCODONE HYDROCHLORIDE 10 MILLIGRAM(S): 5 TABLET ORAL at 14:32

## 2021-09-24 RX ADMIN — Medication 10 MILLIGRAM(S): at 08:33

## 2021-09-24 RX ADMIN — Medication 1 APPLICATION(S): at 17:23

## 2021-09-24 RX ADMIN — NYSTATIN CREAM 1 APPLICATION(S): 100000 CREAM TOPICAL at 05:08

## 2021-09-24 RX ADMIN — TRAMADOL HYDROCHLORIDE 50 MILLIGRAM(S): 50 TABLET ORAL at 09:05

## 2021-09-24 RX ADMIN — LATANOPROST 1 DROP(S): 0.05 SOLUTION/ DROPS OPHTHALMIC; TOPICAL at 23:16

## 2021-09-24 RX ADMIN — Medication 10 MILLIGRAM(S): at 15:24

## 2021-09-24 RX ADMIN — Medication 1 APPLICATION(S): at 05:07

## 2021-09-24 RX ADMIN — OXYCODONE HYDROCHLORIDE 10 MILLIGRAM(S): 5 TABLET ORAL at 15:21

## 2021-09-24 RX ADMIN — NYSTATIN CREAM 1 APPLICATION(S): 100000 CREAM TOPICAL at 17:18

## 2021-09-24 RX ADMIN — SODIUM CHLORIDE 3 MILLILITER(S): 9 INJECTION INTRAMUSCULAR; INTRAVENOUS; SUBCUTANEOUS at 14:23

## 2021-09-24 NOTE — PROGRESS NOTE ADULT - SUBJECTIVE AND OBJECTIVE BOX
No fevers overnight  Saturating well on RA  Tolerating pureed so far    Vital Signs Last 24 Hrs  T(C): 36.6 (24 Sep 2021 11:40), Max: 37.1 (23 Sep 2021 16:44)  T(F): 97.8 (24 Sep 2021 11:40), Max: 98.7 (23 Sep 2021 16:44)  HR: 73 (24 Sep 2021 11:40) (65 - 93)  BP: 105/61 (24 Sep 2021 11:40) (101/65 - 127/79)  BP(mean): --  RR: 18 (24 Sep 2021 11:40) (18 - 20)  SpO2: 98% (24 Sep 2021 11:40) (96% - 100%)    I&O's Summary    09-23-21 @ 07:01  -  09-24-21 @ 07:00  --------------------------------------------------------  IN: 0 mL / OUT: 800 mL / NET: -800 mL    09-24-21 @ 07:01  -  09-24-21 @ 15:48  --------------------------------------------------------  IN: 360 mL / OUT: 200 mL / NET: 160 mL        PHYSICAL EXAM:  GENERAL: NAD, well-developed  HEAD:  Atraumatic, Normocephalic  EYES: EOMI, PERRLA, conjunctiva and sclera clear, legally blind  NECK: Supple, No JVD  CHEST/LUNG: mild decrease breath sounds bilaterally; No wheeze   HEART: Regular rate and rhythm; No murmurs, rubs, or gallops  ABDOMEN: Soft, Nontender, Nondistended; Bowel sounds present  NEURO: awake alert, no focal weakness, 5/5 b/l upper extremity strength, 4/5 lower extremity strength  EXTREMITIES:  2+ Peripheral Pulses, No clubbing, cyanosis, trace b/l edema  BACK: incision c/d/i    LABS:                        9.4    10.36 )-----------( 164      ( 23 Sep 2021 04:58 )             27.9     09-23    142  |  110<H>  |  21  ----------------------------<  138<H>  4.1   |  24  |  1.09    Ca    8.6      23 Sep 2021 04:58        CAPILLARY BLOOD GLUCOSE                RADIOLOGY & ADDITIONAL TESTS:    Imaging Personally Reviewed:  [x] YES  [ ] NO    Will obtain old records:  [ ] YES  [x] NO

## 2021-09-24 NOTE — PROVIDER CONTACT NOTE (OTHER) - BACKGROUND
9/5 s/p fall, R forehead hematoma, acute minimally displaced R coracoid process scapular fx, T11/L1 compression fx and an inferior sternal fx.
transfer from [facility] for trauma evaluation secondary to a mechanical fall from sitting.
S/p fall, awaiting surgery Tuesday.
pt s/p fall w/ spinal fx, BR w/ spinal precautions
s/p fall. spinal surgery
Pt here S/p fall and got a posterior extension of prior L2-5 fusion and stabilization

## 2021-09-24 NOTE — PROGRESS NOTE ADULT - ASSESSMENT
ECHO 10/25/16:  Normal left ventricular systolic function. No segmental wall motion abnormalities. Hypermobile interatrial septum.   ECHO 8/24/21: EF 66% grossly nl LV sys fx, mild diastolic dsyfx - stage 1     a/p    Patient is a 75yo F , known to practice from prior admission,  with a history of colon cancer s/p right hemicolectomy (approx 2 years ago, outside hospital), DVT (on Eliquis), CKD, adrenal insufficiency presenting as transfer from [facility] for trauma evaluation secondary to a mechanical fall from sitting.     #Mechanical fall  -MRI noted with anterior splaying of T12/L1 space w/ edema   -CT T done noted with widening of T12/L1 disc space w/ L1 laminectomy  -s/p T10-pelvis with revision of previous L2-5 hardware, L1 expanded PSO.  -s/p intubation- now extubated 9/16 , on NC  -CV stable   -management per neuro sx     #SVT (hx)  -hr stable   -cont bb     #Dyspnea  -CT A neg for PE  -dyspnea improved - s/p IVP lasix 40 mg x 1 with good UO  -CXR noted   -lasix PRN    # DVT (hx)  -recent le doppler negative for acute dvt  -on SQ lovenox  -heme f/u     # wm on CKD   -renal f/u     #UTI  -tx per primary team     #HTN  -bp stable   -cont bb, arb     dvt ppx

## 2021-09-24 NOTE — PROGRESS NOTE ADULT - ASSESSMENT
77 yo F with a history of colon cancer s/p right hemicolectomy (approx 2 years ago, outside hospital), DVT (on Eliquis), CKD, adrenal insufficiency presenting as transfer from [facility] for trauma evaluation secondary to a mechanical fall from sitting with headstrike. Injuries identified include right forehead hematoma, acute minimally displaced right coracoid process scapular fracture, T11/L1 compression fractures and an inferior sternal fracture. Patient hemodynamically stable.    # Dysphagia  s/p FEES with speech and swallow, full assessments reviewed  Bedside indirect laryngoscopy was unremarkable  On pureed diet  Consider Mucinex for thick secretions    # Elevated PTT  Mild elevations in PTT can be seen with SC heparin administration, particularly with TID dosing  low plasma protein, impaired renal function, and low BMI have been postulated as contributing factors.   Self-resolved  Appreciate hematology    # S/p Mechanical Fall:  Right forehead hematoma, acute minimally displaced right coracoid process scapular fracture, T11/L1 compression fractures and an inferior sternal fracture  no surgery for scapular fx. Placed in sling  CT spine with fusion L2-5. Fracture through the T12-L1 disc space with widening of the disc space and a L1-2 laminectomy which appears unstable. Diffuse osteopenia. Old T11 and L1 fractures.   MRI T/L spine with anterior splaying of the intervertebral disc space at the T12-L1 with edema in the anterior prevertebral soft tissues  CT T done noted with widening of T12/L1 disc space w/ L1 laminectomy  Neurosurgery offering surgery vs conservative management, TLSO brace in the meantime.   The daughter Susie decided to proceed with surgery.   Cr has improved.   Pt s/p OR: revision of prior L2-L5 fusion w/ extension from T10- pelvis w/o L1 PSO, and plastics closure on 9/14  Monitored in NICU post operatively, extubated 9/16/21. Doing well, transferred to the floor on 9/18/21.   Appreciate neurosurgical/plastics intervention and post-op care    # Nausea:   Pt has been nauseous (no abd pain). ?pain med induced/ Opioids   Switched hydrocortisone from IV to PO 9/20/21    # Hypopituitary   She is on Hydrocortisone 10 mg BID at home, so will monitor for adrenal insufficiency. am cortisol appropriate  UA unimpressive. vit D, vit B12 normal. TSH low as expected with hypopit. free T4 sent, normal.   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 Treviño ([facility]). Outpt EMR reviewed; Cr range from 1.4 to 1.6   Monitor I/O's, Serial Cr, Avoid nephrotoxins  Cr is up from baseline. Hold Losartan. s/p gentle IVF for acute kidney failure: likely pre-renal.   Her recent TTE reviewed, with normal LV.   Her Cr now back to baseline (1.4-1.6)   Losartan restarted    # UTI  UA+, urine culture grew > 100K Pseudomonas aeruginosa  S/p 4 doses of ceftriaxone 9/19-9/22    # CAD: Chronic, stable  Cont home CV meds  Held ASA for neurosurgery     # Hx of SVT  ST on monitor 120s  BB resumed by cards, uptitrate as BP tolerates     # Hypothyroidism  Chronic, stable  TSH low. Total T4 is nl  Levothyroxine for hypothyroid transition to IV as no PO access   TSH 0.18, T(6)  Can transition back to LT4 50mcg po daily via NG tube as per Endo  Check Free T4 in AM  Endocrine following      # Hx of DVT, lower extremity  Home med Eliquis, on hold   Lovenox 30 mg SC daily    # GI ppx:  Protonix IV daily 77 yo F with a history of colon cancer s/p right hemicolectomy (approx 2 years ago, outside hospital), DVT (on Eliquis), CKD, adrenal insufficiency presenting as transfer from [facility] for trauma evaluation secondary to a mechanical fall from sitting with headstrike. Injuries identified include right forehead hematoma, acute minimally displaced right coracoid process scapular fracture, T11/L1 compression fractures and an inferior sternal fracture. Patient hemodynamically stable.    # Dysphagia  s/p FEES with speech and swallow, full assessments reviewed  Bedside indirect laryngoscopy was unremarkable  On pureed diet  Consider Mucinex for thick secretions    # Elevated PTT  Mild elevations in PTT can be seen with SC heparin administration, particularly with TID dosing  low plasma protein, impaired renal function, and low BMI have been postulated as contributing factors.   Self-resolved  Appreciate hematology    # S/p Mechanical Fall:  Right forehead hematoma, acute minimally displaced right coracoid process scapular fracture, T11/L1 compression fractures and an inferior sternal fracture  no surgery for scapular fx. Placed in sling  CT spine with fusion L2-5. Fracture through the T12-L1 disc space with widening of the disc space and a L1-2 laminectomy which appears unstable. Diffuse osteopenia. Old T11 and L1 fractures.   MRI T/L spine with anterior splaying of the intervertebral disc space at the T12-L1 with edema in the anterior prevertebral soft tissues  CT T done noted with widening of T12/L1 disc space w/ L1 laminectomy  Neurosurgery offering surgery vs conservative management, TLSO brace in the meantime.   The daughter Susie decided to proceed with surgery.   Cr has improved.   Pt s/p OR: revision of prior L2-L5 fusion w/ extension from T10- pelvis w/o L1 PSO, and plastics closure on 9/14  Monitored in NICU post operatively, extubated 9/16/21. Doing well, transferred to the floor on 9/18/21.   Appreciate neurosurgical/plastics intervention and post-op care    # Nausea:   Pt has been nauseous (no abd pain). ?pain med induced/ Opioids   Switched hydrocortisone from IV to PO 9/20/21    # Hypopituitary   She is on Hydrocortisone 10 mg BID at home, so will monitor for adrenal insufficiency. am cortisol appropriate  UA unimpressive. vit D, vit B12 normal. TSH low as expected with hypopit. free T4 sent, normal.   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 Treviño ([facility]). Outpt EMR reviewed; Cr range from 1.4 to 1.6   Monitor I/O's, Serial Cr, Avoid nephrotoxins  Cr is up from baseline. Hold Losartan. s/p gentle IVF for acute kidney failure: likely pre-renal.   Her recent TTE reviewed, with normal LV.   Her Cr now back to baseline (1.4-1.6)   Losartan restarted    # UTI  UA+, urine culture grew > 100K Pseudomonas aeruginosa  S/p 4 doses of ceftriaxone 9/19-9/22    # CAD: Chronic, stable  Cont home CV meds  Held ASA for neurosurgery     # Hx of SVT  ST on monitor 120s  BB resumed by cards, uptitrate as BP tolerates     # Hypothyroidism  Chronic, stable  TSH low. Total T4 is nl  Levothyroxine for hypothyroid transition to IV as no PO access   TSH 0.18, T(6)  Continue LT4 50mcg po daily  Free T4 nl on 9/17/21  Endocrine following      # Hx of DVT, lower extremity  Home med Eliquis, on hold   Lovenox 30 mg SC daily    # GI ppx:  Protonix IV daily

## 2021-09-24 NOTE — PROGRESS NOTE ADULT - ASSESSMENT
75yo F with a history of colon cancer s/p right hemicolectomy (approx 2 years ago, outside hospital), DVT (on Eliquis), CKD, adrenal insufficiency presenting as transfer from [facility] for trauma evaluation secondary to a mechanical fall from sitting with associated head strike    #hx colon CA s/p right hemicolectomy (surveillance colonoscopy 9/24/2020 negative, patent end to side ileocolic anastomosis at transverse colon)    #Dysphagia - IMPROVED  s/p repeat FEES 9/23 - cleared for puree with thin liquids  -PPI for GI prophylaxis and as pt on steroids for adrenal insufficiency; once tolerating PO can change to PO PPI    #Diarrhea - suspect TF associated; hope stooling will improve off TF and now off Abx (had +UTI 9/19)  -if has loose stools then please send stool GI PCR and C diff  -monitor clinically  -probiotic smoothie added BID  -Bacid BID    Discussed with Neurosurgery team    Please call over weekend prn with acute GI concerns   GI service : [phone]    Sean Dahl PA-C    [facility]  [phone]  After hours and weekend coverage [phone]

## 2021-09-24 NOTE — PROGRESS NOTE ADULT - SUBJECTIVE AND OBJECTIVE BOX
CARDIOLOGY FOLLOW UP - Dr. Orozco  DATE OF SERVICE: 09-24-21      no acute events   resting comfortably with no complaints.     REVIEW OF SYSTEMS:   CONSTITUTIONAL: No fever, weight loss, or fatigue   RESPIRATORY:  No cough, wheezing, chills or hemoptysis; No SOB  CARDIOVASCULAR: No chest pain, palpitations, passing out, dizziness, or leg swelling   GASTROINTESTINAL: No abdominal or epigastric pain. No nausea, vomiting, or hematemesis, no diarreha, or constipation, No melena or hematochezia   VASCULAR: no edema.       PHYSICAL EXAM:  T(C): 36.7 (09-24-21 @ 07:18), Max: 37.4 (09-23-21 @ 12:12)  HR: 78 (09-24-21 @ 07:18) (65 - 93)  BP: 115/73 (09-24-21 @ 07:18) (101/65 - 127/79)  RR: 20 (09-24-21 @ 07:18) (18 - 20)  SpO2: 98% (09-24-21 @ 07:18) (96% - 100%)  Wt(kg): --  I&O's Summary    23 Sep 2021 07:01  -  24 Sep 2021 07:00  --------------------------------------------------------  IN: 0 mL / OUT: 800 mL / NET: -800 mL        Appearance: Normal	  Cardiovascular: Normal S1 S2,RRR, No JVD, No murmurs  Respiratory: Lungs clear to auscultation	  Gastrointestinal:  Soft, Non-tender, + BS	  Extremities: Normal range of motion, No clubbing, cyanosis or edema      HOME MEDICATIONS:  acetaminophen 325 mg oral tablet: 2 tab(s) orally every 6 hours, As needed, Mild Pain (1 - 3) (24 Sep 2020 11:16)  Artificial Tears ophthalmic solution: 1 drop(s) to each affected eye 4 times a day, As Needed (22 Aug 2021 23:28)  aspirin 81 mg oral delayed release tablet: 1 tab(s) orally once a day (27 Aug 2021 13:38)  calcium (as carbonate) 600 mg oral tablet: 1 cap(s) orally once a day (24 Sep 2020 11:16)  Eliquis 2.5 mg oral tablet: 1 tab(s) orally 2 times a day (24 Sep 2020 11:16)  felodipine 5 mg oral tablet, extended release: 1 tab(s) orally once a day (22 Aug 2021 23:23)  guaiFENesin 600 mg oral tablet, extended release: 1 tab(s) orally every 12 hours as needed for cough  (27 Aug 2021 13:38)  hydrocortisone 10 mg oral tablet: 1 tab(s) orally 2 times a day (24 Sep 2020 11:16)  latanoprost 0.005% ophthalmic solution: 1 drop(s) to each affected eye once a day (in the evening) (22 Aug 2021 23:27)  levothyroxine 50 mcg (0.05 mg) oral tablet: 1 tab(s) orally once a day (24 Sep 2020 11:16)  Lipitor 10 mg oral tablet: 1 tab(s) orally once a day (22 Aug 2021 23:41)  Macular Vitamin Benefit oral tablet: 1 tab(s) orally once a day (24 Sep 2020 11:16)  metoprolol succinate 50 mg oral tablet, extended release: 1 tab(s) orally once a day (22 Aug 2021 23:24)  Multiple Vitamins oral tablet: 1 tab(s) orally once a day (22 Aug 2021 23:22)  olmesartan 20 mg oral tablet: 1 tab(s) orally once a day (22 Aug 2021 23:22)  omeprazole 20 mg oral delayed release capsule: 1 cap(s) orally once a day (22 Aug 2021 23:21)  oxyCODONE 5 mg oral tablet: 1 tab(s) orally every 8 hours as needed for severe pain (27 Aug 2021 13:35)  OxyCONTIN 20 mg oral tablet, extended release: 1 tab(s) orally every 12 hours (24 Sep 2020 11:16)  risedronate 35 mg oral tablet: 1 tab(s) orally once a week (Mondays) (05 Sep 2021 18:30)  senna oral tablet: 2 tab(s) orally once a day (at bedtime), As Needed (05 Sep 2021 18:31)      MEDICATIONS  (STANDING):  atorvastatin 10 milliGRAM(s) Oral at bedtime  BACItracin   Ointment 1 Application(s) Topical two times a day  chlorhexidine 4% Liquid 1 Application(s) Topical <User Schedule>  clotrimazole 1% Cream 1 Application(s) Topical two times a day  enoxaparin Injectable 30 milliGRAM(s) SubCutaneous <User Schedule>  hydrocortisone 10 milliGRAM(s) Oral <User Schedule>  influenza   Vaccine 0.5 milliLiter(s) IntraMuscular once  lactobacillus acidophilus 1 Tablet(s) Oral two times a day  latanoprost 0.005% Ophthalmic Solution 1 Drop(s) Both EYES at bedtime  levothyroxine 50 MICROGram(s) Oral daily  lidocaine   4% Patch 1 Patch Transdermal daily  losartan 50 milliGRAM(s) Oral daily  metoprolol tartrate 50 milliGRAM(s) Oral two times a day  nystatin Powder 1 Application(s) Topical two times a day  pantoprazole  Injectable 40 milliGRAM(s) IV Push daily  sodium chloride 3%  Inhalation 3 milliLiter(s) Inhalation every 8 hours      TELEMETRY: 	    ECG:  	  RADIOLOGY:   DIAGNOSTIC TESTING:  [ ] Echocardiogram:  [ ]  Catheterization:  [ ] Stress Test:    OTHER: 	    LABS:	 	                                9.4    10.36 )-----------( 164      ( 23 Sep 2021 04:58 )             27.9     09-23    142  |  110<H>  |  21  ----------------------------<  138<H>  4.1   |  24  |  1.09    Ca    8.6      23 Sep 2021 04:58

## 2021-09-24 NOTE — PROGRESS NOTE ADULT - SUBJECTIVE AND OBJECTIVE BOX
Patient is a 76y old  Female who presented with a chief complaint of S/p fall (24 Sep 2021 12:06)      INTERVAL HPI/OVERNIGHT EVENTS:  passed SLP evaluation yesterday  tolerating PO puree with thin liquids  stools still loose  no abdominal pain, nausea or vomiting    MEDICATIONS  (STANDING):  atorvastatin 10 milliGRAM(s) Oral at bedtime  BACItracin   Ointment 1 Application(s) Topical two times a day  chlorhexidine 4% Liquid 1 Application(s) Topical <User Schedule>  clotrimazole 1% Cream 1 Application(s) Topical two times a day  enoxaparin Injectable 30 milliGRAM(s) SubCutaneous <User Schedule>  hydrocortisone 10 milliGRAM(s) Oral <User Schedule>  influenza   Vaccine 0.5 milliLiter(s) IntraMuscular once  lactobacillus acidophilus 1 Tablet(s) Oral two times a day  latanoprost 0.005% Ophthalmic Solution 1 Drop(s) Both EYES at bedtime  levothyroxine 50 MICROGram(s) Oral daily  lidocaine   4% Patch 1 Patch Transdermal daily  losartan 50 milliGRAM(s) Oral daily  metoprolol tartrate 50 milliGRAM(s) Oral two times a day  nystatin Powder 1 Application(s) Topical two times a day  pantoprazole  Injectable 40 milliGRAM(s) IV Push daily  sodium chloride 3%  Inhalation 3 milliLiter(s) Inhalation every 8 hours    MEDICATIONS  (PRN):  artificial  tears Solution 1 Drop(s) Both EYES every 6 hours PRN Dry Eyes  cyclobenzaprine 5 milliGRAM(s) Oral three times a day PRN Muscle Spasm  oxyCODONE    IR 5 milliGRAM(s) Oral every 6 hours PRN Moderate Pain (4 - 6)  oxyCODONE    IR 10 milliGRAM(s) Oral every 6 hours PRN Severe Pain (7 - 10)  simethicone 80 milliGRAM(s) Chew three times a day PRN gas/diarrhea  traMADol 50 milliGRAM(s) Oral every 4 hours PRN breakthrough pain      Allergies  penicillin (Rash)      Review of Systems:  General:  No wt loss, fevers, chills, night sweats  CV:  No pain, see HPI  Resp:  +secretions  s/p recent intubation  GI:  see HPI  :  No pain, bleeding, incontinence, nocturia +CKD   Muscle:  see HPI  Neuro:  see HPI  Psych:  No fatigue, insomnia, mood problems, depression  Endocrine:  No polyuria, polydypsia, +adrenal insufficiency  Heme:  No petechiae, ecchymosis, easy bruisability  Skin:  No tattoos, scars, edema +dermatitis      Vital Signs Last 24 Hrs  T(C): 36.6 (24 Sep 2021 11:40), Max: 37.1 (23 Sep 2021 16:44)  T(F): 97.8 (24 Sep 2021 11:40), Max: 98.7 (23 Sep 2021 16:44)  HR: 73 (24 Sep 2021 11:40) (65 - 93)  BP: 105/61 (24 Sep 2021 11:40) (101/65 - 127/79)  BP(mean): --  RR: 18 (24 Sep 2021 11:40) (18 - 20)  SpO2: 98% (24 Sep 2021 11:40) (96% - 100%)    PHYSICAL EXAM:  Constitutional: elderly AA female sl anxious appearing, alert and responsive    Neck: No LAD, no JVD  Respiratory: good air entry b/l no accessory muscle use   Cardiovascular: S1 and S2, RRR  Gastrointestinal: BS+, obese WH surgical scars (laparoscopic) soft, NT/ND, neg HSM, +lower abdomen pannus   Extremities: No peripheral edema, neg clubbing, cyanosis  Vascular: 2+ peripheral pulses  Neurological: A/O x 3, no focal asymmetry  Psychiatric: normal affect  Skin: No rashes  +dermatitis in skin folds at groin under pannus      LABS:                        9.4    10.36 )-----------( 164      ( 23 Sep 2021 04:58 )             27.9     09-23    142  |  110<H>  |  21  ----------------------------<  138<H>  4.1   |  24  |  1.09    Ca    8.6      23 Sep 2021 04:58        RADIOLOGY & ADDITIONAL TESTS:

## 2021-09-24 NOTE — PROGRESS NOTE ADULT - SUBJECTIVE AND OBJECTIVE BOX
PLASTIC SURGERY PROGRESS NOTE (pg [facility]: [id number], [facility]: [phone])    SUBJECTIVE  The patient was seen and examined. No acute events overnight.    OBJECTIVE  ___________________________________________________  VITAL SIGNS / I&O's   Vital Signs Last 24 Hrs  T(C): 37.1 (24 Sep 2021 04:40), Max: 37.4 (23 Sep 2021 12:12)  T(F): 98.7 (24 Sep 2021 04:40), Max: 99.3 (23 Sep 2021 12:12)  HR: 65 (24 Sep 2021 04:40) (65 - 93)  BP: 127/79 (24 Sep 2021 04:40) (101/65 - 127/79)  BP(mean): --  RR: 18 (24 Sep 2021 04:40) (18 - 20)  SpO2: 96% (24 Sep 2021 04:40) (96% - 100%)      23 Sep 2021 07:01  -  24 Sep 2021 07:00  --------------------------------------------------------  IN:  Total IN: 0 mL    OUT:    Indwelling Catheter - Urethral (mL): 800 mL  Total OUT: 800 mL    Total NET: -800 mL        ___________________________________________________  PHYSICAL EXAM  Gen: Laying in bed, NAD  Resp: Unlabored breathing  Back: aquacel changed and incision c/d/i, no palpable collections. Both HMV with SS output.  Ext: WWP  Skin: No rashes    ___________________________________________________  LABS                        9.4    10.36 )-----------( 164      ( 23 Sep 2021 04:58 )             27.9     23 Sep 2021 04:58    142    |  110    |  21     ----------------------------<  138    4.1     |  24     |  1.09     Ca    8.6        23 Sep 2021 04:58        CAPILLARY BLOOD GLUCOSE              ___________________________________________________  MICRO  Recent Cultures:  Specimen Source: Catheterized Catheterized, 09-19 @ 14:15; Results   >100,000 CFU/ml Pseudomonas aeruginosa; Gram Stain: --; Organism: Pseudomonas aeruginosa    ___________________________________________________  MEDICATIONS  (STANDING):  atorvastatin 10 milliGRAM(s) Oral at bedtime  BACItracin   Ointment 1 Application(s) Topical two times a day  chlorhexidine 4% Liquid 1 Application(s) Topical <User Schedule>  clotrimazole 1% Cream 1 Application(s) Topical two times a day  enoxaparin Injectable 30 milliGRAM(s) SubCutaneous <User Schedule>  hydrocortisone 10 milliGRAM(s) Oral <User Schedule>  influenza   Vaccine 0.5 milliLiter(s) IntraMuscular once  latanoprost 0.005% Ophthalmic Solution 1 Drop(s) Both EYES at bedtime  levothyroxine 50 MICROGram(s) Oral daily  lidocaine   4% Patch 1 Patch Transdermal daily  losartan 50 milliGRAM(s) Oral daily  metoprolol tartrate 50 milliGRAM(s) Oral two times a day  nystatin Powder 1 Application(s) Topical two times a day  pantoprazole  Injectable 40 milliGRAM(s) IV Push daily  sodium chloride 3%  Inhalation 3 milliLiter(s) Inhalation every 8 hours    MEDICATIONS  (PRN):  artificial  tears Solution 1 Drop(s) Both EYES every 6 hours PRN Dry Eyes  cyclobenzaprine 5 milliGRAM(s) Oral three times a day PRN Muscle Spasm  oxyCODONE    IR 5 milliGRAM(s) Oral every 6 hours PRN Moderate Pain (4 - 6)  oxyCODONE    IR 10 milliGRAM(s) Oral every 6 hours PRN Severe Pain (7 - 10)  simethicone 80 milliGRAM(s) Chew three times a day PRN gas/diarrhea  traMADol 50 milliGRAM(s) Oral every 4 hours PRN breakthrough pain

## 2021-09-24 NOTE — PROGRESS NOTE ADULT - ASSESSMENT
76F w/ revision of prior L2-L5 fusion w/ extension from T10-pelvis w/ L1 PSO and paraspinal muscle flap closure w/ plastic surgery.    PLAN  - Dressing changed this AM  - Continue with drain care  - Rest of care per primary team    Plastic Surgery   [facility]: [id number]  [facility]: [phone]

## 2021-09-24 NOTE — PROGRESS NOTE ADULT - SUBJECTIVE AND OBJECTIVE BOX
SUBJECTIVE: Patient seen and examined, endorses pain. Otherwise no acute complaints.     OVERNIGHT EVENTS: adv diet to pureed yesterday.    Vital Signs Last 24 Hrs  T(C): 36.6 (24 Sep 2021 11:40), Max: 37.1 (23 Sep 2021 16:44)  T(F): 97.8 (24 Sep 2021 11:40), Max: 98.7 (23 Sep 2021 16:44)  HR: 73 (24 Sep 2021 11:40) (65 - 93)  BP: 105/61 (24 Sep 2021 11:40) (101/65 - 127/79)  BP(mean): --  RR: 18 (24 Sep 2021 11:40) (18 - 20)  SpO2: 98% (24 Sep 2021 11:40) (96% - 100%)    PHYSICAL EXAM:    Constitutional: No Acute Distress   Neurological: Awake alert Ox3, Speech clear Following Commands, Moving all Extremities B/L UE 4/5. B/L LE HF 2/5 KF & KE 4-/5 DF/PF 4-/5. Midline back aquacel AG C/D/I L    Pulmonary: Clear to Auscultation  Cardiovascular: S1, S2, Regular rate and rhythm   Gastrointestinal: Soft, Non-tender, Non-distened   Extremities: No calf tenderness   Incision: Dressing changed today by plastics.     LABS:                                              9.4    10.36 )-----------( 164      ( 23 Sep 2021 04:58 )             27.9     09-23    142  |  110<H>  |  21  ----------------------------<  138<H>  4.1   |  24  |  1.09    Ca    8.6      23 Sep 2021 04:58        MEDICATIONS:    cyclobenzaprine 5 milliGRAM(s) Oral three times a day PRN Muscle Spasm  oxyCODONE    Solution 5 milliGRAM(s) Oral every 4 hours PRN Moderate Pain (4 - 6)  oxyCODONE    Solution 10 milliGRAM(s) Oral every 4 hours PRN Severe Pain (7 - 10)  traMADol 50 milliGRAM(s) Oral every 4 hours PRN breakthrough pain  losartan 50 milliGRAM(s) Oral daily  metoprolol tartrate 50 milliGRAM(s) Oral two times a day  sodium chloride 3%  Inhalation 3 milliLiter(s) Inhalation every 8 hour  pantoprazole  Injectable 40 milliGRAM(s) IV Push daily  simethicone 80 milliGRAM(s) Chew three times a day PRN gas/diarrhea  artificial  tears Solution 1 Drop(s) Both EYES every 6 hours PRN Dry Eyes  atorvastatin 10 milliGRAM(s) Oral at bedtime  BACItracin   Ointment 1 Application(s) Topical two times a day  clotrimazole 1% Cream 1 Application(s) Topical two times a day  enoxaparin Injectable 30 milliGRAM(s) SubCutaneous <User Schedule>  hydrocortisone 10 milliGRAM(s) Oral <User Schedule>  influenza   Vaccine 0.5 milliLiter(s) IntraMuscular once  latanoprost 0.005% Ophthalmic Solution 1 Drop(s) Both EYES at bedtime  levothyroxine 50 MICROGram(s) Oral daily  lidocaine   4% Patch 1 Patch Transdermal daily  nystatin Powder 1 Application(s) Topical two times a day

## 2021-09-24 NOTE — PROVIDER CONTACT NOTE (OTHER) - RECOMMENDATIONS
EKG to be completed
Provider assess patient at bedside
3L, cough medication
Zofran?
UA
Hold blood pressure medication and straight cath patient per PA orders
fluid bolus, encouraged PO fluids. MD notified

## 2021-09-24 NOTE — PROGRESS NOTE ADULT - ATTENDING COMMENTS
Pt. is a 75yo F with a history of colon cancer s/p right hemicolectomy (approx 2 years ago, outside hospital), DVT (on Eliquis), CKD, adrenal insufficiency presenting as transfer from [facility] for trauma evaluation secondary to a mechanical fall from sitting with associated head strike  Issues:    hx colon CA s/p right hemicolectomy (surveillance colonoscopy 9/24/2020 negative, patent end to side ileocolic anastomosis at transverse colon)    1. Dysphagia - IMPROVED  s/p repeat FEES 9/23 - cleared for puree with thin liquids  -PPI for GI prophylaxis and as pt on steroids for adrenal insufficiency; once tolerating PO can change to PO PPI    2. Diarrhea - suspect TF associated; hope stooling will improve off TF and now off Abx (had +UTI 9/19)  -if has loose stools then please send stool GI PCR and C diff  -monitor clinically  -probiotic smoothie added BID  -Bacid BID    Sixto Bridges MD  [facility]

## 2021-09-24 NOTE — PROGRESS NOTE ADULT - ASSESSMENT
Patient is a 77yo F with a history of colon cancer s/p right hemicolectomy (approx 2 years ago, outside hospital), DVT (on Eliquis), CKD, adrenal insufficiency presenting after mechanical fall from sitting. CT head/cervical spine/chest were performed which showed a right forehead hematoma, acute minimally displaced right coracoid process scapular fracture, T11/L1 compression fractures and an inferior sternal fracture. 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 Trveiño  FELICIANO likely sec to hemodynamic change  Renal function stable  restarted on Losartan 9/17  Monitor I/O  Monitor renal function at present    Acidosis:  non-AG+AG  Hold oral sodium bicarb    HTN:  BP controlled   On LOsartan   Monitor at present    Hyponatremia/Hypernatremia  Improved   Monitor serum Na

## 2021-09-24 NOTE — PROGRESS NOTE ADULT - SUBJECTIVE AND OBJECTIVE BOX
[facility] [place] [facility]   MD KILLIAN JAIME MD RUORU WONG, PA    TEL:  OFFICE: [phone]  DR CHARLES CELL: [phone]  BEENA PISANO CELL: [phone]  DR. LOZANO CELL: [phone]      FROM 5 PM - 7 AM PLEASE CALL ANSWERING SERVICE: [phone]    RENAL FOLLOW UP NOTE--Date of Service 09-24-21 @ 09:57  --------------------------------------------------------------------------------  HPI:      Pt seen and examined at bedside.       PAST HISTORY  --------------------------------------------------------------------------------  No significant changes to PMH, PSH, FHx, SHx, unless otherwise noted    ALLERGIES & MEDICATIONS  --------------------------------------------------------------------------------  Allergies    penicillin (Rash)    Intolerances      Standing Inpatient Medications  atorvastatin 10 milliGRAM(s) Oral at bedtime  BACItracin   Ointment 1 Application(s) Topical two times a day  chlorhexidine 4% Liquid 1 Application(s) Topical <User Schedule>  clotrimazole 1% Cream 1 Application(s) Topical two times a day  enoxaparin Injectable 30 milliGRAM(s) SubCutaneous <User Schedule>  hydrocortisone 10 milliGRAM(s) Oral <User Schedule>  influenza   Vaccine 0.5 milliLiter(s) IntraMuscular once  lactobacillus acidophilus 1 Tablet(s) Oral two times a day  latanoprost 0.005% Ophthalmic Solution 1 Drop(s) Both EYES at bedtime  levothyroxine 50 MICROGram(s) Oral daily  lidocaine   4% Patch 1 Patch Transdermal daily  losartan 50 milliGRAM(s) Oral daily  metoprolol tartrate 50 milliGRAM(s) Oral two times a day  nystatin Powder 1 Application(s) Topical two times a day  pantoprazole  Injectable 40 milliGRAM(s) IV Push daily  sodium chloride 3%  Inhalation 3 milliLiter(s) Inhalation every 8 hours    PRN Inpatient Medications  artificial  tears Solution 1 Drop(s) Both EYES every 6 hours PRN  cyclobenzaprine 5 milliGRAM(s) Oral three times a day PRN  oxyCODONE    IR 5 milliGRAM(s) Oral every 6 hours PRN  oxyCODONE    IR 10 milliGRAM(s) Oral every 6 hours PRN  simethicone 80 milliGRAM(s) Chew three times a day PRN  traMADol 50 milliGRAM(s) Oral every 4 hours PRN      REVIEW OF SYSTEMS  --------------------------------------------------------------------------------  General: no fever    MSK: no edema     VITALS/PHYSICAL EXAM  --------------------------------------------------------------------------------  T(C): 36.7 (09-24-21 @ 07:18), Max: 37.4 (09-23-21 @ 12:12)  HR: 78 (09-24-21 @ 07:18) (65 - 93)  BP: 115/73 (09-24-21 @ 07:18) (101/65 - 127/79)  RR: 20 (09-24-21 @ 07:18) (18 - 20)  SpO2: 98% (09-24-21 @ 07:18) (96% - 100%)  Wt(kg): --        09-23-21 @ 07:01  -  09-24-21 @ 07:00  --------------------------------------------------------  IN: 0 mL / OUT: 800 mL / NET: -800 mL      Physical Exam:  	Gen: NAD  	HEENT: MMM  	Pulm: CTA B/L  	CV: S1S2  	Abd: Soft, +BS  	Ext: No LE edema B/L                      Neuro: Awake   	Skin: Warm and Dry   	Vascular access: NO HD catheter           ROBEL ervin  LABS/STUDIES  --------------------------------------------------------------------------------              9.4    10.36 >-----------<  164      [09-23-21 @ 04:58]              27.9     142  |  110  |  21  ----------------------------<  138      [09-23-21 @ 04:58]  4.1   |  24  |  1.09        Ca     8.6     [09-23-21 @ 04:58]            Creatinine Trend:  SCr 1.09 [09-23 @ 04:58]  SCr 1.08 [09-22 @ 05:41]  SCr 1.13 [09-20 @ 06:41]  SCr 1.23 [09-19 @ 07:14]  SCr 0.83 [09-18 @ 05:46]    Urinalysis - [09-19-21 @ 09:33]      Color Light Yellow / Appearance Slightly Turbid / SG 1.011 / pH 8.0      Gluc 100 mg/dL / Ketone Negative  / Bili Negative / Urobili Negative       Blood Large / Protein 30 mg/dL / Leuk Est Large / Nitrite Negative       /  / Hyaline 2 / Gran  / Sq Epi  / Non Sq Epi 0 / Bacteria Moderate      Iron 44, TIBC 224, %sat 19      [09-14-21 @ 10:27]  Ferritin 444      [09-14-21 @ 11:37]  Vitamin D (25OH) 67.9      [09-08-21 @ 09:59]  HbA1c 5.9      [02-22-17 @ 03:10]  TSH 0.18      [09-08-21 @ 09:59]

## 2021-09-24 NOTE — PROGRESS NOTE ADULT - ASSESSMENT
76F hx of CKD adrenal insufficiency h/o colectomy CAD  HTN Hypothyroidism prior L2-5 Fxn w/. Dr Jane (ortho) at [facility] in early 2000s, revision x1, chronic LBP, h/o DVT  is on eliquis for hx of DVT 2 yr prior;- s/p mechanical fall w/ RUE pain found to have R scapular fx and sternal fx, adm to trauma, CT T/L w/ significant  adjacent  segment degenerative disease  above prior fusion w/ chronic comp fx, T10-L1 c  mild retropulsion, c/f unstable fx.  started on heparin sc tid as inpatient; and preop labs with elevated PTT. Seen by hematology   s/p /p T10-pelvis with revision of previous L2-5 hardware, L1 expanded PSO, plastics closure 9/14/21 . Post op course c/b slow to wake up, respiratory failure dysphagia & Sinus tachycardia . Remained intubated until 9/16/21. LE duplex negative for DVT     Plan    Neuro Q4  Dressing changed 9/24  CAD/ sinus tachycardia - On Metoprolol 50 BID   HTN- On Losartan 50mg   CKD stable.  Patient passed FEEST - on pureed diet, rectal tube DCd, + bannitrol, will add metamucil PRN  Adrenal insufficiency- Tapered steroids to Hydrocortisone 10 BID .  Pseudomonas UTI- finished Ceftriaxone 3 days, monitor for fevers... afebrile.   hypokalemia - Replete K  S na wnl  Free water BID    Pureed diet.  DVT ppx   pain control on oxy 5/10 mg  prn & flexeril 5mg prn     Discussed with patient and family wound care, follow up plans, activity, and medications. Questions answered, and they verbalized understanding.    [id number]

## 2021-09-24 NOTE — PROGRESS NOTE ADULT - ASSESSMENT
75 y/o female with a history of colon cancer s/p right hemicolectomy (approx 2 years ago, outside hospital), DVT (on Eliquis), CKD, adrenal insufficiency presents s/p fall with right coracoid process scapular fracture, T11/L1 compression fractures and an inferior sternal fracture, noted to have prolonged PTT.    1. prolonged PTT  - no hx of bleeding disorder  - nl PTT on admission 9/5; prolonged to 69 on 9/8, further prolonged 9/13  - blood draws have been peripheral sticks- no central catheter/Port  - prolonged PTT can be seen with subcutaneous heparin administration, usually milder elevations, but this is most likely cause in this patient  - low suspicion for factor deficiency with normal PTT on admission  - no hx of liver disease and PT was normal prior  - no evidence of DIC, normal fibrinogen  - mixing study, thrombin time and reptilase time were consistent with heparin effect  - PTT normalized off of subcut heparin and remains normal on SQ lovenox    2.anemia, microcytic- acute blood loss with surgery  - iron studies pre op with high ferritin, normal B12 and folate  - s/p 2 unit PRBC intra op  - monitor Hg, drains now removed; hg stable 9.4    3. hx of DVT, 2019- bilateral  - discovered with colon cancer diagnosis  - has been on eliquis, maintained on AC with decreased mobility  - duplex LE 9/9- no DVT  - eliquis on HOLD for now, repeat Duplex 9/16 remain negative  - CTA chest with no obvious PE on 9/17  - continue prophylaxis with lovenox for now; full AC to resume when cleared by surgery  - with persistent vague pain LE- will repeat Duplex LE    4. s/p fall, with T12- L1 fracture  - s/p thorcao-lumbar fusion per neurosurgery on 9-14-21  - extensive wound, drains now removed  - post op care per NSGY, Plastics

## 2021-09-24 NOTE — PROGRESS NOTE ADULT - SUBJECTIVE AND OBJECTIVE BOX
Chief Complaint: fu    History of Present Illness:  resting, some discomfort; no f/c, no cp/dyspnea, no n/v/abd pain, now taking PO, reports knee pain and LE pain; drains now out; no bleeding reported      MEDICATIONS  (STANDING):  atorvastatin 10 milliGRAM(s) Oral at bedtime  BACItracin   Ointment 1 Application(s) Topical two times a day  chlorhexidine 4% Liquid 1 Application(s) Topical <User Schedule>  clotrimazole 1% Cream 1 Application(s) Topical two times a day  enoxaparin Injectable 30 milliGRAM(s) SubCutaneous <User Schedule>  hydrocortisone 10 milliGRAM(s) Oral <User Schedule>  influenza   Vaccine 0.5 milliLiter(s) IntraMuscular once  lactobacillus acidophilus 1 Tablet(s) Oral two times a day  latanoprost 0.005% Ophthalmic Solution 1 Drop(s) Both EYES at bedtime  levothyroxine 50 MICROGram(s) Oral daily  lidocaine   4% Patch 1 Patch Transdermal daily  losartan 50 milliGRAM(s) Oral daily  metoprolol tartrate 50 milliGRAM(s) Oral two times a day  nystatin Powder 1 Application(s) Topical two times a day  pantoprazole  Injectable 40 milliGRAM(s) IV Push daily  sodium chloride 3%  Inhalation 3 milliLiter(s) Inhalation every 8 hours    MEDICATIONS  (PRN):  artificial  tears Solution 1 Drop(s) Both EYES every 6 hours PRN Dry Eyes  cyclobenzaprine 5 milliGRAM(s) Oral three times a day PRN Muscle Spasm  oxyCODONE    IR 5 milliGRAM(s) Oral every 6 hours PRN Moderate Pain (4 - 6)  oxyCODONE    IR 10 milliGRAM(s) Oral every 6 hours PRN Severe Pain (7 - 10)  simethicone 80 milliGRAM(s) Chew three times a day PRN gas/diarrhea  traMADol 50 milliGRAM(s) Oral every 4 hours PRN breakthrough pain      Allergies    penicillin (Rash)    Intolerances        Vital Signs Last 24 Hrs  T(C): 36.6 (24 Sep 2021 11:40), Max: 37.1 (23 Sep 2021 16:44)  T(F): 97.8 (24 Sep 2021 11:40), Max: 98.7 (23 Sep 2021 16:44)  HR: 73 (24 Sep 2021 11:40) (65 - 93)  BP: 105/61 (24 Sep 2021 11:40) (101/65 - 127/79)  BP(mean): --  RR: 18 (24 Sep 2021 11:40) (18 - 20)  SpO2: 98% (24 Sep 2021 11:40) (96% - 100%)    PHYSICAL EXAM  General: adult in NAD  HEENT: clear oropharynx, anicteric sclera, pink conjunctiva  Neck: supple  CV: normal S1/S2   Lungs: decreased BS, poor effort  Abdomen: soft non-tender non-distended, positive bowel sounds  Ext: no calf tenderness, no edema  Skin: no rashes and no petechiae  Lymph Nodes: No LAD in  neck  Neuro: alert and oriented X 3, no focal deficits    LABS:                          9.4    10.36 )-----------( 164      ( 23 Sep 2021 04:58 )             27.9         Mean Cell Volume : 83.8 fl  Mean Cell Hemoglobin : 28.2 pg  Mean Cell Hemoglobin Concentration : 33.7 gm/dL  Auto Neutrophil # : x  Auto Lymphocyte # : x  Auto Monocyte # : x  Auto Eosinophil # : x  Auto Basophil # : x  Auto Neutrophil % : x  Auto Lymphocyte % : x  Auto Monocyte % : x  Auto Eosinophil % : x  Auto Basophil % : x      Serial CBC's  09-23 @ 04:58  Hct-27.9 / Hgb-9.4 / Plat-164 / RBC-3.33 / WBC-10.36  Serial CBC's  09-22 @ 05:41  Hct-28.2 / Hgb-9.4 / Plat-164 / RBC-3.38 / WBC-9.77      09-23    142  |  110<H>  |  21  ----------------------------<  138<H>  4.1   |  24  |  1.09    Ca    8.6      23 Sep 2021 04:58                        Radiology:

## 2021-09-25 LAB
ANION GAP SERPL CALC-SCNC: 9 MMOL/L — SIGNIFICANT CHANGE UP (ref 5–17)
BUN SERPL-MCNC: 15 MG/DL — SIGNIFICANT CHANGE UP (ref 7–23)
CALCIUM SERPL-MCNC: 8.5 MG/DL — SIGNIFICANT CHANGE UP (ref 8.4–10.5)
CHLORIDE SERPL-SCNC: 107 MMOL/L — SIGNIFICANT CHANGE UP (ref 96–108)
CO2 SERPL-SCNC: 24 MMOL/L — SIGNIFICANT CHANGE UP (ref 22–31)
CREAT SERPL-MCNC: 1.05 MG/DL — SIGNIFICANT CHANGE UP (ref 0.5–1.3)
GLUCOSE SERPL-MCNC: 78 MG/DL — SIGNIFICANT CHANGE UP (ref 70–99)
HCT VFR BLD CALC: 27.2 % — LOW (ref 34.5–45)
HGB BLD-MCNC: 9.2 G/DL — LOW (ref 11.5–15.5)
MCHC RBC-ENTMCNC: 28.6 PG — SIGNIFICANT CHANGE UP (ref 27–34)
MCHC RBC-ENTMCNC: 33.8 GM/DL — SIGNIFICANT CHANGE UP (ref 32–36)
MCV RBC AUTO: 84.5 FL — SIGNIFICANT CHANGE UP (ref 80–100)
NRBC # BLD: 0 /100 WBCS — SIGNIFICANT CHANGE UP (ref 0–0)
PLATELET # BLD AUTO: 178 K/UL — SIGNIFICANT CHANGE UP (ref 150–400)
POTASSIUM SERPL-MCNC: 4 MMOL/L — SIGNIFICANT CHANGE UP (ref 3.5–5.3)
POTASSIUM SERPL-SCNC: 4 MMOL/L — SIGNIFICANT CHANGE UP (ref 3.5–5.3)
RBC # BLD: 3.22 M/UL — LOW (ref 3.8–5.2)
RBC # FLD: 22 % — HIGH (ref 10.3–14.5)
SODIUM SERPL-SCNC: 140 MMOL/L — SIGNIFICANT CHANGE UP (ref 135–145)
WBC # BLD: 8.12 K/UL — SIGNIFICANT CHANGE UP (ref 3.8–10.5)
WBC # FLD AUTO: 8.12 K/UL — SIGNIFICANT CHANGE UP (ref 3.8–10.5)

## 2021-09-25 RX ADMIN — Medication 50 MICROGRAM(S): at 05:17

## 2021-09-25 RX ADMIN — Medication 1 TABLET(S): at 05:17

## 2021-09-25 RX ADMIN — PANTOPRAZOLE SODIUM 40 MILLIGRAM(S): 20 TABLET, DELAYED RELEASE ORAL at 11:37

## 2021-09-25 RX ADMIN — TRAMADOL HYDROCHLORIDE 50 MILLIGRAM(S): 50 TABLET ORAL at 17:51

## 2021-09-25 RX ADMIN — CYCLOBENZAPRINE HYDROCHLORIDE 5 MILLIGRAM(S): 10 TABLET, FILM COATED ORAL at 15:05

## 2021-09-25 RX ADMIN — Medication 1 TABLET(S): at 17:47

## 2021-09-25 RX ADMIN — CYCLOBENZAPRINE HYDROCHLORIDE 5 MILLIGRAM(S): 10 TABLET, FILM COATED ORAL at 21:29

## 2021-09-25 RX ADMIN — CHLORHEXIDINE GLUCONATE 1 APPLICATION(S): 213 SOLUTION TOPICAL at 22:00

## 2021-09-25 RX ADMIN — OXYCODONE HYDROCHLORIDE 10 MILLIGRAM(S): 5 TABLET ORAL at 11:37

## 2021-09-25 RX ADMIN — SODIUM CHLORIDE 3 MILLILITER(S): 9 INJECTION INTRAMUSCULAR; INTRAVENOUS; SUBCUTANEOUS at 15:08

## 2021-09-25 RX ADMIN — TRAMADOL HYDROCHLORIDE 50 MILLIGRAM(S): 50 TABLET ORAL at 01:58

## 2021-09-25 RX ADMIN — CYCLOBENZAPRINE HYDROCHLORIDE 5 MILLIGRAM(S): 10 TABLET, FILM COATED ORAL at 05:17

## 2021-09-25 RX ADMIN — LIDOCAINE 1 PATCH: 4 CREAM TOPICAL at 01:30

## 2021-09-25 RX ADMIN — Medication 1 APPLICATION(S): at 05:15

## 2021-09-25 RX ADMIN — Medication 10 MILLIGRAM(S): at 08:14

## 2021-09-25 RX ADMIN — LOSARTAN POTASSIUM 50 MILLIGRAM(S): 100 TABLET, FILM COATED ORAL at 05:17

## 2021-09-25 RX ADMIN — LATANOPROST 1 DROP(S): 0.05 SOLUTION/ DROPS OPHTHALMIC; TOPICAL at 21:30

## 2021-09-25 RX ADMIN — Medication 1 APPLICATION(S): at 18:01

## 2021-09-25 RX ADMIN — ENOXAPARIN SODIUM 30 MILLIGRAM(S): 100 INJECTION SUBCUTANEOUS at 17:48

## 2021-09-25 RX ADMIN — TRAMADOL HYDROCHLORIDE 50 MILLIGRAM(S): 50 TABLET ORAL at 08:00

## 2021-09-25 RX ADMIN — OXYCODONE HYDROCHLORIDE 10 MILLIGRAM(S): 5 TABLET ORAL at 12:35

## 2021-09-25 RX ADMIN — LIDOCAINE 1 PATCH: 4 CREAM TOPICAL at 20:59

## 2021-09-25 RX ADMIN — Medication 1 APPLICATION(S): at 17:50

## 2021-09-25 RX ADMIN — NYSTATIN CREAM 1 APPLICATION(S): 100000 CREAM TOPICAL at 05:18

## 2021-09-25 RX ADMIN — ATORVASTATIN CALCIUM 10 MILLIGRAM(S): 80 TABLET, FILM COATED ORAL at 21:29

## 2021-09-25 RX ADMIN — Medication 1 APPLICATION(S): at 05:16

## 2021-09-25 RX ADMIN — Medication 50 MILLIGRAM(S): at 05:17

## 2021-09-25 RX ADMIN — SODIUM CHLORIDE 3 MILLILITER(S): 9 INJECTION INTRAMUSCULAR; INTRAVENOUS; SUBCUTANEOUS at 05:16

## 2021-09-25 RX ADMIN — NYSTATIN CREAM 1 APPLICATION(S): 100000 CREAM TOPICAL at 17:47

## 2021-09-25 RX ADMIN — TRAMADOL HYDROCHLORIDE 50 MILLIGRAM(S): 50 TABLET ORAL at 03:00

## 2021-09-25 RX ADMIN — TRAMADOL HYDROCHLORIDE 50 MILLIGRAM(S): 50 TABLET ORAL at 07:25

## 2021-09-25 RX ADMIN — Medication 10 MILLIGRAM(S): at 15:04

## 2021-09-25 RX ADMIN — LIDOCAINE 1 PATCH: 4 CREAM TOPICAL at 11:37

## 2021-09-25 NOTE — PROGRESS NOTE ADULT - SUBJECTIVE AND OBJECTIVE BOX
NO acute changes in her subjective breathing    Vital Signs Last 24 Hrs  T(C): 37.1 (25 Sep 2021 11:38), Max: 37.1 (25 Sep 2021 11:38)  T(F): 98.8 (25 Sep 2021 11:38), Max: 98.8 (25 Sep 2021 11:38)  HR: 62 (25 Sep 2021 11:38) (62 - 87)  BP: 138/79 (25 Sep 2021 11:38) (99/63 - 138/79)  BP(mean): --  RR: 18 (25 Sep 2021 11:38) (18 - 18)  SpO2: 95% (25 Sep 2021 11:38) (95% - 99%)    I&O's Summary    09-24-21 @ 07:01  -  09-25-21 @ 07:00  --------------------------------------------------------  IN: 1240 mL / OUT: 950 mL / NET: 290 mL    09-25-21 @ 07:01  -  09-25-21 @ 13:41  --------------------------------------------------------  IN: 540 mL / OUT: 0 mL / NET: 540 mL        PHYSICAL EXAM:  GENERAL: NAD, well-developed  HEAD:  Atraumatic, Normocephalic  EYES: EOMI, PERRLA, conjunctiva and sclera clear, legally blind  NECK: Supple, No JVD  CHEST/LUNG: mild decrease breath sounds bilaterally; No wheeze   HEART: Regular rate and rhythm; No murmurs, rubs, or gallops  ABDOMEN: Soft, Nontender, Nondistended; Bowel sounds present  NEURO: awake alert, no focal weakness, 5/5 b/l upper extremity strength, 4/5 lower extremity strength  EXTREMITIES:  2+ Peripheral Pulses, No clubbing, cyanosis, trace b/l edema  BACK: incision c/d/i    LABS:                        9.2    8.12  )-----------( 178      ( 25 Sep 2021 07:06 )             27.2     09-25    140  |  107  |  15  ----------------------------<  78  4.0   |  24  |  1.05    Ca    8.5      25 Sep 2021 07:06        CAPILLARY BLOOD GLUCOSE                RADIOLOGY & ADDITIONAL TESTS:    Imaging Personally Reviewed:  [x] YES  [ ] NO    Will obtain old records:  [ ] YES  [x] NO

## 2021-09-25 NOTE — PROGRESS NOTE ADULT - ASSESSMENT
75 yo F with a history of colon cancer s/p right hemicolectomy (approx 2 years ago, outside hospital), DVT (on Eliquis), CKD, adrenal insufficiency presenting as transfer from [facility] for trauma evaluation secondary to a mechanical fall from sitting with headstrike. Injuries identified include right forehead hematoma, acute minimally displaced right coracoid process scapular fracture, T11/L1 compression fractures and an inferior sternal fracture. Patient hemodynamically stable.    # Dysphagia  s/p FEES with speech and swallow, full assessments reviewed  Bedside indirect laryngoscopy was unremarkable  On pureed diet  Consider Mucinex for thick secretions    # Elevated PTT  Mild elevations in PTT can be seen with SC heparin administration, particularly with TID dosing  low plasma protein, impaired renal function, and low BMI have been postulated as contributing factors.   Self-resolved  Appreciate hematology    # S/p Mechanical Fall:  Right forehead hematoma, acute minimally displaced right coracoid process scapular fracture, T11/L1 compression fractures and an inferior sternal fracture  no surgery for scapular fx. Placed in sling  CT spine with fusion L2-5. Fracture through the T12-L1 disc space with widening of the disc space and a L1-2 laminectomy which appears unstable. Diffuse osteopenia. Old T11 and L1 fractures.   MRI T/L spine with anterior splaying of the intervertebral disc space at the T12-L1 with edema in the anterior prevertebral soft tissues  CT T done noted with widening of T12/L1 disc space w/ L1 laminectomy  Neurosurgery offering surgery vs conservative management, TLSO brace in the meantime.   The daughter Susie decided to proceed with surgery.   Pt s/p OR: revision of prior L2-L5 fusion w/ extension from T10- pelvis w/o L1 PSO, and plastics closure on 9/14  Monitored in NICU post operatively, extubated 9/16/21. Doing well, transferred to the floor on 9/18/21.   Appreciate neurosurgical/plastics intervention and post-op care    # Hypopituitary   She is on Hydrocortisone 10 mg BID at home, so will monitor for adrenal insufficiency. am cortisol appropriate  UA unimpressive. vit D, vit B12 normal. TSH low as expected with hypopit. free T4 sent, normal.   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 Treviño ([facility]). Outpt EMR reviewed; Cr range from 1.4 to 1.6   Monitor I/O's, Serial Cr, Avoid nephrotoxins  Cr is up from baseline. Hold Losartan. s/p gentle IVF for acute kidney failure: likely pre-renal.   Her recent TTE reviewed, with normal LV.   Her Cr now back to baseline (1.4-1.6)   Losartan restarted 9/17/21    # UTI  UA+, urine culture grew > 100K Pseudomonas aeruginosa  S/p 4 doses of ceftriaxone 9/19-9/22    # CAD: Chronic, stable  Cont home CV meds  Held ASA for neurosurgery     # Hx of SVT  ST on monitor 120s  BB resumed by cards, uptitrate as BP tolerates     # Hypothyroidism  TSH low. Total T4 is nl  Levothyroxine for hypothyroid transition to IV as no PO access   TSH 0.18, T(6)  Continue LT4 50mcg po daily  Free T4 nl on 9/17/21  Endocrine following      # Hx of DVT, lower extremity  Home med Eliquis, on hold   Lovenox 30 mg SC daily    # GI ppx:  Protonix IV daily

## 2021-09-25 NOTE — PROGRESS NOTE ADULT - ASSESSMENT
Patient is a 77yo F with a history of colon cancer s/p right hemicolectomy (approx 2 years ago, outside hospital), DVT (on Eliquis), CKD, adrenal insufficiency presenting after mechanical fall from sitting. CT head/cervical spine/chest were performed which showed a right forehead hematoma, acute minimally displaced right coracoid process scapular fracture, T11/L1 compression fractures and an inferior sternal fracture. 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 Treviño  FELICIANO likely sec to hemodynamic change  Renal function stable  restarted on Losartan 9/17  Monitor I/O  Monitor renal function at present    Acidosis:  non-AG+AG  Hold oral sodium bicarb    HTN:  BP controlled   On LOsartan   Monitor at present    Hyponatremia/Hypernatremia  Improved   Monitor serum Na

## 2021-09-25 NOTE — PROGRESS NOTE ADULT - SUBJECTIVE AND OBJECTIVE BOX
SUBJECTIVE: Patient seen and examined, endorses pain. Otherwise no acute complaints.     OVERNIGHT EVENTS: none    Vital Signs Last 24 Hrs  T(C): 37.1 (25 Sep 2021 11:38), Max: 37.1 (25 Sep 2021 11:38)  T(F): 98.8 (25 Sep 2021 11:38), Max: 98.8 (25 Sep 2021 11:38)  HR: 62 (25 Sep 2021 11:38) (62 - 87)  BP: 138/79 (25 Sep 2021 11:38) (99/63 - 138/79)  BP(mean): --  RR: 18 (25 Sep 2021 11:38) (18 - 18)  SpO2: 95% (25 Sep 2021 11:38) (95% - 99%)    PHYSICAL EXAM:    Constitutional: No Acute Distress   Neurological: Awake alert Ox3, Speech clear Following Commands, Moving all Extremities B/L UE 4/5. B/L LE HF 2/5 KF & KE 4-/5 DF/PF 4-/5. Midline back aquacel AG C/D/I L    Pulmonary: Clear to Auscultation  Cardiovascular: S1, S2, Regular rate and rhythm   Gastrointestinal: Soft, Non-tender, Non-distened   Extremities: No calf tenderness   Incision: Dressing changed today by plastics.     LABS:                                              9.2    8.12  )-----------( 178      ( 25 Sep 2021 07:06 )             27.2     09-25    140  |  107  |  15  ----------------------------<  78  4.0   |  24  |  1.05    Ca    8.5      25 Sep 2021 07:06        MEDICATIONS:    cyclobenzaprine 5 milliGRAM(s) Oral three times a day PRN Muscle Spasm  oxyCODONE    Solution 5 milliGRAM(s) Oral every 4 hours PRN Moderate Pain (4 - 6)  oxyCODONE    Solution 10 milliGRAM(s) Oral every 4 hours PRN Severe Pain (7 - 10)  traMADol 50 milliGRAM(s) Oral every 4 hours PRN breakthrough pain  losartan 50 milliGRAM(s) Oral daily  metoprolol tartrate 50 milliGRAM(s) Oral two times a day  sodium chloride 3%  Inhalation 3 milliLiter(s) Inhalation every 8 hour  pantoprazole  Injectable 40 milliGRAM(s) IV Push daily  simethicone 80 milliGRAM(s) Chew three times a day PRN gas/diarrhea  artificial  tears Solution 1 Drop(s) Both EYES every 6 hours PRN Dry Eyes  atorvastatin 10 milliGRAM(s) Oral at bedtime  BACItracin   Ointment 1 Application(s) Topical two times a day  clotrimazole 1% Cream 1 Application(s) Topical two times a day  enoxaparin Injectable 30 milliGRAM(s) SubCutaneous <User Schedule>  hydrocortisone 10 milliGRAM(s) Oral <User Schedule>  influenza   Vaccine 0.5 milliLiter(s) IntraMuscular once  latanoprost 0.005% Ophthalmic Solution 1 Drop(s) Both EYES at bedtime  levothyroxine 50 MICROGram(s) Oral daily  lidocaine   4% Patch 1 Patch Transdermal daily  nystatin Powder 1 Application(s) Topical two times a day

## 2021-09-25 NOTE — PROGRESS NOTE ADULT - ASSESSMENT
76F hx of CKD adrenal insufficiency h/o colectomy CAD  HTN Hypothyroidism prior L2-5 Fxn w/. Dr Jane (ortho) at [facility] in early 2000s, revision x1, chronic LBP, h/o DVT  is on eliquis for hx of DVT 2 yr prior;- s/p mechanical fall w/ RUE pain found to have R scapular fx and sternal fx, adm to trauma, CT T/L w/ significant  adjacent  segment degenerative disease  above prior fusion w/ chronic comp fx, T10-L1 c  mild retropulsion, c/f unstable fx.  started on heparin sc tid as inpatient; and preop labs with elevated PTT. Seen by hematology   s/p /p T10-pelvis with revision of previous L2-5 hardware, L1 expanded PSO, plastics closure 9/14/21 . Post op course c/b slow to wake up, respiratory failure dysphagia & Sinus tachycardia . Remained intubated until 9/16/21. LE duplex negative for DVT     Plan    Neuro Q4  Dressing changed 9/24  CAD/ sinus tachycardia - On Metoprolol 50 BID   HTN- On Losartan 50mg   CKD stable.  Patient passed FEEST - on pureed diet, rectal tube DCd, + bannitrol, will add metamucil PRN  Adrenal insufficiency- Tapered steroids to Hydrocortisone 10 BID .  Pseudomonas UTI- finished Ceftriaxone 3 days, monitor for fevers... afebrile.   hypokalemia - Replete K  S na wnl  Free water BID    Pureed diet.  DVT ppx   pain control on oxy 5/10 mg  prn & flexeril 5mg prn     Discussed with patient and family wound care, follow up plans, activity, and medications. Questions answered, and they verbalized understanding.    [id number]

## 2021-09-25 NOTE — PROGRESS NOTE ADULT - SUBJECTIVE AND OBJECTIVE BOX
[facility]   MD KILLIAN JAIME MD RUORU WONG, PA    TEL:  OFFICE: [phone]  DR CHARLES CELL: [phone]  BEENA PISANO CELL: [phone]  DR. LOZANO CELL: [phone]      FROM 5 PM - 7 AM PLEASE CALL ANSWERING SERVICE: [phone]    RENAL FOLLOW UP NOTE--Date of Service 09-25-21 @ 08:40  --------------------------------------------------------------------------------  HPI:      Pt seen and examined at bedside.       PAST HISTORY  --------------------------------------------------------------------------------  No significant changes to PMH, PSH, FHx, SHx, unless otherwise noted    ALLERGIES & MEDICATIONS  --------------------------------------------------------------------------------  Allergies    penicillin (Rash)    Intolerances      Standing Inpatient Medications  atorvastatin 10 milliGRAM(s) Oral at bedtime  BACItracin   Ointment 1 Application(s) Topical two times a day  chlorhexidine 4% Liquid 1 Application(s) Topical <User Schedule>  clotrimazole 1% Cream 1 Application(s) Topical two times a day  enoxaparin Injectable 30 milliGRAM(s) SubCutaneous <User Schedule>  hydrocortisone 10 milliGRAM(s) Oral <User Schedule>  influenza   Vaccine 0.5 milliLiter(s) IntraMuscular once  lactobacillus acidophilus 1 Tablet(s) Oral two times a day  latanoprost 0.005% Ophthalmic Solution 1 Drop(s) Both EYES at bedtime  levothyroxine 50 MICROGram(s) Oral daily  lidocaine   4% Patch 1 Patch Transdermal daily  losartan 50 milliGRAM(s) Oral daily  metoprolol tartrate 50 milliGRAM(s) Oral two times a day  nystatin Powder 1 Application(s) Topical two times a day  pantoprazole  Injectable 40 milliGRAM(s) IV Push daily  sodium chloride 0.9%. 1000 milliLiter(s) IV Continuous <Continuous>  sodium chloride 3%  Inhalation 3 milliLiter(s) Inhalation every 8 hours    PRN Inpatient Medications  artificial  tears Solution 1 Drop(s) Both EYES every 6 hours PRN  cyclobenzaprine 5 milliGRAM(s) Oral three times a day PRN  oxyCODONE    IR 5 milliGRAM(s) Oral every 6 hours PRN  oxyCODONE    IR 10 milliGRAM(s) Oral every 6 hours PRN  psyllium Powder 1 Packet(s) Oral two times a day PRN  simethicone 80 milliGRAM(s) Chew three times a day PRN  traMADol 50 milliGRAM(s) Oral every 4 hours PRN      REVIEW OF SYSTEMS  --------------------------------------------------------------------------------  General: no fever    MSK: no edema     VITALS/PHYSICAL EXAM  --------------------------------------------------------------------------------  T(C): 36.9 (09-25-21 @ 07:21), Max: 36.9 (09-25-21 @ 07:21)  HR: 75 (09-25-21 @ 07:21) (72 - 87)  BP: 115/61 (09-25-21 @ 07:21) (99/63 - 125/71)  RR: 18 (09-25-21 @ 07:21) (18 - 18)  SpO2: 98% (09-25-21 @ 07:21) (96% - 99%)  Wt(kg): --        09-24-21 @ 07:01  -  09-25-21 @ 07:00  --------------------------------------------------------  IN: 1240 mL / OUT: 950 mL / NET: 290 mL      Physical Exam:  	Gen: NAD  	HEENT: MMM  	Pulm: CTA B/L  	CV: S1S2  	Abd: Soft, +BS  	Ext: No LE edema B/L                      Neuro: Awake   	Skin: Warm and Dry   	Vascular access: NO HD catheter            no  aziza  LABS/STUDIES  --------------------------------------------------------------------------------              9.2    8.12  >-----------<  178      [09-25-21 @ 07:06]              27.2     140  |  107  |  15  ----------------------------<  78      [09-25-21 @ 07:06]  4.0   |  24  |  1.05        Ca     8.5     [09-25-21 @ 07:06]            Creatinine Trend:  SCr 1.05 [09-25 @ 07:06]  SCr 1.09 [09-23 @ 04:58]  SCr 1.08 [09-22 @ 05:41]  SCr 1.13 [09-20 @ 06:41]  SCr 1.23 [09-19 @ 07:14]    Urinalysis - [09-19-21 @ 09:33]      Color Light Yellow / Appearance Slightly Turbid / SG 1.011 / pH 8.0      Gluc 100 mg/dL / Ketone Negative  / Bili Negative / Urobili Negative       Blood Large / Protein 30 mg/dL / Leuk Est Large / Nitrite Negative       /  / Hyaline 2 / Gran  / Sq Epi  / Non Sq Epi 0 / Bacteria Moderate      Iron 44, TIBC 224, %sat 19      [09-14-21 @ 10:27]  Ferritin 444      [09-14-21 @ 11:37]  Vitamin D (25OH) 67.9      [09-08-21 @ 09:59]  HbA1c 5.9      [02-22-17 @ 03:10]  TSH 0.18      [09-08-21 @ 09:59]

## 2021-09-25 NOTE — PROGRESS NOTE ADULT - SUBJECTIVE AND OBJECTIVE BOX
CC: no events    TELEMETRY:     PHYSICAL EXAM:    T(C): 36.9 (09-25-21 @ 07:21), Max: 36.9 (09-25-21 @ 07:21)  HR: 75 (09-25-21 @ 07:21) (72 - 87)  BP: 115/61 (09-25-21 @ 07:21) (99/63 - 125/71)  RR: 18 (09-25-21 @ 07:21) (18 - 18)  SpO2: 98% (09-25-21 @ 07:21) (96% - 99%)  Wt(kg): --  I&O's Summary    24 Sep 2021 07:01  -  25 Sep 2021 07:00  --------------------------------------------------------  IN: 1240 mL / OUT: 950 mL / NET: 290 mL        Appearance: Normal	  Cardiovascular: Normal S1 S2,RRR, No JVD, No murmurs  Respiratory: Lungs clear to auscultation	  Gastrointestinal:  Soft, Non-tender, + BS	  Extremities: Normal range of motion, No clubbing, cyanosis or edema  Vascular: Peripheral pulses palpable 2+ bilaterally     LABS:	 	                          9.2    8.12  )-----------( 178      ( 25 Sep 2021 07:06 )             27.2     09-25    140  |  107  |  15  ----------------------------<  78  4.0   |  24  |  1.05    Ca    8.5      25 Sep 2021 07:06            CARDIAC MARKERS:

## 2021-09-26 ENCOUNTER — TRANSCRIPTION ENCOUNTER (OUTPATIENT)
Age: 76
End: 2021-09-26

## 2021-09-26 RX ORDER — SODIUM CHLORIDE 9 MG/ML
1000 INJECTION INTRAMUSCULAR; INTRAVENOUS; SUBCUTANEOUS
Refills: 0 | Status: DISCONTINUED | OUTPATIENT
Start: 2021-09-26 | End: 2021-09-27

## 2021-09-26 RX ADMIN — LOSARTAN POTASSIUM 50 MILLIGRAM(S): 100 TABLET, FILM COATED ORAL at 05:24

## 2021-09-26 RX ADMIN — NYSTATIN CREAM 1 APPLICATION(S): 100000 CREAM TOPICAL at 17:27

## 2021-09-26 RX ADMIN — OXYCODONE HYDROCHLORIDE 10 MILLIGRAM(S): 5 TABLET ORAL at 21:47

## 2021-09-26 RX ADMIN — Medication 50 MILLIGRAM(S): at 17:28

## 2021-09-26 RX ADMIN — CHLORHEXIDINE GLUCONATE 1 APPLICATION(S): 213 SOLUTION TOPICAL at 22:59

## 2021-09-26 RX ADMIN — SODIUM CHLORIDE 3 MILLILITER(S): 9 INJECTION INTRAMUSCULAR; INTRAVENOUS; SUBCUTANEOUS at 23:00

## 2021-09-26 RX ADMIN — Medication 1 APPLICATION(S): at 05:39

## 2021-09-26 RX ADMIN — LATANOPROST 1 DROP(S): 0.05 SOLUTION/ DROPS OPHTHALMIC; TOPICAL at 21:48

## 2021-09-26 RX ADMIN — SODIUM CHLORIDE 75 MILLILITER(S): 9 INJECTION INTRAMUSCULAR; INTRAVENOUS; SUBCUTANEOUS at 07:17

## 2021-09-26 RX ADMIN — ATORVASTATIN CALCIUM 10 MILLIGRAM(S): 80 TABLET, FILM COATED ORAL at 21:47

## 2021-09-26 RX ADMIN — Medication 1 TABLET(S): at 17:28

## 2021-09-26 RX ADMIN — Medication 50 MICROGRAM(S): at 05:23

## 2021-09-26 RX ADMIN — Medication 1 APPLICATION(S): at 17:45

## 2021-09-26 RX ADMIN — OXYCODONE HYDROCHLORIDE 10 MILLIGRAM(S): 5 TABLET ORAL at 10:48

## 2021-09-26 RX ADMIN — LIDOCAINE 1 PATCH: 4 CREAM TOPICAL at 00:25

## 2021-09-26 RX ADMIN — LIDOCAINE 1 PATCH: 4 CREAM TOPICAL at 19:00

## 2021-09-26 RX ADMIN — Medication 1 APPLICATION(S): at 05:38

## 2021-09-26 RX ADMIN — OXYCODONE HYDROCHLORIDE 10 MILLIGRAM(S): 5 TABLET ORAL at 11:18

## 2021-09-26 RX ADMIN — ENOXAPARIN SODIUM 30 MILLIGRAM(S): 100 INJECTION SUBCUTANEOUS at 17:27

## 2021-09-26 RX ADMIN — Medication 10 MILLIGRAM(S): at 07:50

## 2021-09-26 RX ADMIN — SODIUM CHLORIDE 3 MILLILITER(S): 9 INJECTION INTRAMUSCULAR; INTRAVENOUS; SUBCUTANEOUS at 05:24

## 2021-09-26 RX ADMIN — Medication 10 MILLIGRAM(S): at 15:12

## 2021-09-26 RX ADMIN — NYSTATIN CREAM 1 APPLICATION(S): 100000 CREAM TOPICAL at 05:22

## 2021-09-26 RX ADMIN — Medication 1 APPLICATION(S): at 17:03

## 2021-09-26 RX ADMIN — Medication 50 MILLIGRAM(S): at 05:24

## 2021-09-26 RX ADMIN — LIDOCAINE 1 PATCH: 4 CREAM TOPICAL at 11:17

## 2021-09-26 RX ADMIN — SODIUM CHLORIDE 3 MILLILITER(S): 9 INJECTION INTRAMUSCULAR; INTRAVENOUS; SUBCUTANEOUS at 14:19

## 2021-09-26 RX ADMIN — Medication 1 TABLET(S): at 05:23

## 2021-09-26 RX ADMIN — PANTOPRAZOLE SODIUM 40 MILLIGRAM(S): 20 TABLET, DELAYED RELEASE ORAL at 11:18

## 2021-09-26 NOTE — PROGRESS NOTE ADULT - ASSESSMENT
HPI:  76F hx of CKD adrenal insufficiency h/o colectomy CAD  HTN Hypothyroidism prior L2-5 Fxn w/. Dr Jane (ortho) at [facility] in early 2000s, revision x1, chronic LBP, h/o DVT  is on eliquis for hx of DVT 2 yr prior;- s/p mechanical fall w/ RUE pain found to have R scapular fx and sternal fx, adm to trauma, CT T/L w/ significant  adjacent  segment degenerative disease  above prior fusion w/ chronic comp fx, T10-L1 c  mild retropulsion, c/f unstable fx.  started on heparin sc tid as inpatient; and preop labs with elevated PTT. Seen by hematology   s/p /p T10-pelvis with revision of previous L2-5 hardware, L1 expanded PSO, plastics closure 9/14/21 . Post op course c/b slow to wake up, respiratory failure dysphagia & Sinus tachycardia . Remained intubated until 9/16/21. LE duplex negative for DVT     Plan    Neuro Q4  Dressing changed 9/24  CAD/ sinus tachycardia - On Metoprolol 50 BID   HTN- On Losartan 50mg   CKD stable.  Patient passed FEEST - on pureed diet, monitor for bm   Adrenal insufficiency- Tapered steroids to Hydrocortisone 10 BID .  Pseudomonas UTI- finished Ceftriaxone 3 days, monitor for fevers... afebrile.   continue free water   Pureed diet.  DVT ppx -sql and scds   pain control on oxy 5/10 mg  prn & flexeril 5mg prn   on prn straight cath   Discussed with patient and family wound care, follow up plans, activity, and medications. Questions answered, and they verbalized understanding.    [id number]

## 2021-09-26 NOTE — DISCHARGE NOTE PROVIDER - NSDCMRMEDTOKEN_GEN_ALL_CORE_FT
acetaminophen 325 mg oral tablet: 2 tab(s) orally every 6 hours, As needed, Mild Pain (1 - 3)  Artificial Tears ophthalmic solution: 1 drop(s) to each affected eye 4 times a day, As Needed  aspirin 81 mg oral delayed release tablet: 1 tab(s) orally once a day  calcium (as carbonate) 600 mg oral tablet: 1 cap(s) orally once a day  Eliquis 2.5 mg oral tablet: 1 tab(s) orally 2 times a day  felodipine 5 mg oral tablet, extended release: 1 tab(s) orally once a day  guaiFENesin 600 mg oral tablet, extended release: 1 tab(s) orally every 12 hours as needed for cough   hydrocortisone 10 mg oral tablet: 1 tab(s) orally 2 times a day  latanoprost 0.005% ophthalmic solution: 1 drop(s) to each affected eye once a day (in the evening)  levothyroxine 50 mcg (0.05 mg) oral tablet: 1 tab(s) orally once a day  Lipitor 10 mg oral tablet: 1 tab(s) orally once a day  Macular Vitamin Benefit oral tablet: 1 tab(s) orally once a day  metoprolol succinate 50 mg oral tablet, extended release: 1 tab(s) orally once a day  Multiple Vitamins oral tablet: 1 tab(s) orally once a day  olmesartan 20 mg oral tablet: 1 tab(s) orally once a day  omeprazole 20 mg oral delayed release capsule: 1 cap(s) orally once a day  oxyCODONE 5 mg oral tablet: 1 tab(s) orally every 8 hours as needed for severe pain  OxyCONTIN 20 mg oral tablet, extended release: 1 tab(s) orally every 12 hours  risedronate 35 mg oral tablet: 1 tab(s) orally once a week (Mondays)  senna oral tablet: 2 tab(s) orally once a day (at bedtime), As Needed  TLSO Brace:    atorvastatin 10 mg oral tablet: 1 tab(s) orally once a day (at bedtime)  bacitracin 500 units/g topical ointment: 1 application topically 2 times a day  clotrimazole 1% topical cream: 1 application topically 2 times a day  cyclobenzaprine 5 mg oral tablet: 1 tab(s) orally 3 times a day, As needed, Muscle Spasm  enoxaparin: 30 milligram(s) subcutaneous once a day (at bedtime)  hydrocortisone 10 mg oral tablet: 1 tab(s) orally 2 times a day  lactobacillus acidophilus oral capsule: 1 tab(s) orally 2 times a day  latanoprost 0.005% ophthalmic solution: 1 drop(s) to each affected eye once a day (at bedtime)  levothyroxine 50 mcg (0.05 mg) oral tablet: 1 tab(s) orally once a day  lidocaine 4% topical film: Apply topically to affected area once a day  losartan 50 mg oral tablet: 1 tab(s) orally once a day  metoprolol tartrate 50 mg oral tablet: 1 tab(s) orally 2 times a day  Multiple Vitamins oral tablet: 1 tab(s) orally once a day  nystatin 100,000 units/g topical powder: 1 application topically 2 times a day  ocular lubricant ophthalmic solution: 1 drop(s) to each affected eye every 6 hours, As needed, Dry Eyes  oxyCODONE 10 mg oral tablet: 1 tab(s) orally every 6 hours, As needed, Severe Pain (7 - 10)  oxyCODONE 5 mg oral tablet: 1 tab(s) orally every 6 hours, As needed, Moderate Pain (4 - 6)  pantoprazole 40 mg oral delayed release tablet: 1 tab(s) orally once a day (before a meal)  risedronate 35 mg oral tablet: 1 tab(s) orally once a week (Mondays)  sodium chloride 3% inhalation solution: 3 milliliter(s) inhaled every 8 hours  TLSO Brace: when out of bed

## 2021-09-26 NOTE — PROGRESS NOTE ADULT - SUBJECTIVE AND OBJECTIVE BOX
SUBJECTIVE:   Patient was seen and evaluated at bedside. Patient is resting in bed and is in no new acute distress.   OVERNIGHT EVENTS:   none   Vital Signs Last 24 Hrs  T(C): 37.6 (26 Sep 2021 07:24), Max: 37.6 (26 Sep 2021 07:24)  T(F): 99.6 (26 Sep 2021 07:24), Max: 99.6 (26 Sep 2021 07:24)  HR: 74 (26 Sep 2021 07:24) (62 - 90)  BP: 143/81 (26 Sep 2021 07:24) (110/68 - 152/82)  BP(mean): --  RR: 18 (26 Sep 2021 07:24) (18 - 18)  SpO2: 99% (26 Sep 2021 07:24) (95% - 100%)    PHYSICAL EXAM:    General: No Acute Distress     Neurological: awake alert oriented to person place and time, follows simple commands and moves all extremities     Pulmonary: Clear to Auscultation, No Rales, No Rhonchi, No Wheezes     Cardiovascular: S1, S2, Regular Rate and Rhythm     Gastrointestinal: Soft, Nontender, Nondistended     Incision: clean and dry.     LABS:                        9.2    8.12  )-----------( 178      ( 25 Sep 2021 07:06 )             27.2    09-25    140  |  107  |  15  ----------------------------<  78  4.0   |  24  |  1.05    Ca    8.5      25 Sep 2021 07:06          09-25 @ 07:01  -  09-26 @ 07:00  --------------------------------------------------------  IN: 1685 mL / OUT: 1050 mL / NET: 635 mL      DRAINS:     MEDICATIONS:  Antibiotics:    Neuro:  cyclobenzaprine 5 milliGRAM(s) Oral three times a day PRN Muscle Spasm  oxyCODONE    IR 5 milliGRAM(s) Oral every 6 hours PRN Moderate Pain (4 - 6)  oxyCODONE    IR 10 milliGRAM(s) Oral every 6 hours PRN Severe Pain (7 - 10)  traMADol 50 milliGRAM(s) Oral every 4 hours PRN breakthrough pain    Cardiac:  losartan 50 milliGRAM(s) Oral daily  metoprolol tartrate 50 milliGRAM(s) Oral two times a day    Pulm:  sodium chloride 3%  Inhalation 3 milliLiter(s) Inhalation every 8 hours    GI/:  pantoprazole  Injectable 40 milliGRAM(s) IV Push daily  psyllium Powder 1 Packet(s) Oral two times a day PRN diarrhea  simethicone 80 milliGRAM(s) Chew three times a day PRN gas/diarrhea    Other:   artificial  tears Solution 1 Drop(s) Both EYES every 6 hours PRN Dry Eyes  atorvastatin 10 milliGRAM(s) Oral at bedtime  BACItracin   Ointment 1 Application(s) Topical two times a day  chlorhexidine 4% Liquid 1 Application(s) Topical <User Schedule>  clotrimazole 1% Cream 1 Application(s) Topical two times a day  enoxaparin Injectable 30 milliGRAM(s) SubCutaneous <User Schedule>  hydrocortisone 10 milliGRAM(s) Oral <User Schedule>  influenza   Vaccine 0.5 milliLiter(s) IntraMuscular once  lactobacillus acidophilus 1 Tablet(s) Oral two times a day  latanoprost 0.005% Ophthalmic Solution 1 Drop(s) Both EYES at bedtime  levothyroxine 50 MICROGram(s) Oral daily  lidocaine   4% Patch 1 Patch Transdermal daily  nystatin Powder 1 Application(s) Topical two times a day  sodium chloride 0.9%. 1000 milliLiter(s) IV Continuous <Continuous>    DIET: [] Regular [] CCD [] Renal [] Puree [] Dysphagia [] Tube Feeds:   pureed   IMAGING:   no new imaging today

## 2021-09-26 NOTE — PROGRESS NOTE ADULT - ASSESSMENT
77 yo F with a history of colon cancer s/p right hemicolectomy (approx 2 years ago, outside hospital), DVT (on Eliquis), CKD, adrenal insufficiency presenting as transfer from [facility] for trauma evaluation secondary to a mechanical fall from sitting with headstrike. Injuries identified include right forehead hematoma, acute minimally displaced right coracoid process scapular fracture, T11/L1 compression fractures and an inferior sternal fracture. Patient hemodynamically stable.    # Dysphagia  s/p FEES with speech and swallow, full assessments reviewed  Bedside indirect laryngoscopy was unremarkable  On pureed diet  Consider Mucinex for thick secretions    # Elevated PTT  Mild elevations in PTT can be seen with SC heparin administration, particularly with TID dosing  low plasma protein, impaired renal function, and low BMI have been postulated as contributing factors.   Self-resolved  Appreciate hematology    # S/p Mechanical Fall:  Right forehead hematoma, acute minimally displaced right coracoid process scapular fracture, T11/L1 compression fractures and an inferior sternal fracture  no surgery for scapular fx. Placed in sling  CT spine with fusion L2-5. Fracture through the T12-L1 disc space with widening of the disc space and a L1-2 laminectomy which appears unstable. Diffuse osteopenia. Old T11 and L1 fractures.   MRI T/L spine with anterior splaying of the intervertebral disc space at the T12-L1 with edema in the anterior prevertebral soft tissues  CT T done noted with widening of T12/L1 disc space w/ L1 laminectomy  Neurosurgery offering surgery vs conservative management, TLSO brace in the meantime.   The daughter Susie decided to proceed with surgery.   Pt s/p OR: revision of prior L2-L5 fusion w/ extension from T10- pelvis w/o L1 PSO, and plastics closure on 9/14  Monitored in NICU post operatively, extubated 9/16/21. Doing well, transferred to the floor on 9/18/21.   Appreciate neurosurgical/plastics intervention and post-op care    # Hypopituitary   She is on Hydrocortisone 10 mg BID at home, so will monitor for adrenal insufficiency. am cortisol appropriate  UA unimpressive. vit D, vit B12 normal. TSH low as expected with hypopit. free T4 sent, normal.   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 Treviño ([facility]). Outpt EMR reviewed; Cr range from 1.4 to 1.6   Monitor I/O's, Serial Cr, Avoid nephrotoxins  Cr is up from baseline. Hold Losartan. s/p gentle IVF for acute kidney failure: likely pre-renal.   Her recent TTE reviewed, with normal LV.   Her Cr now back to baseline (1.4-1.6)   Losartan restarted 9/17/21    # UTI  UA+, urine culture grew > 100K Pseudomonas aeruginosa  S/p 4 doses of ceftriaxone 9/19-9/22    # CAD: Chronic, stable  Cont home CV meds  Held ASA for neurosurgery     # Hx of SVT  ST on monitor 120s  BB resumed by cards, uptitrate as BP tolerates     # Hypothyroidism  TSH low. Total T4 is nl  Levothyroxine for hypothyroid transition to IV as no PO access   TSH 0.18, T(6)  Continue LT4 50mcg po daily  Free T4 nl on 9/17/21  Endocrine following      # Hx of DVT, lower extremity  Home med Eliquis, on hold   Lovenox 30 mg SC daily    # GI ppx:  Switch to PO PPI daily

## 2021-09-26 NOTE — DISCHARGE NOTE PROVIDER - CARE PROVIDERS DIRECT ADDRESSES
,[email or web address]

## 2021-09-26 NOTE — PROGRESS NOTE ADULT - SUBJECTIVE AND OBJECTIVE BOX
[facility]   MD KILLIAN JAIME MD RUORU WONG, PA    TEL:  OFFICE: [phone]  DR CHARLES CELL: [phone]  BEENA PISANO CELL: [phone]  DR. LOZANO CELL: [phone]      FROM 5 PM - 7 AM PLEASE CALL ANSWERING SERVICE: [phone]    RENAL FOLLOW UP NOTE--Date of Service 09-26-21 @ 08:17  --------------------------------------------------------------------------------  HPI:      Pt seen and examined at bedside.       PAST HISTORY  --------------------------------------------------------------------------------  No significant changes to PMH, PSH, FHx, SHx, unless otherwise noted    ALLERGIES & MEDICATIONS  --------------------------------------------------------------------------------  Allergies    penicillin (Rash)    Intolerances      Standing Inpatient Medications  atorvastatin 10 milliGRAM(s) Oral at bedtime  BACItracin   Ointment 1 Application(s) Topical two times a day  chlorhexidine 4% Liquid 1 Application(s) Topical <User Schedule>  clotrimazole 1% Cream 1 Application(s) Topical two times a day  enoxaparin Injectable 30 milliGRAM(s) SubCutaneous <User Schedule>  hydrocortisone 10 milliGRAM(s) Oral <User Schedule>  influenza   Vaccine 0.5 milliLiter(s) IntraMuscular once  lactobacillus acidophilus 1 Tablet(s) Oral two times a day  latanoprost 0.005% Ophthalmic Solution 1 Drop(s) Both EYES at bedtime  levothyroxine 50 MICROGram(s) Oral daily  lidocaine   4% Patch 1 Patch Transdermal daily  losartan 50 milliGRAM(s) Oral daily  metoprolol tartrate 50 milliGRAM(s) Oral two times a day  nystatin Powder 1 Application(s) Topical two times a day  pantoprazole  Injectable 40 milliGRAM(s) IV Push daily  sodium chloride 0.9%. 1000 milliLiter(s) IV Continuous <Continuous>  sodium chloride 3%  Inhalation 3 milliLiter(s) Inhalation every 8 hours    PRN Inpatient Medications  artificial  tears Solution 1 Drop(s) Both EYES every 6 hours PRN  cyclobenzaprine 5 milliGRAM(s) Oral three times a day PRN  oxyCODONE    IR 5 milliGRAM(s) Oral every 6 hours PRN  oxyCODONE    IR 10 milliGRAM(s) Oral every 6 hours PRN  psyllium Powder 1 Packet(s) Oral two times a day PRN  simethicone 80 milliGRAM(s) Chew three times a day PRN  traMADol 50 milliGRAM(s) Oral every 4 hours PRN      REVIEW OF SYSTEMS  --------------------------------------------------------------------------------  General: no fever    MSK: no edema     VITALS/PHYSICAL EXAM  --------------------------------------------------------------------------------  T(C): 37.6 (09-26-21 @ 07:24), Max: 37.6 (09-26-21 @ 07:24)  HR: 74 (09-26-21 @ 07:24) (62 - 90)  BP: 143/81 (09-26-21 @ 07:24) (110/68 - 152/82)  RR: 18 (09-26-21 @ 07:24) (18 - 18)  SpO2: 99% (09-26-21 @ 07:24) (95% - 100%)  Wt(kg): --        09-25-21 @ 07:01  -  09-26-21 @ 07:00  --------------------------------------------------------  IN: 1685 mL / OUT: 1050 mL / NET: 635 mL      Physical Exam:  	Gen: NAD  	HEENT: MMM  	Pulm: CTA B/L  	CV: S1S2  	Abd: Soft, +BS  	Ext: No LE edema B/L                      Neuro: Awake   	Skin: Warm and Dry   	Vascular access: NO HD catheter            no  aziza  LABS/STUDIES  --------------------------------------------------------------------------------              9.2    8.12  >-----------<  178      [09-25-21 @ 07:06]              27.2     140  |  107  |  15  ----------------------------<  78      [09-25-21 @ 07:06]  4.0   |  24  |  1.05        Ca     8.5     [09-25-21 @ 07:06]            Creatinine Trend:  SCr 1.05 [09-25 @ 07:06]  SCr 1.09 [09-23 @ 04:58]  SCr 1.08 [09-22 @ 05:41]  SCr 1.13 [09-20 @ 06:41]  SCr 1.23 [09-19 @ 07:14]    Urinalysis - [09-19-21 @ 09:33]      Color Light Yellow / Appearance Slightly Turbid / SG 1.011 / pH 8.0      Gluc 100 mg/dL / Ketone Negative  / Bili Negative / Urobili Negative       Blood Large / Protein 30 mg/dL / Leuk Est Large / Nitrite Negative       /  / Hyaline 2 / Gran  / Sq Epi  / Non Sq Epi 0 / Bacteria Moderate      Iron 44, TIBC 224, %sat 19      [09-14-21 @ 10:27]  Ferritin 444      [09-14-21 @ 11:37]  Vitamin D (25OH) 67.9      [09-08-21 @ 09:59]  HbA1c 5.9      [02-22-17 @ 03:10]  TSH 0.18      [09-08-21 @ 09:59]

## 2021-09-26 NOTE — DISCHARGE NOTE PROVIDER - REASON FOR ADMISSION
Patient was admitted after a fall with T12 L1 compression fracture , right scapular fracture and sternal fracture.    S/p fall

## 2021-09-26 NOTE — PROGRESS NOTE ADULT - ASSESSMENT
77 y/o female with a history of colon cancer s/p right hemicolectomy (approx 2 years ago, outside hospital), DVT (on Eliquis), CKD, adrenal insufficiency presents s/p fall with right coracoid process scapular fracture, T11/L1 compression fractures and an inferior sternal fracture, noted to have prolonged PTT.    1. prolonged PTT  - no hx of bleeding disorder  - nl PTT on admission 9/5; prolonged to 69 on 9/8, further prolonged 9/13  - blood draws have been peripheral sticks- no central catheter/Port  - prolonged PTT can be seen with subcutaneous heparin administration, usually milder elevations, but this is most likely cause in this patient  - low suspicion for factor deficiency with normal PTT on admission  - no hx of liver disease and PT was normal prior  - no evidence of DIC, normal fibrinogen  - mixing study, thrombin time and reptilase time were consistent with heparin effect  - PTT normalized off of subcut heparin and remains normal on SQ lovenox    2.anemia, microcytic- acute blood loss with surgery  - iron studies pre op with high ferritin, normal B12 and folate  - s/p 2 unit PRBC intra op  - monitor Hg, drains now removed; overall stable    3. hx of DVT, 2019- bilateral  - discovered with colon cancer diagnosis  - has been on eliquis, maintained on AC with decreased mobility  - duplex LE 9/9- no DVT  - eliquis on HOLD for now, repeat Duplex 9/16 remain negative  - CTA chest with no obvious PE on 9/17  - continue prophylaxis with lovenox for now; full AC to resume when cleared by surgery  - with persistent vague pain LE- repeat Duplex 9/24 no proximal DVT    4. s/p fall, with T12- L1 fracture  - s/p thorcao-lumbar fusion per neurosurgery on 9-14-21  - extensive wound, drains now removed  - post op care per NSGY, Plastics

## 2021-09-26 NOTE — DISCHARGE NOTE PROVIDER - NSDCCPCAREPLAN_GEN_ALL_CORE_FT
PRINCIPAL DISCHARGE DIAGNOSIS  Diagnosis: Compression fracture of thoracic vertebra  Assessment and Plan of Treatment: s/p T10-pelvis w/ revision of L2-L5 hardware, L1 expanded pedicle subtraction osteotomy, and plastics closure on 9/14/2021  No strenuous activity.  No lifting.  No twisting or bending.  Do not return to work until cleared to do so by physician.  No driving until cleared to do so by physician.  Keep incisions clean and dry. do not get incision wet until post op day #4. do not soak in water. no baths. pat dry. do not apply cream/moisture to incisions.  Do not take NSAIDs (aspirin, motrin, advil, aleve, etc) or blood thinners until cleared to do so by neurosurgeon  items for follow up: wound check, when to resume aspirin, when to resume eliquis, when to resume felodipine  follow up with neurosurgery, Dr. Moody, within 1 week of discharge from rehab, call [phone]  follow up with plastic surgery, Dr. Plascencia, within 1 week of discharge from rehab, call [phone]      SECONDARY DISCHARGE DIAGNOSES  Diagnosis: Closed fracture of coracoid process of scapula  Assessment and Plan of Treatment: non-weight bearing in right upper extremity  keep right arm in sling as per ortho  follow up with orthopedics, Dr. Tripp, within 1 week of discharge from rehab, call [phone]    Diagnosis: Acute kidney injury superimposed on CKD  Assessment and Plan of Treatment: now resolved  follow upo with nephrology, Dr. Salazar, [phone]

## 2021-09-26 NOTE — DISCHARGE NOTE PROVIDER - PROVIDER TOKENS
PROVIDER:[TOKEN:[[id number]],FOLLOWUP:[1 week]] PROVIDER:[TOKEN:[[id number]]],PROVIDER:[TOKEN:[[id number]]],PROVIDER:[TOKEN:[[id number]]],PROVIDER:[TOKEN:[[id number]]],PROVIDER:[TOKEN:[[id number]]]

## 2021-09-26 NOTE — PROGRESS NOTE ADULT - ASSESSMENT
ECHO 10/25/16:  Normal left ventricular systolic function. No segmental wall motion abnormalities. Hypermobile interatrial septum.   ECHO 8/24/21: EF 66% grossly nl LV sys fx, mild diastolic dsyfx - stage 1     a/p    Patient is a 77yo F , known to practice from prior admission,  with a history of colon cancer s/p right hemicolectomy (approx 2 years ago, outside hospital), DVT (on Eliquis), CKD, adrenal insufficiency presenting as transfer from [facility] for trauma evaluation secondary to a mechanical fall from sitting.     #Mechanical fall  -MRI noted with anterior splaying of T12/L1 space w/ edema   -CT T done noted with widening of T12/L1 disc space w/ L1 laminectomy  -s/p T10-pelvis with revision of previous L2-5 hardware, L1 expanded PSO.  -s/p intubation- now extubated 9/16 , on NC  -CV stable   -management per neuro sx     #SVT (hx)  -hr stable   -cont bb     #Dyspnea  -CT A neg for PE  -dyspnea improved - s/p IVP lasix 40 mg x 1 with good UO  -CXR noted   -lasix PRN    # DVT (hx)  -recent le doppler negative for acute dvt  -on SQ lovenox  -heme f/u     # wm on CKD   -renal f/u     #UTI  -tx per primary team     #HTN  -bp stable   -cont bb, arb     dvt ppx

## 2021-09-26 NOTE — PROGRESS NOTE ADULT - SUBJECTIVE AND OBJECTIVE BOX
Chief Complaint: fu    History of Present Illness: resting comfortably; pain improved in general, no f/c, no cp/dyspnea, on NC, tolerating diet, no n/v/abd pain, no reported bleeding, +weakness, no leg pain      MEDICATIONS  (STANDING):  atorvastatin 10 milliGRAM(s) Oral at bedtime  BACItracin   Ointment 1 Application(s) Topical two times a day  chlorhexidine 4% Liquid 1 Application(s) Topical <User Schedule>  clotrimazole 1% Cream 1 Application(s) Topical two times a day  enoxaparin Injectable 30 milliGRAM(s) SubCutaneous <User Schedule>  hydrocortisone 10 milliGRAM(s) Oral <User Schedule>  influenza   Vaccine 0.5 milliLiter(s) IntraMuscular once  lactobacillus acidophilus 1 Tablet(s) Oral two times a day  latanoprost 0.005% Ophthalmic Solution 1 Drop(s) Both EYES at bedtime  levothyroxine 50 MICROGram(s) Oral daily  lidocaine   4% Patch 1 Patch Transdermal daily  losartan 50 milliGRAM(s) Oral daily  metoprolol tartrate 50 milliGRAM(s) Oral two times a day  nystatin Powder 1 Application(s) Topical two times a day  pantoprazole  Injectable 40 milliGRAM(s) IV Push daily  sodium chloride 0.9%. 1000 milliLiter(s) (75 mL/Hr) IV Continuous <Continuous>  sodium chloride 3%  Inhalation 3 milliLiter(s) Inhalation every 8 hours    MEDICATIONS  (PRN):  artificial  tears Solution 1 Drop(s) Both EYES every 6 hours PRN Dry Eyes  cyclobenzaprine 5 milliGRAM(s) Oral three times a day PRN Muscle Spasm  oxyCODONE    IR 5 milliGRAM(s) Oral every 6 hours PRN Moderate Pain (4 - 6)  oxyCODONE    IR 10 milliGRAM(s) Oral every 6 hours PRN Severe Pain (7 - 10)  psyllium Powder 1 Packet(s) Oral two times a day PRN diarrhea  simethicone 80 milliGRAM(s) Chew three times a day PRN gas/diarrhea  traMADol 50 milliGRAM(s) Oral every 4 hours PRN breakthrough pain      Allergies    penicillin (Rash)    Intolerances        Vital Signs Last 24 Hrs  T(C): 37.6 (26 Sep 2021 07:24), Max: 37.6 (26 Sep 2021 07:24)  T(F): 99.6 (26 Sep 2021 07:24), Max: 99.6 (26 Sep 2021 07:24)  HR: 74 (26 Sep 2021 07:24) (62 - 90)  BP: 143/81 (26 Sep 2021 07:24) (110/68 - 152/82)  BP(mean): --  RR: 18 (26 Sep 2021 07:24) (18 - 18)  SpO2: 99% (26 Sep 2021 07:24) (95% - 100%)    PHYSICAL EXAM  General: adult in NAD  HEENT: clear oropharynx, anicteric sclera, pink conjunctiva  Neck: supple  CV: normal S1/S2  Lungs: clear to auscultation anterior  Abdomen: soft non-tender non-distended, , positive bowel sounds  Ext: no edema, SCDs bilateral  Skin: no rashes and no petechiae  Lymph Nodes: No LAD in neck  Neuro: alert and oriented X 3, no focal deficits    LABS:                          9.2    8.12  )-----------( 178      ( 25 Sep 2021 07:06 )             27.2         Mean Cell Volume : 84.5 fl  Mean Cell Hemoglobin : 28.6 pg  Mean Cell Hemoglobin Concentration : 33.8 gm/dL  Auto Neutrophil # : x  Auto Lymphocyte # : x  Auto Monocyte # : x  Auto Eosinophil # : x  Auto Basophil # : x  Auto Neutrophil % : x  Auto Lymphocyte % : x  Auto Monocyte % : x  Auto Eosinophil % : x  Auto Basophil % : x      Serial CBC's  09-25 @ 07:06  Hct-27.2 / Hgb-9.2 / Plat-178 / RBC-3.22 / WBC-8.12  Serial CBC's  09-23 @ 04:58  Hct-27.9 / Hgb-9.4 / Plat-164 / RBC-3.33 / WBC-10.36      09-25    140  |  107  |  15  ----------------------------<  78  4.0   |  24  |  1.05    Ca    8.5      25 Sep 2021 07:06                        Radiology:    < from: [facility] Duplex Lower Ext Vein Scan, Bilat (09.24.21 @ 16:32) >    IMPRESSION:  No femoropopliteal deep venous thrombosis in either lower extremity.  Calf veins could not be visualized.    < end of copied text >

## 2021-09-26 NOTE — DISCHARGE NOTE PROVIDER - NSDCFUADDINST_GEN_ALL_CORE_FT
Dressing - can remove bandage on back in in 5 days and leave uncovered.  Dressing - can remove bandage on back in in 5 days (10/2/2021) and leave uncovered.   Remove left upper extremity dressing on 9/28/2021

## 2021-09-26 NOTE — DISCHARGE NOTE PROVIDER - HOSPITAL COURSE
Patient was transferred to [facility] from [facility] . Patient was initially admitted to trauma surgery and was transferred to neurosurgery for further management. Patient was admitted to [address] [place] [address] under neurosurgery. While being in trauma surgery service patient had trauma work up done and was treated for pain and monitored. Patient was on strict spine precaution under neurosurgery and was optimized for surgery. Patient was seen to have FELICIANO and nephrologist saw the patient and cleared for surgery. Patient was also seen by cardiologist and cleared. Hospitalist saw patient and cleared. Patient had tlso brace placed. Patient was noted to have elevated ptt and was seen by hematology. Patient as cleared for surgery from hematology perspective. Patient was taken to OR on 9/14 and had Revision of prior L2-L5 fusion with extension from T10 pelvis with L1 pso and plastic closure. Post procedure patient was moved to icu and was monitored. Patient had acute respiratory failure post op and was slowly weaned off the ventilator in the icu. Post extubation patient ws seen by physical therapy and recommended for subacute rehab. Patient was moved to floor. Patient has dopplers of the legs that showed no DVT. Patient was seen by endocrinology due to h/o of adrenal insufficiency and was continued on steroid. Patient was also treated for UTI and patient was noted to hve loose bm. Patient was seen by GI and was started on bulking  agents. Patient was seen by swallow therapist and feest was done. Patient was advanced to a pureed diet. Patient's surgical drains were removed at bedside. Patient had no other complications. Patient was discharged to rehab with follow up instructions. Patient was transferred to [facility] from [facility] . Patient was initially admitted to trauma surgery and was transferred to neurosurgery for further management. Patient was admitted to [address] [place] [address] under neurosurgery. While being in trauma surgery service patient had trauma work up done and was treated for pain and monitored. Patient was on strict spine precaution under neurosurgery and was optimized for surgery. Patient was seen to have FELICIANO and nephrologist saw the patient and cleared for surgery. Patient was also seen by cardiologist and cleared. Hospitalist saw patient and cleared. Patient had tlso brace placed. Patient was noted to have elevated ptt and was seen by hematology. Patient as cleared for surgery from hematology perspective. Patient was taken to OR on 9/14 and had Revision of prior L2-L5 fusion with extension from T10 pelvis with L1 pso and plastic closure. Post procedure patient was moved to icu and was monitored. Patient had acute respiratory failure post op and was slowly weaned off the ventilator in the icu. Post extubation patient was seen by physical therapy and recommended for subacute rehab. Patient was moved to floor. Patient has dopplers of the legs that showed no DVT. CTA chest was done which was negative for pulmonary embolism. Now saturating well on room air. Patient was seen by endocrinology due to h/o of adrenal insufficiency and was continued on steroid. Patient was also treated for UTI and patient was noted to have loose bm. Patient was seen by GI and was started on bulking agents. Patient was seen by swallow therapist and feest was done. Patient was advanced to a pureed diet. Patient's surgical drains were removed at bedside. Patient developed urinary retention requiring multiple straight caths, ervin was placed on 9/27/2021. TLSO brace to be used when patient is out of bed.  Patient had no other complications. Patient was discharged to rehab with follow up instructions.

## 2021-09-26 NOTE — PROGRESS NOTE ADULT - SUBJECTIVE AND OBJECTIVE BOX
Feels OK  No acute pain  No N/V    Vital Signs Last 24 Hrs  T(C): 37.2 (26 Sep 2021 12:12), Max: 37.6 (26 Sep 2021 07:24)  T(F): 99 (26 Sep 2021 12:12), Max: 99.6 (26 Sep 2021 07:24)  HR: 88 (26 Sep 2021 12:12) (74 - 88)  BP: 141/82 (26 Sep 2021 12:12) (115/75 - 152/82)  BP(mean): --  RR: 20 (26 Sep 2021 12:12) (18 - 20)  SpO2: 96% (26 Sep 2021 12:12) (96% - 100%)    I&O's Summary    09-25-21 @ 07:01  -  09-26-21 @ 07:00  --------------------------------------------------------  IN: 1685 mL / OUT: 1050 mL / NET: 635 mL    09-26-21 @ 07:01  -  09-26-21 @ 15:59  --------------------------------------------------------  IN: 715 mL / OUT: 0 mL / NET: 715 mL        LABS:                        9.2    8.12  )-----------( 178      ( 25 Sep 2021 07:06 )             27.2     09-25    140  |  107  |  15  ----------------------------<  78  4.0   |  24  |  1.05    Ca    8.5      25 Sep 2021 07:06        CAPILLARY BLOOD GLUCOSE                RADIOLOGY & ADDITIONAL TESTS:    Imaging Personally Reviewed:  [x] YES  [ ] NO    Will obtain old records:  [ ] YES  [x] NOPHYSICAL EXAM:  GENERAL: NAD, well-developed  HEAD:  Atraumatic, Normocephalic  EYES: EOMI, PERRLA, conjunctiva and sclera clear, legally blind  NECK: Supple, No JVD  CHEST/LUNG: mild decrease breath sounds bilaterally; No wheeze   HEART: Regular rate and rhythm; No murmurs, rubs, or gallops  ABDOMEN: Soft, Nontender, Nondistended; Bowel sounds present  NEURO: awake alert, no focal weakness, 5/5 b/l upper extremity strength, 4/5 lower extremity strength  EXTREMITIES:  2+ Peripheral Pulses, No clubbing, cyanosis, trace b/l edema  BACK: incision c/d/i

## 2021-09-26 NOTE — PROGRESS NOTE ADULT - SUBJECTIVE AND OBJECTIVE BOX
CC: no events    TELEMETRY:     PHYSICAL EXAM:    T(C): 37.6 (09-26-21 @ 07:24), Max: 37.6 (09-26-21 @ 07:24)  HR: 74 (09-26-21 @ 07:24) (62 - 90)  BP: 143/81 (09-26-21 @ 07:24) (110/68 - 152/82)  RR: 18 (09-26-21 @ 07:24) (18 - 18)  SpO2: 99% (09-26-21 @ 07:24) (95% - 100%)  Wt(kg): --  I&O's Summary    25 Sep 2021 07:01  -  26 Sep 2021 07:00  --------------------------------------------------------  IN: 1610 mL / OUT: 1050 mL / NET: 560 mL        Appearance: Normal	  Cardiovascular: Normal S1 S2,RRR, No JVD, No murmurs  Respiratory: Lungs clear to auscultation	  Gastrointestinal:  Soft, Non-tender, + BS	  Extremities: Normal range of motion, No clubbing, cyanosis or edema  Vascular: Peripheral pulses palpable 2+ bilaterally     LABS:	 	                          9.2    8.12  )-----------( 178      ( 25 Sep 2021 07:06 )             27.2     09-25    140  |  107  |  15  ----------------------------<  78  4.0   |  24  |  1.05    Ca    8.5      25 Sep 2021 07:06            CARDIAC MARKERS:        MEDICATIONS  (STANDING):  atorvastatin 10 milliGRAM(s) Oral at bedtime  BACItracin   Ointment 1 Application(s) Topical two times a day  chlorhexidine 4% Liquid 1 Application(s) Topical <User Schedule>  clotrimazole 1% Cream 1 Application(s) Topical two times a day  enoxaparin Injectable 30 milliGRAM(s) SubCutaneous <User Schedule>  hydrocortisone 10 milliGRAM(s) Oral <User Schedule>  influenza   Vaccine 0.5 milliLiter(s) IntraMuscular once  lactobacillus acidophilus 1 Tablet(s) Oral two times a day  latanoprost 0.005% Ophthalmic Solution 1 Drop(s) Both EYES at bedtime  levothyroxine 50 MICROGram(s) Oral daily  lidocaine   4% Patch 1 Patch Transdermal daily  losartan 50 milliGRAM(s) Oral daily  metoprolol tartrate 50 milliGRAM(s) Oral two times a day  nystatin Powder 1 Application(s) Topical two times a day  pantoprazole  Injectable 40 milliGRAM(s) IV Push daily  sodium chloride 0.9%. 1000 milliLiter(s) (75 mL/Hr) IV Continuous <Continuous>  sodium chloride 3%  Inhalation 3 milliLiter(s) Inhalation every 8 hours

## 2021-09-27 ENCOUNTER — TRANSCRIPTION ENCOUNTER (OUTPATIENT)
Age: 76
End: 2021-09-27

## 2021-09-27 VITALS
DIASTOLIC BLOOD PRESSURE: 83 MMHG | OXYGEN SATURATION: 96 % | SYSTOLIC BLOOD PRESSURE: 152 MMHG | HEART RATE: 78 BPM | TEMPERATURE: 98 F | RESPIRATION RATE: 20 BRPM

## 2021-09-27 DIAGNOSIS — R79.1 ABNORMAL COAGULATION PROFILE: ICD-10-CM

## 2021-09-27 LAB
ANION GAP SERPL CALC-SCNC: 9 MMOL/L — SIGNIFICANT CHANGE UP (ref 5–17)
BUN SERPL-MCNC: 9 MG/DL — SIGNIFICANT CHANGE UP (ref 7–23)
CALCIUM SERPL-MCNC: 8.2 MG/DL — LOW (ref 8.4–10.5)
CHLORIDE SERPL-SCNC: 107 MMOL/L — SIGNIFICANT CHANGE UP (ref 96–108)
CO2 SERPL-SCNC: 23 MMOL/L — SIGNIFICANT CHANGE UP (ref 22–31)
CREAT SERPL-MCNC: 0.88 MG/DL — SIGNIFICANT CHANGE UP (ref 0.5–1.3)
GLUCOSE SERPL-MCNC: 83 MG/DL — SIGNIFICANT CHANGE UP (ref 70–99)
HCT VFR BLD CALC: 27.1 % — LOW (ref 34.5–45)
HGB BLD-MCNC: 8.8 G/DL — LOW (ref 11.5–15.5)
MCHC RBC-ENTMCNC: 27.8 PG — SIGNIFICANT CHANGE UP (ref 27–34)
MCHC RBC-ENTMCNC: 32.5 GM/DL — SIGNIFICANT CHANGE UP (ref 32–36)
MCV RBC AUTO: 85.5 FL — SIGNIFICANT CHANGE UP (ref 80–100)
NRBC # BLD: 0 /100 WBCS — SIGNIFICANT CHANGE UP (ref 0–0)
PLATELET # BLD AUTO: 202 K/UL — SIGNIFICANT CHANGE UP (ref 150–400)
POTASSIUM SERPL-MCNC: 3.6 MMOL/L — SIGNIFICANT CHANGE UP (ref 3.5–5.3)
POTASSIUM SERPL-SCNC: 3.6 MMOL/L — SIGNIFICANT CHANGE UP (ref 3.5–5.3)
RBC # BLD: 3.17 M/UL — LOW (ref 3.8–5.2)
RBC # FLD: 21.6 % — HIGH (ref 10.3–14.5)
SODIUM SERPL-SCNC: 139 MMOL/L — SIGNIFICANT CHANGE UP (ref 135–145)
WBC # BLD: 8.6 K/UL — SIGNIFICANT CHANGE UP (ref 3.8–10.5)
WBC # FLD AUTO: 8.6 K/UL — SIGNIFICANT CHANGE UP (ref 3.8–10.5)

## 2021-09-27 PROCEDURE — 83935 ASSAY OF URINE OSMOLALITY: CPT

## 2021-09-27 PROCEDURE — 85018 HEMOGLOBIN: CPT

## 2021-09-27 PROCEDURE — P9045: CPT

## 2021-09-27 PROCEDURE — 85027 COMPLETE CBC AUTOMATED: CPT

## 2021-09-27 PROCEDURE — 97530 THERAPEUTIC ACTIVITIES: CPT

## 2021-09-27 PROCEDURE — C1889: CPT

## 2021-09-27 PROCEDURE — 73030 X-RAY EXAM OF SHOULDER: CPT

## 2021-09-27 PROCEDURE — 82570 ASSAY OF URINE CREATININE: CPT

## 2021-09-27 PROCEDURE — 85384 FIBRINOGEN ACTIVITY: CPT

## 2021-09-27 PROCEDURE — 80053 COMPREHEN METABOLIC PANEL: CPT

## 2021-09-27 PROCEDURE — 84132 ASSAY OF SERUM POTASSIUM: CPT

## 2021-09-27 PROCEDURE — 97162 PT EVAL MOD COMPLEX 30 MIN: CPT

## 2021-09-27 PROCEDURE — 86900 BLOOD TYPING SEROLOGIC ABO: CPT

## 2021-09-27 PROCEDURE — 99233 SBSQ HOSP IP/OBS HIGH 50: CPT

## 2021-09-27 PROCEDURE — 85730 THROMBOPLASTIN TIME PARTIAL: CPT

## 2021-09-27 PROCEDURE — 82533 TOTAL CORTISOL: CPT

## 2021-09-27 PROCEDURE — 72148 MRI LUMBAR SPINE W/O DYE: CPT

## 2021-09-27 PROCEDURE — 92610 EVALUATE SWALLOWING FUNCTION: CPT

## 2021-09-27 PROCEDURE — 94002 VENT MGMT INPAT INIT DAY: CPT

## 2021-09-27 PROCEDURE — 36569 INSJ PICC 5 YR+ W/O IMAGING: CPT

## 2021-09-27 PROCEDURE — 86769 SARS-COV-2 COVID-19 ANTIBODY: CPT

## 2021-09-27 PROCEDURE — C1769: CPT

## 2021-09-27 PROCEDURE — 94003 VENT MGMT INPAT SUBQ DAY: CPT

## 2021-09-27 PROCEDURE — 72128 CT CHEST SPINE W/O DYE: CPT

## 2021-09-27 PROCEDURE — 97163 PT EVAL HIGH COMPLEX 45 MIN: CPT

## 2021-09-27 PROCEDURE — 71045 X-RAY EXAM CHEST 1 VIEW: CPT

## 2021-09-27 PROCEDURE — 84300 ASSAY OF URINE SODIUM: CPT

## 2021-09-27 PROCEDURE — 99285 EMERGENCY DEPT VISIT HI MDM: CPT

## 2021-09-27 PROCEDURE — 92526 ORAL FUNCTION THERAPY: CPT

## 2021-09-27 PROCEDURE — 82550 ASSAY OF CK (CPK): CPT

## 2021-09-27 PROCEDURE — 83605 ASSAY OF LACTIC ACID: CPT

## 2021-09-27 PROCEDURE — 81001 URINALYSIS AUTO W/SCOPE: CPT

## 2021-09-27 PROCEDURE — 85732 THROMBOPLASTIN TIME PARTIAL: CPT

## 2021-09-27 PROCEDURE — P9016: CPT

## 2021-09-27 PROCEDURE — 82435 ASSAY OF BLOOD CHLORIDE: CPT

## 2021-09-27 PROCEDURE — 85520 HEPARIN ASSAY: CPT

## 2021-09-27 PROCEDURE — 84133 ASSAY OF URINE POTASSIUM: CPT

## 2021-09-27 PROCEDURE — 97166 OT EVAL MOD COMPLEX 45 MIN: CPT

## 2021-09-27 PROCEDURE — C1751: CPT

## 2021-09-27 PROCEDURE — 86850 RBC ANTIBODY SCREEN: CPT

## 2021-09-27 PROCEDURE — 82746 ASSAY OF FOLIC ACID SERUM: CPT

## 2021-09-27 PROCEDURE — 82330 ASSAY OF CALCIUM: CPT

## 2021-09-27 PROCEDURE — C1713: CPT

## 2021-09-27 PROCEDURE — 80048 BASIC METABOLIC PNL TOTAL CA: CPT

## 2021-09-27 PROCEDURE — 83735 ASSAY OF MAGNESIUM: CPT

## 2021-09-27 PROCEDURE — 82607 VITAMIN B-12: CPT

## 2021-09-27 PROCEDURE — 87641 MR-STAPH DNA AMP PROBE: CPT

## 2021-09-27 PROCEDURE — 82803 BLOOD GASES ANY COMBINATION: CPT

## 2021-09-27 PROCEDURE — 72146 MRI CHEST SPINE W/O DYE: CPT

## 2021-09-27 PROCEDURE — 71275 CT ANGIOGRAPHY CHEST: CPT

## 2021-09-27 PROCEDURE — 85635 REPTILASE TEST: CPT

## 2021-09-27 PROCEDURE — 85014 HEMATOCRIT: CPT

## 2021-09-27 PROCEDURE — 84295 ASSAY OF SERUM SODIUM: CPT

## 2021-09-27 PROCEDURE — 85610 PROTHROMBIN TIME: CPT

## 2021-09-27 PROCEDURE — 84443 ASSAY THYROID STIM HORMONE: CPT

## 2021-09-27 PROCEDURE — 97110 THERAPEUTIC EXERCISES: CPT

## 2021-09-27 PROCEDURE — 82306 VITAMIN D 25 HYDROXY: CPT

## 2021-09-27 PROCEDURE — 83540 ASSAY OF IRON: CPT

## 2021-09-27 PROCEDURE — 96376 TX/PRO/DX INJ SAME DRUG ADON: CPT

## 2021-09-27 PROCEDURE — U0005: CPT

## 2021-09-27 PROCEDURE — 82565 ASSAY OF CREATININE: CPT

## 2021-09-27 PROCEDURE — 84436 ASSAY OF TOTAL THYROXINE: CPT

## 2021-09-27 PROCEDURE — 87640 STAPH A DNA AMP PROBE: CPT

## 2021-09-27 PROCEDURE — 76000 FLUOROSCOPY <1 HR PHYS/QHP: CPT

## 2021-09-27 PROCEDURE — 84484 ASSAY OF TROPONIN QUANT: CPT

## 2021-09-27 PROCEDURE — 85025 COMPLETE CBC W/AUTO DIFF WBC: CPT

## 2021-09-27 PROCEDURE — 97164 PT RE-EVAL EST PLAN CARE: CPT

## 2021-09-27 PROCEDURE — 84100 ASSAY OF PHOSPHORUS: CPT

## 2021-09-27 PROCEDURE — 83550 IRON BINDING TEST: CPT

## 2021-09-27 PROCEDURE — 82947 ASSAY GLUCOSE BLOOD QUANT: CPT

## 2021-09-27 PROCEDURE — U0003: CPT

## 2021-09-27 PROCEDURE — 82728 ASSAY OF FERRITIN: CPT

## 2021-09-27 PROCEDURE — 86923 COMPATIBILITY TEST ELECTRIC: CPT

## 2021-09-27 PROCEDURE — 74018 RADEX ABDOMEN 1 VIEW: CPT

## 2021-09-27 PROCEDURE — 72131 CT LUMBAR SPINE W/O DYE: CPT | Mod: MA

## 2021-09-27 PROCEDURE — P9041: CPT

## 2021-09-27 PROCEDURE — 85670 THROMBIN TIME PLASMA: CPT

## 2021-09-27 PROCEDURE — 87186 SC STD MICRODIL/AGAR DIL: CPT

## 2021-09-27 PROCEDURE — 94640 AIRWAY INHALATION TREATMENT: CPT

## 2021-09-27 PROCEDURE — 73590 X-RAY EXAM OF LOWER LEG: CPT

## 2021-09-27 PROCEDURE — 96374 THER/PROPH/DIAG INJ IV PUSH: CPT | Mod: XU

## 2021-09-27 PROCEDURE — 86901 BLOOD TYPING SEROLOGIC RH(D): CPT

## 2021-09-27 PROCEDURE — 87086 URINE CULTURE/COLONY COUNT: CPT

## 2021-09-27 PROCEDURE — 93005 ELECTROCARDIOGRAM TRACING: CPT

## 2021-09-27 PROCEDURE — 72080 X-RAY EXAM THORACOLMB 2/> VW: CPT

## 2021-09-27 PROCEDURE — 73562 X-RAY EXAM OF KNEE 3: CPT

## 2021-09-27 PROCEDURE — 93970 EXTREMITY STUDY: CPT

## 2021-09-27 PROCEDURE — 72170 X-RAY EXAM OF PELVIS: CPT

## 2021-09-27 PROCEDURE — 84439 ASSAY OF FREE THYROXINE: CPT

## 2021-09-27 RX ORDER — METOPROLOL TARTRATE 50 MG
1 TABLET ORAL
Qty: 0 | Refills: 0 | DISCHARGE

## 2021-09-27 RX ORDER — LACTOBACILLUS ACIDOPHILUS 100MM CELL
1 CAPSULE ORAL
Qty: 0 | Refills: 0 | DISCHARGE
Start: 2021-09-27

## 2021-09-27 RX ORDER — ATORVASTATIN CALCIUM 80 MG/1
1 TABLET, FILM COATED ORAL
Qty: 0 | Refills: 0 | DISCHARGE

## 2021-09-27 RX ORDER — NYSTATIN CREAM 100000 [USP'U]/G
1 CREAM TOPICAL
Qty: 0 | Refills: 0 | DISCHARGE
Start: 2021-09-27

## 2021-09-27 RX ORDER — LATANOPROST 0.05 MG/ML
1 SOLUTION/ DROPS OPHTHALMIC; TOPICAL
Qty: 0 | Refills: 0 | DISCHARGE

## 2021-09-27 RX ORDER — ATORVASTATIN CALCIUM 80 MG/1
1 TABLET, FILM COATED ORAL
Qty: 0 | Refills: 0 | DISCHARGE
Start: 2021-09-27

## 2021-09-27 RX ORDER — OLMESARTAN MEDOXOMIL 5 MG/1
1 TABLET, FILM COATED ORAL
Qty: 0 | Refills: 0 | DISCHARGE

## 2021-09-27 RX ORDER — PANTOPRAZOLE SODIUM 20 MG/1
1 TABLET, DELAYED RELEASE ORAL
Qty: 0 | Refills: 0 | DISCHARGE
Start: 2021-09-27

## 2021-09-27 RX ORDER — SODIUM CHLORIDE 9 MG/ML
3 INJECTION INTRAMUSCULAR; INTRAVENOUS; SUBCUTANEOUS
Qty: 0 | Refills: 0 | DISCHARGE
Start: 2021-09-27

## 2021-09-27 RX ORDER — LATANOPROST 0.05 MG/ML
1 SOLUTION/ DROPS OPHTHALMIC; TOPICAL
Qty: 0 | Refills: 0 | DISCHARGE
Start: 2021-09-27

## 2021-09-27 RX ORDER — OXYCODONE HYDROCHLORIDE 5 MG/1
1 TABLET ORAL
Qty: 0 | Refills: 0 | DISCHARGE
Start: 2021-09-27

## 2021-09-27 RX ORDER — CYCLOBENZAPRINE HYDROCHLORIDE 10 MG/1
1 TABLET, FILM COATED ORAL
Qty: 0 | Refills: 0 | DISCHARGE
Start: 2021-09-27

## 2021-09-27 RX ORDER — CALCIUM CARBONATE 500(1250)
1 TABLET ORAL
Qty: 0 | Refills: 0 | DISCHARGE

## 2021-09-27 RX ORDER — ENOXAPARIN SODIUM 100 MG/ML
30 INJECTION SUBCUTANEOUS
Qty: 0 | Refills: 0 | DISCHARGE
Start: 2021-09-27

## 2021-09-27 RX ORDER — PANTOPRAZOLE SODIUM 20 MG/1
40 TABLET, DELAYED RELEASE ORAL
Refills: 0 | Status: DISCONTINUED | OUTPATIENT
Start: 2021-09-27 | End: 2021-09-27

## 2021-09-27 RX ORDER — LIDOCAINE 4 G/100G
1 CREAM TOPICAL
Qty: 0 | Refills: 0 | DISCHARGE
Start: 2021-09-27

## 2021-09-27 RX ORDER — LOSARTAN POTASSIUM 100 MG/1
1 TABLET, FILM COATED ORAL
Qty: 0 | Refills: 0 | DISCHARGE
Start: 2021-09-27

## 2021-09-27 RX ORDER — OMEPRAZOLE 10 MG/1
1 CAPSULE, DELAYED RELEASE ORAL
Qty: 0 | Refills: 0 | DISCHARGE

## 2021-09-27 RX ORDER — FELODIPINE 5 MG/1
1 TABLET, FILM COATED, EXTENDED RELEASE ORAL
Qty: 0 | Refills: 0 | DISCHARGE

## 2021-09-27 RX ORDER — MULTIVIT-MIN/FERROUS GLUCONATE 9 MG/15 ML
1 LIQUID (ML) ORAL
Qty: 0 | Refills: 0 | DISCHARGE

## 2021-09-27 RX ORDER — OXYCODONE HYDROCHLORIDE 5 MG/1
1 TABLET ORAL
Qty: 0 | Refills: 0 | DISCHARGE

## 2021-09-27 RX ORDER — LEVOTHYROXINE SODIUM 125 MCG
1 TABLET ORAL
Qty: 0 | Refills: 0 | DISCHARGE

## 2021-09-27 RX ORDER — BACITRACIN ZINC 500 UNIT/G
1 OINTMENT IN PACKET (EA) TOPICAL
Qty: 0 | Refills: 0 | DISCHARGE
Start: 2021-09-27

## 2021-09-27 RX ORDER — METOPROLOL TARTRATE 50 MG
1 TABLET ORAL
Qty: 0 | Refills: 0 | DISCHARGE
Start: 2021-09-27

## 2021-09-27 RX ORDER — LEVOTHYROXINE SODIUM 125 MCG
1 TABLET ORAL
Qty: 0 | Refills: 0 | DISCHARGE
Start: 2021-09-27

## 2021-09-27 RX ORDER — APIXABAN 2.5 MG/1
1 TABLET, FILM COATED ORAL
Qty: 0 | Refills: 0 | DISCHARGE

## 2021-09-27 RX ADMIN — Medication 1 TABLET(S): at 17:09

## 2021-09-27 RX ADMIN — Medication 50 MILLIGRAM(S): at 05:09

## 2021-09-27 RX ADMIN — OXYCODONE HYDROCHLORIDE 10 MILLIGRAM(S): 5 TABLET ORAL at 11:40

## 2021-09-27 RX ADMIN — NYSTATIN CREAM 1 APPLICATION(S): 100000 CREAM TOPICAL at 17:11

## 2021-09-27 RX ADMIN — Medication 1 APPLICATION(S): at 17:33

## 2021-09-27 RX ADMIN — PANTOPRAZOLE SODIUM 40 MILLIGRAM(S): 20 TABLET, DELAYED RELEASE ORAL at 11:10

## 2021-09-27 RX ADMIN — OXYCODONE HYDROCHLORIDE 10 MILLIGRAM(S): 5 TABLET ORAL at 05:03

## 2021-09-27 RX ADMIN — OXYCODONE HYDROCHLORIDE 10 MILLIGRAM(S): 5 TABLET ORAL at 11:10

## 2021-09-27 RX ADMIN — LIDOCAINE 1 PATCH: 4 CREAM TOPICAL at 11:10

## 2021-09-27 RX ADMIN — NYSTATIN CREAM 1 APPLICATION(S): 100000 CREAM TOPICAL at 05:10

## 2021-09-27 RX ADMIN — Medication 1 TABLET(S): at 05:03

## 2021-09-27 RX ADMIN — Medication 1 APPLICATION(S): at 05:05

## 2021-09-27 RX ADMIN — OXYCODONE HYDROCHLORIDE 10 MILLIGRAM(S): 5 TABLET ORAL at 17:48

## 2021-09-27 RX ADMIN — Medication 10 MILLIGRAM(S): at 07:40

## 2021-09-27 RX ADMIN — SODIUM CHLORIDE 3 MILLILITER(S): 9 INJECTION INTRAMUSCULAR; INTRAVENOUS; SUBCUTANEOUS at 05:03

## 2021-09-27 RX ADMIN — CYCLOBENZAPRINE HYDROCHLORIDE 5 MILLIGRAM(S): 10 TABLET, FILM COATED ORAL at 01:33

## 2021-09-27 RX ADMIN — Medication 1 APPLICATION(S): at 05:09

## 2021-09-27 RX ADMIN — LOSARTAN POTASSIUM 50 MILLIGRAM(S): 100 TABLET, FILM COATED ORAL at 05:02

## 2021-09-27 RX ADMIN — SODIUM CHLORIDE 3 MILLILITER(S): 9 INJECTION INTRAMUSCULAR; INTRAVENOUS; SUBCUTANEOUS at 14:16

## 2021-09-27 RX ADMIN — Medication 50 MILLIGRAM(S): at 17:10

## 2021-09-27 RX ADMIN — ENOXAPARIN SODIUM 30 MILLIGRAM(S): 100 INJECTION SUBCUTANEOUS at 17:10

## 2021-09-27 RX ADMIN — OXYCODONE HYDROCHLORIDE 10 MILLIGRAM(S): 5 TABLET ORAL at 17:10

## 2021-09-27 RX ADMIN — Medication 50 MICROGRAM(S): at 05:03

## 2021-09-27 RX ADMIN — Medication 10 MILLIGRAM(S): at 17:09

## 2021-09-27 NOTE — PROGRESS NOTE ADULT - ASSESSMENT
ECHO 10/25/16:  Normal left ventricular systolic function. No segmental wall motion abnormalities. Hypermobile interatrial septum.   ECHO 8/24/21: EF 66% grossly nl LV sys fx, mild diastolic dsyfx - stage 1     a/p    Patient is a 75yo F , known to practice from prior admission,  with a history of colon cancer s/p right hemicolectomy (approx 2 years ago, outside hospital), DVT (on Eliquis), CKD, adrenal insufficiency presenting as transfer from [facility] for trauma evaluation secondary to a mechanical fall from sitting.     #Mechanical fall  -MRI noted with anterior splaying of T12/L1 space w/ edema   -CT T done noted with widening of T12/L1 disc space w/ L1 laminectomy  -s/p T10-pelvis with revision of previous L2-5 hardware, L1 expanded PSO.  -s/p intubation- now extubated 9/16 , on NC  -CV stable   -management per neuro sx     #SVT (hx)  -hr stable   -cont bb     #Dyspnea  -CT A neg for PE  -dyspnea improved - s/p IVP lasix 40 mg x 1 with good UO  -CXR noted   -lasix PRN    #HTN  -bp stable   -cont bb, arb     # DVT (hx)  -repeat le doppler negative for acute dvt  -on SQ lovenox  -heme f/u     # wm on CKD   -hyponatremia improved   -renal f/u     #UTI  -s/p abx per primary team       dvt ppx

## 2021-09-27 NOTE — PROGRESS NOTE ADULT - ASSESSMENT
HPI:  Patient is a 76 year old female that had fall from sitting on her commode.  Was brought to [facility] and then transferred to [facility] which showed right coracoid process scapular fracture and T11/L1 compression fractures as well as inferior sternal fracture.  Admitted to the neurosurgery service for management of T11/S3odakcymzovp fractures.    PROCEDURE: s/p T10-pelvis w/ revision of L2-L5 hardware, L1 expanded pedicle subtraction osteotomy, and plastics closure on 9/14/2021   POD#13    PLAN:  Neuro:   -q4 hour neuro checks  -oxycodone, lidoderm patch, and tramadol for pain control  -flexeril for muscle spasms  -out of bed with assistance  -pt/t - subacute rehab upon discharge    Respiratory:   -wean off nasal cannula  -3% inhalation  -incentive spirometer for lung expansion    CV:  -keep sbp 100-140  -svt previously, cardiology following, on metoprolol  -losartan for bp control  -atorvastatin for lipid control    Endocrine:   -levothyroxine for hypothyroid  -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

## 2021-09-27 NOTE — PROGRESS NOTE ADULT - TIME BILLING
a/p  discussed with family
A/P
A/P  Discussed with neurosurgery NP
Agree with above NP note.  75 yo F, known to practice from prior admission,  with a history of colon cancer s/p right hemicolectomy (approx 2 years ago, outside hospital), DVT (on Eliquis), CKD, adrenal insufficiency presenting as transfer from [facility] for trauma evaluation secondary to a mechanical fall from sitting    #Mechanical fall  -MRI noted with anterior splaying of T12/L1 space w/ edema   -await posterior extension of prior L2-5 fusion and stabilization next week after ASA washout  -recent echo noted with EF 66% grossly nl LV sys fx, mild diastolic dsyfx   -remains cardiac optimized and can proceed to OR with acceptable CV risk  -neuro sx/ trauma f/u   -c/w toprol 50 mg daily for h/o SVT  -AC on hold for OR
Agree with above NP note.  77 yo F, known to practice from prior admission,  with a history of colon cancer s/p right hemicolectomy (approx 2 years ago, outside hospital), DVT (on Eliquis), CKD, adrenal insufficiency presenting as transfer from [facility] for trauma evaluation secondary to a mechanical fall from sitting    #Mechanical fall  -MRI noted with anterior splaying of T12/L1 space w/ edema   -await posterior extension of prior L2-5 fusion and stabilization next week after ASA washout  -recent echo noted with EF 66% grossly nl LV sys fx, mild diastolic dsyfx   -remains cardiac optimized and can proceed to OR with acceptable CV risk  -neuro sx/ trauma f/u   -c/w toprol 50 mg daily for h/o SVT  -AC on hold for OR
Agree with above NP note.  77 yo F, known to practice from prior admission,  with a history of colon cancer s/p right hemicolectomy (approx 2 years ago, outside hospital), DVT (on Eliquis), CKD, adrenal insufficiency presenting as transfer from [facility] for trauma evaluation secondary to a mechanical fall from sitting    #Mechanical fall  -MRI noted with anterior splaying of T12/L1 space w/ edema   -await posterior extension of prior L2-5 fusion and stabilization next week after ASA washout  -recent echo noted with EF 66% grossly nl LV sys fx, mild diastolic dsyfx   -remains cardiac optimized and can proceed to OR with acceptable CV risk  -neuro sx/ trauma f/u   -c/w toprol 50 mg daily for h/o SVT  -AC on hold for OR
Agree with above NP note.  CV stable  remains intubated in NSCU,  wean to extubate  management per neuro sx , NSCU
Pt seen and examined, agree with the above assessment and plan by Cathy  CV stable  Cont prn lasix  Cont BB for PSVT x 1   DVT ppx
Pt seen and examined, agree with the above assessment and plan by Cathy.  #Mechanical fall  -MRI noted with anterior splaying of T12/L1 space w/ edema   -CT T done noted with widening of T12/L1 disc space w/ L1 laminectomy  -s/p T10-pelvis with revision of previous L2-5 hardware, L1 expanded PSO.  -s/p intubation- now extubated 9/16 , on NC  -CV stable   -management per neuro sx     #SVT (hx)  -hr stable   -cont bb   -inc bb as needed     #Dyspnea  -CT A neg for PE  -dyspnea improving - s/p IVP lasix 40 mg x 1 with good UO  -CXR noted   -lasix PRN  -monitor vol status closely while on IVF     # DVT (hx)  -recent le doppler negative for acute dvt  -on SQ lovenox  -heme f/u     # wm on CKD   -renal f/u     #UTI  -tx per primary team     #HTN  -bp improving  -cont bb, arb     dvt ppx
Pt seen and examined, agree with the above assessment and plan by NAHOMI Pineda.    #Mechanical fall  -MRI noted with anterior splaying of T12/L1 space w/ edema   -CT T done noted with widening of T12/L1 disc space w/ L1 laminectomy  -s/p T10-pelvis with revision of previous L2-5 hardware, L1 expanded PSO.  -s/p intubation- now extubated 9/16 , on NC  -CV stable   -management per neuro sx     #SVT (hx)  -hr stable   -cont bb   -inc bb as needed     #Dyspnea  -CT A neg for PE  -dyspnea improving - s/p IVP lasix 40 mg x 1 with good UO  -CXR noted   -lasix PRN  -monitor vol status closely while on IVF     # DVT (hx)  -recent le doppler negative for acute dvt  -AC on hold post-op- hem to follow up for further a/c recommendations    # wm on CKD   -renal f/u     #UTI  -tx per primary team     #HTN  -bp improving  -cont bb, arb     dvt ppx
Pt seen and examined, agree with the above assessment and plan by NAHOMI Pineda.    #Mechanical fall  -MRI noted with anterior splaying of T12/L1 space w/ edema   -CT T done noted with widening of T12/L1 disc space w/ L1 laminectomy  -s/p T10-pelvis with revision of previous L2-5 hardware, L1 expanded PSO.  -s/p intubation- now extubated 9/16 on venti mask  -CV stable   -management per neuro sx , NSCU     #SVT (hx)  -off bb post-op /critically ill   -will eventually resume   -on pressor. mido per NSCU     # DVT (hx)  -recent le doppler negative for acute dvt  -AC on hold post-op- hem to follow up for further a/c recommendations    # wm on CKD   -renal f/u     dvt ppx
A/P
Agree with above NP note.  77 yo F, known to practice from prior admission,  with a history of colon cancer s/p right hemicolectomy (approx 2 years ago, outside hospital), DVT (on Eliquis), CKD, adrenal insufficiency presenting as transfer from [facility] for trauma evaluation secondary to a mechanical fall from sitting    #Mechanical fall  -MRI noted with anterior splaying of T12/L1 space w/ edema   -await posterior extension of prior L2-5 fusion and stabilization next week after ASA washout  -recent echo noted with EF 66% grossly nl LV sys fx, mild diastolic dsyfx   -remains cardiac optimized and can proceed to OR with acceptable CV risk  -neuro sx/ trauma f/u   -c/w toprol 50 mg daily for h/o SVT  -AC on hold for OR
Agree with above NP note.  cv stable  d/c planning  cont current tx
Pt seen and examined, agree with the above assessment and plan by Miriam  CV stable  Cont prn lasix  Cont BB for PSVT x 1   DVT ppx
Pt seen and examined, agree with the above assessment and plan by NAHOMI Pineda.    ##Mechanical fall  -MRI noted with anterior splaying of T12/L1 space w/ edema   -CT T done noted with widening of T12/L1 disc space w/ L1 laminectomy  -s/p T10-pelvis with revision of previous L2-5 hardware, L1 expanded PSO.  -s/p intubation- now extubated 9/16 , on NC  -CV stable   -management per neuro sx     #SVT (hx)  -BB resumed, HR improving  -inc bb as needed     #Dyspnea  -CT A neg for PE  -pt appears mildly overloaded - can trial IVP lasix 40 mg x1     # DVT (hx)  -recent le doppler negative for acute dvt  -AC on hold post-op- hem to follow up for further a/c recommendations    # wm on CKD   -renal f/u     #UTI  -tx per primary team     dvt ppx
Pt seen and examined, agree with the above assessment and plan by NAHOMI Pineda.    #Mechanical fall  -MRI noted with anterior splaying of T12/L1 space w/ edema   -CT T done noted with widening of T12/L1 disc space w/ L1 laminectomy  -s/p T10-pelvis with revision of previous L2-5 hardware, L1 expanded PSO.  -s/p intubation- now extubated 9/16 , on NC  -CV stable   -management per neuro sx     #SVT (hx)  -hr stable   -cont bb   -inc bb as needed     #Dyspnea  -CT A neg for PE  -dyspnea improving - s/p IVP lasix 40 mg x 1 with good UO  -CXR noted   -lasix PRN  -monitor vol status closely while on IVF     # DVT (hx)  -recent le doppler negative for acute dvt  -AC on hold post-op- hem to follow up for further a/c recommendations    # wm on CKD   -renal f/u     #UTI  -tx per primary team     #HTN  -bp improving  -cont bb, arb     dvt ppx
Pt seen and examined, agree with the above assessment and plan by NAHOMI Pineda.    #Mechanical fall  -MRI noted with anterior splaying of T12/L1 space w/ edema   -CT T done noted with widening of T12/L1 disc space w/ L1 laminectomy  -s/p T10-pelvis with revision of previous L2-5 hardware, L1 expanded PSO.  -s/p intubation- now extubated 9/16 , on NC  -CV stable   -management per neuro sx , NSCU     #SVT (hx)  -BB resumed, HR improving  -inc bb as needed     #Dyspnea  -CT A neg for PE  -pt appears mildly overloaded - can trial IVP lasix 40 mg x1     # DVT (hx)  -recent le doppler negative for acute dvt  -AC on hold post-op- hem to follow up for further a/c recommendations    # wm on CKD   -renal f/u     dvt ppx
a/P
Agree with above NP note.  77 yo F, known to practice from prior admission,  with a history of colon cancer s/p right hemicolectomy (approx 2 years ago, outside hospital), DVT (on Eliquis), CKD, adrenal insufficiency presenting as transfer from [facility] for trauma evaluation secondary to a mechanical fall from sitting    #Mechanical fall  -MRI noted with anterior splaying of T12/L1 space w/ edema   -await posterior extension of prior L2-5 fusion and stabilization next week after ASA washout  -recent echo noted with EF 66% grossly nl LV sys fx, mild diastolic dsyfx   -remains cardiac optimized and can proceed to OR with acceptable CV risk  -neuro sx/ trauma f/u   -c/w toprol 50 mg daily for h/o SVT  -AC on hold for OR
More than 30 minutes spent on total encounter: more than 50% of the visit was spent on educating the patient and family regarding condition, medications, follow up plans, signs and symptoms to be concerned with, preparing paperwork, and questions answered regarding discharge.
Pt seen and examined, agree with the above assessment and plan by NAHOMI Pineda.    #Mechanical fall  -MRI noted with anterior splaying of T12/L1 space w/ edema   -CT T done noted with widening of T12/L1 disc space w/ L1 laminectomy  -s/p T10-pelvis with revision of previous L2-5 hardware, L1 expanded PSO.  -s/p intubation- now extubated 9/16   -monitor for chf, prn lasix  -CV stable   -management per neuro sx , NSCU     #SVT (hx)  -off bb post-op /critically ill   -will eventually resume   -on pressor. mido per NSCU     # DVT (hx)  -recent le doppler negative for acute dvt  -AC on hold post-op- hem to follow up for further a/c recommendations    # wm on CKD   -renal f/u     dvt ppx
More than 30 minutes spent on total encounter: more than 50% of the visit was spent on educating the patient and family regarding condition, medications, follow up plans, signs and symptoms to be concerned with, preparing paperwork, and questions answered regarding discharge.
examine patient   review labs   review imaging studies
examine patient   review labs and vitals
inpatient post op care as above
panhypopituitarism, stress dose steroids

## 2021-09-27 NOTE — PROVIDER CONTACT NOTE (OTHER) - DATE AND TIME:
24-Sep-2021 17:30
09-Sep-2021 02:20
12-Sep-2021 00:18
12-Sep-2021 18:40
08-Sep-2021 00:43
27-Sep-2021 16:20
06-Sep-2021 14:12
22-Sep-2021 02:25

## 2021-09-27 NOTE — PROVIDER CONTACT NOTE (OTHER) - REASON
Pt nauseous
pt tachycardic 120's
upon patient wiping after bathroom bright red blood on wipe
Patient blood pressure 99/65 and patient retaining urine
78/42
coughing fit, pt. requiring 3L NC, requesting cough medication
Pt drain leaking from insertion site
pt. c/o dysuria

## 2021-09-27 NOTE — PROGRESS NOTE ADULT - ATTENDING COMMENTS
s/p hemicolectomy for colon cancer, diarrhea resolved, dysphagia improved    plan; management as above

## 2021-09-27 NOTE — PROGRESS NOTE ADULT - THIS PATIENT HAS THE FOLLOWING CONDITION(S)/DIAGNOSES ON THIS ADMISSION:
None
Renal Disease
post op blood loss anemia- stable/Acute Blood Loss Anemia
None
Renal Disease
stable- will trend/Renal Disease/Acute Blood Loss Anemia
None
Renal Disease
Renal Disease
stable- will trend/Renal Disease/Acute Blood Loss Anemia
None
Renal Disease
Renal Disease
stable- will trend/Renal Disease/Acute Blood Loss Anemia
Renal Disease
Renal Disease

## 2021-09-27 NOTE — PROVIDER CONTACT NOTE (OTHER) - SITUATION
Pt right hemovac is draining from insertion site. Pt bed saturated w/ blood
upon patient wiping after bathroom bright red blood on wipe. no visible blood in the toilet. stool was normal and patient did not stain
Pt c/o nausea.
78/42
Patient blood pressure 99/65 and patient retaining urine
pt tachycardic 120's
pt. c/o dysuria

## 2021-09-27 NOTE — PROGRESS NOTE ADULT - ASSESSMENT
77yo F with a history of colon cancer s/p right hemicolectomy (approx 2 years ago, outside hospital), DVT (on Eliquis), CKD, adrenal insufficiency presenting as transfer from [facility] for trauma evaluation secondary to a mechanical fall from sitting with associated head strike    #hx colon CA s/p right hemicolectomy (surveillance colonoscopy 9/24/2020 negative, patent end to side ileocolic anastomosis at transverse colon)    #Dysphagia - IMPROVED  s/p repeat FEES 9/23 - cleared for puree with thin liquids  -change to PO PPI for GI prophylaxis (especially in light of steroids)    #Diarrhea - suspect TF associated; hope stooling will improve off TF and now off Abx (had +UTI 9/19)- IMPROVED  -probiotic smoothie added BID  -Bacid BID    Discussed with Neurosurgery team  NO GI objection to d/c planning    Sean Dahl PA-C    [facility]  [phone]  After hours and weekend coverage [phone]

## 2021-09-27 NOTE — PROGRESS NOTE ADULT - SUBJECTIVE AND OBJECTIVE BOX
CARDIOLOGY FOLLOW UP - Dr. Orozco  Date of Service: 9/27/21  CC: reprots intermittent back pain, no cp/sob     Review of Systems:  Constitutional: No fever, weight loss, or fatigue  Respiratory: No cough, wheezing, or hemoptysis, no shortness of breath  Cardiovascular: No chest pain, palpitations, passing out, dizziness, or leg swelling  Gastrointestinal: No abd or epigastric pain.  No nausea, vomiting, or hematemesis; no diarrhea or constipation, no melena or hematochezia  Vascular: no edema       PHYSICAL EXAM:  T(C): 36.9 (09-27-21 @ 08:19), Max: 37.2 (09-26-21 @ 12:12)  HR: 72 (09-27-21 @ 10:05) (72 - 88)  BP: 157/83 (09-27-21 @ 10:05) (121/80 - 157/83)  RR: 20 (09-27-21 @ 10:05) (18 - 20)  SpO2: 96% (09-27-21 @ 10:05) (96% - 99%)  Wt(kg): --  I&O's Summary    26 Sep 2021 07:01  -  27 Sep 2021 07:00  --------------------------------------------------------  IN: 1060 mL / OUT: 1100 mL / NET: -40 mL        Appearance: Normal	  Cardiovascular: Normal S1 S2,RRR, No JVD, No murmurs  Respiratory: Lungs clear to auscultation	  Gastrointestinal:  Soft, Non-tender, + BS	  Extremities: Normal range of motion, No clubbing, cyanosis or edema      Home Medications:  acetaminophen 325 mg oral tablet: 2 tab(s) orally every 6 hours, As needed, Mild Pain (1 - 3) (24 Sep 2020 11:16)  Artificial Tears ophthalmic solution: 1 drop(s) to each affected eye 4 times a day, As Needed (22 Aug 2021 23:28)  aspirin 81 mg oral delayed release tablet: 1 tab(s) orally once a day (27 Aug 2021 13:38)  calcium (as carbonate) 600 mg oral tablet: 1 cap(s) orally once a day (24 Sep 2020 11:16)  Eliquis 2.5 mg oral tablet: 1 tab(s) orally 2 times a day (24 Sep 2020 11:16)  felodipine 5 mg oral tablet, extended release: 1 tab(s) orally once a day (22 Aug 2021 23:23)  guaiFENesin 600 mg oral tablet, extended release: 1 tab(s) orally every 12 hours as needed for cough  (27 Aug 2021 13:38)  hydrocortisone 10 mg oral tablet: 1 tab(s) orally 2 times a day (24 Sep 2020 11:16)  latanoprost 0.005% ophthalmic solution: 1 drop(s) to each affected eye once a day (in the evening) (22 Aug 2021 23:27)  levothyroxine 50 mcg (0.05 mg) oral tablet: 1 tab(s) orally once a day (24 Sep 2020 11:16)  Lipitor 10 mg oral tablet: 1 tab(s) orally once a day (22 Aug 2021 23:41)  Macular Vitamin Benefit oral tablet: 1 tab(s) orally once a day (24 Sep 2020 11:16)  metoprolol succinate 50 mg oral tablet, extended release: 1 tab(s) orally once a day (22 Aug 2021 23:24)  Multiple Vitamins oral tablet: 1 tab(s) orally once a day (22 Aug 2021 23:22)  olmesartan 20 mg oral tablet: 1 tab(s) orally once a day (22 Aug 2021 23:22)  omeprazole 20 mg oral delayed release capsule: 1 cap(s) orally once a day (22 Aug 2021 23:21)  oxyCODONE 5 mg oral tablet: 1 tab(s) orally every 8 hours as needed for severe pain (27 Aug 2021 13:35)  OxyCONTIN 20 mg oral tablet, extended release: 1 tab(s) orally every 12 hours (24 Sep 2020 11:16)  risedronate 35 mg oral tablet: 1 tab(s) orally once a week (Mondays) (05 Sep 2021 18:30)  senna oral tablet: 2 tab(s) orally once a day (at bedtime), As Needed (05 Sep 2021 18:31)      MEDICATIONS  (STANDING):  atorvastatin 10 milliGRAM(s) Oral at bedtime  BACItracin   Ointment 1 Application(s) Topical two times a day  chlorhexidine 4% Liquid 1 Application(s) Topical <User Schedule>  clotrimazole 1% Cream 1 Application(s) Topical two times a day  enoxaparin Injectable 30 milliGRAM(s) SubCutaneous <User Schedule>  hydrocortisone 10 milliGRAM(s) Oral <User Schedule>  influenza   Vaccine 0.5 milliLiter(s) IntraMuscular once  lactobacillus acidophilus 1 Tablet(s) Oral two times a day  latanoprost 0.005% Ophthalmic Solution 1 Drop(s) Both EYES at bedtime  levothyroxine 50 MICROGram(s) Oral daily  lidocaine   4% Patch 1 Patch Transdermal daily  losartan 50 milliGRAM(s) Oral daily  metoprolol tartrate 50 milliGRAM(s) Oral two times a day  nystatin Powder 1 Application(s) Topical two times a day  pantoprazole  Injectable 40 milliGRAM(s) IV Push daily  sodium chloride 0.9%. 1000 milliLiter(s) (75 mL/Hr) IV Continuous <Continuous>  sodium chloride 3%  Inhalation 3 milliLiter(s) Inhalation every 8 hours      TELEMETRY: 	    ECG:  	  RADIOLOGY:   DIAGNOSTIC TESTING:  [ ] Echocardiogram:  [ ]  Catheterization:  [ ] Stress Test:    OTHER: 	    LABS:	 	                            8.8    8.60  )-----------( 202      ( 27 Sep 2021 05:51 )             27.1     09-27    139  |  107  |  9   ----------------------------<  83  3.6   |  23  |  0.88    Ca    8.2<L>      27 Sep 2021 05:51

## 2021-09-27 NOTE — PROGRESS NOTE ADULT - SUBJECTIVE AND OBJECTIVE BOX
[facility] NEPHROLOGY PRACTICE   MD KILLIAN JAIME MD RUORU WONG, PA    TEL:  OFFICE: [phone]  DR CHARLES CELL: [phone]  BEENA PISANO CELL: [phone]  DR. LOZANO CELL: [phone]      FROM 5 PM - 7 AM PLEASE CALL ANSWERING SERVICE: [phone]    RENAL FOLLOW UP NOTE--Date of Service 09-27-21 @ 08:50  --------------------------------------------------------------------------------  HPI:      Pt seen and examined at bedside.       PAST HISTORY  --------------------------------------------------------------------------------  No significant changes to PMH, PSH, FHx, SHx, unless otherwise noted    ALLERGIES & MEDICATIONS  --------------------------------------------------------------------------------  Allergies    penicillin (Rash)    Intolerances      Standing Inpatient Medications  atorvastatin 10 milliGRAM(s) Oral at bedtime  BACItracin   Ointment 1 Application(s) Topical two times a day  chlorhexidine 4% Liquid 1 Application(s) Topical <User Schedule>  clotrimazole 1% Cream 1 Application(s) Topical two times a day  enoxaparin Injectable 30 milliGRAM(s) SubCutaneous <User Schedule>  hydrocortisone 10 milliGRAM(s) Oral <User Schedule>  influenza   Vaccine 0.5 milliLiter(s) IntraMuscular once  lactobacillus acidophilus 1 Tablet(s) Oral two times a day  latanoprost 0.005% Ophthalmic Solution 1 Drop(s) Both EYES at bedtime  levothyroxine 50 MICROGram(s) Oral daily  lidocaine   4% Patch 1 Patch Transdermal daily  losartan 50 milliGRAM(s) Oral daily  metoprolol tartrate 50 milliGRAM(s) Oral two times a day  nystatin Powder 1 Application(s) Topical two times a day  pantoprazole  Injectable 40 milliGRAM(s) IV Push daily  sodium chloride 0.9%. 1000 milliLiter(s) IV Continuous <Continuous>  sodium chloride 3%  Inhalation 3 milliLiter(s) Inhalation every 8 hours    PRN Inpatient Medications  artificial  tears Solution 1 Drop(s) Both EYES every 6 hours PRN  cyclobenzaprine 5 milliGRAM(s) Oral three times a day PRN  oxyCODONE    IR 5 milliGRAM(s) Oral every 6 hours PRN  oxyCODONE    IR 10 milliGRAM(s) Oral every 6 hours PRN  simethicone 80 milliGRAM(s) Chew three times a day PRN  traMADol 50 milliGRAM(s) Oral every 4 hours PRN      REVIEW OF SYSTEMS  --------------------------------------------------------------------------------  General: no fever  MSK: no edema     VITALS/PHYSICAL EXAM  --------------------------------------------------------------------------------  T(C): 36.9 (09-27-21 @ 08:19), Max: 37.2 (09-26-21 @ 12:12)  HR: 73 (09-27-21 @ 08:19) (73 - 88)  BP: 144/80 (09-27-21 @ 08:19) (121/80 - 155/80)  RR: 20 (09-27-21 @ 08:19) (18 - 20)  SpO2: 98% (09-27-21 @ 08:19) (96% - 99%)  Wt(kg): --        09-26-21 @ 07:01  -  09-27-21 @ 07:00  --------------------------------------------------------  IN: 1060 mL / OUT: 1100 mL / NET: -40 mL      Physical Exam:  	Gen: NAD  	HEENT: MMM  	Pulm: CTA B/L  	CV: S1S2  	Abd: Soft, +BS  	Ext: No LE edema B/L                      Neuro: Awake   	Skin: Warm and Dry   	Vascular access: NO HD catheter             no aziza  LABS/STUDIES  --------------------------------------------------------------------------------              8.8    8.60  >-----------<  202      [09-27-21 @ 05:51]              27.1     139  |  107  |  9   ----------------------------<  83      [09-27-21 @ 05:51]  3.6   |  23  |  0.88        Ca     8.2     [09-27-21 @ 05:51]            Creatinine Trend:  SCr 0.88 [09-27 @ 05:51]  SCr 1.05 [09-25 @ 07:06]  SCr 1.09 [09-23 @ 04:58]  SCr 1.08 [09-22 @ 05:41]  SCr 1.13 [09-20 @ 06:41]    Urinalysis - [09-19-21 @ 09:33]      Color Light Yellow / Appearance Slightly Turbid / SG 1.011 / pH 8.0      Gluc 100 mg/dL / Ketone Negative  / Bili Negative / Urobili Negative       Blood Large / Protein 30 mg/dL / Leuk Est Large / Nitrite Negative       /  / Hyaline 2 / Gran  / Sq Epi  / Non Sq Epi 0 / Bacteria Moderate      Iron 44, TIBC 224, %sat 19      [09-14-21 @ 10:27]  Ferritin 444      [09-14-21 @ 11:37]  Vitamin D (25OH) 67.9      [09-08-21 @ 09:59]  HbA1c 5.9      [02-22-17 @ 03:10]  TSH 0.18      [09-08-21 @ 09:59]

## 2021-09-27 NOTE — CHART NOTE - NSCHARTNOTEFT_GEN_A_CORE
Left upper extremity picc line removed with no difficulties.  Pressure held at picc exit site after removal.  Dressing placed.  Patient tolerated well with no immediate complications.

## 2021-09-27 NOTE — PROGRESS NOTE ADULT - SUBJECTIVE AND OBJECTIVE BOX
Patient is a 76y old  Female who presented with a chief complaint of S/p fall (27 Sep 2021 11:46)      INTERVAL HPI/OVERNIGHT EVENTS:  no further diarrhea  tolerating PO diet  1BM today  no nausea or vomiting, no abdominal pain      MEDICATIONS  (STANDING):  atorvastatin 10 milliGRAM(s) Oral at bedtime  BACItracin   Ointment 1 Application(s) Topical two times a day  chlorhexidine 4% Liquid 1 Application(s) Topical <User Schedule>  clotrimazole 1% Cream 1 Application(s) Topical two times a day  enoxaparin Injectable 30 milliGRAM(s) SubCutaneous <User Schedule>  hydrocortisone 10 milliGRAM(s) Oral <User Schedule>  influenza   Vaccine 0.5 milliLiter(s) IntraMuscular once  lactobacillus acidophilus 1 Tablet(s) Oral two times a day  latanoprost 0.005% Ophthalmic Solution 1 Drop(s) Both EYES at bedtime  levothyroxine 50 MICROGram(s) Oral daily  lidocaine   4% Patch 1 Patch Transdermal daily  losartan 50 milliGRAM(s) Oral daily  metoprolol tartrate 50 milliGRAM(s) Oral two times a day  nystatin Powder 1 Application(s) Topical two times a day  pantoprazole  Injectable 40 milliGRAM(s) IV Push daily  sodium chloride 0.9%. 1000 milliLiter(s) (75 mL/Hr) IV Continuous <Continuous>  sodium chloride 3%  Inhalation 3 milliLiter(s) Inhalation every 8 hours    MEDICATIONS  (PRN):  artificial  tears Solution 1 Drop(s) Both EYES every 6 hours PRN Dry Eyes  cyclobenzaprine 5 milliGRAM(s) Oral three times a day PRN Muscle Spasm  oxyCODONE    IR 5 milliGRAM(s) Oral every 6 hours PRN Moderate Pain (4 - 6)  oxyCODONE    IR 10 milliGRAM(s) Oral every 6 hours PRN Severe Pain (7 - 10)  simethicone 80 milliGRAM(s) Chew three times a day PRN gas/diarrhea  traMADol 50 milliGRAM(s) Oral every 4 hours PRN breakthrough pain      Allergies  penicillin (Rash)      Review of Systems:  General:  No wt loss, fevers, chills, night sweats  CV:  No pain, see HPI  Resp:  no cough or SOB s/p recent intubation  GI:  see HPI  :  No pain, bleeding, incontinence, nocturia +CKD   Muscle:  see HPI  Neuro:  see HPI  Psych:  No fatigue, insomnia, mood problems, depression  Endocrine:  No polyuria, polydypsia, +adrenal insufficiency  Heme:  No petechiae, ecchymosis, easy bruisability  Skin:  No tattoos, scars, edema +dermatitis      Vital Signs Last 24 Hrs  T(C): 36.8 (27 Sep 2021 12:05), Max: 37.1 (26 Sep 2021 16:23)  T(F): 98.2 (27 Sep 2021 12:05), Max: 98.7 (26 Sep 2021 16:23)  HR: 82 (27 Sep 2021 12:05) (72 - 82)  BP: 147/82 (27 Sep 2021 12:05) (121/80 - 157/83)  BP(mean): --  RR: 20 (27 Sep 2021 12:05) (18 - 20)  SpO2: 97% (27 Sep 2021 12:05) (96% - 99%)    PHYSICAL EXAM:  Constitutional: elderly AA female sl anxious appearing, alert and responsive    Neck: No LAD, no JVD  Respiratory: good air entry b/l no accessory muscle use   Cardiovascular: S1 and S2, RRR  Gastrointestinal: BS+, obese WH surgical scars (laparoscopic) soft, NT/ND, neg HSM, +lower abdomen pannus   Extremities: No peripheral edema, neg clubbing, cyanosis  Vascular: 2+ peripheral pulses  Neurological: A/O x 3, no focal asymmetry  Psychiatric: normal affect  Skin: No rashes  +dermatitis in skin folds at groin under pannus        LABS:                        8.8    8.60  )-----------( 202      ( 27 Sep 2021 05:51 )             27.1     09-27    139  |  107  |  9   ----------------------------<  83  3.6   |  23  |  0.88    Ca    8.2<L>      27 Sep 2021 05:51              RADIOLOGY & ADDITIONAL TESTS:

## 2021-09-27 NOTE — PROGRESS NOTE ADULT - PROVIDER SPECIALTY LIST ADULT
Cardiology
Heme/Onc
Heme/Onc
Internal Medicine
NSICU
NSICU
Nephrology
Nephrology
Neurosurgery
Orthopedics
Cardiology
Heme/Onc
Internal Medicine
NSICU
NSICU
Nephrology
Nephrology
Neurosurgery
Trauma Surgery
Trauma Surgery
Cardiology
Endocrinology
Gastroenterology
Heme/Onc
Heme/Onc
Internal Medicine
Nephrology
Neurosurgery
Plastic Surgery
Surgery
Trauma Surgery
Cardiology
Gastroenterology
Heme/Onc
Heme/Onc
Internal Medicine
NSICU
NSICU
Nephrology
Neurosurgery
Endocrinology
Internal Medicine
Internal Medicine
Neurosurgery
Endocrinology
Neurosurgery
Orthopedics
ENT
Neurosurgery

## 2021-09-27 NOTE — PROVIDER CONTACT NOTE (OTHER) - ASSESSMENT
Patient with productive coughing fit in bed, anterior lung sounds clear, D 5 1/2 NS with 20meq KCl infusing @ 50cc/hr,  pt. requesting to sit up-- orders for patient to stay flat, 02 increased to 3L to keep 02 >95%, pt. requesting cough medication
aox4, 36.8, HR 73, 78/42 auscultated w/ stethoscope, RR 18 3L NC 95%. Pt has no c/o dizziness, SOB, diaphoresis, nausea. Pt has c/o pain 7/10
in no distress.
PT resting in bed, on 3 L NC. other VSS HR in 120's. Coreg given on prior shift as ordered in AM.
Patient blood pressure 99/65 and patient retaining urine. Patient remains neurologically stable
Pt neurologically stable, VSS
pt. c/o new dysuria. bladder is non-distended. pt. voiding clear yellow urine. no odor present. perineum cleaned by RN.
Pt c/o nausea.

## 2021-09-27 NOTE — PROGRESS NOTE ADULT - ASSESSMENT
Patient is a 75yo F with a history of colon cancer s/p right hemicolectomy (approx 2 years ago, outside hospital), DVT (on Eliquis), CKD, adrenal insufficiency presenting after mechanical fall from sitting. CT head/cervical spine/chest were performed which showed a right forehead hematoma, acute minimally displaced right coracoid process scapular fracture, T11/L1 compression fractures and an inferior sternal fracture. 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 Treviño  FELICIANO likely sec to hemodynamic change  Renal function stable  restarted on Losartan 9/17  Monitor I/O  Monitor renal function at present    Acidosis:  non-AG+AG  Hold oral sodium bicarb    HTN:  BP controlled   On LOsartan   Monitor at present    Hyponatremia/Hypernatremia  Improved   Monitor serum Na

## 2021-09-27 NOTE — PROGRESS NOTE ADULT - SUBJECTIVE AND OBJECTIVE BOX
HPI:  Patient is a 76 year old female that had fall from sitting on her commode.  Was brought to [facility] and then transferred to [facility] which showed right coracoid process scapular fracture and T11/L1 compression fractures as well as inferior sternal fracture.  Admitted to the neurosurgery service for management of T11/[id number] fractures.    OVERNIGHT EVENTS:  No acute events overnight.  Having incisional pain which is controlled on current regimen.  Tolerating diet.  Has been working with physical therapy.    Vital Signs Last 24 Hrs  T(C): 36.8 (27 Sep 2021 12:05), Max: 37.1 (26 Sep 2021 16:23)  T(F): 98.2 (27 Sep 2021 12:05), Max: 98.7 (26 Sep 2021 16:23)  HR: 82 (27 Sep 2021 12:05) (72 - 82)  BP: 147/82 (27 Sep 2021 12:05) (121/80 - 157/83)  BP(mean): --  RR: 20 (27 Sep 2021 12:05) (18 - 20)  SpO2: 97% (27 Sep 2021 12:05) (96% - 99%)    I&O's Detail    26 Sep 2021 07:01  -  27 Sep 2021 07:00  --------------------------------------------------------  IN:    Oral Fluid: 460 mL    sodium chloride 0.9%: 600 mL  Total IN: 1060 mL    OUT:    Intermittent Catheterization - Urethral (mL): 1100 mL  Total OUT: 1100 mL    Total NET: -40 mL      27 Sep 2021 07:01  -  27 Sep 2021 12:40  --------------------------------------------------------  IN:    Oral Fluid: 120 mL    sodium chloride 0.9%: 450 mL  Total IN: 570 mL    OUT:  Total OUT: 0 mL    Total NET: 570 mL        I&O's Summary    26 Sep 2021 07:01  -  27 Sep 2021 07:00  --------------------------------------------------------  IN: 1060 mL / OUT: 1100 mL / NET: -40 mL    27 Sep 2021 07:01  -  27 Sep 2021 12:40  --------------------------------------------------------  IN: 570 mL / OUT: 0 mL / NET: 570 mL        PHYSICAL EXAM:  Neurological: awake, alert, oriented x3, follows commands, speech clear and fluent  RUE: 5/5 throughout  LUE: 5/5 throughout  RLE: 2/5 hf, 4-/5 kf/ke, 4-/5 df/pf  LLE: 2/5 hf, 4-/5 kf/ke, 4-/5 df/pf  sensation present, intact, equal throughout    Cardiovascular: +s1, s2  Respiratory: clear to auscultation b/l  Gastrointestinal: soft, non-distended, non-tender  Genitourinary: +voiding  Incision/Wound: posterior back vertical incision dressing c/d/i w/ aquacel    TUBES/LINES:  [x] none    DIET:  [x] pureed, probiotic yogurt/smoothie 1 can bid      LABS:                        8.8    8.60  )-----------( 202      ( 27 Sep 2021 05:51 )             27.1     09-27    139  |  107  |  9   ----------------------------<  83  3.6   |  23  |  0.88    Ca    8.2<L>      27 Sep 2021 05:51            Allergies    penicillin (Rash)          MEDICATIONS:  Antibiotics:  none    Neuro:  cyclobenzaprine 5 milliGRAM(s) Oral three times a day PRN  oxyCODONE    IR 5 milliGRAM(s) Oral every 6 hours PRN  oxyCODONE    IR 10 milliGRAM(s) Oral every 6 hours PRN  traMADol 50 milliGRAM(s) Oral every 4 hours PRN    Anticoagulation:  enoxaparin Injectable 30 milliGRAM(s) SubCutaneous <User Schedule>    OTHER:  artificial  tears Solution 1 Drop(s) Both EYES every 6 hours PRN  atorvastatin 10 milliGRAM(s) Oral at bedtime  BACItracin   Ointment 1 Application(s) Topical two times a day  chlorhexidine 4% Liquid 1 Application(s) Topical <User Schedule>  clotrimazole 1% Cream 1 Application(s) Topical two times a day  hydrocortisone 10 milliGRAM(s) Oral <User Schedule>  influenza   Vaccine 0.5 milliLiter(s) IntraMuscular once  lactobacillus acidophilus 1 Tablet(s) Oral two times a day  latanoprost 0.005% Ophthalmic Solution 1 Drop(s) Both EYES at bedtime  levothyroxine 50 MICROGram(s) Oral daily  lidocaine   4% Patch 1 Patch Transdermal daily  losartan 50 milliGRAM(s) Oral daily  metoprolol tartrate 50 milliGRAM(s) Oral two times a day  nystatin Powder 1 Application(s) Topical two times a day  pantoprazole  Injectable 40 milliGRAM(s) IV Push daily  simethicone 80 milliGRAM(s) Chew three times a day PRN  sodium chloride 3%  Inhalation 3 milliLiter(s) Inhalation every 8 hours    IVF:  sodium chloride 0.9%. 1000 milliLiter(s) IV Continuous <Continuous>    CULTURES:  Culture Results:   >100,000 CFU/ml Pseudomonas aeruginosa (09-19 @ 14:15)    RADIOLOGY & ADDITIONAL TESTS:  no new imaging     HPI:  Patient is a 76 year old female that had fall from sitting on her commode.  Was brought to [facility] and then transferred to [facility] which showed right coracoid process scapular fracture and T11/L1 compression fractures as well as inferior sternal fracture.  Admitted to the neurosurgery service for management of T11/[id number] fractures.    OVERNIGHT EVENTS:  No acute events overnight.  Having incisional pain which is controlled on current regimen.  Tolerating diet.  Has been working with physical therapy.    Vital Signs Last 24 Hrs  T(C): 36.8 (27 Sep 2021 12:05), Max: 37.1 (26 Sep 2021 16:23)  T(F): 98.2 (27 Sep 2021 12:05), Max: 98.7 (26 Sep 2021 16:23)  HR: 82 (27 Sep 2021 12:05) (72 - 82)  BP: 147/82 (27 Sep 2021 12:05) (121/80 - 157/83)  BP(mean): --  RR: 20 (27 Sep 2021 12:05) (18 - 20)  SpO2: 97% (27 Sep 2021 12:05) (96% - 99%)    I&O's Detail    26 Sep 2021 07:01  -  27 Sep 2021 07:00  --------------------------------------------------------  IN:    Oral Fluid: 460 mL    sodium chloride 0.9%: 600 mL  Total IN: 1060 mL    OUT:    Intermittent Catheterization - Urethral (mL): 1100 mL  Total OUT: 1100 mL    Total NET: -40 mL      27 Sep 2021 07:01  -  27 Sep 2021 12:40  --------------------------------------------------------  IN:    Oral Fluid: 120 mL    sodium chloride 0.9%: 450 mL  Total IN: 570 mL    OUT:  Total OUT: 0 mL    Total NET: 570 mL        I&O's Summary    26 Sep 2021 07:01  -  27 Sep 2021 07:00  --------------------------------------------------------  IN: 1060 mL / OUT: 1100 mL / NET: -40 mL    27 Sep 2021 07:01  -  27 Sep 2021 12:40  --------------------------------------------------------  IN: 570 mL / OUT: 0 mL / NET: 570 mL        PHYSICAL EXAM:  Neurological: awake, alert, oriented x3, follows commands, speech clear and fluent  RUE: 5/5 throughout  LUE: 5/5 throughout  RLE: 2/5 hf, 4-/5 kf/ke, 4-/5 df/pf  LLE: 2/5 hf, 4-/5 kf/ke, 4-/5 df/pf  sensation present, intact, equal throughout    Cardiovascular: +s1, s2  Respiratory: clear to auscultation b/l  Gastrointestinal: soft, non-distended, non-tender  Genitourinary: +voiding/intermittent straight cath  Incision/Wound: posterior back vertical incision dressing c/d/i w/ aquacel    TUBES/LINES:  [x] none    DIET:  [x] pureed, probiotic yogurt/smoothie 1 can bid      LABS:                        8.8    8.60  )-----------( 202      ( 27 Sep 2021 05:51 )             27.1     09-27    139  |  107  |  9   ----------------------------<  83  3.6   |  23  |  0.88    Ca    8.2<L>      27 Sep 2021 05:51            Allergies    penicillin (Rash)          MEDICATIONS:  Antibiotics:  none    Neuro:  cyclobenzaprine 5 milliGRAM(s) Oral three times a day PRN  oxyCODONE    IR 5 milliGRAM(s) Oral every 6 hours PRN  oxyCODONE    IR 10 milliGRAM(s) Oral every 6 hours PRN  traMADol 50 milliGRAM(s) Oral every 4 hours PRN    Anticoagulation:  enoxaparin Injectable 30 milliGRAM(s) SubCutaneous <User Schedule>    OTHER:  artificial  tears Solution 1 Drop(s) Both EYES every 6 hours PRN  atorvastatin 10 milliGRAM(s) Oral at bedtime  BACItracin   Ointment 1 Application(s) Topical two times a day  chlorhexidine 4% Liquid 1 Application(s) Topical <User Schedule>  clotrimazole 1% Cream 1 Application(s) Topical two times a day  hydrocortisone 10 milliGRAM(s) Oral <User Schedule>  influenza   Vaccine 0.5 milliLiter(s) IntraMuscular once  lactobacillus acidophilus 1 Tablet(s) Oral two times a day  latanoprost 0.005% Ophthalmic Solution 1 Drop(s) Both EYES at bedtime  levothyroxine 50 MICROGram(s) Oral daily  lidocaine   4% Patch 1 Patch Transdermal daily  losartan 50 milliGRAM(s) Oral daily  metoprolol tartrate 50 milliGRAM(s) Oral two times a day  nystatin Powder 1 Application(s) Topical two times a day  pantoprazole  Injectable 40 milliGRAM(s) IV Push daily  simethicone 80 milliGRAM(s) Chew three times a day PRN  sodium chloride 3%  Inhalation 3 milliLiter(s) Inhalation every 8 hours    IVF:  sodium chloride 0.9%. 1000 milliLiter(s) IV Continuous <Continuous>    CULTURES:  Culture Results:   >100,000 CFU/ml Pseudomonas aeruginosa (09-19 @ 14:15)    RADIOLOGY & ADDITIONAL TESTS:  no new imaging

## 2021-09-27 NOTE — PROGRESS NOTE ADULT - TIME-BASED BILLING (NON-CRITICAL CARE)
Time-based billing (NON-critical care)

## 2021-09-27 NOTE — DISCHARGE NOTE NURSING/CASE MANAGEMENT/SOCIAL WORK - PATIENT PORTAL LINK FT
You can access the FollowMyHealth Patient Portal offered by [facility] by registering at the following website: [email or web address]. By joining [facility]’s FollowMyHealth portal, you will also be able to view your health information using other applications (apps) compatible with our system.

## 2021-09-27 NOTE — PROGRESS NOTE ADULT - REASON FOR ADMISSION
S/p fall
9/14/21 s/p S/p T10-pelvis with revision of previous L2-5 hardware, L1 expanded PSO, with plastics closure for failed back syndrome, L1 chance fracture, and coronal plane deformity.  s/p fall and found to have right scapula and sternal fractures
S/p fall

## 2021-10-05 ENCOUNTER — APPOINTMENT (OUTPATIENT)
Dept: PLASTIC SURGERY | Facility: CLINIC | Age: 76
End: 2021-10-05
Payer: MEDICARE

## 2021-10-05 DIAGNOSIS — Z09 ENCOUNTER FOR FOLLOW-UP EXAMINATION AFTER COMPLETED TREATMENT FOR CONDITIONS OTHER THAN MALIGNANT NEOPLASM: ICD-10-CM

## 2021-10-05 PROCEDURE — 99024 POSTOP FOLLOW-UP VISIT: CPT

## 2021-10-19 PROBLEM — Z09 POSTOP CHECK: Status: ACTIVE | Noted: 2021-10-19

## 2021-10-19 NOTE — HISTORY OF PRESENT ILLNESS
[FreeTextEntry1] : 76-year-old female status post hardware instrumentation with back reconstruction using paraspinal muscle.  Patient presents today for follow-up visit

## 2021-11-19 ENCOUNTER — NON-APPOINTMENT (OUTPATIENT)
Age: 76
End: 2021-11-19

## 2021-11-20 ENCOUNTER — OUTPATIENT (OUTPATIENT)
Dept: OUTPATIENT SERVICES | Facility: HOSPITAL | Age: 76
LOS: 1 days | Discharge: HOME | End: 2021-11-20
Payer: MEDICAID

## 2021-11-20 DIAGNOSIS — Z90.49 ACQUIRED ABSENCE OF OTHER SPECIFIED PARTS OF DIGESTIVE TRACT: Chronic | ICD-10-CM

## 2021-11-20 DIAGNOSIS — R93.89 ABNORMAL FINDINGS ON DIAGNOSTIC IMAGING OF OTHER SPECIFIED BODY STRUCTURES: ICD-10-CM

## 2021-11-20 PROCEDURE — 76770 US EXAM ABDO BACK WALL COMP: CPT | Mod: 26

## 2021-12-01 PROCEDURE — G9005: CPT

## 2021-12-03 DIAGNOSIS — Z78.9 OTHER SPECIFIED HEALTH STATUS: ICD-10-CM

## 2021-12-06 ENCOUNTER — APPOINTMENT (OUTPATIENT)
Dept: ORTHOPEDIC SURGERY | Facility: CLINIC | Age: 76
End: 2021-12-06
Payer: MEDICARE

## 2021-12-06 VITALS
SYSTOLIC BLOOD PRESSURE: 148 MMHG | HEIGHT: 62 IN | DIASTOLIC BLOOD PRESSURE: 67 MMHG | BODY MASS INDEX: 33.13 KG/M2 | WEIGHT: 180 LBS | HEART RATE: 61 BPM

## 2021-12-06 DIAGNOSIS — S42.024D NONDISPLACED FRACTURE OF SHAFT OF RIGHT CLAVICLE, SUBSEQUENT ENCOUNTER FOR FRACTURE WITH ROUTINE HEALING: ICD-10-CM

## 2021-12-06 PROCEDURE — 73000 X-RAY EXAM OF COLLAR BONE: CPT | Mod: RT

## 2021-12-06 PROCEDURE — 99213 OFFICE O/P EST LOW 20 MIN: CPT

## 2021-12-09 ENCOUNTER — APPOINTMENT (OUTPATIENT)
Dept: UROLOGY | Facility: CLINIC | Age: 76
End: 2021-12-09
Payer: MEDICARE

## 2021-12-09 VITALS
HEART RATE: 72 BPM | BODY MASS INDEX: 33.13 KG/M2 | RESPIRATION RATE: 17 BRPM | TEMPERATURE: 99 F | WEIGHT: 180 LBS | SYSTOLIC BLOOD PRESSURE: 144 MMHG | DIASTOLIC BLOOD PRESSURE: 79 MMHG | HEIGHT: 62 IN

## 2021-12-09 DIAGNOSIS — N26.1 ATROPHY OF KIDNEY (TERMINAL): ICD-10-CM

## 2021-12-09 PROBLEM — S42.024D CLOSED NONDISPLACED FRACTURE OF SHAFT OF RIGHT CLAVICLE WITH ROUTINE HEALING, SUBSEQUENT ENCOUNTER: Status: ACTIVE | Noted: 2021-12-06

## 2021-12-09 PROCEDURE — 99204 OFFICE O/P NEW MOD 45 MIN: CPT

## 2021-12-09 RX ORDER — GABAPENTIN 100 MG/1
100 CAPSULE ORAL
Refills: 0 | Status: ACTIVE | COMMUNITY

## 2021-12-09 RX ORDER — ALENDRONATE SODIUM 70 MG/1
70 TABLET ORAL
Refills: 0 | Status: ACTIVE | COMMUNITY

## 2021-12-09 RX ORDER — ATORVASTATIN CALCIUM 10 MG/1
10 TABLET, FILM COATED ORAL
Qty: 90 | Refills: 0 | Status: ACTIVE | COMMUNITY
Start: 2021-08-20

## 2021-12-09 RX ORDER — FELODIPINE 5 MG/1
5 TABLET, EXTENDED RELEASE ORAL
Qty: 90 | Refills: 0 | Status: COMPLETED | COMMUNITY
Start: 2021-02-22

## 2021-12-09 RX ORDER — NYSTATIN 100000 [USP'U]/G
100000 POWDER TOPICAL
Qty: 60 | Refills: 0 | Status: ACTIVE | COMMUNITY
Start: 2021-04-19

## 2021-12-09 RX ORDER — LATANOPROST/PF 0.005 %
0.01 DROPS OPHTHALMIC (EYE)
Qty: 2 | Refills: 0 | Status: ACTIVE | COMMUNITY
Start: 2021-11-23

## 2021-12-09 RX ORDER — HYDROCORTISONE 25 MG/G
2.5 CREAM TOPICAL
Qty: 28 | Refills: 0 | Status: ACTIVE | COMMUNITY
Start: 2021-12-01

## 2021-12-09 RX ORDER — CALCIUM CARBONATE/VITAMIN D3 600 MG-20
TABLET ORAL
Refills: 0 | Status: ACTIVE | COMMUNITY

## 2021-12-09 RX ORDER — METOPROLOL SUCCINATE 50 MG/1
50 TABLET, EXTENDED RELEASE ORAL
Qty: 90 | Refills: 0 | Status: COMPLETED | COMMUNITY
Start: 2021-04-19 | End: 2021-12-09

## 2021-12-09 RX ORDER — PANTOPRAZOLE 40 MG/1
40 TABLET, DELAYED RELEASE ORAL
Qty: 30 | Refills: 0 | Status: ACTIVE | COMMUNITY
Start: 2021-11-23

## 2021-12-09 RX ORDER — LOSARTAN POTASSIUM 50 MG/1
50 TABLET, FILM COATED ORAL
Qty: 30 | Refills: 0 | Status: ACTIVE | COMMUNITY
Start: 2021-11-23

## 2021-12-09 RX ORDER — METOPROLOL TARTRATE 50 MG/1
50 TABLET, FILM COATED ORAL
Qty: 60 | Refills: 0 | Status: ACTIVE | COMMUNITY
Start: 2021-11-23

## 2021-12-09 RX ORDER — SENNOSIDES 8.6 MG
8.6 TABLET ORAL
Refills: 0 | Status: ACTIVE | COMMUNITY

## 2021-12-09 RX ORDER — OLMESARTAN MEDOXOMIL 20 MG/1
20 TABLET, FILM COATED ORAL
Qty: 90 | Refills: 0 | Status: COMPLETED | COMMUNITY
Start: 2021-02-22

## 2021-12-09 RX ORDER — LEVOTHYROXINE SODIUM 0.05 MG/1
50 TABLET ORAL
Qty: 30 | Refills: 0 | Status: ACTIVE | COMMUNITY
Start: 2021-11-23

## 2021-12-09 RX ORDER — RISEDRONATE SODIUM 35 MG/1
35 TABLET, FILM COATED ORAL
Qty: 12 | Refills: 0 | Status: COMPLETED | COMMUNITY
Start: 2021-09-02

## 2021-12-09 RX ORDER — LIDOCAINE 5% 700 MG/1
5 PATCH TOPICAL
Refills: 0 | Status: ACTIVE | COMMUNITY

## 2021-12-09 RX ORDER — PSYLLIUM HUSK 0.4 G
0.52 CAPSULE ORAL
Refills: 0 | Status: ACTIVE | COMMUNITY

## 2021-12-09 RX ORDER — OXYCODONE AND ACETAMINOPHEN 5; 325 MG/1; MG/1
5-325 TABLET ORAL
Qty: 90 | Refills: 0 | Status: COMPLETED | COMMUNITY
Start: 2021-08-16

## 2021-12-09 NOTE — PHYSICAL EXAM
[General Appearance - Well Developed] : well developed [General Appearance - Well Nourished] : well nourished [Normal Appearance] : normal appearance [Well Groomed] : well groomed [General Appearance - In No Acute Distress] : no acute distress [Edema] : no peripheral edema [Respiration, Rhythm And Depth] : normal respiratory rhythm and effort [Exaggerated Use Of Accessory Muscles For Inspiration] : no accessory muscle use [Abdomen Soft] : soft [Abdomen Tenderness] : non-tender [Costovertebral Angle Tenderness] : no ~M costovertebral angle tenderness [] : no rash [Oriented To Time, Place, And Person] : oriented to person, place, and time [Affect] : the affect was normal [Mood] : the mood was normal [Not Anxious] : not anxious [FreeTextEntry1] : wheelchair bound

## 2021-12-09 NOTE — HISTORY OF PRESENT ILLNESS
[FreeTextEntry1] :  76  y.o woman , with longstanding hx of CKD, last creat in  0.88  range , with indwelling Obregon ,S/P SPINAL surgery 9/14/21  , RIGHT Clavicle fx ,  failed TOV X 2 , discharged from [facility] 11/24/21.Had Hx of urinary frequency prior to Obregon. Chronic constipation - managed with sennekot,Colace , and Metamucil .Daughter RN participates in her care- had done CIC in past . Meds reconciled. PSH - hemicolectomy 2019 - stage 2 A .Fluids : 24 OZ OF TEA,32  to 48  OZ of water ,16 oz of cranberry juice ,\par Obregon draining yellow urine to night bag.\par Renal US 11/2021 - bilateral atrophied kidneys, no mass, no stones no hydronephrosis.

## 2021-12-09 NOTE — REVIEW OF SYSTEMS
[Eyesight Problems] : eyesight problems [Dry Eyes] : dryness of the eyes [Constipation] : constipation [Date of last menstrual period ____] : date of last menstrual period: [unfilled] [Joint Pain] : joint pain [Difficulty Walking] : difficulty walking [Negative] : Heme/Lymph [Feeling Tired] : feeling tired [Limb Weakness] : limb weakness [see HPI] : see HPI [Muscle Weakness] : muscle weakness [Feelings Of Weakness] : feelings of weakness [Fever] : no fever [Chills] : no chills [Dysuria] : no dysuria [Incontinence] : no incontinence

## 2021-12-09 NOTE — ASSESSMENT
[FreeTextEntry1] :  76  y.o woman , with longstanding hx of CKD,with indwelling Obregon ,S/P SPINAL surgery 9/14/21  , RIGHT Clavicle fx ,  failed TOV X 2 , discharged from [facility] 11/24/21.Had Hx of urinary frequency prior to Obregon. Chronic constipation - managed with sennekot,Colace , and Metamucil .\par \par Trial of Flomax 034  mg po daily\par Bowel management- add Gummy fibers besides current regimen\par Fluids management - increase water intake to 2  liter gradually, cut down on tea intake.\par Chronic opioid induced constipation - consult with Pain specialist and GI .\par \par RTO for TOV 12/22/21  at 10 am -  to book same.  \par \par \par \par \par \par \par

## 2021-12-13 ENCOUNTER — APPOINTMENT (OUTPATIENT)
Dept: NEUROSURGERY | Facility: CLINIC | Age: 76
End: 2021-12-13
Payer: MEDICARE

## 2021-12-13 VITALS
BODY MASS INDEX: 33.13 KG/M2 | DIASTOLIC BLOOD PRESSURE: 68 MMHG | SYSTOLIC BLOOD PRESSURE: 113 MMHG | HEART RATE: 55 BPM | WEIGHT: 180 LBS | OXYGEN SATURATION: 99 % | HEIGHT: 62 IN

## 2021-12-13 DIAGNOSIS — S32.018K: ICD-10-CM

## 2021-12-13 PROCEDURE — 99024 POSTOP FOLLOW-UP VISIT: CPT

## 2021-12-13 NOTE — ED ADULT NURSE NOTE - PRIMARY CARE PROVIDER
Faxed notes and discharge papers over to [facility] for PT and wound care. Keely

## 2021-12-14 NOTE — ASSESSMENT
[FreeTextEntry1] : Ms. Joleen Solomon is a 76 Year old Woman S/P T10 to Pelvis Fusion\par She may Discontinue TLSO brace. She can resume Eliquis. She should continue in Home PT.\par She will follow up in March 2022 with Xray.\par \par Please see Dr. Moody's dictation for details\par I, Dr. Huong Moody evaluated the patient with the nurse practitioner Lucio Funez and established the plan of care. I personally discuss this patient with the nurse practitioner at the time of the visit. I agree with the assessment and plan as written, unless noted below.\par \par

## 2021-12-14 NOTE — REASON FOR VISIT
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 at L3L4.\par  Removal of prior L2L5 instrumentation.\par  Freehand placement of bilateral T10, T11, T12 and S1 pedicle screws.\par  Upsizing and lengthening of bilateral L2, L3 left sided L4 and bilateral L5 pedicle screws.\par  Freehand placement of leftsided S2 alar iliac screw.\par  Intraoperative CT scan.\par  CTguided stereotactic placement of right sided S3 alar iliac screw.\par  Bilateral vertebroplasties at T10 and T12.\par  Revision T12L1 laminectomies for decompression of spinal cord and bilateral skeletonization of the T12 nerve roots.\par  Intraoperative ultrasound to confirm decompression.\par  Three column extended pedicle subtraction osteotomy at L1 for deformity correction.\par  T12L1 diskectomy.\par  [de-identified] : 9/14/21 [de-identified] : Ms. 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  She comes with an indwelling Obregon catheter; Heather urine\par Oriented to person and responds appropriately.\par She wears a TLSO  [Family Member] : family member

## 2021-12-14 NOTE — PHYSICAL EXAM
[General Appearance - Alert] : alert [General Appearance - In No Acute Distress] : in no acute distress [Oriented To Time, Place, And Person] : oriented to person, place, and time [Impaired Insight] : insight and judgment were intact [Cranial Nerves Trigeminal (V)] : facial sensation intact symmetrically [Cranial Nerves Facial (VII)] : face symmetrical [Cranial Nerves Vestibulocochlear (VIII)] : hearing was intact bilaterally [] : no respiratory distress [Heart Rate And Rhythm] : heart rate was normal and rhythm regular [Abdomen Soft] : soft [FreeTextEntry1] : Wheelchair dependent; 2 person assist MS 4/5  [Skin Color & Pigmentation] : normal skin color and pigmentation

## 2021-12-22 ENCOUNTER — APPOINTMENT (OUTPATIENT)
Dept: UROLOGY | Facility: CLINIC | Age: 76
End: 2021-12-22
Payer: MEDICARE

## 2021-12-22 VITALS — DIASTOLIC BLOOD PRESSURE: 86 MMHG | SYSTOLIC BLOOD PRESSURE: 143 MMHG

## 2021-12-22 DIAGNOSIS — R33.8 OTHER RETENTION OF URINE: ICD-10-CM

## 2021-12-22 PROCEDURE — 99213 OFFICE O/P EST LOW 20 MIN: CPT

## 2021-12-30 PROBLEM — R33.8 ACUTE URINARY RETENTION: Status: ACTIVE | Noted: 2021-12-09

## 2022-01-14 ENCOUNTER — NON-APPOINTMENT (OUTPATIENT)
Age: 77
End: 2022-01-14

## 2022-01-14 ENCOUNTER — APPOINTMENT (OUTPATIENT)
Dept: UROLOGY | Facility: CLINIC | Age: 77
End: 2022-01-14
Payer: MEDICARE

## 2022-01-14 VITALS
BODY MASS INDEX: 32.2 KG/M2 | DIASTOLIC BLOOD PRESSURE: 77 MMHG | HEIGHT: 62 IN | HEART RATE: 71 BPM | SYSTOLIC BLOOD PRESSURE: 130 MMHG | WEIGHT: 175 LBS

## 2022-01-14 DIAGNOSIS — K59.09 OTHER CONSTIPATION: ICD-10-CM

## 2022-01-14 DIAGNOSIS — N39.498 OTHER SPECIFIED URINARY INCONTINENCE: ICD-10-CM

## 2022-01-14 DIAGNOSIS — R33.9 RETENTION OF URINE, UNSPECIFIED: ICD-10-CM

## 2022-01-14 PROCEDURE — 51798 US URINE CAPACITY MEASURE: CPT

## 2022-01-14 PROCEDURE — 99213 OFFICE O/P EST LOW 20 MIN: CPT

## 2022-01-14 RX ORDER — ENOXAPARIN SODIUM 100 MG/ML
30 INJECTION SUBCUTANEOUS
Qty: 30 | Refills: 0 | Status: COMPLETED | COMMUNITY
Start: 2021-12-03 | End: 2022-01-14

## 2022-01-14 RX ORDER — FUROSEMIDE 20 MG/1
20 TABLET ORAL
Refills: 0 | Status: ACTIVE | COMMUNITY

## 2022-01-14 RX ORDER — TAMSULOSIN HYDROCHLORIDE 0.4 MG/1
0.4 CAPSULE ORAL
Qty: 90 | Refills: 3 | Status: ACTIVE | COMMUNITY
Start: 2021-12-09 | End: 1900-01-01

## 2022-01-14 NOTE — PHYSICAL EXAM
[General Appearance - Well Developed] : well developed [General Appearance - Well Nourished] : well nourished [Normal Appearance] : normal appearance [Well Groomed] : well groomed [General Appearance - In No Acute Distress] : no acute distress [Abdomen Soft] : soft [Abdomen Tenderness] : non-tender [Costovertebral Angle Tenderness] : no ~M costovertebral angle tenderness [Urinary Bladder Findings] : the bladder was normal on palpation [Edema] : no peripheral edema [] : no respiratory distress [Respiration, Rhythm And Depth] : normal respiratory rhythm and effort [Exaggerated Use Of Accessory Muscles For Inspiration] : no accessory muscle use [Oriented To Time, Place, And Person] : oriented to person, place, and time [Affect] : the affect was normal [Mood] : the mood was normal [Not Anxious] : not anxious [FreeTextEntry1] : limited mobility, wheel chair bound

## 2022-01-14 NOTE — REVIEW OF SYSTEMS
[Feeling Tired] : feeling tired [Eyesight Problems] : eyesight problems [Dry Eyes] : dryness of the eyes [Constipation] : constipation [Date of last menstrual period ____] : date of last menstrual period: [unfilled] [Joint Pain] : joint pain [Limb Weakness] : limb weakness [Difficulty Walking] : difficulty walking [see HPI] : see HPI [Muscle Weakness] : muscle weakness [Feelings Of Weakness] : feelings of weakness [Negative] : Heme/Lymph [Fever] : no fever [Chills] : no chills [Dysuria] : no dysuria [Incontinence] : no incontinence

## 2022-01-14 NOTE — PHYSICAL EXAM
[General Appearance - Well Developed] : well developed [General Appearance - Well Nourished] : well nourished [Normal Appearance] : normal appearance [Well Groomed] : well groomed [General Appearance - In No Acute Distress] : no acute distress [Abdomen Soft] : soft [Abdomen Tenderness] : non-tender [Costovertebral Angle Tenderness] : no ~M costovertebral angle tenderness [Edema] : no peripheral edema [] : no respiratory distress [Respiration, Rhythm And Depth] : normal respiratory rhythm and effort [Exaggerated Use Of Accessory Muscles For Inspiration] : no accessory muscle use [Oriented To Time, Place, And Person] : oriented to person, place, and time [Affect] : the affect was normal [Mood] : the mood was normal [Not Anxious] : not anxious [FreeTextEntry1] : wheelchair bound

## 2022-01-14 NOTE — HISTORY OF PRESENT ILLNESS
[FreeTextEntry1] : 75yo female with cc of urinary retention. Pt underwent spinal surgery 9/14/22. Post op failed TOV X 2. Discharged from [facility] rehab 11/24/21. Had Hx of urinary frequency prior to Obregon. Chronic pain on long term narcotics with issues with constipation - managed with sennekot, Colace , and Metamucil . Daughter (RN) participates in her care- had done CIC in past after she developed retention during a prior surgery. Now with more mobility issues and harder to manage. Renal US 11/2021 - bilateral atrophied kidneys, no mass, no stones no hydronephrosis. \par \par Pt with multifactorial reasons for retention. Plan made for trial of Flomax with decreased narcotics as tolerated, increased ambulation/mobility as ok per ortho and improved bowel regimen. She returns today for follow-up. Now TLSO brace is off. Pt still with extensive PT and mobility only slowly improving. Bowels at baseline. Performed void trial today. \par \par

## 2022-01-14 NOTE — REVIEW OF SYSTEMS
[Constipation] : constipation [see HPI] : see HPI [Dysuria] : no dysuria [Incontinence] : incontinence [Negative] : Heme/Lymph

## 2022-01-14 NOTE — ASSESSMENT
[FreeTextEntry1] : Multifactorial reasons for urinary retention post spinal surgery. Daughter removed ervin this am. Pt initially had not voided but able to into pullup while in office with residual only 78cc. Catheter supplies given in the event pt unable to void again. \par \par Encourage use of bedside commode for volitional voids .\par --Cont Flomax \par \par Consider pure Wick bladder drainage system if covered by insurance. \par RTO in 6 months.\par \par \par \par

## 2022-01-14 NOTE — ASSESSMENT
[FreeTextEntry1] : Multifactorial reasons for urinary retention post spinal surgery. Daughter removed ervin this am. Pt initially had not voided but able to into pullup while in office with residual only 78cc. Catheter supplies given in the event pt unable to void again. \par --Cont flomax for now\par --RTC in 2-3 weeks with repeat PVR and sx check\par \par \par \par \par \par

## 2022-01-14 NOTE — HISTORY OF PRESENT ILLNESS
[FreeTextEntry1] : \par 75 yo female with cc of urinary retention. Pt underwent spinal surgery 9/14/22. Post op failed TOV X 2. Discharged from [facility] rehab 11/24/21. Had Hx of urinary frequency prior to Obregon. Chronic pain on long term narcotics with issues with constipation - managed with sennekot, Colace , and Metamucil . Daughter (RN) participates in her care- had done CIC in past after she developed retention during a prior surgery. Now with more mobility issues and harder to manage. Renal US 11/2021 - bilateral atrophied kidneys, no mass, no stones no hydronephrosis. \par \par Pt with multifactorial reasons for retention. Plan made for trial of Flomax with decreased narcotics as tolerated, increased ambulation/mobility as ok per ortho and improved bowel regimen. She returns today for follow-up. Now TLSO brace is off. Pt still with extensive PT and mobility only slowly improving. Bowels at baseline. Performed void trial today. \par \par \par \par \par \par \par Today with dtr Susie, pt doing better with PT . Voids into diaper X 2 changes during day and 5 at night.Improved constipation with Colace and sennekot and awaiting gummy fibers. PVR 79 ml \par  \par

## 2022-02-14 ENCOUNTER — APPOINTMENT (OUTPATIENT)
Dept: RADIOLOGY | Facility: IMAGING CENTER | Age: 77
End: 2022-02-14

## 2022-03-07 ENCOUNTER — APPOINTMENT (OUTPATIENT)
Dept: ORTHOPEDIC SURGERY | Facility: CLINIC | Age: 77
End: 2022-03-07

## 2022-03-10 ENCOUNTER — APPOINTMENT (OUTPATIENT)
Dept: RADIOLOGY | Facility: CLINIC | Age: 77
End: 2022-03-10
Payer: MEDICARE

## 2022-03-10 ENCOUNTER — OUTPATIENT (OUTPATIENT)
Dept: OUTPATIENT SERVICES | Facility: HOSPITAL | Age: 77
LOS: 1 days | End: 2022-03-10
Payer: MEDICARE

## 2022-03-10 DIAGNOSIS — Z90.49 ACQUIRED ABSENCE OF OTHER SPECIFIED PARTS OF DIGESTIVE TRACT: Chronic | ICD-10-CM

## 2022-03-10 DIAGNOSIS — S32.009A UNSPECIFIED FRACTURE OF UNSPECIFIED LUMBAR VERTEBRA, INITIAL ENCOUNTER FOR CLOSED FRACTURE: ICD-10-CM

## 2022-03-10 PROCEDURE — 77080 DXA BONE DENSITY AXIAL: CPT | Mod: 26

## 2022-03-10 PROCEDURE — 72082 X-RAY EXAM ENTIRE SPI 2/3 VW: CPT | Mod: 26

## 2022-03-10 PROCEDURE — 72082 X-RAY EXAM ENTIRE SPI 2/3 VW: CPT

## 2022-03-10 PROCEDURE — 77080 DXA BONE DENSITY AXIAL: CPT

## 2022-03-11 ENCOUNTER — APPOINTMENT (OUTPATIENT)
Dept: RADIOLOGY | Facility: CLINIC | Age: 77
End: 2022-03-11

## 2022-03-16 ENCOUNTER — APPOINTMENT (OUTPATIENT)
Dept: NEUROSURGERY | Facility: CLINIC | Age: 77
End: 2022-03-16
Payer: MEDICARE

## 2022-03-16 VITALS
HEART RATE: 83 BPM | HEIGHT: 62 IN | DIASTOLIC BLOOD PRESSURE: 71 MMHG | SYSTOLIC BLOOD PRESSURE: 137 MMHG | BODY MASS INDEX: 32.2 KG/M2 | WEIGHT: 175 LBS | OXYGEN SATURATION: 100 %

## 2022-03-16 PROCEDURE — 99212 OFFICE O/P EST SF 10 MIN: CPT

## 2022-03-16 RX ORDER — OXYCODONE HYDROCHLORIDE 15 MG/1
15 TABLET, FILM COATED, EXTENDED RELEASE ORAL
Refills: 0 | Status: ACTIVE | COMMUNITY

## 2022-03-16 RX ORDER — OXYCODONE HYDROCHLORIDE 20 MG/1
20 TABLET, FILM COATED, EXTENDED RELEASE ORAL
Qty: 60 | Refills: 0 | Status: DISCONTINUED | COMMUNITY
Start: 2021-12-01 | End: 2022-03-16

## 2022-03-16 NOTE — PHYSICAL EXAM
[General Appearance - Alert] : alert [General Appearance - In No Acute Distress] : in no acute distress [Oriented To Time, Place, And Person] : oriented to person, place, and time [] : no respiratory distress [Heart Rate And Rhythm] : heart rate was normal and rhythm regular

## 2022-03-18 NOTE — ASSESSMENT
[FreeTextEntry1] : Ms. Joleen Solomon is a 76 year old S/P Lumbar Spinal Fusion.\par She has made significant improvement. She will continue outpatient PT.\par She will continue to transition taper off narcotics.  She will follow up with endocrinologist for assessment and treatment of osteoporosis. She will follow up in 6 months.\par \par Please see Dr. Moody's dictation for details.\par I, Dr. Huong Moody evaluated the patient with the nurse practitioner Lucio Funez and established the plan of care. I personally discuss this patient with the nurse practitioner at the time of the visit. I agree with the assessment and plan as written, unless noted below.\par \par \par

## 2022-03-18 NOTE — REASON FOR VISIT
[Follow-Up: _____] : a [unfilled] follow-up visit [Family Member] : family member [FreeTextEntry1] : Her daughter reports that she is doing well. She comes vis wheelchair assist. She is ambulating with walker with assistance.\par She takes oxycontin and neurotin dose reduction.\par She has transition to PT\par Her Obregon was discontinued. She is continent of urine.\par She is close to her baseline status and plans to continue.

## 2022-03-18 NOTE — REVIEW OF SYSTEMS
[As Noted in HPI] : as noted in HPI [de-identified] : Wheelchair assist. She ambulates at home with walker and assistance

## 2022-05-16 NOTE — DISCHARGE NOTE NURSING/CASE MANAGEMENT/SOCIAL WORK - NSDCPETBCESMAN_GEN_ALL_CORE
If you are a smoker, it is important for your health to stop smoking. Please be aware that second hand smoke is also harmful.
4

## 2022-08-01 ENCOUNTER — APPOINTMENT (OUTPATIENT)
Dept: UROLOGY | Facility: CLINIC | Age: 77
End: 2022-08-01

## 2022-09-21 ENCOUNTER — APPOINTMENT (OUTPATIENT)
Dept: NEUROSURGERY | Facility: CLINIC | Age: 77
End: 2022-09-21

## 2022-09-21 VITALS
SYSTOLIC BLOOD PRESSURE: 147 MMHG | OXYGEN SATURATION: 94 % | HEIGHT: 62 IN | DIASTOLIC BLOOD PRESSURE: 82 MMHG | WEIGHT: 176 LBS | BODY MASS INDEX: 32.39 KG/M2 | HEART RATE: 60 BPM

## 2022-09-21 DIAGNOSIS — M48.061 SPINAL STENOSIS, LUMBAR REGION WITHOUT NEUROGENIC CLAUDICATION: ICD-10-CM

## 2022-09-21 PROCEDURE — 99212 OFFICE O/P EST SF 10 MIN: CPT

## 2022-09-26 NOTE — PHYSICAL EXAM
[de-identified] : Ambulate with 2 person assistance\par Wheelchair dependent [Deformity] : deformity [] : no respiratory distress [Heart Rate And Rhythm] : heart rate was normal and rhythm regular

## 2022-09-26 NOTE — ASSESSMENT
[FreeTextEntry1] : Ms. Joleen Solomon S/P Complex Thoracolumbar to pelvis fusion\par Doing well\par Xray shows intact hardware with intact construct and hardware\par Follow up in one year with xray\par \par Please see Dr. Moody's dictation for details.\par I, Dr. Huong Moody evaluated the patient with the nurse practitioner Lucio Funez and established the plan of care. I personally discuss this patient with the nurse practitioner at the time of the visit. I agree with the assessment and plan as written, unless noted below.\par \par

## 2022-09-26 NOTE — REASON FOR VISIT
[Follow-Up: _____] : a [unfilled] follow-up visit [FreeTextEntry1] : Overall she is doing well.\par  She remains wheelchair dependent but her daughter reports that she has improved significantly since surgery with improved quality of life.

## 2022-12-13 NOTE — H&P ADULT. - PROBLEM/PLAN-6
If provider orders tests at today's visit, patient would like to be contacted via Telephone.  If to contact patient by phone, patient's preferred phone # is [phone] (Cell) and it is OK to leave message on voice mail or with family member.  If medications are ordered at today's visit, the pharmacy name/location patient would like them to be sent to is   [id number] [address] [facility]  [address]  [address] [facility] [address]  Phone: [phone] Fax: [phone]         DISPLAY PLAN FREE TEXT

## 2022-12-28 NOTE — DISCHARGE NOTE PROVIDER - CARE PROVIDER_API CALL
Fall Risk Maldonado Plascencia  PLASTIC SURGERY  [address] [place] [address]  Phone: [phone]  Fax: [phone]  Follow Up Time: 1 week   Farhat Moody)  Neurosurgery  [address], [address]  [place] [address]  Phone: [phone]  Fax: [phone]  Follow Up Time:     Maldonado Plascencia  PLASTIC SURGERY  [address] [place] [address]  Phone: [phone]  Fax: [phone]  Follow Up Time:     Mayank Tripp)  Orthopaedic Surgery  [address], [address]  [place] [address]  Phone: [phone]  Fax: [phone]  Follow Up Time:     James Salazar)  Internal Medicine  [address] [place] [address]  Phone: [phone]  Fax: [phone]  Follow Up Time:     Brian Orozco)  Cardiology  [address], [address]  [place] [address]  Phone: [phone]  Fax: [phone]  Follow Up Time:

## 2023-08-04 NOTE — OCCUPATIONAL THERAPY INITIAL EVALUATION ADULT - ADL RETRAINING, OT EVAL
Detail Level: Detailed
Detail Level: Zone
GOAL: Pt will perform UB/LB dressing Independently in 4 weeks

## 2023-08-29 NOTE — SWALLOW BEDSIDE ASSESSMENT ADULT - SLP PRECAUTIONS/LIMITATIONS: VISION
Anesthesia Pre-Procedure Evaluation    Patient: Abiola Matute   MRN: [id number] : 1966        Procedure : Procedure(s):  Laparoscopic microwave ablation of liver tumor intraoperative ultrasound of liver          Past Medical History:   Diagnosis Date    Alcohol abuse     Alcoholic cirrhosis of liver with ascites     Anal fistula     Atypical ductal hyperplasia of breast 09/10/2010    ERT not recommended -left - and flat epithelial atypia-scheduled for breast biopsy 2010     Bifid uvula     Cholelithiasis     Contact perianal dermatitis and other eczema     recurrent - clobetasol     Crohn disease     Fear of flying      - gets Ativan prn.     HCC (hepatocellular carcinoma) (H) 2023    Hypertension     IBS (irritable bowel syndrome)       Past Surgical History:   Procedure Laterality Date    BIOPSY ANAL N/A 3/28/2019    anal biopsy and culure placement of seton - Dr Fleming    BIOPSY BREAST Left 2010    - scheduled with Dr. Varma     BIOPSY LIVER  2019    COLONOSCOPY  2006    COLONOSCOPY N/A 2014    Procedure: COMBINED COLONOSCOPY, SINGLE OR MULTIPLE BIOPSY/POLYPECTOMY BY BIOPSY;  Surgeon: Diane Fleming MD;  Location:  GI    COLONOSCOPY N/A 2019    Procedure: COLONOSCOPY, WITH POLYPECTOMY AND BIOPSY;  Surgeon: Farhan Schilling MD;  Location: [facility]    ENDOSCOPIC RETROGRADE CHOLANGIOPANCREATOGRAM N/A 2020    Procedure: ENDOSCOPIC RETROGRADE CHOLANGIOPANCREATOGRAPHY WITH, sledge removal,sphincterotomy, stent in gallbladder and pancreatic duct stent, and balloon dilation;  Surgeon: Guru Isac Kraft MD;  Location: [facility] OR    ESOPHAGOSCOPY, GASTROSCOPY, DUODENOSCOPY (EGD), COMBINED N/A 2019    Procedure: ESOPHAGOGASTRODUODENOSCOPY (EGD);  Surgeon: Farhan Schilling MD;  Location: [facility] GI    ESOPHAGOSCOPY, GASTROSCOPY, DUODENOSCOPY (EGD), COMBINED N/A 2023    Procedure: Esophagoscopy, gastroscopy, duodenoscopy (EGD), combined;  Surgeon: 
+hx of macular degeneration/impaired
Jeannette Cervantes MD;  Location: [facility] GI    EXAM UNDER ANESTHESIA ANUS N/A 3/28/2019    Procedure: EXAM UNDER ANESTHESIA ANUS;  Surgeon: Diane Fleming MD;  Location:  OR    EXAM UNDER ANESTHESIA ANUS N/A 2/26/2021    Procedure: EXAM UNDER ANESTHESIA OF ANUS, SETON PLACEMENT, EXCISION OF SKIN BRIDGE;  Surgeon: Avtar Nam MD;  [place] [facility]    EXAM UNDER ANESTHESIA ANUS N/A 1/6/2023    Procedure: EXAM UNDER ANESTHESIA, ANUS, SETON EXCHANGE, partial fistulotomy;  Surgeon: Mary Dugan MD;  Location: [facility] OR    HYSTERECTOMY, VAGINAL  2006    with Dr. Licha Zhou - with BSO for fibroids     IR GALLBLADDER DRAIN PLACEMENT  4/22/2020    IR SIRT (SELECTIVE INTERNAL RADIO THERAPY)  2/21/2023    IR VISCERAL ANGIOGRAM  2/21/2023    IR VISCERAL EMBOLIZATION  2/27/2023    OPEN REDUCTION INTERNAL FIXATION ANKLE Left at age 26    plates and screws removed at age 35    Pelviscopy with removal of bilateral hydrosalpinges.  04/15/2010    ZZC APPENDECTOMY  at age 13      Allergies   Allergen Reactions    Fish Oil      Redness and itching around eye area only - went away when fish oil capsules stopped     Metronidazole      pain/itching    Ppd [Tuberculin Purified Protein Derivative]     Sulfa Antibiotics      hives    Terbinafine And Related      Lamisil = mild urticarial reaction      Social History     Tobacco Use    Smoking status: Never     Passive exposure: Never    Smokeless tobacco: Never   Substance Use Topics    Alcohol use: Not Currently      Wt Readings from Last 1 Encounters:   08/29/23 83.5 kg (184 lb 1.4 oz)           Physical Exam    Airway        Mallampati: II   TM distance: > 3 FB   Neck ROM: full   Mouth opening: > 3 cm    Respiratory Devices and Support         Dental       (+) Minor Abnormalities - some fillings, tiny chips      Cardiovascular          Rhythm and rate: regular and normal     Pulmonary           breath sounds clear to auscultation           OUTSIDE 
LABS:  CBC:   Lab Results   Component Value Date    WBC 3.5 (L) 08/04/2023    WBC 4.7 07/07/2023    HGB 13.2 08/04/2023    HGB 14.0 07/07/2023    HCT 38.6 08/04/2023    HCT 41.9 07/07/2023     (L) 08/04/2023     (L) 07/07/2023     BMP:   Lab Results   Component Value Date     (L) 07/07/2023     05/17/2023    POTASSIUM 4.1 07/07/2023    POTASSIUM 3.9 05/17/2023    CHLORIDE 104 07/07/2023    CHLORIDE 102 05/17/2023    CO2 23 07/07/2023    CO2 25 05/17/2023    BUN 12.2 07/07/2023    BUN 10.3 05/17/2023    CR 0.70 07/07/2023    CR 0.63 05/17/2023    GLC 93 07/07/2023    GLC 89 05/17/2023     COAGS:   Lab Results   Component Value Date    PTT 41 (H) 04/21/2020    INR 1.16 (H) 07/07/2023     POC:   Lab Results   Component Value Date     (H) 04/24/2020    HCG Negative 06/07/2006    HCGS Negative 02/14/2023     HEPATIC:   Lab Results   Component Value Date    ALBUMIN 3.6 08/04/2023    PROTTOTAL 7.4 08/04/2023    ALT 33 08/04/2023    AST 66 (H) 08/04/2023     (H) 11/02/2017    ALKPHOS 125 (H) 08/04/2023    BILITOTAL 1.6 (H) 08/04/2023     OTHER:   Lab Results   Component Value Date    A1C 4.7 02/14/2023    ARIEL 8.8 07/07/2023    PHOS 4.3 02/14/2023    MAG 2.0 05/21/2014    LIPASE 205 04/25/2020    AMYLASE 79 04/24/2020    TSH 2.63 02/14/2023    CRP 5.0 09/15/2021    SED 54 (H) 08/04/2023       Anesthesia Plan    ASA Status:  3    NPO Status:  NPO Appropriate    Anesthesia Type: General.     - Airway: ETT   Induction: Intravenous.   Maintenance: Balanced.   Techniques and Equipment:     - Lines/Monitors: 2nd IV     Consents    Anesthesia Plan(s) and associated risks, benefits, and realistic alternatives discussed. Questions answered and patient/representative(s) expressed understanding.     - Discussed:     - Discussed with:  Patient      - Extended Intubation/Ventilatory Support Discussed: No.      - Patient is DNR/DNI Status: No     Use of blood products discussed: No . 
    Postoperative Care    Pain management: IV analgesics.   PONV prophylaxis: Ondansetron (or other 5HT-3), Dexamethasone or Solumedrol     Comments:    Other Comments: NPO. Meds reviewed. Pt with liver cirrhosis, HCC requiring ablation today. Labs reviewed. Pt has no CP or SOB, no recent URI. Crohn's stable currently. No problems with anesthesia in past.        H&P reviewed: Unable to attach H&P to encounter due to EHR limitations. H&P Update: appropriate H&P reviewed, patient examined. No interval changes since H&P (within 30 days).         Lori Judge MD  
+HX of macular degeneration as per son/impaired

## 2023-09-14 ENCOUNTER — OUTPATIENT (OUTPATIENT)
Dept: OUTPATIENT SERVICES | Facility: HOSPITAL | Age: 78
LOS: 1 days | End: 2023-09-14
Payer: MEDICARE

## 2023-09-14 ENCOUNTER — APPOINTMENT (OUTPATIENT)
Dept: RADIOLOGY | Facility: CLINIC | Age: 78
End: 2023-09-14
Payer: MEDICARE

## 2023-09-14 DIAGNOSIS — Z90.49 ACQUIRED ABSENCE OF OTHER SPECIFIED PARTS OF DIGESTIVE TRACT: Chronic | ICD-10-CM

## 2023-09-14 DIAGNOSIS — M48.061 SPINAL STENOSIS, LUMBAR REGION WITHOUT NEUROGENIC CLAUDICATION: ICD-10-CM

## 2023-09-14 PROCEDURE — 72082 X-RAY EXAM ENTIRE SPI 2/3 VW: CPT

## 2023-09-14 PROCEDURE — 72082 X-RAY EXAM ENTIRE SPI 2/3 VW: CPT | Mod: 26

## 2023-09-20 ENCOUNTER — APPOINTMENT (OUTPATIENT)
Dept: NEUROSURGERY | Facility: CLINIC | Age: 78
End: 2023-09-20
Payer: MEDICARE

## 2023-09-20 VITALS
DIASTOLIC BLOOD PRESSURE: 75 MMHG | BODY MASS INDEX: 32.39 KG/M2 | HEART RATE: 65 BPM | SYSTOLIC BLOOD PRESSURE: 120 MMHG | HEIGHT: 62 IN | WEIGHT: 176 LBS | OXYGEN SATURATION: 94 %

## 2023-09-20 DIAGNOSIS — M40.35 FLATBACK SYNDROME, THORACOLUMBAR REGION: ICD-10-CM

## 2023-09-20 DIAGNOSIS — S22.009A UNSPECIFIED FRACTURE OF UNSPECIFIED THORACIC VERTEBRA, INITIAL ENCOUNTER FOR CLOSED FRACTURE: ICD-10-CM

## 2023-09-20 PROCEDURE — 99212 OFFICE O/P EST SF 10 MIN: CPT

## 2023-12-18 DIAGNOSIS — Z99.3 DEPENDENCE ON WHEELCHAIR: ICD-10-CM

## 2024-04-22 NOTE — CONSULT NOTE ADULT - ASSESSMENT
R hip pain, likely multifactorial spine vs hip pathology   Will try to inject right hip bursa tomorrow  needs aggressive PT  Would not increase opiates- don't seem to be helping  may need outpt Ortho eval. No

## 2024-04-30 NOTE — DISCHARGE NOTE ADULT - COMMUNITY RESOURCES
[facility] for rehab address:[phone] [place] [address] tel # [phone], [facility] Lauri to transport via w/c thru the tunnel
no

## 2024-09-18 ENCOUNTER — APPOINTMENT (OUTPATIENT)
Dept: RADIOLOGY | Facility: CLINIC | Age: 79
End: 2024-09-18
Payer: MEDICARE

## 2024-09-18 ENCOUNTER — OUTPATIENT (OUTPATIENT)
Dept: OUTPATIENT SERVICES | Facility: HOSPITAL | Age: 79
LOS: 1 days | End: 2024-09-18
Payer: MEDICARE

## 2024-09-18 DIAGNOSIS — Z90.49 ACQUIRED ABSENCE OF OTHER SPECIFIED PARTS OF DIGESTIVE TRACT: Chronic | ICD-10-CM

## 2024-09-18 DIAGNOSIS — Z00.8 ENCOUNTER FOR OTHER GENERAL EXAMINATION: ICD-10-CM

## 2024-09-18 PROCEDURE — 72083 X-RAY EXAM ENTIRE SPI 4/5 VW: CPT | Mod: 26

## 2024-09-18 PROCEDURE — 77080 DXA BONE DENSITY AXIAL: CPT | Mod: 26

## 2024-09-18 PROCEDURE — 72100 X-RAY EXAM L-S SPINE 2/3 VWS: CPT

## 2024-09-18 PROCEDURE — 77080 DXA BONE DENSITY AXIAL: CPT

## 2024-09-18 PROCEDURE — 72082 X-RAY EXAM ENTIRE SPI 2/3 VW: CPT

## 2024-09-23 ENCOUNTER — NON-APPOINTMENT (OUTPATIENT)
Age: 79
End: 2024-09-23

## 2024-09-24 ENCOUNTER — APPOINTMENT (OUTPATIENT)
Dept: NEUROSURGERY | Facility: CLINIC | Age: 79
End: 2024-09-24
Payer: MEDICARE

## 2024-09-24 VITALS
HEIGHT: 62 IN | DIASTOLIC BLOOD PRESSURE: 70 MMHG | SYSTOLIC BLOOD PRESSURE: 168 MMHG | BODY MASS INDEX: 32.39 KG/M2 | WEIGHT: 176 LBS

## 2024-09-24 VITALS — OXYGEN SATURATION: 98 % | DIASTOLIC BLOOD PRESSURE: 85 MMHG | SYSTOLIC BLOOD PRESSURE: 147 MMHG | HEART RATE: 76 BPM

## 2024-09-24 DIAGNOSIS — S22.009A UNSPECIFIED FRACTURE OF UNSPECIFIED THORACIC VERTEBRA, INITIAL ENCOUNTER FOR CLOSED FRACTURE: ICD-10-CM

## 2024-09-24 DIAGNOSIS — S32.009A UNSPECIFIED FRACTURE OF UNSPECIFIED LUMBAR VERTEBRA, INITIAL ENCOUNTER FOR CLOSED FRACTURE: ICD-10-CM

## 2024-09-24 PROCEDURE — 99212 OFFICE O/P EST SF 10 MIN: CPT

## 2024-10-01 NOTE — PHYSICAL EXAM
[General Appearance - Alert] : alert [General Appearance - In No Acute Distress] : in no acute distress [Oriented To Time, Place, And Person] : oriented to person, place, and time [Outer Ear] : the ears and nose were normal in appearance [] : no respiratory distress [Heart Rate And Rhythm] : heart rate was normal and rhythm regular [Skin Color & Pigmentation] : normal skin color and pigmentation [FreeTextEntry1] : Wheelchair dependent

## 2024-10-01 NOTE — REASON FOR VISIT
[Follow-Up: _____] : a [unfilled] follow-up visit [FreeTextEntry1] : GREYSON TATE  is an 79 year who returns to the office for a follow-up visit and review of the diagnostic workup. Today Ms. TATE  is concerned about Her Xray results She is doing well with PT weekly She veers to the left side when sitting She ambulates with assistance with walker She has not had any falls

## 2024-10-01 NOTE — ASSESSMENT
[FreeTextEntry1] : Patient: Ms. Joleen Solomon, 79 years old Condition: S/P Lumbar Fusion, showing neurological improvement  Imaging and diagnostic workup findings: 1. Postsurgical changes are evident with posterior spinal fusion hardware between T10 and the pelvis. 2. Compression deformities from T11 through L1 are still present. 3. These changes are relatively stable.  Plan: - Continue physical therapy and implement ambulation strategies.  Follow-up: - Schedule a one-year follow-up with X-rays.  Please refer to Dr. Moody's dictation for further details. I, Dr. Huong Moody, assessed the patient with Nurse Practitioner Lucio Funez and established the plan of care. I discussed the patient's case with the nurse practitioner during the visit and concur with the assessment and plan as documented, unless otherwise noted.

## 2025-01-07 NOTE — CONSULT NOTE ADULT - ASSESSMENT
Patient is a 75yo F with a history of colon cancer s/p right hemicolectomy (approx 2 years ago, outside hospital), DVT (on Eliquis), CKD, adrenal insufficiency presenting as transfer from [facility] for trauma evaluation secondary to a mechanical fall from sitting with headstrike. Injuries identified include right forehead hematoma, acute minimally displaced right coracoid process scapular fracture, T11/L1 compression fractures and an inferior sternal fracture. Patient hemodynamically stable.    s/p Mechanical Fall:  Admitted to surgery  Right forehead hematoma, acute minimally displaced right coracoid process scapular fracture, T11/L1 compression fractures and an inferior sternal fracture  No acute surgical intervention as per ortho  Placed in sling  Xrays negative   CT spine with fusion L2-5. Fracture through the T12-L1 disc space with widening of the disc space and a L1-2 laminectomy which appears unstable. Diffuse osteopenia. Old T11 and L1 fractures.   MRI T/L spine with anterior splaying of the intervertebral disc space at the T12-L1 with edema in the anterior prevertebral soft tissues  Pain control  Bowel Regimen  Incentive spirometry   Should wear TLSO brace at all times  Monitor neuro checks  PT Eval  Bedrest *Keep patient flat* Spine precautions*  Care per neurosurgery     Chronic kidney disease, unspecified CKD stage:   Renal Dr. Treviño ([employer])  Outpt EMR reviewed; Cr range from 1.4 to 1.6   Monitor I/O's  Serial Cr  Avoid nephrotoxins  Renally dose medications  Continue to monitor    CAD:   Chronic, stable  Cont home CV meds  Monitor Cr - if cont to trend up hold arb    Hypothyroidism  Chronic, stable  Cont Synthroid 50mcg    GI ppx:  Protonix     Hx of DVT, lower extremity  Home med Eliquis, on hold  Heparin subq Q8H      [facility]  [phone]     Patient is a 75yo F with a history of colon cancer s/p right hemicolectomy (approx 2 years ago, outside hospital), DVT (on Eliquis), CKD, adrenal insufficiency presenting as transfer from [facility] for trauma evaluation secondary to a mechanical fall from sitting with headstrike. Injuries identified include right forehead hematoma, acute minimally displaced right coracoid process scapular fracture, T11/L1 compression fractures and an inferior sternal fracture. Patient hemodynamically stable.    s/p Mechanical Fall:  Admitted to surgery  Right forehead hematoma, acute minimally displaced right coracoid process scapular fracture, T11/L1 compression fractures and an inferior sternal fracture  No acute surgical intervention as per ortho  Placed in sling  Xrays negative   CT spine with fusion L2-5. Fracture through the T12-L1 disc space with widening of the disc space and a L1-2 laminectomy which appears unstable. Diffuse osteopenia. Old T11 and L1 fractures.   MRI T/L spine with anterior splaying of the intervertebral disc space at the T12-L1 with edema in the anterior prevertebral soft tissues  Pain control  Bowel Regimen  Incentive spirometry   Should wear TLSO brace at all times  Monitor neuro checks  PT Eval  Bedrest *Keep patient flat* Spine precautions*  Care per neurosurgery     Chronic kidney disease, unspecified CKD stage:   Renal Dr. Treviño ([employer])  Outpt EMR reviewed; Cr range from 1.4 to 1.6   Monitor I/O's  Serial Cr  Avoid nephrotoxins  Renally dose medications  Continue to monitor    CAD:   Chronic, stable  Cont home CV meds  Cr elevated - Losartan DC for now    Hypothyroidism  Chronic, stable  Cont Synthroid 50mcg    GI ppx:  Protonix     Hx of DVT, lower extremity  Home med Eliquis, on hold  Heparin subq Q8H      [facility]  [phone]     07-Jan-2025 22:01

## 2025-02-04 NOTE — PHYSICAL THERAPY INITIAL EVALUATION ADULT - SITTING BALANCE: STATIC
Keep a list of your medicines with you. List all of the prescription medicines, nonprescription medicines, supplements, natural remedies, and vitamins that you take. Tell your healthcare providers who treat you about all of the products you are taking. Your provider can provide you with a form to keep track of them. Just ask.  Follow the directions that come with your medicine, including information about food or alcohol. Make sure you know how and when to take your medicine. Do not take more or less than you are supposed to take.  Keep all medicines out of the reach of children.  Store medicines according to the directions on the label.  Monitor yourself. Learn to know how your body reacts to your new medicine and keep track of how it makes you feel before attempting (If your provider has allowed you to do so) to drive or go to work.   Seek emergency medical attention if you think you have used too much of this medicine. An overdose of any prescription medicine can be fatal. Overdose symptoms may include extreme drowsiness, muscle weakness, confusion, cold and clammy skin, pinpoint pupils, shallow breathing, slow heart rate, fainting, or coma.  Don't share prescription medicines with others, even when they seem to have the same symptoms. What may be good for you may be harmful to others.  If you are no longer taking a prescribed medication and you have pills left please take your pills out of their original containers. Mix crushed pills with an undesirable substance, such as cat litter or used coffee grounds. Put the mixture into a disposable container with a lid, such as an empty margarine tub, or into a sealable bag.  Cover up or remove any of your personal information on the empty containers by covering it with black permanent marker or duct tape.  Place the sealed container with the mixture, and the empty drug containers, in the trash.   If you use a medication that is in the form of a patch, dispose of used  fair balance

## 2025-06-12 NOTE — ASU DISCHARGE PLAN (ADULT/PEDIATRIC) - PROCEDURE
PACO ROBBINS    Patient Age: 74 year old   Interpreting service used: No    Insurance on file confirmed with caller: Yes    Patient seen within 1 year by a provider in primary care? Yes-      Refill to be: ePrescribed    Medication requested to be refilled: See Pended Medication    Addition Information: Original ordered by outside provider    Does patient have enough medication for 72 business hours? Yes-  Route message to providers clinical pool.    Caller informed to check with the pharmacy later for their refill. If problems arise, we will contact patient.    Message read back to caller for accuracy: Yes     WEIGHT AND HEIGHT:   Wt Readings from Last 1 Encounters:   05/27/25 69 kg (152 lb 3.2 oz)     Ht Readings from Last 1 Encounters:   05/27/25 4' 11\" (1.499 m)     BMI Readings from Last 1 Encounters:   05/27/25 30.74 kg/m²       ALLERGIES:  Albuterol  Current Outpatient Medications   Medication Sig Dispense Refill    metoPROLOL succinate (TOPROL-XL) 50 MG 24 hr tablet Take 1 tablet by mouth in the morning and 1 tablet in the evening.      predniSONE (DELTASONE) 20 MG tablet Take 1 tablet by mouth daily (with breakfast). 3 tablet 0    sertraline (ZOLOFT) 25 MG tablet Take 1 tablet by mouth daily.      irbesartan-hydrochlorothiazide (AVALIDE) 300-12.5 MG per tablet Take 1 tablet by mouth every morning.      miconazole (Zeasorb-AF) 2 % topical powder Apply to affected area as needed. Affected area must be completely dry before application. 70 g 5    clobetasol (TEMOVATE) 0.05 % ointment Apply 1 Application topically in the morning and 1 Application in the evening. X 3-4 weeks, then use qod 30 g 3    omeprazole (PriLOSEC) 40 MG capsule Take 1 capsule by mouth in the morning and 1 capsule in the evening. 180 capsule 3    sucralfate (CARAFATE) 1 g tablet Take 1 tablet by mouth in the morning and 1 tablet in the evening. 180 tablet 3    loratadine (CLARITIN) 10 MG tablet Take 10 mg by mouth daily.      Ascorbic  Colonoscopy Acid (VITAMIN C) 500 MG tablet Take 500 mg by mouth daily.      aspirin 81 MG tablet Take 81 mg by mouth daily.       calcium citrate/vit D (CITRACAL + D) 315-200 MG-UNIT per tablet Take by mouth daily.        No current facility-administered medications for this visit.         CALL BACK INFO: Ok to leave response (including medical information) on answering machine        PCP: Makenzie Fregoso DO         INS: Payor: [employer] / Plan: MEDICAREPPO / Product Type: MEDICARE   PATIENT ADDRESS:  [address] [place] [address] Dr Kathleen BOWLES [phone]

## 2025-09-18 ENCOUNTER — APPOINTMENT (OUTPATIENT)
Dept: RADIOLOGY | Facility: CLINIC | Age: 80
End: 2025-09-18